# Patient Record
Sex: FEMALE | Race: ASIAN | Employment: UNEMPLOYED | URBAN - METROPOLITAN AREA
[De-identification: names, ages, dates, MRNs, and addresses within clinical notes are randomized per-mention and may not be internally consistent; named-entity substitution may affect disease eponyms.]

---

## 2017-07-31 PROBLEM — M35.1 MIXED CONNECTIVE TISSUE DISEASE (HCC): Status: ACTIVE | Noted: 2017-07-31

## 2017-07-31 PROBLEM — I73.00 RAYNAUD'S DISEASE WITHOUT GANGRENE: Status: ACTIVE | Noted: 2017-07-31

## 2017-07-31 PROCEDURE — 85613 RUSSELL VIPER VENOM DILUTED: CPT | Performed by: INTERNAL MEDICINE

## 2017-07-31 PROCEDURE — 86038 ANTINUCLEAR ANTIBODIES: CPT | Performed by: INTERNAL MEDICINE

## 2017-07-31 PROCEDURE — 85597 PHOSPHOLIPID PLTLT NEUTRALIZ: CPT | Performed by: INTERNAL MEDICINE

## 2017-07-31 PROCEDURE — 85670 THROMBIN TIME PLASMA: CPT | Performed by: INTERNAL MEDICINE

## 2017-07-31 PROCEDURE — 81001 URINALYSIS AUTO W/SCOPE: CPT | Performed by: INTERNAL MEDICINE

## 2017-07-31 PROCEDURE — 84156 ASSAY OF PROTEIN URINE: CPT | Performed by: INTERNAL MEDICINE

## 2017-07-31 PROCEDURE — 85610 PROTHROMBIN TIME: CPT | Performed by: INTERNAL MEDICINE

## 2017-07-31 PROCEDURE — 86146 BETA-2 GLYCOPROTEIN ANTIBODY: CPT | Performed by: INTERNAL MEDICINE

## 2017-07-31 PROCEDURE — 82570 ASSAY OF URINE CREATININE: CPT | Performed by: INTERNAL MEDICINE

## 2017-07-31 PROCEDURE — 86160 COMPLEMENT ANTIGEN: CPT | Performed by: INTERNAL MEDICINE

## 2017-07-31 PROCEDURE — 85732 THROMBOPLASTIN TIME PARTIAL: CPT | Performed by: INTERNAL MEDICINE

## 2017-07-31 PROCEDURE — 86147 CARDIOLIPIN ANTIBODY EA IG: CPT | Performed by: INTERNAL MEDICINE

## 2017-07-31 PROCEDURE — 87086 URINE CULTURE/COLONY COUNT: CPT | Performed by: INTERNAL MEDICINE

## 2017-07-31 PROCEDURE — 85598 HEXAGNAL PHOSPH PLTLT NEUTRL: CPT | Performed by: INTERNAL MEDICINE

## 2017-09-28 PROCEDURE — 86803 HEPATITIS C AB TEST: CPT | Performed by: INTERNAL MEDICINE

## 2017-09-28 PROCEDURE — 86480 TB TEST CELL IMMUN MEASURE: CPT | Performed by: INTERNAL MEDICINE

## 2017-09-28 PROCEDURE — 87340 HEPATITIS B SURFACE AG IA: CPT | Performed by: INTERNAL MEDICINE

## 2017-09-28 PROCEDURE — 86704 HEP B CORE ANTIBODY TOTAL: CPT | Performed by: INTERNAL MEDICINE

## 2017-09-28 PROCEDURE — 86160 COMPLEMENT ANTIGEN: CPT | Performed by: INTERNAL MEDICINE

## 2017-09-28 PROCEDURE — 86706 HEP B SURFACE ANTIBODY: CPT | Performed by: INTERNAL MEDICINE

## 2017-12-04 PROCEDURE — 85613 RUSSELL VIPER VENOM DILUTED: CPT | Performed by: INTERNAL MEDICINE

## 2017-12-04 PROCEDURE — 85705 THROMBOPLASTIN INHIBITION: CPT | Performed by: INTERNAL MEDICINE

## 2017-12-04 PROCEDURE — 85732 THROMBOPLASTIN TIME PARTIAL: CPT | Performed by: INTERNAL MEDICINE

## 2017-12-04 PROCEDURE — 85610 PROTHROMBIN TIME: CPT | Performed by: INTERNAL MEDICINE

## 2017-12-10 PROBLEM — Z79.899 HIGH RISK MEDICATION USE: Status: ACTIVE | Noted: 2017-12-10

## 2018-12-26 PROCEDURE — 86431 RHEUMATOID FACTOR QUANT: CPT | Performed by: INTERNAL MEDICINE

## 2018-12-26 PROCEDURE — 86160 COMPLEMENT ANTIGEN: CPT | Performed by: INTERNAL MEDICINE

## 2018-12-26 PROCEDURE — 83876 ASSAY MYELOPEROXIDASE: CPT | Performed by: INTERNAL MEDICINE

## 2018-12-26 PROCEDURE — 83516 IMMUNOASSAY NONANTIBODY: CPT | Performed by: INTERNAL MEDICINE

## 2018-12-26 PROCEDURE — 86225 DNA ANTIBODY NATIVE: CPT | Performed by: INTERNAL MEDICINE

## 2018-12-26 PROCEDURE — 86235 NUCLEAR ANTIGEN ANTIBODY: CPT | Performed by: INTERNAL MEDICINE

## 2018-12-26 PROCEDURE — 86038 ANTINUCLEAR ANTIBODIES: CPT | Performed by: INTERNAL MEDICINE

## 2018-12-27 ENCOUNTER — HOSPITAL ENCOUNTER (OUTPATIENT)
Age: 43
Discharge: HOME | End: 2018-12-27
Attending: FAMILY MEDICINE
Payer: COMMERCIAL

## 2018-12-27 VITALS
WEIGHT: 143.31 LBS | RESPIRATION RATE: 16 BRPM | SYSTOLIC BLOOD PRESSURE: 119 MMHG | TEMPERATURE: 99 F | BODY MASS INDEX: 26 KG/M2 | DIASTOLIC BLOOD PRESSURE: 81 MMHG | HEART RATE: 78 BPM | OXYGEN SATURATION: 100 %

## 2018-12-27 DIAGNOSIS — L97.919 ULCERS OF BOTH LOWER LEGS (HCC): Primary | ICD-10-CM

## 2018-12-27 DIAGNOSIS — Z87.898 HISTORY OF MIXED CONNECTIVE TISSUE DISEASE: ICD-10-CM

## 2018-12-27 DIAGNOSIS — M79.2 NEURALGIA: ICD-10-CM

## 2018-12-27 DIAGNOSIS — L84 PLANTAR CALLUS: ICD-10-CM

## 2018-12-27 DIAGNOSIS — L97.929 ULCERS OF BOTH LOWER LEGS (HCC): Primary | ICD-10-CM

## 2018-12-27 PROCEDURE — 99204 OFFICE O/P NEW MOD 45 MIN: CPT

## 2018-12-27 PROCEDURE — 99203 OFFICE O/P NEW LOW 30 MIN: CPT

## 2018-12-27 PROCEDURE — 99202 OFFICE O/P NEW SF 15 MIN: CPT

## 2018-12-27 NOTE — ED INITIAL ASSESSMENT (HPI)
Pt does not have PCP, Seen by Rheumatologist Dr Sumi Gil yesterday who ordered blood work which was done also advised to set up PCP appointment - 1/3  & wound clinic - appointment 1/3.   Pt has bilat lower leg wounds requesting pain control until get into her o

## 2018-12-27 NOTE — ED PROVIDER NOTES
Patient Seen in: [facility]    History   Patient presents with:  Cellulitis (integumentary, infectious)  Pain (neurologic)    Stated Complaint: wound check    HPI  64-year-old female with a  presents to immediate car well-nourished. No distress. HENT:   Head: Normocephalic and atraumatic. Right Ear: External ear normal.   Left Ear: External ear normal.   Nose: Nose normal.   Mouth/Throat: Oropharynx is clear and moist. No oropharyngeal exudate.    Eyes: Conjunctivae Disposition and Plan     Clinical Impression:  Ulcers of both lower legs ([address]  (primary encounter diagnosis)  History of mixed connective tissue disease  Neuralgia  Plantar callus    Disposition:  Discharge  12/27/2018 10:28 am    Follow-up:

## 2018-12-27 NOTE — ED NOTES
Pt and family given instruction on wound care and follow-up with the wound clinic. Pt receptive to instructions given.

## 2019-01-15 ENCOUNTER — OFFICE VISIT (OUTPATIENT)
Dept: WOUND CARE | Facility: HOSPITAL | Age: 44
End: 2019-01-15
Attending: NURSE PRACTITIONER
Payer: COMMERCIAL

## 2019-01-15 DIAGNOSIS — L94.9 LOCALIZED CONNECTIVE TISSUE DISORDER, UNSPECIFIED: ICD-10-CM

## 2019-01-15 DIAGNOSIS — L97.322 NON-PRESSURE CHRONIC ULCER OF LEFT ANKLE WITH FAT LAYER EXPOSED (HCC): Primary | ICD-10-CM

## 2019-01-15 DIAGNOSIS — L97.312 NON-PRESSURE CHRONIC ULCER OF RIGHT ANKLE WITH FAT LAYER EXPOSED (HCC): ICD-10-CM

## 2019-01-15 DIAGNOSIS — L97.929 CHRONIC VENOUS HYPERTENSION (IDIOPATHIC) WITH ULCER AND INFLAMMATION OF BILATERAL LOWER EXTREMITY (HCC): ICD-10-CM

## 2019-01-15 DIAGNOSIS — I87.333 CHRONIC VENOUS HYPERTENSION (IDIOPATHIC) WITH ULCER AND INFLAMMATION OF BILATERAL LOWER EXTREMITY (HCC): ICD-10-CM

## 2019-01-15 DIAGNOSIS — L97.919 CHRONIC VENOUS HYPERTENSION (IDIOPATHIC) WITH ULCER AND INFLAMMATION OF BILATERAL LOWER EXTREMITY (HCC): ICD-10-CM

## 2019-01-15 PROCEDURE — 99215 OFFICE O/P EST HI 40 MIN: CPT

## 2019-01-15 NOTE — PROGRESS NOTES
Subjective    Chief Complaint  This information was obtained from the patient  patient is here on initial visit, wound to both legs. wounds began 12/26/18, unknown how they started. pain to wounds 8/10, burning sensation - worst at night.     Allergies  No Cancer - No History, Diabetes - Mother, Father, Heart Disease - No History, Hypertension - No History, Kidney Disease - No History, Lung Disease - No History, Mental Illness - No History, Stroke - No History, Thyroid Problems - No History, Tuberculosis - N Medication reconciliation completed at today's visit. : Yes  History of chemotherapy?: No  History of radiation?: No    Medications  prednisone - oral 5 mg tablet        Objective    Constitutional  bp wnl for patient. Pulse Regular and wnl for patient. Chuyita RODRIGUEZ The periwound skin moisture is normal. The periwound skin exhibited: Brawny Induration, Erythema, Hemosiderosis. The periwound skin did not exhibit: Edema, Friable, Rash. The temperature of the periwound skin is Cool.  Periwound skin does not exhibit signs Plan    Additional Orders:    Wound Cleansing & Dressings  May shower with protection.   Cleanse with saline or wound cleanser - soak with PURE AND CLEAN  Enluxtra humidifiber  Medipore tape (no substitution)   Change dressing every: - ON SATURDAY    Comp Other Physician Referral - rheumatology options: 1) Chad Santos is with [employer] of [address] in [address] [facility] 2) [address]:  A follow-up appointment should be scheduled.         I discussed with the patient an

## 2019-01-22 ENCOUNTER — OFFICE VISIT (OUTPATIENT)
Dept: WOUND CARE | Facility: HOSPITAL | Age: 44
End: 2019-01-22
Attending: NURSE PRACTITIONER
Payer: COMMERCIAL

## 2019-01-22 ENCOUNTER — HOSPITAL ENCOUNTER (OUTPATIENT)
Dept: CARDIOLOGY CLINIC | Facility: HOSPITAL | Age: 44
Discharge: HOME | End: 2019-01-22
Attending: NURSE PRACTITIONER
Payer: COMMERCIAL

## 2019-01-22 DIAGNOSIS — L97.312 NON-PRESSURE CHRONIC ULCER OF ANKLE WITH FAT LAYER EXPOSED, RIGHT (HCC): ICD-10-CM

## 2019-01-22 DIAGNOSIS — L97.322 NON-PRESSURE CHRONIC ULCER OF LEFT ANKLE, WITH FAT LAYER EXPOSED (HCC): ICD-10-CM

## 2019-01-22 DIAGNOSIS — L97.322 NON-PRESSURE CHRONIC ULCER OF LEFT ANKLE, WITH FAT LAYER EXPOSED (HCC): Primary | ICD-10-CM

## 2019-01-22 PROCEDURE — 99214 OFFICE O/P EST MOD 30 MIN: CPT

## 2019-01-22 PROCEDURE — 93925 LOWER EXTREMITY STUDY: CPT | Performed by: NURSE PRACTITIONER

## 2019-01-22 NOTE — PROGRESS NOTES
Subjective    Chief Complaint  This information was obtained from the patient  patient is here for followup for bilateral leg wounds. She have vascular studies scheduled for 10 am today. She arrives with single layer tubigrip in place.  She states pain cont 1-22-19 patient returns today with spouse, she does have her vascular studies scheduled for 10 am today.  she is wearing single layer tubi , ambulates without difficulty, no acute s/s of infection, still c/o burning pain at noc has been taking gabapenti dp palpable bilaterally. Extremities free of varicosities, no edema, +hemosideran staining. Capillary refill < 3 seconds. Digits are cool and dusky in color. toenails are wnl for color, thickness and hygeine. + hairgrowth on legs.  .     Musculoskeletal:  G The periwound skin moisture is normal. The periwound skin exhibited: Brawny Induration, Hemosiderosis. The periwound skin did not exhibit: Edema. The temperature of the periwound skin is Cool. Periwound skin does not exhibit signs or symptoms of infection. Protein: Meats, beans, eggs, milk and yogurt particularly [place] yogurt), tofu, soy nuts, soy protein products  Vitamin C: Citrus fruits and juices, strawberries, tomatoes, tomato juice, peppers, baked potatoes, spinach, broccoli, cauliflower, Frederick spro

## 2019-01-29 ENCOUNTER — OFFICE VISIT (OUTPATIENT)
Dept: WOUND CARE | Facility: HOSPITAL | Age: 44
End: 2019-01-29
Attending: NURSE PRACTITIONER
Payer: COMMERCIAL

## 2019-01-29 DIAGNOSIS — L97.312 NON-PRESSURE CHRONIC ULCER OF ANKLE WITH FAT LAYER EXPOSED, RIGHT (HCC): ICD-10-CM

## 2019-01-29 DIAGNOSIS — L97.322 NON-PRESSURE CHRONIC ULCER OF LEFT ANKLE, WITH FAT LAYER EXPOSED (HCC): Primary | ICD-10-CM

## 2019-01-29 DIAGNOSIS — L97.322 NON-PRESSURE CHRONIC ULCER OF LEFT ANKLE WITH FAT LAYER EXPOSED (HCC): ICD-10-CM

## 2019-01-29 PROCEDURE — 97597 DBRDMT OPN WND 1ST 20 CM/<: CPT

## 2019-01-29 NOTE — PROGRESS NOTES
Subjective    Chief Complaint  This information was obtained from the patient  patient is here for followup for bilateral leg wounds. She arrives with single layer tubigrip in place. She states pain continues to be worse at night.      Allergies  No known a 1-29-19 patient returns today with spouse, we discussed vascular studies, wounds are improving pain is better in right lower extremity since wound has healed. patient has appt with dr Brian Flowers and then with dr Natalia Rogers at the end of february.   continue current p dp palpable bilaterally. Extremities free of varicosities, no edema, +hemosideran staining. Capillary refill < 3 seconds. Digits are cool and dusky in color. toenails are wnl for color, thickness and hygeine. + hairgrowth on legs.  .     Musculoskeletal:  G The periwound skin moisture is normal. The periwound skin exhibited: Hemosiderosis. The periwound skin did not exhibit: Brawny Induration, Edema. The temperature of the periwound skin is Cool. Periwound skin does not exhibit signs or symptoms of infection. Cleanse with saline or wound cleanser - soak with PURE AND CLEAN or VASHE  Enluxtra humidifiber  Medipore tape (no substitution)   Change dressing every: - weekly    Compression Therapy:  Tubigrip - single C BILATERALLY  Other: - MAKE SURE YOU WEAR VERY WA Manuel DOMINGUEZ, NANCY-AP, CFCN, [facility] 01/29/2019 08:10:49       Entered By: Manuel Parra on 01/29/2019 08:10:38

## 2019-01-31 ENCOUNTER — OFFICE VISIT (OUTPATIENT)
Dept: RHEUMATOLOGY | Facility: CLINIC | Age: 44
End: 2019-01-31
Payer: COMMERCIAL

## 2019-01-31 VITALS
HEART RATE: 64 BPM | DIASTOLIC BLOOD PRESSURE: 62 MMHG | BODY MASS INDEX: 23 KG/M2 | SYSTOLIC BLOOD PRESSURE: 108 MMHG | WEIGHT: 132.81 LBS | RESPIRATION RATE: 12 BRPM | TEMPERATURE: 98 F

## 2019-01-31 DIAGNOSIS — M79.10 MYALGIA: ICD-10-CM

## 2019-01-31 DIAGNOSIS — R76.8 RIBONUCLEOPROTEIN ANTIBODY POSITIVE: ICD-10-CM

## 2019-01-31 DIAGNOSIS — I73.01 RAYNAUD'S DISEASE WITH GANGRENE (HCC): ICD-10-CM

## 2019-01-31 DIAGNOSIS — M35.1 MIXED CONNECTIVE TISSUE DISEASE (HCC): Primary | ICD-10-CM

## 2019-01-31 DIAGNOSIS — Z79.899 HIGH RISK MEDICATION USE: ICD-10-CM

## 2019-01-31 DIAGNOSIS — R76.8 SS-A ANTIBODY POSITIVE: ICD-10-CM

## 2019-01-31 PROCEDURE — 99245 OFF/OP CONSLTJ NEW/EST HI 55: CPT | Performed by: INTERNAL MEDICINE

## 2019-01-31 RX ORDER — AMLODIPINE BESYLATE 2.5 MG/1
2.5 TABLET ORAL DAILY
Qty: 30 TABLET | Refills: 2 | Status: SHIPPED | OUTPATIENT
Start: 2019-01-31 | End: 2019-05-01

## 2019-01-31 RX ORDER — FOLIC ACID 1 MG/1
1 TABLET ORAL DAILY
Qty: 90 TABLET | Refills: 0 | Status: SHIPPED | OUTPATIENT
Start: 2019-01-31 | End: 2019-05-28

## 2019-01-31 RX ORDER — METHOTREXATE 2.5 MG/1
15 TABLET ORAL WEEKLY
Qty: 24 TABLET | Refills: 2 | Status: SHIPPED | OUTPATIENT
Start: 2019-01-31 | End: 2019-02-27

## 2019-01-31 RX ORDER — PREDNISONE 1 MG/1
5 TABLET ORAL DAILY
Qty: 30 TABLET | Refills: 2 | Status: SHIPPED | OUTPATIENT
Start: 2019-01-31 | End: 2019-05-28

## 2019-01-31 NOTE — PATIENT INSTRUCTIONS
-- start daily amlodipine to encourage better blood flow to your fingers/toes  -- start methotrexate 6 tabs weekly and daily folic acid 1mg   -- read about both medications below  -- continue with current dose of prednisone 5mg, be sure to be getting calci drug used to treat cancer including breast cancer, leukemia, and lymphoma. This medicine can also be used to treat psoriasis and certain kinds of arthritis. How should I use this medicine? Take this medicine by mouth with a glass of water.  Follow the dir gums, or nose  · signs and symptoms of kidney injury like trouble passing urine or change in the amount of urine  · signs and symptoms of liver injury like dark yellow or brown urine; general ill feeling or flu-like symptoms; light-colored stools; loss of red cell counts  · lung disease  · radiation therapy  · stomach ulcers  · ulcerative colitis  · an unusual or allergic reaction to methotrexate, other medicines, foods, dyes, or preservatives  · pregnant or trying to get pregnant  · breast-feeding  What sh sheet is a summary. It may not cover all possible information. If you have questions about this medicine, talk to your doctor, pharmacist, or health care provider.  Copyright© 2018 [employer]        Amlodipine tablets  Brand Name: Norvasc  What is this medici it is almost time for your next dose, take only that dose. Do not take double or extra doses. Where should I keep my medicine? Keep out of the reach of children. Store at room temperature between 59 and 86 degrees F (15 and 30 degrees C).  Protect from l Raynaud disease? A Raynaud disease attack is often triggered by cold or stress. During an attack, blood vessels suddenly narrow (called vasospasm).  This most often happens in fingers and toes. In rare cases, the nose, ears, or even tongue are affected.  Marv Schultz as beta-blockers, migraine medicine, birth control pills and others  · Injury  How is Raynaud disease diagnosed? Your description of your symptoms, a health history, and a physical exam are often enough for a diagnosis.  Blood tests and other tests may be isn’t likely to cause any permanent damage. If attacks are severe, very prolonged, or very often, skin damage may result. Controlling attacks can help prevent this. When to seek medical care  The following problems happen rarely, but they can be serious.

## 2019-02-05 ENCOUNTER — OFFICE VISIT (OUTPATIENT)
Dept: WOUND CARE | Facility: HOSPITAL | Age: 44
End: 2019-02-05
Attending: NURSE PRACTITIONER
Payer: COMMERCIAL

## 2019-02-05 DIAGNOSIS — L97.322 NON-PRESSURE CHRONIC ULCER OF LEFT ANKLE, WITH FAT LAYER EXPOSED (HCC): Primary | ICD-10-CM

## 2019-02-05 DIAGNOSIS — L97.312 NON-PRESSURE CHRONIC ULCER OF ANKLE WITH FAT LAYER EXPOSED, RIGHT (HCC): ICD-10-CM

## 2019-02-05 PROCEDURE — 99214 OFFICE O/P EST MOD 30 MIN: CPT

## 2019-02-05 NOTE — PROGRESS NOTES
Subjective    Chief Complaint  This information was obtained from the patient  patient is here for followup for bilateral leg wounds. She arrives with single layer tubigrip in place.  She states pain continues to be worse at night. has new medications but n 1-22-19 patient returns today with spouse, she does have her vascular studies scheduled for 10 am today.  she is wearing single layer tubi , ambulates without difficulty, no acute s/s of infection, still c/o burning pain at noc has been taking gabapenti Additional Information  Medication reconciliation completed at today's visit. : Yes        Objective    Constitutional  bp wnl for patient. Pulse Regular and wnl for patient. Geo Mehta Respirations easy and unlabored. Temperature wnl. Weight normal for height. Geo Mehta  Kenneth judgement and insight is intact, however assistance by family for medical decision-making. Alert and oriented times 3. Patient is anxious, but cooperative with exam and answers questions appropriately.         Assessment    Active Problems    ICD-10  (Encou S/S of Infection  Non-adherence    Care summary  Reviewed and evaluated labs. - Dec 2018 bun 7, creat 0.63, gfr 110    Wound type: - AUTOIMMUNE  Wound improving. No s/s of infection.    Compression technique: - single C TUBI  Perfusion assessed by HANNAH/TBI -

## 2019-02-12 ENCOUNTER — OFFICE VISIT (OUTPATIENT)
Dept: WOUND CARE | Facility: HOSPITAL | Age: 44
End: 2019-02-12
Attending: NURSE PRACTITIONER
Payer: COMMERCIAL

## 2019-02-12 DIAGNOSIS — L97.322 NON-PRESSURE CHRONIC ULCER OF LEFT ANKLE, WITH FAT LAYER EXPOSED (HCC): Primary | ICD-10-CM

## 2019-02-12 DIAGNOSIS — L97.312 NON-PRESSURE CHRONIC ULCER OF ANKLE WITH FAT LAYER EXPOSED, RIGHT (HCC): ICD-10-CM

## 2019-02-12 PROCEDURE — 99214 OFFICE O/P EST MOD 30 MIN: CPT

## 2019-02-12 NOTE — PROGRESS NOTES
Subjective    Chief Complaint  This information was obtained from the patient  patient is here for followup for bilateral leg wounds. Patients stated pain on the wound.      Allergies  No known allergies    HPI  This information was obtained from the patien 2-5-19 patient returns today, they saw dr. Car Beth and she was restarted on methotrexate and also amlodipine.   patient continues with pain in ble.  wound improving although there is an area of atrophie jay proximal to the wound that I am concerned may b bp wnl for patient. Pulse Regular and wnl for patient. Jayla Spies Respirations easy and unlabored. Temperature wnl. Weight normal for height. . Appearance neat and clean. Appears in no acute distress. Well nourished and well developed.  Height/Length: 64 in (162.56 cm Wound #3 Left, Posterior Ankle is an acute Deep Tissue Pressure Injury Persistent non-blanchable deep red, maroon or purple discoloration Pressure Ulcer and has received a status of Not Healed.  Initial wound encounter measurements are 1.8cm length x 0.9cm Enluxtra humidifiber  Medipore tape (no substitution)   Change dressing every: - weekly    Compression Therapy:  Tubigrip - single C BILATERALLY-MAKE SURE THERE IS NOT ANY WRINKLES AND THAT THE TUBI  GOES FROM THE BASE OF TOES TO ABOVE THE ANKLE  Other Sabino DOMINGUEZ, NANCY-AP, CFCN, [facility] 02/12/2019 08:05:38       Entered By: Sabino Norris on 02/12/2019 08:05:27

## 2019-02-19 ENCOUNTER — OFFICE VISIT (OUTPATIENT)
Dept: WOUND CARE | Facility: HOSPITAL | Age: 44
End: 2019-02-19
Attending: NURSE PRACTITIONER
Payer: COMMERCIAL

## 2019-02-19 ENCOUNTER — APPOINTMENT (OUTPATIENT)
Dept: LAB | Facility: HOSPITAL | Age: 44
End: 2019-02-19
Attending: INTERNAL MEDICINE
Payer: COMMERCIAL

## 2019-02-19 DIAGNOSIS — R76.8 RIBONUCLEOPROTEIN ANTIBODY POSITIVE: ICD-10-CM

## 2019-02-19 DIAGNOSIS — M35.1 MIXED CONNECTIVE TISSUE DISEASE (HCC): ICD-10-CM

## 2019-02-19 DIAGNOSIS — M79.10 MYALGIA: ICD-10-CM

## 2019-02-19 DIAGNOSIS — L97.312 NON-PRESSURE CHRONIC ULCER OF ANKLE WITH FAT LAYER EXPOSED, RIGHT (HCC): ICD-10-CM

## 2019-02-19 DIAGNOSIS — I73.01 RAYNAUD'S DISEASE WITH GANGRENE (HCC): ICD-10-CM

## 2019-02-19 DIAGNOSIS — R76.8 SS-A ANTIBODY POSITIVE: ICD-10-CM

## 2019-02-19 DIAGNOSIS — L97.322 NON-PRESSURE CHRONIC ULCER OF LEFT ANKLE, WITH FAT LAYER EXPOSED (HCC): Primary | ICD-10-CM

## 2019-02-19 DIAGNOSIS — L97.322 NON-PRESSURE CHRONIC ULCER OF LEFT ANKLE WITH FAT LAYER EXPOSED (HCC): ICD-10-CM

## 2019-02-19 LAB
ALBUMIN SERPL-MCNC: 4 G/DL (ref 3.4–5)
ALBUMIN/GLOB SERPL: 0.9 {RATIO} (ref 1–2)
ALP LIVER SERPL-CCNC: 89 U/L (ref 37–98)
ALT SERPL-CCNC: 17 U/L (ref 13–56)
ANION GAP SERPL CALC-SCNC: 5 MMOL/L (ref 0–18)
AST SERPL-CCNC: 18 U/L (ref 15–37)
BASOPHILS # BLD AUTO: 0.02 X10(3) UL (ref 0–0.2)
BASOPHILS NFR BLD AUTO: 0.5 %
BILIRUB SERPL-MCNC: 0.6 MG/DL (ref 0.1–2)
BUN BLD-MCNC: 10 MG/DL (ref 7–18)
BUN/CREAT SERPL: 16.1 (ref 10–20)
CALCIUM BLD-MCNC: 8.9 MG/DL (ref 8.5–10.1)
CHLORIDE SERPL-SCNC: 112 MMOL/L (ref 98–107)
CO2 SERPL-SCNC: 26 MMOL/L (ref 21–32)
CREAT BLD-MCNC: 0.62 MG/DL (ref 0.55–1.02)
CRP SERPL-MCNC: <0.29 MG/DL (ref ?–0.3)
DEPRECATED RDW RBC AUTO: 45 FL (ref 35.1–46.3)
EOSINOPHIL # BLD AUTO: 0.07 X10(3) UL (ref 0–0.7)
EOSINOPHIL NFR BLD AUTO: 1.9 %
ERYTHROCYTE [DISTWIDTH] IN BLOOD BY AUTOMATED COUNT: 14.4 % (ref 11–15)
GLOBULIN PLAS-MCNC: 4.6 G/DL (ref 2.8–4.4)
GLUCOSE BLD-MCNC: 69 MG/DL (ref 70–99)
HCT VFR BLD AUTO: 37.7 % (ref 35–48)
HGB BLD-MCNC: 11.6 G/DL (ref 12–16)
IMM GRANULOCYTES # BLD AUTO: 0.01 X10(3) UL (ref 0–1)
IMM GRANULOCYTES NFR BLD: 0.3 %
LYMPHOCYTES # BLD AUTO: 0.55 X10(3) UL (ref 1–4)
LYMPHOCYTES NFR BLD AUTO: 15.1 %
M PROTEIN MFR SERPL ELPH: 8.6 G/DL (ref 6.4–8.2)
MCH RBC QN AUTO: 26.6 PG (ref 26–34)
MCHC RBC AUTO-ENTMCNC: 30.8 G/DL (ref 31–37)
MCV RBC AUTO: 86.5 FL (ref 80–100)
MONOCYTES # BLD AUTO: 0.32 X10(3) UL (ref 0.1–1)
MONOCYTES NFR BLD AUTO: 8.8 %
NEUTROPHILS # BLD AUTO: 2.68 X10 (3) UL (ref 1.5–7.7)
NEUTROPHILS # BLD AUTO: 2.68 X10(3) UL (ref 1.5–7.7)
NEUTROPHILS NFR BLD AUTO: 73.4 %
OSMOLALITY SERPL CALC.SUM OF ELEC: 293 MOSM/KG (ref 275–295)
PLATELET # BLD AUTO: 200 10(3)UL (ref 150–450)
POTASSIUM SERPL-SCNC: 3.6 MMOL/L (ref 3.5–5.1)
RBC # BLD AUTO: 4.36 X10(6)UL (ref 3.8–5.3)
SED RATE-ML: 37 MM/HR (ref 0–25)
SODIUM SERPL-SCNC: 143 MMOL/L (ref 136–145)
WBC # BLD AUTO: 3.7 X10(3) UL (ref 4–11)

## 2019-02-19 PROCEDURE — 86235 NUCLEAR ANTIGEN ANTIBODY: CPT

## 2019-02-19 PROCEDURE — 86140 C-REACTIVE PROTEIN: CPT | Performed by: INTERNAL MEDICINE

## 2019-02-19 PROCEDURE — 80053 COMPREHEN METABOLIC PANEL: CPT | Performed by: INTERNAL MEDICINE

## 2019-02-19 PROCEDURE — 85025 COMPLETE CBC W/AUTO DIFF WBC: CPT | Performed by: INTERNAL MEDICINE

## 2019-02-19 PROCEDURE — 82595 ASSAY OF CRYOGLOBULIN: CPT

## 2019-02-19 PROCEDURE — 99214 OFFICE O/P EST MOD 30 MIN: CPT

## 2019-02-19 PROCEDURE — 83883 ASSAY NEPHELOMETRY NOT SPEC: CPT

## 2019-02-19 PROCEDURE — 84165 PROTEIN E-PHORESIS SERUM: CPT

## 2019-02-19 PROCEDURE — 36415 COLL VENOUS BLD VENIPUNCTURE: CPT | Performed by: INTERNAL MEDICINE

## 2019-02-19 PROCEDURE — 86334 IMMUNOFIX E-PHORESIS SERUM: CPT

## 2019-02-19 PROCEDURE — 85652 RBC SED RATE AUTOMATED: CPT | Performed by: INTERNAL MEDICINE

## 2019-02-19 PROCEDURE — 83516 IMMUNOASSAY NONANTIBODY: CPT

## 2019-02-19 NOTE — PROGRESS NOTES
Subjective    Chief Complaint  This information was obtained from the patient  patient is here for followup for bilateral leg wounds. Patients stated pain on the wound or new concerns.     Allergies  No known allergies    HPI  This information was obtained 2-5-19 patient returns today, they saw dr. Nicki Betancur and she was restarted on methotrexate and also amlodipine.   patient continues with pain in ble.  wound improving although there is an area of atrophie jay proximal to the wound that I am concerned may b Medication reconciliation completed at today's visit. : Yes        Objective    Constitutional  bp wnl for patient. Pulse Regular and wnl for patient. Royann Dana Respirations easy and unlabored. Temperature wnl. Weight normal for height. . Appearance neat and clean. Wound #3 Left, Posterior Ankle is an acute Deep Tissue Pressure Injury Persistent non-blanchable deep red, maroon or purple discoloration Pressure Ulcer and has received a status of Not Healed.  Subsequent wound encounter measurements are 1.9cm length x 0.7 Wound #3 Left, Posterior Ankle     Wound Cleansing & Dressings  Betadine  Change Dressing Daily and PRN    Additional Orders: Follow-Up Appointments  Return Appointment in 1 week. Misc/Additional Orders  Supplement with a daily multivitamin.   Incre Electronic Signature(s)  Signed By: Date:  Marylouise Schirmer FNP-C, NANCY-BHARGAVI, CFALISON, [facility] 02/19/2019 08:23:59       Entered By: Marylouise Schirmer on 02/19/2019 08:23:44

## 2019-02-22 LAB
ALBUMIN SERPL-MCNC: 4.61 G/DL (ref 3.1–4.5)
ALBUMIN/GLOB SERPL: 1.25 {RATIO}
ALPHA1 GLOB SERPL ELPH-MCNC: 0.19 G/DL (ref 0.1–0.3)
ALPHA2 GLOB SERPL ELPH-MCNC: 0.86 G/DL (ref 0.6–1)
B-GLOBULIN SERPL ELPH-MCNC: 0.76 G/DL (ref 0.7–1.2)
GAMMA GLOB SERPL ELPH-MCNC: 1.88 G/DL (ref 0.6–1.6)
KAPPA FREE LIGHT CHAIN: 4.21 MG/DL (ref 0.33–1.94)
KAPPA/LAMBDA FLC RATIO: 1.85 (ref 0.26–1.65)
LAMBDA FREE LIGHT CHAIN: 2.28 MG/DL (ref 0.57–2.63)
MAI PROTEIN SERPL-MCNC: 8.3 G/DL (ref 6.4–8.2)

## 2019-02-26 ENCOUNTER — OFFICE VISIT (OUTPATIENT)
Dept: WOUND CARE | Facility: HOSPITAL | Age: 44
End: 2019-02-26
Attending: NURSE PRACTITIONER
Payer: COMMERCIAL

## 2019-02-26 DIAGNOSIS — L97.312 NON-PRESSURE CHRONIC ULCER OF ANKLE WITH FAT LAYER EXPOSED, RIGHT (HCC): ICD-10-CM

## 2019-02-26 DIAGNOSIS — L97.929 CHRONIC VENOUS HYPERTENSION (IDIOPATHIC) WITH ULCER AND INFLAMMATION OF BILATERAL LOWER EXTREMITY (HCC): ICD-10-CM

## 2019-02-26 DIAGNOSIS — L97.322 NON-PRESSURE CHRONIC ULCER OF LEFT ANKLE, WITH FAT LAYER EXPOSED (HCC): Primary | ICD-10-CM

## 2019-02-26 DIAGNOSIS — I87.333 CHRONIC VENOUS HYPERTENSION (IDIOPATHIC) WITH ULCER AND INFLAMMATION OF BILATERAL LOWER EXTREMITY (HCC): ICD-10-CM

## 2019-02-26 DIAGNOSIS — L97.919 CHRONIC VENOUS HYPERTENSION (IDIOPATHIC) WITH ULCER AND INFLAMMATION OF BILATERAL LOWER EXTREMITY (HCC): ICD-10-CM

## 2019-02-26 PROCEDURE — 99214 OFFICE O/P EST MOD 30 MIN: CPT

## 2019-02-26 NOTE — PROGRESS NOTES
Subjective    Chief Complaint  This information was obtained from the patient  patient is here for followup for left leg wounds. Patients states having very little pain 3/10.     Allergies  No known allergies    HPI  This information was obtained from the p 2-5-19 patient returns today, they saw dr. Mary Alice Alonzo and she was restarted on methotrexate and also amlodipine.   patient continues with pain in ble.  wound improving although there is an area of atrophie jay proximal to the wound that I am concerned may b Respiratory: Cough, Shortness of Breath  Cardiovascular (Central/Peripheral): Chest Pain, Dyspnea on Exertion, Edema  Psychiatric: Memory Loss, Depression    Additional Information  Medication reconciliation completed at today's visit. : Yes        Parvin Hodges The periwound skin texture is normal. The periwound skin moisture is normal. The periwound skin exhibited: Atrophie Stephanie, Hemosiderosis. The periwound skin did not exhibit: Erythema. The temperature of the periwound skin is Cool.  Periwound skin does not Wound #1 Left, Medial Lower Leg     Wound Cleansing & Dressings  May shower with protection.   Cleanse with saline or wound cleanser - soak with PURE AND CLEAN or VASHE  Enluxtra humidifiber  Medipore tape (no substitution)   Change dressing every: - weekly Perfusion assessed by doppler. - PUSLATILE SIGNALS AT THE DP/PT BILATERALLY  Perfusion assessed by palpation of pulses. Last albumin date and value: - Dec 2018 albumin 3.7/tp 8.3        continue current poc.     Electronic Signature(s)  Signed By: Date:

## 2019-02-27 ENCOUNTER — OFFICE VISIT (OUTPATIENT)
Dept: RHEUMATOLOGY | Facility: CLINIC | Age: 44
End: 2019-02-27
Payer: COMMERCIAL

## 2019-02-27 VITALS
WEIGHT: 134 LBS | RESPIRATION RATE: 12 BRPM | SYSTOLIC BLOOD PRESSURE: 100 MMHG | HEART RATE: 60 BPM | DIASTOLIC BLOOD PRESSURE: 60 MMHG | BODY MASS INDEX: 23 KG/M2 | TEMPERATURE: 98 F

## 2019-02-27 DIAGNOSIS — I73.01 RAYNAUD'S DISEASE WITH GANGRENE (HCC): ICD-10-CM

## 2019-02-27 DIAGNOSIS — M35.1 MIXED CONNECTIVE TISSUE DISEASE (HCC): Primary | ICD-10-CM

## 2019-02-27 DIAGNOSIS — D72.819 LEUKOPENIA, UNSPECIFIED TYPE: ICD-10-CM

## 2019-02-27 DIAGNOSIS — R76.8 RIBONUCLEOPROTEIN ANTIBODY POSITIVE: ICD-10-CM

## 2019-02-27 DIAGNOSIS — R76.8 SS-A ANTIBODY POSITIVE: ICD-10-CM

## 2019-02-27 DIAGNOSIS — Z79.899 HIGH RISK MEDICATION USE: ICD-10-CM

## 2019-02-27 PROCEDURE — 99215 OFFICE O/P EST HI 40 MIN: CPT | Performed by: INTERNAL MEDICINE

## 2019-02-27 RX ORDER — METHOTREXATE 2.5 MG/1
12.5 TABLET ORAL WEEKLY
Qty: 24 TABLET | Refills: 2 | Status: SHIPPED | OUTPATIENT
Start: 2019-02-27 | End: 2019-04-29

## 2019-03-01 LAB
EJ (GLYCYL - TRNA SYNTHETASE) ANTIBODY: NEGATIVE
FIBRILLARIN (U3 RNP) AB, IGG: NEGATIVE
JO-1 (HISTIDY1-TRNA SYNTHETASE) AB, IGG: 1 AU/ML
KU ANTIBODY: NEGATIVE
MDA5 (CADM-140) ANTIBODY: NEGATIVE
MI-2 (NUCLEAR HELICASE PROTEIN) ANTIBODY: NEGATIVE
NXP-2 (NUCLEAR MATRIX PROTEIN-2) AB: NEGATIVE
OJ (ISOLEUCYL-TRNA SYNTHETASE) ANTIBODY: NEGATIVE
P155/140 (TIF-1 GAMMA) ANTIBODY: NEGATIVE
PL-12 (ALANYL-TRNA SYNTHETASE) ANTIBODY: NEGATIVE
PL-7 (THREONYL-TRNA SYNTHETASE) ANTIBODY: NEGATIVE
PM_SCL 100 ANTIBODY, IGG: NEGATIVE
RIBONUCLEIC PROTEIN (U1) (ENA) AB, IGG: 68 AU/ML
SAE1 (SUMO ACTIVATING ENZYME) ANTIBODY: NEGATIVE
SRP (SINGLE RECOGNITION PARTICLE) AB: NEGATIVE
SSA 52 (RO) (ENA) ANTIBODY, IGG: 226 AU/ML
SSA 60 (RO) (ENA) ANTIBODY, IGG: 120 AU/ML
TIF1-GAMMA ANTIBODY: NEGATIVE
U2 SN (SMALL NUCLEAR) RNP ANTIBODY: NEGATIVE

## 2019-03-05 ENCOUNTER — OFFICE VISIT (OUTPATIENT)
Dept: WOUND CARE | Facility: HOSPITAL | Age: 44
End: 2019-03-05
Attending: NURSE PRACTITIONER
Payer: COMMERCIAL

## 2019-03-05 DIAGNOSIS — L97.312 NON-PRESSURE CHRONIC ULCER OF ANKLE WITH FAT LAYER EXPOSED, RIGHT (HCC): ICD-10-CM

## 2019-03-05 DIAGNOSIS — L97.322 NON-PRESSURE CHRONIC ULCER OF LEFT ANKLE, WITH FAT LAYER EXPOSED (HCC): Primary | ICD-10-CM

## 2019-03-05 DIAGNOSIS — I87.333 CHRONIC VENOUS HYPERTENSION (IDIOPATHIC) WITH ULCER AND INFLAMMATION OF BILATERAL LOWER EXTREMITY (HCC): ICD-10-CM

## 2019-03-05 DIAGNOSIS — L97.919 CHRONIC VENOUS HYPERTENSION (IDIOPATHIC) WITH ULCER AND INFLAMMATION OF BILATERAL LOWER EXTREMITY (HCC): ICD-10-CM

## 2019-03-05 DIAGNOSIS — L97.929 CHRONIC VENOUS HYPERTENSION (IDIOPATHIC) WITH ULCER AND INFLAMMATION OF BILATERAL LOWER EXTREMITY (HCC): ICD-10-CM

## 2019-03-05 PROCEDURE — 99214 OFFICE O/P EST MOD 30 MIN: CPT

## 2019-03-05 NOTE — PROGRESS NOTES
Subjective    Chief Complaint  This information was obtained from the patient  Patient is here for a follow up visit for left ankle and leg wounds.     Allergies  No known allergies    HPI  This information was obtained from the patient, caregiver and chart 3-5-19 patient returns today, she saw dr Margaret Chau last week.   patient symptoms continue to improve, pain better, ulcers also improved without s/s of infection, ambulates without difficulty    Review of Systems (ROS)  This information was obtained from the pa Wound #1 Left, Medial Lower Leg is a chronic Full Thickness Venous Ulcer and has received a status of Bridged. Subsequent wound encounter measurements are 1cm length x 0.9cm width x 0.1cm depth, with an area of 0.9 sq cm and a volume of 0.09 cubic cm.  No t (Encounter Diagnosis) L89.520 - Pressure ulcer of left ankle, unstageable  (Encounter Diagnosis) L97.322 - Non-pressure chronic ulcer of left ankle with fat layer exposed  (Encounter Diagnosis) I87.333 - Chronic venous hypertension (idiopathic) with ulcer Vitamin A: Dark green, leafy vegetables, orange or yellow vegetables, cantaloupe, fortified dairy products, liver, fortified cereals  Zinc: Fortified cereals, red meats, seafood    S/S of Infection  Non-adherence    Care summary  Reviewed and evaluated lab

## 2019-03-12 ENCOUNTER — OFFICE VISIT (OUTPATIENT)
Dept: WOUND CARE | Facility: HOSPITAL | Age: 44
End: 2019-03-12
Attending: NURSE PRACTITIONER
Payer: COMMERCIAL

## 2019-03-12 DIAGNOSIS — I87.333 CHRONIC VENOUS HYPERTENSION (IDIOPATHIC) WITH ULCER AND INFLAMMATION OF BILATERAL LOWER EXTREMITY (HCC): ICD-10-CM

## 2019-03-12 DIAGNOSIS — L97.312 NON-PRESSURE CHRONIC ULCER OF ANKLE WITH FAT LAYER EXPOSED, RIGHT (HCC): ICD-10-CM

## 2019-03-12 DIAGNOSIS — L97.322 NON-PRESSURE CHRONIC ULCER OF LEFT ANKLE, WITH FAT LAYER EXPOSED (HCC): Primary | ICD-10-CM

## 2019-03-12 DIAGNOSIS — L97.929 CHRONIC VENOUS HYPERTENSION (IDIOPATHIC) WITH ULCER AND INFLAMMATION OF BILATERAL LOWER EXTREMITY (HCC): ICD-10-CM

## 2019-03-12 DIAGNOSIS — L97.919 CHRONIC VENOUS HYPERTENSION (IDIOPATHIC) WITH ULCER AND INFLAMMATION OF BILATERAL LOWER EXTREMITY (HCC): ICD-10-CM

## 2019-03-12 PROCEDURE — 99214 OFFICE O/P EST MOD 30 MIN: CPT

## 2019-03-12 NOTE — PROGRESS NOTES
Subjective    Chief Complaint  This information was obtained from the patient  Patient is here for a follow up visit for left ankle and leg wounds.     Allergies  No known allergies    HPI  This information was obtained from the patient, caregiver and chart without difficulty    3-12-19 patient returns today, wound is again smaller, slightly dry, achilles area resolved, no s/s of infection, patient able to ambulate without difficulty, pain only with palpation to the area.     Review of Systems (ROS)  This info received a status of Bridged. Subsequent wound encounter measurements are 0.3cm length x 0.2cm width with no measurable depth, with an area of 0.06 sq cm . No tunneling has been noted. No sinus tract has been noted. No undermining has been noted.  There is extremity  (Encounter Diagnosis) L94.9 - Localized connective tissue disorder, unspecified  (Encounter Diagnosis) I73.00 - Raynaud's syndrome without gangrene  (Encounter Diagnosis) Z79.52 - Long term (current) use of systemic steroids        Plan    Wound sonography of the lower extremities. 2.  Normal bilateral HANNAH's.  3.  Minimally depressed bilateral toe brachial indices. Perfusion assessed by doppler. - PUSLATILE SIGNALS AT THE DP/PT BILATERALLY  Perfusion assessed by palpation of pulses.    Last album

## 2019-03-26 ENCOUNTER — APPOINTMENT (OUTPATIENT)
Dept: LAB | Facility: HOSPITAL | Age: 44
End: 2019-03-26
Attending: INTERNAL MEDICINE
Payer: COMMERCIAL

## 2019-03-26 ENCOUNTER — OFFICE VISIT (OUTPATIENT)
Dept: RHEUMATOLOGY | Facility: CLINIC | Age: 44
End: 2019-03-26
Payer: COMMERCIAL

## 2019-03-26 ENCOUNTER — OFFICE VISIT (OUTPATIENT)
Dept: WOUND CARE | Facility: HOSPITAL | Age: 44
End: 2019-03-26
Attending: NURSE PRACTITIONER
Payer: COMMERCIAL

## 2019-03-26 VITALS
SYSTOLIC BLOOD PRESSURE: 110 MMHG | TEMPERATURE: 99 F | BODY MASS INDEX: 24 KG/M2 | HEART RATE: 68 BPM | RESPIRATION RATE: 16 BRPM | DIASTOLIC BLOOD PRESSURE: 68 MMHG | WEIGHT: 138 LBS

## 2019-03-26 DIAGNOSIS — Z79.52 CURRENT CHRONIC USE OF SYSTEMIC STEROIDS: ICD-10-CM

## 2019-03-26 DIAGNOSIS — L97.919 CHRONIC VENOUS HYPERTENSION (IDIOPATHIC) WITH ULCER AND INFLAMMATION OF BILATERAL LOWER EXTREMITY (HCC): ICD-10-CM

## 2019-03-26 DIAGNOSIS — R76.8 POSITIVE ANA (ANTINUCLEAR ANTIBODY): ICD-10-CM

## 2019-03-26 DIAGNOSIS — I73.01 RAYNAUD'S DISEASE WITH GANGRENE (HCC): ICD-10-CM

## 2019-03-26 DIAGNOSIS — R76.0 LUPUS ANTICOAGULANT POSITIVE: ICD-10-CM

## 2019-03-26 DIAGNOSIS — M35.1 MIXED CONNECTIVE TISSUE DISEASE (HCC): Primary | ICD-10-CM

## 2019-03-26 DIAGNOSIS — L97.929 CHRONIC VENOUS HYPERTENSION (IDIOPATHIC) WITH ULCER AND INFLAMMATION OF BILATERAL LOWER EXTREMITY (HCC): ICD-10-CM

## 2019-03-26 DIAGNOSIS — L97.312 NON-PRESSURE CHRONIC ULCER OF ANKLE WITH FAT LAYER EXPOSED, RIGHT (HCC): ICD-10-CM

## 2019-03-26 DIAGNOSIS — R76.8 SS-A ANTIBODY POSITIVE: ICD-10-CM

## 2019-03-26 DIAGNOSIS — L97.322 NON-PRESSURE CHRONIC ULCER OF LEFT ANKLE, WITH FAT LAYER EXPOSED (HCC): Primary | ICD-10-CM

## 2019-03-26 DIAGNOSIS — D72.819 LEUKOPENIA, UNSPECIFIED TYPE: ICD-10-CM

## 2019-03-26 DIAGNOSIS — R76.8 RIBONUCLEOPROTEIN ANTIBODY POSITIVE: ICD-10-CM

## 2019-03-26 DIAGNOSIS — Z79.899 HIGH RISK MEDICATION USE: ICD-10-CM

## 2019-03-26 DIAGNOSIS — I87.333 CHRONIC VENOUS HYPERTENSION (IDIOPATHIC) WITH ULCER AND INFLAMMATION OF BILATERAL LOWER EXTREMITY (HCC): ICD-10-CM

## 2019-03-26 LAB
BASOPHILS # BLD AUTO: 0.01 X10(3) UL (ref 0–0.2)
BASOPHILS NFR BLD AUTO: 0.2 %
DEPRECATED RDW RBC AUTO: 47.7 FL (ref 35.1–46.3)
EOSINOPHIL # BLD AUTO: 0.1 X10(3) UL (ref 0–0.7)
EOSINOPHIL NFR BLD AUTO: 2.5 %
ERYTHROCYTE [DISTWIDTH] IN BLOOD BY AUTOMATED COUNT: 15.4 % (ref 11–15)
HCT VFR BLD AUTO: 37.6 % (ref 35–48)
HGB BLD-MCNC: 12 G/DL (ref 12–16)
IMM GRANULOCYTES # BLD AUTO: 0.01 X10(3) UL (ref 0–1)
IMM GRANULOCYTES NFR BLD: 0.2 %
LYMPHOCYTES # BLD AUTO: 0.47 X10(3) UL (ref 1–4)
LYMPHOCYTES NFR BLD AUTO: 11.6 %
MCH RBC QN AUTO: 27.4 PG (ref 26–34)
MCHC RBC AUTO-ENTMCNC: 31.9 G/DL (ref 31–37)
MCV RBC AUTO: 85.8 FL (ref 80–100)
MONOCYTES # BLD AUTO: 0.54 X10(3) UL (ref 0.1–1)
MONOCYTES NFR BLD AUTO: 13.3 %
NEUTROPHILS # BLD AUTO: 2.93 X10 (3) UL (ref 1.5–7.7)
NEUTROPHILS # BLD AUTO: 2.93 X10(3) UL (ref 1.5–7.7)
NEUTROPHILS NFR BLD AUTO: 72.2 %
PLATELET # BLD AUTO: 204 10(3)UL (ref 150–450)
RBC # BLD AUTO: 4.38 X10(6)UL (ref 3.8–5.3)
WBC # BLD AUTO: 4.1 X10(3) UL (ref 4–11)

## 2019-03-26 PROCEDURE — 99213 OFFICE O/P EST LOW 20 MIN: CPT

## 2019-03-26 PROCEDURE — 36415 COLL VENOUS BLD VENIPUNCTURE: CPT | Performed by: INTERNAL MEDICINE

## 2019-03-26 PROCEDURE — 85025 COMPLETE CBC W/AUTO DIFF WBC: CPT | Performed by: INTERNAL MEDICINE

## 2019-03-26 PROCEDURE — 99215 OFFICE O/P EST HI 40 MIN: CPT | Performed by: INTERNAL MEDICINE

## 2019-03-26 NOTE — PROGRESS NOTES
?  RHEUMATOLOGY Follow up   Date of visit: 3/26/2019  ? Patient presents with:  Mixed Connective Tissue Disease: 1 month f/u. Pt states 'is doing well but does have some pain on right outside ankle for about a week now. Leg ulcer has completely healed. 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 We will recheck this after her next visit. Her myositis antibody panel was negative with exception of SSA positivity and RNP positivity.    Will have pt get repeat blood tests (CBC, CMP and infla with daily folic acid supplementation as well as added amlodipine. She had some decrease in her WBC count so we repeated this and she is here for follow up.      Currently taking: Methotrextate 5 tabs once weekly, daily folic acid supplementation, amlodipin stopped. Pt was also on azathioprine but had some cytopenias. Also had been on methotrexate but since this wasn't helping, this was discontinued. Pt has been following with wound care.  States with the topicals and wrapping, she has had some improvement connective tissue disease ([facility] [address]    • Raynaud's disease      Past Surgical History:  Past Surgical History:   Procedure Laterality Date   • TUBAL LIGATION       Family History:  Family History   Problem Relation Age of Onset   • Diabetes Mother    • Diabete clear and moist. No oropharyngeal exudate. Eyes: Conjunctivae and EOM are normal. Pupils are equal, round, and reactive to light. No scleral icterus. Neck: Normal range of motion. Neck supple. No JVD present. No tracheal deviation present.    Cardiovasc 85.8 03/26/2019    MCH 27.4 03/26/2019    MCHC 31.9 03/26/2019    RDW 15.4 (H) 03/26/2019    NEPRELIM 2.93 03/26/2019    NEPERCENT 72.2 03/26/2019    LYPERCENT 11.6 03/26/2019    MOPERCENT 13.3 03/26/2019    EOPERCENT 2.5 03/26/2019    BAPERCENT 0.2 03/26/

## 2019-03-26 NOTE — PROGRESS NOTES
Subjective    Chief Complaint  This information was obtained from the patient  Patient is here for a follow up visit for left ankle and leg wounds.     Allergies  No known allergies    HPI  This information was obtained from the patient, caregiver and chart 3-5-19 patient returns today, she saw dr Presley Salomon last week.   patient symptoms continue to improve, pain better, ulcers also improved without s/s of infection, ambulates without difficulty    3-12-19 patient returns today, wound is again smaller, slightly  bp wnl for patient. Pulse Regular and wnl for patient. Tino Chafe Respirations easy and unlabored. Temperature wnl. Weight normal for height. . Appearance neat and clean. Appears in no acute distress. Well nourished and well developed.  Height/Length: 64 in (162.56 cm (Encounter Diagnosis) I87.333 - Chronic venous hypertension (idiopathic) with ulcer and inflammation of bilateral lower extremity  (Encounter Diagnosis) L94.9 - Localized connective tissue disorder, unspecified  (Encounter Diagnosis) I73.00 - Raynaud's syn Wound improving. No s/s of infection. - fragilly healed  Compression technique: - single C TUBI  Perfusion assessed by HANNAH/TBI - January 2019:  CONCLUSION:    1. Normal bilateral arterial duplex sonography of the lower extremities.   2.  Normal bilateral A

## 2019-04-09 ENCOUNTER — APPOINTMENT (OUTPATIENT)
Dept: WOUND CARE | Facility: HOSPITAL | Age: 44
End: 2019-04-09
Attending: NURSE PRACTITIONER
Payer: COMMERCIAL

## 2019-04-29 DIAGNOSIS — I73.01 RAYNAUD'S DISEASE WITH GANGRENE (HCC): ICD-10-CM

## 2019-04-29 DIAGNOSIS — M35.1 MIXED CONNECTIVE TISSUE DISEASE (HCC): ICD-10-CM

## 2019-05-01 DIAGNOSIS — M35.1 MIXED CONNECTIVE TISSUE DISEASE (HCC): ICD-10-CM

## 2019-05-01 DIAGNOSIS — I73.01 RAYNAUD'S DISEASE WITH GANGRENE (HCC): ICD-10-CM

## 2019-05-01 RX ORDER — AMLODIPINE BESYLATE 2.5 MG/1
TABLET ORAL
Qty: 30 TABLET | Refills: 2 | Status: SHIPPED | OUTPATIENT
Start: 2019-05-01 | End: 2019-05-28

## 2019-05-01 NOTE — TELEPHONE ENCOUNTER
Ok to fill? mp      Future Appointments   Date Time Provider Noemy Langston   5/28/2019 11:00 AM Jamie Farmer [facility] Qiana Hopper

## 2019-05-28 ENCOUNTER — OFFICE VISIT (OUTPATIENT)
Dept: RHEUMATOLOGY | Facility: CLINIC | Age: 44
End: 2019-05-28
Payer: COMMERCIAL

## 2019-05-28 VITALS
RESPIRATION RATE: 12 BRPM | BODY MASS INDEX: 22 KG/M2 | DIASTOLIC BLOOD PRESSURE: 64 MMHG | TEMPERATURE: 98 F | SYSTOLIC BLOOD PRESSURE: 102 MMHG | WEIGHT: 127 LBS | HEART RATE: 60 BPM

## 2019-05-28 DIAGNOSIS — R76.8 RIBONUCLEOPROTEIN ANTIBODY POSITIVE: ICD-10-CM

## 2019-05-28 DIAGNOSIS — Z79.899 HIGH RISK MEDICATION USE: Primary | ICD-10-CM

## 2019-05-28 DIAGNOSIS — M35.1 MIXED CONNECTIVE TISSUE DISEASE (HCC): ICD-10-CM

## 2019-05-28 DIAGNOSIS — R76.8 POSITIVE ANA (ANTINUCLEAR ANTIBODY): ICD-10-CM

## 2019-05-28 DIAGNOSIS — Z79.52 CURRENT CHRONIC USE OF SYSTEMIC STEROIDS: ICD-10-CM

## 2019-05-28 DIAGNOSIS — R76.8 SS-A ANTIBODY POSITIVE: ICD-10-CM

## 2019-05-28 DIAGNOSIS — I73.01 RAYNAUD'S DISEASE WITH GANGRENE (HCC): ICD-10-CM

## 2019-05-28 PROCEDURE — 99215 OFFICE O/P EST HI 40 MIN: CPT | Performed by: INTERNAL MEDICINE

## 2019-05-28 RX ORDER — AMLODIPINE BESYLATE 2.5 MG/1
2.5 TABLET ORAL
Qty: 90 TABLET | Refills: 0 | Status: SHIPPED | OUTPATIENT
Start: 2019-05-28 | End: 2019-08-26

## 2019-05-28 RX ORDER — FOLIC ACID 1 MG/1
1 TABLET ORAL DAILY
Qty: 90 TABLET | Refills: 0 | Status: SHIPPED | OUTPATIENT
Start: 2019-05-28 | End: 2019-08-30

## 2019-05-28 RX ORDER — PREDNISONE 1 MG/1
5 TABLET ORAL DAILY
Qty: 90 TABLET | Refills: 0 | Status: SHIPPED | OUTPATIENT
Start: 2019-05-28 | End: 2019-08-30

## 2019-05-28 NOTE — PROGRESS NOTES
?  RHEUMATOLOGY Follow up   Date of visit: 5/28/2019  ? Patient presents with:  Mixed Connective Tissue Disease: 2 month f/u. Pt states 'is doing ok but does have a spot on right bottom foot. Is also  having lots of hair loss.'    ?   ASSESSMENT, DISCUSS OTC remedies for this. Advised that if the wound opens, however, she needs to be very careful it does not get infected due to the chronic prednisone and methotrexate use, this can increase her risk of infection.      For her hair loss, recommended OTC bioti Seemed like she was also on methotrexate but unclear why stopped. Since first seeing her, I restarted methotrexate with daily folic acid supplementation as well as added amlodipine. She had some decrease in her WBC count which has stabilized.  She is here recommended pt be evaluated by wound care. Also recommended tertiary center. Was previously on plaquenil, however due to the side effects, this was stopped. Pt was also on azathioprine but had some cytopenias.  Also had been on methotrexate but since this    Family hx:  Father with possible MCTD  Brothers/sisters healthy       Past Medical History:  Past Medical History:   Diagnosis Date   • Asthma    • Mixed connective tissue disease ([facility] [address]    • Raynaud's disease      Past Surgical History:  Past Surgical BESYLATE 2.5 MG Oral Tab    predniSONE 5 MG Oral Tab               ? ?  Allergies:  No Known Allergies  ? REVIEW OF SYSTEMS   ? Review of Systems   Constitutional: Negative for malaise/fatigue. HENT: Negative for hearing loss and tinnitus.     Eyes: Ne the fingers, no basilar joint tenderness of the 1st CMC bilaterally. No swelling, tenderness, redness or restriction of motion of the DIPs, PIPs, MCPs, wrists, elbows, ankles, or joints of the feet.   Bilateral shoulders with full ROM, no evidence of impin ANIONGAP 5 02/19/2019    GFRNAA 112 02/19/2019    GFRAA 129 02/19/2019    CA 8.9 02/19/2019    OSMOCALC 293 02/19/2019    ALKPHO 89 02/19/2019    AST 18 02/19/2019    ALT 17 02/19/2019    BILT 0.6 02/19/2019    TP 8.6 (H) 02/19/2019    ALB 4.0 02/19/2019

## 2019-08-30 ENCOUNTER — OFFICE VISIT (OUTPATIENT)
Dept: RHEUMATOLOGY | Facility: CLINIC | Age: 44
End: 2019-08-30
Payer: COMMERCIAL

## 2019-08-30 ENCOUNTER — LAB ENCOUNTER (OUTPATIENT)
Dept: LAB | Age: 44
End: 2019-08-30
Attending: INTERNAL MEDICINE
Payer: COMMERCIAL

## 2019-08-30 VITALS
RESPIRATION RATE: 16 BRPM | SYSTOLIC BLOOD PRESSURE: 100 MMHG | HEIGHT: 60 IN | HEART RATE: 60 BPM | WEIGHT: 132 LBS | TEMPERATURE: 98 F | DIASTOLIC BLOOD PRESSURE: 62 MMHG | BODY MASS INDEX: 25.91 KG/M2

## 2019-08-30 DIAGNOSIS — Z79.52 CURRENT CHRONIC USE OF SYSTEMIC STEROIDS: ICD-10-CM

## 2019-08-30 DIAGNOSIS — Z79.899 HIGH RISK MEDICATION USE: ICD-10-CM

## 2019-08-30 DIAGNOSIS — M35.1 MIXED CONNECTIVE TISSUE DISEASE (HCC): Primary | ICD-10-CM

## 2019-08-30 DIAGNOSIS — R76.8 RIBONUCLEOPROTEIN ANTIBODY POSITIVE: ICD-10-CM

## 2019-08-30 DIAGNOSIS — M79.604 RIGHT LEG PAIN: ICD-10-CM

## 2019-08-30 DIAGNOSIS — L65.9 HAIR LOSS: ICD-10-CM

## 2019-08-30 DIAGNOSIS — I73.01 RAYNAUD'S DISEASE WITH GANGRENE (HCC): ICD-10-CM

## 2019-08-30 DIAGNOSIS — M35.1 MIXED CONNECTIVE TISSUE DISEASE (HCC): ICD-10-CM

## 2019-08-30 DIAGNOSIS — R76.8 SS-A ANTIBODY POSITIVE: ICD-10-CM

## 2019-08-30 LAB
ALBUMIN SERPL-MCNC: 3.9 G/DL (ref 3.4–5)
ALBUMIN/GLOB SERPL: 0.9 {RATIO} (ref 1–2)
ALP LIVER SERPL-CCNC: 77 U/L (ref 37–98)
ALT SERPL-CCNC: 17 U/L (ref 13–56)
ANION GAP SERPL CALC-SCNC: 7 MMOL/L (ref 0–18)
AST SERPL-CCNC: 17 U/L (ref 15–37)
BASOPHILS # BLD AUTO: 0.02 X10(3) UL (ref 0–0.2)
BASOPHILS NFR BLD AUTO: 0.3 %
BILIRUB SERPL-MCNC: 0.3 MG/DL (ref 0.1–2)
BUN BLD-MCNC: 12 MG/DL (ref 7–18)
BUN/CREAT SERPL: 21.8 (ref 10–20)
CALCIUM BLD-MCNC: 8.8 MG/DL (ref 8.5–10.1)
CHLORIDE SERPL-SCNC: 106 MMOL/L (ref 98–112)
CO2 SERPL-SCNC: 27 MMOL/L (ref 21–32)
CREAT BLD-MCNC: 0.55 MG/DL (ref 0.55–1.02)
CRP SERPL-MCNC: <0.29 MG/DL (ref ?–0.3)
DEPRECATED RDW RBC AUTO: 49.1 FL (ref 35.1–46.3)
EOSINOPHIL # BLD AUTO: 0.04 X10(3) UL (ref 0–0.7)
EOSINOPHIL NFR BLD AUTO: 0.6 %
ERYTHROCYTE [DISTWIDTH] IN BLOOD BY AUTOMATED COUNT: 15.7 % (ref 11–15)
GLOBULIN PLAS-MCNC: 4.5 G/DL (ref 2.8–4.4)
GLUCOSE BLD-MCNC: 89 MG/DL (ref 70–99)
HCT VFR BLD AUTO: 38.2 % (ref 35–48)
HGB BLD-MCNC: 11.6 G/DL (ref 12–16)
IMM GRANULOCYTES # BLD AUTO: 0.03 X10(3) UL (ref 0–1)
IMM GRANULOCYTES NFR BLD: 0.5 %
LYMPHOCYTES # BLD AUTO: 0.42 X10(3) UL (ref 1–4)
LYMPHOCYTES NFR BLD AUTO: 6.8 %
M PROTEIN MFR SERPL ELPH: 8.4 G/DL (ref 6.4–8.2)
MCH RBC QN AUTO: 26.5 PG (ref 26–34)
MCHC RBC AUTO-ENTMCNC: 30.4 G/DL (ref 31–37)
MCV RBC AUTO: 87.2 FL (ref 80–100)
MONOCYTES # BLD AUTO: 0.53 X10(3) UL (ref 0.1–1)
MONOCYTES NFR BLD AUTO: 8.5 %
NEUTROPHILS # BLD AUTO: 5.18 X10 (3) UL (ref 1.5–7.7)
NEUTROPHILS # BLD AUTO: 5.18 X10(3) UL (ref 1.5–7.7)
NEUTROPHILS NFR BLD AUTO: 83.3 %
OSMOLALITY SERPL CALC.SUM OF ELEC: 289 MOSM/KG (ref 275–295)
PLATELET # BLD AUTO: 221 10(3)UL (ref 150–450)
POTASSIUM SERPL-SCNC: 4 MMOL/L (ref 3.5–5.1)
RBC # BLD AUTO: 4.38 X10(6)UL (ref 3.8–5.3)
SED RATE-ML: 34 MM/HR (ref 0–25)
SODIUM SERPL-SCNC: 140 MMOL/L (ref 136–145)
WBC # BLD AUTO: 6.2 X10(3) UL (ref 4–11)

## 2019-08-30 PROCEDURE — 99214 OFFICE O/P EST MOD 30 MIN: CPT | Performed by: INTERNAL MEDICINE

## 2019-08-30 PROCEDURE — 86140 C-REACTIVE PROTEIN: CPT | Performed by: INTERNAL MEDICINE

## 2019-08-30 PROCEDURE — 85025 COMPLETE CBC W/AUTO DIFF WBC: CPT | Performed by: INTERNAL MEDICINE

## 2019-08-30 PROCEDURE — 80053 COMPREHEN METABOLIC PANEL: CPT | Performed by: INTERNAL MEDICINE

## 2019-08-30 PROCEDURE — 85652 RBC SED RATE AUTOMATED: CPT | Performed by: INTERNAL MEDICINE

## 2019-08-30 RX ORDER — FOLIC ACID 1 MG/1
2 TABLET ORAL DAILY
Qty: 180 TABLET | Refills: 0 | Status: SHIPPED | OUTPATIENT
Start: 2019-08-30 | End: 2019-12-06

## 2019-08-30 RX ORDER — PREDNISONE 1 MG/1
5 TABLET ORAL DAILY
Qty: 90 TABLET | Refills: 0 | Status: SHIPPED | OUTPATIENT
Start: 2019-08-30 | End: 2019-12-06

## 2019-08-30 NOTE — PATIENT INSTRUCTIONS
-- get blood tests done today, if labs look okay, will continue current dose of methotrexate  -- increase folic acid 2mg daily   -- will try to decrease prednisone to 2.5mg daily  -- let me know if the pain over the right ankle gets worse, can consider res

## 2019-08-30 NOTE — PROGRESS NOTES
?  RHEUMATOLOGY Follow up   Date of visit: 8/30/2019? Patient presents with:  Mixed Connective Tissue Disease: 3 month f/u, has been on 5 tablets of MTX, patient has pain to her right leg and is unsure if MTX  is helping. No labs. C/O hair loss.  Also on underlying mixed connective tissue disease. Patient does have some gabapentin 100 mg tablets available from previous rheumatologist, recommended that if the pain continues/worsens to call my office and we may recommend restarting the gabapentin.     She is follow-up today. She was seen as a new patient a few months ago for second opinion regarding underlying connective tissue disease. She has been suffering from multiple leg wounds over the years.   She was diagnosed by another rheumatologist with having mixe [facility]. She was seeing Dr. Justin Garcia, diagnosed pt with mixed connective tissue disease. States her symptoms worsened in winter months- joint pain in her hands as well as change in color of her fingertips.  About one month ago, she developed a woun abdominal pain, constipation, diarrhea, or bloody stools. There are no symptoms of severe dry eyes, dry mouth, or swelling of the cheeks or under the jawbone. No fevers, chills, lymphadenopathy, night sweats, or easy bruising or bleeding.   Denies chron Encounter  folic acid 1 MG Oral Tab               predniSONE 5 MG Oral Tab               methotrexate 2.5 MG Oral Tab           ? ? Allergies:  No Known Allergies  ? REVIEW OF SYSTEMS   ?   Review of Systems   Constitutional: Negative for chills, fever, tracheal deviation present. Cardiovascular: Normal rate, regular rhythm, normal heart sounds and intact distal pulses. No murmur heard. Pulmonary/Chest: Effort normal and breath sounds normal. No respiratory distress. She has no wheezes.    Abdominal: 03/26/2019    LYMABS 0.47 (L) 03/26/2019    MOABSO 0.54 03/26/2019    EOABSO 0.10 03/26/2019    BAABSO 0.01 03/26/2019     Lab Results   Component Value Date    GLU 69 (L) 02/19/2019    BUN 10 02/19/2019    BUNCREA 16.1 02/19/2019    CREATSERUM 0.62 02/19/

## 2019-11-18 DIAGNOSIS — M35.1 MIXED CONNECTIVE TISSUE DISEASE (HCC): ICD-10-CM

## 2019-11-20 DIAGNOSIS — I73.01 RAYNAUD'S DISEASE WITH GANGRENE (HCC): ICD-10-CM

## 2019-11-20 DIAGNOSIS — M35.1 MIXED CONNECTIVE TISSUE DISEASE (HCC): ICD-10-CM

## 2019-11-20 RX ORDER — FOLIC ACID 1 MG/1
2 TABLET ORAL DAILY
Qty: 30 TABLET | Refills: 2 | Status: SHIPPED | OUTPATIENT
Start: 2019-11-20 | End: 2019-12-06

## 2019-12-06 ENCOUNTER — APPOINTMENT (OUTPATIENT)
Dept: LAB | Age: 44
End: 2019-12-06
Attending: INTERNAL MEDICINE
Payer: COMMERCIAL

## 2019-12-06 ENCOUNTER — OFFICE VISIT (OUTPATIENT)
Dept: RHEUMATOLOGY | Facility: CLINIC | Age: 44
End: 2019-12-06
Payer: COMMERCIAL

## 2019-12-06 VITALS
BODY MASS INDEX: 27 KG/M2 | HEART RATE: 72 BPM | TEMPERATURE: 98 F | RESPIRATION RATE: 12 BRPM | WEIGHT: 137 LBS | SYSTOLIC BLOOD PRESSURE: 122 MMHG | DIASTOLIC BLOOD PRESSURE: 70 MMHG

## 2019-12-06 DIAGNOSIS — Z79.899 HIGH RISK MEDICATION USE: ICD-10-CM

## 2019-12-06 DIAGNOSIS — I73.01 RAYNAUD'S DISEASE WITH GANGRENE (HCC): ICD-10-CM

## 2019-12-06 DIAGNOSIS — Z79.52 CURRENT CHRONIC USE OF SYSTEMIC STEROIDS: ICD-10-CM

## 2019-12-06 DIAGNOSIS — L65.9 HAIR LOSS: ICD-10-CM

## 2019-12-06 DIAGNOSIS — M35.1 MIXED CONNECTIVE TISSUE DISEASE (HCC): Primary | ICD-10-CM

## 2019-12-06 DIAGNOSIS — R76.8 RIBONUCLEOPROTEIN ANTIBODY POSITIVE: ICD-10-CM

## 2019-12-06 DIAGNOSIS — R76.8 SS-A ANTIBODY POSITIVE: ICD-10-CM

## 2019-12-06 PROCEDURE — 86140 C-REACTIVE PROTEIN: CPT | Performed by: INTERNAL MEDICINE

## 2019-12-06 PROCEDURE — 80053 COMPREHEN METABOLIC PANEL: CPT | Performed by: INTERNAL MEDICINE

## 2019-12-06 PROCEDURE — 99214 OFFICE O/P EST MOD 30 MIN: CPT | Performed by: INTERNAL MEDICINE

## 2019-12-06 PROCEDURE — 85652 RBC SED RATE AUTOMATED: CPT | Performed by: INTERNAL MEDICINE

## 2019-12-06 PROCEDURE — 85025 COMPLETE CBC W/AUTO DIFF WBC: CPT | Performed by: INTERNAL MEDICINE

## 2019-12-06 RX ORDER — AMLODIPINE BESYLATE 2.5 MG/1
TABLET ORAL
Refills: 0 | COMMUNITY
Start: 2019-11-19 | End: 2019-12-06

## 2019-12-06 RX ORDER — FOLIC ACID 1 MG/1
2 TABLET ORAL DAILY
Qty: 180 TABLET | Refills: 0 | Status: SHIPPED | OUTPATIENT
Start: 2019-12-06 | End: 2020-03-10

## 2019-12-06 RX ORDER — PREDNISONE 1 MG/1
2.5 TABLET ORAL DAILY
Qty: 90 TABLET | Refills: 0 | Status: SHIPPED | OUTPATIENT
Start: 2019-12-06 | End: 2020-03-10

## 2019-12-06 RX ORDER — AMLODIPINE BESYLATE 2.5 MG/1
2.5 TABLET ORAL DAILY
Qty: 30 TABLET | Refills: 0 | Status: SHIPPED | OUTPATIENT
Start: 2019-12-06 | End: 2020-01-07

## 2019-12-06 NOTE — PROGRESS NOTES
?  RHEUMATOLOGY Follow up   Date of visit: 12/6/19? Patient presents with:  Mixed Connective Tissue Disease: 3 month f/u. Pt states 'is doing well.'     ?   ASSESSMENT, DISCUSSION & PLAN   Assessment:  Mixed connective tissue disease ([facility] [address]  (primary enco time.    Plan:  Diagnoses and all orders for this visit:    Mixed connective tissue disease ([facility] [address]  -     CBC WITH DIFFERENTIAL WITH PLATELET  -     COMP METABOLIC PANEL (14)  -     C-REACTIVE PROTEIN  -     SED RATE, WESTERGREN (AUTOMATED)  -     folic aci tabs once weekly, daily folic acid supplementation, as well as prednisone 2.5mg daily. Restarted amlodipine a few months ago. Her  is here and serves to translate if pt does not understand what I'm asking/explaining.      Since her last visit, s with the topicals and wrapping, she has had some improvement of the wound size. Pt continues to have burning sensation in both legs, described as 5-6/10. She is currently on prednisone 5mg daily.    She has never gotten a biopsy of the lesion, unless sh Problem Relation Age of Onset   • Diabetes Mother    • Diabetes Father      Social History:  Social History    Tobacco Use      Smoking status: Never Smoker      Smokeless tobacco: Never Used    Alcohol use: No    Drug use: No    Medications:  amLODIPine and redness. Respiratory: Negative for cough and wheezing. Cardiovascular: Negative for chest pain, palpitations and leg swelling. Gastrointestinal: Negative for abdominal pain, constipation, diarrhea, heartburn, nausea and vomiting.    Genitourinary fingers, no basilar joint tenderness of the 1st CMC bilaterally. No swelling, tenderness, redness or restriction of motion of the DIPs, PIPs, MCPs, wrists, elbows, ankles, or joints of the feet. Bilateral shoulders with full ROM.   Bilateral knees without 08/30/2019    BILT 0.3 08/30/2019    TP 8.4 (H) 08/30/2019    ALB 3.9 08/30/2019    GLOBULIN 4.5 (H) 08/30/2019     08/30/2019    K 4.0 08/30/2019     08/30/2019    CO2 27.0 08/30/2019       Additional Labs:  08/2019  CRP normal   ESR normal

## 2020-01-07 ENCOUNTER — OFFICE VISIT (OUTPATIENT)
Dept: RHEUMATOLOGY | Facility: CLINIC | Age: 45
End: 2020-01-07
Payer: COMMERCIAL

## 2020-01-07 VITALS
HEART RATE: 68 BPM | SYSTOLIC BLOOD PRESSURE: 104 MMHG | BODY MASS INDEX: 27 KG/M2 | DIASTOLIC BLOOD PRESSURE: 68 MMHG | RESPIRATION RATE: 16 BRPM | WEIGHT: 137 LBS | TEMPERATURE: 98 F

## 2020-01-07 DIAGNOSIS — R76.8 POSITIVE ANA (ANTINUCLEAR ANTIBODY): ICD-10-CM

## 2020-01-07 DIAGNOSIS — R76.8 RIBONUCLEOPROTEIN ANTIBODY POSITIVE: ICD-10-CM

## 2020-01-07 DIAGNOSIS — L65.9 HAIR LOSS: ICD-10-CM

## 2020-01-07 DIAGNOSIS — M79.604 RIGHT LEG PAIN: ICD-10-CM

## 2020-01-07 DIAGNOSIS — Z79.899 HIGH RISK MEDICATION USE: ICD-10-CM

## 2020-01-07 DIAGNOSIS — R76.8 SS-A ANTIBODY POSITIVE: ICD-10-CM

## 2020-01-07 DIAGNOSIS — M35.1 MIXED CONNECTIVE TISSUE DISEASE (HCC): Primary | ICD-10-CM

## 2020-01-07 DIAGNOSIS — I73.01 RAYNAUD'S DISEASE WITH GANGRENE (HCC): ICD-10-CM

## 2020-01-07 PROCEDURE — 99214 OFFICE O/P EST MOD 30 MIN: CPT | Performed by: INTERNAL MEDICINE

## 2020-01-07 RX ORDER — NIFEDIPINE 30 MG/1
30 TABLET, FILM COATED, EXTENDED RELEASE ORAL DAILY
Qty: 30 TABLET | Refills: 2 | Status: SHIPPED | OUTPATIENT
Start: 2020-01-07 | End: 2020-02-18

## 2020-01-07 RX ORDER — GABAPENTIN 100 MG/1
100 CAPSULE ORAL 2 TIMES DAILY
Qty: 60 CAPSULE | Refills: 2 | Status: SHIPPED | OUTPATIENT
Start: 2020-01-07 | End: 2020-03-10

## 2020-01-07 NOTE — PROGRESS NOTES
?  RHEUMATOLOGY Follow up   Date of visit: 1/7/2020    Patient presents with:  Mixed Connective Tissue Disease: here for follow up on right ankle ulcer    ?   ASSESSMENT, DISCUSSION & PLAN   Assessment:  Mixed connective tissue disease ([address]  (primary encou from the methotrexate prior to next visit  -- will reassess antibody status prior to next visit as well  -- follow up in 2 months or sooner as needed  -- will likely order 2D ECHO as well as PFTs/CXR at next visit for screening for ILD and pulmonary hypert Future    SS-A antibody positive  -     MYOSITIS ANTIBODY COMPREHENSIVE PANEL; Future  -     WELLINGTON BY IFA, IGG; Future  -     RHEUMATOID ARTHRITIS FACTOR  -     CYCLIC CITRULLINATE PEP.  IGG; Future  -     WELLINGTON BY IFA WITH REFLEX TO ENAS; Future    Ribonucleop where previous wounds were present. She does have slight ulceration over the right ankle x 2. No other areas of ulceration. Denies any GI upset, fatigue, dizziness. No recurrence of oral ulcers. No chest pain or shortness of breath.   Overall, feels wel thighs without redness or rash.    Hx of cytopenia while on DMARDs  + ulcers over toes/bottom of feet  + pain awakening the patient from sleep  + looks thinner, however actual weight is the same.      No history of significant wrist pain or swelling, pain o Discontinued During This Encounter  amLODIPine Besylate 2.5 MG Oral Tab    ? ? Allergies:  No Known Allergies  ? REVIEW OF SYSTEMS   ? Review of Systems   Constitutional: Negative for chills, fever, malaise/fatigue and weight loss.    HENT: Negative for normal heart sounds and intact distal pulses. No murmur heard. Pulmonary/Chest: Effort normal and breath sounds normal. No respiratory distress. She has no wheezes. Abdominal: Soft. She exhibits no distension.  Musculoskeletal:         General: No defo EOABSO 0.09 12/06/2019    BAABSO 0.02 12/06/2019     Lab Results   Component Value Date    GLU 85 12/06/2019    BUN 7 12/06/2019    BUNCREA 10.9 12/06/2019    CREATSERUM 0.64 12/06/2019    ANIONGAP 4 12/06/2019    GFRNAA 110 12/06/2019    GFRAA 126 12/06/2

## 2020-01-13 RX ORDER — AMLODIPINE BESYLATE 2.5 MG/1
TABLET ORAL
Qty: 30 TABLET | Refills: 0 | OUTPATIENT
Start: 2020-01-13

## 2020-02-12 NOTE — PROGRESS NOTES
?  RHEUMATOLOGY NEW PATIENT   Date of visit: 1/31/2019  ? Patient presents with:  Establish Care: NP ref by [facility] for bilateral lower leg wounds. Pt dx w/ MCTD and raynauds. Father dx w/ MCTD. ?   ASSESSMENT, DISCUSSION & PLAN   Assessment:  Mixed con Patient will follow-up in 1 month after these blood tests are done. We discussed the risk and benefit of methotrexate therapy, including the possibility of liver toxicity.  We will monitor her liver functions in every month for the first couple months C-REACTIVE PROTEIN  -     predniSONE 5 MG Oral Tab; Take 1 tablet (5 mg total) by mouth daily. Raynaud's disease with gangrene ([facility] [address]  -     AmLODIPine Besylate 2.5 MG Oral Tab;  Take 1 tablet (2.5 mg total) by mouth daily.  -     Methotrexate Sodium 2.5 She followed with Dr. Jonas Led, as Dr. Lares Lies left. Once the wound came, he recommended pt be evaluated by wound care. Also recommended tertiary center. Was previously on plaquenil, however due to the side effects, this was stopped.   Pt was also on azathiopr infections/disease or epistaxis. Denies chronic cough or hemoptysis.      Family hx:  Father with possible MCTD  Brothers/sisters healthy     Past Medical History:  Past Medical History:   Diagnosis Date   • Asthma    • Mixed connective tissue disease ([facility] [address] urgency. Musculoskeletal: Positive for joint pain. Negative for back pain and neck pain. Skin: Negative for itching and rash. Neurological: Negative for dizziness, weakness and headaches. Endo/Heme/Allergies: Bruises/bleeds easily.    Psychiatric/Be effusion. Lymphadenopathy:     She has no cervical adenopathy. Neurological: She is alert and oriented to person, place, and time. No cranial nerve deficit. Skin: Skin is warm and dry. She is not diaphoretic. Two wounds, currently bandaged.   Abnorm MICHAEL 8/11/2017 19:05 [phone]      Study Result     X-RAY RIGHT LOWER EXTREMITY, TIBIA AND FIBULA  CLINICAL INFORMATION: Pain in right leg  COMPARISON STUDY: None. TECHNIQUE:  AP and lateral tibia and fibula, 2 views.   FINDINGS: The tibia and fibula appe normal...

## 2020-02-18 ENCOUNTER — PATIENT MESSAGE (OUTPATIENT)
Dept: RHEUMATOLOGY | Facility: CLINIC | Age: 45
End: 2020-02-18

## 2020-02-18 DIAGNOSIS — M35.1 MIXED CONNECTIVE TISSUE DISEASE (HCC): ICD-10-CM

## 2020-02-18 DIAGNOSIS — M79.604 RIGHT LEG PAIN: ICD-10-CM

## 2020-02-18 DIAGNOSIS — I73.01 RAYNAUD'S DISEASE WITH GANGRENE (HCC): ICD-10-CM

## 2020-02-18 RX ORDER — NIFEDIPINE 30 MG/1
30 TABLET, FILM COATED, EXTENDED RELEASE ORAL DAILY
Qty: 90 TABLET | Refills: 0 | Status: SHIPPED | OUTPATIENT
Start: 2020-02-18 | End: 2020-03-10

## 2020-02-18 NOTE — PROGRESS NOTES
Your appointments     Date & Time Appointment Department [place] [facility])    Mar 10, 2020 11:45 AM CDT Exam - Established with DO Melchor [address] ([facility])

## 2020-02-19 RX ORDER — AMLODIPINE BESYLATE 2.5 MG/1
2.5 TABLET ORAL DAILY
Qty: 90 TABLET | Refills: 0 | Status: SHIPPED | OUTPATIENT
Start: 2020-02-19 | End: 2020-03-10

## 2020-02-19 NOTE — TELEPHONE ENCOUNTER
From: Alicia Jones  To: Cachorro Stapleton DO  Sent: 2/18/2020 6:54 PM CST  Subject: Referral Request    Hi Dr. Nelli Otoole,    Could you please refill Amlodopine medicine as early possible . My wife missed to use this medicine from last 12 days.     Note :    Last vis

## 2020-03-10 ENCOUNTER — LAB ENCOUNTER (OUTPATIENT)
Dept: LAB | Age: 45
End: 2020-03-10
Attending: INTERNAL MEDICINE
Payer: COMMERCIAL

## 2020-03-10 ENCOUNTER — OFFICE VISIT (OUTPATIENT)
Dept: RHEUMATOLOGY | Facility: CLINIC | Age: 45
End: 2020-03-10
Payer: COMMERCIAL

## 2020-03-10 VITALS
BODY MASS INDEX: 26.5 KG/M2 | HEIGHT: 60 IN | WEIGHT: 135 LBS | TEMPERATURE: 98 F | HEART RATE: 100 BPM | RESPIRATION RATE: 18 BRPM | SYSTOLIC BLOOD PRESSURE: 96 MMHG | DIASTOLIC BLOOD PRESSURE: 62 MMHG

## 2020-03-10 DIAGNOSIS — R76.8 POSITIVE ANA (ANTINUCLEAR ANTIBODY): ICD-10-CM

## 2020-03-10 DIAGNOSIS — R76.8 SS-A ANTIBODY POSITIVE: ICD-10-CM

## 2020-03-10 DIAGNOSIS — Z79.899 HIGH RISK MEDICATION USE: ICD-10-CM

## 2020-03-10 DIAGNOSIS — R76.8 RIBONUCLEOPROTEIN ANTIBODY POSITIVE: ICD-10-CM

## 2020-03-10 DIAGNOSIS — M35.1 MIXED CONNECTIVE TISSUE DISEASE (HCC): Primary | ICD-10-CM

## 2020-03-10 DIAGNOSIS — L65.9 HAIR LOSS: ICD-10-CM

## 2020-03-10 DIAGNOSIS — Z79.52 CURRENT CHRONIC USE OF SYSTEMIC STEROIDS: ICD-10-CM

## 2020-03-10 DIAGNOSIS — I73.01 RAYNAUD'S DISEASE WITH GANGRENE (HCC): ICD-10-CM

## 2020-03-10 DIAGNOSIS — M35.1 MIXED CONNECTIVE TISSUE DISEASE (HCC): ICD-10-CM

## 2020-03-10 DIAGNOSIS — M79.604 RIGHT LEG PAIN: ICD-10-CM

## 2020-03-10 DIAGNOSIS — L97.911 ULCER OF RIGHT LOWER EXTREMITY, LIMITED TO BREAKDOWN OF SKIN (HCC): ICD-10-CM

## 2020-03-10 LAB
ALBUMIN SERPL-MCNC: 3.8 G/DL (ref 3.4–5)
ALBUMIN/GLOB SERPL: 0.7 {RATIO} (ref 1–2)
ALP LIVER SERPL-CCNC: 82 U/L (ref 37–98)
ALT SERPL-CCNC: 22 U/L (ref 13–56)
ANION GAP SERPL CALC-SCNC: 3 MMOL/L (ref 0–18)
AST SERPL-CCNC: 13 U/L (ref 15–37)
BASOPHILS # BLD AUTO: 0.02 X10(3) UL (ref 0–0.2)
BASOPHILS NFR BLD AUTO: 0.4 %
BILIRUB SERPL-MCNC: 0.4 MG/DL (ref 0.1–2)
BUN BLD-MCNC: 7 MG/DL (ref 7–18)
BUN/CREAT SERPL: 12.3 (ref 10–20)
CALCIUM BLD-MCNC: 8.9 MG/DL (ref 8.5–10.1)
CHLORIDE SERPL-SCNC: 106 MMOL/L (ref 98–112)
CO2 SERPL-SCNC: 28 MMOL/L (ref 21–32)
CREAT BLD-MCNC: 0.57 MG/DL (ref 0.55–1.02)
CRP SERPL-MCNC: 0.32 MG/DL (ref ?–0.3)
DEPRECATED RDW RBC AUTO: 45.9 FL (ref 35.1–46.3)
EOSINOPHIL # BLD AUTO: 0.08 X10(3) UL (ref 0–0.7)
EOSINOPHIL NFR BLD AUTO: 1.6 %
ERYTHROCYTE [DISTWIDTH] IN BLOOD BY AUTOMATED COUNT: 14.5 % (ref 11–15)
GLOBULIN PLAS-MCNC: 5.1 G/DL (ref 2.8–4.4)
GLUCOSE BLD-MCNC: 79 MG/DL (ref 70–99)
HCT VFR BLD AUTO: 37.8 % (ref 35–48)
HGB BLD-MCNC: 11.6 G/DL (ref 12–16)
IMM GRANULOCYTES # BLD AUTO: 0.01 X10(3) UL (ref 0–1)
IMM GRANULOCYTES NFR BLD: 0.2 %
LYMPHOCYTES # BLD AUTO: 0.46 X10(3) UL (ref 1–4)
LYMPHOCYTES NFR BLD AUTO: 9.2 %
M PROTEIN MFR SERPL ELPH: 8.9 G/DL (ref 6.4–8.2)
MCH RBC QN AUTO: 27 PG (ref 26–34)
MCHC RBC AUTO-ENTMCNC: 30.7 G/DL (ref 31–37)
MCV RBC AUTO: 88.1 FL (ref 80–100)
MONOCYTES # BLD AUTO: 0.58 X10(3) UL (ref 0.1–1)
MONOCYTES NFR BLD AUTO: 11.6 %
NEUTROPHILS # BLD AUTO: 3.85 X10 (3) UL (ref 1.5–7.7)
NEUTROPHILS # BLD AUTO: 3.85 X10(3) UL (ref 1.5–7.7)
NEUTROPHILS NFR BLD AUTO: 77 %
OSMOLALITY SERPL CALC.SUM OF ELEC: 281 MOSM/KG (ref 275–295)
PATIENT FASTING Y/N/NP: YES
PLATELET # BLD AUTO: 195 10(3)UL (ref 150–450)
POTASSIUM SERPL-SCNC: 3.7 MMOL/L (ref 3.5–5.1)
RBC # BLD AUTO: 4.29 X10(6)UL (ref 3.8–5.3)
RHEUMATOID FACT SERPL-ACNC: 13 IU/ML (ref ?–15)
SED RATE-ML: 41 MM/HR (ref 0–25)
SODIUM SERPL-SCNC: 137 MMOL/L (ref 136–145)
WBC # BLD AUTO: 5 X10(3) UL (ref 4–11)

## 2020-03-10 PROCEDURE — 86225 DNA ANTIBODY NATIVE: CPT | Performed by: INTERNAL MEDICINE

## 2020-03-10 PROCEDURE — 86038 ANTINUCLEAR ANTIBODIES: CPT | Performed by: INTERNAL MEDICINE

## 2020-03-10 PROCEDURE — 85652 RBC SED RATE AUTOMATED: CPT | Performed by: INTERNAL MEDICINE

## 2020-03-10 PROCEDURE — 85025 COMPLETE CBC W/AUTO DIFF WBC: CPT | Performed by: INTERNAL MEDICINE

## 2020-03-10 PROCEDURE — 99214 OFFICE O/P EST MOD 30 MIN: CPT | Performed by: INTERNAL MEDICINE

## 2020-03-10 PROCEDURE — 86431 RHEUMATOID FACTOR QUANT: CPT | Performed by: INTERNAL MEDICINE

## 2020-03-10 PROCEDURE — 83516 IMMUNOASSAY NONANTIBODY: CPT | Performed by: INTERNAL MEDICINE

## 2020-03-10 PROCEDURE — 86140 C-REACTIVE PROTEIN: CPT | Performed by: INTERNAL MEDICINE

## 2020-03-10 PROCEDURE — 86235 NUCLEAR ANTIGEN ANTIBODY: CPT | Performed by: INTERNAL MEDICINE

## 2020-03-10 PROCEDURE — 80053 COMPREHEN METABOLIC PANEL: CPT | Performed by: INTERNAL MEDICINE

## 2020-03-10 RX ORDER — METHOTREXATE 2.5 MG/1
12.5 TABLET ORAL WEEKLY
Qty: 60 TABLET | Refills: 0 | Status: SHIPPED | OUTPATIENT
Start: 2020-03-10 | End: 2020-05-05

## 2020-03-10 RX ORDER — PREDNISONE 1 MG/1
2.5 TABLET ORAL DAILY
Qty: 90 TABLET | Refills: 0 | Status: SHIPPED | OUTPATIENT
Start: 2020-03-10 | End: 2020-08-12

## 2020-03-10 RX ORDER — AMLODIPINE BESYLATE 2.5 MG/1
2.5 TABLET ORAL 2 TIMES DAILY
Qty: 180 TABLET | Refills: 0 | Status: SHIPPED | OUTPATIENT
Start: 2020-03-10 | End: 2020-05-05

## 2020-03-10 RX ORDER — FOLIC ACID 1 MG/1
2 TABLET ORAL DAILY
Qty: 180 TABLET | Refills: 0 | Status: SHIPPED | OUTPATIENT
Start: 2020-03-10 | End: 2020-05-05

## 2020-03-10 NOTE — PROGRESS NOTES
?  RHEUMATOLOGY Follow up   Date of visit: 03/10/2020    Patient presents with: Follow - Up: 2 month f/u    ?   ASSESSMENT, DISCUSSION & PLAN   Assessment:  Mixed connective tissue disease ([facility] [address]  (primary encounter diagnosis)  Raynaud's disease with gangre of low blood pressure (headaches, dizziness, lightheaded, etc) you may want to check your blood pressure at home as well  -- get updated labs done today  -- continue 5 tabs of methotrexate weekly for now  -- try to stop prednisone and let me know how you f who is seen for medically necessary follow-up today. She was seen as a new patient several months ago for second opinion regarding underlying connective tissue disease. She had been suffering from multiple leg wounds over the years.   She was diagnosed by a some oral prednisone which were reduced over time. She had a wound on her leg, was told it was a vascular wound. Had a laser vein surgery at that time. No recurrence until one month ago. About 3 years ago, they [facility].  She was seeing seizures. No history of prior blood clot in the legs or lungs, strokes or ischemic phenomenon. Denies nonhealing ulcers on the fingertips, trouble swallowing, or severe acid reflux.   The patient denies any history of uveitis, crampy abdominal pain, const mouth daily. Take 2 tablets (2 mg total) by mouth daily. PREDNISONE 5 MG ORAL TAB predniSONE 5 MG Oral Tab       Take 0.5 tablets (2.5 mg total) by mouth daily. Take 0.5 tablets (2.5 mg total) by mouth daily.      Medications Discontinued During Th normal.   Mouth/Throat: Oropharynx is clear and moist. No oropharyngeal exudate. Eyes: Pupils are equal, round, and reactive to light. Conjunctivae and EOM are normal. No scleral icterus. Neck: Normal range of motion. Neck supple. No JVD present.  No tr 03/10/2020    HGB 11.6 (L) 03/10/2020    HCT 37.8 03/10/2020    .0 03/10/2020    MCV 88.1 03/10/2020    MCH 27.0 03/10/2020    MCHC 30.7 (L) 03/10/2020    RDW 14.5 03/10/2020    NEPRELIM 3.85 03/10/2020    NEPERCENT 77.0 03/10/2020    LYPERCENT 9.2

## 2020-03-10 NOTE — PATIENT INSTRUCTIONS
-- increase amlodipine 2.5mg to twice daily, notify me if any signs of low blood pressure (headaches, dizziness, lightheaded, etc) you may want to check your blood pressure at home as well  -- get updated labs done today  -- continue 5 tabs of methotrexate

## 2020-03-11 ENCOUNTER — TELEPHONE (OUTPATIENT)
Dept: RHEUMATOLOGY | Facility: CLINIC | Age: 45
End: 2020-03-11

## 2020-03-11 LAB
ANA SCREEN: POSITIVE
CCP IGG SERPL-ACNC: 2.7 U/ML (ref 0–6.9)
CENTROMERE AUTOAB: <100 AU/ML (ref ?–100)
DSDNA AUTOAB: <100 IU/ML (ref ?–100)
HISTONE AUTOAB: <100 AU/ML (ref ?–100)
JO-1 AUTOAB: <100 AU/ML (ref ?–100)
RNP AUTOAB: 364 AU/ML (ref ?–100)
SCL-70 AUTOAB: <100 AU/ML (ref ?–100)
SM AUTOAB (SMITH): <100 AU/ML (ref ?–100)
SSA AUTOAB: 358 AU/ML (ref ?–100)
SSB AUTOAB: <100 AU/ML (ref ?–100)

## 2020-03-12 LAB — ANTI-NUCLEAR ANTIBODY (ANA), HEP-2 IGG: DETECTED

## 2020-03-13 RX ORDER — METHOTREXATE 2.5 MG/1
TABLET ORAL
Qty: 20 TABLET | Refills: 2 | OUTPATIENT
Start: 2020-03-13

## 2020-03-16 RX ORDER — AMLODIPINE BESYLATE 2.5 MG/1
TABLET ORAL
Qty: 30 TABLET | Refills: 2 | OUTPATIENT
Start: 2020-03-16

## 2020-03-20 LAB
EJ (GLYCYL - TRNA SYNTHETASE) ANTIBODY: NEGATIVE
FIBRILLARIN (U3 RNP) AB, IGG: NEGATIVE
JO-1 (HISTIDY1-TRNA SYNTHETASE) AB, IGG: 0 AU/ML
KU ANTIBODY: NEGATIVE
MDA5 (CADM-140) ANTIBODY: NEGATIVE
MI-2 (NUCLEAR HELICASE PROTEIN) ANTIBODY: NEGATIVE
NXP-2 (NUCLEAR MATRIX PROTEIN-2) AB: NEGATIVE
OJ (ISOLEUCYL-TRNA SYNTHETASE) ANTIBODY: NEGATIVE
P155/140 (TIF-1 GAMMA) ANTIBODY: NEGATIVE
PL-12 (ALANYL-TRNA SYNTHETASE) ANTIBODY: NEGATIVE
PL-7 (THREONYL-TRNA SYNTHETASE) ANTIBODY: NEGATIVE
PM_SCL 100 ANTIBODY, IGG: NEGATIVE
RIBONUCLEIC PROTEIN (U1) (ENA) AB, IGG: 29 AU/ML
SAE1 (SUMO ACTIVATING ENZYME) ANTIBODY: NEGATIVE
SRP (SINGLE RECOGNITION PARTICLE) AB: NEGATIVE
SSA 52 (RO) (ENA) ANTIBODY, IGG: 181 AU/ML
SSA 60 (RO) (ENA) ANTIBODY, IGG: 92 AU/ML
TIF1-GAMMA ANTIBODY: NEGATIVE

## 2020-05-05 ENCOUNTER — TELEMEDICINE (OUTPATIENT)
Dept: RHEUMATOLOGY | Facility: CLINIC | Age: 45
End: 2020-05-05

## 2020-05-05 VITALS — HEIGHT: 60 IN | BODY MASS INDEX: 26.5 KG/M2 | WEIGHT: 135 LBS

## 2020-05-05 DIAGNOSIS — R76.8 RIBONUCLEOPROTEIN ANTIBODY POSITIVE: ICD-10-CM

## 2020-05-05 DIAGNOSIS — M35.1 MIXED CONNECTIVE TISSUE DISEASE (HCC): Primary | ICD-10-CM

## 2020-05-05 DIAGNOSIS — R76.8 POSITIVE ANA (ANTINUCLEAR ANTIBODY): ICD-10-CM

## 2020-05-05 DIAGNOSIS — R76.8 SS-A ANTIBODY POSITIVE: ICD-10-CM

## 2020-05-05 DIAGNOSIS — I73.01 RAYNAUD'S DISEASE WITH GANGRENE (HCC): ICD-10-CM

## 2020-05-05 DIAGNOSIS — L65.9 HAIR LOSS: ICD-10-CM

## 2020-05-05 DIAGNOSIS — L97.911 ULCER OF RIGHT LOWER EXTREMITY, LIMITED TO BREAKDOWN OF SKIN (HCC): ICD-10-CM

## 2020-05-05 DIAGNOSIS — Z79.899 HIGH RISK MEDICATION USE: ICD-10-CM

## 2020-05-05 PROCEDURE — 99214 OFFICE O/P EST MOD 30 MIN: CPT | Performed by: INTERNAL MEDICINE

## 2020-05-05 RX ORDER — FOLIC ACID 1 MG/1
2 TABLET ORAL DAILY
Qty: 180 TABLET | Refills: 0 | Status: SHIPPED | OUTPATIENT
Start: 2020-05-05 | End: 2020-08-12

## 2020-05-05 RX ORDER — AMLODIPINE BESYLATE 2.5 MG/1
2.5 TABLET ORAL 2 TIMES DAILY
Qty: 180 TABLET | Refills: 0 | Status: SHIPPED | OUTPATIENT
Start: 2020-05-05 | End: 2020-08-24

## 2020-05-05 RX ORDER — GABAPENTIN 100 MG/1
100 CAPSULE ORAL 3 TIMES DAILY
Qty: 270 CAPSULE | Refills: 0 | Status: SHIPPED | OUTPATIENT
Start: 2020-05-05 | End: 2020-08-03

## 2020-05-05 RX ORDER — METHOTREXATE 2.5 MG/1
12.5 TABLET ORAL WEEKLY
Qty: 60 TABLET | Refills: 0 | Status: SHIPPED | OUTPATIENT
Start: 2020-05-05 | End: 2020-08-03

## 2020-05-05 NOTE — PROGRESS NOTES
[facility] TeleHealth Audio and Visual Visit   Office visit canceled due to coronavirus pandemic    This was an audio/video conversation using Epic/[employer] in lieu of an in-person visit due to need to limit person to person contact during the [facility]  Ulcer of right lower extremity, limited to breakdown of skin [facility])    Discussion:  Mrs. Jessica Pate is a 39 yo woman with previously diagnosed mixed connective tissue disease and recent ulcer/chronic wounds in her bilateral legs.  She does seem to 2mg daily and biotin supplementation  -- get chest xray to evaluate for possible ILD  -- get 2D echo (ultrasound of heart) to evaluate for pulmonary hypertension   -- follow up in 1-2 months or sooner as needed    Patient and pt's  verbalized unders disease. She had been suffering from multiple leg wounds over the years. She was diagnosed by another rheumatologist with having mixed connective tissue disorder. She was previously on Plaquenil as well as azathioprine and some dose of prednisone.   Seemed [facility]. There was concern for possible rheumatologic issue at that time. She was given some oral prednisone which were reduced over time. She had a wound on her leg, was told it was a vascular wound. Had a laser vein surgery at that time.  No recurrence unti elevated or scarring rashes, prior renal or liver disease, or history of seizures. No history of prior blood clot in the legs or lungs, strokes or ischemic phenomenon. Denies nonhealing ulcers on the fingertips, trouble swallowing, or severe acid reflux. (two) times daily. FOLIC ACID 1 MG ORAL TAB folic acid 1 MG Oral Tab       Take 2 tablets (2 mg total) by mouth daily. Take 2 tablets (2 mg total) by mouth daily.     METHOTREXATE SODIUM 2.5 MG ORAL TAB Methotrexate Sodium 2.5 MG Oral Tab       Take 5 oriented to person, place, and time. She appears well-developed and well-nourished. No distress. HENT:   Head: Normocephalic and atraumatic. Eyes: Conjunctivae and EOM are normal. No scleral icterus. Neck: No JVD present.  No tracheal deviation presen 03/10/2020    EOPERCENT 1.6 03/10/2020    BAPERCENT 0.4 03/10/2020    NE 3.85 03/10/2020    LYMABS 0.46 (L) 03/10/2020    MOABSO 0.58 03/10/2020    EOABSO 0.08 03/10/2020    BAABSO 0.02 03/10/2020     Lab Results   Component Value Date    GLU 79 03/10/2020

## 2020-07-01 ENCOUNTER — OFFICE VISIT (OUTPATIENT)
Dept: RHEUMATOLOGY | Facility: CLINIC | Age: 45
End: 2020-07-01
Payer: COMMERCIAL

## 2020-07-01 VITALS
SYSTOLIC BLOOD PRESSURE: 100 MMHG | HEIGHT: 62 IN | DIASTOLIC BLOOD PRESSURE: 60 MMHG | WEIGHT: 130 LBS | TEMPERATURE: 99 F | BODY MASS INDEX: 23.92 KG/M2 | HEART RATE: 88 BPM | RESPIRATION RATE: 16 BRPM

## 2020-07-01 DIAGNOSIS — R76.8 RIBONUCLEOPROTEIN ANTIBODY POSITIVE: ICD-10-CM

## 2020-07-01 DIAGNOSIS — I73.01 RAYNAUD'S DISEASE WITH GANGRENE (HCC): ICD-10-CM

## 2020-07-01 DIAGNOSIS — L65.9 HAIR LOSS: ICD-10-CM

## 2020-07-01 DIAGNOSIS — Z79.899 HIGH RISK MEDICATION USE: ICD-10-CM

## 2020-07-01 DIAGNOSIS — Z79.52 CURRENT CHRONIC USE OF SYSTEMIC STEROIDS: ICD-10-CM

## 2020-07-01 DIAGNOSIS — M35.1 MIXED CONNECTIVE TISSUE DISEASE (HCC): Primary | ICD-10-CM

## 2020-07-01 DIAGNOSIS — L97.911 ULCER OF RIGHT LOWER EXTREMITY, LIMITED TO BREAKDOWN OF SKIN (HCC): ICD-10-CM

## 2020-07-01 DIAGNOSIS — R76.8 SS-A ANTIBODY POSITIVE: ICD-10-CM

## 2020-07-01 DIAGNOSIS — R76.8 POSITIVE ANA (ANTINUCLEAR ANTIBODY): ICD-10-CM

## 2020-07-01 PROCEDURE — 99214 OFFICE O/P EST MOD 30 MIN: CPT | Performed by: INTERNAL MEDICINE

## 2020-07-01 NOTE — PATIENT INSTRUCTIONS
-- follow up with vascular surgeon and keep me updated with what he says  -- depending on workup, will consider adding sildenafil   -- continue current dose of amlodipine for now  -- increase methotrexate to 6 tabs once weekly and continue daily folic acid

## 2020-07-01 NOTE — PROGRESS NOTES
?  RHEUMATOLOGY Follow up   Date of visit: 07/01/2020    Patient presents with: Follow - Up: 3 month f/u. Feeling good. No aches and pains. Prednisone helped.     ?  ASSESSMENT, DISCUSSION & PLAN   Assessment:  Mixed connective tissue disease ([facility] [address]  (prima -- follow up with vascular surgeon and keep me updated with what he says  -- depending on workup, will consider adding sildenafil   -- continue current dose of amlodipine for now  -- increase methotrexate to 6 tabs once weekly and continue daily folic her last visit, there was concern for early ulcer formation and pt had only recently restarted her amlodipine after discontinuing during warmer months. Her  is here and serves to translate if pt does not understand what I'm asking/explaining. however due to the side effects, this was stopped. Pt was also on azathioprine but had some cytopenias. Also had been on methotrexate but since this wasn't helping, this was discontinued. Pt has been following with wound care.  States with the topicals a History:   Diagnosis Date   • Asthma    • Mixed connective tissue disease ([facility] [address]    • Raynaud's disease      Past Surgical History:  Past Surgical History:   Procedure Laterality Date   • TUBAL LIGATION       Family History:  Family History   Problem Relati and headaches. Endo/Heme/Allergies: Bruises/bleeds easily. Psychiatric/Behavioral: Negative for depression. The patient is not nervous/anxious and does not have insomnia.       PHYSICAL EXAM   Today's Vitals:  Temperature Blood Pressure Heart Rate Resp hyperpigmentation of skin   Abnormal nailfold capillaroscopy diffuse fingers, stable   No digital pits of fingers. Cool to touch toes without color changes; dorsalis pedis pulse intact.    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

## 2020-07-06 ENCOUNTER — PATIENT MESSAGE (OUTPATIENT)
Dept: RHEUMATOLOGY | Facility: CLINIC | Age: 45
End: 2020-07-06

## 2020-07-06 DIAGNOSIS — M35.1 MIXED CONNECTIVE TISSUE DISEASE (HCC): Primary | ICD-10-CM

## 2020-07-06 DIAGNOSIS — I73.01 RAYNAUD'S DISEASE WITH GANGRENE (HCC): ICD-10-CM

## 2020-07-07 RX ORDER — HYDROXYCHLOROQUINE SULFATE 200 MG/1
200 TABLET ORAL DAILY
Qty: 30 TABLET | Refills: 0 | Status: SHIPPED | OUTPATIENT
Start: 2020-07-07 | End: 2020-08-12

## 2020-07-07 NOTE — TELEPHONE ENCOUNTER
Called and spoke to patient's   Discussed multiple treatment options including retrying Plaquenil versus stopping methotrexate and starting CellCept versus adding monthly IVIG infusions.   Amongst these options, patient would feel most comfortable re

## 2020-07-08 ENCOUNTER — OFFICE VISIT (OUTPATIENT)
Dept: WOUND CARE | Facility: HOSPITAL | Age: 45
End: 2020-07-08
Attending: NURSE PRACTITIONER
Payer: COMMERCIAL

## 2020-07-08 DIAGNOSIS — I87.331 CHRONIC VENOUS HYPERTENSION (IDIOPATHIC) WITH ULCER AND INFLAMMATION OF RIGHT LOWER EXTREMITY (HCC): ICD-10-CM

## 2020-07-08 DIAGNOSIS — L97.919 CHRONIC VENOUS HYPERTENSION (IDIOPATHIC) WITH ULCER AND INFLAMMATION OF RIGHT LOWER EXTREMITY (HCC): ICD-10-CM

## 2020-07-08 DIAGNOSIS — I77.6 VASCULITIS DETERMINED BY BIOPSY OF SKIN (HCC): Primary | ICD-10-CM

## 2020-07-08 DIAGNOSIS — L97.312 NON-PRESSURE CHRONIC ULCER OF RIGHT ANKLE WITH FAT LAYER EXPOSED (HCC): ICD-10-CM

## 2020-07-08 PROCEDURE — 99214 OFFICE O/P EST MOD 30 MIN: CPT

## 2020-07-08 PROCEDURE — 88346 IMFLUOR 1ST 1ANTB STAIN PX: CPT

## 2020-07-08 PROCEDURE — 88350 IMFLUOR EA ADDL 1ANTB STN PX: CPT

## 2020-07-08 PROCEDURE — 29581 APPL MULTLAYER CMPRN SYS LEG: CPT

## 2020-07-08 PROCEDURE — 88305 TISSUE EXAM BY PATHOLOGIST: CPT

## 2020-07-08 PROCEDURE — 11104 PUNCH BX SKIN SINGLE LESION: CPT

## 2020-07-08 NOTE — PROGRESS NOTES
Subjective    Chief Complaint  This information was obtained from the patient   states wounds opened two weeks ago from underlying conditions. Wounds were covered with telfa and roll gauze.     Allergies  No known allergies    HPI  This information w Never smoker, Caffeine Use - none, Alcohol Use - no, Marital Status - , Cultural, Hoahaoism, or Language Concerns - none, Lives in - house, Lives with -     Medical History  This information was obtained from the patient, caregiver and chart. Wound #4 Right, Lateral Lower Leg is a chronic Full Thickness Venous Ulcer and has received a status of Not Healed.  Initial wound encounter measurements are 5.5cm length x 3.1cm width x 0.1cm depth, with an area of 17.05 sq cm and a volume of 1.705 cubic c bp wnl for patient. Pulse Regular and wnl for patient. Liborione Surinder Respirations easy and unlabored. Temperature wnl. Weight normal for height. . Appearance neat and clean. Appears in no acute distress. Well nourished and well developed.     Cardiovascular:  dp/pt palpa Wound #5 (Venous Ulcer) is located on the right, medial lower leg. There was a Punch biopsy performed by Ulises Devi NP. The skin was cleansed and prepped with anti-septic. Pain control was achieved using Local % Inj. . Tissue sample was removed with th 2.  Normal bilateral HANNAH's.  3.  Minimally depressed bilateral toe brachial indices. Perfusion assessed by doppler. - PUSLATILE SIGNALS AT THE DP/PT BILATERALLY  Perfusion assessed by palpation of pulses.   Venous/Vascular consult: - dr Tayo Keith

## 2020-07-10 ENCOUNTER — OFFICE VISIT (OUTPATIENT)
Dept: WOUND CARE | Facility: HOSPITAL | Age: 45
End: 2020-07-10
Attending: NURSE PRACTITIONER
Payer: COMMERCIAL

## 2020-07-10 DIAGNOSIS — I87.331 CHRONIC VENOUS HYPERTENSION (IDIOPATHIC) WITH ULCER AND INFLAMMATION OF RIGHT LOWER EXTREMITY (HCC): ICD-10-CM

## 2020-07-10 DIAGNOSIS — L97.919 CHRONIC VENOUS HYPERTENSION (IDIOPATHIC) WITH ULCER AND INFLAMMATION OF RIGHT LOWER EXTREMITY (HCC): ICD-10-CM

## 2020-07-10 DIAGNOSIS — L97.312 NON-PRESSURE CHRONIC ULCER OF RIGHT ANKLE WITH FAT LAYER EXPOSED (HCC): Primary | ICD-10-CM

## 2020-07-10 PROCEDURE — 29581 APPL MULTLAYER CMPRN SYS LEG: CPT

## 2020-07-15 ENCOUNTER — TELEPHONE (OUTPATIENT)
Dept: RHEUMATOLOGY | Facility: CLINIC | Age: 45
End: 2020-07-15

## 2020-07-15 ENCOUNTER — OFFICE VISIT (OUTPATIENT)
Dept: WOUND CARE | Facility: HOSPITAL | Age: 45
End: 2020-07-15
Attending: NURSE PRACTITIONER
Payer: COMMERCIAL

## 2020-07-15 DIAGNOSIS — L97.919 CHRONIC VENOUS HYPERTENSION (IDIOPATHIC) WITH ULCER AND INFLAMMATION OF RIGHT LOWER EXTREMITY (HCC): ICD-10-CM

## 2020-07-15 DIAGNOSIS — I77.6 VASCULITIS DETERMINED BY BIOPSY OF SKIN (HCC): ICD-10-CM

## 2020-07-15 DIAGNOSIS — L97.312 NON-PRESSURE CHRONIC ULCER OF RIGHT ANKLE WITH FAT LAYER EXPOSED (HCC): Primary | ICD-10-CM

## 2020-07-15 DIAGNOSIS — I87.331 CHRONIC VENOUS HYPERTENSION (IDIOPATHIC) WITH ULCER AND INFLAMMATION OF RIGHT LOWER EXTREMITY (HCC): ICD-10-CM

## 2020-07-15 DIAGNOSIS — M32.19 LUPUS VASCULITIS (HCC): Primary | ICD-10-CM

## 2020-07-15 DIAGNOSIS — I77.89 LUPUS VASCULITIS (HCC): Primary | ICD-10-CM

## 2020-07-15 PROCEDURE — 29581 APPL MULTLAYER CMPRN SYS LEG: CPT

## 2020-07-15 RX ORDER — MYCOPHENOLATE MOFETIL 500 MG/1
500 TABLET ORAL DAILY
Qty: 30 TABLET | Refills: 2 | Status: SHIPPED | OUTPATIENT
Start: 2020-07-15 | End: 2020-08-13

## 2020-07-15 NOTE — TELEPHONE ENCOUNTER
Called patient and spoke to patient's  per patient preference. Discussed test results from biopsy through wound care in detail. 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 Due to this patient does warrant further immunosuppression.   Patient is hesitant in doing

## 2020-07-15 NOTE — PROGRESS NOTES
Subjective    Chief Complaint  This information was obtained from the patient  Patient is here for a follow up visit for right leg wounds.  states the ultrasound was completed yesterday.     Allergies  No known allergies    HPI  This information was Cardiovascular (Central/Peripheral):  Other (venous disease/intervention, raynouds)  Integumentary (Hair/Skin/Nails): Dryness, Calluses/Corns, Hyperpigmentation, Ulcers  Allergic/Immunologic: Other (raynauds, mixed connective tissue disease)  Psychiatric: N Wound #5 Right, Medial Lower Leg is a chronic Full Thickness Venous Ulcer and has received a status of Not Healed.  Subsequent wound encounter measurements are 1.9cm length x 1.7cm width x 0.2cm depth, with an area of 3.23 sq cm and a volume of 0.646 cubic Active Problems    ICD-10  (Encounter Diagnosis) L93.1 - Subacute cutaneous lupus erythematosus  (Encounter Diagnosis) L97.312 - Non-pressure chronic ulcer of right ankle with fat layer exposed  (Encounter Diagnosis) L95.8 - Other vasculitis limited to the 1.  Normal bilateral arterial duplex sonography of the lower extremities. 2.  Normal bilateral HANNAH's.  3.  Minimally depressed bilateral toe brachial indices.   Perfusion assessed by doppler. - PUSLATILE SIGNALS AT THE DP/PT BILATERALLY  Perfusion assessed

## 2020-07-22 ENCOUNTER — OFFICE VISIT (OUTPATIENT)
Dept: WOUND CARE | Facility: HOSPITAL | Age: 45
End: 2020-07-22
Attending: NURSE PRACTITIONER
Payer: COMMERCIAL

## 2020-07-22 DIAGNOSIS — L97.929 CHRONIC VENOUS HYPERTENSION (IDIOPATHIC) WITH ULCER AND INFLAMMATION OF BILATERAL LOWER EXTREMITY (HCC): ICD-10-CM

## 2020-07-22 DIAGNOSIS — L95.8 OTHER VASCULITIS LIMITED TO THE SKIN: ICD-10-CM

## 2020-07-22 DIAGNOSIS — L93.1 SUBACUTE CUTANEOUS LUPUS ERYTHEMATOSUS: Primary | ICD-10-CM

## 2020-07-22 DIAGNOSIS — L97.919 CHRONIC VENOUS HYPERTENSION (IDIOPATHIC) WITH ULCER AND INFLAMMATION OF BILATERAL LOWER EXTREMITY (HCC): ICD-10-CM

## 2020-07-22 DIAGNOSIS — M36.8: ICD-10-CM

## 2020-07-22 DIAGNOSIS — I87.333 CHRONIC VENOUS HYPERTENSION (IDIOPATHIC) WITH ULCER AND INFLAMMATION OF BILATERAL LOWER EXTREMITY (HCC): ICD-10-CM

## 2020-07-22 DIAGNOSIS — L97.322 NON-PRESSURE CHRONIC ULCER OF LEFT ANKLE, WITH FAT LAYER EXPOSED (HCC): ICD-10-CM

## 2020-07-22 PROCEDURE — 29581 APPL MULTLAYER CMPRN SYS LEG: CPT

## 2020-07-22 NOTE — PROGRESS NOTES
Subjective    Chief Complaint  This information was obtained from the patient  Patient is here for a follow up visit for right leg wounds. Allergies  No known allergies    HPI  This information was obtained from the patient, caregiver and chart.   7-8-20 7-22-20 patient returns today with spouse, Vascular does not feel there is any intervention that can be done related to her venous study. rheumatology has discussed with the couple regarding medication changes and starting cellcept.   no s/s infection, amb Wound #4 Right, Lateral Lower Leg is a chronic Full Thickness Venous Ulcer and has received a status of Not Healed. Subsequent wound encounter measurements are 2.5cm length x 2.8cm width x 0.1cm depth, with an area of 7 sq cm and a volume of 0.7 cubic cm. bp wnl for patient. Pulse Regular and wnl for patient. Arch Kim Respirations easy and unlabored. Temperature wnl. Weight normal for height. . Appearance neat and clean. Appears in no acute distress. Well nourished and well developed.     Cardiovascular:  dp/pt palpa Wound #5 Right, Medial Lower Leg    Wound Cleansing & Dressings:  May shower with protection. Cleanse with Vashe  Enluxtra humidifiber  Change Dressing Every: - weekly    Compression Therapy:  Coflex 2 Layer  Avoid prolonged standing in one place.   Oma Segal

## 2020-07-29 ENCOUNTER — OFFICE VISIT (OUTPATIENT)
Dept: WOUND CARE | Facility: HOSPITAL | Age: 45
End: 2020-07-29
Attending: NURSE PRACTITIONER
Payer: COMMERCIAL

## 2020-07-29 DIAGNOSIS — L95.8 OTHER VASCULITIS LIMITED TO THE SKIN: ICD-10-CM

## 2020-07-29 DIAGNOSIS — I87.331 CHRONIC VENOUS HYPERTENSION (IDIOPATHIC) WITH ULCER AND INFLAMMATION OF RIGHT LOWER EXTREMITY (HCC): ICD-10-CM

## 2020-07-29 DIAGNOSIS — L97.919 CHRONIC VENOUS HYPERTENSION (IDIOPATHIC) WITH ULCER AND INFLAMMATION OF RIGHT LOWER EXTREMITY (HCC): ICD-10-CM

## 2020-07-29 DIAGNOSIS — L97.322 NON-PRESSURE CHRONIC ULCER OF LEFT ANKLE, WITH FAT LAYER EXPOSED (HCC): ICD-10-CM

## 2020-07-29 DIAGNOSIS — L93.1 SUBACUTE CUTANEOUS LUPUS ERYTHEMATOSUS: Primary | ICD-10-CM

## 2020-07-29 DIAGNOSIS — M36.8: ICD-10-CM

## 2020-07-29 PROCEDURE — 97597 DBRDMT OPN WND 1ST 20 CM/<: CPT

## 2020-07-29 NOTE — PROGRESS NOTES
Subjective    Chief Complaint  This information was obtained from the patient  Patient is here for a follow up visit for right leg wounds. Denies any pain at this time.     Allergies  No known allergies    HPI  This information was obtained from the patient ambulates without difficulty. wounds are improving, albeit slowly, patient reports pain is improved    7-29-20 patient returns today with spouse.   no s/s of infection, pain is better, selective debridement today, will have spouse change dressings and utili The periwound skin did not exhibit: Edema. The temperature of the periwound skin is Warm. Periwound skin does not exhibit signs or symptoms of infection. Local Pulse is Weak.     Wound #5 Right, Medial Lower Leg is a chronic Full Thickness Venous Ulcer and agitation. Calm, cooperative, and communicative. Appropriate interactions and affect.         Assessment    Active Problems    ICD-10  (Encounter Diagnosis) L93.1 - Subacute cutaneous lupus erythematosus  (Encounter Diagnosis) L97.312 - Non-pressure chronic depth; with an area of 2.1 sq cm and a volume of 0.42 cubic cm;        Plan    Wound Orders:  Wound #4 Right, Lateral Lower Leg    Wound Cleansing & Dressings:  May shower with protection.   Cleanse with Vashe  Enluxtra humidifiber  Medipore tape (no substi

## 2020-08-12 ENCOUNTER — APPOINTMENT (OUTPATIENT)
Dept: LAB | Age: 45
End: 2020-08-12
Attending: INTERNAL MEDICINE
Payer: COMMERCIAL

## 2020-08-12 ENCOUNTER — OFFICE VISIT (OUTPATIENT)
Dept: WOUND CARE | Facility: HOSPITAL | Age: 45
End: 2020-08-12
Attending: NURSE PRACTITIONER
Payer: COMMERCIAL

## 2020-08-12 ENCOUNTER — OFFICE VISIT (OUTPATIENT)
Dept: RHEUMATOLOGY | Facility: CLINIC | Age: 45
End: 2020-08-12
Payer: COMMERCIAL

## 2020-08-12 VITALS
HEART RATE: 84 BPM | TEMPERATURE: 98 F | DIASTOLIC BLOOD PRESSURE: 60 MMHG | WEIGHT: 129.38 LBS | SYSTOLIC BLOOD PRESSURE: 100 MMHG | RESPIRATION RATE: 16 BRPM | HEIGHT: 66 IN | BODY MASS INDEX: 20.79 KG/M2

## 2020-08-12 DIAGNOSIS — R76.8 RIBONUCLEOPROTEIN ANTIBODY POSITIVE: ICD-10-CM

## 2020-08-12 DIAGNOSIS — L95.8 OTHER VASCULITIS LIMITED TO THE SKIN: ICD-10-CM

## 2020-08-12 DIAGNOSIS — Z79.899 HIGH RISK MEDICATION USE: ICD-10-CM

## 2020-08-12 DIAGNOSIS — L93.1 SUBACUTE CUTANEOUS LUPUS ERYTHEMATOSUS: Primary | ICD-10-CM

## 2020-08-12 DIAGNOSIS — L97.919 CHRONIC VENOUS HYPERTENSION (IDIOPATHIC) WITH ULCER AND INFLAMMATION OF RIGHT LOWER EXTREMITY (HCC): ICD-10-CM

## 2020-08-12 DIAGNOSIS — M35.1 MIXED CONNECTIVE TISSUE DISEASE (HCC): ICD-10-CM

## 2020-08-12 DIAGNOSIS — I77.89 LUPUS VASCULITIS (HCC): Primary | ICD-10-CM

## 2020-08-12 DIAGNOSIS — I73.01 RAYNAUD'S DISEASE WITH GANGRENE (HCC): ICD-10-CM

## 2020-08-12 DIAGNOSIS — L97.322 NON-PRESSURE CHRONIC ULCER OF LEFT ANKLE, WITH FAT LAYER EXPOSED (HCC): ICD-10-CM

## 2020-08-12 DIAGNOSIS — L97.911 ULCER OF RIGHT LOWER EXTREMITY, LIMITED TO BREAKDOWN OF SKIN (HCC): ICD-10-CM

## 2020-08-12 DIAGNOSIS — Z79.52 CURRENT CHRONIC USE OF SYSTEMIC STEROIDS: ICD-10-CM

## 2020-08-12 DIAGNOSIS — M32.19 LUPUS VASCULITIS (HCC): ICD-10-CM

## 2020-08-12 DIAGNOSIS — M32.19 LUPUS VASCULITIS (HCC): Primary | ICD-10-CM

## 2020-08-12 DIAGNOSIS — R76.8 POSITIVE ANA (ANTINUCLEAR ANTIBODY): ICD-10-CM

## 2020-08-12 DIAGNOSIS — I87.331 CHRONIC VENOUS HYPERTENSION (IDIOPATHIC) WITH ULCER AND INFLAMMATION OF RIGHT LOWER EXTREMITY (HCC): ICD-10-CM

## 2020-08-12 DIAGNOSIS — I77.89 LUPUS VASCULITIS (HCC): ICD-10-CM

## 2020-08-12 DIAGNOSIS — R76.8 SS-A ANTIBODY POSITIVE: ICD-10-CM

## 2020-08-12 DIAGNOSIS — M36.8: ICD-10-CM

## 2020-08-12 LAB
ALBUMIN SERPL-MCNC: 3.9 G/DL (ref 3.4–5)
ALBUMIN/GLOB SERPL: 0.9 {RATIO} (ref 1–2)
ALP LIVER SERPL-CCNC: 75 U/L (ref 37–98)
ALT SERPL-CCNC: 15 U/L (ref 13–56)
ANION GAP SERPL CALC-SCNC: 1 MMOL/L (ref 0–18)
AST SERPL-CCNC: 13 U/L (ref 15–37)
BASOPHILS # BLD AUTO: 0.01 X10(3) UL (ref 0–0.2)
BASOPHILS NFR BLD AUTO: 0.2 %
BILIRUB SERPL-MCNC: 0.3 MG/DL (ref 0.1–2)
BUN BLD-MCNC: 7 MG/DL (ref 7–18)
BUN/CREAT SERPL: 13.2 (ref 10–20)
C3 SERPL-MCNC: 115 MG/DL (ref 90–180)
C4 SERPL-MCNC: 30.6 MG/DL (ref 10–40)
CALCIUM BLD-MCNC: 9.2 MG/DL (ref 8.5–10.1)
CHLORIDE SERPL-SCNC: 107 MMOL/L (ref 98–112)
CO2 SERPL-SCNC: 29 MMOL/L (ref 21–32)
CREAT BLD-MCNC: 0.53 MG/DL (ref 0.55–1.02)
CRP SERPL-MCNC: 0.6 MG/DL (ref ?–0.3)
DEPRECATED RDW RBC AUTO: 47.8 FL (ref 35.1–46.3)
EOSINOPHIL # BLD AUTO: 0.06 X10(3) UL (ref 0–0.7)
EOSINOPHIL NFR BLD AUTO: 1.1 %
ERYTHROCYTE [DISTWIDTH] IN BLOOD BY AUTOMATED COUNT: 14.8 % (ref 11–15)
GLOBULIN PLAS-MCNC: 4.5 G/DL (ref 2.8–4.4)
GLUCOSE BLD-MCNC: 92 MG/DL (ref 70–99)
HCT VFR BLD AUTO: 36.9 % (ref 35–48)
HGB BLD-MCNC: 11.4 G/DL (ref 12–16)
IMM GRANULOCYTES # BLD AUTO: 0.06 X10(3) UL (ref 0–1)
IMM GRANULOCYTES NFR BLD: 1.1 %
LYMPHOCYTES # BLD AUTO: 0.51 X10(3) UL (ref 1–4)
LYMPHOCYTES NFR BLD AUTO: 9.7 %
M PROTEIN MFR SERPL ELPH: 8.4 G/DL (ref 6.4–8.2)
MCH RBC QN AUTO: 27.3 PG (ref 26–34)
MCHC RBC AUTO-ENTMCNC: 30.9 G/DL (ref 31–37)
MCV RBC AUTO: 88.3 FL (ref 80–100)
MONOCYTES # BLD AUTO: 0.52 X10(3) UL (ref 0.1–1)
MONOCYTES NFR BLD AUTO: 9.9 %
NEUTROPHILS # BLD AUTO: 4.11 X10 (3) UL (ref 1.5–7.7)
NEUTROPHILS # BLD AUTO: 4.11 X10(3) UL (ref 1.5–7.7)
NEUTROPHILS NFR BLD AUTO: 78 %
OSMOLALITY SERPL CALC.SUM OF ELEC: 282 MOSM/KG (ref 275–295)
PATIENT FASTING Y/N/NP: NO
PLATELET # BLD AUTO: 192 10(3)UL (ref 150–450)
POTASSIUM SERPL-SCNC: 4.7 MMOL/L (ref 3.5–5.1)
RBC # BLD AUTO: 4.18 X10(6)UL (ref 3.8–5.3)
SED RATE-ML: 36 MM/HR (ref 0–25)
SODIUM SERPL-SCNC: 137 MMOL/L (ref 136–145)
WBC # BLD AUTO: 5.3 X10(3) UL (ref 4–11)

## 2020-08-12 PROCEDURE — 36415 COLL VENOUS BLD VENIPUNCTURE: CPT | Performed by: INTERNAL MEDICINE

## 2020-08-12 PROCEDURE — 3074F SYST BP LT 130 MM HG: CPT | Performed by: INTERNAL MEDICINE

## 2020-08-12 PROCEDURE — 85025 COMPLETE CBC W/AUTO DIFF WBC: CPT | Performed by: INTERNAL MEDICINE

## 2020-08-12 PROCEDURE — 80053 COMPREHEN METABOLIC PANEL: CPT | Performed by: INTERNAL MEDICINE

## 2020-08-12 PROCEDURE — 85652 RBC SED RATE AUTOMATED: CPT | Performed by: INTERNAL MEDICINE

## 2020-08-12 PROCEDURE — 3078F DIAST BP <80 MM HG: CPT | Performed by: INTERNAL MEDICINE

## 2020-08-12 PROCEDURE — 99214 OFFICE O/P EST MOD 30 MIN: CPT

## 2020-08-12 PROCEDURE — 86140 C-REACTIVE PROTEIN: CPT | Performed by: INTERNAL MEDICINE

## 2020-08-12 PROCEDURE — 99214 OFFICE O/P EST MOD 30 MIN: CPT | Performed by: INTERNAL MEDICINE

## 2020-08-12 PROCEDURE — 86160 COMPLEMENT ANTIGEN: CPT | Performed by: INTERNAL MEDICINE

## 2020-08-12 PROCEDURE — 3008F BODY MASS INDEX DOCD: CPT | Performed by: INTERNAL MEDICINE

## 2020-08-12 RX ORDER — PREDNISONE 2.5 MG
2.5 TABLET ORAL DAILY
Qty: 90 TABLET | Refills: 0 | Status: SHIPPED | OUTPATIENT
Start: 2020-08-12 | End: 2020-09-14

## 2020-08-12 RX ORDER — HYDROXYCHLOROQUINE SULFATE 200 MG/1
200 TABLET ORAL DAILY
Qty: 90 TABLET | Refills: 0 | Status: SHIPPED | OUTPATIENT
Start: 2020-08-12 | End: 2020-08-15

## 2020-08-12 NOTE — PROGRESS NOTES
Subjective    Chief Complaint  This information was obtained from the patient  Patient is here for a follow up visit for right leg wounds.  Patients denies any issues    Allergies  No known allergies    HPI  This information was obtained from the patient, radha ambulates without difficulty. wounds are improving, albeit slowly, patient reports pain is improved    7-29-20 patient returns today with spouse.   no s/s of infection, pain is better, selective debridement today, will have spouse change dressings and utili tunneling has been noted. No sinus tract has been noted. No undermining has been noted. There is a small amount of sero-sanguineous drainage noted which has no odor. The patient reports a wound pain of level 0/10. The wound margin is well defined.  Wound be thickness and hygeine. + hairgrowth on legs. .    Musculoskeletal:  Gait and station stable. Integumentary (Hair, Skin)  see wound documentation. Psychiatric:  Judgment and insight intact. Alert and oriented times 3.  No evidence of depression, anxiety AUTOIMMUNE  Wound improving. No s/s of infection. Perfusion assessed by HANNAH/TBI - January 2019:  CONCLUSION:    1. Normal bilateral arterial duplex sonography of the lower extremities.   2.  Normal bilateral HANNAH's.  3.  Minimally depressed bilateral toe b

## 2020-08-12 NOTE — PROGRESS NOTES
?  RHEUMATOLOGY Follow up   Date of visit: 08/12/2020    Patient presents with:  Joint Pain: one month f/u. Feeling good. no symptoms.      ?  ASSESSMENT, DISCUSSION & PLAN   Assessment:  Lupus vasculitis ([facility] [address]  (primary encounter diagnosis)  [facility] nicely. At this time, pt got labs after her visit with me, labs look stable, so will increase cellcept to 500mg twice daily, continue prednisone 2.5mg daily and plaquenil 200mg daily in addition to her amlodipine.  She will get repeat labs in another on chronic use of systemic steroids        Return in about 2 months (around 10/12/2020). ? HPI   Mark Mccullough is a 41year old female with the following active problems who is seen for medically necessary follow-up today.  She was seen as a new patient severa daily  Biotin supplementation         HPI from initial consultation  States about 8-10 years ago, she started to have pain in her joints, while living in [place]. There was concern for possible rheumatologic issue at that time.  She was given some oral predni swelling of the MCPs, pain or swelling of the ankles and bones of the feet, or new skin nodule formation. The patient denies nasal ulcers, photosensitive rash, elevated or scarring rashes, prior renal or liver disease, or history of seizures.   No history ORAL TAB Mycophenolate Mofetil 500 MG Oral Tab       Take 1 tablet (500 mg total) by mouth 2 (two) times daily. Take 1 tablet (500 mg total) by mouth daily.     PLAQUENIL 200 MG ORAL TAB PLAQUENIL 200 MG Oral Tab       Take 1 tablet (200 mg total) by mynor Encounters:  08/12/20 : 129 lb 6.4 oz (58.7 kg)   Height: 66\" Body mass index is 20.89 kg/m². Body surface area is 1.66 meters squared. Physical Exam   Constitutional: She is oriented to person, place, and time.  She appears well-developed and well review     Labs:  Lab Results   Component Value Date    WBC 5.3 08/12/2020    RBC 4.18 08/12/2020    HGB 11.4 (L) 08/12/2020    HCT 36.9 08/12/2020    .0 08/12/2020    MCV 88.3 08/12/2020    MCH 27.3 08/12/2020    MCHC 30.9 (L) 08/12/2020    RDW 14.   Few grains along basement membrane zone and grains and         clumps within superficial and upper dermal blood         vessels     Fibrinogen:  3+ deposition around superficial, upper, and                mid dermal blood vessels     COMMENTS   By direct 12/2018  SSB negative  SSA 52 and 60 positive   RNP 62  (H)  Padilla negative  Scl 70 negative   Chromatin negative  dsDNA negiatve  WELLINGTON 1:1280 speckled  RF 18 (N<15)  MPO/PR3 negative  C3 and C4 normal  CRP 0.17 normal  ESR 35 (H)     10/2018  ESR 41 (H

## 2020-08-13 RX ORDER — MYCOPHENOLATE MOFETIL 500 MG/1
500 TABLET ORAL 2 TIMES DAILY
Qty: 120 TABLET | Refills: 0 | Status: SHIPPED | OUTPATIENT
Start: 2020-08-13 | End: 2020-10-16

## 2020-08-13 NOTE — PATIENT INSTRUCTIONS
-- increase CellCept (mycophenolate) to 500mg twice daily  -- continue plaquenil 200mg daily   -- continue current dose of amlodipine for now  -- no need for folic acid but okay to complete most recent refill   -- continue prednisone 2.5mg daily   -- get u

## 2020-08-15 DIAGNOSIS — M32.19 LUPUS VASCULITIS (HCC): ICD-10-CM

## 2020-08-15 DIAGNOSIS — M35.1 MIXED CONNECTIVE TISSUE DISEASE (HCC): ICD-10-CM

## 2020-08-15 DIAGNOSIS — I77.89 LUPUS VASCULITIS (HCC): ICD-10-CM

## 2020-08-15 DIAGNOSIS — I73.01 RAYNAUD'S DISEASE WITH GANGRENE (HCC): ICD-10-CM

## 2020-08-17 RX ORDER — HYDROXYCHLOROQUINE SULFATE 200 MG/1
200 TABLET ORAL DAILY
Qty: 90 TABLET | Refills: 0 | Status: SHIPPED | OUTPATIENT
Start: 2020-08-17 | End: 2020-09-14

## 2020-08-22 DIAGNOSIS — L97.911 ULCER OF RIGHT LOWER EXTREMITY, LIMITED TO BREAKDOWN OF SKIN (HCC): ICD-10-CM

## 2020-08-22 DIAGNOSIS — I73.01 RAYNAUD'S DISEASE WITH GANGRENE (HCC): ICD-10-CM

## 2020-08-22 DIAGNOSIS — M35.1 MIXED CONNECTIVE TISSUE DISEASE (HCC): ICD-10-CM

## 2020-08-24 RX ORDER — AMLODIPINE BESYLATE 2.5 MG/1
TABLET ORAL
Qty: 60 TABLET | Refills: 2 | Status: SHIPPED | OUTPATIENT
Start: 2020-08-24 | End: 2020-10-16

## 2020-08-24 NOTE — TELEPHONE ENCOUNTER
Future Appointments   Date Time Provider Noemy Langston   10/13/2020 11:15 AM Rhonda Day DO [facility] [place] [place]   12/16/2020  9:00 AM Diana Farmer DO [facility] [place]

## 2020-08-26 ENCOUNTER — OFFICE VISIT (OUTPATIENT)
Dept: WOUND CARE | Facility: HOSPITAL | Age: 45
End: 2020-08-26
Attending: NURSE PRACTITIONER
Payer: COMMERCIAL

## 2020-08-26 DIAGNOSIS — I87.331 CHRONIC VENOUS HYPERTENSION (IDIOPATHIC) WITH ULCER AND INFLAMMATION OF RIGHT LOWER EXTREMITY (HCC): ICD-10-CM

## 2020-08-26 DIAGNOSIS — L97.322 NON-PRESSURE CHRONIC ULCER OF LEFT ANKLE, WITH FAT LAYER EXPOSED (HCC): ICD-10-CM

## 2020-08-26 DIAGNOSIS — M36.8: ICD-10-CM

## 2020-08-26 DIAGNOSIS — L93.1 SUBACUTE CUTANEOUS LUPUS ERYTHEMATOSUS: Primary | ICD-10-CM

## 2020-08-26 DIAGNOSIS — L97.919 CHRONIC VENOUS HYPERTENSION (IDIOPATHIC) WITH ULCER AND INFLAMMATION OF RIGHT LOWER EXTREMITY (HCC): ICD-10-CM

## 2020-08-26 DIAGNOSIS — L95.8 OTHER VASCULITIS LIMITED TO THE SKIN: ICD-10-CM

## 2020-08-26 PROCEDURE — 99214 OFFICE O/P EST MOD 30 MIN: CPT

## 2020-08-26 NOTE — PROGRESS NOTES
Subjective    Chief Complaint  This information was obtained from the patient  Patient is here for a wound care follow up. She denies any new wound concerns or current pain.     Allergies  No known allergies    HPI  This information was obtained from the pa infection, ambulates without difficulty. wounds are improving, albeit slowly, patient reports pain is improved    7-29-20 patient returns today with spouse.   no s/s of infection, pain is better, selective debridement today, will have spouse change dressing Right, Lateral Lower Leg is a chronic Full Thickness Vasculitic Ulcer and has received an outcome of Resolved. Subsequent wound encounter measurements are 0cm length x 0cm width with no measurable depth, with an area of 0 sq cm .  No tunneling has been note nourished and well developed. Cardiovascular:  dp/pt palpable bilaterally. left lower Extremity + varicosities, minimal edema, hyperpigmentation. Capillary refill < 3 seconds. Digits are warm. toenails are wnl for color, thickness and hygeine.  + Antwon Looney - March 2020 bun 7, creat 0.57, [id number]  Dec 2018 bun 7, creat 0.63, gfr 110    Reviewed hospital records to include: - biopsy results: +DIF  Wound type: - AUTOIMMUNE  Wound improving. No s/s of infection.   Perfusion assessed by HANNAH/TBI - January 2019:  CON

## 2020-09-09 ENCOUNTER — OFFICE VISIT (OUTPATIENT)
Dept: WOUND CARE | Facility: HOSPITAL | Age: 45
End: 2020-09-09
Attending: NURSE PRACTITIONER
Payer: COMMERCIAL

## 2020-09-09 DIAGNOSIS — L97.919 CHRONIC VENOUS HYPERTENSION (IDIOPATHIC) WITH ULCER AND INFLAMMATION OF RIGHT LOWER EXTREMITY (HCC): ICD-10-CM

## 2020-09-09 DIAGNOSIS — L95.8 OTHER VASCULITIS LIMITED TO THE SKIN: ICD-10-CM

## 2020-09-09 DIAGNOSIS — L93.1 SUBACUTE CUTANEOUS LUPUS ERYTHEMATOSUS: Primary | ICD-10-CM

## 2020-09-09 DIAGNOSIS — I87.331 CHRONIC VENOUS HYPERTENSION (IDIOPATHIC) WITH ULCER AND INFLAMMATION OF RIGHT LOWER EXTREMITY (HCC): ICD-10-CM

## 2020-09-09 DIAGNOSIS — L97.322 NON-PRESSURE CHRONIC ULCER OF LEFT ANKLE, WITH FAT LAYER EXPOSED (HCC): ICD-10-CM

## 2020-09-09 DIAGNOSIS — M36.8: ICD-10-CM

## 2020-09-09 PROCEDURE — 99214 OFFICE O/P EST MOD 30 MIN: CPT

## 2020-09-09 NOTE — PROGRESS NOTES
Subjective    Chief Complaint  This information was obtained from the patient  Patient is here for a wound care follow up.  Patients denies any pain on the wound    Allergies  No known allergies    HPI  This information was obtained from the patient, hasmukh ambulates without difficulty. wounds are improving, albeit slowly, patient reports pain is improved    7-29-20 patient returns today with spouse.   no s/s of infection, pain is better, selective debridement today, will have spouse change dressings and utili Fatigue, Fever, Loss of Appetite, Marked Weight Change, Night Sweats, Chills  Respiratory: Cough, Shortness of Breath  Cardiovascular (Central/Peripheral): Chest Pain, Dyspnea on Exertion, Edema  Musculoskeletal: Backache, Contractures, Assistive Devices documentation. Psychiatric:  Judgment and insight intact. Alert and oriented times 3. Patient is anxious, but cooperative with exam and answers questions appropriately.         Assessment    Active Problems    ICD-10  (Encounter Diagnosis) L93.1 - Subacu albumin 3.8/tp8.9        discussed cleansing and moisturization.   continued use of light compression stockings indefinitely    Electronic Signature(s)  Signed By: Date:  Shun DOMINGUEZ, NANCY-AP, CFCN, CSWS, WCC, [facility] 09/09/2020 08:23:39       Entered

## 2020-09-12 DIAGNOSIS — I73.01 RAYNAUD'S DISEASE WITH GANGRENE (HCC): ICD-10-CM

## 2020-09-12 DIAGNOSIS — I77.89 LUPUS VASCULITIS (HCC): ICD-10-CM

## 2020-09-12 DIAGNOSIS — M32.19 LUPUS VASCULITIS (HCC): ICD-10-CM

## 2020-09-12 DIAGNOSIS — M35.1 MIXED CONNECTIVE TISSUE DISEASE (HCC): ICD-10-CM

## 2020-09-14 DIAGNOSIS — M35.1 MIXED CONNECTIVE TISSUE DISEASE (HCC): ICD-10-CM

## 2020-09-14 DIAGNOSIS — I77.89 LUPUS VASCULITIS (HCC): ICD-10-CM

## 2020-09-14 DIAGNOSIS — M32.19 LUPUS VASCULITIS (HCC): ICD-10-CM

## 2020-09-14 RX ORDER — HYDROXYCHLOROQUINE SULFATE 200 MG/1
TABLET, FILM COATED ORAL
Qty: 30 TABLET | Refills: 0 | Status: SHIPPED | OUTPATIENT
Start: 2020-09-14 | End: 2020-10-08

## 2020-09-14 RX ORDER — PREDNISONE 2.5 MG
2.5 TABLET ORAL DAILY
Qty: 90 TABLET | Refills: 0 | Status: SHIPPED | OUTPATIENT
Start: 2020-09-14 | End: 2020-10-16

## 2020-10-08 DIAGNOSIS — M35.1 MIXED CONNECTIVE TISSUE DISEASE (HCC): ICD-10-CM

## 2020-10-08 DIAGNOSIS — I73.01 RAYNAUD'S DISEASE WITH GANGRENE (HCC): ICD-10-CM

## 2020-10-08 DIAGNOSIS — M32.19 LUPUS VASCULITIS (HCC): ICD-10-CM

## 2020-10-08 DIAGNOSIS — I77.89 LUPUS VASCULITIS (HCC): ICD-10-CM

## 2020-10-08 RX ORDER — HYDROXYCHLOROQUINE SULFATE 200 MG/1
TABLET, FILM COATED ORAL
Qty: 30 TABLET | Refills: 0 | Status: SHIPPED | OUTPATIENT
Start: 2020-10-08 | End: 2020-10-16

## 2020-10-16 ENCOUNTER — OFFICE VISIT (OUTPATIENT)
Dept: RHEUMATOLOGY | Facility: CLINIC | Age: 45
End: 2020-10-16
Payer: COMMERCIAL

## 2020-10-16 VITALS
HEIGHT: 66 IN | HEART RATE: 78 BPM | TEMPERATURE: 97 F | RESPIRATION RATE: 16 BRPM | BODY MASS INDEX: 21.69 KG/M2 | WEIGHT: 135 LBS | DIASTOLIC BLOOD PRESSURE: 70 MMHG | SYSTOLIC BLOOD PRESSURE: 110 MMHG

## 2020-10-16 DIAGNOSIS — R76.8 RIBONUCLEOPROTEIN ANTIBODY POSITIVE: ICD-10-CM

## 2020-10-16 DIAGNOSIS — M35.1 MIXED CONNECTIVE TISSUE DISEASE (HCC): Primary | ICD-10-CM

## 2020-10-16 DIAGNOSIS — I73.01 RAYNAUD'S DISEASE WITH GANGRENE (HCC): ICD-10-CM

## 2020-10-16 DIAGNOSIS — L97.911 ULCER OF RIGHT LOWER EXTREMITY, LIMITED TO BREAKDOWN OF SKIN (HCC): ICD-10-CM

## 2020-10-16 DIAGNOSIS — I77.89 LUPUS VASCULITIS (HCC): ICD-10-CM

## 2020-10-16 DIAGNOSIS — Z79.52 CURRENT CHRONIC USE OF SYSTEMIC STEROIDS: ICD-10-CM

## 2020-10-16 DIAGNOSIS — Z79.899 HIGH RISK MEDICATION USE: ICD-10-CM

## 2020-10-16 DIAGNOSIS — M32.19 LUPUS VASCULITIS (HCC): ICD-10-CM

## 2020-10-16 PROCEDURE — 99214 OFFICE O/P EST MOD 30 MIN: CPT | Performed by: INTERNAL MEDICINE

## 2020-10-16 PROCEDURE — 3078F DIAST BP <80 MM HG: CPT | Performed by: INTERNAL MEDICINE

## 2020-10-16 PROCEDURE — 3074F SYST BP LT 130 MM HG: CPT | Performed by: INTERNAL MEDICINE

## 2020-10-16 PROCEDURE — 3008F BODY MASS INDEX DOCD: CPT | Performed by: INTERNAL MEDICINE

## 2020-10-16 RX ORDER — AMLODIPINE BESYLATE 2.5 MG/1
2.5 TABLET ORAL 2 TIMES DAILY
Qty: 180 TABLET | Refills: 0 | Status: SHIPPED | OUTPATIENT
Start: 2020-10-16 | End: 2020-12-16

## 2020-10-16 RX ORDER — PREDNISONE 2.5 MG
2.5 TABLET ORAL DAILY
Qty: 90 TABLET | Refills: 0 | Status: SHIPPED | OUTPATIENT
Start: 2020-10-16 | End: 2020-11-08

## 2020-10-16 RX ORDER — MYCOPHENOLATE MOFETIL 500 MG/1
500 TABLET ORAL 2 TIMES DAILY
Qty: 180 TABLET | Refills: 0 | Status: SHIPPED | OUTPATIENT
Start: 2020-10-16 | End: 2020-11-08

## 2020-10-16 RX ORDER — HYDROXYCHLOROQUINE SULFATE 200 MG/1
200 TABLET, FILM COATED ORAL DAILY
Qty: 90 TABLET | Refills: 0 | Status: SHIPPED | OUTPATIENT
Start: 2020-10-16 | End: 2020-11-23

## 2020-10-16 NOTE — PATIENT INSTRUCTIONS
-- continue CellCept (mycophenolate) to 500mg twice daily  -- continue plaquenil 200mg daily   -- continue current dose of amlodipine for now  -- continue prednisone 2.5mg daily   -- get updated labs in one month to monitor for toxicity from cellcept   --

## 2020-10-16 NOTE — PROGRESS NOTES
?  RHEUMATOLOGY Follow up   Date of visit: 10/16/2020    Patient presents with:  Lupus: here for follow up. co rash over nose and bilateral wrist/forearm with slight itching  Mixed Connective Tissue Disease    ?   ASSESSMENT, DISCUSSION & PLAN   Assessment: either of these things worsens. Reminded she will need to get updated ECHO, CXR and PFTs next year when they feel more comfortable about the [address] pandemic. Having MCTD puts her at increased risk of ILD and pulmonary hypertension.      Follow up in 2 mo Tab; Take 1 tablet (200 mg total) by mouth daily.  -     Mycophenolate Mofetil 500 MG Oral Tab; Take 1 tablet (500 mg total) by mouth 2 (two) times daily. -     predniSONE 2.5 MG Oral Tab; Take 1 tablet (2.5 mg total) by mouth daily.     High risk medicati getting better on it's own. Admits to slight itching. Applying a moisturizing cream.   Prior wounds on right ankle has improved almost completely resolved. No new lesions. Denies worsened raynauds despite cold weather.    Denies any other joint pain or sw prednisone 5mg daily. She has never gotten a biopsy of the lesion, unless she had one 8 years ago in [facility], but  cannot recall results.   She tried gabapentin, only 100mg nightly but stopped because of feeling drowsy.      Pt does have morning stif tobacco: Never Used    Alcohol use: No    Drug use: No    Medications:    •  amLODIPine Besylate 2.5 MG Oral Tab, Take 1 tablet (2.5 mg total) by mouth 2 (two) times daily. , Disp: 180 tablet, Rfl: 0    •  Hydroxychloroquine Sulfate 200 MG Oral Tab, Take 1 Cardiovascular: Negative for chest pain, palpitations and leg swelling. Gastrointestinal: Negative for abdominal pain, constipation, diarrhea, heartburn, nausea and vomiting. Genitourinary: Negative for dysuria, frequency, hematuria and urgency.    Mu restriction of motion of the DIPs, PIPs, MCPs, wrists, elbows, ankles, or joints of the feet. Bilateral shoulders with full ROM. Bilateral knees without medial joint line tenderness, no crepitus, no effusion.      Lymphadenopathy:     She has no cervical 3.9 08/12/2020    GLOBULIN 4.5 (H) 08/12/2020     08/12/2020    K 4.7 08/12/2020     08/12/2020    CO2 29.0 08/12/2020       Additional Labs:  08/2020  Esr 36  CRP 0.60  C3 and C4 normal     Cutaneous Direct IF, Biopsy See Note    Comment: IMMU antibody-mediated vasculitis, likely lupus vasculitis. Correlation with histopathologic examination of formalin-fixed   tissue is needed to further define this process.  Correlation with   clinical findings is also needed, including with serologic gordo

## 2020-11-08 DIAGNOSIS — I77.89 LUPUS VASCULITIS (HCC): ICD-10-CM

## 2020-11-08 DIAGNOSIS — M35.1 MIXED CONNECTIVE TISSUE DISEASE (HCC): ICD-10-CM

## 2020-11-08 DIAGNOSIS — I73.01 RAYNAUD'S DISEASE WITH GANGRENE (HCC): ICD-10-CM

## 2020-11-08 DIAGNOSIS — M32.19 LUPUS VASCULITIS (HCC): ICD-10-CM

## 2020-11-09 RX ORDER — PREDNISONE 2.5 MG
2.5 TABLET ORAL DAILY
Qty: 90 TABLET | Refills: 0 | Status: SHIPPED | OUTPATIENT
Start: 2020-11-09 | End: 2020-12-16

## 2020-11-09 RX ORDER — MYCOPHENOLATE MOFETIL 500 MG/1
500 TABLET ORAL 2 TIMES DAILY
Qty: 180 TABLET | Refills: 0 | Status: SHIPPED | OUTPATIENT
Start: 2020-11-09 | End: 2020-12-16

## 2020-11-23 DIAGNOSIS — M32.19 LUPUS VASCULITIS (HCC): ICD-10-CM

## 2020-11-23 DIAGNOSIS — I77.89 LUPUS VASCULITIS (HCC): ICD-10-CM

## 2020-11-23 DIAGNOSIS — I73.01 RAYNAUD'S DISEASE WITH GANGRENE (HCC): ICD-10-CM

## 2020-11-23 DIAGNOSIS — M35.1 MIXED CONNECTIVE TISSUE DISEASE (HCC): ICD-10-CM

## 2020-11-23 RX ORDER — HYDROXYCHLOROQUINE SULFATE 200 MG/1
200 TABLET, FILM COATED ORAL DAILY
Qty: 90 TABLET | Refills: 0 | Status: SHIPPED | OUTPATIENT
Start: 2020-11-23 | End: 2021-01-21

## 2020-12-16 ENCOUNTER — OFFICE VISIT (OUTPATIENT)
Dept: RHEUMATOLOGY | Facility: CLINIC | Age: 45
End: 2020-12-16
Payer: COMMERCIAL

## 2020-12-16 VITALS
RESPIRATION RATE: 16 BRPM | SYSTOLIC BLOOD PRESSURE: 100 MMHG | DIASTOLIC BLOOD PRESSURE: 62 MMHG | WEIGHT: 131.63 LBS | TEMPERATURE: 97 F | HEIGHT: 64 IN | BODY MASS INDEX: 22.47 KG/M2 | HEART RATE: 80 BPM

## 2020-12-16 DIAGNOSIS — M35.1 MIXED CONNECTIVE TISSUE DISEASE (HCC): Primary | ICD-10-CM

## 2020-12-16 DIAGNOSIS — I77.89 LUPUS VASCULITIS (HCC): ICD-10-CM

## 2020-12-16 DIAGNOSIS — M32.19 LUPUS VASCULITIS (HCC): ICD-10-CM

## 2020-12-16 DIAGNOSIS — L97.911 ULCER OF RIGHT LOWER EXTREMITY, LIMITED TO BREAKDOWN OF SKIN (HCC): ICD-10-CM

## 2020-12-16 DIAGNOSIS — I73.01 RAYNAUD'S DISEASE WITH GANGRENE (HCC): ICD-10-CM

## 2020-12-16 DIAGNOSIS — Z79.899 HIGH RISK MEDICATION USE: ICD-10-CM

## 2020-12-16 DIAGNOSIS — R07.89 CHEST PRESSURE: ICD-10-CM

## 2020-12-16 DIAGNOSIS — Z79.52 CURRENT CHRONIC USE OF SYSTEMIC STEROIDS: ICD-10-CM

## 2020-12-16 DIAGNOSIS — R76.8 RIBONUCLEOPROTEIN ANTIBODY POSITIVE: ICD-10-CM

## 2020-12-16 DIAGNOSIS — M79.10 MYALGIA: ICD-10-CM

## 2020-12-16 PROCEDURE — 3008F BODY MASS INDEX DOCD: CPT | Performed by: INTERNAL MEDICINE

## 2020-12-16 PROCEDURE — 3074F SYST BP LT 130 MM HG: CPT | Performed by: INTERNAL MEDICINE

## 2020-12-16 PROCEDURE — 99214 OFFICE O/P EST MOD 30 MIN: CPT | Performed by: INTERNAL MEDICINE

## 2020-12-16 PROCEDURE — 3078F DIAST BP <80 MM HG: CPT | Performed by: INTERNAL MEDICINE

## 2020-12-16 RX ORDER — MYCOPHENOLATE MOFETIL 500 MG/1
500 TABLET ORAL 2 TIMES DAILY
Qty: 180 TABLET | Refills: 0 | Status: SHIPPED | OUTPATIENT
Start: 2020-12-16 | End: 2021-02-16

## 2020-12-16 RX ORDER — AMLODIPINE BESYLATE 2.5 MG/1
2.5 TABLET ORAL 2 TIMES DAILY
Qty: 180 TABLET | Refills: 0 | Status: SHIPPED | OUTPATIENT
Start: 2020-12-16 | End: 2021-02-16

## 2020-12-16 RX ORDER — PREDNISONE 2.5 MG
2.5 TABLET ORAL DAILY
Qty: 90 TABLET | Refills: 0 | Status: SHIPPED | OUTPATIENT
Start: 2020-12-16 | End: 2021-02-16

## 2020-12-16 NOTE — PROGRESS NOTES
?  RHEUMATOLOGY Follow up   Date of visit: 12/16/2020    Patient presents with: Follow - Up: 2 month f/u. Feeling good. No rashes or other symptoms.     ?  ASSESSMENT, DISCUSSION & PLAN   Assessment:  Mixed connective tissue disease ([facility] [address]  (primary encount another CXR since she did not get earlier this year. Reminded she will need to get updated ECHO and PFTs next year when they feel more comfortable about the [address] pandemic. Having MCTD puts her at increased risk of ILD and pulmonary hypertension. Oral Tab; Take 1 tablet (500 mg total) by mouth 2 (two) times daily. -     predniSONE 2.5 MG Oral Tab; Take 1 tablet (2.5 mg total) by mouth daily.     High risk medication use  -     CBC WITH DIFFERENTIAL WITH PLATELET  -     COMP METABOLIC PANEL (14) periods, last cycle was 3 months ago. Has plans to see PCP tomorrow and will discuss then. + chest heaviness/pressure. No chest pain or shortness of breath. Denies cough. Denies fever. Denies any known covid exposure.  Has been essentially home quarantine daily.   She has never gotten a biopsy of the lesion, unless she had one 8 years ago in [facility], but  cannot recall results.   She tried gabapentin, only 100mg nightly but stopped because of feeling drowsy.      Pt does have morning stiffness lasting 1 Alcohol use: No    Drug use: No    Medications:    •  amLODIPine Besylate 2.5 MG Oral Tab, Take 1 tablet (2.5 mg total) by mouth 2 (two) times daily. , Disp: 180 tablet, Rfl: 0    •  Mycophenolate Mofetil 500 MG Oral Tab, Take 1 tablet (500 mg total) by mo neck pain. Negative for back pain, joint pain and myalgias. Skin: Negative for itching and rash. Neurological: Negative for dizziness, tingling, seizures, weakness and headaches. Endo/Heme/Allergies: Negative for environmental allergies.  Bruises/blee Neurological: She is alert and oriented to person, place, and time. No cranial nerve deficit. Skin: Skin is warm and dry. She is not diaphoretic.    Previous ulcerations now with healing granulation tissue   Abnormal nailfold capillaroscopy diffuse fing IMMUNODERMATOLOGY REPORT       Specimen(s):   1.  Right medial leg     Clinical/Diagnostic Information:   Presumptive diagnosis is vasculitis     ____________________________________________________________   DIAGNOSTIC INTERPRETATION     Positive findings lupus associated antibodies, including antibodies to SSA (Ro)   and SSB (La), which are often associated with SCLE.          03/2020  CCP negative  RF negative  ESR 41 normal  CRP 0.32 borderline   Myositis ab panel- SSA 51kd +; SSA 60kd +; otherwise negat

## 2021-01-21 DIAGNOSIS — M32.19 LUPUS VASCULITIS (HCC): ICD-10-CM

## 2021-01-21 DIAGNOSIS — I73.01 RAYNAUD'S DISEASE WITH GANGRENE (HCC): ICD-10-CM

## 2021-01-21 DIAGNOSIS — I77.89 LUPUS VASCULITIS (HCC): ICD-10-CM

## 2021-01-21 DIAGNOSIS — M35.1 MIXED CONNECTIVE TISSUE DISEASE (HCC): ICD-10-CM

## 2021-01-21 RX ORDER — HYDROXYCHLOROQUINE SULFATE 200 MG/1
TABLET, FILM COATED ORAL
Qty: 30 TABLET | Refills: 2 | Status: SHIPPED | OUTPATIENT
Start: 2021-01-21 | End: 2021-02-16

## 2021-01-21 NOTE — TELEPHONE ENCOUNTER
Future Appointments   Date Time Provider Noemy Ivis   2/16/2021 11:30 AM Elvia Montes DO [facility]   4/14/2021 11:00 AM Alisha Farmer DO [facility] [place] [facility]   7/28/2021 10:00 AM Eduardo Farmer DO [facility] [place] [facility]

## 2021-02-16 ENCOUNTER — OFFICE VISIT (OUTPATIENT)
Dept: RHEUMATOLOGY | Facility: CLINIC | Age: 46
End: 2021-02-16
Payer: COMMERCIAL

## 2021-02-16 VITALS
DIASTOLIC BLOOD PRESSURE: 74 MMHG | HEART RATE: 76 BPM | TEMPERATURE: 97 F | BODY MASS INDEX: 24.55 KG/M2 | HEIGHT: 61 IN | WEIGHT: 130 LBS | RESPIRATION RATE: 16 BRPM | SYSTOLIC BLOOD PRESSURE: 100 MMHG

## 2021-02-16 DIAGNOSIS — I77.89 LUPUS VASCULITIS (HCC): ICD-10-CM

## 2021-02-16 DIAGNOSIS — M32.19 LUPUS VASCULITIS (HCC): ICD-10-CM

## 2021-02-16 DIAGNOSIS — Z79.899 HIGH RISK MEDICATION USE: ICD-10-CM

## 2021-02-16 DIAGNOSIS — L65.9 HAIR LOSS: ICD-10-CM

## 2021-02-16 DIAGNOSIS — M35.1 MIXED CONNECTIVE TISSUE DISEASE (HCC): Primary | ICD-10-CM

## 2021-02-16 DIAGNOSIS — I73.01 RAYNAUD'S DISEASE WITH GANGRENE (HCC): ICD-10-CM

## 2021-02-16 DIAGNOSIS — L97.911 ULCER OF RIGHT LOWER EXTREMITY, LIMITED TO BREAKDOWN OF SKIN (HCC): ICD-10-CM

## 2021-02-16 DIAGNOSIS — Z79.52 CURRENT CHRONIC USE OF SYSTEMIC STEROIDS: ICD-10-CM

## 2021-02-16 PROCEDURE — 3074F SYST BP LT 130 MM HG: CPT | Performed by: INTERNAL MEDICINE

## 2021-02-16 PROCEDURE — 99214 OFFICE O/P EST MOD 30 MIN: CPT | Performed by: INTERNAL MEDICINE

## 2021-02-16 PROCEDURE — 3078F DIAST BP <80 MM HG: CPT | Performed by: INTERNAL MEDICINE

## 2021-02-16 PROCEDURE — 3008F BODY MASS INDEX DOCD: CPT | Performed by: INTERNAL MEDICINE

## 2021-02-16 RX ORDER — PREDNISONE 2.5 MG
2.5 TABLET ORAL DAILY
Qty: 90 TABLET | Refills: 0 | Status: SHIPPED | OUTPATIENT
Start: 2021-02-16 | End: 2021-07-28

## 2021-02-16 RX ORDER — HYDROXYCHLOROQUINE SULFATE 200 MG/1
200 TABLET, FILM COATED ORAL DAILY
Qty: 90 TABLET | Refills: 0 | Status: SHIPPED | OUTPATIENT
Start: 2021-02-16 | End: 2021-07-28

## 2021-02-16 RX ORDER — MYCOPHENOLATE MOFETIL 500 MG/1
500 TABLET ORAL 2 TIMES DAILY
Qty: 180 TABLET | Refills: 0 | Status: SHIPPED | OUTPATIENT
Start: 2021-02-16 | End: 2021-07-15

## 2021-02-16 RX ORDER — AMLODIPINE BESYLATE 2.5 MG/1
2.5 TABLET ORAL DAILY
Qty: 90 TABLET | Refills: 0 | Status: SHIPPED | OUTPATIENT
Start: 2021-02-16 | End: 2021-05-12

## 2021-02-16 NOTE — PROGRESS NOTES
?  RHEUMATOLOGY Follow up   Date of visit: 2/16/2021    Patient presents with: Follow - Up: 2 month f/u. Doing good. Rash on ankle healing well. No joint pain or swelling. No other rashes.      ?  ASSESSMENT, DISCUSSION & PLAN   Assessment:  Mixed connecti pulmonary hypertension. Recommended she consider seeing dermatology for the persistent hair loss to see if there are other treatment options. Follow up in 2 months or sooner as needed. Labs before that visit.      -- continue CellCept (mycophenolate 1 tablet (2.5 mg total) by mouth daily.  -     Mycophenolate Mofetil 500 MG Oral Tab; Take 1 tablet (500 mg total) by mouth 2 (two) times daily. Lupus vasculitis (HCC)  -     Hydroxychloroquine Sulfate 200 MG Oral Tab;  Take 1 tablet (200 mg total) by mo cough. Denies fever. Has been essentially home quarantined. Previous ankle wound has subsided. Denies new lesions/ulcers. Previous discomfort at tip of right toe has subsided. Denies worsened raynauds despite cold weather.    Denies any other joint p recall results. She tried gabapentin, only 100mg nightly but stopped because of feeling drowsy.      Pt does have morning stiffness lasting 10-15 minutes.    Significant hair loss  + ulcers over bottom inside lip  Itching over thighs without redness or kira (200 mg total) by mouth daily. , Disp: 90 tablet, Rfl: 0    •  amLODIPine Besylate 2.5 MG Oral Tab, Take 1 tablet (2.5 mg total) by mouth daily. , Disp: 90 tablet, Rfl: 0    •  Mycophenolate Mofetil 500 MG Oral Tab, Take 1 tablet (500 mg total) by mouth 2 (t Genitourinary: Negative for frequency and urgency. Musculoskeletal: Positive for neck pain. Negative for back pain, joint pain and myalgias. Skin: Negative for itching and rash.    Neurological: Negative for dizziness, tingling, seizures, weakness and Neurological: She is alert and oriented to person, place, and time. No cranial nerve deficit. Skin: Skin is warm and dry. She is not diaphoretic. Previous ulcerations now healed and slight scarring present.    Abnormal nailfold capillaroscopy diffuse Information:   Presumptive diagnosis is vasculitis     ____________________________________________________________   DIAGNOSTIC INTERPRETATION     Positive findings by direct immunofluorescence; probable lupus   erythematosus, including lupus vasculitis often associated with SCLE.          03/2020  CCP negative  RF negative  ESR 41 normal  CRP 0.32 borderline   Myositis ab panel- SSA 51kd +; SSA 60kd +; otherwise negative  WELLINGTON 1:1280 speckled    (N<100)   (N<100)  Remaining DARRYL panel neg (SS

## 2021-02-17 NOTE — PATIENT INSTRUCTIONS
-- continue CellCept (mycophenolate) to 500mg twice daily  -- continue plaquenil 200mg daily   -- continue current dose of amlodipine for now  -- continue prednisone 2.5mg daily   -- get updated labs now to monitor for toxicity from cellcept prior to next

## 2021-05-12 DIAGNOSIS — I73.01 RAYNAUD'S DISEASE WITH GANGRENE (HCC): ICD-10-CM

## 2021-05-12 DIAGNOSIS — M35.1 MIXED CONNECTIVE TISSUE DISEASE (HCC): ICD-10-CM

## 2021-05-12 RX ORDER — AMLODIPINE BESYLATE 2.5 MG/1
TABLET ORAL
Qty: 60 TABLET | Refills: 2 | Status: SHIPPED | OUTPATIENT
Start: 2021-05-12 | End: 2021-07-28

## 2021-05-12 NOTE — TELEPHONE ENCOUNTER
Future Appointments   Date Time Provider Noemy Langston   7/28/2021 10:00 AM Dsilva, Ceclia Brittle, DO [facility] [place]

## 2021-07-03 DIAGNOSIS — M32.19 LUPUS VASCULITIS (HCC): ICD-10-CM

## 2021-07-03 DIAGNOSIS — I77.89 LUPUS VASCULITIS (HCC): ICD-10-CM

## 2021-07-03 DIAGNOSIS — I73.01 RAYNAUD'S DISEASE WITH GANGRENE (HCC): ICD-10-CM

## 2021-07-03 DIAGNOSIS — M35.1 MIXED CONNECTIVE TISSUE DISEASE (HCC): ICD-10-CM

## 2021-07-05 NOTE — TELEPHONE ENCOUNTER
Future Appointments   Date Time Provider Noemy Langston   7/28/2021 10:00 AM Tyler Farmer,  [facility] Greg[facility] MATTHEWS 2/16/21 recommended fu in 2mo  Outstanding lab work   Phoned pt, and recommended he go for his labs to monitor for toxicity, pt st

## 2021-07-07 ENCOUNTER — TELEPHONE (OUTPATIENT)
Dept: RHEUMATOLOGY | Facility: CLINIC | Age: 46
End: 2021-07-07

## 2021-07-07 RX ORDER — MYCOPHENOLATE MOFETIL 500 MG/1
TABLET ORAL
Qty: 60 TABLET | Refills: 2 | OUTPATIENT
Start: 2021-07-07

## 2021-07-07 NOTE — TELEPHONE ENCOUNTER
Patient is overdue for labs by many months. No refill can be given. I put in repeat blood work for the patient to get completed.

## 2021-07-08 NOTE — TELEPHONE ENCOUNTER
phoned pt, explained lab work over due, will need to complete prior to approving medication refills. abs by many months. Lab orders placed per Dr. Ricky Hernandez. Pt voiced understanding.

## 2021-07-13 ENCOUNTER — TELEPHONE (OUTPATIENT)
Dept: RHEUMATOLOGY | Facility: CLINIC | Age: 46
End: 2021-07-13

## 2021-07-14 ENCOUNTER — LAB ENCOUNTER (OUTPATIENT)
Dept: LAB | Age: 46
End: 2021-07-14
Attending: INTERNAL MEDICINE
Payer: COMMERCIAL

## 2021-07-14 DIAGNOSIS — M35.1 MIXED CONNECTIVE TISSUE DISEASE (HCC): Primary | ICD-10-CM

## 2021-07-14 LAB
ALBUMIN SERPL-MCNC: 4.1 G/DL (ref 3.4–5)
ALBUMIN/GLOB SERPL: 1 {RATIO} (ref 1–2)
ALP LIVER SERPL-CCNC: 85 U/L
ALT SERPL-CCNC: 20 U/L
ANION GAP SERPL CALC-SCNC: 2 MMOL/L (ref 0–18)
AST SERPL-CCNC: 19 U/L (ref 15–37)
BASOPHILS # BLD AUTO: 0.02 X10(3) UL (ref 0–0.2)
BASOPHILS NFR BLD AUTO: 0.5 %
BILIRUB SERPL-MCNC: 0.6 MG/DL (ref 0.1–2)
BILIRUB UR QL STRIP.AUTO: NEGATIVE
BUN BLD-MCNC: 8 MG/DL (ref 7–18)
BUN/CREAT SERPL: 16.7 (ref 10–20)
C3 SERPL-MCNC: 112 MG/DL (ref 90–180)
C4 SERPL-MCNC: 27.3 MG/DL (ref 10–40)
CALCIUM BLD-MCNC: 9.4 MG/DL (ref 8.5–10.1)
CHLORIDE SERPL-SCNC: 108 MMOL/L (ref 98–112)
CLARITY UR REFRACT.AUTO: CLEAR
CO2 SERPL-SCNC: 29 MMOL/L (ref 21–32)
COLOR UR AUTO: COLORLESS
CREAT BLD-MCNC: 0.48 MG/DL
CREAT UR-SCNC: <13 MG/DL
CRP SERPL-MCNC: 0.46 MG/DL (ref ?–0.3)
DEPRECATED RDW RBC AUTO: 42.7 FL (ref 35.1–46.3)
EOSINOPHIL # BLD AUTO: 0.05 X10(3) UL (ref 0–0.7)
EOSINOPHIL NFR BLD AUTO: 1.3 %
ERYTHROCYTE [DISTWIDTH] IN BLOOD BY AUTOMATED COUNT: 13.9 % (ref 11–15)
GLOBULIN PLAS-MCNC: 4.3 G/DL (ref 2.8–4.4)
GLUCOSE BLD-MCNC: 78 MG/DL (ref 70–99)
GLUCOSE UR STRIP.AUTO-MCNC: NEGATIVE MG/DL
HCT VFR BLD AUTO: 36.4 %
HGB BLD-MCNC: 10.9 G/DL
IMM GRANULOCYTES # BLD AUTO: 0 X10(3) UL (ref 0–1)
IMM GRANULOCYTES NFR BLD: 0 %
KETONES UR STRIP.AUTO-MCNC: NEGATIVE MG/DL
LYMPHOCYTES # BLD AUTO: 0.7 X10(3) UL (ref 1–4)
LYMPHOCYTES NFR BLD AUTO: 17.6 %
M PROTEIN MFR SERPL ELPH: 8.4 G/DL (ref 6.4–8.2)
MCH RBC QN AUTO: 25.3 PG (ref 26–34)
MCHC RBC AUTO-ENTMCNC: 29.9 G/DL (ref 31–37)
MCV RBC AUTO: 84.7 FL
MONOCYTES # BLD AUTO: 0.62 X10(3) UL (ref 0.1–1)
MONOCYTES NFR BLD AUTO: 15.6 %
NEUTROPHILS # BLD AUTO: 2.59 X10 (3) UL (ref 1.5–7.7)
NEUTROPHILS # BLD AUTO: 2.59 X10(3) UL (ref 1.5–7.7)
NEUTROPHILS NFR BLD AUTO: 65 %
NITRITE UR QL STRIP.AUTO: NEGATIVE
OSMOLALITY SERPL CALC.SUM OF ELEC: 285 MOSM/KG (ref 275–295)
PATIENT FASTING Y/N/NP: YES
PH UR STRIP.AUTO: 6 [PH] (ref 5–8)
POTASSIUM SERPL-SCNC: 4 MMOL/L (ref 3.5–5.1)
PROT UR STRIP.AUTO-MCNC: NEGATIVE MG/DL
PROT UR-MCNC: <5 MG/DL
RBC # BLD AUTO: 4.3 X10(6)UL
SED RATE-ML: 40 MM/HR
SODIUM SERPL-SCNC: 139 MMOL/L (ref 136–145)
SP GR UR STRIP.AUTO: 1 (ref 1–1.03)
UROBILINOGEN UR STRIP.AUTO-MCNC: <2 MG/DL
WBC # BLD AUTO: 4 X10(3) UL (ref 4–11)

## 2021-07-14 PROCEDURE — 81001 URINALYSIS AUTO W/SCOPE: CPT | Performed by: INTERNAL MEDICINE

## 2021-07-14 PROCEDURE — 84156 ASSAY OF PROTEIN URINE: CPT | Performed by: INTERNAL MEDICINE

## 2021-07-14 PROCEDURE — 80053 COMPREHEN METABOLIC PANEL: CPT | Performed by: INTERNAL MEDICINE

## 2021-07-14 PROCEDURE — 85652 RBC SED RATE AUTOMATED: CPT | Performed by: INTERNAL MEDICINE

## 2021-07-14 PROCEDURE — 86160 COMPLEMENT ANTIGEN: CPT | Performed by: INTERNAL MEDICINE

## 2021-07-14 PROCEDURE — 82570 ASSAY OF URINE CREATININE: CPT | Performed by: INTERNAL MEDICINE

## 2021-07-14 PROCEDURE — 85025 COMPLETE CBC W/AUTO DIFF WBC: CPT | Performed by: INTERNAL MEDICINE

## 2021-07-14 PROCEDURE — 86225 DNA ANTIBODY NATIVE: CPT | Performed by: INTERNAL MEDICINE

## 2021-07-14 PROCEDURE — 86140 C-REACTIVE PROTEIN: CPT | Performed by: INTERNAL MEDICINE

## 2021-07-15 DIAGNOSIS — I73.01 RAYNAUD'S DISEASE WITH GANGRENE (HCC): ICD-10-CM

## 2021-07-15 DIAGNOSIS — I77.89 LUPUS VASCULITIS (HCC): ICD-10-CM

## 2021-07-15 DIAGNOSIS — M35.1 MIXED CONNECTIVE TISSUE DISEASE (HCC): ICD-10-CM

## 2021-07-15 DIAGNOSIS — M32.19 LUPUS VASCULITIS (HCC): ICD-10-CM

## 2021-07-15 RX ORDER — MYCOPHENOLATE MOFETIL 500 MG/1
500 TABLET ORAL 2 TIMES DAILY
Qty: 180 TABLET | Refills: 0 | Status: SHIPPED | OUTPATIENT
Start: 2021-07-15 | End: 2021-07-28

## 2021-07-15 NOTE — TELEPHONE ENCOUNTER
Pt phoned office, requesting refill of mycophenolate. Labs completed yesterday.    Future Appointments   Date Time Provider Noemy Ivis   7/28/2021 10:00 AM Nelli Farmer DO [facility]

## 2021-07-16 LAB — DSDNA AUTOAB: <100 IU/ML (ref ?–100)

## 2021-07-28 ENCOUNTER — OFFICE VISIT (OUTPATIENT)
Dept: RHEUMATOLOGY | Facility: CLINIC | Age: 46
End: 2021-07-28
Payer: COMMERCIAL

## 2021-07-28 VITALS
DIASTOLIC BLOOD PRESSURE: 50 MMHG | SYSTOLIC BLOOD PRESSURE: 98 MMHG | WEIGHT: 127 LBS | RESPIRATION RATE: 16 BRPM | HEART RATE: 80 BPM | BODY MASS INDEX: 23.98 KG/M2 | TEMPERATURE: 98 F | HEIGHT: 61 IN

## 2021-07-28 DIAGNOSIS — M32.19 LUPUS VASCULITIS (HCC): ICD-10-CM

## 2021-07-28 DIAGNOSIS — I77.89 LUPUS VASCULITIS (HCC): ICD-10-CM

## 2021-07-28 DIAGNOSIS — R76.8 SS-A ANTIBODY POSITIVE: ICD-10-CM

## 2021-07-28 DIAGNOSIS — R76.8 RIBONUCLEOPROTEIN ANTIBODY POSITIVE: ICD-10-CM

## 2021-07-28 DIAGNOSIS — Z79.899 HIGH RISK MEDICATION USE: ICD-10-CM

## 2021-07-28 DIAGNOSIS — R76.8 POSITIVE ANA (ANTINUCLEAR ANTIBODY): ICD-10-CM

## 2021-07-28 DIAGNOSIS — I73.01 RAYNAUD'S DISEASE WITH GANGRENE (HCC): ICD-10-CM

## 2021-07-28 DIAGNOSIS — R82.90 ABNORMAL URINALYSIS: ICD-10-CM

## 2021-07-28 DIAGNOSIS — D50.9 MICROCYTIC ANEMIA: ICD-10-CM

## 2021-07-28 DIAGNOSIS — M35.1 MIXED CONNECTIVE TISSUE DISEASE (HCC): Primary | ICD-10-CM

## 2021-07-28 DIAGNOSIS — Z79.52 CURRENT CHRONIC USE OF SYSTEMIC STEROIDS: ICD-10-CM

## 2021-07-28 PROCEDURE — 99214 OFFICE O/P EST MOD 30 MIN: CPT | Performed by: INTERNAL MEDICINE

## 2021-07-28 PROCEDURE — 3074F SYST BP LT 130 MM HG: CPT | Performed by: INTERNAL MEDICINE

## 2021-07-28 PROCEDURE — 3008F BODY MASS INDEX DOCD: CPT | Performed by: INTERNAL MEDICINE

## 2021-07-28 PROCEDURE — 3078F DIAST BP <80 MM HG: CPT | Performed by: INTERNAL MEDICINE

## 2021-07-28 RX ORDER — HYDROXYCHLOROQUINE SULFATE 200 MG/1
200 TABLET, FILM COATED ORAL DAILY
Qty: 90 TABLET | Refills: 0 | Status: SHIPPED | OUTPATIENT
Start: 2021-07-28 | End: 2021-10-27

## 2021-07-28 RX ORDER — PREDNISONE 2.5 MG
2.5 TABLET ORAL DAILY
Qty: 90 TABLET | Refills: 0 | Status: SHIPPED | OUTPATIENT
Start: 2021-07-28 | End: 2021-10-27

## 2021-07-28 RX ORDER — AMLODIPINE BESYLATE 2.5 MG/1
2.5 TABLET ORAL 2 TIMES DAILY
Qty: 120 TABLET | Refills: 0 | Status: SHIPPED | OUTPATIENT
Start: 2021-07-28 | End: 2021-10-27

## 2021-07-28 RX ORDER — MYCOPHENOLATE MOFETIL 500 MG/1
500 TABLET ORAL 2 TIMES DAILY
Qty: 180 TABLET | Refills: 0 | Status: SHIPPED | OUTPATIENT
Start: 2021-07-28 | End: 2021-10-27

## 2021-07-28 NOTE — PROGRESS NOTES
?  RHEUMATOLOGY Follow up   Date of visit: 7/28/2021    Patient presents with: Follow - Up: 5 month f/u. Feeling good. No joint pain or swelling.        ASSESSMENT, DISCUSSION & PLAN   Assessment:  Mixed connective tissue disease ([address]  (primary encounter continue plaquenil 200mg daily   -- continue current dose of amlodipine for now  -- continue prednisone 2.5mg daily   -- get updated labs for workup of the anemia (wait one week after next menstrual cycle)   -- need to get 2D ECHO and PFTs (to check for po tablet (500 mg total) by mouth 2 (two) times daily.  -     Hydroxychloroquine Sulfate 200 MG Oral Tab; Take 1 tablet (200 mg total) by mouth daily.  -     SARS-COV-2 BY PCR (ALIJOSE);  Future    Raynaud's disease with gangrene ([facility]  -     CARD ECHO 2D DOPP and start mycophenolate due to lupus vasculitis dx on skin biopsy. She presents for follow up today. Her  is here and serves to translate if pt does not understand what I'm asking/explaining.      Currently taking:  Mycophenolate 500mg BID  Plaque plaquenil, however due to the side effects, this was stopped. Pt was also on azathioprine but had some cytopenias. Also had been on methotrexate but since this wasn't helping, this was discontinued. Pt has been following with wound care.  States with the Medical History:   Diagnosis Date   • Asthma    • Mixed connective tissue disease ([facility] [address]    • Raynaud's disease      Past Surgical History:  Past Surgical History:   Procedure Laterality Date   • TUBAL LIGATION       Family History:  Family History   Proble MG Oral Tab    Mycophenolate Mofetil 500 MG Oral Tab  ? ? Allergies:  No Known Allergies  ? REVIEW OF SYSTEMS   ? Review of Systems   Constitutional: Negative for chills, fever, malaise/fatigue and weight loss.    HENT: Negative for congestion, hearing heart sounds. No murmur heard. Pulmonary:      Effort: Pulmonary effort is normal. No respiratory distress. Breath sounds: Normal breath sounds. No wheezing. Abdominal:      General: There is no distension. Palpations: Abdomen is soft.    Mu (L) 07/14/2021    MOABSO 0.62 07/14/2021    EOABSO 0.05 07/14/2021    BAABSO 0.02 07/14/2021     Lab Results   Component Value Date    GLU 78 07/14/2021    BUN 8 07/14/2021    BUNCREA 16.7 07/14/2021    CREATSERUM 0.48 (L) 07/14/2021    ANIONGAP 2 07/14/20 specifically subacute   cutaneous lupus erythematosus (SCLE). The finding of granular   staining of cell nuclei may be indicative of the presence of   anti-nuclear antibodies, further lending support to the   possibility of lupus erythematosus.  In addition

## 2021-07-28 NOTE — PATIENT INSTRUCTIONS
-- continue CellCept (mycophenolate) to 500mg twice daily  -- continue plaquenil 200mg daily   -- continue current dose of amlodipine for now  -- continue prednisone 2.5mg daily   -- get updated labs for workup of the anemia (wait one week after next menst

## 2021-08-02 ENCOUNTER — TELEPHONE (OUTPATIENT)
Dept: RHEUMATOLOGY | Facility: CLINIC | Age: 46
End: 2021-08-02

## 2021-10-27 ENCOUNTER — OFFICE VISIT (OUTPATIENT)
Dept: RHEUMATOLOGY | Facility: CLINIC | Age: 46
End: 2021-10-27
Payer: COMMERCIAL

## 2021-10-27 VITALS
RESPIRATION RATE: 16 BRPM | BODY MASS INDEX: 23.6 KG/M2 | HEART RATE: 72 BPM | DIASTOLIC BLOOD PRESSURE: 54 MMHG | OXYGEN SATURATION: 98 % | SYSTOLIC BLOOD PRESSURE: 98 MMHG | WEIGHT: 125 LBS | HEIGHT: 61 IN | TEMPERATURE: 97 F

## 2021-10-27 DIAGNOSIS — R76.8 SS-A ANTIBODY POSITIVE: ICD-10-CM

## 2021-10-27 DIAGNOSIS — I77.89 LUPUS VASCULITIS (HCC): ICD-10-CM

## 2021-10-27 DIAGNOSIS — I73.01 RAYNAUD'S DISEASE WITH GANGRENE (HCC): ICD-10-CM

## 2021-10-27 DIAGNOSIS — D50.9 MICROCYTIC ANEMIA: ICD-10-CM

## 2021-10-27 DIAGNOSIS — Z79.52 CURRENT CHRONIC USE OF SYSTEMIC STEROIDS: ICD-10-CM

## 2021-10-27 DIAGNOSIS — R76.8 RIBONUCLEOPROTEIN ANTIBODY POSITIVE: ICD-10-CM

## 2021-10-27 DIAGNOSIS — R82.90 ABNORMAL URINALYSIS: ICD-10-CM

## 2021-10-27 DIAGNOSIS — M35.1 MIXED CONNECTIVE TISSUE DISEASE (HCC): Primary | ICD-10-CM

## 2021-10-27 DIAGNOSIS — R06.02 SHORTNESS OF BREATH: ICD-10-CM

## 2021-10-27 DIAGNOSIS — L30.9 DERMATITIS: ICD-10-CM

## 2021-10-27 DIAGNOSIS — M32.19 LUPUS VASCULITIS (HCC): ICD-10-CM

## 2021-10-27 DIAGNOSIS — Z79.899 HIGH RISK MEDICATION USE: ICD-10-CM

## 2021-10-27 PROCEDURE — 3008F BODY MASS INDEX DOCD: CPT | Performed by: INTERNAL MEDICINE

## 2021-10-27 PROCEDURE — 99214 OFFICE O/P EST MOD 30 MIN: CPT | Performed by: INTERNAL MEDICINE

## 2021-10-27 PROCEDURE — 3078F DIAST BP <80 MM HG: CPT | Performed by: INTERNAL MEDICINE

## 2021-10-27 PROCEDURE — 3074F SYST BP LT 130 MM HG: CPT | Performed by: INTERNAL MEDICINE

## 2021-10-27 RX ORDER — DOXYCYCLINE HYCLATE 100 MG
100 TABLET ORAL 2 TIMES DAILY
Qty: 20 TABLET | Refills: 0 | Status: SHIPPED | OUTPATIENT
Start: 2021-10-27 | End: 2021-11-30 | Stop reason: ALTCHOICE

## 2021-10-27 RX ORDER — AMLODIPINE BESYLATE 2.5 MG/1
2.5 TABLET ORAL 2 TIMES DAILY
Qty: 180 TABLET | Refills: 0 | Status: SHIPPED | OUTPATIENT
Start: 2021-10-27 | End: 2021-11-25

## 2021-10-27 RX ORDER — HYDROXYCHLOROQUINE SULFATE 200 MG/1
200 TABLET, FILM COATED ORAL DAILY
Qty: 90 TABLET | Refills: 0 | Status: SHIPPED | OUTPATIENT
Start: 2021-10-27 | End: 2021-11-25

## 2021-10-27 RX ORDER — PREDNISONE 2.5 MG
2.5 TABLET ORAL DAILY
Qty: 90 TABLET | Refills: 0 | Status: SHIPPED | OUTPATIENT
Start: 2021-10-27 | End: 2021-11-25

## 2021-10-27 RX ORDER — MYCOPHENOLATE MOFETIL 500 MG/1
500 TABLET ORAL 2 TIMES DAILY
Qty: 180 TABLET | Refills: 0 | Status: SHIPPED | OUTPATIENT
Start: 2021-10-27 | End: 2021-11-25

## 2021-10-27 NOTE — PROGRESS NOTES
?  RHEUMATOLOGY Follow up   Date of visit: 10/27/2021    Patient presents with: Follow - Up: 3 month f/u. Feeling good. Facial rash around the mouth that started last week. Skin tightening and discoloration on the fingers. No joint pain.        ASSESSMENT, evaluate for possible ILD vs pulmonary HTN. Of note, she does have some perioral dermatitis which looks like it could be infectious. Will have her take doxycycline for the next 7-10 days.  If no improvement, will consider antiviral therapy with valcyclovi 2.5 MG Oral Tab; Take 1 tablet (2.5 mg total) by mouth 2 (two) times daily.  -     CBC WITH DIFFERENTIAL WITH PLATELET  -     COMP METABOLIC PANEL (14)  -     CARD ECHO 2D DOPPLER (CPT=93173); Future  -     COMPLETE PFT;  Future  -     XR CHEST PA + LAT BENNIE LAT CHEST (CPT=71046); Future    SS-A antibody positive  -     CARD ECHO 2D DOPPLER (CPT=93306); Future  -     COMPLETE PFT; Future  -     XR CHEST PA + LAT CHEST (CPT=71046); Future    Shortness of breath  -     CARD ECHO 2D DOPPLER (CPT=93736);  Future  - products or masking. Has had some worsened Raynaud's with the change in weather. Has some worsened cracking of the finger tips. No worsened ulcerations over the ankles. Denies any other joint pain or swelling.   Denies tightening of the skin that's w is currently on prednisone 5mg daily. She has never gotten a biopsy of the lesion, unless she had one 8 years ago in [facility], but  cannot recall results.   She tried gabapentin, only 100mg nightly but stopped because of feeling drowsy.      Pt does h Smokeless tobacco: Never Used    Vaping Use      Vaping Use: Never used    Alcohol use: No    Drug use: No    Medications:  predniSONE 2.5 MG Oral Tab, Take 1 tablet (2.5 mg total) by mouth daily. , Disp: 90 tablet, Rfl: 0  Mycophenolate Mofetil 500 MG Oral loss, sore throat and tinnitus. Eyes: Negative for blurred vision, double vision and photophobia. Respiratory: Negative for cough, shortness of breath and wheezing. Cardiovascular: Negative for chest pain, palpitations and leg swelling.    The [employer] deformity. Cervical back: Neck supple. Comments: No evidence of heberden or kecia nodes of any of the fingers, no basilar joint tenderness of the 1st CMC bilaterally.   No swelling, tenderness, redness or restriction of motion of the DIPs, PIPs GLU 78 07/14/2021    BUN 8 07/14/2021    BUNCREA 16.7 07/14/2021    CREATSERUM 0.48 (L) 07/14/2021    ANIONGAP 2 07/14/2021    GFRNAA 119 07/14/2021    GFRAA 137 07/14/2021    CA 9.4 07/14/2021    OSMOCALC 285 07/14/2021    ALKPHO 85 07/14/2021    AST 1 around basal keratinocytes and within cell nuclei. The   presence of granular IgG within the epidermis raises the   consideration of lupus erythematosus, specifically subacute   cutaneous lupus erythematosus (SCLE).  The finding of granular   staining of ce 40 (H)    Deangelo Bob,   [facility]  10/27/2021

## 2021-11-09 ENCOUNTER — TELEPHONE (OUTPATIENT)
Dept: RHEUMATOLOGY | Facility: CLINIC | Age: 46
End: 2021-11-09

## 2021-11-22 ENCOUNTER — HOSPITAL ENCOUNTER (INPATIENT)
Facility: HOSPITAL | Age: 46
LOS: 3 days | Discharge: HOME | DRG: 813 | End: 2021-11-25
Attending: EMERGENCY MEDICINE | Admitting: INTERNAL MEDICINE
Payer: COMMERCIAL

## 2021-11-22 ENCOUNTER — OFFICE VISIT (OUTPATIENT)
Dept: RHEUMATOLOGY | Facility: CLINIC | Age: 46
End: 2021-11-22
Payer: COMMERCIAL

## 2021-11-22 ENCOUNTER — LAB ENCOUNTER (OUTPATIENT)
Dept: LAB | Facility: HOSPITAL | Age: 46
End: 2021-11-22
Attending: INTERNAL MEDICINE
Payer: COMMERCIAL

## 2021-11-22 ENCOUNTER — TELEPHONE (OUTPATIENT)
Dept: RHEUMATOLOGY | Facility: CLINIC | Age: 46
End: 2021-11-22

## 2021-11-22 VITALS
SYSTOLIC BLOOD PRESSURE: 102 MMHG | TEMPERATURE: 97 F | DIASTOLIC BLOOD PRESSURE: 70 MMHG | WEIGHT: 126 LBS | HEIGHT: 61 IN | RESPIRATION RATE: 16 BRPM | BODY MASS INDEX: 23.79 KG/M2 | HEART RATE: 82 BPM

## 2021-11-22 DIAGNOSIS — M35.1 MIXED CONNECTIVE TISSUE DISEASE (HCC): ICD-10-CM

## 2021-11-22 DIAGNOSIS — E53.8 B12 DEFICIENCY: ICD-10-CM

## 2021-11-22 DIAGNOSIS — I73.01 RAYNAUD'S DISEASE WITH GANGRENE (HCC): ICD-10-CM

## 2021-11-22 DIAGNOSIS — D69.6 THROMBOCYTOPENIA (HCC): Primary | ICD-10-CM

## 2021-11-22 DIAGNOSIS — R23.8 ABNORMAL BRUISING: ICD-10-CM

## 2021-11-22 DIAGNOSIS — R23.8 ABNORMAL BRUISING: Primary | ICD-10-CM

## 2021-11-22 DIAGNOSIS — Z79.52 CURRENT CHRONIC USE OF SYSTEMIC STEROIDS: ICD-10-CM

## 2021-11-22 DIAGNOSIS — Z79.899 HIGH RISK MEDICATION USE: ICD-10-CM

## 2021-11-22 DIAGNOSIS — D50.0 IRON DEFICIENCY ANEMIA DUE TO CHRONIC BLOOD LOSS: ICD-10-CM

## 2021-11-22 DIAGNOSIS — D50.9 MICROCYTIC ANEMIA: ICD-10-CM

## 2021-11-22 DIAGNOSIS — M32.19 LUPUS VASCULITIS (HCC): ICD-10-CM

## 2021-11-22 DIAGNOSIS — I77.89 LUPUS VASCULITIS (HCC): ICD-10-CM

## 2021-11-22 PROBLEM — D64.9 ANEMIA: Status: ACTIVE | Noted: 2021-11-22

## 2021-11-22 PROCEDURE — 85025 COMPLETE CBC W/AUTO DIFF WBC: CPT | Performed by: INTERNAL MEDICINE

## 2021-11-22 PROCEDURE — 3008F BODY MASS INDEX DOCD: CPT | Performed by: INTERNAL MEDICINE

## 2021-11-22 PROCEDURE — 80053 COMPREHEN METABOLIC PANEL: CPT | Performed by: INTERNAL MEDICINE

## 2021-11-22 PROCEDURE — 99215 OFFICE O/P EST HI 40 MIN: CPT | Performed by: INTERNAL MEDICINE

## 2021-11-22 PROCEDURE — 3078F DIAST BP <80 MM HG: CPT | Performed by: INTERNAL MEDICINE

## 2021-11-22 PROCEDURE — 3074F SYST BP LT 130 MM HG: CPT | Performed by: INTERNAL MEDICINE

## 2021-11-22 RX ORDER — ONDANSETRON 2 MG/ML
4 INJECTION INTRAMUSCULAR; INTRAVENOUS EVERY 6 HOURS PRN
Status: DISCONTINUED | OUTPATIENT
Start: 2021-11-22 | End: 2021-11-25

## 2021-11-22 RX ORDER — POTASSIUM CHLORIDE 20 MEQ/1
40 TABLET, EXTENDED RELEASE ORAL ONCE
Status: COMPLETED | OUTPATIENT
Start: 2021-11-22 | End: 2021-11-22

## 2021-11-22 RX ORDER — MYCOPHENOLATE MOFETIL 250 MG/1
500 CAPSULE ORAL 2 TIMES DAILY
Status: DISCONTINUED | OUTPATIENT
Start: 2021-11-22 | End: 2021-11-23

## 2021-11-22 RX ORDER — METHYLPREDNISOLONE SODIUM SUCCINATE 40 MG/ML
40 INJECTION, POWDER, LYOPHILIZED, FOR SOLUTION INTRAMUSCULAR; INTRAVENOUS EVERY 8 HOURS
Status: DISCONTINUED | OUTPATIENT
Start: 2021-11-23 | End: 2021-11-23

## 2021-11-22 RX ORDER — ACETAMINOPHEN 325 MG/1
650 TABLET ORAL EVERY 6 HOURS PRN
Status: DISCONTINUED | OUTPATIENT
Start: 2021-11-22 | End: 2021-11-25

## 2021-11-22 RX ORDER — HYDROXYCHLOROQUINE SULFATE 200 MG/1
200 TABLET, FILM COATED ORAL DAILY
Status: DISCONTINUED | OUTPATIENT
Start: 2021-11-23 | End: 2021-11-23

## 2021-11-22 RX ORDER — PROCHLORPERAZINE EDISYLATE 5 MG/ML
5 INJECTION INTRAMUSCULAR; INTRAVENOUS EVERY 8 HOURS PRN
Status: DISCONTINUED | OUTPATIENT
Start: 2021-11-22 | End: 2021-11-25

## 2021-11-22 RX ORDER — METHYLPREDNISOLONE SODIUM SUCCINATE 40 MG/ML
40 INJECTION, POWDER, LYOPHILIZED, FOR SOLUTION INTRAMUSCULAR; INTRAVENOUS ONCE
Status: COMPLETED | OUTPATIENT
Start: 2021-11-22 | End: 2021-11-22

## 2021-11-22 NOTE — PROGRESS NOTES
?  RHEUMATOLOGY Follow up   Date of visit: 11/22/2021    Patient presents with: Follow - Up: 3 week f/u. Several marks on both arms and back. Noticed them about three days ago. Color stays the same. Noticed all marks same day. No dizziness.        ASSESSME ECHO, PFTs as well as CXR for baseline. She had delayed initially due to [address] pandemic, however, she has not had any baseline done since disease onset and we need to evaluate for possible ILD vs pulmonary HTN.      Patient and pt's  verbalized und WBC count which has stabilized. Decision was made to stop methotrexate and start mycophenolate due to lupus vasculitis dx on skin biopsy. She presents for follow up today.     Her  is here and serves to translate if pt does not understand what I'm as in both legs, described as 5-6/10. She is currently on prednisone 5mg daily. She has never gotten a biopsy of the lesion, unless she had one 8 years ago in [facility], but  cannot recall results.   She tried gabapentin, only 100mg nightly but stopped Use      Smoking status: Never Smoker      Smokeless tobacco: Never Used    Vaping Use      Vaping Use: Never used    Alcohol use: No    Drug use: No    Medications:  predniSONE 2.5 MG Oral Tab, Take 1 tablet (2.5 mg total) by mouth daily. , Disp: 90 tablet 1 Encounters:  11/22/21 : 126 lb (57.2 kg)   Height: 5' 1\" (154.9 cm) Body mass index is 23.81 kg/m². Body surface area is 1.55 meters squared. Physical Exam  Vitals and nursing note reviewed.    Constitutional:       General: She is not in acute d over forehead    Neurological:      General: No focal deficit present. Mental Status: She is alert and oriented to person, place, and time. Cranial Nerves: No cranial nerve deficit.    Psychiatric:         Mood and Affect: Mood normal.         Beh Clinical/Diagnostic Information:   Presumptive diagnosis is vasculitis     ____________________________________________________________   DIAGNOSTIC INTERPRETATION     Positive findings by direct immunofluorescence; probable lupus   erythematosus, incl SSB (La), which are often associated with SCLE.          03/2020  CCP negative  RF negative  ESR 41 normal  CRP 0.32 borderline   Myositis ab panel- SSA 51kd +; SSA 60kd +; otherwise negative  WELLINGTON 1:1280 speckled    (N<100)   (N<100)  Remaini

## 2021-11-23 PROCEDURE — 99255 IP/OBS CONSLTJ NEW/EST HI 80: CPT | Performed by: SPECIALIST

## 2021-11-23 PROCEDURE — 99255 IP/OBS CONSLTJ NEW/EST HI 80: CPT | Performed by: INTERNAL MEDICINE

## 2021-11-23 RX ORDER — METHYLPREDNISOLONE SODIUM SUCCINATE 125 MG/2ML
80 INJECTION, POWDER, LYOPHILIZED, FOR SOLUTION INTRAMUSCULAR; INTRAVENOUS DAILY
Status: DISCONTINUED | OUTPATIENT
Start: 2021-11-24 | End: 2021-11-25

## 2021-11-23 RX ORDER — AMLODIPINE BESYLATE 2.5 MG/1
2.5 TABLET ORAL 2 TIMES DAILY
Status: DISCONTINUED | OUTPATIENT
Start: 2021-11-23 | End: 2021-11-25

## 2021-11-23 RX ORDER — CYANOCOBALAMIN 1000 UG/ML
1000 INJECTION INTRAMUSCULAR; SUBCUTANEOUS DAILY
Status: DISCONTINUED | OUTPATIENT
Start: 2021-11-23 | End: 2021-11-25

## 2021-11-23 RX ORDER — METHYLPREDNISOLONE SODIUM SUCCINATE 40 MG/ML
40 INJECTION, POWDER, LYOPHILIZED, FOR SOLUTION INTRAMUSCULAR; INTRAVENOUS ONCE
Status: COMPLETED | OUTPATIENT
Start: 2021-11-23 | End: 2021-11-23

## 2021-11-23 RX ORDER — HYDROXYCHLOROQUINE SULFATE 200 MG/1
200 TABLET, FILM COATED ORAL 2 TIMES DAILY
Status: DISCONTINUED | OUTPATIENT
Start: 2021-11-23 | End: 2021-11-25

## 2021-11-23 NOTE — PLAN OF CARE
Pt a&ox4, VSS, afebrile. O2 sats stable on room air. Primarily yenni speaking, pt  at bedside to help translate and complete admission navigator. Pt denies pain, denies nausea. Also denies dizziness/lightheadedness. Up with steady gait.  Voiding dimple brush  - Limit straining and forceful nose blowing  Outcome: Progressing

## 2021-11-23 NOTE — PLAN OF CARE
Problem: Patient/Family Goals  Goal: Patient/Family Long Term Goal  Description: Patient's Long Term Goal: Discharge home    Interventions:  - Monitor labs  -Maintain bleeding precautions  - See additional Care Plan goals for specific interventions  Outc

## 2021-11-23 NOTE — CONSULTS
THE [facility] Hematology Oncology Group Report of Consultation      Patient Name: Brenna Harris   YOB: 1975  Medical Record Number: [id number]  Consulting Physician: Thuy Penaloza. Regis Mckay M.D.    Referring Physician: MD MASON Pearce Sodium Succ (Solu-MEDROL) injection 40 mg, 40 mg, Intravenous, Once  acetaminophen (TYLENOL) tab 650 mg, 650 mg, Oral, Q6H PRN  ondansetron (ZOFRAN) injection 4 mg, 4 mg, Intravenous, Q6H PRN  Prochlorperazine Edisylate (COMPAZINE) injection 5 mg, 5 mg, In atraumatic. Eyes Conjunctiva clear; sclera anicteric. ENMT External nose normal; external ears normal.  Neck NO JVD. Hematologic/Lymphatic Bruises on upper extremities. Respiratory Normal effort; no respiratory distress.   Cardiovascular Regular rate a x10(3) uL    Basophil Absolute 0.01 0.00 - 0.20 x10(3) uL    Immature Granulocyte Absolute 0.01 0.00 - 1.00 x10(3) uL    Neutrophil % 68.3 %    Lymphocyte % 19.4 %    Monocyte % 10.9 %    Eosinophil % 0.8 %    Basophil % 0.3 %    Immature Granulocyte % 0.3 5.30 x10(6)uL    HGB 11.7 (L) 12.0 - 16.0 g/dL    HCT 37.7 35.0 - 48.0 %    PLT 10.0 (LL) 150.0 - 450.0 10(3)uL    MCV 78.9 (L) 80.0 - 100.0 fL    MCH 24.5 (L) 26.0 - 34.0 pg    MCHC 31.0 31.0 - 37.0 g/dL    RDW 15.3 %    Neutrophil Absolute Prelim 2.19 1.

## 2021-11-23 NOTE — ED PROVIDER NOTES
Patient Seen in: [facility] Emergency Department      History   Patient presents with:  Abnormal Result    Stated Complaint: low plat     Subjective:   HPI    This is a 43-year-old woman history of mixed connective tissue disorder, lupus vasculitis, note reviewed. General: Well-appearing woman sitting up in the bed no acute distress  Head: Normocephalic and atraumatic.    HEENT:  Mucous membranes are moist.  Oropharynx clear, no significant petechia noted  Cardiovascular:  Normal rate and regular rhy 40mg solumedrol q8h, will add on LDH levels. Admission disposition: 11/22/2021  7:13 PM                                  Disposition and Plan     Clinical Impression:   Thrombocytopenia ([address]  (primary encounter diagnosis)     Disposition:  Admit  11/22/

## 2021-11-23 NOTE — H&P
[facility] Hospitalist H&P       CC: easy bruising    PCP: Valarie Velasquez MD    History of Present Illness: Pt is a 41 yo with asthma, MCTD, lupus vasculitis, Raynaud's dz who is admitted for easy bruising.  Seen in rheum clinic and follow up labs reveal hearing intact       Neck: Supple, symmetrical   Lungs:   Clear to auscultation bilaterally. ok effort   Chest wall:  No tenderness or deformity. Heart:  Regular rate and rhythm, S1, S2 normal,no LE edema   Abdomen:   Soft, non-tender.  Bowel sounds lisa therapeutic plan as outlined    Thank You,  [address]  Internal Medicine  Answering Service number: [phone]

## 2021-11-23 NOTE — ED INITIAL ASSESSMENT (HPI)
Patient to ED with . Reports she saw her doctor today, was sent here d/t abnormal labs and for admission. Patient denies complaints, pain, weakness, or other complaints.

## 2021-11-23 NOTE — TELEPHONE ENCOUNTER
Called and spoke to pt's . Pt's platelet count severely low. Significant decrease compared to July. Now only 10k was previous over 200k in July.  In the setting of the abnormal bruising, I am concerned for possible ITP vs TTP given her hx of severe

## 2021-11-23 NOTE — CONSULTS
[facility]  Rheumatology Report of Consultation  Ash Olivo Patient Status:  Inpatient    1975 MRN [id number]   [facility] 3NW-A Attending Bladimir Moreno, [address] [place] [address] Day # 1 PCP Alberto Rubio MD     Date of Admi Raynaud's phenomena. She also has some telangiectasia. History of mild asthma. PSH    tubal ligation. FAMHX parents are alive. Father has some form of arthritis please in his 76s. There is diabetes in the family. 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   1 marlys cyanosis, clubbing, or edema. Normal upper and lower extremities. Joints are nontender. Skin: Left forearm has a small bruise slight purpura of the size of the oval half dollar. No significant petechiae present.     Laboratory Data:  Lab Results   [place] patient.     Stanislaw Esteves MD Cell [phone]    11/23/2021  8:53 AM

## 2021-11-24 PROCEDURE — 99233 SBSQ HOSP IP/OBS HIGH 50: CPT | Performed by: SPECIALIST

## 2021-11-24 PROCEDURE — 99232 SBSQ HOSP IP/OBS MODERATE 35: CPT | Performed by: INTERNAL MEDICINE

## 2021-11-24 NOTE — PROGRESS NOTES
[facility] Hospitalist Progress Note     PCP: Africa Tiwari MD    CC:  Follow up    SUBJECTIVE:  No current issues. To start on IVIG.     OBJECTIVE:  Temp:  [98.4 °F (36.9 °C)-98.5 °F (36.9 °C)] 98.4 °F (36.9 °C)  Pulse:  [76-83] 76  Resp:  [17-18] 18  BP: Thrombocytopenia ([facility] [address]  Active Problems:    Anemia    B12 deficiency          39 yo with asthma, MCTD, lupus vasculitis, Raynaud's dz who is admitted for easy bruising.  Found to have platelets 10     **easy bruising secondary to acute thrombocytopenia, welch

## 2021-11-24 NOTE — PLAN OF CARE
Pt a&ox4, VSS, afebrile. Up with steady gait. IV SL. Denies pain. Bruise to left forearm, otherwise skin is intact. Voiding well. Tolerating diet. Plan of care discussed with pt and pt spouse at bedside, both verbalize understanding.       Problem: Patient

## 2021-11-24 NOTE — PROGRESS NOTES
THE [facility] Hematology Oncology Group Progress Note      Patient Name: Juancarlos Saini   YOB: 1975  Medical Record Number: [id number]  Attending Physician: NATALI Toribio Rachael Alvarez is a 40year old female w JVD.  Hematologic/Lymphatic Bruises on upper extremities. Respiratory Normal effort; no respiratory distress. Cardiovascular Regular rate and rhythm. Neurologic Motor and sensory grossly intact. Psychiatric Mood and affect appropriate.     Laboratory Leukocyte Esterase Urine Negative Negative    WBC Urine 1-5 0 - 5 /HPF    RBC Urine None Seen 0 - 2 /HPF    Bacteria Urine None Seen None Seen /HPF    Squamous Epi.  Cells None Seen None Seen /HPF    Renal Tubular Epithelial Cells None Seen None Seen /HP remained unchanged at 11 K/mcl. Awaiting platelet count this morning. If there is no response to steroids, will continue steroids but add IVIG. If platelet count is rising, she may be able to be switched to oral steroids to continue as outpatient.

## 2021-11-24 NOTE — PROGRESS NOTES
[facility]  Rheumatology Progress Note  Rosalia Olivo Patient Status:  Inpatient    1975 MRN [id number]   [facility] 3NW-A Attending Eleuterio Reina, *   Hosp Day # 2 PCP Africa Tiwari MD     Subjective:  Darin London

## 2021-11-24 NOTE — PAYOR COMM NOTE
--------------  CONTINUED STAY REVIEW    Payor: KLAUS MCCURDY  Subscriber #:  [id number]  Authorization Number: [id number]    Admit date: 11/22/21  Admit time:  9:50 PM    FAXING CLINICAL UPDATE FOR 11/23/21 11/23/21  HEMATOLOGY:  Date of Consultation: 11/ The skin at the access site was anesthetized.  Using a micropuncture needle with the Seldinger technique the right basilic vein was succesfully accessed in an antegrade fashion over the guidewire using a Introducer Shth 6fr 50cm 11cm Grn Hub Snpft Dil.  edema.  Integumentary   Skin is warm and dry. Neurologic         Motor and sensory grossly intact.   Psychiatric         Mood and affect appropriate.         Result Value     Glucose 92     Sodium 140     Potassium 3.9     Chloride 109     CO2 25.0     Ani 200 mg Oral     11/23/2021 2033 Given 200 mg Oral       immune globulin (GAMMAGARD) 10 % infusion 50 g     Date Action Dose Route     11/24/2021 1157 New Bag 50 g Intravenous       methylPREDNISolone Sodium Succ (Solu-MEDROL) injection 80 mg     Date Actio

## 2021-11-25 VITALS
RESPIRATION RATE: 18 BRPM | BODY MASS INDEX: 21.73 KG/M2 | WEIGHT: 122.63 LBS | DIASTOLIC BLOOD PRESSURE: 72 MMHG | HEIGHT: 63 IN | HEART RATE: 81 BPM | TEMPERATURE: 98 F | SYSTOLIC BLOOD PRESSURE: 104 MMHG | OXYGEN SATURATION: 99 %

## 2021-11-25 PROCEDURE — 99232 SBSQ HOSP IP/OBS MODERATE 35: CPT | Performed by: INTERNAL MEDICINE

## 2021-11-25 RX ORDER — AMLODIPINE BESYLATE 2.5 MG/1
2.5 TABLET ORAL 2 TIMES DAILY
Qty: 60 TABLET | Refills: 1 | Status: SHIPPED | OUTPATIENT
Start: 2021-11-25 | End: 2021-12-11

## 2021-11-25 RX ORDER — PREDNISONE 20 MG/1
80 TABLET ORAL
Status: DISCONTINUED | OUTPATIENT
Start: 2021-11-25 | End: 2021-11-25

## 2021-11-25 RX ORDER — CYANOCOBALAMIN (VITAMIN B-12) 100 MCG
1000 TABLET ORAL DAILY
Qty: 30 TABLET | Refills: 0 | Status: SHIPPED | OUTPATIENT
Start: 2021-11-25 | End: 2021-12-25

## 2021-11-25 RX ORDER — HYDROXYCHLOROQUINE SULFATE 200 MG/1
200 TABLET, FILM COATED ORAL 2 TIMES DAILY
Qty: 60 TABLET | Refills: 1 | Status: SHIPPED | OUTPATIENT
Start: 2021-11-25 | End: 2022-01-10

## 2021-11-25 RX ORDER — PREDNISONE 20 MG/1
80 TABLET ORAL DAILY
Qty: 240 TABLET | Refills: 0 | Status: SHIPPED | OUTPATIENT
Start: 2021-11-25 | End: 2022-01-26

## 2021-11-25 NOTE — PROGRESS NOTES
Heme/Onc Progress Note - Prane[facility]      Chief Complaint:    Follow up for evaluation and management of thrombocytopenia. Interim History:      No new complaints. No bleeding. No abdominal pain. Good appetite. Normal bm's.      Physical Examination:    Vital S argues against a primary marrow process. Drug effect (ie mycophenolate) remains a possibility. She has received 3 days of IV methylprednisolone. She had IVIG yesterday. PLT today much improved to 40k. Recommend stopping IV methylprednisolone.  Will sta

## 2021-11-25 NOTE — PROGRESS NOTES
[facility]  Rheumatology Progress Note  Evelyn Olivo Patient Status:  Inpatient    1975 MRN [id number]   [facility] 3NW-A Attending Marie Love MD   Hosp Day # 3 PCP Jennifer Shah MD     Subjective:  Evelyn Marino today on 80 mg of prednisone and she will follow up with hematology early next week with a repeat CBC. #2 - For iron deficiency she is receiving another dose of IV iron. #3 - vitamin B12 deficiency she is going to take daily B12 at home.   #4 -  her mixed

## 2021-11-25 NOTE — PLAN OF CARE
Pt alert and oriented x 4. Scientologist speaking.  at bedside. Up ad gabriel. Voiding with no difficulties. Lung sounds clear on room air. Abdomen soft and non-distended. Bowel sounds present. Pt denies nausea. Pt denies pain. VSS. IV saline locked.  POC discus

## 2021-11-25 NOTE — PLAN OF CARE
Plan of care discussed with patient and spouse. Both are very eager to leave today. Iv iron dose completed as ordered. All discharge instructions discussed with both at bedside. All Md's on case discussed as well. No signs of bleeding noted.  Voiding well,

## 2021-11-26 NOTE — PROGRESS NOTES
[facility] Hematology Oncology Group Progress Note      Patient Name: Watson Solorzano   YOB: 1975  Medical Record Number: [id number]  Attending Physician: Rangel Ybarra M.D.      Date of Visit: 11/30/2021       Chief Complaint  ITP - fol disorders. Social History (historical data, reviewed by physician)  Denies tobacco use. Current Medications   predniSONE 20 MG Oral Tab, Take 4 tablets (80 mg total) by mouth daily. , Disp: 240 tablet, Rfl: 0  Cyanocobalamin (B-12-SL) 1000 MCG The [place] Absolute 0.04 0.00 - 0.70 x10(3) uL    Basophil Absolute 0.03 0.00 - 0.20 x10(3) uL    Immature Granulocyte Absolute 0.12 0.00 - 1.00 x10(3) uL    Neutrophil % 57.9 %    Lymphocyte % 28.1 %    Monocyte % 11.8 %    Eosinophil % 0.5 %    Basophil % 0.3 %

## 2021-11-26 NOTE — PAYOR COMM NOTE
Discharge Notification    Patient Name: Carter Spring  Payor: KLAUS PPO  Subscriber #: [id number]  Authorization Number: Raejean Amaya Date/Time: 11/22/2021 7:30 PM  Discharge Date/Time: 11/25/2021 12:45 PM

## 2021-11-30 ENCOUNTER — OFFICE VISIT (OUTPATIENT)
Dept: HEMATOLOGY/ONCOLOGY | Facility: HOSPITAL | Age: 46
End: 2021-11-30
Attending: SPECIALIST
Payer: COMMERCIAL

## 2021-11-30 VITALS
TEMPERATURE: 98 F | DIASTOLIC BLOOD PRESSURE: 72 MMHG | WEIGHT: 123 LBS | HEART RATE: 87 BPM | BODY MASS INDEX: 21.79 KG/M2 | HEIGHT: 62.99 IN | SYSTOLIC BLOOD PRESSURE: 107 MMHG | OXYGEN SATURATION: 100 % | RESPIRATION RATE: 16 BRPM

## 2021-11-30 DIAGNOSIS — D69.3 ACUTE ITP (HCC): ICD-10-CM

## 2021-11-30 DIAGNOSIS — E61.1 IRON DEFICIENCY: ICD-10-CM

## 2021-11-30 DIAGNOSIS — E53.8 B12 DEFICIENCY: Primary | ICD-10-CM

## 2021-11-30 DIAGNOSIS — D50.0 IRON DEFICIENCY ANEMIA SECONDARY TO BLOOD LOSS (CHRONIC): ICD-10-CM

## 2021-11-30 PROCEDURE — 96372 THER/PROPH/DIAG INJ SC/IM: CPT

## 2021-11-30 PROCEDURE — 99214 OFFICE O/P EST MOD 30 MIN: CPT | Performed by: SPECIALIST

## 2021-11-30 RX ORDER — CYANOCOBALAMIN 1000 UG/ML
1000 INJECTION INTRAMUSCULAR; SUBCUTANEOUS ONCE
Status: COMPLETED | OUTPATIENT
Start: 2021-11-30 | End: 2021-11-30

## 2021-11-30 RX ORDER — CYANOCOBALAMIN 1000 UG/ML
1000 INJECTION INTRAMUSCULAR; SUBCUTANEOUS ONCE
Status: CANCELLED | OUTPATIENT
Start: 2021-11-30

## 2021-11-30 RX ADMIN — CYANOCOBALAMIN 1000 MCG: 1000 INJECTION INTRAMUSCULAR; SUBCUTANEOUS at 10:01:00

## 2021-11-30 NOTE — PROGRESS NOTES
Patient is here today for hematology post Hospital follow up with Chuck Floyd . Patient denies pain. Patient is currently on Prednisone 80mg daily. Stated she is feeling better. Medication list and medical history were reviewed and updated.      Education Re

## 2021-11-30 NOTE — PROGRESS NOTES
Education Record    Learner:  Patient     Disease / Diagnosis: B12 deficiency - vitamin b12 injection    Barriers / Limitations:  None   Comments:    Method:  Brief focused and Reinforcement   Comments:    General Topics:  Plan of care reviewed   Comments:

## 2021-12-05 DIAGNOSIS — D69.3 ACUTE ITP (HCC): Primary | ICD-10-CM

## 2021-12-07 ENCOUNTER — NURSE ONLY (OUTPATIENT)
Dept: HEMATOLOGY/ONCOLOGY | Facility: HOSPITAL | Age: 46
End: 2021-12-07
Attending: SPECIALIST
Payer: COMMERCIAL

## 2021-12-07 DIAGNOSIS — D69.3 ACUTE ITP (HCC): ICD-10-CM

## 2021-12-07 PROCEDURE — 85025 COMPLETE CBC W/AUTO DIFF WBC: CPT

## 2021-12-07 PROCEDURE — 36415 COLL VENOUS BLD VENIPUNCTURE: CPT

## 2021-12-14 ENCOUNTER — NURSE ONLY (OUTPATIENT)
Dept: HEMATOLOGY/ONCOLOGY | Facility: HOSPITAL | Age: 46
End: 2021-12-14
Attending: SPECIALIST
Payer: COMMERCIAL

## 2021-12-14 DIAGNOSIS — D69.3 ACUTE ITP (HCC): ICD-10-CM

## 2021-12-14 PROCEDURE — 85025 COMPLETE CBC W/AUTO DIFF WBC: CPT

## 2021-12-14 PROCEDURE — 36415 COLL VENOUS BLD VENIPUNCTURE: CPT

## 2021-12-20 ENCOUNTER — PATIENT MESSAGE (OUTPATIENT)
Dept: HEMATOLOGY/ONCOLOGY | Facility: HOSPITAL | Age: 46
End: 2021-12-20

## 2021-12-21 ENCOUNTER — NURSE ONLY (OUTPATIENT)
Dept: HEMATOLOGY/ONCOLOGY | Facility: HOSPITAL | Age: 46
End: 2021-12-21
Attending: SPECIALIST
Payer: COMMERCIAL

## 2021-12-21 ENCOUNTER — TELEPHONE (OUTPATIENT)
Dept: HEMATOLOGY/ONCOLOGY | Facility: HOSPITAL | Age: 46
End: 2021-12-21

## 2021-12-21 DIAGNOSIS — D69.3 ACUTE ITP (HCC): ICD-10-CM

## 2021-12-21 PROCEDURE — 36415 COLL VENOUS BLD VENIPUNCTURE: CPT

## 2021-12-21 PROCEDURE — 85025 COMPLETE CBC W/AUTO DIFF WBC: CPT

## 2021-12-21 NOTE — TELEPHONE ENCOUNTER
From: Loreto Jessie Pallavi  To: Marina Couch MD  Sent: 12/20/2021 4:16 PM CST  Subject: Amelia Coffey Doctor   From the last 5 days my wife Raymond Olivo suffering with motion problem. She doesn’t had motion for the last 5 days .  This issue star

## 2021-12-21 NOTE — TELEPHONE ENCOUNTER
Patient is constipated - patient has not had a bowel movement in 5 days. Instructed to try miralax or Milk of magnesia - to let us know is she has no results.

## 2021-12-21 NOTE — TELEPHONE ENCOUNTER
Patients   Louise calling.    Has questions for the DR  And also would like results from Labs on 12/21/2021    Please return call to [facility]

## 2021-12-22 ENCOUNTER — HOSPITAL ENCOUNTER (EMERGENCY)
Facility: HOSPITAL | Age: 46
Discharge: LEFT BEFORE DISCHARGE | End: 2021-12-22
Attending: EMERGENCY MEDICINE
Payer: COMMERCIAL

## 2021-12-22 ENCOUNTER — APPOINTMENT (OUTPATIENT)
Dept: CT IMAGING | Facility: HOSPITAL | Age: 46
End: 2021-12-22
Attending: EMERGENCY MEDICINE
Payer: COMMERCIAL

## 2021-12-22 VITALS
DIASTOLIC BLOOD PRESSURE: 90 MMHG | RESPIRATION RATE: 18 BRPM | TEMPERATURE: 96 F | SYSTOLIC BLOOD PRESSURE: 111 MMHG | BODY MASS INDEX: 24.8 KG/M2 | HEIGHT: 63 IN | OXYGEN SATURATION: 100 % | HEART RATE: 85 BPM | WEIGHT: 140 LBS

## 2021-12-22 DIAGNOSIS — K56.41 FECAL IMPACTION (HCC): Primary | ICD-10-CM

## 2021-12-22 DIAGNOSIS — R33.9 URINARY RETENTION: ICD-10-CM

## 2021-12-22 DIAGNOSIS — K80.20 CALCULUS OF GALLBLADDER WITHOUT CHOLECYSTITIS WITHOUT OBSTRUCTION: ICD-10-CM

## 2021-12-22 PROCEDURE — 51702 INSERT TEMP BLADDER CATH: CPT

## 2021-12-22 PROCEDURE — 96375 TX/PRO/DX INJ NEW DRUG ADDON: CPT

## 2021-12-22 PROCEDURE — 81025 URINE PREGNANCY TEST: CPT

## 2021-12-22 PROCEDURE — 74177 CT ABD & PELVIS W/CONTRAST: CPT | Performed by: EMERGENCY MEDICINE

## 2021-12-22 PROCEDURE — 96361 HYDRATE IV INFUSION ADD-ON: CPT

## 2021-12-22 PROCEDURE — 87077 CULTURE AEROBIC IDENTIFY: CPT | Performed by: EMERGENCY MEDICINE

## 2021-12-22 PROCEDURE — 99284 EMERGENCY DEPT VISIT MOD MDM: CPT

## 2021-12-22 PROCEDURE — 87186 SC STD MICRODIL/AGAR DIL: CPT | Performed by: EMERGENCY MEDICINE

## 2021-12-22 PROCEDURE — 85025 COMPLETE CBC W/AUTO DIFF WBC: CPT | Performed by: EMERGENCY MEDICINE

## 2021-12-22 PROCEDURE — 87086 URINE CULTURE/COLONY COUNT: CPT | Performed by: EMERGENCY MEDICINE

## 2021-12-22 PROCEDURE — 83690 ASSAY OF LIPASE: CPT | Performed by: EMERGENCY MEDICINE

## 2021-12-22 PROCEDURE — 96374 THER/PROPH/DIAG INJ IV PUSH: CPT

## 2021-12-22 PROCEDURE — 80053 COMPREHEN METABOLIC PANEL: CPT | Performed by: EMERGENCY MEDICINE

## 2021-12-22 PROCEDURE — 99285 EMERGENCY DEPT VISIT HI MDM: CPT

## 2021-12-22 PROCEDURE — 81001 URINALYSIS AUTO W/SCOPE: CPT | Performed by: EMERGENCY MEDICINE

## 2021-12-22 RX ORDER — SODIUM CHLORIDE 9 MG/ML
1000 INJECTION, SOLUTION INTRAVENOUS ONCE
Status: COMPLETED | OUTPATIENT
Start: 2021-12-22 | End: 2021-12-22

## 2021-12-22 RX ORDER — DOCUSATE SODIUM 100 MG/1
100 CAPSULE, LIQUID FILLED ORAL 2 TIMES DAILY
Qty: 30 CAPSULE | Refills: 0 | Status: SHIPPED | OUTPATIENT
Start: 2021-12-22

## 2021-12-22 RX ORDER — POTASSIUM CHLORIDE 20 MEQ/1
20 TABLET, EXTENDED RELEASE ORAL ONCE
Status: COMPLETED | OUTPATIENT
Start: 2021-12-22 | End: 2021-12-22

## 2021-12-22 RX ORDER — KETOROLAC TROMETHAMINE 30 MG/ML
30 INJECTION, SOLUTION INTRAMUSCULAR; INTRAVENOUS ONCE
Status: COMPLETED | OUTPATIENT
Start: 2021-12-22 | End: 2021-12-22

## 2021-12-22 NOTE — ED INITIAL ASSESSMENT (HPI)
Patient presents for evaluation of constipation and abdominal pain for the past week, sent by primary to rule out bowel obstruction.

## 2021-12-22 NOTE — ED PROVIDER NOTES
Patient Seen in: [facility] Emergency Department      History   Patient presents with:  Abdomen/Flank Pain    Stated Complaint: abd pain, sent by pcp to r/o sbo    Subjective:   HPI    Patient is a 23-year-old female with medical history as noted be 24.80 kg/m²         Physical Exam  GENERAL: Well-developed, well-nourished female sitting up breathing easily in no apparent distress. Patient is nontoxic in appearance  HEENT: Head is normocephalic, atraumatic.  Pupils are 3 mm equally round and reactive PREGNANCY URINE - Normal   RAPID SARS-COV-2 BY PCR - Normal   CBC WITH DIFFERENTIAL WITH PLATELET    Narrative: The following orders were created for panel order CBC With Differential With Platelet.   Procedure                               Abnormality 1100 cc of clear urine that was returned. Patient felt much better after this was placed. Patient was improved in her symptoms after Noland catheter was placed. Patient was given a stress dose of steroids here in the emergency room.   I discussed with the discussed that I wanted to admit patient to the hospital for further observation and the patient and patient's  are refusing admission to the hospital at this time and are aware of the risks of being discharged home at this time.   Patient was able t capsule (100 mg total) by mouth 2 (two) times daily.   Qty: 30 capsule Refills: 0

## 2021-12-28 ENCOUNTER — NURSE ONLY (OUTPATIENT)
Dept: HEMATOLOGY/ONCOLOGY | Facility: HOSPITAL | Age: 46
End: 2021-12-28
Attending: SPECIALIST
Payer: COMMERCIAL

## 2021-12-28 ENCOUNTER — TELEPHONE (OUTPATIENT)
Dept: HEMATOLOGY/ONCOLOGY | Facility: HOSPITAL | Age: 46
End: 2021-12-28

## 2021-12-28 DIAGNOSIS — D69.3 ACUTE ITP (HCC): ICD-10-CM

## 2021-12-28 PROCEDURE — 85025 COMPLETE CBC W/AUTO DIFF WBC: CPT

## 2021-12-28 PROCEDURE — 36415 COLL VENOUS BLD VENIPUNCTURE: CPT

## 2021-12-28 NOTE — TELEPHONE ENCOUNTER
CALVIN Liao Rns  Reviewed CBC results with Dr Mariya Ruiz. Pt should be on prednisone 20 mg every day. They should decrease dose to 10 mg every day and recheck CBC in 1 week.  Please contact the patient's  to notify

## 2021-12-29 ENCOUNTER — TELEPHONE (OUTPATIENT)
Dept: RHEUMATOLOGY | Facility: CLINIC | Age: 46
End: 2021-12-29

## 2021-12-29 NOTE — TELEPHONE ENCOUNTER
Called pts  to get an update on pt since she was last admitted to the hospital. Pts child answered and said that they would have their dad call the office back with an update. 98

## 2021-12-30 NOTE — TELEPHONE ENCOUNTER
Pts  called back with an update. He stated that her bruising is getting better. It is still present, but no new bruises have occurred. She is on 10 mg of prednisone daily, amlodipine, and plaquenil. Has stopped the mycophenalate.     Her gallbladder

## 2022-01-08 NOTE — DISCHARGE SUMMARY
[facility] Discharge Summary     Patient ID:  Rosita Olivo  40year old  5/27/1975    Admit date: 11/22/2021  Discharge date and time: 11/25/2021 12:45 PM     Attending Physician: No att. providers found     Primary Care Phys hold - resume when ok with hemeonc/rheum     **leukopenia- suspect reactive     **subclinical hypothyroidism- TSH 5, fT4 wnl- f/u with PCP     Stable chronic illnesses:  **MCTD, lupus vasculitis, Raynaud's dz-f/u outpatient       Consults: IP CONSULT TO HE the rectum suggestive of fecal impaction. No evidence of bowel obstruction. 3. Gallstone within the neck of the gallbladder measuring 1.3 x 1.0 cm. The gallbladder is contracted but there is suggestion of small amount of pericholecystic fluid.   Correlat

## 2022-01-11 ENCOUNTER — NURSE ONLY (OUTPATIENT)
Dept: HEMATOLOGY/ONCOLOGY | Facility: HOSPITAL | Age: 47
End: 2022-01-11
Attending: SPECIALIST
Payer: COMMERCIAL

## 2022-01-11 ENCOUNTER — TELEPHONE (OUTPATIENT)
Dept: HEMATOLOGY/ONCOLOGY | Facility: HOSPITAL | Age: 47
End: 2022-01-11

## 2022-01-11 DIAGNOSIS — D69.3 ACUTE ITP (HCC): ICD-10-CM

## 2022-01-11 LAB
BASOPHILS # BLD AUTO: 0.01 X10(3) UL (ref 0–0.2)
BASOPHILS NFR BLD AUTO: 0.2 %
EOSINOPHIL # BLD AUTO: 0.02 X10(3) UL (ref 0–0.7)
EOSINOPHIL NFR BLD AUTO: 0.4 %
ERYTHROCYTE [DISTWIDTH] IN BLOOD BY AUTOMATED COUNT: 17.4 %
HCT VFR BLD AUTO: 43.5 %
HGB BLD-MCNC: 13.2 G/DL
IMM GRANULOCYTES # BLD AUTO: 0.01 X10(3) UL (ref 0–1)
IMM GRANULOCYTES NFR BLD: 0.2 %
LYMPHOCYTES # BLD AUTO: 1.84 X10(3) UL (ref 1–4)
LYMPHOCYTES NFR BLD AUTO: 33 %
MCH RBC QN AUTO: 27.9 PG (ref 26–34)
MCHC RBC AUTO-ENTMCNC: 30.3 G/DL (ref 31–37)
MCV RBC AUTO: 92 FL
MONOCYTES # BLD AUTO: 0.65 X10(3) UL (ref 0.1–1)
MONOCYTES NFR BLD AUTO: 11.6 %
NEUTROPHILS # BLD AUTO: 3.05 X10 (3) UL (ref 1.5–7.7)
NEUTROPHILS # BLD AUTO: 3.05 X10(3) UL (ref 1.5–7.7)
NEUTROPHILS NFR BLD AUTO: 54.6 %
PLATELET # BLD AUTO: 214 10(3)UL (ref 150–450)
RBC # BLD AUTO: 4.73 X10(6)UL
WBC # BLD AUTO: 5.6 X10(3) UL (ref 4–11)

## 2022-01-11 PROCEDURE — 85025 COMPLETE CBC W/AUTO DIFF WBC: CPT

## 2022-01-11 PROCEDURE — 36415 COLL VENOUS BLD VENIPUNCTURE: CPT

## 2022-01-11 NOTE — TELEPHONE ENCOUNTER
Dr. Adal Dejesus spoke with patient spouse. See is currently on Prednisone 10mg daily.       Lory Kaplan spoke to patient spouse - to stop prednisone - recheck labs in one month

## 2022-01-13 ENCOUNTER — PATIENT MESSAGE (OUTPATIENT)
Dept: RHEUMATOLOGY | Facility: CLINIC | Age: 47
End: 2022-01-13

## 2022-01-13 DIAGNOSIS — I77.89 LUPUS VASCULITIS (HCC): ICD-10-CM

## 2022-01-13 DIAGNOSIS — D69.6 THROMBOCYTOPENIA (HCC): ICD-10-CM

## 2022-01-13 DIAGNOSIS — M32.19 LUPUS VASCULITIS (HCC): ICD-10-CM

## 2022-01-13 DIAGNOSIS — M35.1 MIXED CONNECTIVE TISSUE DISEASE (HCC): Primary | ICD-10-CM

## 2022-01-13 DIAGNOSIS — Z79.899 HIGH RISK MEDICATION USE: ICD-10-CM

## 2022-01-18 DIAGNOSIS — D69.6 THROMBOCYTOPENIA (HCC): ICD-10-CM

## 2022-01-18 RX ORDER — PREDNISONE 20 MG/1
TABLET ORAL
Qty: 120 TABLET | Refills: 1 | OUTPATIENT
Start: 2022-01-18

## 2022-01-26 ENCOUNTER — OFFICE VISIT (OUTPATIENT)
Dept: RHEUMATOLOGY | Facility: CLINIC | Age: 47
End: 2022-01-26
Payer: COMMERCIAL

## 2022-01-26 VITALS
BODY MASS INDEX: 22.32 KG/M2 | HEART RATE: 82 BPM | DIASTOLIC BLOOD PRESSURE: 60 MMHG | SYSTOLIC BLOOD PRESSURE: 94 MMHG | HEIGHT: 63 IN | RESPIRATION RATE: 16 BRPM | WEIGHT: 126 LBS | TEMPERATURE: 97 F

## 2022-01-26 DIAGNOSIS — Z79.899 HIGH RISK MEDICATION USE: ICD-10-CM

## 2022-01-26 DIAGNOSIS — D69.3 IDIOPATHIC THROMBOCYTOPENIC PURPURA (ITP) (HCC): ICD-10-CM

## 2022-01-26 DIAGNOSIS — M35.1 MIXED CONNECTIVE TISSUE DISEASE (HCC): Primary | ICD-10-CM

## 2022-01-26 DIAGNOSIS — I77.89 LUPUS VASCULITIS (HCC): ICD-10-CM

## 2022-01-26 DIAGNOSIS — M32.19 LUPUS VASCULITIS (HCC): ICD-10-CM

## 2022-01-26 DIAGNOSIS — I73.01 RAYNAUD'S DISEASE WITH GANGRENE (HCC): ICD-10-CM

## 2022-01-26 DIAGNOSIS — R76.8 RIBONUCLEOPROTEIN ANTIBODY POSITIVE: ICD-10-CM

## 2022-01-26 DIAGNOSIS — R76.8 SS-A ANTIBODY POSITIVE: ICD-10-CM

## 2022-01-26 PROCEDURE — 3074F SYST BP LT 130 MM HG: CPT | Performed by: INTERNAL MEDICINE

## 2022-01-26 PROCEDURE — 3008F BODY MASS INDEX DOCD: CPT | Performed by: INTERNAL MEDICINE

## 2022-01-26 PROCEDURE — 99215 OFFICE O/P EST HI 40 MIN: CPT | Performed by: INTERNAL MEDICINE

## 2022-01-26 PROCEDURE — 3078F DIAST BP <80 MM HG: CPT | Performed by: INTERNAL MEDICINE

## 2022-01-26 NOTE — PROGRESS NOTES
?  RHEUMATOLOGY Follow up   Date of visit: 01/26/2022    Patient presents with: Follow - Up: 2 month f/u. Feeling good. Bruising has healed and no more have appeared. No joint pain or swelling. No new symptoms.        ASSESSMENT, DISCUSSION & PLAN   Assess further recommendations based off of what those labs show. Discussed potentially restarting low-dose prednisone versus low-dose mycophenolate. Also briefly discussed options of azathioprine versus injectable methotrexate.   Again we will hold off on any m rheumatologist with having mixed connective tissue disorder. She was previously on Plaquenil as well as azathioprine and some dose of prednisone. Seemed like she was also on methotrexate but unclear why stopped.  Since first seeing her, I restarted methotr ago.   About 3 years ago, they moved to [facility]. She was seeing Dr. Quincy Cross, diagnosed pt with mixed connective tissue disease. States her symptoms worsened in winter months- joint pain in her hands as well as change in color of her fingertips.  A patient denies any history of uveitis, crampy abdominal pain, constipation, diarrhea, or bloody stools. There are no symptoms of severe dry eyes, dry mouth, or swelling of the cheeks or under the jawbone.    No fevers, chills, lymphadenopathy, night sweat and palpitations. Gastrointestinal: Negative for abdominal pain, blood in stool, diarrhea, heartburn and nausea. Genitourinary: Negative for dysuria, frequency and hematuria.    Musculoskeletal: Negative for back pain, joint pain, myalgias and neck pain or restriction of motion of the DIPs, PIPs, MCPs, wrists, elbows, or joints of the feet. Bilateral shoulders with full ROM. Bilateral knees without medial joint line tenderness, no crepitus, no effusion.   + bilateral ankle tenderness over med/lat malleol 12/22/2021    OSMOCALC 276 12/22/2021    ALKPHO 78 12/22/2021    AST 11 (L) 12/22/2021    ALT 25 12/22/2021    BILT 0.5 12/22/2021    TP 8.1 12/22/2021    ALB 4.0 12/22/2021    GLOBULIN 4.1 12/22/2021     (L) 12/22/2021    K 3.3 (L) 12/22/2021    CL superficial, upper, and                mid dermal blood vessels     COMMENTS   By direct immunofluorescence, there is granular deposition of IgG   within and around basal keratinocytes and within cell nuclei.  The   presence of granular IgG within the epide (N<15)  MPO/PR3 negative  C3 and C4 normal  CRP 0.17 normal  ESR 35 (H)     10/2018  ESR 41 (H)     04/2018  CRP 0.44 (N)  ESR 48 (H)     02/2018  CRP normal  ESR 44 (H)    Alisha Farmer DO  [facility]  01/26/2022

## 2022-01-29 ENCOUNTER — LAB ENCOUNTER (OUTPATIENT)
Dept: LAB | Facility: HOSPITAL | Age: 47
End: 2022-01-29
Attending: INTERNAL MEDICINE
Payer: COMMERCIAL

## 2022-01-29 DIAGNOSIS — Z79.899 HIGH RISK MEDICATION USE: ICD-10-CM

## 2022-01-29 DIAGNOSIS — D69.6 THROMBOCYTOPENIA (HCC): ICD-10-CM

## 2022-01-29 DIAGNOSIS — I77.89 LUPUS VASCULITIS (HCC): ICD-10-CM

## 2022-01-29 DIAGNOSIS — D69.3 ACUTE ITP (HCC): ICD-10-CM

## 2022-01-29 DIAGNOSIS — E53.8 B12 DEFICIENCY: ICD-10-CM

## 2022-01-29 DIAGNOSIS — M35.1 MIXED CONNECTIVE TISSUE DISEASE (HCC): ICD-10-CM

## 2022-01-29 DIAGNOSIS — M32.19 LUPUS VASCULITIS (HCC): ICD-10-CM

## 2022-01-29 LAB
ALBUMIN SERPL-MCNC: 3.7 G/DL (ref 3.4–5)
ALBUMIN/GLOB SERPL: 0.9 {RATIO} (ref 1–2)
ALP LIVER SERPL-CCNC: 87 U/L
ALT SERPL-CCNC: 18 U/L
ANION GAP SERPL CALC-SCNC: 3 MMOL/L (ref 0–18)
AST SERPL-CCNC: 19 U/L (ref 15–37)
BASOPHILS # BLD AUTO: 0.02 X10(3) UL (ref 0–0.2)
BASOPHILS NFR BLD AUTO: 0.3 %
BILIRUB SERPL-MCNC: 0.3 MG/DL (ref 0.1–2)
BILIRUB UR QL STRIP.AUTO: NEGATIVE
BUN BLD-MCNC: 7 MG/DL (ref 7–18)
CALCIUM BLD-MCNC: 9.2 MG/DL (ref 8.5–10.1)
CHLORIDE SERPL-SCNC: 107 MMOL/L (ref 98–112)
CO2 SERPL-SCNC: 29 MMOL/L (ref 21–32)
CREAT BLD-MCNC: 0.51 MG/DL
CRP SERPL-MCNC: 0.44 MG/DL (ref ?–0.3)
DEPRECATED HBV CORE AB SER IA-ACNC: 245.8 NG/ML
EOSINOPHIL # BLD AUTO: 0.07 X10(3) UL (ref 0–0.7)
EOSINOPHIL NFR BLD AUTO: 1.2 %
ERYTHROCYTE [DISTWIDTH] IN BLOOD BY AUTOMATED COUNT: 16.5 %
ERYTHROCYTE [SEDIMENTATION RATE] IN BLOOD: 48 MM/HR
FASTING STATUS PATIENT QL REPORTED: NO
GLOBULIN PLAS-MCNC: 4.2 G/DL (ref 2.8–4.4)
GLUCOSE BLD-MCNC: 81 MG/DL (ref 70–99)
GLUCOSE UR STRIP.AUTO-MCNC: NEGATIVE MG/DL
HCT VFR BLD AUTO: 39.1 %
HGB BLD-MCNC: 12.2 G/DL
IMM GRANULOCYTES # BLD AUTO: 0.02 X10(3) UL (ref 0–1)
IMM GRANULOCYTES NFR BLD: 0.3 %
IRON SATN MFR SERPL: 14 %
IRON SERPL-MCNC: 46 UG/DL
KETONES UR STRIP.AUTO-MCNC: NEGATIVE MG/DL
LYMPHOCYTES # BLD AUTO: 1.3 X10(3) UL (ref 1–4)
LYMPHOCYTES NFR BLD AUTO: 22.1 %
MCH RBC QN AUTO: 28.2 PG (ref 26–34)
MCHC RBC AUTO-ENTMCNC: 31.2 G/DL (ref 31–37)
MCV RBC AUTO: 90.5 FL
MONOCYTES # BLD AUTO: 0.71 X10(3) UL (ref 0.1–1)
MONOCYTES NFR BLD AUTO: 12.1 %
NEUTROPHILS # BLD AUTO: 3.75 X10(3) UL (ref 1.5–7.7)
NEUTROPHILS NFR BLD AUTO: 64 %
NITRITE UR QL STRIP.AUTO: NEGATIVE
OSMOLALITY SERPL CALC.SUM OF ELEC: 285 MOSM/KG (ref 275–295)
PH UR STRIP.AUTO: 6 [PH] (ref 5–8)
PLATELET # BLD AUTO: 296 10(3)UL (ref 150–450)
POTASSIUM SERPL-SCNC: 3.7 MMOL/L (ref 3.5–5.1)
PROT SERPL-MCNC: 7.9 G/DL (ref 6.4–8.2)
PROT UR STRIP.AUTO-MCNC: NEGATIVE MG/DL
RBC # BLD AUTO: 4.32 X10(6)UL
RBC UR QL AUTO: NEGATIVE
SODIUM SERPL-SCNC: 139 MMOL/L (ref 136–145)
SP GR UR STRIP.AUTO: 1 (ref 1–1.03)
TIBC SERPL-MCNC: 325 UG/DL (ref 240–450)
TRANSFERRIN SERPL-MCNC: 218 MG/DL (ref 200–360)
UROBILINOGEN UR STRIP.AUTO-MCNC: <2 MG/DL
VIT B12 SERPL-MCNC: 689 PG/ML (ref 193–986)
WBC # BLD AUTO: 5.9 X10(3) UL (ref 4–11)

## 2022-01-29 PROCEDURE — 86225 DNA ANTIBODY NATIVE: CPT | Performed by: INTERNAL MEDICINE

## 2022-01-29 PROCEDURE — 82728 ASSAY OF FERRITIN: CPT | Performed by: INTERNAL MEDICINE

## 2022-01-29 PROCEDURE — 80053 COMPREHEN METABOLIC PANEL: CPT | Performed by: INTERNAL MEDICINE

## 2022-01-29 PROCEDURE — 86038 ANTINUCLEAR ANTIBODIES: CPT | Performed by: INTERNAL MEDICINE

## 2022-01-29 PROCEDURE — 85652 RBC SED RATE AUTOMATED: CPT | Performed by: INTERNAL MEDICINE

## 2022-01-29 PROCEDURE — 86140 C-REACTIVE PROTEIN: CPT | Performed by: INTERNAL MEDICINE

## 2022-01-29 PROCEDURE — 86038 ANTINUCLEAR ANTIBODIES: CPT

## 2022-01-29 PROCEDURE — 81001 URINALYSIS AUTO W/SCOPE: CPT | Performed by: INTERNAL MEDICINE

## 2022-01-29 PROCEDURE — 85025 COMPLETE CBC W/AUTO DIFF WBC: CPT

## 2022-01-29 PROCEDURE — 83550 IRON BINDING TEST: CPT | Performed by: INTERNAL MEDICINE

## 2022-01-29 PROCEDURE — 82607 VITAMIN B-12: CPT

## 2022-01-29 PROCEDURE — 83540 ASSAY OF IRON: CPT | Performed by: INTERNAL MEDICINE

## 2022-01-29 PROCEDURE — 86235 NUCLEAR ANTIGEN ANTIBODY: CPT | Performed by: INTERNAL MEDICINE

## 2022-01-31 LAB
ANA SER QL: POSITIVE
CENTROMERE AB SER-ACNC: <100 AU/ML (ref ?–100)
DSDNA AB SER-ACNC: <100 IU/ML (ref ?–100)
ENA RNP AB SER-ACNC: 201 AU/ML (ref ?–100)
ENA SCL70 AB SER-ACNC: <100 AU/ML (ref ?–100)
ENA SM AB SER-ACNC: <100 AU/ML (ref ?–100)
ENA SS-A AB SER-ACNC: 365 AU/ML (ref ?–100)
ENA SS-B AB SER-ACNC: <100 AU/ML (ref ?–100)
HISTONE AB SER-ACNC: <100 AU/ML (ref ?–100)
JO-1 AUTOAB: <100 AU/ML (ref ?–100)

## 2022-02-01 LAB — ANTI-NUCLEAR ANTIBODY (ANA), HEP-2 IGG: DETECTED

## 2022-02-08 ENCOUNTER — APPOINTMENT (OUTPATIENT)
Dept: HEMATOLOGY/ONCOLOGY | Facility: HOSPITAL | Age: 47
End: 2022-02-08
Attending: SPECIALIST
Payer: COMMERCIAL

## 2022-02-25 ENCOUNTER — OFFICE VISIT (OUTPATIENT)
Dept: RHEUMATOLOGY | Facility: CLINIC | Age: 47
End: 2022-02-25
Payer: COMMERCIAL

## 2022-02-25 VITALS
WEIGHT: 125 LBS | HEART RATE: 78 BPM | HEIGHT: 63 IN | SYSTOLIC BLOOD PRESSURE: 100 MMHG | RESPIRATION RATE: 16 BRPM | BODY MASS INDEX: 22.15 KG/M2 | DIASTOLIC BLOOD PRESSURE: 60 MMHG | TEMPERATURE: 97 F

## 2022-02-25 DIAGNOSIS — M32.19 LUPUS VASCULITIS (HCC): ICD-10-CM

## 2022-02-25 DIAGNOSIS — Z79.52 LONG TERM (CURRENT) USE OF SYSTEMIC STEROIDS: ICD-10-CM

## 2022-02-25 DIAGNOSIS — Z79.899 LONG-TERM USE OF PLAQUENIL: ICD-10-CM

## 2022-02-25 DIAGNOSIS — I77.89 LUPUS VASCULITIS (HCC): ICD-10-CM

## 2022-02-25 DIAGNOSIS — M35.1 MIXED CONNECTIVE TISSUE DISEASE (HCC): Primary | ICD-10-CM

## 2022-02-25 DIAGNOSIS — I73.01 RAYNAUD'S DISEASE WITH GANGRENE (HCC): ICD-10-CM

## 2022-02-25 DIAGNOSIS — R70.0 ELEVATED SED RATE: ICD-10-CM

## 2022-02-25 DIAGNOSIS — R76.8 SS-A ANTIBODY POSITIVE: ICD-10-CM

## 2022-02-25 DIAGNOSIS — D69.3 IDIOPATHIC THROMBOCYTOPENIC PURPURA (ITP) (HCC): ICD-10-CM

## 2022-02-25 DIAGNOSIS — R76.8 RIBONUCLEOPROTEIN ANTIBODY POSITIVE: ICD-10-CM

## 2022-02-25 DIAGNOSIS — R76.8 POSITIVE ANA (ANTINUCLEAR ANTIBODY): ICD-10-CM

## 2022-02-25 DIAGNOSIS — D50.8 OTHER IRON DEFICIENCY ANEMIA: ICD-10-CM

## 2022-02-25 PROCEDURE — 99214 OFFICE O/P EST MOD 30 MIN: CPT | Performed by: INTERNAL MEDICINE

## 2022-02-25 PROCEDURE — 3078F DIAST BP <80 MM HG: CPT | Performed by: INTERNAL MEDICINE

## 2022-02-25 PROCEDURE — 3074F SYST BP LT 130 MM HG: CPT | Performed by: INTERNAL MEDICINE

## 2022-02-25 PROCEDURE — 3008F BODY MASS INDEX DOCD: CPT | Performed by: INTERNAL MEDICINE

## 2022-02-25 RX ORDER — FERROUS SULFATE 220 (44)/5
220 SOLUTION, ORAL ORAL
Qty: 473 ML | Refills: 0 | Status: SHIPPED | OUTPATIENT
Start: 2022-02-25

## 2022-02-25 RX ORDER — PREDNISONE 1 MG/1
5 TABLET ORAL DAILY
COMMUNITY

## 2022-02-25 NOTE — PATIENT INSTRUCTIONS
-- continue prednisone 5mg daily  -- continue amlodipine 2.5mg twice daily  -- continue plaquenil 200mg twice daily, will consider decreasing this based off weight at next appointment  -- start iron supplementation- start with once daily and try to increase to twice daily  -- labs now to monitor blood counts, kidney/liver function and inflammatory markers  -- iron studies to be checked prior to April appt  -- will need to get ECHO and chest imaging in the Spring  -- consider nephrology evaluation given the hydronephrosis seen on CT during hospitalization  -- be sure to get updated eye exam while on the plaquenil  -- follow up in 2 months or sooner as needed  -- call/message with any questions/concerns    Dr. Shanon Gleason

## 2022-04-27 ENCOUNTER — OFFICE VISIT (OUTPATIENT)
Dept: RHEUMATOLOGY | Facility: CLINIC | Age: 47
End: 2022-04-27
Payer: COMMERCIAL

## 2022-04-27 VITALS
HEIGHT: 63 IN | TEMPERATURE: 97 F | WEIGHT: 119 LBS | SYSTOLIC BLOOD PRESSURE: 90 MMHG | BODY MASS INDEX: 21.09 KG/M2 | RESPIRATION RATE: 16 BRPM | HEART RATE: 82 BPM | DIASTOLIC BLOOD PRESSURE: 60 MMHG

## 2022-04-27 DIAGNOSIS — M32.19 LUPUS VASCULITIS (HCC): ICD-10-CM

## 2022-04-27 DIAGNOSIS — R76.8 RIBONUCLEOPROTEIN ANTIBODY POSITIVE: ICD-10-CM

## 2022-04-27 DIAGNOSIS — M35.1 MIXED CONNECTIVE TISSUE DISEASE (HCC): Primary | ICD-10-CM

## 2022-04-27 DIAGNOSIS — D69.3 IDIOPATHIC THROMBOCYTOPENIC PURPURA (ITP) (HCC): ICD-10-CM

## 2022-04-27 DIAGNOSIS — R76.8 POSITIVE ANA (ANTINUCLEAR ANTIBODY): ICD-10-CM

## 2022-04-27 DIAGNOSIS — I73.01 RAYNAUD'S DISEASE WITH GANGRENE (HCC): ICD-10-CM

## 2022-04-27 DIAGNOSIS — R53.82 CHRONIC FATIGUE: ICD-10-CM

## 2022-04-27 DIAGNOSIS — Z79.52 LONG TERM (CURRENT) USE OF SYSTEMIC STEROIDS: ICD-10-CM

## 2022-04-27 DIAGNOSIS — Z79.899 LONG-TERM USE OF PLAQUENIL: ICD-10-CM

## 2022-04-27 DIAGNOSIS — I77.89 LUPUS VASCULITIS (HCC): ICD-10-CM

## 2022-04-27 DIAGNOSIS — E55.9 VITAMIN D DEFICIENCY: ICD-10-CM

## 2022-04-27 PROCEDURE — 3078F DIAST BP <80 MM HG: CPT | Performed by: INTERNAL MEDICINE

## 2022-04-27 PROCEDURE — 99214 OFFICE O/P EST MOD 30 MIN: CPT | Performed by: INTERNAL MEDICINE

## 2022-04-27 PROCEDURE — 3008F BODY MASS INDEX DOCD: CPT | Performed by: INTERNAL MEDICINE

## 2022-04-27 PROCEDURE — 3074F SYST BP LT 130 MM HG: CPT | Performed by: INTERNAL MEDICINE

## 2022-04-27 RX ORDER — HYDROXYCHLOROQUINE SULFATE 200 MG/1
200 TABLET, FILM COATED ORAL DAILY
Qty: 90 TABLET | Refills: 1 | Status: SHIPPED | OUTPATIENT
Start: 2022-04-27

## 2022-04-28 NOTE — PATIENT INSTRUCTIONS
-- continue prednisone 5mg daily  -- continue amlodipine 2.5mg twice daily  -- continue plaquenil 200mg but decrease to once daily, based off weight  -- labs now to monitor blood counts, kidney/liver function and inflammatory markers  -- will need to get ECHO and chest imaging  -- consider nephrology evaluation given the hydronephrosis seen on CT during hospitalization  -- be sure to get updated eye exam while on the plaquenil  -- follow up in 2 months or sooner as needed  -- call/message with any questions/concerns    Dr. Evelyn Wilkes

## 2022-06-22 RX ORDER — AMLODIPINE BESYLATE 2.5 MG/1
TABLET ORAL
Qty: 180 TABLET | Refills: 0 | Status: SHIPPED | OUTPATIENT
Start: 2022-06-22

## 2022-06-22 NOTE — TELEPHONE ENCOUNTER
Future Appointments   Date Time Provider Noemy Langston   6/24/2022  9:00 AM Annamarie Farmer DO [facility]   8/31/2022 10:30 AM Alisha Farmer DO [facility] [place]   10/26/2022 10:30 AM Alisha Farmer DO [facility] 4/28/22  RTO in 2mo

## 2022-06-24 ENCOUNTER — TELEMEDICINE (OUTPATIENT)
Dept: RHEUMATOLOGY | Facility: CLINIC | Age: 47
End: 2022-06-24
Payer: COMMERCIAL

## 2022-06-24 DIAGNOSIS — I77.89 LUPUS VASCULITIS (HCC): ICD-10-CM

## 2022-06-24 DIAGNOSIS — M35.1 MIXED CONNECTIVE TISSUE DISEASE (HCC): Primary | ICD-10-CM

## 2022-06-24 DIAGNOSIS — M32.19 LUPUS VASCULITIS (HCC): ICD-10-CM

## 2022-06-24 NOTE — PROGRESS NOTES
Attempted to call pt for her virtual appt with no answer. Was also called by MA in office with no answer. [employer] message also sent. Pt does have follow up on 08/31/22 already scheduled.      Jacinto Anaya DO  [facility]  6/24/2022

## 2022-07-28 ENCOUNTER — LAB ENCOUNTER (OUTPATIENT)
Dept: LAB | Facility: HOSPITAL | Age: 47
End: 2022-07-28
Attending: INTERNAL MEDICINE
Payer: COMMERCIAL

## 2022-07-28 DIAGNOSIS — E55.9 VITAMIN D DEFICIENCY: ICD-10-CM

## 2022-07-28 DIAGNOSIS — M32.19 LUPUS VASCULITIS (HCC): ICD-10-CM

## 2022-07-28 DIAGNOSIS — R70.0 ELEVATED SED RATE: ICD-10-CM

## 2022-07-28 DIAGNOSIS — R53.82 CHRONIC FATIGUE: ICD-10-CM

## 2022-07-28 DIAGNOSIS — I77.89 LUPUS VASCULITIS (HCC): ICD-10-CM

## 2022-07-28 DIAGNOSIS — D50.8 OTHER IRON DEFICIENCY ANEMIA: ICD-10-CM

## 2022-07-28 DIAGNOSIS — M35.1 MIXED CONNECTIVE TISSUE DISEASE (HCC): ICD-10-CM

## 2022-07-28 LAB
ALBUMIN SERPL-MCNC: 4 G/DL (ref 3.4–5)
ALBUMIN/GLOB SERPL: 1.1 {RATIO} (ref 1–2)
ALP LIVER SERPL-CCNC: 79 U/L
ALT SERPL-CCNC: 18 U/L
ANION GAP SERPL CALC-SCNC: 0 MMOL/L (ref 0–18)
AST SERPL-CCNC: 15 U/L (ref 15–37)
BASOPHILS # BLD AUTO: 0.01 X10(3) UL (ref 0–0.2)
BASOPHILS NFR BLD AUTO: 0.2 %
BILIRUB SERPL-MCNC: 0.4 MG/DL (ref 0.1–2)
BUN BLD-MCNC: 7 MG/DL (ref 7–18)
C3 SERPL-MCNC: 97.8 MG/DL (ref 90–180)
C4 SERPL-MCNC: 29.1 MG/DL (ref 10–40)
CALCIUM BLD-MCNC: 9.1 MG/DL (ref 8.5–10.1)
CHLORIDE SERPL-SCNC: 110 MMOL/L (ref 98–112)
CO2 SERPL-SCNC: 30 MMOL/L (ref 21–32)
CREAT BLD-MCNC: 0.61 MG/DL
CRP SERPL-MCNC: <0.29 MG/DL (ref ?–0.3)
DEPRECATED HBV CORE AB SER IA-ACNC: 118.3 NG/ML
EOSINOPHIL # BLD AUTO: 0.04 X10(3) UL (ref 0–0.7)
EOSINOPHIL NFR BLD AUTO: 0.9 %
ERYTHROCYTE [DISTWIDTH] IN BLOOD BY AUTOMATED COUNT: 13 %
ERYTHROCYTE [SEDIMENTATION RATE] IN BLOOD: 15 MM/HR
FASTING STATUS PATIENT QL REPORTED: NO
GLOBULIN PLAS-MCNC: 3.8 G/DL (ref 2.8–4.4)
GLUCOSE BLD-MCNC: 87 MG/DL (ref 70–99)
HCT VFR BLD AUTO: 42.3 %
HGB BLD-MCNC: 13.2 G/DL
IMM GRANULOCYTES # BLD AUTO: 0.01 X10(3) UL (ref 0–1)
IMM GRANULOCYTES NFR BLD: 0.2 %
IRON SATN MFR SERPL: 20 %
IRON SERPL-MCNC: 68 UG/DL
LYMPHOCYTES # BLD AUTO: 0.86 X10(3) UL (ref 1–4)
LYMPHOCYTES NFR BLD AUTO: 20.1 %
MCH RBC QN AUTO: 28.8 PG (ref 26–34)
MCHC RBC AUTO-ENTMCNC: 31.2 G/DL (ref 31–37)
MCV RBC AUTO: 92.2 FL
MONOCYTES # BLD AUTO: 0.55 X10(3) UL (ref 0.1–1)
MONOCYTES NFR BLD AUTO: 12.9 %
NEUTROPHILS # BLD AUTO: 2.81 X10 (3) UL (ref 1.5–7.7)
NEUTROPHILS # BLD AUTO: 2.81 X10(3) UL (ref 1.5–7.7)
NEUTROPHILS NFR BLD AUTO: 65.7 %
OSMOLALITY SERPL CALC.SUM OF ELEC: 287 MOSM/KG (ref 275–295)
PLATELET # BLD AUTO: 169 10(3)UL (ref 150–450)
POTASSIUM SERPL-SCNC: 3.3 MMOL/L (ref 3.5–5.1)
PROT SERPL-MCNC: 7.8 G/DL (ref 6.4–8.2)
RBC # BLD AUTO: 4.59 X10(6)UL
SODIUM SERPL-SCNC: 140 MMOL/L (ref 136–145)
TIBC SERPL-MCNC: 335 UG/DL (ref 240–450)
TRANSFERRIN SERPL-MCNC: 225 MG/DL (ref 200–360)
VIT B12 SERPL-MCNC: 302 PG/ML (ref 193–986)
VIT D+METAB SERPL-MCNC: 14.4 NG/ML (ref 30–100)
WBC # BLD AUTO: 4.3 X10(3) UL (ref 4–11)

## 2022-07-28 PROCEDURE — 85652 RBC SED RATE AUTOMATED: CPT

## 2022-07-28 PROCEDURE — 82607 VITAMIN B-12: CPT

## 2022-07-28 PROCEDURE — 80053 COMPREHEN METABOLIC PANEL: CPT

## 2022-07-28 PROCEDURE — 83550 IRON BINDING TEST: CPT

## 2022-07-28 PROCEDURE — 82728 ASSAY OF FERRITIN: CPT

## 2022-07-28 PROCEDURE — 86140 C-REACTIVE PROTEIN: CPT

## 2022-07-28 PROCEDURE — 36415 COLL VENOUS BLD VENIPUNCTURE: CPT

## 2022-07-28 PROCEDURE — 86160 COMPLEMENT ANTIGEN: CPT

## 2022-07-28 PROCEDURE — 83540 ASSAY OF IRON: CPT

## 2022-07-28 PROCEDURE — 82306 VITAMIN D 25 HYDROXY: CPT

## 2022-07-28 PROCEDURE — 85025 COMPLETE CBC W/AUTO DIFF WBC: CPT

## 2022-07-29 DIAGNOSIS — E55.9 VITAMIN D DEFICIENCY: Primary | ICD-10-CM

## 2022-07-29 RX ORDER — ERGOCALCIFEROL 1.25 MG/1
50000 CAPSULE ORAL WEEKLY
Qty: 12 CAPSULE | Refills: 0 | Status: SHIPPED | OUTPATIENT
Start: 2022-07-29 | End: 2022-10-21

## 2022-08-31 ENCOUNTER — OFFICE VISIT (OUTPATIENT)
Dept: RHEUMATOLOGY | Facility: CLINIC | Age: 47
End: 2022-08-31
Payer: COMMERCIAL

## 2022-08-31 VITALS
SYSTOLIC BLOOD PRESSURE: 98 MMHG | BODY MASS INDEX: 21.62 KG/M2 | HEART RATE: 68 BPM | RESPIRATION RATE: 16 BRPM | DIASTOLIC BLOOD PRESSURE: 64 MMHG | WEIGHT: 122 LBS | HEIGHT: 63 IN

## 2022-08-31 DIAGNOSIS — Z79.899 LONG-TERM USE OF PLAQUENIL: ICD-10-CM

## 2022-08-31 DIAGNOSIS — M35.1 MIXED CONNECTIVE TISSUE DISEASE (HCC): Primary | ICD-10-CM

## 2022-08-31 DIAGNOSIS — I73.01 RAYNAUD'S DISEASE WITH GANGRENE (HCC): ICD-10-CM

## 2022-08-31 DIAGNOSIS — Z13.820 SCREENING FOR OSTEOPOROSIS: ICD-10-CM

## 2022-08-31 DIAGNOSIS — Z79.52 LONG TERM (CURRENT) USE OF SYSTEMIC STEROIDS: ICD-10-CM

## 2022-08-31 DIAGNOSIS — M32.19 LUPUS VASCULITIS (HCC): ICD-10-CM

## 2022-08-31 DIAGNOSIS — I77.89 LUPUS VASCULITIS (HCC): ICD-10-CM

## 2022-08-31 DIAGNOSIS — R76.8 RIBONUCLEOPROTEIN ANTIBODY POSITIVE: ICD-10-CM

## 2022-08-31 DIAGNOSIS — Z78.0 ASYMPTOMATIC MENOPAUSE: ICD-10-CM

## 2022-08-31 DIAGNOSIS — R76.8 POSITIVE ANA (ANTINUCLEAR ANTIBODY): ICD-10-CM

## 2022-08-31 PROCEDURE — 99214 OFFICE O/P EST MOD 30 MIN: CPT | Performed by: INTERNAL MEDICINE

## 2022-08-31 PROCEDURE — 3074F SYST BP LT 130 MM HG: CPT | Performed by: INTERNAL MEDICINE

## 2022-08-31 PROCEDURE — 3078F DIAST BP <80 MM HG: CPT | Performed by: INTERNAL MEDICINE

## 2022-08-31 PROCEDURE — 3008F BODY MASS INDEX DOCD: CPT | Performed by: INTERNAL MEDICINE

## 2022-08-31 RX ORDER — AMLODIPINE BESYLATE 2.5 MG/1
2.5 TABLET ORAL 2 TIMES DAILY
Qty: 180 TABLET | Refills: 0 | Status: SHIPPED | OUTPATIENT
Start: 2022-08-31

## 2022-08-31 RX ORDER — HYDROXYCHLOROQUINE SULFATE 200 MG/1
200 TABLET, FILM COATED ORAL DAILY
Qty: 90 TABLET | Refills: 0 | Status: SHIPPED | OUTPATIENT
Start: 2022-08-31

## 2022-08-31 RX ORDER — PREDNISONE 1 MG/1
5 TABLET ORAL DAILY
Qty: 90 TABLET | Refills: 0 | Status: SHIPPED | OUTPATIENT
Start: 2022-08-31

## 2022-09-01 NOTE — PATIENT INSTRUCTIONS
-- continue prednisone 5mg daily  -- continue amlodipine 2.5mg twice daily  -- continue plaquenil 200mg daily  -- labs prior to next visit to monitor blood counts, kidney/liver function and inflammatory markers  -- will need to get ECHO and chest imaging- reminded that orders  in October  -- consider nephrology evaluation given the hydronephrosis seen on CT during hospitalization  -- be sure to get updated eye exam while on the plaquenil  -- follow up in 2 months or sooner as needed  -- call/message with any questions/concerns    Dr. Julius Hurtado

## 2022-10-15 DIAGNOSIS — E55.9 VITAMIN D DEFICIENCY: ICD-10-CM

## 2022-10-17 RX ORDER — ERGOCALCIFEROL 1.25 MG/1
CAPSULE ORAL
Qty: 12 CAPSULE | Refills: 0 | Status: SHIPPED | OUTPATIENT
Start: 2022-10-17

## 2022-10-17 NOTE — TELEPHONE ENCOUNTER
Future Appointments   Date Time Provider Noemy Langston   10/26/2022 10:30 AM Virgie Irby, DO [facility]   1/24/2023 11:30 AM Mary Kay Farmer, DO [facility]     Last office visit 8/31/2022  Last labs taken 7/28/2022

## 2022-10-26 ENCOUNTER — OFFICE VISIT (OUTPATIENT)
Dept: RHEUMATOLOGY | Facility: CLINIC | Age: 47
End: 2022-10-26
Payer: COMMERCIAL

## 2022-10-26 VITALS
OXYGEN SATURATION: 100 % | HEART RATE: 78 BPM | SYSTOLIC BLOOD PRESSURE: 100 MMHG | DIASTOLIC BLOOD PRESSURE: 60 MMHG | HEIGHT: 63 IN | WEIGHT: 121 LBS | TEMPERATURE: 97 F | BODY MASS INDEX: 21.44 KG/M2 | RESPIRATION RATE: 16 BRPM

## 2022-10-26 DIAGNOSIS — M32.19 LUPUS VASCULITIS (HCC): ICD-10-CM

## 2022-10-26 DIAGNOSIS — E53.8 B12 DEFICIENCY: ICD-10-CM

## 2022-10-26 DIAGNOSIS — Z79.52 LONG TERM (CURRENT) USE OF SYSTEMIC STEROIDS: ICD-10-CM

## 2022-10-26 DIAGNOSIS — R76.8 RIBONUCLEOPROTEIN ANTIBODY POSITIVE: ICD-10-CM

## 2022-10-26 DIAGNOSIS — Z79.899 LONG-TERM USE OF PLAQUENIL: ICD-10-CM

## 2022-10-26 DIAGNOSIS — E55.9 VITAMIN D DEFICIENCY: ICD-10-CM

## 2022-10-26 DIAGNOSIS — I77.89 LUPUS VASCULITIS (HCC): ICD-10-CM

## 2022-10-26 DIAGNOSIS — D50.8 OTHER IRON DEFICIENCY ANEMIA: ICD-10-CM

## 2022-10-26 DIAGNOSIS — M35.1 MIXED CONNECTIVE TISSUE DISEASE (HCC): Primary | ICD-10-CM

## 2022-10-26 DIAGNOSIS — R76.8 POSITIVE ANA (ANTINUCLEAR ANTIBODY): ICD-10-CM

## 2022-10-26 DIAGNOSIS — I73.01 RAYNAUD'S DISEASE WITH GANGRENE (HCC): ICD-10-CM

## 2022-10-26 PROCEDURE — 3078F DIAST BP <80 MM HG: CPT | Performed by: INTERNAL MEDICINE

## 2022-10-26 PROCEDURE — 3074F SYST BP LT 130 MM HG: CPT | Performed by: INTERNAL MEDICINE

## 2022-10-26 PROCEDURE — 99214 OFFICE O/P EST MOD 30 MIN: CPT | Performed by: INTERNAL MEDICINE

## 2022-10-26 PROCEDURE — 3008F BODY MASS INDEX DOCD: CPT | Performed by: INTERNAL MEDICINE

## 2022-10-26 RX ORDER — PREDNISONE 1 MG/1
5 TABLET ORAL DAILY
Qty: 90 TABLET | Refills: 0 | Status: SHIPPED | OUTPATIENT
Start: 2022-10-26

## 2022-10-26 RX ORDER — AMLODIPINE BESYLATE 2.5 MG/1
2.5 TABLET ORAL 2 TIMES DAILY
Qty: 180 TABLET | Refills: 0 | Status: SHIPPED | OUTPATIENT
Start: 2022-10-26

## 2022-10-26 RX ORDER — HYDROXYCHLOROQUINE SULFATE 200 MG/1
200 TABLET, FILM COATED ORAL DAILY
Qty: 90 TABLET | Refills: 0 | Status: SHIPPED | OUTPATIENT
Start: 2022-10-26

## 2022-10-26 NOTE — PATIENT INSTRUCTIONS
-- continue prednisone 5mg daily  -- continue amlodipine 2.5mg twice daily  -- continue plaquenil 200mg daily  -- labs now, in system from before and I added iron studies and B12 levels.    -- will need to get ECHO and PFTs, updated orders placed, my staff will confirm insurance approval and call when you are able to schedule   -- remember bone density on file   -- consider nephrology evaluation given the hydronephrosis seen on CT during hospitalization  -- be sure to get updated eye exam while on the plaquenil- if no eye exam no further refills  -- follow up in 3 months or sooner as needed  -- call/message with any questions/concerns    Dr. Melissa Mazariegos

## 2022-10-26 NOTE — PROGRESS NOTES
Phoned [employer] to initiate PA PFT and Echo. No PA required. Call VIA#[phone]. Phoned pt and notified ok to schedule.

## 2022-10-26 NOTE — PROGRESS NOTES
Phoned [employer] to initiate PA PFT and ECHO. No PA required. Call Ref# [id number]. Phoned pt and notified ok to schedule with CS.

## 2023-03-04 DIAGNOSIS — M32.19 LUPUS VASCULITIS (HCC): ICD-10-CM

## 2023-03-04 DIAGNOSIS — M35.1 MIXED CONNECTIVE TISSUE DISEASE (HCC): ICD-10-CM

## 2023-03-04 DIAGNOSIS — I77.89 LUPUS VASCULITIS (HCC): ICD-10-CM

## 2023-03-04 DIAGNOSIS — I73.01 RAYNAUD'S DISEASE WITH GANGRENE (HCC): ICD-10-CM

## 2023-03-06 RX ORDER — PREDNISONE 1 MG/1
5 TABLET ORAL DAILY
Qty: 90 TABLET | Refills: 0 | Status: SHIPPED | OUTPATIENT
Start: 2023-03-06

## 2023-03-06 RX ORDER — AMLODIPINE BESYLATE 2.5 MG/1
TABLET ORAL
Qty: 180 TABLET | Refills: 0 | Status: SHIPPED | OUTPATIENT
Start: 2023-03-06

## 2023-03-28 ENCOUNTER — OFFICE VISIT (OUTPATIENT)
Dept: RHEUMATOLOGY | Facility: CLINIC | Age: 48
End: 2023-03-28
Payer: COMMERCIAL

## 2023-03-28 VITALS
RESPIRATION RATE: 16 BRPM | DIASTOLIC BLOOD PRESSURE: 62 MMHG | WEIGHT: 123 LBS | BODY MASS INDEX: 21.79 KG/M2 | HEART RATE: 60 BPM | HEIGHT: 63 IN | OXYGEN SATURATION: 100 % | TEMPERATURE: 98 F | SYSTOLIC BLOOD PRESSURE: 100 MMHG

## 2023-03-28 DIAGNOSIS — M32.19 LUPUS VASCULITIS (HCC): ICD-10-CM

## 2023-03-28 DIAGNOSIS — D69.3 IDIOPATHIC THROMBOCYTOPENIC PURPURA (ITP) (HCC): ICD-10-CM

## 2023-03-28 DIAGNOSIS — E53.8 B12 DEFICIENCY: ICD-10-CM

## 2023-03-28 DIAGNOSIS — I77.89 LUPUS VASCULITIS (HCC): ICD-10-CM

## 2023-03-28 DIAGNOSIS — I73.01 RAYNAUD'S DISEASE WITH GANGRENE (HCC): ICD-10-CM

## 2023-03-28 DIAGNOSIS — E55.9 VITAMIN D DEFICIENCY: ICD-10-CM

## 2023-03-28 DIAGNOSIS — M35.1 MIXED CONNECTIVE TISSUE DISEASE (HCC): Primary | ICD-10-CM

## 2023-03-28 DIAGNOSIS — Z79.899 LONG-TERM USE OF PLAQUENIL: ICD-10-CM

## 2023-03-28 DIAGNOSIS — D50.9 MICROCYTIC ANEMIA: ICD-10-CM

## 2023-03-28 DIAGNOSIS — Z79.52 LONG TERM (CURRENT) USE OF SYSTEMIC STEROIDS: ICD-10-CM

## 2023-03-28 PROCEDURE — 3008F BODY MASS INDEX DOCD: CPT | Performed by: INTERNAL MEDICINE

## 2023-03-28 PROCEDURE — 3078F DIAST BP <80 MM HG: CPT | Performed by: INTERNAL MEDICINE

## 2023-03-28 PROCEDURE — 3074F SYST BP LT 130 MM HG: CPT | Performed by: INTERNAL MEDICINE

## 2023-03-28 PROCEDURE — 99214 OFFICE O/P EST MOD 30 MIN: CPT | Performed by: INTERNAL MEDICINE

## 2023-03-28 RX ORDER — HYDROXYCHLOROQUINE SULFATE 200 MG/1
200 TABLET, FILM COATED ORAL DAILY
Qty: 90 TABLET | Refills: 0 | Status: SHIPPED | OUTPATIENT
Start: 2023-03-28

## 2023-03-28 NOTE — PATIENT INSTRUCTIONS
-- continue prednisone 5mg daily  -- continue amlodipine 2.5mg twice daily  -- continue plaquenil 200mg daily  -- labs reordered again   -- will need to get ECHO and PFTs, updated orders placed, my staff will confirm insurance approval and call when you are able to schedule   -- consider nephrology evaluation given the hydronephrosis seen on CT during hospitalization  -- be sure to get updated eye exam while on the plaquenil  -- follow up in 3 months or sooner as needed  -- call/message with any questions/concerns    Dr. Zulema George

## 2023-07-05 ENCOUNTER — LAB ENCOUNTER (OUTPATIENT)
Dept: LAB | Age: 48
End: 2023-07-05
Attending: INTERNAL MEDICINE
Payer: COMMERCIAL

## 2023-07-05 DIAGNOSIS — D50.9 MICROCYTIC ANEMIA: ICD-10-CM

## 2023-07-05 DIAGNOSIS — I73.01 RAYNAUD'S DISEASE WITH GANGRENE (HCC): ICD-10-CM

## 2023-07-05 DIAGNOSIS — D50.8 OTHER IRON DEFICIENCY ANEMIA: ICD-10-CM

## 2023-07-05 DIAGNOSIS — M32.19 LUPUS VASCULITIS (HCC): ICD-10-CM

## 2023-07-05 DIAGNOSIS — I77.89 LUPUS VASCULITIS (HCC): ICD-10-CM

## 2023-07-05 DIAGNOSIS — E53.8 B12 DEFICIENCY: ICD-10-CM

## 2023-07-05 DIAGNOSIS — D69.3 IDIOPATHIC THROMBOCYTOPENIC PURPURA (ITP) (HCC): ICD-10-CM

## 2023-07-05 DIAGNOSIS — E55.9 VITAMIN D DEFICIENCY: ICD-10-CM

## 2023-07-05 DIAGNOSIS — M35.1 MIXED CONNECTIVE TISSUE DISEASE (HCC): ICD-10-CM

## 2023-07-05 LAB
ALBUMIN SERPL-MCNC: 4 G/DL (ref 3.4–5)
ALBUMIN/GLOB SERPL: 1 {RATIO} (ref 1–2)
ALP LIVER SERPL-CCNC: 101 U/L
ALT SERPL-CCNC: 18 U/L
ANION GAP SERPL CALC-SCNC: 2 MMOL/L (ref 0–18)
AST SERPL-CCNC: 20 U/L (ref 15–37)
BASOPHILS # BLD AUTO: 0.02 X10(3) UL (ref 0–0.2)
BASOPHILS NFR BLD AUTO: 0.4 %
BILIRUB SERPL-MCNC: 0.4 MG/DL (ref 0.1–2)
BUN BLD-MCNC: 10 MG/DL (ref 7–18)
CALCIUM BLD-MCNC: 9.1 MG/DL (ref 8.5–10.1)
CHLORIDE SERPL-SCNC: 108 MMOL/L (ref 98–112)
CO2 SERPL-SCNC: 28 MMOL/L (ref 21–32)
CREAT BLD-MCNC: 0.5 MG/DL
DEPRECATED HBV CORE AB SER IA-ACNC: 96.6 NG/ML
EOSINOPHIL # BLD AUTO: 0.02 X10(3) UL (ref 0–0.7)
EOSINOPHIL NFR BLD AUTO: 0.4 %
ERYTHROCYTE [DISTWIDTH] IN BLOOD BY AUTOMATED COUNT: 13 %
FASTING STATUS PATIENT QL REPORTED: NO
GFR SERPLBLD BASED ON 1.73 SQ M-ARVRAT: 116 ML/MIN/1.73M2 (ref 60–?)
GLOBULIN PLAS-MCNC: 4 G/DL (ref 2.8–4.4)
GLUCOSE BLD-MCNC: 93 MG/DL (ref 70–99)
HCT VFR BLD AUTO: 38.4 %
HGB BLD-MCNC: 12.4 G/DL
IMM GRANULOCYTES # BLD AUTO: 0.01 X10(3) UL (ref 0–1)
IMM GRANULOCYTES NFR BLD: 0.2 %
IRON SATN MFR SERPL: 11 %
IRON SERPL-MCNC: 39 UG/DL
LYMPHOCYTES # BLD AUTO: 0.55 X10(3) UL (ref 1–4)
LYMPHOCYTES NFR BLD AUTO: 10 %
MCH RBC QN AUTO: 27.9 PG (ref 26–34)
MCHC RBC AUTO-ENTMCNC: 32.3 G/DL (ref 31–37)
MCV RBC AUTO: 86.3 FL
MONOCYTES # BLD AUTO: 0.42 X10(3) UL (ref 0.1–1)
MONOCYTES NFR BLD AUTO: 7.6 %
NEUTROPHILS # BLD AUTO: 4.49 X10 (3) UL (ref 1.5–7.7)
NEUTROPHILS # BLD AUTO: 4.49 X10(3) UL (ref 1.5–7.7)
NEUTROPHILS NFR BLD AUTO: 81.4 %
OSMOLALITY SERPL CALC.SUM OF ELEC: 285 MOSM/KG (ref 275–295)
PLATELET # BLD AUTO: 189 10(3)UL (ref 150–450)
POTASSIUM SERPL-SCNC: 4 MMOL/L (ref 3.5–5.1)
PROT SERPL-MCNC: 8 G/DL (ref 6.4–8.2)
RBC # BLD AUTO: 4.45 X10(6)UL
SODIUM SERPL-SCNC: 138 MMOL/L (ref 136–145)
TIBC SERPL-MCNC: 359 UG/DL (ref 240–450)
TRANSFERRIN SERPL-MCNC: 241 MG/DL (ref 200–360)
VIT B12 SERPL-MCNC: 246 PG/ML (ref 193–986)
VIT D+METAB SERPL-MCNC: 22.9 NG/ML (ref 30–100)
WBC # BLD AUTO: 5.5 X10(3) UL (ref 4–11)

## 2023-07-05 PROCEDURE — 82607 VITAMIN B-12: CPT

## 2023-07-05 PROCEDURE — 82728 ASSAY OF FERRITIN: CPT

## 2023-07-05 PROCEDURE — 82306 VITAMIN D 25 HYDROXY: CPT

## 2023-07-05 PROCEDURE — 83540 ASSAY OF IRON: CPT

## 2023-07-05 PROCEDURE — 80053 COMPREHEN METABOLIC PANEL: CPT

## 2023-07-05 PROCEDURE — 85025 COMPLETE CBC W/AUTO DIFF WBC: CPT

## 2023-07-05 PROCEDURE — 36415 COLL VENOUS BLD VENIPUNCTURE: CPT

## 2023-07-05 PROCEDURE — 83550 IRON BINDING TEST: CPT

## 2023-07-06 ENCOUNTER — OFFICE VISIT (OUTPATIENT)
Dept: RHEUMATOLOGY | Facility: CLINIC | Age: 48
End: 2023-07-06
Payer: COMMERCIAL

## 2023-07-06 VITALS
SYSTOLIC BLOOD PRESSURE: 100 MMHG | WEIGHT: 125 LBS | DIASTOLIC BLOOD PRESSURE: 60 MMHG | BODY MASS INDEX: 22.15 KG/M2 | HEIGHT: 63 IN | RESPIRATION RATE: 16 BRPM | TEMPERATURE: 98 F | OXYGEN SATURATION: 99 % | HEART RATE: 84 BPM

## 2023-07-06 DIAGNOSIS — M32.19 LUPUS VASCULITIS (HCC): ICD-10-CM

## 2023-07-06 DIAGNOSIS — M79.672 BILATERAL FOOT PAIN: ICD-10-CM

## 2023-07-06 DIAGNOSIS — E61.1 IRON DEFICIENCY: ICD-10-CM

## 2023-07-06 DIAGNOSIS — M79.671 BILATERAL FOOT PAIN: ICD-10-CM

## 2023-07-06 DIAGNOSIS — Z79.899 LONG-TERM USE OF PLAQUENIL: ICD-10-CM

## 2023-07-06 DIAGNOSIS — M35.1 MIXED CONNECTIVE TISSUE DISEASE (HCC): Primary | ICD-10-CM

## 2023-07-06 DIAGNOSIS — E55.9 VITAMIN D DEFICIENCY: ICD-10-CM

## 2023-07-06 DIAGNOSIS — D69.3 IDIOPATHIC THROMBOCYTOPENIC PURPURA (ITP) (HCC): ICD-10-CM

## 2023-07-06 DIAGNOSIS — Z79.52 LONG TERM (CURRENT) USE OF SYSTEMIC STEROIDS: ICD-10-CM

## 2023-07-06 DIAGNOSIS — E53.8 B12 DEFICIENCY: ICD-10-CM

## 2023-07-06 DIAGNOSIS — I77.89 LUPUS VASCULITIS (HCC): ICD-10-CM

## 2023-07-06 DIAGNOSIS — I73.01 RAYNAUD'S DISEASE WITH GANGRENE (HCC): ICD-10-CM

## 2023-07-06 PROCEDURE — 3074F SYST BP LT 130 MM HG: CPT | Performed by: INTERNAL MEDICINE

## 2023-07-06 PROCEDURE — 3078F DIAST BP <80 MM HG: CPT | Performed by: INTERNAL MEDICINE

## 2023-07-06 PROCEDURE — 3008F BODY MASS INDEX DOCD: CPT | Performed by: INTERNAL MEDICINE

## 2023-07-06 PROCEDURE — 99214 OFFICE O/P EST MOD 30 MIN: CPT | Performed by: INTERNAL MEDICINE

## 2023-07-06 RX ORDER — AMLODIPINE BESYLATE 2.5 MG/1
2.5 TABLET ORAL 2 TIMES DAILY
Qty: 180 TABLET | Refills: 0 | Status: SHIPPED | OUTPATIENT
Start: 2023-07-06

## 2023-07-06 RX ORDER — PREDNISONE 5 MG/1
5 TABLET ORAL DAILY
Qty: 90 TABLET | Refills: 0 | Status: SHIPPED | OUTPATIENT
Start: 2023-07-06

## 2023-07-06 RX ORDER — ERGOCALCIFEROL 1.25 MG/1
50000 CAPSULE ORAL WEEKLY
Qty: 12 CAPSULE | Refills: 0 | Status: SHIPPED | OUTPATIENT
Start: 2023-07-06 | End: 2023-09-28

## 2023-07-06 RX ORDER — HYDROXYCHLOROQUINE SULFATE 200 MG/1
200 TABLET, FILM COATED ORAL DAILY
Qty: 90 TABLET | Refills: 0 | Status: SHIPPED | OUTPATIENT
Start: 2023-07-06

## 2023-07-06 RX ORDER — FERROUS SULFATE 325(65) MG
325 TABLET ORAL 2 TIMES DAILY WITH MEALS
Qty: 180 TABLET | Refills: 0 | Status: SHIPPED | OUTPATIENT
Start: 2023-07-06

## 2023-07-06 NOTE — PATIENT INSTRUCTIONS
-- start vitamin d prescription (once weekly x 12 weeks), then start over the counter 2000IU daily  -- start prescription iron supplement twice daily  -- start over the counter B12 sublingual daily  -- continue plaquenil 200mg daily  -- continue amlodipine 2.5mg twice daily  -- continue prednisone 5mg   -- remember importance of monitor for signs of pulmonary hypertension and interstitial lung disease   -- try to get the bone density (expires in August)   -- find out if optometrist did retinal scan and have them fax me a copy of the note  -- labs prior to next visit   -- follow up in 3 months or sooner as needed  -- call with questions/concerns    Dr. Sidra Carbone

## 2023-09-21 DIAGNOSIS — E55.9 VITAMIN D DEFICIENCY: ICD-10-CM

## 2023-10-17 RX ORDER — ERGOCALCIFEROL 1.25 MG/1
50000 CAPSULE ORAL WEEKLY
Qty: 12 CAPSULE | Refills: 0 | OUTPATIENT
Start: 2023-10-17

## 2023-10-19 ENCOUNTER — OFFICE VISIT (OUTPATIENT)
Dept: RHEUMATOLOGY | Facility: CLINIC | Age: 48
End: 2023-10-19
Payer: COMMERCIAL

## 2023-10-19 ENCOUNTER — LAB ENCOUNTER (OUTPATIENT)
Dept: LAB | Facility: HOSPITAL | Age: 48
End: 2023-10-19
Attending: INTERNAL MEDICINE
Payer: COMMERCIAL

## 2023-10-19 ENCOUNTER — HOSPITAL ENCOUNTER (OUTPATIENT)
Dept: GENERAL RADIOLOGY | Facility: HOSPITAL | Age: 48
Discharge: HOME | End: 2023-10-19
Attending: INTERNAL MEDICINE
Payer: COMMERCIAL

## 2023-10-19 VITALS
RESPIRATION RATE: 16 BRPM | DIASTOLIC BLOOD PRESSURE: 60 MMHG | WEIGHT: 127 LBS | HEART RATE: 80 BPM | BODY MASS INDEX: 22.5 KG/M2 | HEIGHT: 63 IN | SYSTOLIC BLOOD PRESSURE: 100 MMHG | TEMPERATURE: 97 F

## 2023-10-19 DIAGNOSIS — E61.1 IRON DEFICIENCY: ICD-10-CM

## 2023-10-19 DIAGNOSIS — E53.8 B12 DEFICIENCY: ICD-10-CM

## 2023-10-19 DIAGNOSIS — M32.19 LUPUS VASCULITIS (HCC): ICD-10-CM

## 2023-10-19 DIAGNOSIS — M35.1 MIXED CONNECTIVE TISSUE DISEASE (HCC): ICD-10-CM

## 2023-10-19 DIAGNOSIS — I73.01 RAYNAUD'S DISEASE WITH GANGRENE (HCC): ICD-10-CM

## 2023-10-19 DIAGNOSIS — D69.3 IDIOPATHIC THROMBOCYTOPENIC PURPURA (ITP) (HCC): ICD-10-CM

## 2023-10-19 DIAGNOSIS — E55.9 VITAMIN D DEFICIENCY: ICD-10-CM

## 2023-10-19 DIAGNOSIS — M79.672 LEFT FOOT PAIN: ICD-10-CM

## 2023-10-19 DIAGNOSIS — I77.89 LUPUS VASCULITIS (HCC): ICD-10-CM

## 2023-10-19 DIAGNOSIS — M35.1 MIXED CONNECTIVE TISSUE DISEASE (HCC): Primary | ICD-10-CM

## 2023-10-19 LAB
ALBUMIN SERPL-MCNC: 4 G/DL (ref 3.4–5)
ALBUMIN/GLOB SERPL: 0.9 {RATIO} (ref 1–2)
ALP LIVER SERPL-CCNC: 96 U/L
ALT SERPL-CCNC: 20 U/L
ANION GAP SERPL CALC-SCNC: 2 MMOL/L (ref 0–18)
AST SERPL-CCNC: 18 U/L (ref 15–37)
BASOPHILS # BLD AUTO: 0.02 X10(3) UL (ref 0–0.2)
BASOPHILS NFR BLD AUTO: 0.4 %
BILIRUB SERPL-MCNC: 0.4 MG/DL (ref 0.1–2)
BUN BLD-MCNC: 8 MG/DL (ref 7–18)
CALCIUM BLD-MCNC: 9.4 MG/DL (ref 8.5–10.1)
CHLORIDE SERPL-SCNC: 109 MMOL/L (ref 98–112)
CO2 SERPL-SCNC: 30 MMOL/L (ref 21–32)
CREAT BLD-MCNC: 0.56 MG/DL
CRP SERPL-MCNC: 0.48 MG/DL (ref ?–0.3)
DEPRECATED HBV CORE AB SER IA-ACNC: 94.1 NG/ML
EGFRCR SERPLBLD CKD-EPI 2021: 113 ML/MIN/1.73M2 (ref 60–?)
EOSINOPHIL # BLD AUTO: 0.06 X10(3) UL (ref 0–0.7)
EOSINOPHIL NFR BLD AUTO: 1.3 %
ERYTHROCYTE [DISTWIDTH] IN BLOOD BY AUTOMATED COUNT: 13.4 %
ERYTHROCYTE [SEDIMENTATION RATE] IN BLOOD: 45 MM/HR
FASTING STATUS PATIENT QL REPORTED: YES
GLOBULIN PLAS-MCNC: 4.5 G/DL (ref 2.8–4.4)
GLUCOSE BLD-MCNC: 74 MG/DL (ref 70–99)
HCT VFR BLD AUTO: 39.6 %
HGB BLD-MCNC: 12.4 G/DL
IMM GRANULOCYTES # BLD AUTO: 0 X10(3) UL (ref 0–1)
IMM GRANULOCYTES NFR BLD: 0 %
IRON SATN MFR SERPL: 12 %
IRON SERPL-MCNC: 43 UG/DL
LYMPHOCYTES # BLD AUTO: 1.01 X10(3) UL (ref 1–4)
LYMPHOCYTES NFR BLD AUTO: 22.3 %
MCH RBC QN AUTO: 27.6 PG (ref 26–34)
MCHC RBC AUTO-ENTMCNC: 31.3 G/DL (ref 31–37)
MCV RBC AUTO: 88 FL
MONOCYTES # BLD AUTO: 0.63 X10(3) UL (ref 0.1–1)
MONOCYTES NFR BLD AUTO: 13.9 %
NEUTROPHILS # BLD AUTO: 2.8 X10 (3) UL (ref 1.5–7.7)
NEUTROPHILS # BLD AUTO: 2.8 X10(3) UL (ref 1.5–7.7)
NEUTROPHILS NFR BLD AUTO: 62.1 %
OSMOLALITY SERPL CALC.SUM OF ELEC: 289 MOSM/KG (ref 275–295)
PLATELET # BLD AUTO: 206 10(3)UL (ref 150–450)
POTASSIUM SERPL-SCNC: 3.9 MMOL/L (ref 3.5–5.1)
PROT SERPL-MCNC: 8.5 G/DL (ref 6.4–8.2)
RBC # BLD AUTO: 4.5 X10(6)UL
SODIUM SERPL-SCNC: 141 MMOL/L (ref 136–145)
TIBC SERPL-MCNC: 353 UG/DL (ref 240–450)
TRANSFERRIN SERPL-MCNC: 237 MG/DL (ref 200–360)
VIT B12 SERPL-MCNC: 726 PG/ML (ref 193–986)
VIT D+METAB SERPL-MCNC: 58.8 NG/ML (ref 30–100)
WBC # BLD AUTO: 4.5 X10(3) UL (ref 4–11)

## 2023-10-19 PROCEDURE — 36415 COLL VENOUS BLD VENIPUNCTURE: CPT

## 2023-10-19 PROCEDURE — 85025 COMPLETE CBC W/AUTO DIFF WBC: CPT

## 2023-10-19 PROCEDURE — 3074F SYST BP LT 130 MM HG: CPT | Performed by: INTERNAL MEDICINE

## 2023-10-19 PROCEDURE — 83540 ASSAY OF IRON: CPT

## 2023-10-19 PROCEDURE — 80053 COMPREHEN METABOLIC PANEL: CPT

## 2023-10-19 PROCEDURE — 73630 X-RAY EXAM OF FOOT: CPT | Performed by: INTERNAL MEDICINE

## 2023-10-19 PROCEDURE — 82306 VITAMIN D 25 HYDROXY: CPT

## 2023-10-19 PROCEDURE — 99215 OFFICE O/P EST HI 40 MIN: CPT | Performed by: INTERNAL MEDICINE

## 2023-10-19 PROCEDURE — 82728 ASSAY OF FERRITIN: CPT

## 2023-10-19 PROCEDURE — 82607 VITAMIN B-12: CPT

## 2023-10-19 PROCEDURE — 3008F BODY MASS INDEX DOCD: CPT | Performed by: INTERNAL MEDICINE

## 2023-10-19 PROCEDURE — 85652 RBC SED RATE AUTOMATED: CPT

## 2023-10-19 PROCEDURE — 3078F DIAST BP <80 MM HG: CPT | Performed by: INTERNAL MEDICINE

## 2023-10-19 PROCEDURE — 86140 C-REACTIVE PROTEIN: CPT

## 2023-10-19 PROCEDURE — 83550 IRON BINDING TEST: CPT

## 2023-10-19 RX ORDER — MYCOPHENOLATE MOFETIL 500 MG/1
500 TABLET ORAL DAILY
Qty: 90 TABLET | Refills: 0 | Status: SHIPPED | OUTPATIENT
Start: 2023-10-19

## 2023-10-19 RX ORDER — HYDROXYCHLOROQUINE SULFATE 200 MG/1
200 TABLET, FILM COATED ORAL DAILY
Qty: 90 TABLET | Refills: 0 | Status: SHIPPED | OUTPATIENT
Start: 2023-10-19

## 2023-10-19 RX ORDER — AMLODIPINE BESYLATE 2.5 MG/1
2.5 TABLET ORAL 2 TIMES DAILY
Qty: 180 TABLET | Refills: 0 | Status: SHIPPED | OUTPATIENT
Start: 2023-10-19

## 2023-10-19 RX ORDER — PREDNISONE 5 MG/1
5 TABLET ORAL DAILY
Qty: 90 TABLET | Refills: 0 | Status: SHIPPED | OUTPATIENT
Start: 2023-10-19

## 2023-10-19 NOTE — PATIENT INSTRUCTIONS
-- start mycophenolate 500mg daily  -- increase prednisone to 10mg daily x 7 days then resume 5mg daily  -- repeat labs in 4 weeks   -- continue plaquenil 200mg daily  -- continue amlodipine 2.5mg twice daily  -- start over the counter vitamin d 1000IU daily  -- restart iron supplement twice daily  -- continue over the counter B12 sublingual daily  -- remember importance of monitor for signs of pulmonary hypertension and interstitial lung disease   -- if no updated eye exam, will not prescribe plaquenil further.    -- follow up in 3 months or sooner as needed  -- call with questions/concerns    Dr. Gomez Castellanos

## 2023-11-07 ENCOUNTER — PATIENT MESSAGE (OUTPATIENT)
Dept: RHEUMATOLOGY | Facility: CLINIC | Age: 48
End: 2023-11-07

## 2023-11-07 DIAGNOSIS — M79.672 LEFT FOOT PAIN: ICD-10-CM

## 2023-11-07 DIAGNOSIS — I77.89 LUPUS VASCULITIS (HCC): ICD-10-CM

## 2023-11-07 DIAGNOSIS — M32.19 LUPUS VASCULITIS (HCC): ICD-10-CM

## 2023-11-07 DIAGNOSIS — M35.1 MIXED CONNECTIVE TISSUE DISEASE (HCC): Primary | ICD-10-CM

## 2023-11-08 RX ORDER — GABAPENTIN 100 MG/1
CAPSULE ORAL
Qty: 180 CAPSULE | Refills: 0 | Status: SHIPPED | OUTPATIENT
Start: 2023-11-08

## 2023-11-20 NOTE — PROGRESS NOTES
?  RHEUMATOLOGY Follow up   Date of visit: 2/27/2019  ? Patient presents with:  Test Results: Pt here to discuss test results. Pt states 'is doing well on medications, no side effects.'    ?   ASSESSMENT, DISCUSSION & PLAN   Assessment:  Mixed connective her with the results of her blood tests and she will likely need a repeat blood test prior to her next appointment again to monitor for any drug toxicities from the methotrexate. 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   We will recheck unclear why stopped. At her last appointment, I restarted methotrexate with daily folic acid supplementation as well as added amlodipine. She is here for follow up.      Currently taking: Methotrextate 6 tabs once weekly, daily folic acid supplementation, a on methotrexate but since this wasn't helping, this was discontinued. Pt has been following with wound care. States with the topicals and wrapping, she has had some improvement of the wound size.    Pt continues to have burning sensation in both legs, eris History:  Past Surgical History:   Procedure Laterality Date   • TUBAL LIGATION       Family History:  Family History   Problem Relation Age of Onset   • Diabetes Mother    • Diabetes Father      Social History:  Social History    Tobacco Use      Smoking Constitutional: She is oriented to person, place, and time. She appears well-developed and well-nourished. No distress. HENT:   Head: Normocephalic.    Right Ear: External ear normal.   Left Ear: External ear normal.   Nose: Nose normal.   Mouth/Throat: Component Value Date    WBC 3.7 (L) 02/19/2019    RBC 4.36 02/19/2019    HGB 11.6 (L) 02/19/2019    HCT 37.7 02/19/2019    .0 02/19/2019    MCV 86.5 02/19/2019    MCH 26.6 02/19/2019    MCHC 30.8 (L) 02/19/2019    RDW 14.4 02/19/2019    NEPRELIM 2 Klisyri Counseling:  I discussed with the patient the risks of Klisyri including but not limited to erythema, scaling, itching, weeping, crusting, and pain.

## 2023-11-28 ENCOUNTER — HOSPITAL ENCOUNTER (OUTPATIENT)
Dept: ULTRASOUND IMAGING | Facility: HOSPITAL | Age: 48
Discharge: HOME | End: 2023-11-28
Attending: INTERNAL MEDICINE
Payer: COMMERCIAL

## 2023-11-28 DIAGNOSIS — M32.19 LUPUS VASCULITIS (HCC): ICD-10-CM

## 2023-11-28 DIAGNOSIS — I73.01 RAYNAUD'S DISEASE WITH GANGRENE (HCC): ICD-10-CM

## 2023-11-28 DIAGNOSIS — M79.672 LEFT FOOT PAIN: ICD-10-CM

## 2023-11-28 DIAGNOSIS — I77.89 LUPUS VASCULITIS (HCC): ICD-10-CM

## 2023-11-28 DIAGNOSIS — M35.1 MIXED CONNECTIVE TISSUE DISEASE (HCC): ICD-10-CM

## 2023-11-28 PROCEDURE — 93926 LOWER EXTREMITY STUDY: CPT | Performed by: INTERNAL MEDICINE

## 2023-12-03 DIAGNOSIS — I73.01 RAYNAUD'S DISEASE WITH GANGRENE (HCC): ICD-10-CM

## 2023-12-03 DIAGNOSIS — M32.19 LUPUS VASCULITIS (HCC): ICD-10-CM

## 2023-12-03 DIAGNOSIS — I77.89 LUPUS VASCULITIS (HCC): ICD-10-CM

## 2023-12-03 DIAGNOSIS — M35.1 MIXED CONNECTIVE TISSUE DISEASE (HCC): ICD-10-CM

## 2023-12-04 RX ORDER — HYDROXYCHLOROQUINE SULFATE 200 MG/1
200 TABLET, FILM COATED ORAL DAILY
Qty: 90 TABLET | Refills: 0 | OUTPATIENT
Start: 2023-12-04

## 2023-12-04 NOTE — TELEPHONE ENCOUNTER
Future Appointments   Date Time Provider Noemy Langston   12/5/2023  3:30 PM Adriel Canela MD [facility]   12/22/2023  7:00 AM PF CARD STRESS ECHO RM 1 PF CARD [place]   1/12/2024  9:45 AM Alisha Farmer, DO [facility] [place]   4/30/2024  1:45 PM Alisha Farmer, DO [facility] [place]   7/23/2024 10:00 AM Melisa Farmer DO [facility] [place]     Last office visit: 10/19/2023    Last fill: 10/19/2023 90 tab, 0 refills    Pt should have enough medication to get them to the next office visit

## 2023-12-05 ENCOUNTER — OFFICE VISIT (OUTPATIENT)
Facility: CLINIC | Age: 48
End: 2023-12-05

## 2023-12-05 VITALS — WEIGHT: 127 LBS | HEIGHT: 63 IN | RESPIRATION RATE: 20 BRPM | BODY MASS INDEX: 22.5 KG/M2

## 2023-12-05 DIAGNOSIS — M79.605 LEG PAIN, LEFT: Primary | ICD-10-CM

## 2024-01-10 DIAGNOSIS — E55.9 VITAMIN D DEFICIENCY: ICD-10-CM

## 2024-01-10 DIAGNOSIS — M32.19 LUPUS VASCULITIS (HCC): ICD-10-CM

## 2024-01-10 DIAGNOSIS — I77.89 LUPUS VASCULITIS (HCC): ICD-10-CM

## 2024-01-10 DIAGNOSIS — E61.1 IRON DEFICIENCY: ICD-10-CM

## 2024-01-10 DIAGNOSIS — I73.01 RAYNAUD'S DISEASE WITH GANGRENE (HCC): ICD-10-CM

## 2024-01-10 DIAGNOSIS — M79.672 LEFT FOOT PAIN: ICD-10-CM

## 2024-01-10 DIAGNOSIS — M35.1 MIXED CONNECTIVE TISSUE DISEASE (HCC): ICD-10-CM

## 2024-01-10 DIAGNOSIS — M35.1 MIXED CONNECTIVE TISSUE DISEASE (HCC): Primary | ICD-10-CM

## 2024-01-10 RX ORDER — MYCOPHENOLATE MOFETIL 500 MG/1
500 TABLET ORAL DAILY
Qty: 90 TABLET | Refills: 0 | Status: SHIPPED | OUTPATIENT
Start: 2024-01-10

## 2024-01-10 NOTE — TELEPHONE ENCOUNTER
LOV:    10/19/2023      Future Appointments   Date Time Provider Department Center   1/12/2024  9:45 AM Alisha Farmer DO [facility] [place]   4/30/2024  1:45 PM Alisha Farmer DO [facility] [place]   7/23/2024 10:00 AM Alisha Farmer DO [facility] [place]       LF:  10/19/2023   QTY:  90   Refills:  0    LABS:          Component      Latest Ref Rng 10/19/2023   WBC      4.0 - 11.0 x10(3) uL 4.5    RBC      3.80 - 5.30 x10(6)uL 4.50    Hemoglobin      12.0 - 16.0 g/dL 12.4    Hematocrit      35.0 - 48.0 % 39.6    Platelet Count      150.0 - 450.0 10(3)uL 206.0    MCV      80.0 - 100.0 fL 88.0    MCH      26.0 - 34.0 pg 27.6    MCHC      31.0 - 37.0 g/dL 31.3    RDW      % 13.4    Prelim Neutrophil Abs      1.50 - 7.70 x10 (3) uL 2.80    Neutrophils Absolute      1.50 - 7.70 x10(3) uL 2.80    Lymphocytes Absolute      1.00 - 4.00 x10(3) uL 1.01    Monocytes Absolute      0.10 - 1.00 x10(3) uL 0.63    Eosinophils Absolute      0.00 - 0.70 x10(3) uL 0.06    Basophils Absolute      0.00 - 0.20 x10(3) uL 0.02    Immature Granulocyte Absolute      0.00 - 1.00 x10(3) uL 0.00    Neutrophils %      % 62.1    Lymphocytes %      % 22.3    Monocytes %      % 13.9    Eosinophils %      % 1.3    Basophils %      % 0.4    Immature Granulocyte %      % 0.0    Glucose      70 - 99 mg/dL 74    Sodium      136 - 145 mmol/L 141    Potassium      3.5 - 5.1 mmol/L 3.9    Chloride      98 - 112 mmol/L 109    Carbon Dioxide, Total      21.0 - 32.0 mmol/L 30.0    ANION GAP      0 - 18 mmol/L 2    BUN      7 - 18 mg/dL 8    CREATININE      0.55 - 1.02 mg/dL 0.56    CALCIUM      8.5 - 10.1 mg/dL 9.4    CALCULATED OSMOLALITY      275 - 295 mOsm/kg 289    EGFR      >=60 mL/min/1.73m2 113    AST (SGOT)      15 - 37 U/L 18    ALT (SGPT)      13 - 56 U/L 20    ALKALINE PHOSPHATASE      39 - 100 U/L 96    Total Bilirubin      0.1 - 2.0 mg/dL 0.4    PROTEIN, TOTAL      6.4 - 8.2 g/dL 8.5 (H)    Albumin      3.4 - 5.0 g/dL 4.0     Globulin      2.8 - 4.4 g/dL 4.5 (H)    A/G Ratio      1.0 - 2.0  0.9 (L)    Patient Fasting for CMP? Yes       Legend:  (H) High  (L) Low

## 2024-01-12 ENCOUNTER — TELEMEDICINE (OUTPATIENT)
Dept: RHEUMATOLOGY | Facility: CLINIC | Age: 49
End: 2024-01-12
Payer: COMMERCIAL

## 2024-01-12 VITALS — WEIGHT: 127 LBS | HEIGHT: 63 IN | BODY MASS INDEX: 22.5 KG/M2

## 2024-01-12 DIAGNOSIS — Z13.820 SCREENING FOR OSTEOPOROSIS: ICD-10-CM

## 2024-01-12 DIAGNOSIS — I77.89 LUPUS VASCULITIS (HCC): ICD-10-CM

## 2024-01-12 DIAGNOSIS — Z79.899 LONG-TERM USE OF PLAQUENIL: ICD-10-CM

## 2024-01-12 DIAGNOSIS — D69.3 IDIOPATHIC THROMBOCYTOPENIC PURPURA (ITP) (HCC): ICD-10-CM

## 2024-01-12 DIAGNOSIS — M35.1 MIXED CONNECTIVE TISSUE DISEASE (HCC): Primary | ICD-10-CM

## 2024-01-12 DIAGNOSIS — E61.1 IRON DEFICIENCY: ICD-10-CM

## 2024-01-12 DIAGNOSIS — I73.01 RAYNAUD'S DISEASE WITH GANGRENE (HCC): ICD-10-CM

## 2024-01-12 DIAGNOSIS — M32.19 LUPUS VASCULITIS (HCC): ICD-10-CM

## 2024-01-12 DIAGNOSIS — Z79.52 LONG TERM (CURRENT) USE OF SYSTEMIC STEROIDS: ICD-10-CM

## 2024-01-12 DIAGNOSIS — Z78.0 ASYMPTOMATIC MENOPAUSE: ICD-10-CM

## 2024-01-12 PROCEDURE — 99213 OFFICE O/P EST LOW 20 MIN: CPT | Performed by: INTERNAL MEDICINE

## 2024-01-12 RX ORDER — PREDNISONE 5 MG/1
5 TABLET ORAL DAILY
Qty: 90 TABLET | Refills: 0 | Status: SHIPPED | OUTPATIENT
Start: 2024-01-12

## 2024-01-12 RX ORDER — HYDROXYCHLOROQUINE SULFATE 200 MG/1
200 TABLET, FILM COATED ORAL DAILY
Qty: 90 TABLET | Refills: 0 | Status: SHIPPED | OUTPATIENT
Start: 2024-01-12

## 2024-01-12 RX ORDER — AMLODIPINE BESYLATE 2.5 MG/1
2.5 TABLET ORAL 2 TIMES DAILY
Qty: 180 TABLET | Refills: 0 | Status: SHIPPED | OUTPATIENT
Start: 2024-01-12

## 2024-01-12 NOTE — PROGRESS NOTES
[facility] TeleHealth Audio and Visual Visit     This was an audio/video conversation using [employer] in lieu of an in-person visit due to need to limit person to person contact during the coronavirus pandemic. The patient was within the state [facility] during our visit.   Raymond Olivo consents to a virtual/telephone check in service on 01/12/2024.  Patient understands and accepts financial responsibility for any deductible, coinsurance and/or co-pays associated with the service. The patient understands that the physical exam will be limited.      Telehealth provider: Dr. Alisha Farmer, DO; encounter initiated by Jair Aguilar MA     Please note that the following visit was completed using two-way, real-time interactive audio and/or video communication. This has been done in good francis to provide continuity of care the in the base interest of the provider/patient relationship, due to the ongoing public health crisis/national emergency and because of restrictions of visitation. There are limitations of this visit as a limited physical exam could be performed. Every conscious effort was taken to allow for sufficient and adequate time. Time was spent on reviewing labs, medications, radiology tests and decision making. Appropriate medical decision-making and tests are ordered as detailed in the plan of care above.    ?  RHEUMATOLOGY Follow up   Date of visit: 01/12/2024    Chief Complaint   Patient presents with    Mixed Connective Tissue Disease     2 month f/u. Feeling good. No joint pain or swelling. No bruising. No new symptoms.        ASSESSMENT, DISCUSSION & PLAN   Assessment:  1. Mixed connective tissue disease (HCC)    2. Lupus vasculitis (HCC)    3. Raynaud's disease with gangrene (HCC)    4. Iron deficiency    5. Long term (current) use of systemic steroids    6. Long-term use of Plaquenil    7. Idiopathic thrombocytopenic purpura (ITP) (HCC)    8. Asymptomatic menopause    9. Screening for  osteoporosis        Discussion:  Mrs. Raymond Olivo is a 47 yo woman with previously diagnosed mixed connective tissue disease and recent ulcer/chronic wounds in her bilateral legs. She does seem to have prominent sclerodermatous features with Raynaud's and chronic wounds, skin tightening as well as telangectasias. It seems like she has been on multiple DMARDs in the past including plaquenil, which was stopped due to possible inefficacy and GI upset as well as azathioprine which was discontinued due to cytopenias.     Since establishing care with me, she restarted methotrexate and had significant improvement of skin ulcerations with amlodipine. She did have worsened skin ulcer formation and biopsy performed by wound care confirmed lupus vasculitis. Due to lack of efficacy of methotrexate, decision was made to stop methotrexate, restart plaquenil and mycophenolate.     Most recently, patient had to be hospitalized due to ITP.  Unclear etiology.  At this time she is doing much better.  However she has stopped both CellCept and prednisone.  She has been off CellCept since hospitalization at the end of November 2022.  And she was off of her prednisone but had some worsened fatigue and trace ankle swelling bilaterally. She did have some elevated inflammatory markers as well so prednisone was restarted. She has been doing better overall.   Reminded AGAIN about importance of getting baseline ECHO and PFTs due to other complications from the MCTD which may be contributing to some of her fatigue and low weight.  She had delayed initially due to COVID19 pandemic, however, she has not had any baseline done since disease onset and we need to evaluate for possible ILD vs pulmonary HTN. Reiterated again but pt declines further work up.     At her last visit, she had some worsened foot pain. Arterial duplex showed mild diffuse stenosis but vascular surgeon did not think significant for intervention. Thought her symptoms were more  neurologic and she has been doing better with use of compression socks  She did not restart gabapentin or MMF.   Will have her continue hcq, prednisone and amlodipine for now  States she got an eye exam through [facility] so will call and request records.   Discussed xray of foot showing periarticular osteopenia- could be related to bone density vs early inflammation. Encouraged to get updated BMD as well as get labs done that were previously ordered.  May consider restarting low dose MMF if elevated inflammatory markers still present.   Will be in communication when testing resulted  Otherwise, okay to follow up in 3 months or sooner as needed     Patient and pt's  verbalized understanding of above instructions. No further questions at this time.    Code selection for this visit was based on severity of care. Time spent (20min) on date of service in preparing to see the patient, obtaining and/or reviewing separately obtained history, performing a medically appropriate examination, counseling and educating the patient/family/caregiver, ordering medications or testing, referring and communicating with other healthcare providers, documenting clinical information in the E HR, independently interpreting results and communicating results to the patient/family/caregiver and care coordination with the patient's other providers.    Plan:  Diagnoses and all orders for this visit:    Mixed connective tissue disease (HCC)  -     amLODIPine 2.5 MG Oral Tab; Take 1 tablet (2.5 mg total) by mouth 2 (two) times daily.  -     hydroxychloroquine 200 MG Oral Tab; Take 1 tablet (200 mg total) by mouth daily.  -     predniSONE 5 MG Oral Tab; Take 1 tablet (5 mg total) by mouth daily.    Lupus vasculitis (HCC)  -     amLODIPine 2.5 MG Oral Tab; Take 1 tablet (2.5 mg total) by mouth 2 (two) times daily.  -     hydroxychloroquine 200 MG Oral Tab; Take 1 tablet (200 mg total) by mouth daily.  -     predniSONE 5 MG Oral Tab; Take 1  tablet (5 mg total) by mouth daily.    Raynaud's disease with gangrene (HCC)  -     amLODIPine 2.5 MG Oral Tab; Take 1 tablet (2.5 mg total) by mouth 2 (two) times daily.  -     hydroxychloroquine 200 MG Oral Tab; Take 1 tablet (200 mg total) by mouth daily.  -     predniSONE 5 MG Oral Tab; Take 1 tablet (5 mg total) by mouth daily.    Iron deficiency    Long term (current) use of systemic steroids    Long-term use of Plaquenil    Idiopathic thrombocytopenic purpura (ITP) (HCC)    Asymptomatic menopause  -     XR DEXA BONE DENSITOMETRY (CPT=77080); Future    Screening for osteoporosis  -     XR DEXA BONE DENSITOMETRY (CPT=77080); Future            Return in about 3 months (around 4/12/2024).  ?  JMI   Raymond Olivo is a 48 year old female with the following active problems who is seen for medically necessary follow-up today. She was seen as a new patient several months ago for second opinion regarding underlying connective tissue disease. She had been suffering from multiple leg wounds over the years.  She was diagnosed by another rheumatologist with having mixed connective tissue disorder. She was previously on Plaquenil as well as azathioprine and some dose of prednisone.  Seemed like she was also on methotrexate but unclear why stopped. Since first seeing her, I restarted methotrexate with daily folic acid supplementation as well as added amlodipine. She had some decrease in her WBC count which has stabilized. Decision was made to stop methotrexate and start mycophenolate due to lupus vasculitis dx on skin biopsy. She was hospitalized due to thrombocytopenia.  Was diagnosed with ITP and given IV methylprednisolone as well as IVIG infusion. Initially as outpatient platelets were only 10 and improved most recently. She presents for follow up today.    Her  is here and serves to translate if pt does not understand what I'm asking/explaining.     Currently taking:  Plaquenil 200mg daily   Amlodipine  2.5mg twice daily  Prednisone 5mg daily   Biotin supplementation  Vit d completed prescription 2 weeks ago, not taking OTC at this time.   B12 daily     Since her last visit, she has been doing well overall.  States prior foot/leg pain has improved. Was seen by vascular surgeon and recommend compression stockings. And thought symptoms were more neuropathic in nature.   Since using the compression stocking for the past one month.   Is not taking the gabapentin or MMF despite recs to do so at last visit.     Prior shoulder pain has subsided.   Denies any other joint pain or swelling.   Denies skin tightness.   Continues to have hair loss/thinning but details unclear- stable   Feels fatigue has improved  Prior weakness has gotten better.     Denies ulcer formation recently   Denies skin rashes or recurrence of prior ulcers  No recent bruising or bleeding  Raynaud's present but not severe, stable.   Feels the tightness of the skin over the fingers and toes are the same.   Has not had obvious bleeding- epistaxis, hemoptysis, hematuria or bloody stools  Denies shortness of breath or chest pain.   Denies cough.   Denies fevers or chills.     Still not been seen by ophthalmology - denies blurred vision. Was seen by optometry in December.   Has not done ECHO or CXR as previously ordered.     HPI from initial consultation  States about 8-10 years ago, she started to have pain in her joints, while living in [place]. There was concern for possible rheumatologic issue at that time. She was given some oral prednisone which were reduced over time. She had a wound on her leg, was told it was a vascular wound. Had a laser vein surgery at that time. No recurrence until one month ago.   About 3 years ago, they moved to [facility]. She was seeing Dr. Keys, diagnosed pt with mixed connective tissue disease. States her symptoms worsened in winter months- joint pain in her hands as well as change in color of her fingertips. About  one month ago, she developed a wound over her leg. She followed with Dr. Ferguson, as Dr. Keys left. Once the wound came, he recommended pt be evaluated by wound care. Also recommended tertiary center.  Was previously on plaquenil, however due to the side effects, this was stopped.  Pt was also on azathioprine but had some cytopenias. Also had been on methotrexate but since this wasn't helping, this was discontinued.   Pt has been following with wound care. States with the topicals and wrapping, she has had some improvement of the wound size.   Pt continues to have burning sensation in both legs, described as 5-6/10.   She is currently on prednisone 5mg daily.   She has never gotten a biopsy of the lesion, unless she had one 8 years ago in [place], but  cannot recall results.  She tried gabapentin, only 100mg nightly but stopped because of feeling drowsy.      Pt does have morning stiffness lasting 10-15 minutes.   Significant hair loss  + ulcers over bottom inside lip  Itching over thighs without redness or rash.   Hx of cytopenia while on DMARDs  + ulcers over toes/bottom of feet  + pain awakening the patient from sleep  + looks thinner, however actual weight is the same.      No history of significant wrist pain or swelling, pain or swelling of the MCPs, pain or swelling of the ankles and bones of the feet, or new skin nodule formation.  The patient denies nasal ulcers, photosensitive rash, elevated or scarring rashes, prior renal or liver disease, or history of seizures.  No history of prior blood clot in the legs or lungs, strokes or ischemic phenomenon.  Denies nonhealing ulcers on the fingertips, trouble swallowing, or severe acid reflux.  The patient denies any history of uveitis, crampy abdominal pain, constipation, diarrhea, or bloody stools.   There are no symptoms of severe dry eyes, dry mouth, or swelling of the cheeks or under the jawbone.   No fevers, chills, lymphadenopathy, night sweats, or easy  bruising or bleeding.  Denies chronic sinus infections/disease or epistaxis.  Denies chronic cough or hemoptysis.      Family hx:  Father with possible MCTD  Brothers/sisters healthy       Past Medical History:  Past Medical History:   Diagnosis Date    Asthma     Mixed connective tissue disease (HCC)     Raynaud's disease      Past Surgical History:  Past Surgical History:   Procedure Laterality Date    TUBAL LIGATION       Family History:  Family History   Problem Relation Age of Onset    Diabetes Mother     Diabetes Father      Social History:  Social History     Socioeconomic History    Marital status:    Tobacco Use    Smoking status: Never    Smokeless tobacco: Never   Vaping Use    Vaping Use: Never used   Substance and Sexual Activity    Alcohol use: No    Drug use: No    Sexual activity: Yes     Medications:  Outpatient Medications Marked as Taking for the 1/12/24 encounter (Telemedicine) with Alisha Farmer DO   Medication Sig Dispense Refill    amLODIPine 2.5 MG Oral Tab Take 1 tablet (2.5 mg total) by mouth 2 (two) times daily. 180 tablet 0    hydroxychloroquine 200 MG Oral Tab Take 1 tablet (200 mg total) by mouth daily. 90 tablet 0    predniSONE 5 MG Oral Tab Take 1 tablet (5 mg total) by mouth daily. 90 tablet 0    Ferrous Sulfate 325 (65 Fe) MG Oral Tab Take 1 tablet (325 mg total) by mouth 2 (two) times daily with meals. 180 tablet 0     Modified Medications    Modified Medication Previous Medication    AMLODIPINE 2.5 MG ORAL TAB amLODIPine 2.5 MG Oral Tab       Take 1 tablet (2.5 mg total) by mouth 2 (two) times daily.    Take 1 tablet (2.5 mg total) by mouth 2 (two) times daily.    HYDROXYCHLOROQUINE 200 MG ORAL TAB hydroxychloroquine 200 MG Oral Tab       Take 1 tablet (200 mg total) by mouth daily.    Take 1 tablet (200 mg total) by mouth daily.    PREDNISONE 5 MG ORAL TAB predniSONE 5 MG Oral Tab       Take 1 tablet (5 mg total) by mouth daily.    Take 1 tablet (5 mg total) by mouth  daily.     Medications Discontinued During This Encounter   Medication Reason    gabapentin 100 MG Oral Cap     amLODIPine 2.5 MG Oral Tab     hydroxychloroquine 200 MG Oral Tab     predniSONE 5 MG Oral Tab      ?  Allergies:  No Known Allergies  ?  REVIEW OF SYSTEMS   ?  Review of Systems   Constitutional:  Negative for chills, fever, malaise/fatigue and weight loss.   HENT:  Negative for congestion and nosebleeds.    Eyes:  Negative for blurred vision, double vision, pain and redness.   Respiratory:  Negative for cough, hemoptysis and shortness of breath.    Cardiovascular:  Negative for chest pain, palpitations and leg swelling.   Gastrointestinal:  Negative for abdominal pain, blood in stool, diarrhea, heartburn and nausea.   Genitourinary:  Negative for dysuria, frequency, hematuria and urgency.   Musculoskeletal:  Positive for joint pain. Negative for back pain, myalgias and neck pain.   Skin:  Negative for itching and rash.   Neurological:  Positive for tingling and weakness. Negative for dizziness, seizures and headaches.   Endo/Heme/Allergies:  Does not bruise/bleed easily.   Psychiatric/Behavioral:  Negative for depression. The patient is not nervous/anxious and does not have insomnia.      PHYSICAL EXAM   Today's Vitals:  Temperature Blood Pressure Heart Rate Resp Rate SpO2               ?  Current Weight Height BMI BSA Pain   Wt Readings from Last 1 Encounters:   01/12/24 127 lb (57.6 kg)    Height: 5' 3\" (160 cm) Body mass index is 22.5 kg/m². Body surface area is 1.59 meters squared.         Physical Exam  Vitals and nursing note reviewed.   Constitutional:       General: She is not in acute distress.     Appearance: She is well-developed. She is not diaphoretic.      Comments: + thin   HENT:      Head: Normocephalic and atraumatic.   Eyes:      General: No scleral icterus.     Extraocular Movements: Extraocular movements intact.      Conjunctiva/sclera: Conjunctivae normal.   Neck:      Vascular: No  JVD.      Trachea: No tracheal deviation.   Pulmonary:      Effort: Pulmonary effort is normal. No respiratory distress.   Musculoskeletal:      Comments: (Prior exam)  No evidence of heberden or kecia nodes of any of the fingers, no basilar joint tenderness of the 1st CMC bilaterally.  No swelling, tenderness, redness or restriction of motion of the DIPs, PIPs, MCPs, wrists, elbows, or joints of the feet.  Bilateral shoulders with full ROM.  Bilateral knees without medial joint line tenderness, no crepitus, no effusion.  + bilateral ankle tenderness over med/lat malleoli and some swelling most notable over lateral malleolus on left - no more swelling but tenderness over top of feet  Very sensitive to touch over left dorsal foot diffusely  Difficult to palpate pulses due to sensitivity to touch    Skin:     General: Skin is warm and dry.      Comments: No active bruising noted today.   Previous ulcerations now healed and slight scarring present around ankles- salt/pepper appearance of skin stable  Abnormal nailfold capillaroscopy diffuse fingers, stable  No digital pits of fingers  Cool to touch toes with purplish color changes; dorsalis pedis pulse intact. Early possible ulcer formation over left 2nd and 3rd toes. -- all resolved; now toes look pink  + telangectasias over face stable  Skin tightening of distal fingers improved    Neurological:      Mental Status: She is alert. She is disoriented.      Cranial Nerves: No cranial nerve deficit.   Psychiatric:         Mood and Affect: Mood normal.         Behavior: Behavior normal.       ?  Radiology review:       Narrative   PROCEDURE:  US ARTERIAL DUPLEX LOWER EXTREMITY LEFT (CPT=93926)     COMPARISON:  None.     INDICATIONS:  M79.672 Left foot pain I73.01 Raynaud's disease with gangrene ([facility]) I77.89 Lupus vasculitis ([facility]) M32.19 Lupus vasculitis ([facility]) M35.1 Mixed connective tissue d*     TECHNIQUE:  A comprehensive color duplex Doppler ultrasound examination of  the left lower extremity was performed. Color imaging, Doppler wave form analysis, and peak systolic flow measurements of the common femoral, profunda femoral, superficial  femoral, and popliteal arteries were performed.       PATIENT STATED HISTORY: (As transcribed by Technologist)           FINDINGS:    LEFT EXTREMITY:  Triphasic waveforms in the left common femoral, profunda, superficial femoral arteries.  Biphasic waveforms below the left knee.  OTHER:  None.       PEAK VELOCITIES (CM/S):  LEFT COMMON FEMORAL:      165    LEFT PROFUNDA FEMORAL:      78      LEFT SUPERFICIAL FEMORAL PROX:    113  LEFT SUPERFICIAL FEMORAL MID:      94  LEFT SUPERFICIAL FEMORAL DISTAL:    92  LEFT POPLITEAL PROX:      110  LEFT POPLITEAL DISTAL:      121  TIBIOPER TRUNK:      66  LEFT PTA PROX:      60  LEFT PTA MID:      62  LEFT PTA DISTAL:      73  LEFT BETH PROX:      82  LEFT BETH MID:      72    LEFT DORSALIS PEDIS::      30  LEFT GREAT TOE PRESSURE:      25 mmHg                       Impression   CONCLUSION:       1. No high-grade stenosis is identified.  There is suggestion of diffuse mild stenosis throughout most of the left lower extremity.     2. Overall diminished left toe pressure is noted.  Possibility of decreased perfusion in the distal left foot is of consideration.          LOCATION:  [place] [id number]           Dictated by (CST): Anselmo Carroll MD on 11/28/2023 at 9:30 AM      Finalized by (CST): Anselmo Carroll MD on 11/28/2023 at 9:35 AM       Narrative   PROCEDURE:  XR FOOT, COMPLETE (MIN 3 VIEWS), LEFT (CPT=73630)     TECHNIQUE:  AP, oblique, and lateral views were obtained.     COMPARISON:  None.     INDICATIONS:  M79.672 Left foot pain I73.01 Raynaud's disease with gangrene ([facility]) I77.89 Lupus vasculitis ([facility]) M32.19 Lupus vasculitis ([facility]) M35.1 Mixed connective tissue d*     PATIENT STATED HISTORY: (As transcribed by Technologist)  Patient states having lateral anterior foot pain that began roughly twenty days  ago, with no injury.         FINDINGS:    BONES:  Osseous structures are intact.  Periarticular decreased bone mineralization.  Limited evaluation of the 2nd through 5th distal phalanges is due to constant flexion.  Joint spaces are preserved.  Mild hallux valgus deformity.  No dislocation.    Mild inferior calcaneal enthesopathy.                   Impression   CONCLUSION:    1. Osseous structures are intact.  Please see details as above.        LOCATION:  [place]        Dictated by ([facility]): Madina Colin MD on 10/19/2023 at 12:16 PM      Finalized by (CST): Madina Colin MD on 10/19/2023 at 12:18 PM        Narrative   PROCEDURE:  XR FOOT, COMPLETE (MIN 3 VIEWS), LEFT (CPT=73630)     TECHNIQUE:  AP, oblique, and lateral views were obtained.     COMPARISON:  None.     INDICATIONS:  M79.672 Left foot pain I73.01 Raynaud's disease with gangrene (HCC) I77.89 Lupus vasculitis (HCC) M32.19 Lupus vasculitis (HCC) M35.1 Mixed connective tissue d*     PATIENT STATED HISTORY: (As transcribed by Technologist)  Patient states having lateral anterior foot pain that began roughly twenty days ago, with no injury.         FINDINGS:    BONES:  Osseous structures are intact.  Periarticular decreased bone mineralization.  Limited evaluation of the 2nd through 5th distal phalanges is due to constant flexion.  Joint spaces are preserved.  Mild hallux valgus deformity.  No dislocation.    Mild inferior calcaneal enthesopathy.                   Impression   CONCLUSION:    1. Osseous structures are intact.  Please see details as above.        LOCATION:  [place]        Dictated by ([facility]): Madina Colin MD on 10/19/2023 at 12:16 PM      Finalized by (CST): Madina Colin MD on 10/19/2023 at 12:18 PM      PROCEDURE:  CT ABDOMEN PELVIS IV CONTRAST, NO ORAL (ER)       COMPARISON:  None.       INDICATIONS:  abd pain, sent by pcp to r/o sbo       TECHNIQUE:  CT scanning was performed from the dome of the diaphragm to the pubic symphysis with non-ionic intravenous  contrast material. Post contrast coronal MPR imaging was performed.  Dose reduction techniques were used. Dose information is   transmitted to the ACR (American College of Radiology) NRDR (National Radiology Data Registry) which includes the Dose Index Registry.       FINDINGS:     LUNG BASES:  Dependent atelectasis bilaterally.   LIVER:  Normal in shape and contour.   BILIARY:  Cholelithiasis.  No intrahepatic biliary dilatation.  There is possible small amount of pericholecystic fluid..   SPLEEN:  Normal.  No enlargement or focal lesion.   PANCREAS:  No tyson-pancreatic inflammatory stranding   ADRENALS:  Normal.  No mass or enlargement.     KIDNEYS:  There is moderate bilateral hydronephrosis.  No obstructing urolithiasis.   BOWEL/MESENTERY:  Bowel is normal in caliber. No evidence of obstruction.  Considerable amount of stool noted within the rectum.  Moderate amount of fecal material noted within the remaining portion of the colon.  The appendix is normal appearance.   PELVIS:  Distended bladder.  No free pelvic fluid.  Calcified phleboliths within the pelvis.     AORTA/VASCULAR:  Atheromatous calcifications of the aorta.  Aorta is normal in caliber.   BONES:  No acute fractures.                        Impression   CONCLUSION:     1. Considerably distended bladder with bilateral hydronephrosis.  No obstructing urolithiasis is noted.   2. Prominent mount of stool noted within the rectum suggestive of fecal impaction.  No evidence of bowel obstruction.     3. Gallstone within the neck of the gallbladder measuring 1.3 x 1.0 cm.  The gallbladder is contracted but there is suggestion of small amount of pericholecystic fluid.  Correlation with clinical symptoms to exclude acute cholecystitis.             Dictated by (CST): Colette Nelson MD on 12/22/2021 at 7:51 PM       Finalized by (CST): Colette Nelson MD on 12/22/2021 at 7:56 PM        Labs:  Lab Results   Component Value Date    WBC 4.5 10/19/2023    RBC 4.50  10/19/2023    HGB 12.4 10/19/2023    HCT 39.6 10/19/2023    .0 10/19/2023    MCV 88.0 10/19/2023    MCH 27.6 10/19/2023    MCHC 31.3 10/19/2023    RDW 13.4 10/19/2023    NEPRELIM 2.80 10/19/2023    NEPERCENT 62.1 10/19/2023    LYPERCENT 22.3 10/19/2023    MOPERCENT 13.9 10/19/2023    EOPERCENT 1.3 10/19/2023    BAPERCENT 0.4 10/19/2023    NE 2.80 10/19/2023    LYMABS 1.01 10/19/2023    MOABSO 0.63 10/19/2023    EOABSO 0.06 10/19/2023    BAABSO 0.02 10/19/2023     Lab Results   Component Value Date    GLU 74 10/19/2023    BUN 8 10/19/2023    BUNCREA 16.7 07/14/2021    CREATSERUM 0.56 10/19/2023    ANIONGAP 2 10/19/2023    GFRNAA 109 07/28/2022    GFRAA 126 07/28/2022    CA 9.4 10/19/2023    OSMOCALC 289 10/19/2023    ALKPHO 96 10/19/2023    AST 18 10/19/2023    ALT 20 10/19/2023    BILT 0.4 10/19/2023    TP 8.5 (H) 10/19/2023    ALB 4.0 10/19/2023    GLOBULIN 4.5 (H) 10/19/2023     10/19/2023    K 3.9 10/19/2023     10/19/2023    CO2 30.0 10/19/2023       Additional Labs:  10/2023  CBC grossly normal  CMP grossly normal  Iron 43; percent sat 12 low  Ferritin 94.1 normal  B12 726 normal  ESR 45 elevated  CRP 0.48 borderline elevated  Vitamin D 58.8 Normal    07/2023  B12 246 borderline low   Vitamin D 22.9 low  Ferritin 96.6 normal  Iron low  CBC grossly normal  CMP grossly normal    07/2022  B12 302 normal  Vitamin D 14.4 low  Ferritin normal  Iron normal  CBC grossly normal  CMP with potassium 3.3 otherwise grossly normal  CRP normal  ESR 15 normal  C3 97.8 normal  C4 29.1 normal    01/29/2022  WELLINGTON 1: 640 speckled      Remainder of DARRYL panel negative  CBC with WBC 5.9; hemoglobin 12.2; platelet 296  B12 689  ESR 48 elevated  CRP 0.44 borderline elevated  CMP grossly normal  UA grossly normal with exception of trace leuk esterase squamous epithelial and rare bacteria  Ferritin 245.8 elevated  Iron and percent sat low    01/11/2022  CBC grossly normal (WBC 5.6; hemoglobin 13.2;  platelet 214)    11/2021   B12 943  WELLINGTON positive      Remainder of DARRYL panel negative intrinsic factor blocking antibody negative  MMA normal  ESR 34  dsDNA negative  Total complement normal    11/22/2021  CBC with WBC 3.9; hemoglobin 11.4; platelet 10    7/2021  dsDNA negative  Protein creatinine ratio normal  UA small amount of blood; leuk esterase; bacteria rare; moderate squamous  C4 27.3 normal  C3 112 normal  CRP 0.46 borderline elevated  ESR 40 elevated  CBC with hemoglobin 10.9; WBC 4.0; platelet 213 normal  CMP grossly normal    08/2020  Esr 36  CRP 0.60  C3 and C4 normal     Cutaneous Direct IF, Biopsy See Note    Comment: IMMUNODERMATOLOGY REPORT       Specimen(s):   1. 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, including lupus vasculitis     (See Results and Comments)   ____________________________________________________________     RESULTS   IgG:  Grains within and around basal keratinocytes and         within cell nuclei and grains in focal superficial,         upper, and mid dermal blood vessels         IgG4:  Negative     IgM:  Grains in focal dermal blood vessels     IgA:  Focal grains within basal keratinocytes     C3:   Few grains along basement membrane zone and grains and         clumps within superficial and upper dermal blood         vessels     Fibrinogen:  3+ deposition around superficial, upper, and                mid dermal blood vessels     COMMENTS   By direct immunofluorescence, there is granular deposition of IgG   within and around basal keratinocytes and within cell nuclei. The   presence of granular IgG within the epidermis raises the   consideration of lupus erythematosus, specifically subacute   cutaneous lupus erythematosus (SCLE). The finding  of granular   staining of cell nuclei may be indicative of the presence of   anti-nuclear antibodies, further lending support to the   possibility of lupus erythematosus. 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     Correlation with histopathologic examination of formalin-fixed   tissue is needed to further define this process. Correlation with   clinical findings is also needed, including with serologic testing   for lupus associated antibodies, including antibodies to SSA (Ro)   and SSB (La), which are often associated with SCLE.        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

## 2024-01-18 DIAGNOSIS — E61.1 IRON DEFICIENCY: ICD-10-CM

## 2024-01-18 RX ORDER — FERROUS SULFATE 325(65) MG
1 TABLET ORAL 2 TIMES DAILY WITH MEALS
Qty: 180 TABLET | Refills: 0 | Status: SHIPPED | OUTPATIENT
Start: 2024-01-18

## 2024-01-18 NOTE — TELEPHONE ENCOUNTER
LOV:     01/12/2024 Telemedicine       Future Appointments   Date Time Provider Department Center   4/30/2024  1:45 PM Alisha Farmer DO [facility] [place]   7/23/2024 10:00 AM Alisha Farmer DO [facility] [place]   11/1/2024  9:30 AM Alisha Farmer DO [facility] [place]       LF:  07/06/2023   QTY:  180   Refills:  0    LABS:      Component      Latest Ref Rng 10/19/2023   Iron, Serum      50 - 170 ug/dL 43 (L)    Transferrin      200 - 360 mg/dL 237    Iron Bind.Cap.(TIBC)      240 - 450 ug/dL 353    Iron Saturation      15 - 50 % 12 (L)    FERRITIN      12.0 - 240.0 ng/mL 94.1       Legend:  (L) Low

## 2024-03-18 ENCOUNTER — LAB ENCOUNTER (OUTPATIENT)
Dept: LAB | Age: 49
End: 2024-03-18
Attending: INTERNAL MEDICINE
Payer: COMMERCIAL

## 2024-03-18 DIAGNOSIS — I73.01 RAYNAUD'S DISEASE WITH GANGRENE (HCC): ICD-10-CM

## 2024-03-18 DIAGNOSIS — E55.9 VITAMIN D DEFICIENCY: ICD-10-CM

## 2024-03-18 DIAGNOSIS — E61.1 IRON DEFICIENCY: ICD-10-CM

## 2024-03-18 DIAGNOSIS — M32.19 LUPUS VASCULITIS (HCC): ICD-10-CM

## 2024-03-18 DIAGNOSIS — M35.1 MIXED CONNECTIVE TISSUE DISEASE (HCC): ICD-10-CM

## 2024-03-18 DIAGNOSIS — I77.89 LUPUS VASCULITIS (HCC): ICD-10-CM

## 2024-03-18 LAB
ALBUMIN SERPL-MCNC: 4 G/DL (ref 3.4–5)
ALBUMIN/GLOB SERPL: 1 {RATIO} (ref 1–2)
ALP LIVER SERPL-CCNC: 85 U/L
ALT SERPL-CCNC: 12 U/L
ANION GAP SERPL CALC-SCNC: 3 MMOL/L (ref 0–18)
AST SERPL-CCNC: 20 U/L (ref 15–37)
BASOPHILS # BLD AUTO: 0.02 X10(3) UL (ref 0–0.2)
BASOPHILS NFR BLD AUTO: 0.6 %
BILIRUB SERPL-MCNC: 0.5 MG/DL (ref 0.1–2)
BUN BLD-MCNC: 6 MG/DL (ref 9–23)
C3 SERPL-MCNC: 103 MG/DL (ref 90–180)
C4 SERPL-MCNC: 33.7 MG/DL (ref 10–40)
CALCIUM BLD-MCNC: 9.5 MG/DL (ref 8.5–10.1)
CHLORIDE SERPL-SCNC: 111 MMOL/L (ref 98–112)
CO2 SERPL-SCNC: 27 MMOL/L (ref 21–32)
CREAT BLD-MCNC: 0.5 MG/DL
CRP SERPL-MCNC: 0.37 MG/DL (ref ?–0.3)
DEPRECATED HBV CORE AB SER IA-ACNC: 114.6 NG/ML
EGFRCR SERPLBLD CKD-EPI 2021: 116 ML/MIN/1.73M2 (ref 60–?)
EOSINOPHIL # BLD AUTO: 0.06 X10(3) UL (ref 0–0.7)
EOSINOPHIL NFR BLD AUTO: 1.9 %
ERYTHROCYTE [DISTWIDTH] IN BLOOD BY AUTOMATED COUNT: 12.9 %
ERYTHROCYTE [SEDIMENTATION RATE] IN BLOOD: 54 MM/HR
FASTING STATUS PATIENT QL REPORTED: YES
GLOBULIN PLAS-MCNC: 4 G/DL (ref 2.8–4.4)
GLUCOSE BLD-MCNC: 93 MG/DL (ref 70–99)
HCT VFR BLD AUTO: 39.2 %
HGB BLD-MCNC: 12.8 G/DL
IMM GRANULOCYTES # BLD AUTO: 0.01 X10(3) UL (ref 0–1)
IMM GRANULOCYTES NFR BLD: 0.3 %
IRON SATN MFR SERPL: 15 %
IRON SERPL-MCNC: 45 UG/DL
LYMPHOCYTES # BLD AUTO: 0.59 X10(3) UL (ref 1–4)
LYMPHOCYTES NFR BLD AUTO: 18.4 %
MCH RBC QN AUTO: 28.8 PG (ref 26–34)
MCHC RBC AUTO-ENTMCNC: 32.7 G/DL (ref 31–37)
MCV RBC AUTO: 88.1 FL
MONOCYTES # BLD AUTO: 0.41 X10(3) UL (ref 0.1–1)
MONOCYTES NFR BLD AUTO: 12.8 %
NEUTROPHILS # BLD AUTO: 2.11 X10 (3) UL (ref 1.5–7.7)
NEUTROPHILS # BLD AUTO: 2.11 X10(3) UL (ref 1.5–7.7)
NEUTROPHILS NFR BLD AUTO: 66 %
OSMOLALITY SERPL CALC.SUM OF ELEC: 289 MOSM/KG (ref 275–295)
PLATELET # BLD AUTO: 174 10(3)UL (ref 150–450)
POTASSIUM SERPL-SCNC: 3.8 MMOL/L (ref 3.5–5.1)
PROT SERPL-MCNC: 8 G/DL (ref 6.4–8.2)
RBC # BLD AUTO: 4.45 X10(6)UL
SODIUM SERPL-SCNC: 141 MMOL/L (ref 136–145)
TIBC SERPL-MCNC: 310 UG/DL (ref 240–450)
TRANSFERRIN SERPL-MCNC: 208 MG/DL (ref 200–360)
VIT D+METAB SERPL-MCNC: 35 NG/ML (ref 30–100)
WBC # BLD AUTO: 3.2 X10(3) UL (ref 4–11)

## 2024-03-18 PROCEDURE — 85025 COMPLETE CBC W/AUTO DIFF WBC: CPT

## 2024-03-18 PROCEDURE — 82728 ASSAY OF FERRITIN: CPT

## 2024-03-18 PROCEDURE — 83540 ASSAY OF IRON: CPT

## 2024-03-18 PROCEDURE — 80053 COMPREHEN METABOLIC PANEL: CPT

## 2024-03-18 PROCEDURE — 86140 C-REACTIVE PROTEIN: CPT

## 2024-03-18 PROCEDURE — 36415 COLL VENOUS BLD VENIPUNCTURE: CPT

## 2024-03-18 PROCEDURE — 86160 COMPLEMENT ANTIGEN: CPT

## 2024-03-18 PROCEDURE — 82306 VITAMIN D 25 HYDROXY: CPT

## 2024-03-18 PROCEDURE — 83550 IRON BINDING TEST: CPT

## 2024-03-18 PROCEDURE — 85652 RBC SED RATE AUTOMATED: CPT

## 2024-04-25 NOTE — PROGRESS NOTES
CHIEF COMPLAINT:     Chief Complaint   Patient presents with    Wound Care     Patient is here for initial visit and has had her wounds for the last 3 weeks. She has been leaving it open to air. Patient reports she has compression stockings at home and her family has been helping with care.     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 Patient saw dr. Santamaria (vascular) in December 2023 and he documented \"I explained to the patient and her  that I do not believe the source of her pain to be vascular in origin.  She has an easily palpable dorsalis pedis pulse and the location of the pain is more suggestive of a neuropathic origin.  I suspect perhaps that given the tightness of her feet from her mixed connective tissue disorder the compression stockings is causing somewhat compression of the nerves in that area and I have encouraged her to substitute those stockings to those that involve the feet up to the upper calf.\" Patient/spouse have been in contact with dr. Farmer and in march 2024 reported a dark spot/ulcer on her bilateral lower legs, she was advised to make an appointment with wound care as well as get her blood work done ordered in January 2024 so that further treatment plan could be extablished.  The blood work did show increased inflammation and she was advised to increase her prednisone to 20mg x 5 days (which spouse reports they did do) and take the amlodipine 2.5 mg-which she states she is doing.  They have been leaving the areas open to air and she has not been wearing compression.  She c/o burning sensation in her ankle area.  She was started on gabapentin by dr. Farmer last year, however they stopped it because it was making her too sleepy. We discussed restarting the gabapentin, but only  take a noc first.  Both wounds are dried eschar.  Will utilize hydrogel with lidocaine to start debriding off/softening the eschar. I have also ordered santyl. Patient placed into spandagrips for compression    MEDICATIONS:     Current Outpatient Medications:     collagenase (SANTYL) 250 UNIT/GM External Ointment, Apply 1 Application topically daily., Disp: 90 g, Rfl: 0    Ferrous Sulfate 325 (65 Fe) MG Oral Tab, Take 1 tablet (325 mg total) by mouth 2 (two) times daily with meals., Disp: 180 tablet, Rfl: 0    amLODIPine 2.5 MG Oral Tab, Take 1 tablet (2.5 mg total) by mouth 2 (two) times daily., Disp: 180 tablet, Rfl: 0    hydroxychloroquine 200 MG Oral Tab, Take 1 tablet (200 mg total) by mouth daily., Disp: 90 tablet, Rfl: 0    predniSONE 5 MG Oral Tab, Take 1 tablet (5 mg total) by mouth daily., Disp: 90 tablet, Rfl: 0    Mycophenolate Mofetil 500 MG Oral Tab, Take 1 tablet (500 mg total) by mouth daily. (Patient not taking: Reported on 1/12/2024), Disp: 90 tablet, Rfl: 0  ALLERGIES:   No Known Allergies   REVIEW OF SYSTEMS:   This information was obtained from the patient/family and chart.    See HPI for pertinent positives, otherwise 10 pt ROS negative.    HISTORY:     Past Medical History:    Mixed connective tissue disease (HCC)    Raynaud's disease     History reviewed. No pertinent surgical history.     Social History     Socioeconomic History    Marital status:    Tobacco Use    Smoking status: Never    Smokeless tobacco: Never   Vaping Use    Vaping status: Never Used   Substance and Sexual Activity    Alcohol use: No    Drug use: No    Sexual activity: Yes     PHYSICAL EXAM:     Vitals:    04/26/24 1035   BP: 118/80   Pulse: 92   Resp: 18   Temp: 98.4 °F (36.9 °C)   Weight: 130 lb (59 kg)   Height: 64\"      Estimated body mass index is 22.31 kg/m² as calculated from the following:    Height as of this encounter: 64\".    Weight as of this encounter: 130 lb (59 kg).   No results found for:  \"PGLU\"    Vital signs reviewed.Appears stated age, well groomed.    Constitutional:  Bp wnl for patient. Pulse Regular and wnl for patient. Respirations easy and unlabored. Temperature wnl. Weight normal for height. Appearance neat and clean. Appears in no acute distress. Well nourished and well developed.    Lower extremity:  dp/pt palpable bilaterally with strong pulsatile signals at the dp/pt/distal hallux. Her Extremities are free of varicosities and edema, they are scarred, scattered changes in pigmentation (hypo/hyper) with atrophie jay. Capillary refill < 3 seconds. Digits are warm. toenails are wnl for color, thickness and hygeine. Skin hydration wnl. + hairgrowth on legs.    Musculoskeletal:  Gait and station stable   Integumentary:  refer to wound characteristics and images   Psychiatric:  Judgment and insight intact. Alert and oriented times 3. No evidence of depression, anxiety, or agitation. Calm, cooperative, and communicative, but very quiet and reserved.  EDEMA:   Calf  Point of Measurement - Left Calf: 31  Point of Measurement - Right Calf: 31  Left Calf from:: Heel  Calf Left cm:: 27.5  Right Calf from:: Heel  Right Calf cm:: 27.5  Ankle  Point of Measurement - Left Ankle: 11  Point of Measurement - Right Ankle: 11  Left Ankle from:: Heel  Left Ankle cm:: 18.2     Right Ankle from:: Heel  Right Ankle cm:: 17.2     DIAGNOSTICS:     Lab Results   Component Value Date    BUN 6 (L) 03/18/2024    CREATSERUM 0.50 (L) 03/18/2024    GFRCKDEPI 121.37 12/17/2020    ALB 4.0 03/18/2024    TP 8.0 03/18/2024    A1C 5.1 12/17/2020 11-28-23 arterial duplex-dr hilliard  FINDINGS:    LEFT EXTREMITY:  Triphasic waveforms in the left common femoral, profunda, superficial femoral arteries.  Biphasic waveforms below the left knee.   OTHER:  None.     PEAK VELOCITIES (CM/S):   LEFT COMMON FEMORAL:      165    LEFT PROFUNDA FEMORAL:      78      LEFT SUPERFICIAL FEMORAL PROX:    113   LEFT SUPERFICIAL FEMORAL MID:       94   LEFT SUPERFICIAL FEMORAL DISTAL:    92   LEFT POPLITEAL PROX:      110   LEFT POPLITEAL DISTAL:      121   TIBIOPER TRUNK:      66   LEFT PTA PROX:      60   LEFT PTA MID:      62   LEFT PTA DISTAL:      73   LEFT BETH PROX:      82   LEFT BETH MID:      72    LEFT DORSALIS PEDIS::      30   LEFT GREAT TOE PRESSURE:      25 mmHg   CONCLUSION:    1. No high-grade stenosis is identified.  There is suggestion of diffuse mild stenosis throughout most of the left lower extremity.    2. Overall diminished left toe pressure is noted.  Possibility of decreased perfusion in the distal left foot is of consideration.     7-14-20 venous reflux study-dr santamaria  Per Dr. Santamaria review \"no intervention needed\"  WOUND ASSESSMENT:     Wound 04/26/24 #1 Left Medial Ankle Ankle Left;Medial (Active)   Date First Assessed/Time First Assessed: 04/26/24 1031    Wound Number (Wound Clinic Only): #1 Left Medial Ankle  Primary Wound Type: (c) Auto-immune  Location: Ankle  Wound Location Orientation: Left;Medial      Assessments 4/26/2024 10:34 AM   Wound Image     Drainage Amount Unable to assess   Wound Length (cm) 3.5 cm   Wound Width (cm) 4.6 cm   Wound Surface Area (cm^2) 16.1 cm^2   Wound Depth (cm) 0.1 cm   Wound Volume (cm^3) 1.61 cm^3   Margins Undefined edges   Non-staged Wound Description Full thickness   Soni-wound Assessment Dry;Edema;Atrophy jay   Wound Odor None   Shape 100% scabbed       No associated orders.       Wound 04/26/24 #2 Right Lateral Ankle Ankle Right;Lateral (Active)   Date First Assessed/Time First Assessed: 04/26/24 1031    Wound Number (Wound Clinic Only): #2 Right Lateral Ankle  Primary Wound Type: (c) Auto-immune  Location: Ankle  Wound Location Orientation: Right;Lateral      Assessments 4/26/2024 10:34 AM   Wound Image     Drainage Amount Unable to assess   Wound Length (cm) 0.5 cm   Wound Width (cm) 0.7 cm   Wound Surface Area (cm^2) 0.35 cm^2   Wound Depth (cm) 0.1 cm   Wound Volume (cm^3) 0.035 cm^3    Margins Well-defined edges   Non-staged Wound Description Full thickness   Soni-wound Assessment Dry;Edema;Atrophy jay   Wound Odor None   Shape 100% scabbed       No associated orders.          ASSESSMENT AND PLAN:    1. Mixed connective tissue disease (HCC)    2. Lupus vasculitis (HCC)    3. Non-pressure chronic ulcer of left lower leg with fat layer exposed (HCC)    4. Non-pressure chronic ulcer of other part of right lower leg with fat layer exposed (HCC)         Discussed with patient and spouse the aspects of wound healing including:  blood flow in/out, wound bed optimization including moist wound healing, removal of necrosis, bioburden control, monitoring for infection, use of compression and finally the patient as a whole including nutrition and increased protein intake.    Risks, benefits, and alternatives of current treatment plan discussed in detail.  Questions and concerns addressed. Red flags to RTC or ED reviewed.  Patient (or parent) agrees to plan.      NOTE TO PATIENT: The 21st Century Cures Act makes clinical notes like these available to patients in the interest of transparency. Clinical notes are medical documents used by physicians and care providers to communicate with each other. These documents include medical language and terminology, abbreviations, and treatment information that may sound technical and at times possibly unfamiliar. In addition, at times, the verbiage may appear blunt or direct. These documents are one tool providers use to communicate relevant information and clinical opinions of the care providers in a way that allows common understanding of the clinical context.   I spent   40   minutes with the patient. This time included:    preparing to see the patient (eg, review notes and recent diagnostics),  seeing the patient, obtaining and/or reviewing separately obtained history, performing a medically appropriate examination and/or evaluation, counseling and educating the  patient, documenting in the record, rx  DISCHARGE:      Patient Instructions   Please return: 1 week-    RESTART GABABAPENTIN 100MG AT NIGHT ONLY    Requested Prescriptions     Signed Prescriptions Disp Refills    collagenase (SANTYL) 250 UNIT/GM External Ointment 90 g 0     Sig: Apply 1 Application topically daily.       Patient discharge and wound care instructions  Raymond Olivo  4/26/2024       You may shower and cleanse area with mild soap and water, hypochlorus wound cleanser (ie anasept), dab dry with gauze and apply your dressings as directed.     Changing your dressing:         every day       Wash your hands with soap and water.  Ensure that the old dressing is removed completely. Place it in a plastic bag and throw it in the trash.  Cleanse the wound with hypochlorous wound cleanser (ie. Anasept, vashe, pure and clean) .  It's ok to “scrub” your wound with the gauze, small amount of bleeding with cleansing is normal and ok.  Apply the following dressings:  put BLUE SMALL HYDROGEL directly on the wound, then cover with a 2x2 soaked with the RED LARGE CONTAINer HYDROGEL>COVER WITH TEGADERM AND SPANDAGRIP.    Nutrition and blood sugar control:  Focus on the following:  Protein: Meats, beans, eggs, milk and yogurt particularly Greek yogurt), tofu, soy nuts, soy protein products (Follow the protein handout in your welcome folder)  Vitamin C: Citrus fruits and juices, strawberries, tomatoes, tomato juice, peppers, baked potatoes, spinach, broccoli, cauliflower, [place] sprouts, cabbage  Vitamin A: Dark green, leafy vegetables, orange or yellow vegetables, cantaloupe, fortified dairy products, liver, fortified cereals  Zinc: Fortified cereals, red meats, seafood  Consider supplementing with Justin by [employer]. It can be purchased on amazon, [employer] website, or local pharmacy may be able to order it for you.  (These are essential branch chain amino acids that help with tissue building and wound healing).      Concerns:  Signs of infection may include the following:  Increase in redness  Red \"streaks\" from wound  Increase in swelling  Fever  Unusual odor  Change in the amount of wound drainage     Should you experience any significant changes in your wound(s) or have any questions regarding your home care instructions please contact the [facility] center [facility] @ [phone] If after regular business hours, please call your family doctor or local emergency room. The treatment plan has been discussed at length between you and your provider. Follow all instructions carefully, it is very important. If you do not follow all instructions you are at risk of your wound not healing, infection, possible loss of limb and even loss of life.        Martina Mckeon FNP-C, CWCN-AP, CFCN, CSWS, WCC, DWC  4/26/2024

## 2024-04-26 ENCOUNTER — OFFICE VISIT (OUTPATIENT)
Dept: WOUND CARE | Facility: HOSPITAL | Age: 49
End: 2024-04-26
Attending: NURSE PRACTITIONER
Payer: COMMERCIAL

## 2024-04-26 VITALS
TEMPERATURE: 98 F | WEIGHT: 130 LBS | DIASTOLIC BLOOD PRESSURE: 80 MMHG | RESPIRATION RATE: 18 BRPM | HEIGHT: 64 IN | SYSTOLIC BLOOD PRESSURE: 118 MMHG | HEART RATE: 92 BPM | BODY MASS INDEX: 22.2 KG/M2

## 2024-04-26 DIAGNOSIS — L97.922 NON-PRESSURE CHRONIC ULCER OF LEFT LOWER LEG WITH FAT LAYER EXPOSED (HCC): ICD-10-CM

## 2024-04-26 DIAGNOSIS — L97.812 NON-PRESSURE CHRONIC ULCER OF OTHER PART OF RIGHT LOWER LEG WITH FAT LAYER EXPOSED (HCC): ICD-10-CM

## 2024-04-26 DIAGNOSIS — M35.1 MIXED CONNECTIVE TISSUE DISEASE (HCC): Primary | ICD-10-CM

## 2024-04-26 DIAGNOSIS — I77.89 LUPUS VASCULITIS (HCC): ICD-10-CM

## 2024-04-26 DIAGNOSIS — M32.19 LUPUS VASCULITIS (HCC): ICD-10-CM

## 2024-04-26 PROCEDURE — 99214 OFFICE O/P EST MOD 30 MIN: CPT

## 2024-04-26 RX ORDER — COLLAGENASE SANTYL 250 [ARB'U]/G
1 OINTMENT TOPICAL DAILY
Qty: 90 G | Refills: 0 | Status: SHIPPED | OUTPATIENT
Start: 2024-04-26 | End: 2024-05-26

## 2024-04-26 NOTE — PATIENT INSTRUCTIONS
Please return: 1 week-    RESTART GABABAPENTIN 100MG AT NIGHT ONLY    Requested Prescriptions     Signed Prescriptions Disp Refills    collagenase (SANTYL) 250 UNIT/GM External Ointment 90 g 0     Sig: Apply 1 Application topically daily.       Patient discharge and wound care instructions  Raymond Olivo  4/26/2024       You may shower and cleanse area with mild soap and water, hypochlorus wound cleanser (ie anasept), dab dry with gauze and apply your dressings as directed.     Changing your dressing:         every day       Wash your hands with soap and water.  Ensure that the old dressing is removed completely. Place it in a plastic bag and throw it in the trash.  Cleanse the wound with hypochlorous wound cleanser (ie. Anasept, vashe, pure and clean) .  It's ok to “scrub” your wound with the gauze, small amount of bleeding with cleansing is normal and ok.  Apply the following dressings:  put BLUE SMALL HYDROGEL directly on the wound, then cover with a 2x2 soaked with the RED LARGE CONTAINer HYDROGEL>COVER WITH TEGADERM AND SPANDAGRIP.    Nutrition and blood sugar control:  Focus on the following:  Protein: Meats, beans, eggs, milk and yogurt particularly Greek yogurt), tofu, soy nuts, soy protein products (Follow the protein handout in your welcome folder)  Vitamin C: Citrus fruits and juices, strawberries, tomatoes, tomato juice, peppers, baked potatoes, spinach, broccoli, cauliflower, [place] sprouts, cabbage  Vitamin A: Dark green, leafy vegetables, orange or yellow vegetables, cantaloupe, fortified dairy products, liver, fortified cereals  Zinc: Fortified cereals, red meats, seafood  Consider supplementing with Justin by [employer]. It can be purchased on amazon, [employer] website, or local pharmacy may be able to order it for you.  (These are essential branch chain amino acids that help with tissue building and wound healing).     Concerns:  Signs of infection may include the following:  Increase in  redness  Red \"streaks\" from wound  Increase in swelling  Fever  Unusual odor  Change in the amount of wound drainage     Should you experience any significant changes in your wound(s) or have any questions regarding your home care instructions please contact the wound center [facility] @ [phone] If after regular business hours, please call your family doctor or local emergency room. The treatment plan has been discussed at length between you and your provider. Follow all instructions carefully, it is very important. If you do not follow all instructions you are at risk of your wound not healing, infection, possible loss of limb and even loss of life.

## 2024-04-26 NOTE — PROGRESS NOTES
.Weekly Wound Education Note    Teaching Provided To: Family;Patient  Training Topics: Dressing;Edema control;Cleasing and general instructions;Compression;Discharge instructions  Training Method: Explain/Verbal;Written  Training Response: Patient responds and understands        Notes: Inital visit for bilateral ankle wounds. Start with lidocaine hydrogel, hydrogel covered gauze, tegaderm and spandagrip MARCE Carmen ordered this visit.

## 2024-04-30 ENCOUNTER — OFFICE VISIT (OUTPATIENT)
Dept: RHEUMATOLOGY | Facility: CLINIC | Age: 49
End: 2024-04-30
Payer: COMMERCIAL

## 2024-04-30 VITALS
OXYGEN SATURATION: 98 % | TEMPERATURE: 97 F | HEIGHT: 63 IN | BODY MASS INDEX: 21.44 KG/M2 | SYSTOLIC BLOOD PRESSURE: 98 MMHG | DIASTOLIC BLOOD PRESSURE: 58 MMHG | WEIGHT: 121 LBS | HEART RATE: 78 BPM

## 2024-04-30 DIAGNOSIS — Z79.899 LONG-TERM USE OF PLAQUENIL: ICD-10-CM

## 2024-04-30 DIAGNOSIS — Z79.899 HIGH RISK MEDICATION USE: ICD-10-CM

## 2024-04-30 DIAGNOSIS — I77.89 LUPUS VASCULITIS (HCC): ICD-10-CM

## 2024-04-30 DIAGNOSIS — M32.19 LUPUS VASCULITIS (HCC): ICD-10-CM

## 2024-04-30 DIAGNOSIS — I73.01 RAYNAUD'S DISEASE WITH GANGRENE (HCC): ICD-10-CM

## 2024-04-30 DIAGNOSIS — M35.1 MIXED CONNECTIVE TISSUE DISEASE (HCC): Primary | ICD-10-CM

## 2024-04-30 DIAGNOSIS — Z79.52 LONG TERM (CURRENT) USE OF SYSTEMIC STEROIDS: ICD-10-CM

## 2024-04-30 PROCEDURE — 99215 OFFICE O/P EST HI 40 MIN: CPT | Performed by: INTERNAL MEDICINE

## 2024-04-30 PROCEDURE — 99417 PROLNG OP E/M EACH 15 MIN: CPT | Performed by: INTERNAL MEDICINE

## 2024-04-30 PROCEDURE — 3074F SYST BP LT 130 MM HG: CPT | Performed by: INTERNAL MEDICINE

## 2024-04-30 PROCEDURE — 3008F BODY MASS INDEX DOCD: CPT | Performed by: INTERNAL MEDICINE

## 2024-04-30 PROCEDURE — 3078F DIAST BP <80 MM HG: CPT | Performed by: INTERNAL MEDICINE

## 2024-04-30 RX ORDER — HYDROXYCHLOROQUINE SULFATE 200 MG/1
200 TABLET, FILM COATED ORAL DAILY
Qty: 90 TABLET | Refills: 0 | Status: SHIPPED | OUTPATIENT
Start: 2024-04-30

## 2024-04-30 RX ORDER — AMLODIPINE BESYLATE 2.5 MG/1
2.5 TABLET ORAL 2 TIMES DAILY
Qty: 180 TABLET | Refills: 0 | Status: SHIPPED | OUTPATIENT
Start: 2024-04-30

## 2024-04-30 RX ORDER — PREDNISONE 5 MG/1
5 TABLET ORAL DAILY
Qty: 90 TABLET | Refills: 0 | Status: SHIPPED | OUTPATIENT
Start: 2024-04-30

## 2024-04-30 RX ORDER — GABAPENTIN 100 MG/1
100 CAPSULE ORAL NIGHTLY
COMMUNITY

## 2024-04-30 NOTE — PROGRESS NOTES
?  RHEUMATOLOGY Follow up   Date of visit: 04/30/2024    Chief Complaint   Patient presents with    Mixed Connective Tissue Disease    Leg Pain     Bilateral Wounds       ASSESSMENT, DISCUSSION & PLAN   Assessment:  1. Mixed connective tissue disease (HCC)    2. Lupus vasculitis (HCC)    3. Raynaud's disease with gangrene (HCC)    4. Long term (current) use of systemic steroids    5. Long-term use of Plaquenil    6. High risk medication use          Discussion:  Mrs. Raymond Olivo is a 49 yo woman with previously diagnosed mixed connective tissue disease and recent ulcer/chronic wounds in her bilateral legs. She does seem to have prominent sclerodermatous features with Raynaud's and chronic wounds, skin tightening as well as telangectasias. It seems like she has been on multiple DMARDs in the past including plaquenil, which was stopped due to possible inefficacy and GI upset as well as azathioprine which was discontinued due to cytopenias.     Since establishing care with me, she restarted methotrexate and had significant improvement of skin ulcerations with amlodipine. She did have worsened skin ulcer formation and biopsy performed by wound care confirmed lupus vasculitis. Due to lack of efficacy of methotrexate, decision was made to stop methotrexate, restart plaquenil and mycophenolate.     Patient had to be hospitalized due to ITP.  Unclear etiology but had responded to prolonged steroids.   She decided to  stop both CellCept and prednisone.  She has been off CellCept since hospitalization at the end of November 2022.  And she had been off of her prednisone but had some worsened fatigue and trace ankle swelling bilaterally. She did have some elevated inflammatory markers as well so prednisone was restarted. She had been doing better overall.     There is a comopnent of medical nonadherence and pt has been told multiple times to get  baseline ECHO and PFTs due to other complications from the MCTD which may be  contributing to some of her fatigue and low weight.  She had initially delayed initially due to COVID19 pandemic, however, she has not had any baseline done since disease onset and we need to evaluate for possible ILD vs pulmonary HTN. Reiterated again but pt declines further work up.     More recently, she had some worsened foot pain. Arterial duplex showed mild diffuse stenosis but vascular surgeon did not think significant for intervention. Thought her symptoms were more neurologic and she has been doing better with use of compression socks  She did not restart gabapentin or MMF despite my recommendations to do so.    Now, more recently, she has suffered recurrence of ulcerations in both ankles that had progressed fairly quickly. Her and her  had requested urgent wound care evaluation so they were fit in last week and her wounds are improving.   Reminded pt that this is likely the lupus vasculitis as was determined a few years ago. Strongly recommended compliance with medication regimen    -- restart MMF 500mg daily  -- updated labs in 4 weeks to monitor for toxicities   -- continue prednisone 5mg daily, will consider increasing based off follow up with wound care later this week  -- continue plaquenil 200mg daily (weight based dosing)  -- continue amlodipine 2.5mg twice daily, will consider nifedipine. (I do not think she will be open to fluoxetine usage but will consider this at next visit)  -- be sure to get retinal exam by ophthalmology since retinal exam does not seem to have been done by [facility]   -- previously discussed xray of foot showing periarticular osteopenia- could be related to bone density vs early inflammation. Encouraged to get updated BMD that was previously ordered.  -- get baseline ECHO and PFTs as previously ordered and recommended   -- take iron supplements daily  -- take vitamin d 1000IU daily   -- follow up in 3 months  -- we will be in communication in the  meantime      Patient and pt's  verbalized understanding of above instructions. No further questions at this time.    Code selection for this visit was based on time. Time spent (65min) on date of service in preparing to see the patient, obtaining and/or reviewing separately obtained history, performing a medically appropriate examination, counseling and educating the patient/family/caregiver, ordering medications or testing, referring and communicating with other healthcare providers, documenting clinical information in the E HR, independently interpreting results and communicating results to the patient/family/caregiver and care coordination with the patient's other providers.    Additional time spent communicating and coordinating care leading up to today's appt.     Plan:  Diagnoses and all orders for this visit:    Mixed connective tissue disease (HCC)  -     CBC With Differential With Platelet; Standing  -     Comp Metabolic Panel (14); Standing  -     Sed Rate, Westergren (Automated); Standing  -     C-Reactive Protein; Standing  -     amLODIPine 2.5 MG Oral Tab; Take 1 tablet (2.5 mg total) by mouth 2 (two) times daily.  -     hydroxychloroquine 200 MG Oral Tab; Take 1 tablet (200 mg total) by mouth daily.  -     predniSONE 5 MG Oral Tab; Take 1 tablet (5 mg total) by mouth daily.    Lupus vasculitis (HCC)  -     CBC With Differential With Platelet; Standing  -     Comp Metabolic Panel (14); Standing  -     Sed Rate, Westergren (Automated); Standing  -     C-Reactive Protein; Standing  -     amLODIPine 2.5 MG Oral Tab; Take 1 tablet (2.5 mg total) by mouth 2 (two) times daily.  -     hydroxychloroquine 200 MG Oral Tab; Take 1 tablet (200 mg total) by mouth daily.  -     predniSONE 5 MG Oral Tab; Take 1 tablet (5 mg total) by mouth daily.    Raynaud's disease with gangrene (HCC)  -     amLODIPine 2.5 MG Oral Tab; Take 1 tablet (2.5 mg total) by mouth 2 (two) times daily.  -     hydroxychloroquine 200  MG Oral Tab; Take 1 tablet (200 mg total) by mouth daily.  -     predniSONE 5 MG Oral Tab; Take 1 tablet (5 mg total) by mouth daily.    Long term (current) use of systemic steroids    Long-term use of Plaquenil    High risk medication use  -     CBC With Differential With Platelet; Standing  -     Comp Metabolic Panel (14); Standing  -     Sed Rate, Westergren (Automated); Standing  -     C-Reactive Protein; Standing              Return in about 3 months (around 7/30/2024).  ?  HPI   Raymond Olivo is a 48 year old female with the following active problems who is seen for medically necessary follow-up today. She was seen as a new patient several months ago for second opinion regarding underlying connective tissue disease. She had been suffering from multiple leg wounds over the years.  She was diagnosed by another rheumatologist with having mixed connective tissue disorder. She was previously on Plaquenil as well as azathioprine and some dose of prednisone.  Seemed like she was also on methotrexate but unclear why stopped. Since first seeing her, I restarted methotrexate with daily folic acid supplementation as well as added amlodipine. She had some decrease in her WBC count which has stabilized. Decision was made to stop methotrexate and start mycophenolate due to lupus vasculitis dx on skin biopsy. She was hospitalized due to thrombocytopenia.  Was diagnosed with ITP and given IV methylprednisolone as well as IVIG infusion. Initially as outpatient platelets were only 10 and improved most recently. She presents for follow up today.    Her  is here and serves to translate if pt does not understand what I'm asking/explaining.     Currently taking:  Plaquenil 200mg daily   Amlodipine 2.5mg twice daily  Prednisone 5mg daily   Biotin supplementation  Vit d completed prescription 2 weeks ago, not taking OTC at this time.   B12 daily     Since her last visit, she hasn't been doing well.   Developed ulcers on  both lower legs over the past 3 weeks, started to get worse and asked to be seen in wound clinic last week. Is now doing various wound care topicals.  Having burning sensation more than the pain.   Restarted gabapentin 100mg nightly - is helping minimally.   Increased fatigue but relates to above     Still using the compression stocking since seeing vascular surgeon.     Denies any other joint pain or swelling.   Denies skin tightness.   Continues to have hair loss/thinning but details unclear- stable   Prior weakness has gotten better.   Denies skin rashes   No recent bruising or bleeding  Raynaud's present but not severe, stable.   Feels the tightness of the skin over the fingers and toes are the same.   Has not had obvious bleeding- epistaxis, hemoptysis, hematuria or bloody stools  Denies shortness of breath or chest pain.   Denies cough.   Denies fevers or chills.     Still not been seen by ophthalmology - denies blurred vision. Was seen by optometry in December.   Has not done ECHO or CXR as previously ordered.     HPI from initial consultation  States about 8-10 years ago, she started to have pain in her joints, while living in [place]. There was concern for possible rheumatologic issue at that time. She was given some oral prednisone which were reduced over time. She had a wound on her leg, was told it was a vascular wound. Had a laser vein surgery at that time. No recurrence until one month ago.   About 3 years ago, they moved to [facility]. She was seeing Dr. Keys, diagnosed pt with mixed connective tissue disease. States her symptoms worsened in winter months- joint pain in her hands as well as change in color of her fingertips. About one month ago, she developed a wound over her leg. She followed with Dr. Ferguson, as Dr. Keys left. Once the wound came, he recommended pt be evaluated by wound care. Also recommended tertiary center.  Was previously on plaquenil, however due to the side effects,  this was stopped.  Pt was also on azathioprine but had some cytopenias. Also had been on methotrexate but since this wasn't helping, this was discontinued.   Pt has been following with wound care. States with the topicals and wrapping, she has had some improvement of the wound size.   Pt continues to have burning sensation in both legs, described as 5-6/10.   She is currently on prednisone 5mg daily.   She has never gotten a biopsy of the lesion, unless she had one 8 years ago in [place], but  cannot recall results.  She tried gabapentin, only 100mg nightly but stopped because of feeling drowsy.      Pt does have morning stiffness lasting 10-15 minutes.   Significant hair loss  + ulcers over bottom inside lip  Itching over thighs without redness or rash.   Hx of cytopenia while on DMARDs  + ulcers over toes/bottom of feet  + pain awakening the patient from sleep  + looks thinner, however actual weight is the same.      No history of significant wrist pain or swelling, pain or swelling of the MCPs, pain or swelling of the ankles and bones of the feet, or new skin nodule formation.  The patient denies nasal ulcers, photosensitive rash, elevated or scarring rashes, prior renal or liver disease, or history of seizures.  No history of prior blood clot in the legs or lungs, strokes or ischemic phenomenon.  Denies nonhealing ulcers on the fingertips, trouble swallowing, or severe acid reflux.  The patient denies any history of uveitis, crampy abdominal pain, constipation, diarrhea, or bloody stools.   There are no symptoms of severe dry eyes, dry mouth, or swelling of the cheeks or under the jawbone.   No fevers, chills, lymphadenopathy, night sweats, or easy bruising or bleeding.  Denies chronic sinus infections/disease or epistaxis.  Denies chronic cough or hemoptysis.      Family hx:  Father with possible MCTD  Brothers/sisters healthy       Past Medical History:  Past Medical History:    Mixed connective tissue  disease (HCC)    Raynaud's disease     Past Surgical History:  History reviewed. No pertinent surgical history.    Family History:  Family History   Problem Relation Age of Onset    Diabetes Father     Diabetes Mother      Social History:  Social History     Socioeconomic History    Marital status:    Tobacco Use    Smoking status: Never    Smokeless tobacco: Never   Vaping Use    Vaping status: Never Used   Substance and Sexual Activity    Alcohol use: No    Drug use: No    Sexual activity: Yes     Medications:  Outpatient Medications Marked as Taking for the 4/30/24 encounter (Office Visit) with Alisha Farmer DO   Medication Sig Dispense Refill    gabapentin 100 MG Oral Cap Take 1 capsule (100 mg total) by mouth nightly.      amLODIPine 2.5 MG Oral Tab Take 1 tablet (2.5 mg total) by mouth 2 (two) times daily. 180 tablet 0    hydroxychloroquine 200 MG Oral Tab Take 1 tablet (200 mg total) by mouth daily. 90 tablet 0    predniSONE 5 MG Oral Tab Take 1 tablet (5 mg total) by mouth daily. 90 tablet 0    collagenase (SANTYL) 250 UNIT/GM External Ointment Apply 1 Application topically daily. 90 g 0    Ferrous Sulfate 325 (65 Fe) MG Oral Tab Take 1 tablet (325 mg total) by mouth 2 (two) times daily with meals. 180 tablet 0    Mycophenolate Mofetil 500 MG Oral Tab Take 1 tablet (500 mg total) by mouth daily. 90 tablet 0     Modified Medications    Modified Medication Previous Medication    AMLODIPINE 2.5 MG ORAL TAB amLODIPine 2.5 MG Oral Tab       Take 1 tablet (2.5 mg total) by mouth 2 (two) times daily.    Take 1 tablet (2.5 mg total) by mouth 2 (two) times daily.    HYDROXYCHLOROQUINE 200 MG ORAL TAB hydroxychloroquine 200 MG Oral Tab       Take 1 tablet (200 mg total) by mouth daily.    Take 1 tablet (200 mg total) by mouth daily.    PREDNISONE 5 MG ORAL TAB predniSONE 5 MG Oral Tab       Take 1 tablet (5 mg total) by mouth daily.    Take 1 tablet (5 mg total) by mouth daily.     Medications Discontinued  During This Encounter   Medication Reason    amLODIPine 2.5 MG Oral Tab     hydroxychloroquine 200 MG Oral Tab     predniSONE 5 MG Oral Tab        ?  Allergies:  No Known Allergies  ?  REVIEW OF SYSTEMS   ?  Review of Systems   Constitutional:  Negative for chills, fever, malaise/fatigue and weight loss.   HENT:  Negative for congestion and nosebleeds.    Eyes:  Negative for blurred vision, double vision, pain and redness.   Respiratory:  Negative for cough, hemoptysis and shortness of breath.    Cardiovascular:  Negative for chest pain, palpitations and leg swelling.   Gastrointestinal:  Negative for abdominal pain, blood in stool, diarrhea, heartburn and nausea.   Genitourinary:  Negative for dysuria, frequency, hematuria and urgency.   Musculoskeletal:  Positive for joint pain. Negative for back pain, myalgias and neck pain.   Skin:  Negative for itching and rash.   Neurological:  Positive for tingling and weakness. Negative for dizziness, seizures and headaches.   Endo/Heme/Allergies:  Does not bruise/bleed easily.   Psychiatric/Behavioral:  Negative for depression. The patient is not nervous/anxious and does not have insomnia.      PHYSICAL EXAM   Today's Vitals:  Temperature Blood Pressure Heart Rate Resp Rate SpO2   Temp: 97 °F (36.1 °C) BP: 98/58 Pulse: 78   SpO2: 98 %   ?  Current Weight Height BMI BSA Pain   Wt Readings from Last 1 Encounters:   04/30/24 121 lb (54.9 kg)    Height: 5' 3\" (160 cm) Body mass index is 21.43 kg/m². Body surface area is 1.56 meters squared. Pain Score: 10 - (Severe)       Physical Exam  Vitals and nursing note reviewed.   Constitutional:       General: She is not in acute distress.     Appearance: She is well-developed. She is not diaphoretic.      Comments: + thin   HENT:      Head: Normocephalic.   Eyes:      General: No scleral icterus.     Extraocular Movements: Extraocular movements intact.      Conjunctiva/sclera: Conjunctivae normal.   Neck:      Vascular: No JVD.       Trachea: No tracheal deviation.   Cardiovascular:      Rate and Rhythm: Normal rate and regular rhythm.      Heart sounds: Normal heart sounds. No murmur heard.  Pulmonary:      Effort: Pulmonary effort is normal. No respiratory distress.      Breath sounds: Normal breath sounds. No wheezing.   Musculoskeletal:      Cervical back: Neck supple.      Comments: (Prior exam)  No evidence of heberden or kecia nodes of any of the fingers, no basilar joint tenderness of the 1st CMC bilaterally.  No swelling, tenderness, redness or restriction of motion of the DIPs, PIPs, MCPs, wrists, elbows, or joints of the feet.  Bilateral shoulders with full ROM.  Bilateral knees without medial joint line tenderness, no crepitus, no effusion.  + bilateral ankle tenderness over med/lat malleoli and some swelling most notable over lateral malleolus on left - no more swelling but tenderness over top of feet  Very sensitive to touch over left dorsal foot diffusely  Difficult to palpate pulses due to sensitivity to touch    Lymphadenopathy:      Cervical: No cervical adenopathy.   Skin:     General: Skin is warm and dry.      Comments: No active bruising noted today.   Salt/pepper appearance of skin stable  Abnormal nailfold capillaroscopy diffuse fingers, stable  No digital pits of fingers  Cool to touch toes with purplish color changes; dorsalis pedis pulse intact.   + telangectasias over face stable  Skin tightening of distal fingers improved   Ulcers noted on b/l ankles- see pictures from wound care   Neurological:      Mental Status: She is alert. She is disoriented.      Cranial Nerves: No cranial nerve deficit.      Gait: Gait abnormal.   Psychiatric:         Mood and Affect: Mood normal.         Behavior: Behavior normal.       ?  Radiology review:       Narrative   PROCEDURE:  US ARTERIAL DUPLEX LOWER EXTREMITY LEFT (CPT=93926)     COMPARISON:  None.     INDICATIONS:  M79.672 Left foot pain I73.01 Raynaud's disease with gangrene  ([facility]) I77.89 Lupus vasculitis ([facility]) M32.19 Lupus vasculitis ([facility]) M35.1 Mixed connective tissue d*     TECHNIQUE:  A comprehensive color duplex Doppler ultrasound examination of the left lower extremity was performed. Color imaging, Doppler wave form analysis, and peak systolic flow measurements of the common femoral, profunda femoral, superficial  femoral, and popliteal arteries were performed.       PATIENT STATED HISTORY: (As transcribed by Technologist)           FINDINGS:    LEFT EXTREMITY:  Triphasic waveforms in the left common femoral, profunda, superficial femoral arteries.  Biphasic waveforms below the left knee.  OTHER:  None.       PEAK VELOCITIES (CM/S):  LEFT COMMON FEMORAL:      165    LEFT PROFUNDA FEMORAL:      78      LEFT SUPERFICIAL FEMORAL PROX:    113  LEFT SUPERFICIAL FEMORAL MID:      94  LEFT SUPERFICIAL FEMORAL DISTAL:    92  LEFT POPLITEAL PROX:      110  LEFT POPLITEAL DISTAL:      121  TIBIOPER TRUNK:      66  LEFT PTA PROX:      60  LEFT PTA MID:      62  LEFT PTA DISTAL:      73  LEFT BETH PROX:      82  LEFT BETH MID:      72    LEFT DORSALIS PEDIS::      30  LEFT GREAT TOE PRESSURE:      25 mmHg                       Impression   CONCLUSION:       1. No high-grade stenosis is identified.  There is suggestion of diffuse mild stenosis throughout most of the left lower extremity.     2. Overall diminished left toe pressure is noted.  Possibility of decreased perfusion in the distal left foot is of consideration.          LOCATION:  [place] [address]           Dictated by (CST): Anselmo Carroll MD on 11/28/2023 at 9:30 AM      Finalized by (CST): Anselmo Carroll MD on 11/28/2023 at 9:35 AM       Narrative   PROCEDURE:  XR FOOT, COMPLETE (MIN 3 VIEWS), LEFT (CPT=73630)     TECHNIQUE:  AP, oblique, and lateral views were obtained.     COMPARISON:  None.     INDICATIONS:  M79.672 Left foot pain I73.01 Raynaud's disease with gangrene ([facility]) I77.89 Lupus vasculitis ([facility]) M32.19 Lupus vasculitis  ([facility]) M35.1 Mixed connective tissue d*     PATIENT STATED HISTORY: (As transcribed by Technologist)  Patient states having lateral anterior foot pain that began roughly twenty days ago, with no injury.         FINDINGS:    BONES:  Osseous structures are intact.  Periarticular decreased bone mineralization.  Limited evaluation of the 2nd through 5th distal phalanges is due to constant flexion.  Joint spaces are preserved.  Mild hallux valgus deformity.  No dislocation.    Mild inferior calcaneal enthesopathy.                   Impression   CONCLUSION:    1. Osseous structures are intact.  Please see details as above.        LOCATION:  [facility]        Dictated by (CST): Madina Colin MD on 10/19/2023 at 12:16 PM      Finalized by (CST): Madina Colin MD on 10/19/2023 at 12:18 PM        Narrative   PROCEDURE:  XR FOOT, COMPLETE (MIN 3 VIEWS), LEFT (CPT=73630)     TECHNIQUE:  AP, oblique, and lateral views were obtained.     COMPARISON:  None.     INDICATIONS:  M79.672 Left foot pain I73.01 Raynaud's disease with gangrene ([facility]) I77.89 Lupus vasculitis ([facility]) M32.19 Lupus vasculitis ([facility]) M35.1 Mixed connective tissue d*     PATIENT STATED HISTORY: (As transcribed by Technologist)  Patient states having lateral anterior foot pain that began roughly twenty days ago, with no injury.         FINDINGS:    BONES:  Osseous structures are intact.  Periarticular decreased bone mineralization.  Limited evaluation of the 2nd through 5th distal phalanges is due to constant flexion.  Joint spaces are preserved.  Mild hallux valgus deformity.  No dislocation.    Mild inferior calcaneal enthesopathy.                   Impression   CONCLUSION:    1. Osseous structures are intact.  Please see details as above.        LOCATION:  [facility]        Dictated by (CST): Madina Colin MD on 10/19/2023 at 12:16 PM      Finalized by (CST): Madina Colin MD on 10/19/2023 at 12:18 PM      PROCEDURE:  CT ABDOMEN PELVIS IV CONTRAST, NO ORAL (ER)       COMPARISON:   None.       INDICATIONS:  abd pain, sent by pcp to r/o sbo       TECHNIQUE:  CT scanning was performed from the dome of the diaphragm to the pubic symphysis with non-ionic intravenous contrast material. Post contrast coronal MPR imaging was performed.  Dose reduction techniques were used. Dose information is   transmitted to the ACR (American College of Radiology) NRDR (National Radiology Data Registry) which includes the Dose Index Registry.       FINDINGS:     LUNG BASES:  Dependent atelectasis bilaterally.   LIVER:  Normal in shape and contour.   BILIARY:  Cholelithiasis.  No intrahepatic biliary dilatation.  There is possible small amount of pericholecystic fluid..   SPLEEN:  Normal.  No enlargement or focal lesion.   PANCREAS:  No tyson-pancreatic inflammatory stranding   ADRENALS:  Normal.  No mass or enlargement.     KIDNEYS:  There is moderate bilateral hydronephrosis.  No obstructing urolithiasis.   BOWEL/MESENTERY:  Bowel is normal in caliber. No evidence of obstruction.  Considerable amount of stool noted within the rectum.  Moderate amount of fecal material noted within the remaining portion of the colon.  The appendix is normal appearance.   PELVIS:  Distended bladder.  No free pelvic fluid.  Calcified phleboliths within the pelvis.     AORTA/VASCULAR:  Atheromatous calcifications of the aorta.  Aorta is normal in caliber.   BONES:  No acute fractures.                        Impression   CONCLUSION:     1. Considerably distended bladder with bilateral hydronephrosis.  No obstructing urolithiasis is noted.   2. Prominent mount of stool noted within the rectum suggestive of fecal impaction.  No evidence of bowel obstruction.     3. Gallstone within the neck of the gallbladder measuring 1.3 x 1.0 cm.  The gallbladder is contracted but there is suggestion of small amount of pericholecystic fluid.  Correlation with clinical symptoms to exclude acute cholecystitis.             Dictated by (CST): Leslie  MD Colette on 12/22/2021 at 7:51 PM       Finalized by (CST): Colette Nelson MD on 12/22/2021 at 7:56 PM        Labs:  Lab Results   Component Value Date    WBC 3.2 (L) 03/18/2024    RBC 4.45 03/18/2024    HGB 12.8 03/18/2024    HCT 39.2 03/18/2024    .0 03/18/2024    MCV 88.1 03/18/2024    MCH 28.8 03/18/2024    MCHC 32.7 03/18/2024    RDW 12.9 03/18/2024    NEPRELIM 2.11 03/18/2024    NEPERCENT 66.0 03/18/2024    LYPERCENT 18.4 03/18/2024    MOPERCENT 12.8 03/18/2024    EOPERCENT 1.9 03/18/2024    BAPERCENT 0.6 03/18/2024    NE 2.11 03/18/2024    LYMABS 0.59 (L) 03/18/2024    MOABSO 0.41 03/18/2024    EOABSO 0.06 03/18/2024    BAABSO 0.02 03/18/2024     Lab Results   Component Value Date    GLU 93 03/18/2024    BUN 6 (L) 03/18/2024    BUNCREA 16.7 07/14/2021    CREATSERUM 0.50 (L) 03/18/2024    ANIONGAP 3 03/18/2024    GFRNAA 109 07/28/2022    GFRAA 126 07/28/2022    CA 9.5 03/18/2024    OSMOCALC 289 03/18/2024    ALKPHO 85 03/18/2024    AST 20 03/18/2024    ALT 12 (L) 03/18/2024    BILT 0.5 03/18/2024    TP 8.0 03/18/2024    ALB 4.0 03/18/2024    GLOBULIN 4.0 03/18/2024     03/18/2024    K 3.8 03/18/2024     03/18/2024    CO2 27.0 03/18/2024       Additional Labs:  03/2024  CBC with WBC 3.2, otherwise normal  CMP grossly normal   Iron 45; percent sat 15 borderline low  Ferritin 114.6  B12 726 normal  ESR 54 elevated  CRP 0.37 borderline elevated  Vitamin D 35  C3 103  C4 33.7     10/2023  CBC grossly normal  CMP grossly normal  Iron 43; percent sat 12 low  Ferritin 94.1 normal  B12 726 normal  ESR 45 elevated  CRP 0.48 borderline elevated  Vitamin D 58.8 Normal    07/2023  B12 246 borderline low   Vitamin D 22.9 low  Ferritin 96.6 normal  Iron low  CBC grossly normal  CMP grossly normal    07/2022  B12 302 normal  Vitamin D 14.4 low  Ferritin normal  Iron normal  CBC grossly normal  CMP with potassium 3.3 otherwise grossly normal  CRP normal  ESR 15 normal  C3 97.8 normal  C4 29.1  normal    01/29/2022  WELLINGTON 1: 640 speckled      Remainder of DARRYL panel negative  CBC with WBC 5.9; hemoglobin 12.2; platelet 296  B12 689  ESR 48 elevated  CRP 0.44 borderline elevated  CMP grossly normal  UA grossly normal with exception of trace leuk esterase squamous epithelial and rare bacteria  Ferritin 245.8 elevated  Iron and percent sat low    01/11/2022  CBC grossly normal (WBC 5.6; hemoglobin 13.2; platelet 214)    11/2021   B12 943  WELLINGTON positive      Remainder of DARRYL panel negative intrinsic factor blocking antibody negative  MMA normal  ESR 34  dsDNA negative  Total complement normal    11/22/2021  CBC with WBC 3.9; hemoglobin 11.4; platelet 10    7/2021  dsDNA negative  Protein creatinine ratio normal  UA small amount of blood; leuk esterase; bacteria rare; moderate squamous  C4 27.3 normal  C3 112 normal  CRP 0.46 borderline elevated  ESR 40 elevated  CBC with hemoglobin 10.9; WBC 4.0; platelet 213 normal  CMP grossly normal    08/2020  Esr 36  CRP 0.60  C3 and C4 normal     Cutaneous Direct IF, Biopsy See Note    Comment: IMMUNODERMATOLOGY REPORT       Specimen(s):   1. 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, including lupus vasculitis     (See Results and Comments)   ____________________________________________________________     RESULTS   IgG:  Grains within and around basal keratinocytes and         within cell nuclei and grains in focal superficial,         upper, and mid dermal blood vessels         IgG4:  Negative     IgM:  Grains in focal dermal blood vessels     IgA:  Focal grains within basal keratinocytes     C3:   Few grains along basement membrane zone and grains and         clumps within superficial and upper dermal blood          vessels     Fibrinogen:  3+ deposition around superficial, upper, and                mid dermal blood vessels     COMMENTS   By direct immunofluorescence, there is granular deposition of IgG   within and around basal keratinocytes and within cell nuclei. The   presence of granular IgG within the epidermis raises the   consideration of lupus erythematosus, specifically subacute   cutaneous lupus erythematosus (SCLE). The finding of granular   staining of cell nuclei may be indicative of the presence of   anti-nuclear antibodies, further lending support to the   possibility of lupus erythematosus. 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     Correlation with histopathologic examination of formalin-fixed   tissue is needed to further define this process. Correlation with   clinical findings is also needed, including with serologic testing   for lupus associated antibodies, including antibodies to SSA (Ro)   and SSB (La), which are often associated with SCLE.        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

## 2024-05-01 NOTE — PATIENT INSTRUCTIONS
-- restart MMF 500mg daily  -- updated labs in 4 weeks to monitor for toxicities   -- continue prednisone 5mg daily, will consider increasing based off follow up with wound care later this week  -- continue plaquenil 200mg daily (weight based dosing)  -- continue amlodipine 2.5mg twice daily, will consider nifedipine. (I do not think she will be open to fluoxetine usage but will consider this at next visit)  -- be sure to get retinal exam by ophthalmology since retinal exam does not seem to have been done by [facility]   -- previously discussed xray of foot showing periarticular osteopenia- could be related to bone density vs early inflammation. Encouraged to get updated BMD that was previously ordered.  -- get baseline ECHO and PFTs as previously ordered and recommended   -- take iron supplements daily  -- take vitamin d 1000IU daily   -- follow up in 3 months  -- we will be in communication in the meantime    Dr. Farmer

## 2024-05-02 NOTE — PROGRESS NOTES
CHIEF COMPLAINT:     Chief Complaint   Patient presents with    Wound Care     Follow up for bilateral leg wounds. Pt has just started using Santyl a few days ago.      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 Patient saw dr. Santamaria (vascular) in December 2023 and he documented \"I explained to the patient and her  that I do not believe the source of her pain to be vascular in origin.  She has an easily palpable dorsalis pedis pulse and the location of the pain is more suggestive of a neuropathic origin.  I suspect perhaps that given the tightness of her feet from her mixed connective tissue disorder the compression stockings is causing somewhat compression of the nerves in that area and I have encouraged her to substitute those stockings to those that involve the feet up to the upper calf.\" Patient/spouse have been in contact with dr. Farmer and in march 2024 reported a dark spot/ulcer on her bilateral lower legs, she was advised to make an appointment with wound care as well as get her blood work done ordered in January 2024 so that further treatment plan could be extablished.  The blood work did show increased inflammation and she was advised to increase her prednisone to 20mg x 5 days (which spouse reports they did do) and take the amlodipine 2.5 mg-which she states she is doing.  They have been leaving the areas open to air and she has not been wearing compression.  She c/o burning sensation in her ankle area.  She was started on gabapentin by dr. Farmer last year, however they stopped it because it was making her too sleepy. We discussed restarting the gabapentin, but only take a noc first.  Both wounds are dried eschar.  Will utilize hydrogel with lidocaine to start debriding off/softening the  eschar. I have also ordered santyl. Patient placed into spandagrips for compression    5-3-24 patient returns.  Last week we had the spouse utilize hydrogel to start moistening and debriding off the eschar/scabbing/dried drainage.  I also ordered santyl.  They were able to get the Santyl, and started utilizing it a few days ago.  she followed up in office with rheum. She states she has been taking the gabapentin at noc but it has not been helping the burning and is still causing her drowsiness.  The wounds are much , the eschar and drainage is off.  Will continue with santyl and have spouse utilize barrier cream around the wounds as needed to prevent maceration. No acute s/s of infection, patient still with pain with any cleansing or manipulation of the wounds.    MEDICATIONS:     Current Outpatient Medications:     gabapentin 100 MG Oral Cap, Take 1 capsule (100 mg total) by mouth nightly., Disp: , Rfl:     amLODIPine 2.5 MG Oral Tab, Take 1 tablet (2.5 mg total) by mouth 2 (two) times daily., Disp: 180 tablet, Rfl: 0    hydroxychloroquine 200 MG Oral Tab, Take 1 tablet (200 mg total) by mouth daily., Disp: 90 tablet, Rfl: 0    predniSONE 5 MG Oral Tab, Take 1 tablet (5 mg total) by mouth daily., Disp: 90 tablet, Rfl: 0    collagenase (SANTYL) 250 UNIT/GM External Ointment, Apply 1 Application topically daily., Disp: 90 g, Rfl: 0    Ferrous Sulfate 325 (65 Fe) MG Oral Tab, Take 1 tablet (325 mg total) by mouth 2 (two) times daily with meals., Disp: 180 tablet, Rfl: 0    Mycophenolate Mofetil 500 MG Oral Tab, Take 1 tablet (500 mg total) by mouth daily., Disp: 90 tablet, Rfl: 0  ALLERGIES:   No Known Allergies   REVIEW OF SYSTEMS:   This information was obtained from the patient/family and chart.    See HPI for pertinent positives, otherwise 10 pt ROS negative.    HISTORY:   Past medical, surgical, family and social history updated where appropriate.      PHYSICAL EXAM:     Vitals:    05/03/24 0700   BP:  120/71   Pulse: 87   Resp: 16   Temp: 97.9 °F (36.6 °C)        Estimated body mass index is 21.43 kg/m² as calculated from the following:    Height as of 4/30/24: 63\".    Weight as of 4/30/24: 121 lb (54.9 kg).   No results found for: \"PGLU\"    Vital signs reviewed.Appears stated age, well groomed.    Constitutional:  Bp wnl for patient. Pulse Regular and wnl for patient. Respirations easy and unlabored. Temperature wnl. Weight normal for height. Appearance neat and clean. Appears in no acute distress. Well nourished and well developed.    Lower extremity:  dp/pt palpable bilaterally. Her Extremities are free of varicosities and edema, they are scarred, scattered changes in pigmentation (hypo/hyper) with atrophie jay. Capillary refill < 3 seconds. Digits are warm. toenails are wnl for color, thickness and hygeine. Skin hydration wnl. + hairgrowth on legs.    Musculoskeletal:  Gait and station stable   Integumentary:  refer to wound characteristics and images   Psychiatric:  Judgment and insight intact. Alert and oriented times 3. No evidence of depression, anxiety, or agitation. Calm, cooperative, and communicative, but very quiet and reserved.  EDEMA:   Calf  Point of Measurement - Left Calf: 31  Point of Measurement - Right Calf: 31  Left Calf from:: Heel  Calf Left cm:: 28.5  Right Calf from:: Heel  Right Calf cm:: 29.8  Ankle  Point of Measurement - Left Ankle: 11  Point of Measurement - Right Ankle: 11  Left Ankle from:: Heel  Left Ankle cm:: 19.2     Right Ankle from:: Heel  Right Ankle cm:: 17.5     DIAGNOSTICS:     Lab Results   Component Value Date    BUN 6 (L) 03/18/2024    CREATSERUM 0.50 (L) 03/18/2024    GFRCKDEPI 121.37 12/17/2020    ALB 4.0 03/18/2024    TP 8.0 03/18/2024    A1C 5.1 12/17/2020 11-28-23 arterial duplex-dr hilliard  FINDINGS:    LEFT EXTREMITY:  Triphasic waveforms in the left common femoral, profunda, superficial femoral arteries.  Biphasic waveforms below the left knee.   OTHER:   None.     PEAK VELOCITIES (CM/S):   LEFT COMMON FEMORAL:      165    LEFT PROFUNDA FEMORAL:      78      LEFT SUPERFICIAL FEMORAL PROX:    113   LEFT SUPERFICIAL FEMORAL MID:      94   LEFT SUPERFICIAL FEMORAL DISTAL:    92   LEFT POPLITEAL PROX:      110   LEFT POPLITEAL DISTAL:      121   TIBIOPER TRUNK:      66   LEFT PTA PROX:      60   LEFT PTA MID:      62   LEFT PTA DISTAL:      73   LEFT BETH PROX:      82   LEFT BETH MID:      72    LEFT DORSALIS PEDIS::      30   LEFT GREAT TOE PRESSURE:      25 mmHg   CONCLUSION:    1. No high-grade stenosis is identified.  There is suggestion of diffuse mild stenosis throughout most of the left lower extremity.    2. Overall diminished left toe pressure is noted.  Possibility of decreased perfusion in the distal left foot is of consideration.     7-14-20 venous reflux study-dr santamaria  Per Dr. Santamaria review \"no intervention needed\"  WOUND ASSESSMENT:     Wound 04/26/24 #1 Left Medial Ankle Ankle Left;Medial (Active)   Date First Assessed/Time First Assessed: 04/26/24 1031    Wound Number (Wound Clinic Only): #1 Left Medial Ankle  Primary Wound Type: (c) Auto-immune  Location: Ankle  Wound Location Orientation: Left;Medial      Assessments 4/26/2024 10:34 AM 5/3/2024 10:10 AM   Wound Image       Drainage Amount Unable to assess Moderate   Drainage Description -- Serosanguineous   Treatments Compression --   Wound Length (cm) 3.5 cm 2.7 cm   Wound Width (cm) 4.6 cm 3.7 cm   Wound Surface Area (cm^2) 16.1 cm^2 9.99 cm^2   Wound Depth (cm) 0.1 cm 0.1 cm   Wound Volume (cm^3) 1.61 cm^3 0.999 cm^3   Wound Healing % -- 38   Margins Undefined edges Well-defined edges   Non-staged Wound Description Full thickness Full thickness   Soni-wound Assessment Dry;Edema;Atrophy jay Edema;Atrophy jay;Moist;Maceration   Wound Granulation Tissue -- Pink;Spongy   Wound Bed Granulation (%) -- 30 %   Wound Bed Slough (%) -- 70 %   Wound Odor None None   Shape 100% scabbed --       No  associated orders.       Wound 04/26/24 #2 Right Lateral Ankle Ankle Right;Lateral (Active)   Date First Assessed/Time First Assessed: 04/26/24 1031    Wound Number (Wound Clinic Only): #2 Right Lateral Ankle  Primary Wound Type: (c) Auto-immune  Location: Ankle  Wound Location Orientation: Right;Lateral      Assessments 4/26/2024 10:34 AM 5/3/2024 10:09 AM   Wound Image       Drainage Amount Unable to assess Scant   Drainage Description -- Serous;Yellow   Treatments Compression --   Wound Length (cm) 0.5 cm 0.4 cm   Wound Width (cm) 0.7 cm 1.3 cm   Wound Surface Area (cm^2) 0.35 cm^2 0.52 cm^2   Wound Depth (cm) 0.1 cm 0.1 cm   Wound Volume (cm^3) 0.035 cm^3 0.052 cm^3   Wound Healing % -- -49   Margins Well-defined edges Well-defined edges   Non-staged Wound Description Full thickness Full thickness   Soni-wound Assessment Dry;Edema;Atrophy jay Edema;Atrophy jay;Moist;Hemosiderin staining   Wound Granulation Tissue -- Pink;Firm   Wound Bed Granulation (%) -- 10 %   Wound Bed Slough (%) -- 90 %   Wound Odor None None   Shape 100% scabbed --       No associated orders.          ASSESSMENT AND PLAN:    1. Mixed connective tissue disease (HCC)    2. Lupus vasculitis (HCC)    3. Non-pressure chronic ulcer of left lower leg with fat layer exposed (HCC)    4. Non-pressure chronic ulcer of other part of right lower leg with fat layer exposed (HCC)       Risks, benefits, and alternatives of current treatment plan discussed in detail.  Questions and concerns addressed. Red flags to RTC or ED reviewed.  Patient (or parent) agrees to plan.      NOTE TO PATIENT: The 21st Century Cures Act makes clinical notes like these available to patients in the interest of transparency. Clinical notes are medical documents used by physicians and care providers to communicate with each other. These documents include medical language and terminology, abbreviations, and treatment information that may sound technical and at times  possibly unfamiliar. In addition, at times, the verbiage may appear blunt or direct. These documents are one tool providers use to communicate relevant information and clinical opinions of the care providers in a way that allows common understanding of the clinical context.   I spent  25 minutes with the patient. This time included:    preparing to see the patient (eg, review notes and recent diagnostics),  seeing the patient, obtaining and/or reviewing separately obtained history, performing a medically appropriate examination and/or evaluation, counseling and educating the patient, documenting in the record,  DISCHARGE:      Patient Instructions   Please return: 1 week-       Patient discharge and wound care instructions  Raymond Olivo  5/3/2024         You may shower and cleanse area with mild soap and water, hypochlorus wound cleanser (ie anasept), dab dry with gauze and apply your dressings as directed.     Changing your dressing:         every day       Wash your hands with soap and water.  Ensure that the old dressing is removed completely. Place it in a plastic bag and throw it in the trash.  Cleanse the wound with hypochlorous wound cleanser (ie. Anasept, vashe, pure and clean) .  It's ok to “scrub” your wound with the gauze, small amount of bleeding with cleansing is normal and ok.  Apply the following dressings:  every 2-3 days: apply the white paste around the wounds   Right:  santyl>gauze>tegaderm>spandagrip  Left:  santyl>adaptic>roll gauze>spandagrip      Nutrition and blood sugar control:  Focus on the following:  Protein: Meats, beans, eggs, milk and yogurt particularly Greek yogurt), tofu, soy nuts, soy protein products (Follow the protein handout in your welcome folder)  Vitamin C: Citrus fruits and juices, strawberries, tomatoes, tomato juice, peppers, baked potatoes, spinach, broccoli, cauliflower, [place] sprouts, cabbage  Vitamin A: Dark green, leafy vegetables, orange or yellow  vegetables, cantaloupe, fortified dairy products, liver, fortified cereals  Zinc: Fortified cereals, red meats, seafood  Consider supplementing with Justin by [employer]. It can be purchased on amazon, [employer] website, or local pharmacy may be able to order it for you.  (These are essential branch chain amino acids that help with tissue building and wound healing).     Concerns:  Signs of infection may include the following:  Increase in redness  Red \"streaks\" from wound  Increase in swelling  Fever  Unusual odor  Change in the amount of wound drainage     Should you experience any significant changes in your wound(s) or have any questions regarding your home care instructions please contact the [facility] @ [phone] If after regular business hours, please call your family doctor or local emergency room. The treatment plan has been discussed at length between you and your provider. Follow all instructions carefully, it is very important. If you do not follow all instructions you are at risk of your wound not healing, infection, possible loss of limb and even loss of life.        Martina Mckeon FNP-C, CWCN-AP, CFCN, CSWS, WCC, DWC  5/3/2024

## 2024-05-03 ENCOUNTER — OFFICE VISIT (OUTPATIENT)
Dept: WOUND CARE | Facility: HOSPITAL | Age: 49
End: 2024-05-03
Attending: NURSE PRACTITIONER
Payer: COMMERCIAL

## 2024-05-03 VITALS
RESPIRATION RATE: 16 BRPM | DIASTOLIC BLOOD PRESSURE: 71 MMHG | TEMPERATURE: 98 F | SYSTOLIC BLOOD PRESSURE: 120 MMHG | HEART RATE: 87 BPM

## 2024-05-03 DIAGNOSIS — I77.89 LUPUS VASCULITIS (HCC): ICD-10-CM

## 2024-05-03 DIAGNOSIS — M32.19 LUPUS VASCULITIS (HCC): ICD-10-CM

## 2024-05-03 DIAGNOSIS — M35.1 MIXED CONNECTIVE TISSUE DISEASE (HCC): Primary | ICD-10-CM

## 2024-05-03 DIAGNOSIS — L97.812 NON-PRESSURE CHRONIC ULCER OF OTHER PART OF RIGHT LOWER LEG WITH FAT LAYER EXPOSED (HCC): ICD-10-CM

## 2024-05-03 DIAGNOSIS — L97.922 NON-PRESSURE CHRONIC ULCER OF LEFT LOWER LEG WITH FAT LAYER EXPOSED (HCC): ICD-10-CM

## 2024-05-03 PROCEDURE — 99213 OFFICE O/P EST LOW 20 MIN: CPT | Performed by: NURSE PRACTITIONER

## 2024-05-03 NOTE — PROGRESS NOTES
.Weekly Wound Education Note    Teaching Provided To: Patient;Family  Training Topics: Discharge instructions;Dressing;Cleasing and general instructions;Edema control;Compression  Training Method: Explain/Verbal;Written  Training Response: Patient responds and understands;Reinforcement needed           Coloplast TRIAD paste to tyson areas.  Santyl ointment and adaptic to wounds, may cover with gauze and tegaderm on right ankle.  Left ankle conforming gauze roll used.  Spanda  size D to BLE.

## 2024-05-03 NOTE — PATIENT INSTRUCTIONS
Please return: 1 week-       Patient discharge and wound care instructions  Raymond Olivo  5/3/2024         You may shower and cleanse area with mild soap and water, hypochlorus wound cleanser (ie anasept), dab dry with gauze and apply your dressings as directed.     Changing your dressing:         every day       Wash your hands with soap and water.  Ensure that the old dressing is removed completely. Place it in a plastic bag and throw it in the trash.  Cleanse the wound with hypochlorous wound cleanser (ie. Anasept, vashe, pure and clean) .  It's ok to “scrub” your wound with the gauze, small amount of bleeding with cleansing is normal and ok.  Apply the following dressings:  every 2-3 days: apply the white paste around the wounds   Right:  santyl>gauze>tegaderm>spandagrip  Left:  santyl>adaptic>roll gauze>spandagrip      Nutrition and blood sugar control:  Focus on the following:  Protein: Meats, beans, eggs, milk and yogurt particularly Greek yogurt), tofu, soy nuts, soy protein products (Follow the protein handout in your welcome folder)  Vitamin C: Citrus fruits and juices, strawberries, tomatoes, tomato juice, peppers, baked potatoes, spinach, broccoli, cauliflower, [place] sprouts, cabbage  Vitamin A: Dark green, leafy vegetables, orange or yellow vegetables, cantaloupe, fortified dairy products, liver, fortified cereals  Zinc: Fortified cereals, red meats, seafood  Consider supplementing with Justin by [employer]. It can be purchased on amazon, [employer] website, or local pharmacy may be able to order it for you.  (These are essential branch chain amino acids that help with tissue building and wound healing).     Concerns:  Signs of infection may include the following:  Increase in redness  Red \"streaks\" from wound  Increase in swelling  Fever  Unusual odor  Change in the amount of wound drainage     Should you experience any significant changes in your wound(s) or have any questions regarding your  home care instructions please contact the wound center [facility] @ [phone] If after regular business hours, please call your family doctor or local emergency room. The treatment plan has been discussed at length between you and your provider. Follow all instructions carefully, it is very important. If you do not follow all instructions you are at risk of your wound not healing, infection, possible loss of limb and even loss of life.

## 2024-05-09 NOTE — PROGRESS NOTES
CHIEF COMPLAINT:     Chief Complaint   Patient presents with    Wound Care     Follow up for bilateral leg wounds. Pt complaining of more pain to wounds.      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 Patient saw dr. Santamaria (vascular) in December 2023 and he documented \"I explained to the patient and her  that I do not believe the source of her pain to be vascular in origin.  She has an easily palpable dorsalis pedis pulse and the location of the pain is more suggestive of a neuropathic origin.  I suspect perhaps that given the tightness of her feet from her mixed connective tissue disorder the compression stockings is causing somewhat compression of the nerves in that area and I have encouraged her to substitute those stockings to those that involve the feet up to the upper calf.\" Patient/spouse have been in contact with dr. Farmer and in march 2024 reported a dark spot/ulcer on her bilateral lower legs, she was advised to make an appointment with wound care as well as get her blood work done ordered in January 2024 so that further treatment plan could be extablished.  The blood work did show increased inflammation and she was advised to increase her prednisone to 20mg x 5 days (which spouse reports they did do) and take the amlodipine 2.5 mg-which she states she is doing.  They have been leaving the areas open to air and she has not been wearing compression.  She c/o burning sensation in her ankle area.  She was started on gabapentin by dr. Farmer last year, however they stopped it because it was making her too sleepy. We discussed restarting the gabapentin, but only take a noc first.  Both wounds are dried eschar.  Will utilize hydrogel with lidocaine to start debriding off/softening the eschar. I  have also ordered santyl. Patient placed into spandagrips for compression    5-3-24 patient returns.  Last week we had the spouse utilize hydrogel to start moistening and debriding off the eschar/scabbing/dried drainage.  I also ordered santyl.  They were able to get the Santyl, and started utilizing it a few days ago.  she followed up in office with rheum. She states she has been taking the gabapentin at noc but it has not been helping the burning and is still causing her drowsiness.  The wounds are much , the eschar and drainage is off.  Will continue with santyl and have spouse utilize barrier cream around the wounds as needed to prevent maceration. No acute s/s of infection, patient still with pain with any cleansing or manipulation of the wounds.    5-10-24 patient returns. Edema is stable.  They have been utilizing santyl.  Her pain has been worse the last 4 days, especially when she is walking.  She is not able to tolerate any touch to the areas. We discussed utilizing a compound rx, they are agreeable.  I called and s/w [facility] and they will compound 5% phenytoin, 5% lidocaine, .85340% misoprostol, 1%pentoxifylline, 1%clindamycin.  Patient to continue to use the santyl until the compound is received. No systemic s/s of infecrtion.    MEDICATIONS:     Current Outpatient Medications:     gabapentin 100 MG Oral Cap, Take 1 capsule (100 mg total) by mouth nightly., Disp: , Rfl:     amLODIPine 2.5 MG Oral Tab, Take 1 tablet (2.5 mg total) by mouth 2 (two) times daily., Disp: 180 tablet, Rfl: 0    hydroxychloroquine 200 MG Oral Tab, Take 1 tablet (200 mg total) by mouth daily., Disp: 90 tablet, Rfl: 0    predniSONE 5 MG Oral Tab, Take 1 tablet (5 mg total) by mouth daily., Disp: 90 tablet, Rfl: 0    collagenase (SANTYL) 250 UNIT/GM External Ointment, Apply 1 Application topically daily., Disp: 90 g, Rfl: 0    Ferrous Sulfate 325 (65 Fe) MG Oral Tab, Take 1 tablet (325 mg total) by mouth 2 (two)  times daily with meals., Disp: 180 tablet, Rfl: 0    Mycophenolate Mofetil 500 MG Oral Tab, Take 1 tablet (500 mg total) by mouth daily., Disp: 90 tablet, Rfl: 0  ALLERGIES:   No Known Allergies   REVIEW OF SYSTEMS:   This information was obtained from the patient/family and chart.    See HPI for pertinent positives, otherwise 10 pt ROS negative.    HISTORY:   Past medical, surgical, family and social history updated where appropriate.      PHYSICAL EXAM:     Vitals:    05/10/24 1100   BP: 107/70   Pulse: 95   Resp: 16   Temp: 98.1 °F (36.7 °C)          Estimated body mass index is 21.43 kg/m² as calculated from the following:    Height as of 4/30/24: 63\".    Weight as of 4/30/24: 121 lb (54.9 kg).   No results found for: \"PGLU\"    Vital signs reviewed.Appears stated age, well groomed.    Constitutional:  Bp wnl for patient. Pulse Regular and wnl for patient. Respirations easy and unlabored. Temperature wnl. Weight normal for height. Appearance neat and clean. Appears in no acute distress. Well nourished and well developed.    Lower extremity:  dp/pt palpable bilaterally. Extremities are free of varicosities and edema, they are scarred, scattered changes in pigmentation (hypo/hyper) with atrophie jay. Capillary refill < 3 seconds. Digits are warm. toenails are wnl for color, thickness and hygeine. Skin hydration wnl. + hairgrowth on legs.    Musculoskeletal:  Gait and station stable   Integumentary:  refer to wound characteristics and images   Psychiatric:  Judgment and insight intact. Alert and oriented times 3. No evidence of depression, anxiety, or agitation. Calm, cooperative, and communicative, but very quiet and reserved.  EDEMA:   Calf  Point of Measurement - Left Calf: 31  Point of Measurement - Right Calf: 31  Left Calf from:: Heel  Calf Left cm:: 28.5  Right Calf from:: Heel  Right Calf cm:: 29  Ankle  Point of Measurement - Left Ankle: 11  Point of Measurement - Right Ankle: 11  Left Ankle from::  Heel  Left Ankle cm:: 19     Right Ankle from:: Heel  Right Ankle cm:: 17.6     DIAGNOSTICS:     Lab Results   Component Value Date    BUN 6 (L) 03/18/2024    CREATSERUM 0.50 (L) 03/18/2024    GFRCKDEPI 121.37 12/17/2020    ALB 4.0 03/18/2024    TP 8.0 03/18/2024    A1C 5.1 12/17/2020 11-28-23 arterial duplex-dr santamaria  FINDINGS:    LEFT EXTREMITY:  Triphasic waveforms in the left common femoral, profunda, superficial femoral arteries.  Biphasic waveforms below the left knee.   OTHER:  None.     PEAK VELOCITIES (CM/S):   LEFT COMMON FEMORAL:      165    LEFT PROFUNDA FEMORAL:      78      LEFT SUPERFICIAL FEMORAL PROX:    113   LEFT SUPERFICIAL FEMORAL MID:      94   LEFT SUPERFICIAL FEMORAL DISTAL:    92   LEFT POPLITEAL PROX:      110   LEFT POPLITEAL DISTAL:      121   TIBIOPER TRUNK:      66   LEFT PTA PROX:      60   LEFT PTA MID:      62   LEFT PTA DISTAL:      73   LEFT BETH PROX:      82   LEFT BETH MID:      72    LEFT DORSALIS PEDIS::      30   LEFT GREAT TOE PRESSURE:      25 mmHg   CONCLUSION:    1. No high-grade stenosis is identified.  There is suggestion of diffuse mild stenosis throughout most of the left lower extremity.    2. Overall diminished left toe pressure is noted.  Possibility of decreased perfusion in the distal left foot is of consideration.     7-14-20 venous reflux study-dr santamaria  Per Dr. Santamaria review \"no intervention needed\"  WOUND ASSESSMENT:     Wound 04/26/24 #1 Left Medial Ankle Ankle Left;Medial (Active)   Date First Assessed/Time First Assessed: 04/26/24 1031    Wound Number (Wound Clinic Only): #1 Left Medial Ankle  Primary Wound Type: (c) Auto-immune  Location: Ankle  Wound Location Orientation: Left;Medial      Assessments 4/26/2024 10:34 AM 5/10/2024  1:12 PM   Wound Image        Drainage Amount Unable to assess Large   Drainage Description -- Serosanguineous   Treatments Compression --   Wound Length (cm) 3.5 cm 3.5 cm   Wound Width (cm) 4.6 cm 11.5 cm   Wound Surface Area  (cm^2) 16.1 cm^2 40.25 cm^2   Wound Depth (cm) 0.1 cm 0.1 cm   Wound Volume (cm^3) 1.61 cm^3 4.025 cm^3   Wound Healing % -- -150   Margins Undefined edges Well-defined edges   Non-staged Wound Description Full thickness Full thickness   Soni-wound Assessment Dry;Edema;Atrophy jay Edema;Atrophy jay   Wound Granulation Tissue -- Red;Firm   Wound Bed Granulation (%) -- 15 %   Wound Bed Epithelium (%) -- 15 %   Wound Bed Slough (%) -- 70 %   Wound Odor None None   Shape 100% scabbed --       No associated orders.       Wound 04/26/24 #2 Right Lateral Ankle Ankle Right;Lateral (Active)   Date First Assessed/Time First Assessed: 04/26/24 1031    Wound Number (Wound Clinic Only): #2 Right Lateral Ankle  Primary Wound Type: (c) Auto-immune  Location: Ankle  Wound Location Orientation: Right;Lateral      Assessments 4/26/2024 10:34 AM 5/10/2024  1:12 PM   Wound Image       Drainage Amount Unable to assess Moderate   Drainage Description -- Serous;Yellow   Treatments Compression --   Wound Length (cm) 0.5 cm 1.4 cm   Wound Width (cm) 0.7 cm 1.6 cm   Wound Surface Area (cm^2) 0.35 cm^2 2.24 cm^2   Wound Depth (cm) 0.1 cm 0.1 cm   Wound Volume (cm^3) 0.035 cm^3 0.224 cm^3   Wound Healing % -- -540   Margins Well-defined edges Well-defined edges   Non-staged Wound Description Full thickness Full thickness   Soni-wound Assessment Dry;Edema;Atrophy jay Edema;Atrophy jay;Moist;Hemosiderin staining   Wound Bed Slough (%) -- 100 %   Wound Odor None None   Shape 100% scabbed --       No associated orders.          ASSESSMENT AND PLAN:    1. Mixed connective tissue disease (HCC)    2. Lupus vasculitis (HCC)    3. Non-pressure chronic ulcer of left lower leg with fat layer exposed (HCC)    4. Non-pressure chronic ulcer of other part of right lower leg with fat layer exposed (HCC)         Risks, benefits, and alternatives of current treatment plan discussed in detail.  Questions and concerns addressed. Red flags to RTC  or ED reviewed.  Patient (or parent) agrees to plan.      NOTE TO PATIENT: The 21st Century Cures Act makes clinical notes like these available to patients in the interest of transparency. Clinical notes are medical documents used by physicians and care providers to communicate with each other. These documents include medical language and terminology, abbreviations, and treatment information that may sound technical and at times possibly unfamiliar. In addition, at times, the verbiage may appear blunt or direct. These documents are one tool providers use to communicate relevant information and clinical opinions of the care providers in a way that allows common understanding of the clinical context.   I spent  45 minutes with the patient. This time included:    preparing to see the patient (eg, review notes and recent diagnostics),  seeing the patient, obtaining and/or reviewing separately obtained history, performing a medically appropriate examination and/or evaluation, counseling and educating the patient, documenting in the record, on [facility] with [facility]  DISCHARGE:      Patient Instructions   Please return: 1.5 week     If you don't hear from [facility] pharmacy by Tuesday, call us and let us kno    Patient discharge and wound care instructions  Raymond Olivo  5/10/2024       You may shower and cleanse area with mild soap and water, hypochlorus wound cleanser (ie anasept), dab dry with gauze and apply your dressings as directed.     Changing your dressing:         every day       Wash your hands with soap and water.  Ensure that the old dressing is removed completely. Place it in a plastic bag and throw it in the trash.  Cleanse the wound with hypochlorous wound cleanser (ie. Anasept, vashe, pure and clean) .  It's ok to “scrub” your wound with the gauze, small amount of bleeding with cleansing is normal and ok.  Apply the following dressings:  every 2-3 days: apply the white paste around the  wounds   Right:   santyl>gauze>roll gauze>spandagrip  Left:  santyl>roll gauze>spandagrip      Nutrition and blood sugar control:  Focus on the following:  Protein: Meats, beans, eggs, milk and yogurt particularly Greek yogurt), tofu, soy nuts, soy protein products (Follow the protein handout in your welcome folder)  Vitamin C: Citrus fruits and juices, strawberries, tomatoes, tomato juice, peppers, baked potatoes, spinach, broccoli, cauliflower, [place] sprouts, cabbage  Vitamin A: Dark green, leafy vegetables, orange or yellow vegetables, cantaloupe, fortified dairy products, liver, fortified cereals  Zinc: Fortified cereals, red meats, seafood  Consider supplementing with Justin by [employer]. It can be purchased on amazon, [employer] website, or local pharmacy may be able to order it for you.  (These are essential branch chain amino acids that help with tissue building and wound healing).     Concerns:  Signs of infection may include the following:  Increase in redness  Red \"streaks\" from wound  Increase in swelling  Fever  Unusual odor  Change in the amount of wound drainage     Should you experience any significant changes in your wound(s) or have any questions regarding your home care instructions please contact the [facility] center [facility] @ [phone] If after regular business hours, please call your family doctor or local emergency room. The treatment plan has been discussed at length between you and your provider. Follow all instructions carefully, it is very important. If you do not follow all instructions you are at risk of your wound not healing, infection, possible loss of limb and even loss of life.        Martina Mckeon FNP-C, CWCN-AP, CFCN, CSWS, WCC, DWC  5/10/2024

## 2024-05-10 ENCOUNTER — OFFICE VISIT (OUTPATIENT)
Dept: WOUND CARE | Facility: HOSPITAL | Age: 49
End: 2024-05-10
Attending: NURSE PRACTITIONER
Payer: COMMERCIAL

## 2024-05-10 VITALS
TEMPERATURE: 98 F | HEART RATE: 95 BPM | SYSTOLIC BLOOD PRESSURE: 107 MMHG | DIASTOLIC BLOOD PRESSURE: 70 MMHG | RESPIRATION RATE: 16 BRPM

## 2024-05-10 DIAGNOSIS — L97.812 NON-PRESSURE CHRONIC ULCER OF OTHER PART OF RIGHT LOWER LEG WITH FAT LAYER EXPOSED (HCC): ICD-10-CM

## 2024-05-10 DIAGNOSIS — M35.1 MIXED CONNECTIVE TISSUE DISEASE (HCC): Primary | ICD-10-CM

## 2024-05-10 DIAGNOSIS — L97.922 NON-PRESSURE CHRONIC ULCER OF LEFT LOWER LEG WITH FAT LAYER EXPOSED (HCC): ICD-10-CM

## 2024-05-10 DIAGNOSIS — M32.19 LUPUS VASCULITIS (HCC): ICD-10-CM

## 2024-05-10 DIAGNOSIS — I77.89 LUPUS VASCULITIS (HCC): ICD-10-CM

## 2024-05-10 PROCEDURE — 99215 OFFICE O/P EST HI 40 MIN: CPT | Performed by: NURSE PRACTITIONER

## 2024-05-10 NOTE — PATIENT INSTRUCTIONS
Please return: 1.5 week     If you don't hear from [facility] pharmacy by Tuesday, call us and let us kno    Patient discharge and wound care instructions  Raymond Martinezu  5/10/2024       You may shower and cleanse area with mild soap and water, hypochlorus wound cleanser (ie anasept), dab dry with gauze and apply your dressings as directed.     Changing your dressing:         every day       Wash your hands with soap and water.  Ensure that the old dressing is removed completely. Place it in a plastic bag and throw it in the trash.  Cleanse the wound with hypochlorous wound cleanser (ie. Anasept, vashe, pure and clean) .  It's ok to “scrub” your wound with the gauze, small amount of bleeding with cleansing is normal and ok.  Apply the following dressings:  every 2-3 days: apply the white paste around the wounds   Right:   santyl>gauze>roll gauze>spandagrip  Left:  santyl>roll gauze>spandagrip      Nutrition and blood sugar control:  Focus on the following:  Protein: Meats, beans, eggs, milk and yogurt particularly Greek yogurt), tofu, soy nuts, soy protein products (Follow the protein handout in your welcome folder)  Vitamin C: Citrus fruits and juices, strawberries, tomatoes, tomato juice, peppers, baked potatoes, spinach, broccoli, cauliflower, [place] sprouts, cabbage  Vitamin A: Dark green, leafy vegetables, orange or yellow vegetables, cantaloupe, fortified dairy products, liver, fortified cereals  Zinc: Fortified cereals, red meats, seafood  Consider supplementing with Justin by [employer]. It can be purchased on amazon, [employer] website, or local pharmacy may be able to order it for you.  (These are essential branch chain amino acids that help with tissue building and wound healing).     Concerns:  Signs of infection may include the following:  Increase in redness  Red \"streaks\" from wound  Increase in swelling  Fever  Unusual odor  Change in the amount of wound drainage     Should you experience any  significant changes in your wound(s) or have any questions regarding your home care instructions please contact the wound center [facility] @ [phone] If after regular business hours, please call your family doctor or local emergency room. The treatment plan has been discussed at length between you and your provider. Follow all instructions carefully, it is very important. If you do not follow all instructions you are at risk of your wound not healing, infection, possible loss of limb and even loss of life.

## 2024-05-10 NOTE — PROGRESS NOTES
Weekly Wound Education Note    Teaching Provided To: Patient;Family  Training Topics: Discharge instructions;Dressing;Cleasing and general instructions  Training Method: Written;Explain/Verbal  Training Response: Reinforcement needed            Compounding medication from [facility] pharmacy ordered this visit.  Patients  will apply Santyl ointment daily until medication is received.  Cover wounds with gauze and conforming gauze roll.  Spanda  size D to BLE.

## 2024-05-13 ENCOUNTER — PATIENT MESSAGE (OUTPATIENT)
Dept: RHEUMATOLOGY | Facility: CLINIC | Age: 49
End: 2024-05-13

## 2024-05-13 DIAGNOSIS — M32.19 LUPUS VASCULITIS (HCC): ICD-10-CM

## 2024-05-13 DIAGNOSIS — I73.01 RAYNAUD'S DISEASE WITH GANGRENE (HCC): ICD-10-CM

## 2024-05-13 DIAGNOSIS — I77.89 LUPUS VASCULITIS (HCC): ICD-10-CM

## 2024-05-13 DIAGNOSIS — M35.1 MIXED CONNECTIVE TISSUE DISEASE (HCC): ICD-10-CM

## 2024-05-13 NOTE — TELEPHONE ENCOUNTER
From: Raymond Olivo  To: Alisha Farmer  Sent: 5/13/2024 4:05 PM CDT  Subject: Raymond Downs- Update    Hi Doctor,  Hope you are doing well.  Just wanted to update on Raymond ‘s progress, With wound care ame trying to heel the wound and it’s working very slowly this time and she suggested to try with combination drug proscribed from this week waiting to deliver from pharmacy.   In meantime need your help /input on the extreme pain and burning sensation on her legs. Taking our medication but level of that pain/burning sensation is not decreasing.     Please do needful and looking forward from you.    Thanks  Christy

## 2024-05-14 RX ORDER — PREDNISONE 5 MG/1
TABLET ORAL
Qty: 180 TABLET | Refills: 0 | Status: SHIPPED | OUTPATIENT
Start: 2024-05-14

## 2024-05-20 NOTE — PROGRESS NOTES
CHIEF COMPLAINT:     No chief complaint on file.    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 Patient saw dr. Santamaria (vascular) in December 2023 and he documented \"I explained to the patient and her  that I do not believe the source of her pain to be vascular in origin.  She has an easily palpable dorsalis pedis pulse and the location of the pain is more suggestive of a neuropathic origin.  I suspect perhaps that given the tightness of her feet from her mixed connective tissue disorder the compression stockings is causing somewhat compression of the nerves in that area and I have encouraged her to substitute those stockings to those that involve the feet up to the upper calf.\" Patient/spouse have been in contact with dr. Farmer and in march 2024 reported a dark spot/ulcer on her bilateral lower legs, she was advised to make an appointment with wound care as well as get her blood work done ordered in January 2024 so that further treatment plan could be extablished.  The blood work did show increased inflammation and she was advised to increase her prednisone to 20mg x 5 days (which spouse reports they did do) and take the amlodipine 2.5 mg-which she states she is doing.  They have been leaving the areas open to air and she has not been wearing compression.  She c/o burning sensation in her ankle area.  She was started on gabapentin by dr. Farmer last year, however they stopped it because it was making her too sleepy. We discussed restarting the gabapentin, but only take a noc first.  Both wounds are dried eschar.  Will utilize hydrogel with lidocaine to start debriding off/softening the eschar. I have also ordered santyl. Patient placed into spandagrips for compression    5-3-24 patient returns.  Last  week we had the spouse utilize hydrogel to start moistening and debriding off the eschar/scabbing/dried drainage.  I also ordered santyl.  They were able to get the Santyl, and started utilizing it a few days ago.  she followed up in office with rheum. She states she has been taking the gabapentin at noc but it has not been helping the burning and is still causing her drowsiness.  The wounds are much , the eschar and drainage is off.  Will continue with santyl and have spouse utilize barrier cream around the wounds as needed to prevent maceration. No acute s/s of infection, patient still with pain with any cleansing or manipulation of the wounds.    5-10-24 patient returns. Edema is stable.  They have been utilizing santyl.  Her pain has been worse the last 4 days, especially when she is walking.  She is not able to tolerate any touch to the areas. We discussed utilizing a compound rx, they are agreeable.  I called and s/w [facility] and they will compound 5% phenytoin, 5% lidocaine, .46031% misoprostol, 1%pentoxifylline, 1%clindamycin.  Patient to continue to use the santyl until the compound is received. No systemic s/s of infecrtion.    5-21-24 patient returns today, she has continued to experience pain and burning in her lower extremities.  Increase gabapentin?***, increase prednisone?***.  They have been using the compound topical for *** days    MEDICATIONS:     Current Outpatient Medications:     predniSONE 5 MG Oral Tab, Take 20mg daily for 5 days then update office., Disp: 180 tablet, Rfl: 0    gabapentin 100 MG Oral Cap, Take 1 capsule (100 mg total) by mouth nightly., Disp: , Rfl:     amLODIPine 2.5 MG Oral Tab, Take 1 tablet (2.5 mg total) by mouth 2 (two) times daily., Disp: 180 tablet, Rfl: 0    hydroxychloroquine 200 MG Oral Tab, Take 1 tablet (200 mg total) by mouth daily., Disp: 90 tablet, Rfl: 0    collagenase (SANTYL) 250 UNIT/GM External Ointment, Apply 1 Application topically  daily., Disp: 90 g, Rfl: 0    Ferrous Sulfate 325 (65 Fe) MG Oral Tab, Take 1 tablet (325 mg total) by mouth 2 (two) times daily with meals., Disp: 180 tablet, Rfl: 0    Mycophenolate Mofetil 500 MG Oral Tab, Take 1 tablet (500 mg total) by mouth daily., Disp: 90 tablet, Rfl: 0  ALLERGIES:   No Known Allergies   REVIEW OF SYSTEMS:   This information was obtained from the patient/family and chart.    See HPI for pertinent positives, otherwise 10 pt ROS negative.    HISTORY:   Past medical, surgical, family and social history updated where appropriate.      PHYSICAL EXAM:     There were no vitals filed for this visit.         Estimated body mass index is 21.43 kg/m² as calculated from the following:    Height as of 4/30/24: 63\".    Weight as of 4/30/24: 121 lb (54.9 kg).   No results found for: \"PGLU\"    Vital signs reviewed.Appears stated age, well groomed.    Constitutional:  Bp ***wnl for patient. Pulse Regular and wnl for patient. Respirations easy and unlabored. Temperature wnl. Weight normal for height. Appearance neat and clean. Appears in no acute distress. Well nourished and well developed.    Lower extremity:  dp/pt palpable ***bilaterally. Extremities are free of varicosities and edema, they are scarred, scattered changes in pigmentation (hypo/hyper) with atrophie jay. Capillary refill < 3 seconds. Digits are warm. toenails are wnl for color, thickness and hygeine. Skin hydration wnl. + hairgrowth on legs.    Musculoskeletal:  Gait and station stable   Integumentary:  refer to wound characteristics and images   Psychiatric:  Judgment and insight intact. Alert and oriented times 3. No evidence of depression, anxiety, or agitation. Calm, cooperative, and communicative, but very quiet and reserved.  EDEMA:   Calf                    Ankle                          DIAGNOSTICS:     Lab Results   Component Value Date    BUN 6 (L) 03/18/2024    CREATSERUM 0.50 (L) 03/18/2024    GFRCKDEPI 121.37 12/17/2020     ALB 4.0 03/18/2024    TP 8.0 03/18/2024    A1C 5.1 12/17/2020 11-28-23 arterial duplex-dr santamaria  FINDINGS:    LEFT EXTREMITY:  Triphasic waveforms in the left common femoral, profunda, superficial femoral arteries.  Biphasic waveforms below the left knee.   OTHER:  None.     PEAK VELOCITIES (CM/S):   LEFT COMMON FEMORAL:      165    LEFT PROFUNDA FEMORAL:      78      LEFT SUPERFICIAL FEMORAL PROX:    113   LEFT SUPERFICIAL FEMORAL MID:      94   LEFT SUPERFICIAL FEMORAL DISTAL:    92   LEFT POPLITEAL PROX:      110   LEFT POPLITEAL DISTAL:      121   TIBIOPER TRUNK:      66   LEFT PTA PROX:      60   LEFT PTA MID:      62   LEFT PTA DISTAL:      73   LEFT BETH PROX:      82   LEFT BETH MID:      72    LEFT DORSALIS PEDIS::      30   LEFT GREAT TOE PRESSURE:      25 mmHg   CONCLUSION:    1. No high-grade stenosis is identified.  There is suggestion of diffuse mild stenosis throughout most of the left lower extremity.    2. Overall diminished left toe pressure is noted.  Possibility of decreased perfusion in the distal left foot is of consideration.     7-14-20 venous reflux study-dr santamaria  Per Dr. Santamarai review \"no intervention needed\"  WOUND ASSESSMENT:     Wound 04/26/24 #1 Left Medial Ankle Ankle Left;Medial (Active)   Date First Assessed/Time First Assessed: 04/26/24 1031    Wound Number (Wound Clinic Only): #1 Left Medial Ankle  Primary Wound Type: (c) Auto-immune  Location: Ankle  Wound Location Orientation: Left;Medial      Assessments 4/26/2024 10:34 AM 5/10/2024  1:12 PM   Wound Image        Drainage Amount Unable to assess Large   Drainage Description -- Serosanguineous   Treatments Compression Compression   Wound Length (cm) 3.5 cm 3.5 cm   Wound Width (cm) 4.6 cm 11.5 cm   Wound Surface Area (cm^2) 16.1 cm^2 40.25 cm^2   Wound Depth (cm) 0.1 cm 0.1 cm   Wound Volume (cm^3) 1.61 cm^3 4.025 cm^3   Wound Healing % -- -150   Margins Undefined edges Well-defined edges   Non-staged Wound Description Full  thickness Full thickness   Soni-wound Assessment Dry;Edema;Atrophy jay Edema;Atrophy jay   Wound Granulation Tissue -- Red;Firm   Wound Bed Granulation (%) -- 15 %   Wound Bed Epithelium (%) -- 15 %   Wound Bed Slough (%) -- 70 %   Wound Odor None None   Shape 100% scabbed --       No associated orders.       Wound 04/26/24 #2 Right Lateral Ankle Ankle Right;Lateral (Active)   Date First Assessed/Time First Assessed: 04/26/24 1031    Wound Number (Wound Clinic Only): #2 Right Lateral Ankle  Primary Wound Type: (c) Auto-immune  Location: Ankle  Wound Location Orientation: Right;Lateral      Assessments 4/26/2024 10:34 AM 5/10/2024  1:12 PM   Wound Image       Drainage Amount Unable to assess Moderate   Drainage Description -- Serous;Yellow   Treatments Compression Compression   Wound Length (cm) 0.5 cm 1.4 cm   Wound Width (cm) 0.7 cm 1.6 cm   Wound Surface Area (cm^2) 0.35 cm^2 2.24 cm^2   Wound Depth (cm) 0.1 cm 0.1 cm   Wound Volume (cm^3) 0.035 cm^3 0.224 cm^3   Wound Healing % -- -540   Margins Well-defined edges Well-defined edges   Non-staged Wound Description Full thickness Full thickness   Soni-wound Assessment Dry;Edema;Atrophy jay Edema;Atrophy jay;Moist;Hemosiderin staining   Wound Bed Slough (%) -- 100 %   Wound Odor None None   Shape 100% scabbed --       No associated orders.          ASSESSMENT AND PLAN:    There are no diagnoses linked to this encounter.         Risks, benefits, and alternatives of current treatment plan discussed in detail.  Questions and concerns addressed. Red flags to RTC or ED reviewed.  Patient (or parent) agrees to plan.      NOTE TO PATIENT: The 21st Century Cures Act makes clinical notes like these available to patients in the interest of transparency. Clinical notes are medical documents used by physicians and care providers to communicate with each other. These documents include medical language and terminology, abbreviations, and treatment information that  may sound technical and at times possibly unfamiliar. In addition, at times, the verbiage may appear blunt or direct. These documents are one tool providers use to communicate relevant information and clinical opinions of the care providers in a way that allows common understanding of the clinical context.   I spent  *** minutes with the patient. This time included:    preparing to see the patient (eg, review notes and recent diagnostics),  seeing the patient, obtaining and/or reviewing separately obtained history, performing a medically appropriate examination and/or evaluation, counseling and educating the patient, documenting in the record,***    There are no Patient Instructions on file for this visit.   Martina Mckeon FNP-C, CWCN-AP, CFCN, CSWS, WCC, DWC  5/21/2024        diagnostics),  seeing the patient, obtaining and/or reviewing separately obtained history, performing a medically appropriate examination and/or evaluation, counseling and educating the patient, documenting in the record,    Patient Instructions   Please return: 1    Patient discharge and wound care instructions  Raymond Olivo  5/21/2024           You may shower and cleanse area with mild soap and water, hypochlorus wound cleanser (ie anasept), dab dry with gauze and apply your dressings as directed.     Changing your dressing:           Wash your hands with soap and water.  Ensure that the old dressing is removed completely. Place it in a plastic bag and throw it in the trash.  Cleanse the wound with hypochlorous wound cleanser (ie. Anasept, vashe, pure and clean) .  It's ok to “scrub” your wound with the gauze, small amount of bleeding with cleansing is normal and ok.  Apply the following dressings:  After 3 days: remove the left wrap, cleanse and then   Right:   compound prescription>adaptic>gauze>spandagrip  Left:  compound prescrption>adaptic>roll gauze>spandagrip      Nutrition and blood sugar control:  Focus on the following:  Protein: Meats, beans, eggs, milk and yogurt particularly Greek yogurt), tofu, soy nuts, soy protein products (Follow the protein handout in your welcome folder)  Vitamin C: Citrus fruits and juices, strawberries, tomatoes, tomato juice, peppers, baked potatoes, spinach, broccoli, cauliflower, [place] sprouts, cabbage  Vitamin A: Dark green, leafy vegetables, orange or yellow vegetables, cantaloupe, fortified dairy products, liver, fortified cereals  Zinc: Fortified cereals, red meats, seafood  Consider supplementing with Justin by [employer]. It can be purchased on amazon, [employer], or local pharmacy may be able to order it for you.  (These are essential branch chain amino acids that help with tissue building and wound healing).     Concerns:  Signs of infection may  include the following:  Increase in redness  Red \"streaks\" from wound  Increase in swelling  Fever  Unusual odor  Change in the amount of wound drainage     Should you experience any significant changes in your wound(s) or have any questions regarding your home care instructions please contact the [facility] @ [phone] If after regular business hours, please call your family doctor or local emergency room. The treatment plan has been discussed at length between you and your provider. Follow all instructions carefully, it is very important. If you do not follow all instructions you are at risk of your wound not healing, infection, possible loss of limb and even loss of life.        Martina Mckeon FNP-C, CWCN-AP, CFCN, CSWS, WCC, DWC  5/21/2024

## 2024-05-21 ENCOUNTER — APPOINTMENT (OUTPATIENT)
Dept: WOUND CARE | Facility: HOSPITAL | Age: 49
End: 2024-05-21
Attending: NURSE PRACTITIONER

## 2024-05-21 ENCOUNTER — OFFICE VISIT (OUTPATIENT)
Dept: WOUND CARE | Facility: HOSPITAL | Age: 49
End: 2024-05-21
Attending: NURSE PRACTITIONER

## 2024-05-21 VITALS
RESPIRATION RATE: 16 BRPM | HEART RATE: 85 BPM | DIASTOLIC BLOOD PRESSURE: 70 MMHG | TEMPERATURE: 98 F | SYSTOLIC BLOOD PRESSURE: 111 MMHG

## 2024-05-21 DIAGNOSIS — M32.19 LUPUS VASCULITIS (HCC): ICD-10-CM

## 2024-05-21 DIAGNOSIS — L97.922 NON-PRESSURE CHRONIC ULCER OF LEFT LOWER LEG WITH FAT LAYER EXPOSED (HCC): ICD-10-CM

## 2024-05-21 DIAGNOSIS — L97.812 NON-PRESSURE CHRONIC ULCER OF OTHER PART OF RIGHT LOWER LEG WITH FAT LAYER EXPOSED (HCC): ICD-10-CM

## 2024-05-21 DIAGNOSIS — I77.89 LUPUS VASCULITIS (HCC): ICD-10-CM

## 2024-05-21 DIAGNOSIS — M35.1 MIXED CONNECTIVE TISSUE DISEASE (HCC): Primary | ICD-10-CM

## 2024-05-21 PROCEDURE — 99214 OFFICE O/P EST MOD 30 MIN: CPT | Performed by: NURSE PRACTITIONER

## 2024-05-21 NOTE — PROGRESS NOTES
.Weekly Wound Education Note    Teaching Provided To: Patient;Family  Training Topics: Dressing;Edema control;Cleasing and general instructions;Compression;Discharge instructions  Training Method: Explain/Verbal  Training Response: Patient responds and understands        Notes: Wounds stable. Continue compounding ointment, adaptic, and bordered gauze to right ankle wound. Dressing changed to compounding ointment, folded vasaline gauze, and calamine unna boot 20-30mmHg loosely wrapped to left ankle wound. Pt to leave in place for three days, then remove and change dressing daily. Spandagrip D applied to right leg and extra provided for left leg after wrap is removed.

## 2024-05-21 NOTE — PATIENT INSTRUCTIONS
Please return: 1    Patient discharge and wound care instructions  Raymond Olivo  5/21/2024           You may shower and cleanse area with mild soap and water, hypochlorus wound cleanser (ie anasept), dab dry with gauze and apply your dressings as directed.     Changing your dressing:           Wash your hands with soap and water.  Ensure that the old dressing is removed completely. Place it in a plastic bag and throw it in the trash.  Cleanse the wound with hypochlorous wound cleanser (ie. Anasept, vashe, pure and clean) .  It's ok to “scrub” your wound with the gauze, small amount of bleeding with cleansing is normal and ok.  Apply the following dressings:  After 3 days: remove the left wrap, cleanse and then   Right:   compound prescription>adaptic>gauze>spandagrip  Left:  compound prescrption>adaptic>roll gauze>spandagrip      Nutrition and blood sugar control:  Focus on the following:  Protein: Meats, beans, eggs, milk and yogurt particularly Greek yogurt), tofu, soy nuts, soy protein products (Follow the protein handout in your welcome folder)  Vitamin C: Citrus fruits and juices, strawberries, tomatoes, tomato juice, peppers, baked potatoes, spinach, broccoli, cauliflower, [place] sprouts, cabbage  Vitamin A: Dark green, leafy vegetables, orange or yellow vegetables, cantaloupe, fortified dairy products, liver, fortified cereals  Zinc: Fortified cereals, red meats, seafood  Consider supplementing with Justin by [employer]. It can be purchased on amazon, [employer], or local pharmacy may be able to order it for you.  (These are essential branch chain amino acids that help with tissue building and wound healing).     Concerns:  Signs of infection may include the following:  Increase in redness  Red \"streaks\" from wound  Increase in swelling  Fever  Unusual odor  Change in the amount of wound drainage     Should you experience any significant changes in your wound(s) or have any questions regarding  your home care instructions please contact the wound center [facility] @ [phone] If after regular business hours, please call your family doctor or local emergency room. The treatment plan has been discussed at length between you and your provider. Follow all instructions carefully, it is very important. If you do not follow all instructions you are at risk of your wound not healing, infection, possible loss of limb and even loss of life.

## 2024-05-24 DIAGNOSIS — I73.01 RAYNAUD'S DISEASE WITH GANGRENE (HCC): ICD-10-CM

## 2024-05-24 DIAGNOSIS — M32.19 LUPUS VASCULITIS (HCC): ICD-10-CM

## 2024-05-24 DIAGNOSIS — M35.1 MIXED CONNECTIVE TISSUE DISEASE (HCC): ICD-10-CM

## 2024-05-24 DIAGNOSIS — I77.89 LUPUS VASCULITIS (HCC): ICD-10-CM

## 2024-05-27 NOTE — PROGRESS NOTES
CHIEF COMPLAINT:     Chief Complaint   Patient presents with    Wound Care     Patient arrives for a wound care follow up appointment. Patient arrives with kurlex to the left wound and a bordered gauze to the right wound. Patient states she has mild, \"burning\" pain. Patient is putting prescribed antibiotic ointment to the wounds and vaseline gauze.      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 Patient saw dr. Santamaria (vascular) in December 2023 and he documented \"I explained to the patient and her  that I do not believe the source of her pain to be vascular in origin.  She has an easily palpable dorsalis pedis pulse and the location of the pain is more suggestive of a neuropathic origin.  I suspect perhaps that given the tightness of her feet from her mixed connective tissue disorder the compression stockings is causing somewhat compression of the nerves in that area and I have encouraged her to substitute those stockings to those that involve the feet up to the upper calf.\" Patient/spouse have been in contact with dr. Farmer and in march 2024 reported a dark spot/ulcer on her bilateral lower legs, she was advised to make an appointment with wound care as well as get her blood work done ordered in January 2024 so that further treatment plan could be extablished.  The blood work did show increased inflammation and she was advised to increase her prednisone to 20mg x 5 days (which spouse reports they did do) and take the amlodipine 2.5 mg-which she states she is doing.  They have been leaving the areas open to air and she has not been wearing compression.  She c/o burning sensation in her ankle area.  She was started on gabapentin by dr. Farmer last year, however they stopped it because it was making her  too sleepy. We discussed restarting the gabapentin, but only take a noc first.  Both wounds are dried eschar.  Will utilize hydrogel with lidocaine to start debriding off/softening the eschar. I have also ordered santyl. Patient placed into spandagrips for compression    5-3-24 patient returns.  Last week we had the spouse utilize hydrogel to start moistening and debriding off the eschar/scabbing/dried drainage.  I also ordered santyl.  They were able to get the Santyl, and started utilizing it a few days ago.  she followed up in office with rheum. She states she has been taking the gabapentin at noc but it has not been helping the burning and is still causing her drowsiness.  The wounds are much , the eschar and drainage is off.  Will continue with santyl and have spouse utilize barrier cream around the wounds as needed to prevent maceration. No acute s/s of infection, patient still with pain with any cleansing or manipulation of the wounds.    5-10-24 patient returns. Edema is stable.  They have been utilizing santyl.  Her pain has been worse the last 4 days, especially when she is walking.  She is not able to tolerate any touch to the areas. We discussed utilizing a compound rx, they are agreeable.  I called and s/w [facility] and they will compound 5% phenytoin, 5% lidocaine, .26707% misoprostol, 1%pentoxifylline, 1%clindamycin.  Patient to continue to use the santyl until the compound is received. No systemic s/s of infecrtion.    5-21-24 patient returns today, she has continued to experience pain and burning in her lower extremities. But she states there has been some improvement in the pain and burning. She increased her gabapentin for 2 days, but the most likely reason for the improvement in pain is the increase in prednisone.  They have been using the compound topical for 6-7 days. The right lateral wound is improved, base is necrotic, pain improved-this was cross hatched to allow for rx  penetration.  The left is dessicated and has dried exudate around it.  We discussed putting her in compression wrap with petroleum gauze to assist with autolyitcally debriding the tissue. Spouse will remove the wrap and cleanse in 3 days and will return to using the compound rx with addition of adaptic. No acute s/s of infection.    5-28-24 Patient returns.  She has been utilizing the compound topical med for slightly under 2 weeks.  Last week I did cross francisco the right wound base and we wrapped with petroleum gauze and and compression wrap to assist with autolytic debridement.  Spouse removed the wrap after 3 days. She states her pain was a little better with the wrap.  Wounds are improved, a little dessicated. We discussed putting the compound ointment, petroleum gauze, and putting wraps on for 1 week.  Patient and spouse are agreeable.  There is definitely improvement overall in the wounds.  Patient to see dr. Aguilar when I am out of clinic in 2 weeks.    MEDICATIONS:     Current Outpatient Medications:     predniSONE 5 MG Oral Tab, Take 20mg daily for 5 days then update office., Disp: 180 tablet, Rfl: 0    gabapentin 100 MG Oral Cap, Take 1 capsule (100 mg total) by mouth nightly., Disp: , Rfl:     amLODIPine 2.5 MG Oral Tab, Take 1 tablet (2.5 mg total) by mouth 2 (two) times daily., Disp: 180 tablet, Rfl: 0    hydroxychloroquine 200 MG Oral Tab, Take 1 tablet (200 mg total) by mouth daily., Disp: 90 tablet, Rfl: 0    Ferrous Sulfate 325 (65 Fe) MG Oral Tab, Take 1 tablet (325 mg total) by mouth 2 (two) times daily with meals., Disp: 180 tablet, Rfl: 0    Mycophenolate Mofetil 500 MG Oral Tab, Take 1 tablet (500 mg total) by mouth daily., Disp: 90 tablet, Rfl: 0  ALLERGIES:   No Known Allergies   REVIEW OF SYSTEMS:   This information was obtained from the patient/family and chart.    See HPI for pertinent positives, otherwise 10 pt ROS negative.    HISTORY:   Past medical, surgical, family and social history  updated where appropriate.      PHYSICAL EXAM:     Vitals:    05/28/24 0945   BP: 115/76   Pulse: 89   Resp: 16   Temp: 97.7 °F (36.5 °C)        Estimated body mass index is 21.43 kg/m² as calculated from the following:    Height as of 4/30/24: 63\".    Weight as of 4/30/24: 121 lb (54.9 kg).   No results found for: \"PGLU\"    Vital signs reviewed.Appears stated age, well groomed.    Constitutional:  Bp wnl for patient. Pulse Regular and wnl for patient. Respirations easy and unlabored. Temperature wnl. Weight normal for height. Appearance neat and clean. Appears in no acute distress. Well nourished and well developed.    Lower extremity:  dp/pt palpable bilaterally. Extremities are free of varicosities and edema, they are scarred, scattered changes in pigmentation (hypo/hyper) with atrophie jay. Capillary refill < 3 seconds. Digits are warm. toenails are wnl for color, thickness and hygeine. Skin hydration wnl. + hairgrowth on legs.    Musculoskeletal:  Gait and station stable   Integumentary:  refer to wound characteristics and images   Psychiatric:  Judgment and insight intact. Alert and oriented times 3. No evidence of depression, anxiety, or agitation. Calm, cooperative, and communicative, but very quiet and reserved.  EDEMA:   Calf  Point of Measurement - Left Calf: 31  Point of Measurement - Right Calf: 31  Left Calf from:: Heel  Calf Left cm:: 28.6  Right Calf from:: Heel  Right Calf cm:: 29.2  Ankle  Point of Measurement - Left Ankle: 11  Point of Measurement - Right Ankle: 11  Left Ankle from:: Heel  Left Ankle cm:: 18     Right Ankle from:: Heel  Right Ankle cm:: 17     DIAGNOSTICS:     Lab Results   Component Value Date    BUN 6 (L) 03/18/2024    CREATSERUM 0.50 (L) 03/18/2024    GFRCKDEPI 121.37 12/17/2020    ALB 4.0 03/18/2024    TP 8.0 03/18/2024    A1C 5.1 12/17/2020 11-28-23 arterial duplex-dr hliliard  FINDINGS:    LEFT EXTREMITY:  Triphasic waveforms in the left common femoral, profunda,  superficial femoral arteries.  Biphasic waveforms below the left knee.   OTHER:  None.     PEAK VELOCITIES (CM/S):   LEFT COMMON FEMORAL:      165    LEFT PROFUNDA FEMORAL:      78      LEFT SUPERFICIAL FEMORAL PROX:    113   LEFT SUPERFICIAL FEMORAL MID:      94   LEFT SUPERFICIAL FEMORAL DISTAL:    92   LEFT POPLITEAL PROX:      110   LEFT POPLITEAL DISTAL:      121   TIBIOPER TRUNK:      66   LEFT PTA PROX:      60   LEFT PTA MID:      62   LEFT PTA DISTAL:      73   LEFT BETH PROX:      82   LEFT BETH MID:      72    LEFT DORSALIS PEDIS::      30   LEFT GREAT TOE PRESSURE:      25 mmHg   CONCLUSION:    1. No high-grade stenosis is identified.  There is suggestion of diffuse mild stenosis throughout most of the left lower extremity.    2. Overall diminished left toe pressure is noted.  Possibility of decreased perfusion in the distal left foot is of consideration.     7-14-20 venous reflux study-dr santamaria  Per Dr. Santamaria review \"no intervention needed\"  WOUND ASSESSMENT:     Wound 04/26/24 #1 Left Medial Ankle Ankle Left;Medial (Active)   Date First Assessed/Time First Assessed: 04/26/24 1031    Wound Number (Wound Clinic Only): #1 Left Medial Ankle  Primary Wound Type: (c) Auto-immune  Location: Ankle  Wound Location Orientation: Left;Medial      Assessments 4/26/2024 10:34 AM 5/28/2024  9:47 AM   Wound Image       Drainage Amount Unable to assess Small   Drainage Description -- Serous;Yellow   Treatments Compression --   Wound Length (cm) 3.5 cm 5.5 cm   Wound Width (cm) 4.6 cm 11.4 cm   Wound Surface Area (cm^2) 16.1 cm^2 62.7 cm^2   Wound Depth (cm) 0.1 cm 0.1 cm   Wound Volume (cm^3) 1.61 cm^3 6.27 cm^3   Wound Healing % -- -289   Margins Undefined edges Well-defined edges   Non-staged Wound Description Full thickness Full thickness   Soni-wound Assessment Dry;Edema;Atrophy jay Dry;Edema;Non-blanchable erythema   Wound Granulation Tissue -- Pink;Red;Firm   Wound Bed Granulation (%) -- 40 %   Wound Bed  Epithelium (%) -- 20 %   Wound Bed Slough (%) -- 40 %   Wound Odor None None   Shape 100% scabbed --   Tunneling? -- No   Undermining? -- No   Sinus Tracts? -- No       No associated orders.       Wound 04/26/24 #2 Right Lateral Ankle Ankle Right;Lateral (Active)   Date First Assessed/Time First Assessed: 04/26/24 1031    Wound Number (Wound Clinic Only): #2 Right Lateral Ankle  Primary Wound Type: (c) Auto-immune  Location: Ankle  Wound Location Orientation: Right;Lateral      Assessments 4/26/2024 10:34 AM 5/28/2024  9:45 AM   Wound Image        Drainage Amount Unable to assess Scant   Drainage Description -- Serous;Yellow   Treatments Compression --   Wound Length (cm) 0.5 cm 1.3 cm   Wound Width (cm) 0.7 cm 1.4 cm   Wound Surface Area (cm^2) 0.35 cm^2 1.82 cm^2   Wound Depth (cm) 0.1 cm 0.1 cm   Wound Volume (cm^3) 0.035 cm^3 0.182 cm^3   Wound Healing % -- -420   Margins Well-defined edges Well-defined edges   Non-staged Wound Description Full thickness Full thickness   Soni-wound Assessment Dry;Edema;Atrophy jay Edema;Atrophy jay;Hemosiderin staining;Dry   Wound Granulation Tissue -- Red;Pink;Spongy   Wound Bed Granulation (%) -- 10 %   Wound Bed Slough (%) -- 90 %   Wound Odor None None   Shape 100% scabbed --   Tunneling? -- No   Undermining? -- No   Sinus Tracts? -- No       No associated orders.       Compression Wrap 05/21/24 Ankle Anterior;Left (Active)   Placement Date/Time: 05/21/24 1019   Location: Ankle  Wound Location Orientation: Anterior;Left      Assessments 5/21/2024  9:36 AM   Response to Treatment Well tolerated   Compression Layers Multilayer   Compression Product Type Unna Boot   Dressing Applied Yes   Compression Wrap Location Toes to Knee   Compression Wrap Status Clean;Dry;Intact       No associated orders.          ASSESSMENT AND PLAN:    1. Mixed connective tissue disease (HCC)    2. Lupus vasculitis (HCC)    3. Non-pressure chronic ulcer of left lower leg with fat layer exposed  (HCC)    4. Non-pressure chronic ulcer of other part of right lower leg with fat layer exposed (HCC)      Risks, benefits, and alternatives of current treatment plan discussed in detail.  Questions and concerns addressed. Red flags to RTC or ED reviewed.  Patient (or parent) agrees to plan.      NOTE TO PATIENT: The 21st Century Cures Act makes clinical notes like these available to patients in the interest of transparency. Clinical notes are medical documents used by physicians and care providers to communicate with each other. These documents include medical language and terminology, abbreviations, and treatment information that may sound technical and at times possibly unfamiliar. In addition, at times, the verbiage may appear blunt or direct. These documents are one tool providers use to communicate relevant information and clinical opinions of the care providers in a way that allows common understanding of the clinical context.   I spent 29minutes with the patient. This time included:    preparing to see the patient (eg, review notes and recent diagnostics),  seeing the patient, obtaining and/or reviewing separately obtained history, performing a medically appropriate examination and/or evaluation, counseling and educating the patient, documenting in the record,  DISCHARGE INSTRUCTIONS     Patient Instructions   Please return: 1 week with martina    Kenisha 10 or 12 with Dr. Aguilar  June 17 or 19 with Dr. Aguilar  June 25 with Martina    Patient discharge and wound care instructions  Raymond Olivo  5/28/2024        You may shower with protection of the wound (ie a cast cover or similar).     Changing your dressing:           Wash your hands with soap and water.  Ensure that the old dressing is removed completely. Place it in a plastic bag and throw it in the trash.  Cleanse the wound with hypochlorous wound cleanser (ie. Anasept, vashe, pure and clean) .  It's ok to “scrub” your wound with the gauze, small  amount of bleeding with cleansing is normal and ok.  Apply the following dressings:   Right:   compound prescription>petroleum gauze>20-30mmhg VERY light wrap  Left:  compound prescrption>petroleum gauze>20-30mmhg VERY light wrap    Nutrition and blood sugar control:  Focus on the following:  Protein: Meats, beans, eggs, milk and yogurt particularly Greek yogurt), tofu, soy nuts, soy protein products (Follow the protein handout in your welcome folder)  Vitamin C: Citrus fruits and juices, strawberries, tomatoes, tomato juice, peppers, baked potatoes, spinach, broccoli, cauliflower, [employer] sprouts, cabbage  Vitamin A: Dark green, leafy vegetables, orange or yellow vegetables, cantaloupe, fortified dairy products, liver, fortified cereals  Zinc: Fortified cereals, red meats, seafood  Consider supplementing with Justin by [employer]. It can be purchased on amazon, [employer] website, or local pharmacy may be able to order it for you.  (These are essential branch chain amino acids that help with tissue building and wound healing).     Concerns:  Signs of infection may include the following:  Increase in redness  Red \"streaks\" from wound  Increase in swelling  Fever  Unusual odor  Change in the amount of wound drainage     Should you experience any significant changes in your wound(s) or have any questions regarding your home care instructions please contact the wound center [facility] @ [phone] If after regular business hours, please call your family doctor or local emergency room. The treatment plan has been discussed at length between you and your provider. Follow all instructions carefully, it is very important. If you do not follow all instructions you are at risk of your wound not healing, infection, possible loss of limb and even loss of life.        Martina Mckeon FNP-C, CWCN-AP, CFCN, CSWS, WCC, DWC  5/28/2024

## 2024-05-28 ENCOUNTER — OFFICE VISIT (OUTPATIENT)
Dept: WOUND CARE | Facility: HOSPITAL | Age: 49
End: 2024-05-28
Attending: NURSE PRACTITIONER

## 2024-05-28 VITALS
RESPIRATION RATE: 16 BRPM | SYSTOLIC BLOOD PRESSURE: 115 MMHG | DIASTOLIC BLOOD PRESSURE: 76 MMHG | TEMPERATURE: 98 F | HEART RATE: 89 BPM

## 2024-05-28 DIAGNOSIS — L97.922 NON-PRESSURE CHRONIC ULCER OF LEFT LOWER LEG WITH FAT LAYER EXPOSED (HCC): ICD-10-CM

## 2024-05-28 DIAGNOSIS — I77.89 LUPUS VASCULITIS (HCC): ICD-10-CM

## 2024-05-28 DIAGNOSIS — M32.19 LUPUS VASCULITIS (HCC): ICD-10-CM

## 2024-05-28 DIAGNOSIS — M35.1 MIXED CONNECTIVE TISSUE DISEASE (HCC): Primary | ICD-10-CM

## 2024-05-28 DIAGNOSIS — L97.812 NON-PRESSURE CHRONIC ULCER OF OTHER PART OF RIGHT LOWER LEG WITH FAT LAYER EXPOSED (HCC): ICD-10-CM

## 2024-05-28 PROCEDURE — 99213 OFFICE O/P EST LOW 20 MIN: CPT | Performed by: NURSE PRACTITIONER

## 2024-05-28 RX ORDER — PREDNISONE 5 MG/1
TABLET ORAL
Qty: 180 TABLET | Refills: 0 | Status: SHIPPED | OUTPATIENT
Start: 2024-05-28

## 2024-05-28 NOTE — PROGRESS NOTES
.Weekly Wound Education Note    Teaching Provided To: Patient;Family  Training Topics: Dressing;Edema control;Cleasing and general instructions;Compression;Discharge instructions  Training Method: Explain/Verbal;Written  Training Response: Patient responds and understands        Notes: Wounds stable. Both legs dressed with compounding ointment, folded vaseline gauze, and calamine unna boot 20-30mmHg wrapped loosely. Leave in place until next clinic visit.

## 2024-05-28 NOTE — TELEPHONE ENCOUNTER
LOV: 4/30/2024    RTC: 3 months   Future Appointments   Date Time Provider Department Center   5/28/2024  9:45 AM Martina Mckeon APRN [facility]   7/23/2024 10:00 AM Alisha Farmer DO [facility]   11/1/2024  9:45 AM Alisha Farmer,  [facility]   LABS:   Component      Latest Ref Rng 3/18/2024   WBC      4.0 - 11.0 x10(3) uL 3.2 (L)    RBC      3.80 - 5.30 x10(6)uL 4.45    Hemoglobin      12.0 - 16.0 g/dL 12.8    Hematocrit      35.0 - 48.0 % 39.2    Platelet Count      150.0 - 450.0 10(3)uL 174.0    MCV      80.0 - 100.0 fL 88.1    MCH      26.0 - 34.0 pg 28.8    MCHC      31.0 - 37.0 g/dL 32.7    RDW      % 12.9    Prelim Neutrophil Abs      1.50 - 7.70 x10 (3) uL 2.11    Neutrophils Absolute      1.50 - 7.70 x10(3) uL 2.11    Lymphocytes Absolute      1.00 - 4.00 x10(3) uL 0.59 (L)    Monocytes Absolute      0.10 - 1.00 x10(3) uL 0.41    Eosinophils Absolute      0.00 - 0.70 x10(3) uL 0.06    Basophils Absolute      0.00 - 0.20 x10(3) uL 0.02    Immature Granulocyte Absolute      0.00 - 1.00 x10(3) uL 0.01    Neutrophils %      % 66.0    Lymphocytes %      % 18.4    Monocytes %      % 12.8    Eosinophils %      % 1.9    Basophils %      % 0.6    Immature Granulocyte %      % 0.3    Glucose      70 - 99 mg/dL 93    Sodium      136 - 145 mmol/L 141    Potassium      3.5 - 5.1 mmol/L 3.8    Chloride      98 - 112 mmol/L 111    Carbon Dioxide, Total      21.0 - 32.0 mmol/L 27.0    ANION GAP      0 - 18 mmol/L 3    BUN      9 - 23 mg/dL 6 (L)    CREATININE      0.55 - 1.02 mg/dL 0.50 (L)    CALCIUM      8.5 - 10.1 mg/dL 9.5    CALCULATED OSMOLALITY      275 - 295 mOsm/kg 289    EGFR      >=60 mL/min/1.73m2 116    AST (SGOT)      15 - 37 U/L 20    ALT (SGPT)      13 - 56 U/L 12 (L)    ALKALINE PHOSPHATASE      39 - 100 U/L 85    Total Bilirubin      0.1 - 2.0 mg/dL 0.5    PROTEIN, TOTAL      6.4 - 8.2 g/dL 8.0    Albumin      3.4 - 5.0 g/dL 4.0    Globulin      2.8 - 4.4 g/dL 4.0     A/G Ratio      1.0 - 2.0  1.0    Patient Fasting for CMP? Yes    Iron, Serum      50 - 170 ug/dL 45 (L)    Transferrin      200 - 360 mg/dL 208    Iron Bind.Cap.(TIBC)      240 - 450 ug/dL 310    Iron Saturation      15 - 50 % 15    C-REACTIVE PROTEIN      <0.30 mg/dL 0.37 (H)    SED RATE      0 - 20 mm/Hr 54 (H)    COMPLEMENT C3      90.0 - 180.0 mg/dL 103.0    COMPLEMENT C4      10.0 - 40.0 mg/dL 33.7    FERRITIN      12.0 - 240.0 ng/mL 114.6    VITAMIN D, 25-OH, TOTAL      30.0 - 100.0 ng/mL 35.0       Legend:  (L) Low  (H) High    LAST REFILL: 5/14/2024, Quantity 180 tablets, Refills 0    Dr Farmer-- MCM sent asking pt if doing ok on dose. Awaiting response. Orders pending, approve if agreeable.

## 2024-05-28 NOTE — PATIENT INSTRUCTIONS
Please return: 1 week with estrellita    Kenisha 10 or 12 with Dr. Aguilar  June 17 or 19 with Dr. Aguilar  June 25 with Estrellita    Patient discharge and wound care instructions  Raymond Olivo  5/28/2024        You may shower with protection of the wound (ie a cast cover or similar).     Changing your dressing:           Wash your hands with soap and water.  Ensure that the old dressing is removed completely. Place it in a plastic bag and throw it in the trash.  Cleanse the wound with hypochlorous wound cleanser (ie. Anasept, vashe, pure and clean) .  It's ok to “scrub” your wound with the gauze, small amount of bleeding with cleansing is normal and ok.  Apply the following dressings:   Right:   compound prescription>petroleum gauze>20-30mmhg VERY light wrap  Left:  compound prescrption>petroleum gauze>20-30mmhg VERY light wrap    Nutrition and blood sugar control:  Focus on the following:  Protein: Meats, beans, eggs, milk and yogurt particularly Greek yogurt), tofu, soy nuts, soy protein products (Follow the protein handout in your welcome folder)  Vitamin C: Citrus fruits and juices, strawberries, tomatoes, tomato juice, peppers, baked potatoes, spinach, broccoli, cauliflower, [employer] sprouts, cabbage  Vitamin A: Dark green, leafy vegetables, orange or yellow vegetables, cantaloupe, fortified dairy products, liver, fortified cereals  Zinc: Fortified cereals, red meats, seafood  Consider supplementing with Justin by [employer]. It can be purchased on amazon, [employer] website, or local pharmacy may be able to order it for you.  (These are essential branch chain amino acids that help with tissue building and wound healing).     Concerns:  Signs of infection may include the following:  Increase in redness  Red \"streaks\" from wound  Increase in swelling  Fever  Unusual odor  Change in the amount of wound drainage     Should you experience any significant changes in your wound(s) or have any questions regarding your home  care instructions please contact the wound center [facility] @ [phone] If after regular business hours, please call your family doctor or local emergency room. The treatment plan has been discussed at length between you and your provider. Follow all instructions carefully, it is very important. If you do not follow all instructions you are at risk of your wound not healing, infection, possible loss of limb and even loss of life.

## 2024-06-03 NOTE — PROGRESS NOTES
CHIEF COMPLAINT:     Chief Complaint   Patient presents with    Wound Care     Patient is here for a follow up visit for ankle wounds.     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 Patient saw dr. Santamaria (vascular) in December 2023 and he documented \"I explained to the patient and her  that I do not believe the source of her pain to be vascular in origin.  She has an easily palpable dorsalis pedis pulse and the location of the pain is more suggestive of a neuropathic origin.  I suspect perhaps that given the tightness of her feet from her mixed connective tissue disorder the compression stockings is causing somewhat compression of the nerves in that area and I have encouraged her to substitute those stockings to those that involve the feet up to the upper calf.\" Patient/spouse have been in contact with dr. Farmer and in march 2024 reported a dark spot/ulcer on her bilateral lower legs, she was advised to make an appointment with wound care as well as get her blood work done ordered in January 2024 so that further treatment plan could be extablished.  The blood work did show increased inflammation and she was advised to increase her prednisone to 20mg x 5 days (which spouse reports they did do) and take the amlodipine 2.5 mg-which she states she is doing.  They have been leaving the areas open to air and she has not been wearing compression.  She c/o burning sensation in her ankle area.  She was started on gabapentin by dr. Farmer last year, however they stopped it because it was making her too sleepy. We discussed restarting the gabapentin, but only take a noc first.  Both wounds are dried eschar.  Will utilize hydrogel with lidocaine to start debriding off/softening the eschar. I have also ordered  santyl. Patient placed into spandagrips for compression    5-3-24 patient returns.  Last week we had the spouse utilize hydrogel to start moistening and debriding off the eschar/scabbing/dried drainage.  I also ordered santyl.  They were able to get the Santyl, and started utilizing it a few days ago.  she followed up in office with rheum. She states she has been taking the gabapentin at noc but it has not been helping the burning and is still causing her drowsiness.  The wounds are much , the eschar and drainage is off.  Will continue with santyl and have spouse utilize barrier cream around the wounds as needed to prevent maceration. No acute s/s of infection, patient still with pain with any cleansing or manipulation of the wounds.    5-10-24 patient returns. Edema is stable.  They have been utilizing santyl.  Her pain has been worse the last 4 days, especially when she is walking.  She is not able to tolerate any touch to the areas. We discussed utilizing a compound rx, they are agreeable.  I called and s/w [place] [facility] and they will compound 5% phenytoin, 5% lidocaine, .91930% misoprostol, 1%pentoxifylline, 1%clindamycin.  Patient to continue to use the santyl until the compound is received. No systemic s/s of infecrtion.    5-21-24 patient returns today, she has continued to experience pain and burning in her lower extremities. But she states there has been some improvement in the pain and burning. She increased her gabapentin for 2 days, but the most likely reason for the improvement in pain is the increase in prednisone.  They have been using the compound topical for 6-7 days. The right lateral wound is improved, base is necrotic, pain improved-this was cross hatched to allow for rx penetration.  The left is dessicated and has dried exudate around it.  We discussed putting her in compression wrap with petroleum gauze to assist with autolyitcally debriding the tissue. Spouse will remove the wrap and  cleanse in 3 days and will return to using the compound rx with addition of adaptic. No acute s/s of infection.    5-28-24 Patient returns.  She has been utilizing the compound topical med for slightly under 2 weeks.  Last week I did cross francisco the right wound base and we wrapped with petroleum gauze and and compression wrap to assist with autolytic debridement.  Spouse removed the wrap after 3 days. She states her pain was a little better with the wrap.  Wounds are improved, a little dessicated. We discussed putting the compound ointment, petroleum gauze, and putting wraps on for 1 week.  Patient and spouse are agreeable.  There is definitely improvement overall in the wounds.  Patient to see dr. Aguilar when I am out of clinic in 2 weeks.    6-4-24 patient returns.  Last week she reported that when her legs were wrapped it helped with her pain (we had kept the wraps on for 3 days), therefore this week we placed the compound ointment and petroleum gauze and wrapped in light compression wrap.  Another factor helping her pain is that 2 weeks ago she was on and increased dose of predenisone at 20 mg daily.  She is will be reducing to 15 md later this week. She has tolerated the wraps well.  She still c/o burning pain and difficulty sleeping.  The tissue is improving and it appears the slough would be able to be lifted, but she is unable to tolerate any type of debridement. Will utilize enluxtra (after vashe soak) with back removed and kerramax in an attempt to clean up the wound bed and address the periwound. No s/s of infection. She wiil f/u with dr. Aguilar in my absence    MEDICATIONS:     Current Outpatient Medications:     predniSONE 5 MG Oral Tab, Take 20mg daily for 5 days then update office., Disp: 180 tablet, Rfl: 0    gabapentin 100 MG Oral Cap, Take 1 capsule (100 mg total) by mouth nightly., Disp: , Rfl:     amLODIPine 2.5 MG Oral Tab, Take 1 tablet (2.5 mg total) by mouth 2 (two) times daily., Disp:  180 tablet, Rfl: 0    hydroxychloroquine 200 MG Oral Tab, Take 1 tablet (200 mg total) by mouth daily., Disp: 90 tablet, Rfl: 0    Ferrous Sulfate 325 (65 Fe) MG Oral Tab, Take 1 tablet (325 mg total) by mouth 2 (two) times daily with meals., Disp: 180 tablet, Rfl: 0    Mycophenolate Mofetil 500 MG Oral Tab, Take 1 tablet (500 mg total) by mouth daily., Disp: 90 tablet, Rfl: 0  ALLERGIES:   No Known Allergies   REVIEW OF SYSTEMS:   This information was obtained from the patient/family and chart.    See HPI for pertinent positives, otherwise 10 pt ROS negative.    HISTORY:   Past medical, surgical, family and social history updated where appropriate.      PHYSICAL EXAM:     Vitals:    06/04/24 0950   BP: 111/72   Pulse: 83   Resp: 14   Temp: 98.3 °F (36.8 °C)          Estimated body mass index is 21.43 kg/m² as calculated from the following:    Height as of 4/30/24: 63\".    Weight as of 4/30/24: 121 lb (54.9 kg).   No results found for: \"PGLU\"    Vital signs reviewed.Appears stated age, well groomed.    Constitutional:  Bp wnl for patient. Pulse Regular and wnl for patient. Respirations easy and unlabored. Temperature wnl. Weight normal for height. Appearance neat and clean. Appears in no acute distress. Well nourished and well developed.    Lower extremity:  dp/pt palpable bilaterally. Extremities are free of varicosities and edema, they are scarred, scattered changes in pigmentation (hypo/hyper) with atrophie jay. Capillary refill < 3 seconds. Digits are warm. toenails are wnl for color, thickness and hygeine. Skin hydration wnl. + hairgrowth on legs.    Musculoskeletal:  Gait and station stable   Integumentary:  refer to wound characteristics and images   Psychiatric:  Judgment and insight intact. Alert and oriented times 3. No evidence of depression, anxiety, or agitation. Calm, cooperative, and communicative, but very quiet and reserved.  EDEMA:   Calf  Point of Measurement - Left Calf: 31  Point of  Measurement - Right Calf: 31     Calf Left cm:: 28.5     Right Calf cm:: 29.5  Ankle  Point of Measurement - Left Ankle: 11  Point of Measurement - Right Ankle: 11     Left Ankle cm:: 19        Right Ankle cm:: 17     DIAGNOSTICS:     Lab Results   Component Value Date    BUN 6 (L) 03/18/2024    CREATSERUM 0.50 (L) 03/18/2024    GFRCKDEPI 121.37 12/17/2020    ALB 4.0 03/18/2024    TP 8.0 03/18/2024    A1C 5.1 12/17/2020 11-28-23 arterial duplex-dr santamaria  FINDINGS:    LEFT EXTREMITY:  Triphasic waveforms in the left common femoral, profunda, superficial femoral arteries.  Biphasic waveforms below the left knee.   OTHER:  None.     PEAK VELOCITIES (CM/S):   LEFT COMMON FEMORAL:      165    LEFT PROFUNDA FEMORAL:      78      LEFT SUPERFICIAL FEMORAL PROX:    113   LEFT SUPERFICIAL FEMORAL MID:      94   LEFT SUPERFICIAL FEMORAL DISTAL:    92   LEFT POPLITEAL PROX:      110   LEFT POPLITEAL DISTAL:      121   TIBIOPER TRUNK:      66   LEFT PTA PROX:      60   LEFT PTA MID:      62   LEFT PTA DISTAL:      73   LEFT BETH PROX:      82   LEFT BETH MID:      72    LEFT DORSALIS PEDIS::      30   LEFT GREAT TOE PRESSURE:      25 mmHg   CONCLUSION:    1. No high-grade stenosis is identified.  There is suggestion of diffuse mild stenosis throughout most of the left lower extremity.    2. Overall diminished left toe pressure is noted.  Possibility of decreased perfusion in the distal left foot is of consideration.     7-14-20 venous reflux study-dr santamaria  Per Dr. Santamaria review \"no intervention needed\"  WOUND ASSESSMENT:     Wound 04/26/24 #1 Left Medial Ankle Ankle Left;Medial (Active)   Date First Assessed/Time First Assessed: 04/26/24 1031    Wound Number (Wound Clinic Only): #1 Left Medial Ankle  Primary Wound Type: (c) Auto-immune  Location: Ankle  Wound Location Orientation: Left;Medial      Assessments 4/26/2024 10:34 AM 6/4/2024  9:47 AM   Wound Image        Drainage Amount Unable to assess Small   Drainage Description  -- Serosanguineous   Treatments Compression --   Wound Length (cm) 3.5 cm 5.8 cm   Wound Width (cm) 4.6 cm 12.4 cm   Wound Surface Area (cm^2) 16.1 cm^2 71.92 cm^2   Wound Depth (cm) 0.1 cm 0.1 cm   Wound Volume (cm^3) 1.61 cm^3 7.192 cm^3   Wound Healing % -- -347   Margins Undefined edges Well-defined edges   Non-staged Wound Description Full thickness Full thickness   Soni-wound Assessment Dry;Edema;Atrophy jay Dry;Edema;Non-blanchable erythema   Wound Granulation Tissue -- Pink;Red;Spongy   Wound Bed Granulation (%) -- 40 %   Wound Bed Epithelium (%) -- 10 %   Wound Bed Slough (%) -- 50 %   Wound Odor None None   Shape 100% scabbed --       No associated orders.       Wound 04/26/24 #2 Right Lateral Ankle Ankle Right;Lateral (Active)   Date First Assessed/Time First Assessed: 04/26/24 1031    Wound Number (Wound Clinic Only): #2 Right Lateral Ankle  Primary Wound Type: (c) Auto-immune  Location: Ankle  Wound Location Orientation: Right;Lateral      Assessments 4/26/2024 10:34 AM 6/4/2024  9:44 AM   Wound Image       Drainage Amount Unable to assess Small   Drainage Description -- Serosanguineous   Treatments Compression --   Wound Length (cm) 0.5 cm 2 cm   Wound Width (cm) 0.7 cm 1.7 cm   Wound Surface Area (cm^2) 0.35 cm^2 3.4 cm^2   Wound Depth (cm) 0.1 cm 0.1 cm   Wound Volume (cm^3) 0.035 cm^3 0.34 cm^3   Wound Healing % -- -871   Margins Well-defined edges Well-defined edges   Non-staged Wound Description Full thickness Full thickness   Soni-wound Assessment Dry;Edema;Atrophy jay Edema;Atrophy jay;Hemosiderin staining;Dry   Wound Granulation Tissue -- Pink;Firm   Wound Bed Granulation (%) -- 2 %   Wound Bed Epithelium (%) -- 2 %   Wound Bed Slough (%) -- 90 %   Wound Odor None None   Shape 100% scabbed clustered       No associated orders.       Compression Wrap 05/21/24 Ankle Anterior;Left (Active)   Placement Date/Time: 05/21/24 1019   Location: Ankle  Wound Location Orientation: Anterior;Left       Assessments 5/21/2024  9:36 AM 5/28/2024  9:47 AM   Response to Treatment Well tolerated Well tolerated   Compression Layers Multilayer Multilayer   Compression Product Type Unna Boot Unna Boot   Dressing Applied Yes Yes   Compression Wrap Location Toes to Knee Toes to Knee   Compression Wrap Status Clean;Dry;Intact Clean;Dry;Intact       No associated orders.       Compression Wrap 05/28/24 Ankle Anterior;Right (Active)   Placement Date/Time: 05/28/24 1012   Location: Ankle  Wound Location Orientation: Anterior;Right      Assessments 5/28/2024  9:47 AM   Response to Treatment Well tolerated   Compression Layers Multilayer   Compression Product Type Unna Boot   Dressing Applied Yes   Compression Wrap Location Toes to Knee   Compression Wrap Status Dry;Intact;Clean       No associated orders.          ASSESSMENT AND PLAN:    1. Mixed connective tissue disease (HCC)    2. Lupus vasculitis (HCC)    3. Non-pressure chronic ulcer of left lower leg with fat layer exposed (HCC)    4. Non-pressure chronic ulcer of other part of right lower leg with fat layer exposed (HCC)    5. Current chronic use of systemic steroids        Risks, benefits, and alternatives of current treatment plan discussed in detail.  Questions and concerns addressed. Red flags to RTC or ED reviewed.  Patient (or parent) agrees to plan.      NOTE TO PATIENT: The 21st Century Cures Act makes clinical notes like these available to patients in the interest of transparency. Clinical notes are medical documents used by physicians and care providers to communicate with each other. These documents include medical language and terminology, abbreviations, and treatment information that may sound technical and at times possibly unfamiliar. In addition, at times, the verbiage may appear blunt or direct. These documents are one tool providers use to communicate relevant information and clinical opinions of the care providers in a way that allows common  understanding of the clinical context.   I spent 30minutes with the patient. This time included:    preparing to see the patient (eg, review notes and recent diagnostics),  seeing the patient, obtaining and/or reviewing separately obtained history, performing a medically appropriate examination and/or evaluation, counseling and educating the patient, documenting in the record,  DISCHARGE INSTRUCTIONS     Patient Instructions   Please return: 1 week with estrellita    Kenisha 10 or 12 with Dr. Aguilar  June 17 or 19 with Dr. Aguilar  June 25 with Estrellita    Patient discharge and wound care instructions  Raymond Hayward Pallavi  6/4/2024           You may shower with protection of the wound (ie a cast cover or similar).     Changing your dressing:           Wash your hands with soap and water.  Ensure that the old dressing is removed completely. Place it in a plastic bag and throw it in the trash.  Cleanse the wound with hypochlorous wound cleanser (ie. Anasept, vashe, pure and clean) .  It's ok to “scrub” your wound with the gauze, small amount of bleeding with cleansing is normal and ok.  Apply the following dressings:   Right:   enluxtra with back off>kerramax>20-30mmhg VERY light wrap  Left:   enluxtra with back off>kerramax >20-30mmhg VERY light wrap    Nutrition and blood sugar control:  Focus on the following:  Protein: Meats, beans, eggs, milk and yogurt particularly Greek yogurt), tofu, soy nuts, soy protein products (Follow the protein handout in your welcome folder)  Vitamin C: Citrus fruits and juices, strawberries, tomatoes, tomato juice, peppers, baked potatoes, spinach, broccoli, cauliflower, [place] sprouts, cabbage  Vitamin A: Dark green, leafy vegetables, orange or yellow vegetables, cantaloupe, fortified dairy products, liver, fortified cereals  Zinc: Fortified cereals, red meats, seafood  Consider supplementing with Justin by [employer]. It can be purchased on amazon, [employer] website, or local pharmacy may be  able to order it for you.  (These are essential branch chain amino acids that help with tissue building and wound healing).     Concerns:  Signs of infection may include the following:  Increase in redness  Red \"streaks\" from wound  Increase in swelling  Fever  Unusual odor  Change in the amount of wound drainage     Should you experience any significant changes in your wound(s) or have any questions regarding your home care instructions please contact the [facility] @ [phone] If after regular business hours, please call your family doctor or local emergency room. The treatment plan has been discussed at length between you and your provider. Follow all instructions carefully, it is very important. If you do not follow all instructions you are at risk of your wound not healing, infection, possible loss of limb and even loss of life.        Martina Mckeon FNP-C, CWCN-AP, CFCN, CSWS, WCC, DWC  6/4/2024

## 2024-06-04 ENCOUNTER — OFFICE VISIT (OUTPATIENT)
Dept: WOUND CARE | Facility: HOSPITAL | Age: 49
End: 2024-06-04
Attending: NURSE PRACTITIONER
Payer: COMMERCIAL

## 2024-06-04 VITALS
SYSTOLIC BLOOD PRESSURE: 111 MMHG | TEMPERATURE: 98 F | RESPIRATION RATE: 14 BRPM | DIASTOLIC BLOOD PRESSURE: 72 MMHG | HEART RATE: 83 BPM

## 2024-06-04 DIAGNOSIS — L97.922 NON-PRESSURE CHRONIC ULCER OF LEFT LOWER LEG WITH FAT LAYER EXPOSED (HCC): ICD-10-CM

## 2024-06-04 DIAGNOSIS — M32.19 LUPUS VASCULITIS (HCC): ICD-10-CM

## 2024-06-04 DIAGNOSIS — M35.1 MIXED CONNECTIVE TISSUE DISEASE (HCC): Primary | ICD-10-CM

## 2024-06-04 DIAGNOSIS — Z79.52 CURRENT CHRONIC USE OF SYSTEMIC STEROIDS: ICD-10-CM

## 2024-06-04 DIAGNOSIS — L97.812 NON-PRESSURE CHRONIC ULCER OF OTHER PART OF RIGHT LOWER LEG WITH FAT LAYER EXPOSED (HCC): ICD-10-CM

## 2024-06-04 DIAGNOSIS — I77.89 LUPUS VASCULITIS (HCC): ICD-10-CM

## 2024-06-04 PROCEDURE — 99214 OFFICE O/P EST MOD 30 MIN: CPT | Performed by: NURSE PRACTITIONER

## 2024-06-04 NOTE — PROGRESS NOTES
.Weekly Wound Education Note    Teaching Provided To: Patient;Family  Training Topics: Dressing;Edema control;Cleasing and general instructions;Compression;Discharge instructions  Training Method: Explain/Verbal;Written  Training Response: Patient responds and understands        Notes: Wounds stable. Dressing changed to enluxtra with back off, kerramax and calamine unna boot 20-30mmhg wrapped loosely.

## 2024-06-04 NOTE — PATIENT INSTRUCTIONS
Please return: 1 week with estrellita    Kenisha 10 or 12 with Dr. Aguilar  June 17 or 19 with Dr. Aguilar  June 25 with Estrellita    Patient discharge and wound care instructions  Raymond Olivo  6/4/2024           You may shower with protection of the wound (ie a cast cover or similar).     Changing your dressing:           Wash your hands with soap and water.  Ensure that the old dressing is removed completely. Place it in a plastic bag and throw it in the trash.  Cleanse the wound with hypochlorous wound cleanser (ie. Anasept, vashe, pure and clean) .  It's ok to “scrub” your wound with the gauze, small amount of bleeding with cleansing is normal and ok.  Apply the following dressings:   Right:   enluxtra with back off>kerramax>20-30mmhg VERY light wrap  Left:   enluxtra with back off>kerramax >20-30mmhg VERY light wrap    Nutrition and blood sugar control:  Focus on the following:  Protein: Meats, beans, eggs, milk and yogurt particularly Greek yogurt), tofu, soy nuts, soy protein products (Follow the protein handout in your welcome folder)  Vitamin C: Citrus fruits and juices, strawberries, tomatoes, tomato juice, peppers, baked potatoes, spinach, broccoli, cauliflower, [place] sprouts, cabbage  Vitamin A: Dark green, leafy vegetables, orange or yellow vegetables, cantaloupe, fortified dairy products, liver, fortified cereals  Zinc: Fortified cereals, red meats, seafood  Consider supplementing with Justin by [employer]. It can be purchased on amazon, [employer] website, or local pharmacy may be able to order it for you.  (These are essential branch chain amino acids that help with tissue building and wound healing).     Concerns:  Signs of infection may include the following:  Increase in redness  Red \"streaks\" from wound  Increase in swelling  Fever  Unusual odor  Change in the amount of wound drainage     Should you experience any significant changes in your wound(s) or have any questions regarding your home care  instructions please contact the wound center [facility] @ [phone] If after regular business hours, please call your family doctor or local emergency room. The treatment plan has been discussed at length between you and your provider. Follow all instructions carefully, it is very important. If you do not follow all instructions you are at risk of your wound not healing, infection, possible loss of limb and even loss of life.

## 2024-06-12 ENCOUNTER — OFFICE VISIT (OUTPATIENT)
Dept: WOUND CARE | Facility: HOSPITAL | Age: 49
End: 2024-06-12
Attending: NURSE PRACTITIONER
Payer: COMMERCIAL

## 2024-06-12 VITALS
RESPIRATION RATE: 14 BRPM | DIASTOLIC BLOOD PRESSURE: 69 MMHG | HEART RATE: 86 BPM | SYSTOLIC BLOOD PRESSURE: 105 MMHG | TEMPERATURE: 98 F

## 2024-06-12 DIAGNOSIS — L97.322 NON-PRESSURE CHRONIC ULCER OF LEFT ANKLE WITH FAT LAYER EXPOSED (HCC): ICD-10-CM

## 2024-06-12 DIAGNOSIS — M35.1 MIXED CONNECTIVE TISSUE DISEASE (HCC): ICD-10-CM

## 2024-06-12 DIAGNOSIS — I77.89 LUPUS VASCULITIS (HCC): ICD-10-CM

## 2024-06-12 DIAGNOSIS — Z79.52 CURRENT CHRONIC USE OF SYSTEMIC STEROIDS: ICD-10-CM

## 2024-06-12 DIAGNOSIS — M32.19 LUPUS VASCULITIS (HCC): ICD-10-CM

## 2024-06-12 DIAGNOSIS — I87.333 IDIOPATHIC CHRONIC VENOUS HYPERTENSION OF BOTH LOWER EXTREMITIES WITH ULCER AND INFLAMMATION (HCC): ICD-10-CM

## 2024-06-12 DIAGNOSIS — L97.312 NON-PRESSURE CHRONIC ULCER OF RIGHT ANKLE WITH FAT LAYER EXPOSED (HCC): Primary | ICD-10-CM

## 2024-06-12 PROCEDURE — 29581 APPL MULTLAYER CMPRN SYS LEG: CPT

## 2024-06-12 NOTE — PROGRESS NOTES
[facility] PROGRESS NOTE  SHABNAM ADRIAN MD  6/12/2024    Chief Complaint:   Chief Complaint   Patient presents with    Wound Care     Patient is here for a wound care follow up. She complains of pain that is 5/10.        HPI:   Subjective   Raymond Olivo is a 49 year old female coming in for a follow-up visit.    HPI    COVERING NIRANJAN NP  CHART REVIEWED IN DETAIL.     Wounds stable - slightly improved.   Still  with large amount of nonviable tissue / slough wound base.   Pain better but still has burning all the time - more at night  Takes gabapentin 200 gm at bedtime through rheum - dr. Stewart    No s/o active infection now.     Does not tolerate compression wraps well due to pain - discussed possibility of increasing gabapentin dose so that wraps maybe better tolerated.     Will run insurance PA for skin subs.     Review of Systems  Negative except HPI   Denies chest pain / SOB / palpitations  Denies fever.     Allergies  No Known Allergies    Current Meds:  Current Outpatient Medications   Medication Sig Dispense Refill    predniSONE 5 MG Oral Tab Take 20mg daily for 5 days then update office. 180 tablet 0    gabapentin 100 MG Oral Cap Take 1 capsule (100 mg total) by mouth nightly.      amLODIPine 2.5 MG Oral Tab Take 1 tablet (2.5 mg total) by mouth 2 (two) times daily. 180 tablet 0    hydroxychloroquine 200 MG Oral Tab Take 1 tablet (200 mg total) by mouth daily. 90 tablet 0    Ferrous Sulfate 325 (65 Fe) MG Oral Tab Take 1 tablet (325 mg total) by mouth 2 (two) times daily with meals. 180 tablet 0    Mycophenolate Mofetil 500 MG Oral Tab Take 1 tablet (500 mg total) by mouth daily. 90 tablet 0         EXAM:   Objective   Objective    Physical Exam    Vital Signs  Vitals:    06/12/24 1008   BP: 105/69   Pulse: 86   Resp: 14   Temp: 98.2 °F (36.8 °C)       Wound Assessment  Wound 04/26/24 #1 Left Medial Ankle Ankle Left;Medial (Active)   Wound Image    06/12/24 1006   Drainage Amount Moderate  06/12/24 1006   Drainage Description Serous;Yellow 06/12/24 1006   Treatments Compression 06/04/24 0947   Wound Length (cm) 7.1 cm 06/12/24 1006   Wound Width (cm) 11.2 cm 06/12/24 1006   Wound Surface Area (cm^2) 79.52 cm^2 06/12/24 1006   Wound Depth (cm) 0.1 cm 06/12/24 1006   Wound Volume (cm^3) 7.952 cm^3 06/12/24 1006   Wound Healing % -394 06/12/24 1006   Margins Well-defined edges 06/12/24 1006   Non-staged Wound Description Full thickness 06/12/24 1006   Soni-wound Assessment Dry;Edema;Non-blanchable erythema 06/12/24 1006   Wound Granulation Tissue Pink;Firm 06/12/24 1006   Wound Bed Granulation (%) 20 % 06/12/24 1006   Wound Bed Epithelium (%) 30 % 06/12/24 1006   Wound Bed Slough (%) 50 % 06/12/24 1006   Wound Odor None 06/12/24 1006   Shape 100% scabbed 04/26/24 1034   Tunneling? No 05/28/24 0947   Undermining? No 05/28/24 0947   Sinus Tracts? No 05/28/24 0947       Wound 04/26/24 #2 Right Lateral Ankle Ankle Right;Lateral (Active)   Wound Image   06/12/24 1004   Drainage Amount Scant 06/12/24 1004   Drainage Description Serosanguineous 06/12/24 1004   Treatments Compression 06/04/24 0944   Wound Length (cm) 2 cm 06/12/24 1004   Wound Width (cm) 1.8 cm 06/12/24 1004   Wound Surface Area (cm^2) 3.6 cm^2 06/12/24 1004   Wound Depth (cm) 0.1 cm 06/12/24 1004   Wound Volume (cm^3) 0.36 cm^3 06/12/24 1004   Wound Healing % -929 06/12/24 1004   Margins Well-defined edges 06/12/24 1004   Non-staged Wound Description Full thickness 06/12/24 1004   Soni-wound Assessment Edema;Atrophy jay;Hemosiderin staining;Dry 06/12/24 1004   Wound Granulation Tissue Firm;Pink 06/12/24 1004   Wound Bed Granulation (%) 30 % 06/12/24 1004   Wound Bed Epithelium (%) 2 % 06/04/24 0944   Wound Bed Slough (%) 60 % 06/12/24 1004   Wound Odor None 06/12/24 1004   Shape 10% scabbed 06/12/24 1004   Tunneling? No 05/28/24 0945   Undermining? No 05/28/24 0945   Sinus Tracts? No 05/28/24 0945       Compression Wrap 05/21/24 Ankle  Anterior;Left (Active)   Response to Treatment Well tolerated 06/04/24 0944   Compression Layers Multilayer 06/04/24 0944   Compression Product Type Unna Boot 06/04/24 0944   Dressing Applied Yes 06/04/24 0944   Compression Wrap Location Toes to Knee 06/04/24 0944   Compression Wrap Status Clean;Dry;Intact 06/04/24 0944       Compression Wrap 05/28/24 Ankle Anterior;Right (Active)   Response to Treatment Well tolerated 06/04/24 0944   Compression Layers Multilayer 06/04/24 0944   Compression Product Type Unna Boot 06/04/24 0944   Dressing Applied Yes 06/04/24 0944   Compression Wrap Location Toes to Knee 06/04/24 0944   Compression Wrap Status Dry;Intact;Clean 06/04/24 0944           ASSESSMENT AND PLAN:     Assessment   Assessment    Encounter Diagnosis  1. 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 Idiopathic chronic venous hypertension of both lower extremities with ulcer and inflammation (HCC)    3. Non-pressure chronic ulcer of left ankle with fat layer exposed (HCC)    4. Lupus vasculitis (HCC)    5. Mixed connective tissue disease (HCC)    6. Current chronic use of systemic steroids        Problem List  Patient Active Problem List   Diagnosis    Mixed connective tissue disease (HCC)    Raynaud's disease without gangrene    High risk medication use    Raynaud's disease    Current chronic use of systemic steroids    Leukopenia    Ribonucleoprotein antibody positive    Positive WELLINGTON (antinuclear antibody)    Idiopathic thrombocytopenic purpura (ITP) (HCC)    Anemia    B12 deficiency    Long-term use of Plaquenil    Long term (current) use of systemic steroids    Iron deficiency    Lupus vasculitis (HCC)         MANAGEMENT      Start collagen right ankle wound.   Increase dose of gabapentin  Increase compression as tolerated  Leg elevation.   Low salt diet.   PA for skin subs.   D/w  and daughter in detail.   Return one week    PROCEDURES:    Compression wrap application.      Patient Instructions     Consider increase gabapentin to TID PRN due to persistent burning pain - through Dr. Stewart    RETURN:  June 19th with Dr. Aguilar  June 25th with Martina    Patient discharge and wound care instructions  Raymond Olivo      You may shower with protection of the wound (ie a cast cover or similar).     Changing your dressing:           Wash your hands with soap and water.  Ensure that the old dressing is removed completely. Place it in a plastic bag and throw it in the trash.  Cleanse the wound with hypochlorous wound cleanser (ie. Anasept, vashe, pure and clean) .  It's ok to “scrub” your wound with the gauze, small amount of bleeding with cleansing is normal and ok.  Apply the following dressings:   Right:   JAVON / enluxtra with back off>kerramax>20-30mm hg wrap  Left:   enluxtra with back off>kerramax >20-30mmhg VERY light wrap    Nutrition and blood sugar control:  Focus on the following:  Protein: Meats, beans, eggs, milk and yogurt particularly Greek yogurt), tofu, soy nuts, soy protein products (Follow the protein handout in your welcome folder)  Vitamin C: Citrus fruits and juices, strawberries, tomatoes, tomato juice, peppers, baked potatoes, spinach, broccoli, cauliflower, [place] sprouts, cabbage  Vitamin A: Dark green, leafy vegetables, orange or yellow vegetables, cantaloupe, fortified dairy products, liver, fortified cereals  Zinc: Fortified cereals, red meats, seafood  Consider supplementing with Justin by [employer]ize. It can be purchased on amazon, [employer] website, or local pharmacy may be able to order it for you.  (These are essential branch chain amino acids that help with tissue building and wound healing).     Concerns:  Signs of infection may include the following:  Increase in redness  Red \"streaks\" from wound  Increase in swelling  Fever  Unusual odor  Change in the amount of wound drainage     Should you experience any significant changes in your wound(s) or  have any questions regarding your home care instructions please contact the wound center [facility] @ [phone] If after regular business hours, please call your family doctor or local emergency room. The treatment plan has been discussed at length between you and your provider. Follow all instructions carefully, it is very important. If you do not follow all instructions you are at risk of your wound not healing, infection, possible loss of limb and even loss of life.           Patient/Caregiver Education: There are no barriers to learning. Medical education for above diagnosis given.   Answered all questions.    Outcome: Patient verbalizes understanding. Patient is notified to call with any questions, complications, allergies, or worsening or changing symptoms.  Patient is to call with any side effects or complications as a result of the treatments today.      DOCUMENTATION OF TIME SPENT: Code selection for this visit was based on time spent : 40 min on date of service in preparing to see the patient, obtaining and/or reviewing separately obtained history, performing a medically appropriate examination, counseling and educating the patient/family/caregiver, ordering medications or testing, referring and communicating with other healthcare providers, documenting clinical information in the E HR, independently interpreting results and communicating results to the patient/family/caregiver and care coordination with the patient's other providers.    Followup: Return in about 1 week (around 6/19/2024) for Wound followup.      Note to Patient:  The 21st Century Cures Act makes medical notes like these available to patients in the interest of transparency. However, be advised this is a medical document and is intended as [id number] communication; it is written in medical language and may appear blunt, direct, or contain abbreviations or verbiage that are unfamiliar. Medical documents are intended to carry  relevant information, facts as evident, and the clinical opinion of the practitioner.    Also, please note that this report has been produced using speech recognition software and may contain errors related to that system including, but not limited to, errors in grammar, punctuation, and spelling, as well as words and phrases that possibly may have been recognized inappropriately.  If there are any questions or concerns, contact the dictating provider for clarification.      Kaz Aguilar MD  6/12/2024  10:20 AM

## 2024-06-12 NOTE — PROGRESS NOTES
Weekly Wound Education Note    Teaching Provided To: Patient;Family  Training Topics: Cleasing and general instructions;Compression;Discharge instructions;Dressing;Edema control  Training Method: Explain/Verbal  Training Response: Patient responds and understands;Reinforcement needed        Notes: Stable. Vashe soak prior to dressing application. Right ankle: gina and enluxtra. Left ankle: enluxtra (backing removed) and kerramax. Unna boot 20-30mmhg BLE. Running insurance for skin subs. Ordered polymem silver MAX from [facility] - pt/family advised to bring to next visit.

## 2024-06-12 NOTE — PATIENT INSTRUCTIONS
Consider increase gabapentin to TID PRN due to persistent burning pain - through Dr. Stewart    RETURN:  June 19th with Dr. Aguilar  June 25th with Martina    Patient discharge and wound care instructions  Raymond Olivo      You may shower with protection of the wound (ie a cast cover or similar).     Changing your dressing:           Wash your hands with soap and water.  Ensure that the old dressing is removed completely. Place it in a plastic bag and throw it in the trash.  Cleanse the wound with hypochlorous wound cleanser (ie. Anasept, vashe, pure and clean) .  It's ok to “scrub” your wound with the gauze, small amount of bleeding with cleansing is normal and ok.  Apply the following dressings:   Right:   JAVON / enluxtra with back off>kerramax>20-30mm hg wrap  Left:   enluxtra with back off>kerramax >20-30mmhg VERY light wrap    Nutrition and blood sugar control:  Focus on the following:  Protein: Meats, beans, eggs, milk and yogurt particularly Greek yogurt), tofu, soy nuts, soy protein products (Follow the protein handout in your welcome folder)  Vitamin C: Citrus fruits and juices, strawberries, tomatoes, tomato juice, peppers, baked potatoes, spinach, broccoli, cauliflower, [place] sprouts, cabbage  Vitamin A: Dark green, leafy vegetables, orange or yellow vegetables, cantaloupe, fortified dairy products, liver, fortified cereals  Zinc: Fortified cereals, red meats, seafood  Consider supplementing with Justin by [employer]. It can be purchased on amazon, [employer] website, or local pharmacy may be able to order it for you.  (These are essential branch chain amino acids that help with tissue building and wound healing).     Concerns:  Signs of infection may include the following:  Increase in redness  Red \"streaks\" from wound  Increase in swelling  Fever  Unusual odor  Change in the amount of wound drainage     Should you experience any significant changes in your wound(s) or have any questions  regarding your home care instructions please contact the wound center [facility] @ [phone] If after regular business hours, please call your family doctor or local emergency room. The treatment plan has been discussed at length between you and your provider. Follow all instructions carefully, it is very important. If you do not follow all instructions you are at risk of your wound not healing, infection, possible loss of limb and even loss of life.

## 2024-06-19 ENCOUNTER — OFFICE VISIT (OUTPATIENT)
Dept: WOUND CARE | Facility: HOSPITAL | Age: 49
End: 2024-06-19
Attending: NURSE PRACTITIONER

## 2024-06-19 ENCOUNTER — APPOINTMENT (OUTPATIENT)
Dept: WOUND CARE | Facility: HOSPITAL | Age: 49
End: 2024-06-19
Attending: NURSE PRACTITIONER

## 2024-06-19 VITALS
TEMPERATURE: 98 F | SYSTOLIC BLOOD PRESSURE: 116 MMHG | HEART RATE: 89 BPM | DIASTOLIC BLOOD PRESSURE: 81 MMHG | RESPIRATION RATE: 14 BRPM

## 2024-06-19 DIAGNOSIS — L97.312 NON-PRESSURE CHRONIC ULCER OF RIGHT ANKLE WITH FAT LAYER EXPOSED (HCC): Primary | ICD-10-CM

## 2024-06-19 DIAGNOSIS — I77.89 LUPUS VASCULITIS (HCC): ICD-10-CM

## 2024-06-19 DIAGNOSIS — L97.322 NON-PRESSURE CHRONIC ULCER OF LEFT ANKLE WITH FAT LAYER EXPOSED (HCC): ICD-10-CM

## 2024-06-19 DIAGNOSIS — I87.333 IDIOPATHIC CHRONIC VENOUS HYPERTENSION OF BOTH LOWER EXTREMITIES WITH ULCER AND INFLAMMATION (HCC): ICD-10-CM

## 2024-06-19 DIAGNOSIS — M35.1 MIXED CONNECTIVE TISSUE DISEASE (HCC): ICD-10-CM

## 2024-06-19 DIAGNOSIS — M32.19 LUPUS VASCULITIS (HCC): ICD-10-CM

## 2024-06-19 DIAGNOSIS — Z79.52 CURRENT CHRONIC USE OF SYSTEMIC STEROIDS: ICD-10-CM

## 2024-06-19 PROCEDURE — 97597 DBRDMT OPN WND 1ST 20 CM/<: CPT | Performed by: INTERNAL MEDICINE

## 2024-06-19 PROCEDURE — 29581 APPL MULTLAYER CMPRN SYS LEG: CPT

## 2024-06-19 NOTE — PROGRESS NOTES
.Weekly Wound Education Note    Teaching Provided To: Patient;Family  Training Topics: Discharge instructions;Dressing;Compression;Edema control  Training Method: Explain/Verbal;Written  Training Response: Patient responds and understands;Reinforcement needed            PolyMem Ag to right lateral leg.  Lety Ag, PolyMem Ag to left leg wounds.  Calamine unna boot 20-30mmHg to BLE.

## 2024-06-19 NOTE — PROGRESS NOTES
Patient ID: Raymond Olivo is a 49 year old female.    Debridement Auto-immune Right;Lateral Ankle   Wound 04/26/24 #2 Right Lateral Ankle Ankle Right;Lateral    Performed by: Kaz Tapia MD  Authorized by: Kaz Tapia MD      Consent   Consent obtained? verbal  Consent given by: patient    Debridement Details  Performed by: physician  Debridement type: conservative sharp  Pain control: lidocaine 4%  Pain control administration type: topical    Pre-debridement measurements  Length (cm): 1.5  Width (cm): 1.5  Depth (cm): 0.1  Surface Area (cm^2): 2.25    Post-debridement measurements  Length (cm): 1.5  Width (cm): 1.6  Depth (cm): 0.1  Percent debrided: 100%  Surface Area (cm^2): 2.4  Area Debrided (cm^2): 2.4  Volume (cm^3): 0.24    Devitalized tissue debrided: biofilm and slough  Instrument(s) utilized: curette  Bleeding: small  Hemostasis obtained with: not applicable  Procedural pain (0-10): 4  Post-procedural pain: 2   Response to treatment: procedure was tolerated well

## 2024-06-19 NOTE — PROGRESS NOTES
[facility] PROGRESS NOTE  SHABNAM ADRIAN MD  6/19/2024    Chief Complaint:   Chief Complaint   Patient presents with    Wound Care     Patient is here for a wound care follow up. Her pain is currently 6/10.        HPI:   Subjective   Raymond Olivo is a 49 year old female coming in for a follow-up visit.    HPI    Both wounds stable and improving.   Responding well to gina and enluxtra.  She has brought in the POLYMEM ag that was ordered last visit as recommended by Martina MONTES previously.     Her main concern is burning in the wound and periwound area.   Takes gabapentin for pain - somewhat effective    Toelrating wraps OK.     No s/o infection.   Overall looking better.     Review of Systems  Negative except HPI   Denies chest pain / SOB / palpitations  Denies fever.     Allergies  No Known Allergies    Current Meds:  Current Outpatient Medications   Medication Sig Dispense Refill    predniSONE 5 MG Oral Tab Take 20mg daily for 5 days then update office. 180 tablet 0    gabapentin 100 MG Oral Cap Take 1 capsule (100 mg total) by mouth nightly.      amLODIPine 2.5 MG Oral Tab Take 1 tablet (2.5 mg total) by mouth 2 (two) times daily. 180 tablet 0    hydroxychloroquine 200 MG Oral Tab Take 1 tablet (200 mg total) by mouth daily. 90 tablet 0    Ferrous Sulfate 325 (65 Fe) MG Oral Tab Take 1 tablet (325 mg total) by mouth 2 (two) times daily with meals. 180 tablet 0    Mycophenolate Mofetil 500 MG Oral Tab Take 1 tablet (500 mg total) by mouth daily. 90 tablet 0         EXAM:   Objective   Objective    Physical Exam    Vital Signs  Vitals:    06/19/24 0959   BP: 116/81   Pulse: 89   Resp: 14   Temp: 98 °F (36.7 °C)       Wound Assessment  Wound 04/26/24 #1 Left Medial Ankle Ankle Left;Medial (Active)   Wound Image    06/19/24 1008   Drainage Amount Moderate 06/19/24 1008   Drainage Description Serosanguineous 06/19/24 1008   Treatments Compression 06/12/24 1006   Wound Length (cm) 7.1 cm 06/19/24 1008    Wound Width (cm) 12.2 cm 06/19/24 1008   Wound Surface Area (cm^2) 86.62 cm^2 06/19/24 1008   Wound Depth (cm) 0.1 cm 06/19/24 1008   Wound Volume (cm^3) 8.662 cm^3 06/19/24 1008   Wound Healing % -438 06/19/24 1008   Margins Well-defined edges 06/19/24 1008   Non-staged Wound Description Full thickness 06/19/24 1008   Soni-wound Assessment Dry;Edema;Atrophy jay 06/19/24 1008   Wound Granulation Tissue Firm;[place] 06/19/24 1008   Wound Bed Granulation (%) 25 % 06/19/24 1008   Wound Bed Epithelium (%) 50 % 06/19/24 1008   Wound Bed Slough (%) 25 % 06/19/24 1008   Wound Odor None 06/19/24 1008   Shape 100% scabbed 04/26/24 1034   Tunneling? No 06/19/24 1008   Undermining? No 06/19/24 1008   Sinus Tracts? No 06/19/24 1008       Wound 04/26/24 #2 Right Lateral Ankle Ankle Right;Lateral (Active)   Wound Image   06/19/24 1009   Drainage Amount Moderate 06/19/24 1009   Drainage Description Serous;Yellow 06/19/24 1009   Treatments Compression 06/12/24 1004   Wound Length (cm) 1.5 cm 06/19/24 1009   Wound Width (cm) 1.5 cm 06/19/24 1009   Wound Surface Area (cm^2) 2.25 cm^2 06/19/24 1009   Wound Depth (cm) 0.1 cm 06/19/24 1009   Wound Volume (cm^3) 0.225 cm^3 06/19/24 1009   Wound Healing % -543 06/19/24 1009   Margins Well-defined edges 06/19/24 1009   Non-staged Wound Description Full thickness 06/19/24 1009   Soni-wound Assessment Edema;Atrophy jay;Hemosiderin staining;Dry 06/19/24 1009   Wound Granulation Tissue Firm;[place] 06/19/24 1009   Wound Bed Granulation (%) 5 % 06/19/24 1009   Wound Bed Epithelium (%) 2 % 06/04/24 0944   Wound Bed Slough (%) 95 % 06/19/24 1009   Wound Odor None 06/19/24 1009   Shape 10% scabbed 06/12/24 1004   Tunneling? No 06/19/24 1009   Undermining? No 06/19/24 1009   Sinus Tracts? No 06/19/24 1009       Compression Wrap 05/21/24 Ankle Anterior;Left (Active)   Response to Treatment Well tolerated 06/12/24 1210   Compression Layers Multilayer 06/12/24 1210   Compression Product Type Unna  Boot 06/12/24 1210   Dressing Applied Yes 06/12/24 1210   Compression Wrap Location Toes to Knee 06/04/24 0944   Compression Wrap Status Clean;Dry;Intact 06/12/24 1210       Compression Wrap 05/28/24 Ankle Anterior;Right (Active)   Response to Treatment Well tolerated 06/12/24 1210   Compression Layers Multilayer 06/12/24 1210   Compression Product Type Unna Boot 06/12/24 1210   Dressing Applied Yes 06/12/24 1210   Compression Wrap Location Toes to Knee 06/12/24 1210   Compression Wrap Status Dry;Intact;Clean 06/12/24 1210           ASSESSMENT AND PLAN:     Assessment   Assessment    Encounter Diagnosis  1. 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 Idiopathic chronic venous hypertension of both lower extremities with ulcer and inflammation (HCC)    3. Non-pressure chronic ulcer of left ankle with fat layer exposed (HCC)    4. Lupus vasculitis (HCC)    5. Mixed connective tissue disease (HCC)    6. Current chronic use of systemic steroids        Problem List  Patient Active Problem List   Diagnosis    Mixed connective tissue disease (HCC)    Raynaud's disease without gangrene    High risk medication use    Raynaud's disease    Current chronic use of systemic steroids    Leukopenia    Ribonucleoprotein antibody positive    Positive [place] (antinuclear antibody)    Idiopathic thrombocytopenic purpura (ITP) (HCC)    Anemia    B12 deficiency    Long-term use of Plaquenil    Long term (current) use of systemic steroids    Iron deficiency    Lupus vasculitis (HCC)         MANAGEMENT    Serial debridement as needed.   Start polymem ag to be used above gina collagen.   Consider increasing gabapentin dose.   Continue compression wraps on both legs  Return one week      PROCEDURES:    Debridement Auto-immune Right;Lateral Ankle   Wound 04/26/24 #2 Right Lateral Ankle Ankle Right;Lateral     Performed by: Kaz Tapia MD  Authorized by: Kaz Tapia MD       Consent   Consent  obtained? verbal  Consent given by: patient     Debridement Details  Performed by: physician  Debridement type: conservative sharp  Pain control: lidocaine 4%  Pain control administration type: topical     Pre-debridement measurements  Length (cm): 1.5  Width (cm): 1.5  Depth (cm): 0.1  Surface Area (cm^2): 2.25     Post-debridement measurements  Length (cm): 1.5  Width (cm): 1.6  Depth (cm): 0.1  Percent debrided: 100%  Surface Area (cm^2): 2.4  Area Debrided (cm^2): 2.4  Volume (cm^3): 0.24     Devitalized tissue debrided: biofilm and slough  Instrument(s) utilized: curette  Bleeding: small  Hemostasis obtained with: not applicable  Procedural pain (0-10): 4  Post-procedural pain: 2   Response to treatment: procedure was tolerated well    Compression wraps applied after dressing change.     Patient Instructions   RETURN: June 25th with Martina    Patient discharge and wound care instructions  Raymond Olivo    You may shower with protection of the wound (ie a cast cover or similar).     Changing your dressing:           Wash your hands with soap and water.  Ensure that the old dressing is removed completely. Place it in a plastic bag and throw it in the trash.  Cleanse the wound with hypochlorous wound cleanser (ie. Anasept, vashe, pure and clean) .  It's ok to “scrub” your wound with the gauze, small amount of bleeding with cleansing is normal and ok.  Apply the following dressings:   Right:   JAVON / polymems ag >kerramax>20-30mm hg wrap  Left:   polymems ag >kerramax >20-30mmhg VERY light wrap    Nutrition and blood sugar control:  Focus on the following:  Protein: Meats, beans, eggs, milk and yogurt particularly Greek yogurt), tofu, soy nuts, soy protein products (Follow the protein handout in your welcome folder)  Vitamin C: Citrus fruits and juices, strawberries, tomatoes, tomato juice, peppers, baked potatoes, spinach, broccoli, cauliflower, [place] sprouts, cabbage  Vitamin A: Dark green, leafy  vegetables, orange or yellow vegetables, cantaloupe, fortified dairy products, liver, fortified cereals  Zinc: Fortified cereals, red meats, seafood  Consider supplementing with Justin by [employer]. It can be purchased on amazon, [employer] website, or local pharmacy may be able to order it for you.  (These are essential branch chain amino acids that help with tissue building and wound healing).     Concerns:  Signs of infection may include the following:  Increase in redness  Red \"streaks\" from wound  Increase in swelling  Fever  Unusual odor  Change in the amount of wound drainage     Should you experience any significant changes in your wound(s) or have any questions regarding your home care instructions please contact the [facility] center [facility] @ [phone] If after regular business hours, please call your family doctor or local emergency room. The treatment plan has been discussed at length between you and your provider. Follow all instructions carefully, it is very important. If you do not follow all instructions you are at risk of your wound not healing, infection, possible loss of limb and even loss of life.         Orders  Orders Placed This Encounter   Procedures    Debridement Auto-immune Right;Lateral Ankle     Patient/Caregiver Education: There are no barriers to learning. Medical education for above diagnosis given.   Answered all questions.    Outcome: Patient verbalizes understanding. Patient is notified to call with any questions, complications, allergies, or worsening or changing symptoms.  Patient is to call with any side effects or complications as a result of the treatments today.      DOCUMENTATION OF TIME SPENT: Code selection for this visit was based on time spent : 35 min on date of service in preparing to see the patient, obtaining and/or reviewing separately obtained history, performing a medically appropriate examination, counseling and educating the patient/family/caregiver,  ordering medications or testing, referring and communicating with other healthcare providers, documenting clinical information in the E HR, independently interpreting results and communicating results to the patient/family/caregiver and care coordination with the patient's other providers.    Followup: Return in about 1 week (around 6/26/2024) for Wound followup.      Note to Patient:  The 21st Century Cures Act makes medical notes like these available to patients in the interest of transparency. However, be advised this is a medical document and is intended as [id number] communication; it is written in medical language and may appear blunt, direct, or contain abbreviations or verbiage that are unfamiliar. Medical documents are intended to carry relevant information, facts as evident, and the clinical opinion of the practitioner.    Also, please note that this report has been produced using speech recognition software and may contain errors related to that system including, but not limited to, errors in grammar, punctuation, and spelling, as well as words and phrases that possibly may have been recognized inappropriately.  If there are any questions or concerns, contact the dictating provider for clarification.      Kaz Aguilar MD  6/19/2024  10:13 AM

## 2024-06-24 NOTE — PROGRESS NOTES
CHIEF COMPLAINT:     Chief Complaint   Patient presents with    Wound Recheck     Pt here for follow up arrives with compression wraps to bilaterally legs in place. No new concerns.      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 Patient saw dr. Santamaria (vascular) in December 2023 and he documented \"I explained to the patient and her  that I do not believe the source of her pain to be vascular in origin.  She has an easily palpable dorsalis pedis pulse and the location of the pain is more suggestive of a neuropathic origin.  I suspect perhaps that given the tightness of her feet from her mixed connective tissue disorder the compression stockings is causing somewhat compression of the nerves in that area and I have encouraged her to substitute those stockings to those that involve the feet up to the upper calf.\" Patient/spouse have been in contact with dr. Farmer and in march 2024 reported a dark spot/ulcer on her bilateral lower legs, she was advised to make an appointment with wound care as well as get her blood work done ordered in January 2024 so that further treatment plan could be extablished.  The blood work did show increased inflammation and she was advised to increase her prednisone to 20mg x 5 days (which spouse reports they did do) and take the amlodipine 2.5 mg-which she states she is doing.  They have been leaving the areas open to air and she has not been wearing compression.  She c/o burning sensation in her ankle area.  She was started on gabapentin by dr. Farmer last year, however they stopped it because it was making her too sleepy. We discussed restarting the gabapentin, but only take a noc first.  Both wounds are dried eschar.  Will utilize hydrogel with lidocaine to start debriding  off/softening the eschar. I have also ordered santyl. Patient placed into spandagrips for compression    HPI PRIOR TO JUNE 2024 SEE INDIVIDUAL PROGRESS NOTES  5-3-24 patient returns.  Last week we had the spouse utilize hydrogel to start moistening and debriding off the eschar/scabbing/dried drainage.  I also ordered santyl.  They were able to get the Santyl, and started utilizing it a few days ago.  she followed up in office with rheum. She states she has been taking the gabapentin at noc but it has not been helping the burning and is still causing her drowsiness.  The wounds are much , the eschar and drainage is off.  Will continue with santyl and have spouse utilize barrier cream around the wounds as needed to prevent maceration. No acute s/s of infection, patient still with pain with any cleansing or manipulation of the wounds.    5-10-24 patient returns. Edema is stable.  They have been utilizing santyl.  Her pain has been worse the last 4 days, especially when she is walking.  She is not able to tolerate any touch to the areas. We discussed utilizing a compound rx, they are agreeable.  I called and s/w [facility] and they will compound 5% phenytoin, 5% lidocaine, .09884% misoprostol, 1%pentoxifylline, 1%clindamycin.  Patient to continue to use the santyl until the compound is received. No systemic s/s of infecrtion.    5-21-24 patient returns today, she has continued to experience pain and burning in her lower extremities. But she states there has been some improvement in the pain and burning. She increased her gabapentin for 2 days, but the most likely reason for the improvement in pain is the increase in prednisone.  They have been using the compound topical for 6-7 days. The right lateral wound is improved, base is necrotic, pain improved-this was cross hatched to allow for rx penetration.  The left is dessicated and has dried exudate around it.  We discussed putting her in compression wrap  with petroleum gauze to assist with autolyitcally debriding the tissue. Spouse will remove the wrap and cleanse in 3 days and will return to using the compound rx with addition of adaptic. No acute s/s of infection.    5-28-24 Patient returns.  She has been utilizing the compound topical med for slightly under 2 weeks.  Last week I did cross francisco the right wound base and we wrapped with petroleum gauze and and compression wrap to assist with autolytic debridement.  Spouse removed the wrap after 3 days. She states her pain was a little better with the wrap.  Wounds are improved, a little dessicated. We discussed putting the compound ointment, petroleum gauze, and putting wraps on for 1 week.  Patient and spouse are agreeable.  There is definitely improvement overall in the wounds.  Patient to see dr. Aguilar when I am out of clinic in 2 weeks.    6-4-24 patient returns.  Last week she reported that when her legs were wrapped it helped with her pain (we had kept the wraps on for 3 days), therefore this week we placed the compound ointment and petroleum gauze and wrapped in light compression wrap.  Another factor helping her pain is that 2 weeks ago she was on and increased dose of predenisone at 20 mg daily.  She is will be reducing to 15 md later this week. She has tolerated the wraps well.  She still c/o burning pain and difficulty sleeping.  The tissue is improving and it appears the slough would be able to be lifted, but she is unable to tolerate any type of debridement. Will utilize enluxtra (after vashe soak) with back removed and kerramax in an attempt to clean up the wound bed and address the periwound. No s/s of infection. She wiil f/u with dr. Aguilar in my absence    6-25-24 patient returns.  She has been following up with dr. aguilar in my abcense.  She did receive the polymem ag max and it was used last week with gina.  Today wounds are about the same, but the dressing stuck to the wounds-suspect it  was probably the dried gina.  After a long lidocaine soak and some hydrogel, I was able to clean up the dried scabbing and exudate on the medial aspect on the left, however she was too painful to address the lateral aspect.  Will continue with the polymem but not utilize gina.  Dr. Aguilar had also recommended increasing the gabapentin to help with pain, patient did try this with some result with her pain, but more sleepiness.  Skin sub insurance process started-still pending. No acute s/s of infection.    MEDICATIONS:     Current Outpatient Medications:     predniSONE 5 MG Oral Tab, Take 20mg daily for 5 days then update office., Disp: 180 tablet, Rfl: 0    gabapentin 100 MG Oral Cap, Take 1 capsule (100 mg total) by mouth nightly., Disp: , Rfl:     amLODIPine 2.5 MG Oral Tab, Take 1 tablet (2.5 mg total) by mouth 2 (two) times daily., Disp: 180 tablet, Rfl: 0    hydroxychloroquine 200 MG Oral Tab, Take 1 tablet (200 mg total) by mouth daily., Disp: 90 tablet, Rfl: 0    Ferrous Sulfate 325 (65 Fe) MG Oral Tab, Take 1 tablet (325 mg total) by mouth 2 (two) times daily with meals., Disp: 180 tablet, Rfl: 0    Mycophenolate Mofetil 500 MG Oral Tab, Take 1 tablet (500 mg total) by mouth daily., Disp: 90 tablet, Rfl: 0  ALLERGIES:   No Known Allergies   REVIEW OF SYSTEMS:   This information was obtained from the patient/family and chart.    See HPI for pertinent positives, otherwise 10 pt ROS negative.    HISTORY:   Past medical, surgical, family and social history updated where appropriate.      PHYSICAL EXAM:     Vitals:    06/25/24 0700   BP: 111/70   Pulse: 90   Resp: 12   Temp: 98.5 °F (36.9 °C)       Estimated body mass index is 21.43 kg/m² as calculated from the following:    Height as of 4/30/24: 63\".    Weight as of 4/30/24: 121 lb (54.9 kg).   No results found for: \"PGLU\"    Vital signs reviewed.Appears stated age, well groomed.    Constitutional:  Bp wnl for patient. Pulse Regular and wnl for patient.  Respirations easy and unlabored. Temperature wnl. Weight normal for height. Appearance neat and clean. Appears in no acute distress. Well nourished and well developed.    Lower extremity:  dp/pt palpable bilaterally. Extremities are free of varicosities and edema, they are scarred, scattered changes in pigmentation (hypo/hyper) with atrophie jay. Capillary refill < 3 seconds. Digits are warm. toenails are wnl for color, thickness and hygeine. Skin hydration wnl. + hairgrowth on legs.    Musculoskeletal:  Gait and station stable   Integumentary:  refer to wound characteristics and images   Psychiatric:  Judgment and insight intact. Alert and oriented times 3. No evidence of depression, anxiety, or agitation. Calm, cooperative, and communicative, but very quiet and reserved.  EDEMA:   Calf  Point of Measurement - Left Calf: 31  Point of Measurement - Right Calf: 31  Left Calf from:: Heel  Calf Left cm:: 27.9  Right Calf from:: Heel  Right Calf cm:: 29.4  Ankle  Point of Measurement - Left Ankle: 11  Point of Measurement - Right Ankle: 11  Left Ankle from:: Heel  Left Ankle cm:: 17.6     Right Ankle from:: Heel  Right Ankle cm:: 17.2     DIAGNOSTICS:     Lab Results   Component Value Date    BUN 6 (L) 03/18/2024    CREATSERUM 0.50 (L) 03/18/2024    GFRCKDEPI 121.37 12/17/2020    ALB 4.0 03/18/2024    TP 8.0 03/18/2024    A1C 5.1 12/17/2020 11-28-23 arterial duplex-dr hilliard  FINDINGS:    LEFT EXTREMITY:  Triphasic waveforms in the left common femoral, profunda, superficial femoral arteries.  Biphasic waveforms below the left knee.   OTHER:  None.     PEAK VELOCITIES (CM/S):   LEFT COMMON FEMORAL:      165    LEFT PROFUNDA FEMORAL:      78      LEFT SUPERFICIAL FEMORAL PROX:    113   LEFT SUPERFICIAL FEMORAL MID:      94   LEFT SUPERFICIAL FEMORAL DISTAL:    92   LEFT POPLITEAL PROX:      110   LEFT POPLITEAL DISTAL:      121   TIBIOPER TRUNK:      66   LEFT PTA PROX:      60   LEFT PTA MID:      62   LEFT PTA  DISTAL:      73   LEFT BETH PROX:      82   LEFT BETH MID:      72    LEFT DORSALIS PEDIS::      30   LEFT GREAT TOE PRESSURE:      25 mmHg   CONCLUSION:    1. No high-grade stenosis is identified.  There is suggestion of diffuse mild stenosis throughout most of the left lower extremity.    2. Overall diminished left toe pressure is noted.  Possibility of decreased perfusion in the distal left foot is of consideration.     7-14-20 venous reflux study-dr santamaria  Per Dr. Santamaria review \"no intervention needed\"  WOUND ASSESSMENT:     Wound 04/26/24 #1 Ankle Left;Medial (Active)   Date First Assessed/Time First Assessed: 04/26/24 1031    Wound Number (Wound Clinic Only): #1  Primary Wound Type: (c) Auto-immune  Location: Ankle  Wound Location Orientation: Left;Medial      Assessments 4/26/2024 10:34 AM 6/25/2024 10:00 AM   Wound Image        Drainage Amount Unable to assess Small   Drainage Description -- Serosanguineous   Treatments Compression --   Wound Length (cm) 3.5 cm 7.6 cm   Wound Width (cm) 4.6 cm 5.8 cm   Wound Surface Area (cm^2) 16.1 cm^2 44.08 cm^2   Wound Depth (cm) 0.1 cm --   Wound Volume (cm^3) 1.61 cm^3 --   Margins Undefined edges Well-defined edges   Non-staged Wound Description Full thickness Full thickness   Soni-wound Assessment Dry;Edema;Atrophy jay Dry;Edema;Atrophy jay   Wound Granulation Tissue -- Pink;Firm   Wound Bed Granulation (%) -- 30 %   Wound Bed Epithelium (%) -- 50 %   Wound Bed Slough (%) -- 20 %   Wound Odor None None   Shape 100% scabbed --   Tunneling? -- No   Undermining? -- No   Sinus Tracts? -- No       No associated orders.       Wound 04/26/24 #2 Ankle Right;Lateral (Active)   Date First Assessed/Time First Assessed: 04/26/24 1031    Wound Number (Wound Clinic Only): #2  Primary Wound Type: (c) Auto-immune  Location: Ankle  Wound Location Orientation: Right;Lateral      Assessments 4/26/2024 10:34 AM 6/25/2024 10:05 AM   Wound Image       Drainage Amount Unable to assess  Small   Drainage Description -- Serosanguineous   Treatments Compression --   Wound Length (cm) 0.5 cm 1.2 cm   Wound Width (cm) 0.7 cm 1.3 cm   Wound Surface Area (cm^2) 0.35 cm^2 1.56 cm^2   Wound Depth (cm) 0.1 cm 0.1 cm   Wound Volume (cm^3) 0.035 cm^3 0.156 cm^3   Wound Healing % -- -346   Margins Well-defined edges Well-defined edges   Non-staged Wound Description Full thickness Full thickness   Soni-wound Assessment Dry;Edema;Atrophy jay Edema;Atrophy jay;Hemosiderin staining;Dry   Wound Granulation Tissue -- Pink;Firm   Wound Bed Granulation (%) -- 25 %   Wound Bed Slough (%) -- 75 %   Wound Odor None None   Shape 100% scabbed --   Tunneling? -- No   Undermining? -- No   Sinus Tracts? -- No       Inactive Orders   Date Order Priority Status Authorizing Provider   06/19/24 1145 Debridement Auto-immune Right;Lateral Ankle Routine Completed Kaz Tapia MD       Compression Wrap 05/21/24 Ankle Anterior;Left (Active)   Placement Date/Time: 05/21/24 1019   Location: Ankle  Wound Location Orientation: Anterior;Left      Assessments 5/21/2024  9:36 AM 6/19/2024 11:46 AM   Response to Treatment Well tolerated Well tolerated   Compression Layers Multilayer Multilayer   Compression Product Type Unna Boot Unna Boot   Dressing Applied Yes Yes   Compression Wrap Location Toes to Knee Toes to Knee   Compression Wrap Status Clean;Dry;Intact Clean;Dry       No associated orders.       Compression Wrap 05/28/24 Ankle Anterior;Right (Active)   Placement Date/Time: 05/28/24 1012   Location: Ankle  Wound Location Orientation: Anterior;Right      Assessments 5/28/2024  9:47 AM 6/19/2024 11:47 AM   Response to Treatment Well tolerated Well tolerated   Compression Layers Multilayer Multilayer   Compression Product Type Unna Boot Unna Boot   Dressing Applied Yes Yes   Compression Wrap Location Toes to Knee Toes to Knee   Compression Wrap Status Dry;Intact;Clean Dry;Clean       No associated orders.       Wound  06/25/24 #3 Ankle Left;Lateral (Active)   Date First Assessed/Time First Assessed: 06/25/24 0959    Wound Number (Wound Clinic Only): #3  Primary Wound Type: Auto-immune  Location: Ankle  Wound Location Orientation: Left;Lateral      Assessments 6/25/2024 10:03 AM   Wound Image     Drainage Amount Scant   Drainage Description Serosanguineous   Wound Length (cm) 0.9 cm   Wound Width (cm) 0.8 cm   Wound Surface Area (cm^2) 0.72 cm^2   Wound Depth (cm) 0.1 cm   Wound Volume (cm^3) 0.072 cm^3   Margins Well-defined edges   Non-staged Wound Description Full thickness   Soni-wound Assessment Blanchable erythema;Dry;Edema;Moist   Wound Granulation Tissue Pink;Pale Lala;Firm   Wound Bed Granulation (%) 50 %   Wound Bed Slough (%) 50 %   Wound Odor None       No associated orders.          ASSESSMENT AND PLAN:    1. 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 Idiopathic chronic venous hypertension of both lower extremities with ulcer and inflammation (HCC)    3. Non-pressure chronic ulcer of left ankle with fat layer exposed (HCC)    4. Lupus vasculitis (HCC)    5. Mixed connective tissue disease (HCC)    6. Current chronic use of systemic steroids          Risks, benefits, and alternatives of current treatment plan discussed in detail.  Questions and concerns addressed. Red flags to RTC or ED reviewed.  Patient (or parent) agrees to plan.      NOTE TO PATIENT: The 21st Century Cures Act makes clinical notes like these available to patients in the interest of transparency. Clinical notes are medical documents used by physicians and care providers to communicate with each other. These documents include medical language and terminology, abbreviations, and treatment information that may sound technical and at times possibly unfamiliar. In addition, at times, the verbiage may appear blunt or direct. These documents are one tool providers use to communicate relevant information and clinical  opinions of the care providers in a way that allows common understanding of the clinical context.   I spent 40minutes with the patient. This time included:    preparing to see the patient (eg, review notes and recent diagnostics),  seeing the patient, obtaining and/or reviewing separately obtained history, performing a medically appropriate examination and/or evaluation, counseling and educating the patient, documenting in the record, time spent \"working\" the surrounding exudate/scabbing off around the wound  DISCHARGE INSTRUCTIONS     Patient Instructions   Please return: 1 week with estrellita      Patient discharge and wound care instructions  Raymondnathan Hayward Pallavi  6/25/2024             You may shower with protection of the wound (ie a cast cover or similar).     Changing your dressing:           Wash your hands with soap and water.  Ensure that the old dressing is removed completely. Place it in a plastic bag and throw it in the trash.  Cleanse the wound with hypochlorous wound cleanser (ie. Anasept, vashe, pure and clean) .  It's ok to “scrub” your wound with the gauze, small amount of bleeding with cleansing is normal and ok.  Apply the following dressings:   Right:  polymem ag>20-30mmhg VERY light wrap  Left:   polymem ag>>20-30mmhg VERY light wrap    Nutrition and blood sugar control:  Focus on the following:  Protein: Meats, beans, eggs, milk and yogurt particularly Greek yogurt), tofu, soy nuts, soy protein products (Follow the protein handout in your welcome folder)  Vitamin C: Citrus fruits and juices, strawberries, tomatoes, tomato juice, peppers, baked potatoes, spinach, broccoli, cauliflower, [place] sprouts, cabbage  Vitamin A: Dark green, leafy vegetables, orange or yellow vegetables, cantaloupe, fortified dairy products, liver, fortified cereals  Zinc: Fortified cereals, red meats, seafood  Consider supplementing with Justin by [employer]. It can be purchased on amazon, [employer] website, or local  pharmacy may be able to order it for you.  (These are essential branch chain amino acids that help with tissue building and wound healing).     Concerns:  Signs of infection may include the following:  Increase in redness  Red \"streaks\" from wound  Increase in swelling  Fever  Unusual odor  Change in the amount of wound drainage     Should you experience any significant changes in your wound(s) or have any questions regarding your home care instructions please contact the [facility] @ [phone] If after regular business hours, please call your family doctor or local emergency room. The treatment plan has been discussed at length between you and your provider. Follow all instructions carefully, it is very important. If you do not follow all instructions you are at risk of your wound not healing, infection, possible loss of limb and even loss of life.        Martina Mckeon FNP-C, CWCN-AP, CFCN, CSWS, WCC, DWC  6/25/2024

## 2024-06-25 ENCOUNTER — OFFICE VISIT (OUTPATIENT)
Dept: WOUND CARE | Facility: HOSPITAL | Age: 49
End: 2024-06-25
Attending: NURSE PRACTITIONER

## 2024-06-25 ENCOUNTER — APPOINTMENT (OUTPATIENT)
Dept: WOUND CARE | Facility: HOSPITAL | Age: 49
End: 2024-06-25
Attending: NURSE PRACTITIONER

## 2024-06-25 VITALS
RESPIRATION RATE: 12 BRPM | TEMPERATURE: 99 F | SYSTOLIC BLOOD PRESSURE: 111 MMHG | DIASTOLIC BLOOD PRESSURE: 70 MMHG | HEART RATE: 90 BPM

## 2024-06-25 DIAGNOSIS — L97.322 NON-PRESSURE CHRONIC ULCER OF LEFT ANKLE WITH FAT LAYER EXPOSED (HCC): ICD-10-CM

## 2024-06-25 DIAGNOSIS — M32.19 LUPUS VASCULITIS (HCC): ICD-10-CM

## 2024-06-25 DIAGNOSIS — L97.312 NON-PRESSURE CHRONIC ULCER OF RIGHT ANKLE WITH FAT LAYER EXPOSED (HCC): Primary | ICD-10-CM

## 2024-06-25 DIAGNOSIS — Z79.52 CURRENT CHRONIC USE OF SYSTEMIC STEROIDS: ICD-10-CM

## 2024-06-25 DIAGNOSIS — M35.1 MIXED CONNECTIVE TISSUE DISEASE (HCC): ICD-10-CM

## 2024-06-25 DIAGNOSIS — I87.333 IDIOPATHIC CHRONIC VENOUS HYPERTENSION OF BOTH LOWER EXTREMITIES WITH ULCER AND INFLAMMATION (HCC): ICD-10-CM

## 2024-06-25 DIAGNOSIS — I77.89 LUPUS VASCULITIS (HCC): ICD-10-CM

## 2024-06-25 PROCEDURE — 99215 OFFICE O/P EST HI 40 MIN: CPT | Performed by: NURSE PRACTITIONER

## 2024-06-25 NOTE — PROGRESS NOTES
.Weekly Wound Education Note    Teaching Provided To: Patient;Family  Training Topics: Dressing;Edema control;Cleasing and general instructions;Compression;Discharge instructions  Training Method: Explain/Verbal;Written  Training Response: Patient responds and understands        Notes: Wounds improving. Continue polymem silver max to both wounds. Both legs wrapped 20-30mmHg.

## 2024-06-25 NOTE — PATIENT INSTRUCTIONS
Please return: 1 week with estrellita      Patient discharge and wound care instructions  Raymond Olivo  6/25/2024             You may shower with protection of the wound (ie a cast cover or similar).     Changing your dressing:           Wash your hands with soap and water.  Ensure that the old dressing is removed completely. Place it in a plastic bag and throw it in the trash.  Cleanse the wound with hypochlorous wound cleanser (ie. Anasept, vashe, pure and clean) .  It's ok to “scrub” your wound with the gauze, small amount of bleeding with cleansing is normal and ok.  Apply the following dressings:   Right:  polymem ag>20-30mmhg VERY light wrap  Left:   polymem ag>>20-30mmhg VERY light wrap    Nutrition and blood sugar control:  Focus on the following:  Protein: Meats, beans, eggs, milk and yogurt particularly Greek yogurt), tofu, soy nuts, soy protein products (Follow the protein handout in your welcome folder)  Vitamin C: Citrus fruits and juices, strawberries, tomatoes, tomato juice, peppers, baked potatoes, spinach, broccoli, cauliflower, [place] sprouts, cabbage  Vitamin A: Dark green, leafy vegetables, orange or yellow vegetables, cantaloupe, fortified dairy products, liver, fortified cereals  Zinc: Fortified cereals, red meats, seafood  Consider supplementing with Justin by [employer]. It can be purchased on amazon, [employer] website, or local pharmacy may be able to order it for you.  (These are essential branch chain amino acids that help with tissue building and wound healing).     Concerns:  Signs of infection may include the following:  Increase in redness  Red \"streaks\" from wound  Increase in swelling  Fever  Unusual odor  Change in the amount of wound drainage     Should you experience any significant changes in your wound(s) or have any questions regarding your home care instructions please contact the [facility] @ [phone] If after regular business hours, please call your  family doctor or local emergency room. The treatment plan has been discussed at length between you and your provider. Follow all instructions carefully, it is very important. If you do not follow all instructions you are at risk of your wound not healing, infection, possible loss of limb and even loss of life.

## 2024-07-01 NOTE — PROGRESS NOTES
CHIEF COMPLAINT:     Chief Complaint   Patient presents with    Wound Care     Follow up for bilateral leg wounds. No complaints at this time.      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 Patient saw dr. Santamaria (vascular) in December 2023 and he documented \"I explained to the patient and her  that I do not believe the source of her pain to be vascular in origin.  She has an easily palpable dorsalis pedis pulse and the location of the pain is more suggestive of a neuropathic origin.  I suspect perhaps that given the tightness of her feet from her mixed connective tissue disorder the compression stockings is causing somewhat compression of the nerves in that area and I have encouraged her to substitute those stockings to those that involve the feet up to the upper calf.\" Patient/spouse have been in contact with dr. Farmer and in march 2024 reported a dark spot/ulcer on her bilateral lower legs, she was advised to make an appointment with wound care as well as get her blood work done ordered in January 2024 so that further treatment plan could be extablished.  The blood work did show increased inflammation and she was advised to increase her prednisone to 20mg x 5 days (which spouse reports they did do) and take the amlodipine 2.5 mg-which she states she is doing.  They have been leaving the areas open to air and she has not been wearing compression.  She c/o burning sensation in her ankle area.  She was started on gabapentin by dr. Farmer last year, however they stopped it because it was making her too sleepy. We discussed restarting the gabapentin, but only take a noc first.  Both wounds are dried eschar.  Will utilize hydrogel with lidocaine to start debriding off/softening the eschar. I have also  ordered santyl. Patient placed into spandagrips for compression    HPI PRIOR TO JUNE 2024 SEE INDIVIDUAL PROGRESS NOTES  5-21-24 patient returns today, she has continued to experience pain and burning in her lower extremities. But she states there has been some improvement in the pain and burning. She increased her gabapentin for 2 days, but the most likely reason for the improvement in pain is the increase in prednisone.  They have been using the compound topical for 6-7 days. The right lateral wound is improved, base is necrotic, pain improved-this was cross hatched to allow for rx penetration.  The left is dessicated and has dried exudate around it.  We discussed putting her in compression wrap with petroleum gauze to assist with autolyitcally debriding the tissue. Spouse will remove the wrap and cleanse in 3 days and will return to using the compound rx with addition of adaptic. No acute s/s of infection.    5-28-24 Patient returns.  She has been utilizing the compound topical med for slightly under 2 weeks.  Last week I did cross francisco the right wound base and we wrapped with petroleum gauze and and compression wrap to assist with autolytic debridement.  Spouse removed the wrap after 3 days. She states her pain was a little better with the wrap.  Wounds are improved, a little dessicated. We discussed putting the compound ointment, petroleum gauze, and putting wraps on for 1 week.  Patient and spouse are agreeable.  There is definitely improvement overall in the wounds.  Patient to see dr. Aguilar when I am out of clinic in 2 weeks.    6-4-24 patient returns.  Last week she reported that when her legs were wrapped it helped with her pain (we had kept the wraps on for 3 days), therefore this week we placed the compound ointment and petroleum gauze and wrapped in light compression wrap.  Another factor helping her pain is that 2 weeks ago she was on and increased dose of predenisone at 20 mg daily.  She is will  be reducing to 15 md later this week. She has tolerated the wraps well.  She still c/o burning pain and difficulty sleeping.  The tissue is improving and it appears the slough would be able to be lifted, but she is unable to tolerate any type of debridement. Will utilize enluxtra (after vashe soak) with back removed and kerramax in an attempt to clean up the wound bed and address the periwound. No s/s of infection. She wiil f/u with dr. Burns in my absence    6-25-24 patient returns.  She has been following up with dr. burns in my abcense.  She did receive the polymem ag max and it was used last week with gina.  Today wounds are about the same, but the dressing stuck to the wounds-suspect it was probably the dried gina.  After a long lidocaine soak and some hydrogel, I was able to clean up the dried scabbing and exudate on the medial aspect on the left, however she was too painful to address the lateral aspect.  Will continue with the polymem but not utilize gina.  Dr. Burns had also recommended increasing the gabapentin to help with pain, patient did try this with some result with her pain, but more sleepiness.  Skin sub insurance process started-still pending. No acute s/s of infection.    7-1-24 patient returns. Last week we utiliized polymem ag (without gina) the dressing. It definitely was less adherent, but still minimally so.  The left medial leg that we worked on last week to remove the dried exudate, is much improved. Today we again tried to remove the remaining dried exudate on the lateral side however patient was not able to tolerate.  Will utilize a scant amount of silver hydrogel under the polymem to attempt to loosen the dried exudate. We are continuint with a light compression wrap.  Epifix is still pending. Burning pain continues, gabapentin helps somewhat.  No s/s of infection.   MEDICATIONS:     Current Outpatient Medications:     predniSONE 5 MG Oral Tab, Take 20mg daily for 5  days then update office., Disp: 180 tablet, Rfl: 0    gabapentin 100 MG Oral Cap, Take 1 capsule (100 mg total) by mouth nightly., Disp: , Rfl:     amLODIPine 2.5 MG Oral Tab, Take 1 tablet (2.5 mg total) by mouth 2 (two) times daily., Disp: 180 tablet, Rfl: 0    hydroxychloroquine 200 MG Oral Tab, Take 1 tablet (200 mg total) by mouth daily., Disp: 90 tablet, Rfl: 0    Ferrous Sulfate 325 (65 Fe) MG Oral Tab, Take 1 tablet (325 mg total) by mouth 2 (two) times daily with meals., Disp: 180 tablet, Rfl: 0    Mycophenolate Mofetil 500 MG Oral Tab, Take 1 tablet (500 mg total) by mouth daily., Disp: 90 tablet, Rfl: 0  ALLERGIES:   No Known Allergies   REVIEW OF SYSTEMS:   This information was obtained from the patient/family and chart.    See HPI for pertinent positives, otherwise 10 pt ROS negative.    HISTORY:   Past medical, surgical, family and social history updated where appropriate.      PHYSICAL EXAM:     Vitals:    07/02/24 0700   BP: 106/68   Pulse: 90   Resp: 12   Temp: 97.8 °F (36.6 °C)     Estimated body mass index is 21.43 kg/m² as calculated from the following:    Height as of 4/30/24: 63\".    Weight as of 4/30/24: 121 lb (54.9 kg).   No results found for: \"PGLU\"    Vital signs reviewed.Appears stated age, well groomed.    Constitutional:  Bp wnl for patient. Pulse Regular and wnl for patient. Respirations easy and unlabored. Temperature wnl. Weight normal for height. Appearance neat and clean. Appears in no acute distress. Well nourished and well developed.    Lower extremity:  dp/pt palpable bilaterally. Extremities are free of varicosities and edema, they are scarred, scattered changes in pigmentation (hypo/hyper) with atrophie jay. Capillary refill < 3 seconds. Digits are warm. toenails are wnl for color, thickness and hygeine. Skin hydration wnl. + hairgrowth on legs.    Musculoskeletal:  Gait and station stable   Integumentary:  refer to wound characteristics and images   Psychiatric:  Judgment  and insight intact. Alert and oriented times 3. No evidence of depression, anxiety, or agitation. Calm, cooperative, and communicative, but very quiet and reserved.  EDEMA:   Calf  Point of Measurement - Left Calf: 31  Point of Measurement - Right Calf: 31  Left Calf from:: Heel  Calf Left cm:: 27.6  Right Calf from:: Heel  Right Calf cm:: 27.7  Ankle  Point of Measurement - Left Ankle: 11  Point of Measurement - Right Ankle: 11  Left Ankle from:: Heel  Left Ankle cm:: 17     Right Ankle from:: Heel  Right Ankle cm:: 16.8     DIAGNOSTICS:     Lab Results   Component Value Date    BUN 6 (L) 03/18/2024    CREATSERUM 0.50 (L) 03/18/2024    GFRCKDEPI 121.37 12/17/2020    ALB 4.0 03/18/2024    TP 8.0 03/18/2024    A1C 5.1 12/17/2020 11-28-23 arterial duplex-dr santamaria  FINDINGS:    LEFT EXTREMITY:  Triphasic waveforms in the left common femoral, profunda, superficial femoral arteries.  Biphasic waveforms below the left knee.   OTHER:  None.     PEAK VELOCITIES (CM/S):   LEFT COMMON FEMORAL:      165    LEFT PROFUNDA FEMORAL:      78      LEFT SUPERFICIAL FEMORAL PROX:    113   LEFT SUPERFICIAL FEMORAL MID:      94   LEFT SUPERFICIAL FEMORAL DISTAL:    92   LEFT POPLITEAL PROX:      110   LEFT POPLITEAL DISTAL:      121   TIBIOPER TRUNK:      66   LEFT PTA PROX:      60   LEFT PTA MID:      62   LEFT PTA DISTAL:      73   LEFT BETH PROX:      82   LEFT BETH MID:      72    LEFT DORSALIS PEDIS::      30   LEFT GREAT TOE PRESSURE:      25 mmHg   CONCLUSION:    1. No high-grade stenosis is identified.  There is suggestion of diffuse mild stenosis throughout most of the left lower extremity.    2. Overall diminished left toe pressure is noted.  Possibility of decreased perfusion in the distal left foot is of consideration.     7-14-20 venous reflux study-dr santamaria  Per Dr. Santamaria review \"no intervention needed\"  WOUND ASSESSMENT:     Wound 04/26/24 #1 Ankle Left;Medial (Active)   Date First Assessed/Time First Assessed: 04/26/24  1031    Wound Number (Wound Clinic Only): #1  Primary Wound Type: (c) Auto-immune  Location: Ankle  Wound Location Orientation: Left;Medial      Assessments 4/26/2024 10:34 AM 7/2/2024  9:09 AM   Wound Image       Drainage Amount Unable to assess Small   Drainage Description -- Serosanguineous   Treatments Compression --   Wound Length (cm) 3.5 cm 7 cm   Wound Width (cm) 4.6 cm 3.3 cm   Wound Surface Area (cm^2) 16.1 cm^2 23.1 cm^2   Wound Depth (cm) 0.1 cm 0.1 cm   Wound Volume (cm^3) 1.61 cm^3 2.31 cm^3   Wound Healing % -- -43   Margins Undefined edges Well-defined edges   Non-staged Wound Description Full thickness Full thickness   Soni-wound Assessment Dry;Edema;Atrophy jay Dry;Edema;Atrophy jay   Wound Granulation Tissue -- Pink;Pale Grey;Firm   Wound Bed Granulation (%) -- 40 %   Wound Bed Epithelium (%) -- 30 %   Wound Bed Slough (%) -- 30 %   Wound Odor None None   Shape 100% scabbed Clustered       No associated orders.       Wound 04/26/24 #2 Ankle Right;Lateral (Active)   Date First Assessed/Time First Assessed: 04/26/24 1031    Wound Number (Wound Clinic Only): #2  Primary Wound Type: (c) Auto-immune  Location: Ankle  Wound Location Orientation: Right;Lateral      Assessments 4/26/2024 10:34 AM 7/2/2024  9:08 AM   Wound Image       Drainage Amount Unable to assess Small   Drainage Description -- Serosanguineous   Treatments Compression --   Wound Length (cm) 0.5 cm 1.8 cm   Wound Width (cm) 0.7 cm 1.6 cm   Wound Surface Area (cm^2) 0.35 cm^2 2.88 cm^2   Wound Depth (cm) 0.1 cm 0.1 cm   Wound Volume (cm^3) 0.035 cm^3 0.288 cm^3   Wound Healing % -- -723   Margins Well-defined edges Well-defined edges   Non-staged Wound Description Full thickness --   Soni-wound Assessment Dry;Edema;Atrophy jay Edema;Atrophy jay;Hemosiderin staining;Dry   Wound Granulation Tissue -- Pink;Firm   Wound Bed Granulation (%) -- 50 %   Wound Bed Slough (%) -- 50 %   Wound Odor None None   Shape 100% scabbed --        Inactive Orders   Date Order Priority Status Authorizing Provider   06/19/24 1145 Debridement Auto-immune Right;Lateral Ankle Routine Completed Kaz Tapia MD       Compression Wrap 05/21/24 Ankle Anterior;Left (Active)   Placement Date/Time: 05/21/24 1019   Location: Ankle  Wound Location Orientation: Anterior;Left      Assessments 5/21/2024  9:36 AM 6/25/2024 10:05 AM   Response to Treatment Well tolerated Well tolerated   Compression Layers Multilayer Multilayer   Compression Product Type Unna Boot Unna Boot   Dressing Applied Yes Yes   Compression Wrap Location Toes to Knee Toes to Knee   Compression Wrap Status Clean;Dry;Intact Clean;Dry       No associated orders.       Compression Wrap 05/28/24 Ankle Anterior;Right (Active)   Placement Date/Time: 05/28/24 1012   Location: Ankle  Wound Location Orientation: Anterior;Right      Assessments 5/28/2024  9:47 AM 6/25/2024 10:05 AM   Response to Treatment Well tolerated Well tolerated   Compression Layers Multilayer Multilayer   Compression Product Type Unna Boot Unna Boot   Dressing Applied Yes Yes   Compression Wrap Location Toes to Knee Toes to Knee   Compression Wrap Status Dry;Intact;Clean Dry;Clean       No associated orders.       Wound 06/25/24 #3 Ankle Left;Lateral (Active)   Date First Assessed/Time First Assessed: 06/25/24 0959    Wound Number (Wound Clinic Only): #3  Primary Wound Type: Auto-immune  Location: Ankle  Wound Location Orientation: Left;Lateral      Assessments 6/25/2024 10:03 AM 7/2/2024  9:10 AM   Wound Image        Drainage Amount Scant Scant   Drainage Description Serosanguineous Serosanguineous   Treatments Compression --   Wound Length (cm) 0.9 cm 0.4 cm   Wound Width (cm) 0.8 cm 0.8 cm   Wound Surface Area (cm^2) 0.72 cm^2 0.32 cm^2   Wound Depth (cm) 0.1 cm 0.1 cm   Wound Volume (cm^3) 0.072 cm^3 0.032 cm^3   Wound Healing % -- 56   Margins Well-defined edges Well-defined edges   Non-staged Wound Description Full  thickness Full thickness   Soni-wound Assessment Blanchable erythema;Dry;Edema;Moist Blanchable erythema;Dry;Edema   Wound Granulation Tissue Pink;Pale Grey;Firm --   Wound Bed Granulation (%) 50 % 10 %   Wound Bed Slough (%) 50 % 90 %   Wound Odor None None       No associated orders.          ASSESSMENT AND PLAN:    1. 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 Idiopathic chronic venous hypertension of both lower extremities with ulcer and inflammation (HCC)    3. Non-pressure chronic ulcer of left ankle with fat layer exposed (HCC)    4. Lupus vasculitis (HCC)    5. Mixed connective tissue disease (HCC)            Risks, benefits, and alternatives of current treatment plan discussed in detail.  Questions and concerns addressed. Red flags to RTC or ED reviewed.  Patient (or parent) agrees to plan.      NOTE TO PATIENT: The 21st Century Cures Act makes clinical notes like these available to patients in the interest of transparency. Clinical notes are medical documents used by physicians and care providers to communicate with each other. These documents include medical language and terminology, abbreviations, and treatment information that may sound technical and at times possibly unfamiliar. In addition, at times, the verbiage may appear blunt or direct. These documents are one tool providers use to communicate relevant information and clinical opinions of the care providers in a way that allows common understanding of the clinical context.   I spent 30minutes with the patient. This time included:    preparing to see the patient (eg, review notes and recent diagnostics),  seeing the patient, obtaining and/or reviewing separately obtained history, performing a medically appropriate examination and/or evaluation, counseling and educating the patient, documenting in the record,   DISCHARGE INSTRUCTIONS     Patient Instructions   Please return: 1 week with estrellita       Patient discharge  and wound care instructions  Raymond Martinezu  7/2/2024     You may shower with protection of the wound (ie a cast cover or similar).     Changing your dressing:           Wash your hands with soap and water.  Ensure that the old dressing is removed completely. Place it in a plastic bag and throw it in the trash.  Cleanse the wound with hypochlorous wound cleanser (ie. Anasept, vashe, pure and clean) .  It's ok to “scrub” your wound with the gauze, small amount of bleeding with cleansing is normal and ok.  Apply the following dressings:   Right:  polymem ag>20-30mmhg VERY light wrap  Left:   small amount silver hydrogel on crusted areas>polymem ag>>20-30mmhg VERY light wrap    Nutrition and blood sugar control:  Focus on the following:  Protein: Meats, beans, eggs, milk and yogurt particularly Greek yogurt), tofu, soy nuts, soy protein products (Follow the protein handout in your welcome folder)  Vitamin C: Citrus fruits and juices, strawberries, tomatoes, tomato juice, peppers, baked potatoes, spinach, broccoli, cauliflower, [place] sprouts, cabbage  Vitamin A: Dark green, leafy vegetables, orange or yellow vegetables, cantaloupe, fortified dairy products, liver, fortified cereals  Zinc: Fortified cereals, red meats, seafood  Consider supplementing with Justin by [employer]. It can be purchased on amazon, [employer] website, or local pharmacy may be able to order it for you.  (These are essential branch chain amino acids that help with tissue building and wound healing).     Concerns:  Signs of infection may include the following:  Increase in redness  Red \"streaks\" from wound  Increase in swelling  Fever  Unusual odor  Change in the amount of wound drainage     Should you experience any significant changes in your wound(s) or have any questions regarding your home care instructions please contact the [facility] @ [phone] If after regular business hours, please call your family doctor or  local emergency room. The treatment plan has been discussed at length between you and your provider. Follow all instructions carefully, it is very important. If you do not follow all instructions you are at risk of your wound not healing, infection, possible loss of limb and even loss of life.        Martina Mckeon FNP-C, CWCN-AP, CFCN, CSWS, WCC, DWC  7/2/2024

## 2024-07-02 ENCOUNTER — OFFICE VISIT (OUTPATIENT)
Dept: WOUND CARE | Facility: HOSPITAL | Age: 49
End: 2024-07-02
Attending: NURSE PRACTITIONER
Payer: COMMERCIAL

## 2024-07-02 VITALS
DIASTOLIC BLOOD PRESSURE: 68 MMHG | SYSTOLIC BLOOD PRESSURE: 106 MMHG | TEMPERATURE: 98 F | HEART RATE: 90 BPM | RESPIRATION RATE: 12 BRPM

## 2024-07-02 DIAGNOSIS — I87.333 IDIOPATHIC CHRONIC VENOUS HYPERTENSION OF BOTH LOWER EXTREMITIES WITH ULCER AND INFLAMMATION (HCC): ICD-10-CM

## 2024-07-02 DIAGNOSIS — M35.1 MIXED CONNECTIVE TISSUE DISEASE (HCC): ICD-10-CM

## 2024-07-02 DIAGNOSIS — I77.89 LUPUS VASCULITIS (HCC): ICD-10-CM

## 2024-07-02 DIAGNOSIS — M32.19 LUPUS VASCULITIS (HCC): ICD-10-CM

## 2024-07-02 DIAGNOSIS — L97.322 NON-PRESSURE CHRONIC ULCER OF LEFT ANKLE WITH FAT LAYER EXPOSED (HCC): ICD-10-CM

## 2024-07-02 DIAGNOSIS — L97.312 NON-PRESSURE CHRONIC ULCER OF RIGHT ANKLE WITH FAT LAYER EXPOSED (HCC): Primary | ICD-10-CM

## 2024-07-02 PROCEDURE — 99214 OFFICE O/P EST MOD 30 MIN: CPT | Performed by: NURSE PRACTITIONER

## 2024-07-02 NOTE — PATIENT INSTRUCTIONS
Please return: 1 week with estrellita       Patient discharge and wound care instructions  Raymond Olivo  7/2/2024     You may shower with protection of the wound (ie a cast cover or similar).     Changing your dressing:           Wash your hands with soap and water.  Ensure that the old dressing is removed completely. Place it in a plastic bag and throw it in the trash.  Cleanse the wound with hypochlorous wound cleanser (ie. Anasept, vashe, pure and clean) .  It's ok to “scrub” your wound with the gauze, small amount of bleeding with cleansing is normal and ok.  Apply the following dressings:   Right:  polymem ag>20-30mmhg VERY light wrap  Left:   small amount silver hydrogel on crusted areas>polymem ag>>20-30mmhg VERY light wrap    Nutrition and blood sugar control:  Focus on the following:  Protein: Meats, beans, eggs, milk and yogurt particularly Greek yogurt), tofu, soy nuts, soy protein products (Follow the protein handout in your welcome folder)  Vitamin C: Citrus fruits and juices, strawberries, tomatoes, tomato juice, peppers, baked potatoes, spinach, broccoli, cauliflower, [place] sprouts, cabbage  Vitamin A: Dark green, leafy vegetables, orange or yellow vegetables, cantaloupe, fortified dairy products, liver, fortified cereals  Zinc: Fortified cereals, red meats, seafood  Consider supplementing with Justin by [employer]. It can be purchased on amazon, [employer] website, or local pharmacy may be able to order it for you.  (These are essential branch chain amino acids that help with tissue building and wound healing).     Concerns:  Signs of infection may include the following:  Increase in redness  Red \"streaks\" from wound  Increase in swelling  Fever  Unusual odor  Change in the amount of wound drainage     Should you experience any significant changes in your wound(s) or have any questions regarding your home care instructions please contact the [facility] center [facility] @ [phone] If after  regular business hours, please call your family doctor or local emergency room. The treatment plan has been discussed at length between you and your provider. Follow all instructions carefully, it is very important. If you do not follow all instructions you are at risk of your wound not healing, infection, possible loss of limb and even loss of life.

## 2024-07-02 NOTE — PROGRESS NOTES
.Weekly Wound Education Note    Teaching Provided To: Patient;Family  Training Topics: Dressing;Edema control;Cleasing and general instructions;Compression;Discharge instructions  Training Method: Explain/Verbal;Written  Training Response: Patient responds and understands        Notes: Wounds stable. Silver hydrogel applied to dry areas on left ankle wounds. All wounds dressed with polymem silver max and calamine unna boot 20-30mmHg.

## 2024-07-08 NOTE — PROGRESS NOTES
CHIEF COMPLAINT:     Chief Complaint   Patient presents with    Wound Care     Patient arrives for a wound care follow up visit. Patienty arrives with bilateral unna wraps and has mild pain.      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 Patient saw dr. Santamaria (vascular) in December 2023 and he documented \"I explained to the patient and her  that I do not believe the source of her pain to be vascular in origin.  She has an easily palpable dorsalis pedis pulse and the location of the pain is more suggestive of a neuropathic origin.  I suspect perhaps that given the tightness of her feet from her mixed connective tissue disorder the compression stockings is causing somewhat compression of the nerves in that area and I have encouraged her to substitute those stockings to those that involve the feet up to the upper calf.\" Patient/spouse have been in contact with dr. Farmer and in march 2024 reported a dark spot/ulcer on her bilateral lower legs, she was advised to make an appointment with wound care as well as get her blood work done ordered in January 2024 so that further treatment plan could be extablished.  The blood work did show increased inflammation and she was advised to increase her prednisone to 20mg x 5 days (which spouse reports they did do) and take the amlodipine 2.5 mg-which she states she is doing.  They have been leaving the areas open to air and she has not been wearing compression.  She c/o burning sensation in her ankle area.  She was started on gabapentin by dr. Farmer last year, however they stopped it because it was making her too sleepy. We discussed restarting the gabapentin, but only take a noc first.  Both wounds are dried eschar.  Will utilize hydrogel with lidocaine to start  debriding off/softening the eschar. I have also ordered santyl. Patient placed into spandagrips for compression    HPI PRIOR TO JULY 2024 SEE INDIVIDUAL PROGRESS NOTES  6-4-24 patient returns.  Last week she reported that when her legs were wrapped it helped with her pain (we had kept the wraps on for 3 days), therefore this week we placed the compound ointment and petroleum gauze and wrapped in light compression wrap.  Another factor helping her pain is that 2 weeks ago she was on and increased dose of predenisone at 20 mg daily.  She is will be reducing to 15 md later this week. She has tolerated the wraps well.  She still c/o burning pain and difficulty sleeping.  The tissue is improving and it appears the slough would be able to be lifted, but she is unable to tolerate any type of debridement. Will utilize enluxtra (after vashe soak) with back removed and kerramax in an attempt to clean up the wound bed and address the periwound. No s/s of infection. She wiil f/u with dr. Burns in my absence    6-25-24 patient returns.  She has been following up with dr. burns in my abcense.  She did receive the polymem ag max and it was used last week with gina.  Today wounds are about the same, but the dressing stuck to the wounds-suspect it was probably the dried gina.  After a long lidocaine soak and some hydrogel, I was able to clean up the dried scabbing and exudate on the medial aspect on the left, however she was too painful to address the lateral aspect.  Will continue with the polymem but not utilize gina.  Dr. Burns had also recommended increasing the gabapentin to help with pain, patient did try this with some result with her pain, but more sleepiness.  Skin sub insurance process started-still pending. No acute s/s of infection.    7-1-24 patient returns. Last week we utiliized polymem ag (without gina) the dressing. It definitely was less adherent, but still minimally so.  The left medial leg that  we worked on last week to remove the dried exudate, is much improved. Today we again tried to remove the remaining dried exudate on the lateral side however patient was not able to tolerate.  Will utilize a scant amount of silver hydrogel under the polymem to attempt to loosen the dried exudate. We are continuint with a light compression wrap.  Epifix is still pending. Burning pain continues, gabapentin helps somewhat.  No s/s of infection.     7-9-24 patient returns.  I was able to secure a sample of debrimit to help work off the dried exudate and scabbing.  The left medial leg was much improved after getting the dried exudate off. We utilize a small amount of silver hydrogel under the polymem to continue to autolytically debride it off. Epifix is not approved under patient's insurance. Wounds are improved today. We utilized a debrimit to assist with light debridement of the wounds and periwound exudate.  Patient continues with burning pain.  Will utilize the urgo clean ag. No acute s/s of infection.  MEDICATIONS:     Current Outpatient Medications:     predniSONE 5 MG Oral Tab, Take 20mg daily for 5 days then update office., Disp: 180 tablet, Rfl: 0    gabapentin 100 MG Oral Cap, Take 1 capsule (100 mg total) by mouth nightly., Disp: , Rfl:     amLODIPine 2.5 MG Oral Tab, Take 1 tablet (2.5 mg total) by mouth 2 (two) times daily., Disp: 180 tablet, Rfl: 0    hydroxychloroquine 200 MG Oral Tab, Take 1 tablet (200 mg total) by mouth daily., Disp: 90 tablet, Rfl: 0    Ferrous Sulfate 325 (65 Fe) MG Oral Tab, Take 1 tablet (325 mg total) by mouth 2 (two) times daily with meals., Disp: 180 tablet, Rfl: 0    Mycophenolate Mofetil 500 MG Oral Tab, Take 1 tablet (500 mg total) by mouth daily., Disp: 90 tablet, Rfl: 0  ALLERGIES:   No Known Allergies   REVIEW OF SYSTEMS:   This information was obtained from the patient/family and chart.    See HPI for pertinent positives, otherwise 10 pt ROS negative.    HISTORY:   Past  medical, surgical, family and social history updated where appropriate.      PHYSICAL EXAM:     Vitals:    07/09/24 0700   BP: 106/69   Pulse: 87   Resp: 15   Temp: 98 °F (36.7 °C)       Estimated body mass index is 21.43 kg/m² as calculated from the following:    Height as of 4/30/24: 63\".    Weight as of 4/30/24: 121 lb (54.9 kg).   No results found for: \"PGLU\"    Vital signs reviewed.Appears stated age, well groomed.    Constitutional:  Bp wnl for patient. Pulse Regular and wnl for patient. Respirations easy and unlabored. Temperature wnl. Weight normal for height. Appearance neat and clean. Appears in no acute distress. Well nourished and well developed.    Lower extremity:  dp/pt palpable bilaterally. Extremities are free of varicosities and edema, they are scarred, scattered changes in pigmentation (hypo/hyper) with atrophie jay. Capillary refill < 3 seconds. Digits are warm. toenails are wnl for color, thickness and hygeine. Skin hydration wnl. + hairgrowth on legs.    Musculoskeletal:  Gait and station stable   Integumentary:  refer to wound characteristics and images   Psychiatric:  Judgment and insight intact. Alert and oriented times 3. No evidence of depression, anxiety, or agitation. Calm, cooperative, and communicative, but very quiet and reserved.  EDEMA:   Calf  Point of Measurement - Left Calf: 31  Point of Measurement - Right Calf: 31  Left Calf from:: Heel  Calf Left cm:: 27.6  Right Calf from:: Heel  Right Calf cm:: 29.6  Ankle  Point of Measurement - Left Ankle: 11  Point of Measurement - Right Ankle: 11  Left Ankle from:: Heel  Left Ankle cm:: 17.3     Right Ankle from:: Heel  Right Ankle cm:: 17.2     DIAGNOSTICS:     Lab Results   Component Value Date    BUN 6 (L) 03/18/2024    CREATSERUM 0.50 (L) 03/18/2024    GFRCKDEPI 121.37 12/17/2020    ALB 4.0 03/18/2024    TP 8.0 03/18/2024    A1C 5.1 12/17/2020 11-28-23 arterial duplex-dr hilliard  FINDINGS:    LEFT EXTREMITY:  Triphasic waveforms  in the left common femoral, profunda, superficial femoral arteries.  Biphasic waveforms below the left knee.   OTHER:  None.     PEAK VELOCITIES (CM/S):   LEFT COMMON FEMORAL:      165    LEFT PROFUNDA FEMORAL:      78      LEFT SUPERFICIAL FEMORAL PROX:    113   LEFT SUPERFICIAL FEMORAL MID:      94   LEFT SUPERFICIAL FEMORAL DISTAL:    92   LEFT POPLITEAL PROX:      110   LEFT POPLITEAL DISTAL:      121   TIBIOPER TRUNK:      66   LEFT PTA PROX:      60   LEFT PTA MID:      62   LEFT PTA DISTAL:      73   LEFT BETH PROX:      82   LEFT BETH MID:      72    LEFT DORSALIS PEDIS::      30   LEFT GREAT TOE PRESSURE:      25 mmHg   CONCLUSION:    1. No high-grade stenosis is identified.  There is suggestion of diffuse mild stenosis throughout most of the left lower extremity.    2. Overall diminished left toe pressure is noted.  Possibility of decreased perfusion in the distal left foot is of consideration.     7-14-20 venous reflux study-dr santamaria  Per Dr. Santamaria review \"no intervention needed\"  WOUND ASSESSMENT:     Wound 04/26/24 #1 Ankle Left;Medial (Active)   Date First Assessed/Time First Assessed: 04/26/24 1031    Wound Number (Wound Clinic Only): #1  Primary Wound Type: (c) Auto-immune  Location: Ankle  Wound Location Orientation: Left;Medial      Assessments 4/26/2024 10:34 AM 7/9/2024  9:21 AM   Wound Image       Drainage Amount Unable to assess Small   Drainage Description -- Serosanguineous   Treatments Compression --   Wound Length (cm) 3.5 cm 5 cm   Wound Width (cm) 4.6 cm 3.4 cm   Wound Surface Area (cm^2) 16.1 cm^2 17 cm^2   Wound Depth (cm) 0.1 cm 0.1 cm   Wound Volume (cm^3) 1.61 cm^3 1.7 cm^3   Wound Healing % -- -6   Margins Undefined edges Well-defined edges   Non-staged Wound Description Full thickness Full thickness   Soni-wound Assessment Dry;Edema;Atrophy jay Dry;Edema;Atrophy jay   Wound Granulation Tissue -- Red;Pink;Firm   Wound Bed Granulation (%) -- 10 %   Wound Bed Epithelium (%) --  20 %   Wound Bed Slough (%) -- 70 %   Wound Odor None None   Shape 100% scabbed Clustered   Tunneling? -- No   Undermining? -- No   Sinus Tracts? -- No       No associated orders.       Wound 04/26/24 #2 Ankle Right;Lateral (Active)   Date First Assessed/Time First Assessed: 04/26/24 1031    Wound Number (Wound Clinic Only): #2  Primary Wound Type: (c) Auto-immune  Location: Ankle  Wound Location Orientation: Right;Lateral      Assessments 4/26/2024 10:34 AM 7/9/2024  9:24 AM   Wound Image       Drainage Amount Unable to assess Scant   Drainage Description -- Serosanguineous   Treatments Compression --   Wound Length (cm) 0.5 cm 1.8 cm   Wound Width (cm) 0.7 cm 1.6 cm   Wound Surface Area (cm^2) 0.35 cm^2 2.88 cm^2   Wound Depth (cm) 0.1 cm 0.1 cm   Wound Volume (cm^3) 0.035 cm^3 0.288 cm^3   Wound Healing % -- -723   Margins Well-defined edges --   Non-staged Wound Description Full thickness Full thickness   Soni-wound Assessment Dry;Edema;Atrophy jay Edema;Atrophy jay;Hemosiderin staining;Dry   Wound Granulation Tissue -- Pink;Spongy   Wound Bed Granulation (%) -- 50 %   Wound Bed Slough (%) -- 50 %   Wound Odor None None   Shape 100% scabbed --   Tunneling? -- No   Undermining? -- No   Sinus Tracts? -- No       Inactive Orders   Date Order Priority Status Authorizing Provider   06/19/24 1145 Debridement Auto-immune Right;Lateral Ankle Routine Completed Kaz Tapia MD       Compression Wrap 05/21/24 Ankle Anterior;Left (Active)   Placement Date/Time: 05/21/24 1019   Location: Ankle  Wound Location Orientation: Anterior;Left      Assessments 5/21/2024  9:36 AM 7/2/2024  9:08 AM   Response to Treatment Well tolerated Well tolerated   Compression Layers Multilayer Multilayer   Compression Product Type Unna Boot Unna Boot   Dressing Applied Yes Yes   Compression Wrap Location Toes to Knee Toes to Knee   Compression Wrap Status Clean;Dry;Intact Clean;Dry       No associated orders.       Compression  Wrap 05/28/24 Ankle Anterior;Right (Active)   Placement Date/Time: 05/28/24 1012   Location: Ankle  Wound Location Orientation: Anterior;Right      Assessments 5/28/2024  9:47 AM 7/2/2024  9:08 AM   Response to Treatment Well tolerated Well tolerated   Compression Layers Multilayer Multilayer   Compression Product Type Unna Boot Unna Boot   Dressing Applied Yes Yes   Compression Wrap Location Toes to Knee Toes to Knee   Compression Wrap Status Dry;Intact;Clean Dry;Clean       No associated orders.       Wound 06/25/24 #3 Ankle Left;Lateral (Active)   Date First Assessed/Time First Assessed: 06/25/24 0959    Wound Number (Wound Clinic Only): #3  Primary Wound Type: Auto-immune  Location: Ankle  Wound Location Orientation: Left;Lateral      Assessments 6/25/2024 10:03 AM 7/9/2024  9:19 AM   Wound Image       Drainage Amount Scant Small   Drainage Description Serosanguineous Serosanguineous   Treatments Compression --   Wound Length (cm) 0.9 cm 1.1 cm   Wound Width (cm) 0.8 cm 1.9 cm   Wound Surface Area (cm^2) 0.72 cm^2 2.09 cm^2   Wound Depth (cm) 0.1 cm 0.1 cm   Wound Volume (cm^3) 0.072 cm^3 0.209 cm^3   Wound Healing % -- -190   Margins Well-defined edges Well-defined edges   Non-staged Wound Description Full thickness Full thickness   Soni-wound Assessment Blanchable erythema;Dry;Edema;Moist Blanchable erythema;Dry;Edema   Wound Granulation Tissue Pink;Pale Grey;Firm Pink;Firm   Wound Bed Granulation (%) 50 % 20 %   Wound Bed Slough (%) 50 % 80 %   Wound Odor None --   Shape -- bridged   Tunneling? -- No   Undermining? -- No   Sinus Tracts? -- No       No associated orders.          ASSESSMENT AND PLAN:    1. 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 Idiopathic chronic venous hypertension of both lower extremities with ulcer and inflammation (HCC)    3. Non-pressure chronic ulcer of left ankle with fat layer exposed (HCC)    4. Lupus vasculitis (HCC)    5. Mixed connective  tissue disease (HCC)        Risks, benefits, and alternatives of current treatment plan discussed in detail.  Questions and concerns addressed. Red flags to RTC or ED reviewed.  Patient (or parent) agrees to plan.      NOTE TO PATIENT: The 21st Century Cures Act makes clinical notes like these available to patients in the interest of transparency. Clinical notes are medical documents used by physicians and care providers to communicate with each other. These documents include medical language and terminology, abbreviations, and treatment information that may sound technical and at times possibly unfamiliar. In addition, at times, the verbiage may appear blunt or direct. These documents are one tool providers use to communicate relevant information and clinical opinions of the care providers in a way that allows common understanding of the clinical context.   I spent 30 minutes with the patient. This time included:    preparing to see the patient (eg, review notes and recent diagnostics),  seeing the patient, obtaining and/or reviewing separately obtained history, performing a medically appropriate examination and/or evaluation, counseling and educating the patient, documenting in the record,   DISCHARGE INSTRUCTIONS     Patient Instructions   Please return: 1 week with estrellita       Patient discharge and wound care instructions  Raymond Olivo  7/9/2024       You may shower with protection of the wound (ie a cast cover or similar).     Changing your dressing:           Wash your hands with soap and water.  Ensure that the old dressing is removed completely. Place it in a plastic bag and throw it in the trash.  Cleanse the wound with hypochlorous wound cleanser (ie. Anasept, vashe, pure and clean) .  It's ok to “scrub” your wound with the gauze, small amount of bleeding with cleansing is normal and ok.  Apply the following dressings:   Right: urog ag (sample)>20-30mmhg VERY light wrap  Left:  urgo ag  (sample)>>20-30mmhg VERY light wrap    Nutrition and blood sugar control:  Focus on the following:  Protein: Meats, beans, eggs, milk and yogurt particularly Greek yogurt), tofu, soy nuts, soy protein products (Follow the protein handout in your welcome folder)  Vitamin C: Citrus fruits and juices, strawberries, tomatoes, tomato juice, peppers, baked potatoes, spinach, broccoli, cauliflower, [place] sprouts, cabbage  Vitamin A: Dark green, leafy vegetables, orange or yellow vegetables, cantaloupe, fortified dairy products, liver, fortified cereals  Zinc: Fortified cereals, red meats, seafood  Consider supplementing with Justin by [employer]. It can be purchased on amazon, [employer] website, or local pharmacy may be able to order it for you.  (These are essential branch chain amino acids that help with tissue building and wound healing).     Concerns:  Signs of infection may include the following:  Increase in redness  Red \"streaks\" from wound  Increase in swelling  Fever  Unusual odor  Change in the amount of wound drainage     Should you experience any significant changes in your wound(s) or have any questions regarding your home care instructions please contact the wound center [facility] @ [phone] If after regular business hours, please call your family doctor or local emergency room. The treatment plan has been discussed at length between you and your provider. Follow all instructions carefully, it is very important. If you do not follow all instructions you are at risk of your wound not healing, infection, possible loss of limb and even loss of life.        Martina Mckeon FNP-C, CWCN-AP, CFCN, CSWS, WCC, DWC  7/9/2024

## 2024-07-09 ENCOUNTER — OFFICE VISIT (OUTPATIENT)
Dept: WOUND CARE | Facility: HOSPITAL | Age: 49
End: 2024-07-09
Attending: NURSE PRACTITIONER
Payer: COMMERCIAL

## 2024-07-09 VITALS
DIASTOLIC BLOOD PRESSURE: 69 MMHG | HEART RATE: 87 BPM | SYSTOLIC BLOOD PRESSURE: 106 MMHG | TEMPERATURE: 98 F | RESPIRATION RATE: 15 BRPM

## 2024-07-09 DIAGNOSIS — M35.1 MIXED CONNECTIVE TISSUE DISEASE (HCC): ICD-10-CM

## 2024-07-09 DIAGNOSIS — L97.312 NON-PRESSURE CHRONIC ULCER OF RIGHT ANKLE WITH FAT LAYER EXPOSED (HCC): Primary | ICD-10-CM

## 2024-07-09 DIAGNOSIS — I87.333 IDIOPATHIC CHRONIC VENOUS HYPERTENSION OF BOTH LOWER EXTREMITIES WITH ULCER AND INFLAMMATION (HCC): ICD-10-CM

## 2024-07-09 DIAGNOSIS — L97.322 NON-PRESSURE CHRONIC ULCER OF LEFT ANKLE WITH FAT LAYER EXPOSED (HCC): ICD-10-CM

## 2024-07-09 DIAGNOSIS — M32.19 LUPUS VASCULITIS (HCC): ICD-10-CM

## 2024-07-09 DIAGNOSIS — I77.89 LUPUS VASCULITIS (HCC): ICD-10-CM

## 2024-07-09 PROCEDURE — 99214 OFFICE O/P EST MOD 30 MIN: CPT | Performed by: NURSE PRACTITIONER

## 2024-07-09 NOTE — PATIENT INSTRUCTIONS
Please return: 1 week with estrellita       Patient discharge and wound care instructions  Raymond Martinezu  7/9/2024       You may shower with protection of the wound (ie a cast cover or similar).     Changing your dressing:           Wash your hands with soap and water.  Ensure that the old dressing is removed completely. Place it in a plastic bag and throw it in the trash.  Cleanse the wound with hypochlorous wound cleanser (ie. Anasept, vashe, pure and clean) .  It's ok to “scrub” your wound with the gauze, small amount of bleeding with cleansing is normal and ok.  Apply the following dressings:   Right: urog ag (sample)>20-30mmhg VERY light wrap  Left:  urgo ag (sample)>>20-30mmhg VERY light wrap    Nutrition and blood sugar control:  Focus on the following:  Protein: Meats, beans, eggs, milk and yogurt particularly Greek yogurt), tofu, soy nuts, soy protein products (Follow the protein handout in your welcome folder)  Vitamin C: Citrus fruits and juices, strawberries, tomatoes, tomato juice, peppers, baked potatoes, spinach, broccoli, cauliflower, [place] sprouts, cabbage  Vitamin A: Dark green, leafy vegetables, orange or yellow vegetables, cantaloupe, fortified dairy products, liver, fortified cereals  Zinc: Fortified cereals, red meats, seafood  Consider supplementing with Justin by [employer]. It can be purchased on amazon, [employer] website, or local pharmacy may be able to order it for you.  (These are essential branch chain amino acids that help with tissue building and wound healing).     Concerns:  Signs of infection may include the following:  Increase in redness  Red \"streaks\" from wound  Increase in swelling  Fever  Unusual odor  Change in the amount of wound drainage     Should you experience any significant changes in your wound(s) or have any questions regarding your home care instructions please contact the [facility] @ [phone] If after regular business hours, please call  your family doctor or local emergency room. The treatment plan has been discussed at length between you and your provider. Follow all instructions carefully, it is very important. If you do not follow all instructions you are at risk of your wound not healing, infection, possible loss of limb and even loss of life.

## 2024-07-09 NOTE — PROGRESS NOTES
.Weekly Wound Education Note    Teaching Provided To: Patient;Family  Training Topics: Dressing;Edema control;Cleasing and general instructions;Compression;Discharge instructions  Training Method: Explain/Verbal;Written  Training Response: Patient responds and understands        Notes: Wounds stable. Dressing changed to sample Urgo Clean AG. BLE wrapped in calamine unna boot 20-30mmHg. ABD pad to anterior ankle for padding.

## 2024-07-15 NOTE — PROGRESS NOTES
CHIEF COMPLAINT:     No chief complaint on file.    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 Patient saw dr. Santamaria (vascular) in December 2023 and he documented \"I explained to the patient and her  that I do not believe the source of her pain to be vascular in origin.  She has an easily palpable dorsalis pedis pulse and the location of the pain is more suggestive of a neuropathic origin.  I suspect perhaps that given the tightness of her feet from her mixed connective tissue disorder the compression stockings is causing somewhat compression of the nerves in that area and I have encouraged her to substitute those stockings to those that involve the feet up to the upper calf.\" Patient/spouse have been in contact with dr. Farmer and in march 2024 reported a dark spot/ulcer on her bilateral lower legs, she was advised to make an appointment with wound care as well as get her blood work done ordered in January 2024 so that further treatment plan could be extablished.  The blood work did show increased inflammation and she was advised to increase her prednisone to 20mg x 5 days (which spouse reports they did do) and take the amlodipine 2.5 mg-which she states she is doing.  They have been leaving the areas open to air and she has not been wearing compression.  She c/o burning sensation in her ankle area.  She was started on gabapentin by dr. Farmer last year, however they stopped it because it was making her too sleepy. We discussed restarting the gabapentin, but only take a noc first.  Both wounds are dried eschar.  Will utilize hydrogel with lidocaine to start debriding off/softening the eschar. I have also ordered santyl. Patient placed into spandagrips for compression    HPI PRIOR TO JULY 2024 SEE  INDIVIDUAL PROGRESS NOTES  6-4-24 patient returns.  Last week she reported that when her legs were wrapped it helped with her pain (we had kept the wraps on for 3 days), therefore this week we placed the compound ointment and petroleum gauze and wrapped in light compression wrap.  Another factor helping her pain is that 2 weeks ago she was on and increased dose of predenisone at 20 mg daily.  She is will be reducing to 15 md later this week. She has tolerated the wraps well.  She still c/o burning pain and difficulty sleeping.  The tissue is improving and it appears the slough would be able to be lifted, but she is unable to tolerate any type of debridement. Will utilize enluxtra (after vashe soak) with back removed and kerramax in an attempt to clean up the wound bed and address the periwound. No s/s of infection. She wiil f/u with dr. Burns in my absence    6-25-24 patient returns.  She has been following up with dr. burns in my abcense.  She did receive the polymem ag max and it was used last week with gina.  Today wounds are about the same, but the dressing stuck to the wounds-suspect it was probably the dried gina.  After a long lidocaine soak and some hydrogel, I was able to clean up the dried scabbing and exudate on the medial aspect on the left, however she was too painful to address the lateral aspect.  Will continue with the polymem but not utilize gina.  Dr. Burns had also recommended increasing the gabapentin to help with pain, patient did try this with some result with her pain, but more sleepiness.  Skin sub insurance process started-still pending. No acute s/s of infection.    7-1-24 patient returns. Last week we utiliized polymem ag (without gina) the dressing. It definitely was less adherent, but still minimally so.  The left medial leg that we worked on last week to remove the dried exudate, is much improved. Today we again tried to remove the remaining dried exudate on the lateral  side however patient was not able to tolerate.  Will utilize a scant amount of silver hydrogel under the polymem to attempt to loosen the dried exudate. We are continuint with a light compression wrap.  Epifix is still pending. Burning pain continues, gabapentin helps somewhat.  No s/s of infection.     7-9-24 patient returns.  I was able to secure a sample of debrimit to help work off the dried exudate and scabbing.  The left medial leg was much improved after getting the dried exudate off. Last week  We utilize a small amount of silver hydrogel under the polymem to continue to autolytically debride it off. Epifix is not approved under patient's insurance. Wounds are improved today. We utilized a debrimit to assist with light debridement of the wounds and periwound exudate.  Patient continues with burning pain.  Will utilize the urgo clean ag. No acute s/s of infection.    7-16-24 patient returns. Last week we were able to remove exudate and lightly debride the wounds with debrimit, finally we utilized the urgo clean ag dressing.  Today ***, burning pain continues***  MEDICATIONS:     Current Outpatient Medications:     predniSONE 5 MG Oral Tab, Take 20mg daily for 5 days then update office., Disp: 180 tablet, Rfl: 0    gabapentin 100 MG Oral Cap, Take 1 capsule (100 mg total) by mouth nightly., Disp: , Rfl:     amLODIPine 2.5 MG Oral Tab, Take 1 tablet (2.5 mg total) by mouth 2 (two) times daily., Disp: 180 tablet, Rfl: 0    hydroxychloroquine 200 MG Oral Tab, Take 1 tablet (200 mg total) by mouth daily., Disp: 90 tablet, Rfl: 0    Ferrous Sulfate 325 (65 Fe) MG Oral Tab, Take 1 tablet (325 mg total) by mouth 2 (two) times daily with meals., Disp: 180 tablet, Rfl: 0    Mycophenolate Mofetil 500 MG Oral Tab, Take 1 tablet (500 mg total) by mouth daily., Disp: 90 tablet, Rfl: 0  ALLERGIES:   No Known Allergies   REVIEW OF SYSTEMS:   This information was obtained from the patient/family and chart.    See HPI for  pertinent positives, otherwise 10 pt ROS negative.    HISTORY:   Past medical, surgical, family and social history updated where appropriate.      PHYSICAL EXAM:     There were no vitals filed for this visit.      Estimated body mass index is 21.43 kg/m² as calculated from the following:    Height as of 4/30/24: 63\".    Weight as of 4/30/24: 121 lb (54.9 kg).   No results found for: \"PGLU\"    Vital signs reviewed.Appears stated age, well groomed.    Constitutional:  Bp*** wnl for patient. Pulse Regular and wnl for patient. Respirations easy and unlabored. Temperature wnl. Weight normal for height. Appearance neat and clean. Appears in no acute distress. Well nourished and well developed.    Lower extremity:  dp/pt palpable ***bilaterally. Extremities are free of varicosities and edema, they are scarred, scattered changes in pigmentation (hypo/hyper) with atrophie jay. Capillary refill < 3 seconds. Digits are warm. toenails are wnl for color, thickness and hygeine. Skin hydration wnl. + hairgrowth on legs.    Musculoskeletal:  Gait and station stable   Integumentary:  refer to wound characteristics and images   Psychiatric:  Judgment and insight intact. Alert and oriented times 3. No evidence of depression, anxiety, or agitation. Calm, cooperative, and communicative, but very quiet and reserved.  EDEMA:   Calf                    Ankle                          DIAGNOSTICS:     Lab Results   Component Value Date    BUN 6 (L) 03/18/2024    CREATSERUM 0.50 (L) 03/18/2024    GFRCKDEPI 121.37 12/17/2020    ALB 4.0 03/18/2024    TP 8.0 03/18/2024    A1C 5.1 12/17/2020 11-28-23 arterial duplex-dr hilliard  FINDINGS:    LEFT EXTREMITY:  Triphasic waveforms in the left common femoral, profunda, superficial femoral arteries.  Biphasic waveforms below the left knee.   OTHER:  None.     PEAK VELOCITIES (CM/S):   LEFT COMMON FEMORAL:      165    LEFT PROFUNDA FEMORAL:      78      LEFT SUPERFICIAL FEMORAL PROX:    113   LEFT  SUPERFICIAL FEMORAL MID:      94   LEFT SUPERFICIAL FEMORAL DISTAL:    92   LEFT POPLITEAL PROX:      110   LEFT POPLITEAL DISTAL:      121   TIBIOPER TRUNK:      66   LEFT PTA PROX:      60   LEFT PTA MID:      62   LEFT PTA DISTAL:      73   LEFT BETH PROX:      82   LEFT BETH MID:      72    LEFT DORSALIS PEDIS::      30   LEFT GREAT TOE PRESSURE:      25 mmHg   CONCLUSION:    1. No high-grade stenosis is identified.  There is suggestion of diffuse mild stenosis throughout most of the left lower extremity.    2. Overall diminished left toe pressure is noted.  Possibility of decreased perfusion in the distal left foot is of consideration.     7-14-20 venous reflux study-dr santamaria  Per Dr. Santamaria review \"no intervention needed\"  WOUND ASSESSMENT:     Wound 04/26/24 #1 Ankle Left;Medial (Active)   Date First Assessed/Time First Assessed: 04/26/24 1031    Wound Number (Wound Clinic Only): #1  Primary Wound Type: (c) Auto-immune  Location: Ankle  Wound Location Orientation: Left;Medial      Assessments 4/26/2024 10:34 AM 7/9/2024  9:21 AM   Wound Image       Drainage Amount Unable to assess Small   Drainage Description -- Serosanguineous   Treatments Compression Compression   Wound Length (cm) 3.5 cm 5 cm   Wound Width (cm) 4.6 cm 3.4 cm   Wound Surface Area (cm^2) 16.1 cm^2 17 cm^2   Wound Depth (cm) 0.1 cm 0.1 cm   Wound Volume (cm^3) 1.61 cm^3 1.7 cm^3   Wound Healing % -- -6   Margins Undefined edges Well-defined edges   Non-staged Wound Description Full thickness Full thickness   Soni-wound Assessment Dry;Edema;Atrophy jay Dry;Edema;Atrophy jay   Wound Granulation Tissue -- Red;Pink;Firm   Wound Bed Granulation (%) -- 10 %   Wound Bed Epithelium (%) -- 20 %   Wound Bed Slough (%) -- 70 %   Wound Odor None None   Shape 100% scabbed Clustered   Tunneling? -- No   Undermining? -- No   Sinus Tracts? -- No       No associated orders.       Wound 04/26/24 #2 Ankle Right;Lateral (Active)   Date First Assessed/Time  First Assessed: 04/26/24 1031    Wound Number (Wound Clinic Only): #2  Primary Wound Type: (c) Auto-immune  Location: Ankle  Wound Location Orientation: Right;Lateral      Assessments 4/26/2024 10:34 AM 7/9/2024  9:24 AM   Wound Image       Drainage Amount Unable to assess Scant   Drainage Description -- Serosanguineous   Treatments Compression Compression   Wound Length (cm) 0.5 cm 1.8 cm   Wound Width (cm) 0.7 cm 1.6 cm   Wound Surface Area (cm^2) 0.35 cm^2 2.88 cm^2   Wound Depth (cm) 0.1 cm 0.1 cm   Wound Volume (cm^3) 0.035 cm^3 0.288 cm^3   Wound Healing % -- -723   Margins Well-defined edges --   Non-staged Wound Description Full thickness Full thickness   Soni-wound Assessment Dry;Edema;Atrophy jay Edema;Atrophy jay;Hemosiderin staining;Dry   Wound Granulation Tissue -- Pink;Spongy   Wound Bed Granulation (%) -- 50 %   Wound Bed Slough (%) -- 50 %   Wound Odor None None   Shape 100% scabbed --   Tunneling? -- No   Undermining? -- No   Sinus Tracts? -- No       Inactive Orders   Date Order Priority Status Authorizing Provider   06/19/24 1145 Debridement Auto-immune Right;Lateral Ankle Routine Completed Kaz Tapia MD       Compression Wrap 05/21/24 Ankle Anterior;Left (Active)   Placement Date/Time: 05/21/24 1019   Location: Ankle  Wound Location Orientation: Anterior;Left      Assessments 5/21/2024  9:36 AM 7/9/2024  9:24 AM   Response to Treatment Well tolerated Well tolerated   Compression Layers Multilayer Multilayer   Compression Product Type Unna Boot Unna Boot   Dressing Applied Yes Yes   Compression Wrap Location Toes to Knee Toes to Knee   Compression Wrap Status Clean;Dry;Intact Clean;Dry       No associated orders.       Compression Wrap 05/28/24 Ankle Anterior;Right (Active)   Placement Date/Time: 05/28/24 1012   Location: Ankle  Wound Location Orientation: Anterior;Right      Assessments 5/28/2024  9:47 AM 7/9/2024  9:24 AM   Response to Treatment Well tolerated Well tolerated    Compression Layers Multilayer Multilayer   Compression Product Type Unna Boot Unna Boot   Dressing Applied Yes Yes   Compression Wrap Location Toes to Knee Toes to Knee   Compression Wrap Status Dry;Intact;Clean Dry;Clean       No associated orders.       Wound 06/25/24 #3 Ankle Left;Lateral (Active)   Date First Assessed/Time First Assessed: 06/25/24 0959    Wound Number (Wound Clinic Only): #3  Primary Wound Type: Auto-immune  Location: Ankle  Wound Location Orientation: Left;Lateral      Assessments 6/25/2024 10:03 AM 7/9/2024  9:19 AM   Wound Image       Drainage Amount Scant Small   Drainage Description Serosanguineous Serosanguineous   Treatments Compression Compression   Wound Length (cm) 0.9 cm 1.1 cm   Wound Width (cm) 0.8 cm 1.9 cm   Wound Surface Area (cm^2) 0.72 cm^2 2.09 cm^2   Wound Depth (cm) 0.1 cm 0.1 cm   Wound Volume (cm^3) 0.072 cm^3 0.209 cm^3   Wound Healing % -- -190   Margins Well-defined edges Well-defined edges   Non-staged Wound Description Full thickness Full thickness   Soni-wound Assessment Blanchable erythema;Dry;Edema;Moist Blanchable erythema;Dry;Edema   Wound Granulation Tissue Pink;Pale Grey;Firm Pink;Firm   Wound Bed Granulation (%) 50 % 20 %   Wound Bed Slough (%) 50 % 80 %   Wound Odor None --   Shape -- bridged   Tunneling? -- No   Undermining? -- No   Sinus Tracts? -- No       No associated orders.          ASSESSMENT AND PLAN:    There are no diagnoses linked to this encounter.        Risks, benefits, and alternatives of current treatment plan discussed in detail.  Questions and concerns addressed. Red flags to RTC or ED reviewed.  Patient (or parent) agrees to plan.      NOTE TO PATIENT: The 21st Century Cures Act makes clinical notes like these available to patients in the interest of transparency. Clinical notes are medical documents used by physicians and care providers to communicate with each other. These documents include medical language and terminology,  abbreviations, and treatment information that may sound technical and at times possibly unfamiliar. In addition, at times, the verbiage may appear blunt or direct. These documents are one tool providers use to communicate relevant information and clinical opinions of the care providers in a way that allows common understanding of the clinical context.   I spent ***minutes with the patient. This time included:    preparing to see the patient (eg, review notes and recent diagnostics),  seeing the patient, obtaining and/or reviewing separately obtained history, performing a medically appropriate examination and/or evaluation, counseling and educating the patient, documenting in the record,   DISCHARGE INSTRUCTIONS     There are no Patient Instructions on file for this visit.   Martina Mckeon FNP-C, CWCN-AP, CFCN, CSWS, WCC, DWC  7/16/2024        particularly Greek yogurt), tofu, soy nuts, soy protein products (Follow the protein handout in your welcome folder)  Vitamin C: Citrus fruits and juices, strawberries, tomatoes, tomato juice, peppers, baked potatoes, spinach, broccoli, cauliflower, [place] sprouts, cabbage  Vitamin A: Dark green, leafy vegetables, orange or yellow vegetables, cantaloupe, fortified dairy products, liver, fortified cereals  Zinc: Fortified cereals, red meats, seafood  Consider supplementing with Justin by [email or web address]. It can be purchased on amazon, [employer] website, or local pharmacy may be able to order it for you.  (These are essential branch chain amino acids that help with tissue building and wound healing).     Concerns:  Signs of infection may include the following:  Increase in redness  Red \"streaks\" from wound  Increase in swelling  Fever  Unusual odor  Change in the amount of wound drainage     Should you experience any significant changes in your wound(s) or have any questions regarding your home care instructions please contact the [facility] @ [phone] If after regular business hours, please call your family doctor or local emergency room. The treatment plan has been discussed at length between you and your provider. Follow all instructions carefully, it is very important. If you do not follow all instructions you are at risk of your wound not healing, infection, possible loss of limb and even loss of life.        Martina Mckeon FNP-C, CWALISON-AP, CFCN, CSWS, WCC, DWC  7/16/2024

## 2024-07-16 ENCOUNTER — APPOINTMENT (OUTPATIENT)
Dept: WOUND CARE | Facility: HOSPITAL | Age: 49
End: 2024-07-16
Attending: NURSE PRACTITIONER
Payer: COMMERCIAL

## 2024-07-16 VITALS
SYSTOLIC BLOOD PRESSURE: 108 MMHG | HEART RATE: 93 BPM | TEMPERATURE: 98 F | RESPIRATION RATE: 16 BRPM | DIASTOLIC BLOOD PRESSURE: 68 MMHG

## 2024-07-16 DIAGNOSIS — L97.312 NON-PRESSURE CHRONIC ULCER OF RIGHT ANKLE WITH FAT LAYER EXPOSED (HCC): Primary | ICD-10-CM

## 2024-07-16 DIAGNOSIS — M35.1 MIXED CONNECTIVE TISSUE DISEASE (HCC): ICD-10-CM

## 2024-07-16 DIAGNOSIS — L97.322 NON-PRESSURE CHRONIC ULCER OF LEFT ANKLE WITH FAT LAYER EXPOSED (HCC): ICD-10-CM

## 2024-07-16 DIAGNOSIS — M32.19 LUPUS VASCULITIS (HCC): ICD-10-CM

## 2024-07-16 DIAGNOSIS — I77.89 LUPUS VASCULITIS (HCC): ICD-10-CM

## 2024-07-16 DIAGNOSIS — I87.333 IDIOPATHIC CHRONIC VENOUS HYPERTENSION OF BOTH LOWER EXTREMITIES WITH ULCER AND INFLAMMATION (HCC): ICD-10-CM

## 2024-07-16 PROCEDURE — 99214 OFFICE O/P EST MOD 30 MIN: CPT | Performed by: NURSE PRACTITIONER

## 2024-07-16 NOTE — PROGRESS NOTES
.Weekly Wound Education Note    Teaching Provided To: Patient;Family  Training Topics: Discharge instructions;Dressing;Edema control;Compression  Training Method: Explain/Verbal;Written  Training Response: Patient responds and understands;Reinforcement needed            Enluxtra (back off) to all wounds, cover with abd pads.  Calamine unna boot 20-30mmHg to BLE.

## 2024-07-16 NOTE — PATIENT INSTRUCTIONS
Please return: 1 week with estrellita       Patient discharge and wound care instructions  Raymond Olivo  7/16/2024          You may shower with protection of the wound (ie a cast cover or similar).     Changing your dressing:           Wash your hands with soap and water.  Ensure that the old dressing is removed completely. Place it in a plastic bag and throw it in the trash.  Cleanse the wound with hypochlorous wound cleanser (ie. Anasept, vashe, pure and clean) .  It's ok to “scrub” your wound with the gauze, small amount of bleeding with cleansing is normal and ok.  Apply the following dressings:   Right: enluxtra (back off)>abd pad>20-30mmhg VERY light wrap  Left:   enluxtra (back off)>abd pad>20-30mmhg VERY light wrap    Nutrition and blood sugar control:  Focus on the following:  Protein: Meats, beans, eggs, milk and yogurt particularly Greek yogurt), tofu, soy nuts, soy protein products (Follow the protein handout in your welcome folder)  Vitamin C: Citrus fruits and juices, strawberries, tomatoes, tomato juice, peppers, baked potatoes, spinach, broccoli, cauliflower, [place] sprouts, cabbage  Vitamin A: Dark green, leafy vegetables, orange or yellow vegetables, cantaloupe, fortified dairy products, liver, fortified cereals  Zinc: Fortified cereals, red meats, seafood  Consider supplementing with Justin by [employer]. It can be purchased on amazon, [employer] website, or local pharmacy may be able to order it for you.  (These are essential branch chain amino acids that help with tissue building and wound healing).     Concerns:  Signs of infection may include the following:  Increase in redness  Red \"streaks\" from wound  Increase in swelling  Fever  Unusual odor  Change in the amount of wound drainage     Should you experience any significant changes in your wound(s) or have any questions regarding your home care instructions please contact the [facility] @ [phone] If after regular  business hours, please call your family doctor or local emergency room. The treatment plan has been discussed at length between you and your provider. Follow all instructions carefully, it is very important. If you do not follow all instructions you are at risk of your wound not healing, infection, possible loss of limb and even loss of life.

## 2024-07-22 NOTE — PROGRESS NOTES
CHIEF COMPLAINT:     Chief Complaint   Patient presents with    Wound Care     Follow-up for wounds to bilateral ankle. Arrives in wraps to both legs. Pain 1/10.     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 Patient saw dr. Santamaria (vascular) in December 2023 and he documented \"I explained to the patient and her  that I do not believe the source of her pain to be vascular in origin.  She has an easily palpable dorsalis pedis pulse and the location of the pain is more suggestive of a neuropathic origin.  I suspect perhaps that given the tightness of her feet from her mixed connective tissue disorder the compression stockings is causing somewhat compression of the nerves in that area and I have encouraged her to substitute those stockings to those that involve the feet up to the upper calf.\" Patient/spouse have been in contact with dr. Farmer and in march 2024 reported a dark spot/ulcer on her bilateral lower legs, she was advised to make an appointment with wound care as well as get her blood work done ordered in January 2024 so that further treatment plan could be extablished.  The blood work did show increased inflammation and she was advised to increase her prednisone to 20mg x 5 days (which spouse reports they did do) and take the amlodipine 2.5 mg-which she states she is doing.  They have been leaving the areas open to air and she has not been wearing compression.  She c/o burning sensation in her ankle area.  She was started on gabapentin by dr. Farmer last year, however they stopped it because it was making her too sleepy. We discussed restarting the gabapentin, but only take a noc first.  Both wounds are dried eschar.  Will utilize hydrogel with lidocaine to start debriding off/softening the  eschar. I have also ordered santyl. Patient placed into spandagrips for compression    HPI PRIOR TO JULY 2024 SEE INDIVIDUAL PROGRESS NOTES  6-25-24 patient returns.  She has been following up with dr. burns in my [facility].  She did receive the polymem ag max and it was used last week with gina.  Today wounds are about the same, but the dressing stuck to the wounds-suspect it was probably the dried gina.  After a long lidocaine soak and some hydrogel, I was able to clean up the dried scabbing and exudate on the medial aspect on the left, however she was too painful to address the lateral aspect.  Will continue with the polymem but not utilize gina.  Dr. Burns had also recommended increasing the gabapentin to help with pain, patient did try this with some result with her pain, but more sleepiness.  Skin sub insurance process started-still pending. No acute s/s of infection.    7-1-24 patient returns. Last week we utiliized polymem ag (without gina) the dressing. It definitely was less adherent, but still minimally so.  The left medial leg that we worked on last week to remove the dried exudate, is much improved. Today we again tried to remove the remaining dried exudate on the lateral side however patient was not able to tolerate.  Will utilize a scant amount of silver hydrogel under the polymem to attempt to loosen the dried exudate. We are continuint with a light compression wrap.  Epifix is still pending. Burning pain continues, gabapentin helps somewhat.  No s/s of infection.     7-9-24 patient returns.  I was able to secure a sample of debrimit to help work off the dried exudate and scabbing.  The left medial leg was much improved after getting the dried exudate off. Last week  We utilize a small amount of silver hydrogel under the polymem to continue to autolytically debride it off. Epifix is not approved under patient's insurance. Wounds are improved today. We utilized a debrimit to assist with  light debridement of the wounds and periwound exudate.  Patient continues with burning pain.  Will utilize the urgo clean ag. No acute s/s of infection.    7-16-24 patient returns. Last week we were able to remove exudate and lightly debride the wounds with debrimit, finally we utilized the urgo clean ag dressing.  burning pain continues worse for a day or two after appointment.  The urgo clean ag did not seem to address the periwound build up of exudate at all and then having to remove that mechanically causes patient significant pain. Will return to enluxtra to assist with both debridement within the wound and the periwound exudate/inflammation. No s/s of infection.  The 2nd pictures are post debrimit cleansing.     7-23-24 Patient returns.  We do not have any further samples of the debrimit that we have been utilizing the last 2 weeks for cleansing both the wound and exudate off the periwound.  Will try to order from [facility] as this definitely seems to be helping.  We transitioned to enluxtra to assist with periwound hygeinge and inflammation. Today wounds and periwound are improved overall, but there is a new area on left lateral proximal ankle that was previously atrophie jay that is now open.   Burning pain continues but patient states slightly better.  Patient saw dr davis earlier today and the plan is to increase the mycophenolate dependent on bloodwork (which she is getting drawn after this appointment).  No s/s of infection.  MEDICATIONS:     Current Outpatient Medications:     gabapentin 100 MG Oral Cap, Take 100mg in am and 200mg at bedtime., Disp: , Rfl:     FLUoxetine 10 MG Oral Cap, Take 1 capsule (10 mg total) by mouth at bedtime., Disp: 90 capsule, Rfl: 0    predniSONE 5 MG Oral Tab, Take 20mg daily for 5 days then update office., Disp: 180 tablet, Rfl: 0    amLODIPine 2.5 MG Oral Tab, Take 1 tablet (2.5 mg total) by mouth 2 (two) times daily., Disp: 180 tablet, Rfl: 0    hydroxychloroquine 200 MG  Oral Tab, Take 1 tablet (200 mg total) by mouth daily., Disp: 90 tablet, Rfl: 0    Ferrous Sulfate 325 (65 Fe) MG Oral Tab, Take 1 tablet (325 mg total) by mouth 2 (two) times daily with meals., Disp: 180 tablet, Rfl: 0    Mycophenolate Mofetil 500 MG Oral Tab, Take 1 tablet (500 mg total) by mouth daily., Disp: 90 tablet, Rfl: 0  ALLERGIES:   No Known Allergies   REVIEW OF SYSTEMS:   This information was obtained from the patient/family and chart.    See HPI for pertinent positives, otherwise 10 pt ROS negative.    HISTORY:   Past medical, surgical, family and social history updated where appropriate.    PHYSICAL EXAM:     Vitals:    07/23/24 1100   BP: 110/73   Pulse: 91   Resp: 16   Temp: 98 °F (36.7 °C)     Estimated body mass index is 20.94 kg/m² as calculated from the following:    Height as of an earlier encounter on 7/23/24: 64\".    Weight as of an earlier encounter on 7/23/24: 122 lb (55.3 kg).   No results found for: \"PGLU\"    Vital signs reviewed.Appears stated age, well groomed.    Constitutional:  Bp wnl for patient. Pulse Regular and wnl for patient. Respirations easy and unlabored. Temperature wnl. Weight normal for height. Appearance neat and clean. Appears in no acute distress. Well nourished and well developed.    Lower extremity:  dp/pt palpable bilaterally. Extremities are free of varicosities and edema, they are scarred, scattered changes in pigmentation (hypo/hyper) with atrophie jay. Capillary refill < 3 seconds. Digits are warm. toenails are wnl for color, thickness and hygeine. Skin hydration wnl. + hairgrowth on legs.    Musculoskeletal:  Gait and station stable   Integumentary:  refer to wound characteristics and images   Psychiatric:  Judgment and insight intact. Alert and oriented times 3. No evidence of depression, anxiety, or agitation. Calm, cooperative, and communicative, but very quiet and reserved.  EDEMA:   Calf  Point of Measurement - Left Calf: 31  Point of Measurement -  Right Calf: 31  Left Calf from:: Heel  Calf Left cm:: 27.4  Right Calf from:: Heel  Right Calf cm:: 28  Ankle  Point of Measurement - Left Ankle: 11  Point of Measurement - Right Ankle: 11  Left Ankle from:: Heel  Left Ankle cm:: 17.1     Right Ankle from:: Heel  Right Ankle cm:: 16.2     DIAGNOSTICS:     Lab Results   Component Value Date    BUN 6 (L) 03/18/2024    CREATSERUM 0.50 (L) 03/18/2024    GFRCKDEPI 121.37 12/17/2020    ALB 4.0 03/18/2024    TP 8.0 03/18/2024    A1C 5.1 12/17/2020 11-28-23 arterial duplex-dr santamaria  FINDINGS:    LEFT EXTREMITY:  Triphasic waveforms in the left common femoral, profunda, superficial femoral arteries.  Biphasic waveforms below the left knee.   OTHER:  None.     PEAK VELOCITIES (CM/S):   LEFT COMMON FEMORAL:      165    LEFT PROFUNDA FEMORAL:      78      LEFT SUPERFICIAL FEMORAL PROX:    113   LEFT SUPERFICIAL FEMORAL MID:      94   LEFT SUPERFICIAL FEMORAL DISTAL:    92   LEFT POPLITEAL PROX:      110   LEFT POPLITEAL DISTAL:      121   TIBIOPER TRUNK:      66   LEFT PTA PROX:      60   LEFT PTA MID:      62   LEFT PTA DISTAL:      73   LEFT BETH PROX:      82   LEFT BETH MID:      72    LEFT DORSALIS PEDIS::      30   LEFT GREAT TOE PRESSURE:      25 mmHg   CONCLUSION:    1. No high-grade stenosis is identified.  There is suggestion of diffuse mild stenosis throughout most of the left lower extremity.    2. Overall diminished left toe pressure is noted.  Possibility of decreased perfusion in the distal left foot is of consideration.     7-14-20 venous reflux study-dr santamaria  Per Dr. Santamaria review \"no intervention needed\"  WOUND ASSESSMENT:     Wound 04/26/24 #1 Ankle Left;Medial (Active)   Date First Assessed/Time First Assessed: 04/26/24 1031    Wound Number (Wound Clinic Only): #1  Primary Wound Type: (c) Auto-immune  Location: Ankle  Wound Location Orientation: Left;Medial      Assessments 4/26/2024 10:34 AM 7/23/2024 11:19 AM   Wound Image        Drainage Amount Unable to  assess Small   Drainage Description -- Yellow;Serous   Treatments Compression --   Wound Length (cm) 3.5 cm 1 cm   Wound Width (cm) 4.6 cm 1 cm   Wound Surface Area (cm^2) 16.1 cm^2 1 cm^2   Wound Depth (cm) 0.1 cm 0.1 cm   Wound Volume (cm^3) 1.61 cm^3 0.1 cm^3   Wound Healing % -- 94   Margins Undefined edges Well-defined edges   Non-staged Wound Description Full thickness Full thickness   Soni-wound Assessment Dry;Edema;Atrophy jay Dry;Hemosiderin staining;Other (Comment)   Wound Granulation Tissue -- Pink;Firm   Wound Bed Granulation (%) -- 40 %   Wound Bed Slough (%) -- 60 %   Wound Odor None None   Shape 100% scabbed --   Tunneling? -- No   Undermining? -- No   Sinus Tracts? -- No       No associated orders.       Wound 04/26/24 #2 Ankle Right;Lateral (Active)   Date First Assessed/Time First Assessed: 04/26/24 1031    Wound Number (Wound Clinic Only): #2  Primary Wound Type: (c) Auto-immune  Location: Ankle  Wound Location Orientation: Right;Lateral      Assessments 4/26/2024 10:34 AM 7/23/2024 11:18 AM   Wound Image       Drainage Amount Unable to assess Small   Drainage Description -- Yellow;Serous   Treatments Compression --   Wound Length (cm) 0.5 cm 1.9 cm   Wound Width (cm) 0.7 cm 1.6 cm   Wound Surface Area (cm^2) 0.35 cm^2 3.04 cm^2   Wound Depth (cm) 0.1 cm 0.2 cm   Wound Volume (cm^3) 0.035 cm^3 0.608 cm^3   Wound Healing % -- -1637   Margins Well-defined edges Well-defined edges   Non-staged Wound Description Full thickness Full thickness   Soni-wound Assessment Dry;Edema;Atrophy jay Atrophy jay;Hemosiderin staining;Dry;Other (Comment)   Wound Granulation Tissue -- Pink;Spongy;Red   Wound Bed Granulation (%) -- 20 %   Wound Bed Slough (%) -- 80 %   Wound Odor None None   Shape 100% scabbed --   Tunneling? -- No   Undermining? -- No   Sinus Tracts? -- No       Inactive Orders   Date Order Priority Status Authorizing Provider   06/19/24 1145 Debridement Auto-immune Right;Lateral Ankle  Routine Completed Kaz Tapia MD       Compression Wrap 05/21/24 Ankle Anterior;Left (Active)   Placement Date/Time: 05/21/24 1019   Location: Ankle  Wound Location Orientation: Anterior;Left      Assessments 5/21/2024  9:36 AM 7/16/2024 10:27 AM   Response to Treatment Well tolerated Well tolerated   Compression Layers Multilayer Multilayer   Compression Product Type Unna Boot Unna Boot   Dressing Applied Yes Yes   Compression Wrap Location Toes to Knee Toes to Knee   Compression Wrap Status Clean;Dry;Intact Dry;Clean       No associated orders.       Compression Wrap 05/28/24 Ankle Anterior;Right (Active)   Placement Date/Time: 05/28/24 1012   Location: Ankle  Wound Location Orientation: Anterior;Right      Assessments 5/28/2024  9:47 AM 7/16/2024 10:27 AM   Response to Treatment Well tolerated Well tolerated   Compression Layers Multilayer Multilayer   Compression Product Type Unna Boot Unna Boot   Dressing Applied Yes Yes   Compression Wrap Location Toes to Knee Toes to Knee   Compression Wrap Status Dry;Intact;Clean Clean;Dry       No associated orders.       Wound 06/25/24 #3 Ankle Left;Lateral (Active)   Date First Assessed/Time First Assessed: 06/25/24 0959    Wound Number (Wound Clinic Only): #3  Primary Wound Type: Auto-immune  Location: Ankle  Wound Location Orientation: Left;Lateral      Assessments 6/25/2024 10:03 AM 7/23/2024 11:21 AM   Wound Image       Drainage Amount Scant Small   Drainage Description Serosanguineous Yellow;Serous   Treatments Compression --   Wound Length (cm) 0.9 cm 2.5 cm   Wound Width (cm) 0.8 cm 1.5 cm   Wound Surface Area (cm^2) 0.72 cm^2 3.75 cm^2   Wound Depth (cm) 0.1 cm 0.1 cm   Wound Volume (cm^3) 0.072 cm^3 0.375 cm^3   Wound Healing % -- -421   Margins Well-defined edges Well-defined edges   Non-staged Wound Description Full thickness Full thickness   Soni-wound Assessment Blanchable erythema;Dry;Edema;Moist Dry;Other (Comment)   Wound Granulation Tissue  Pink;Pale Grey;Firm Spongy;Pink;Red   Wound Bed Granulation (%) 50 % 40 %   Wound Bed Slough (%) 50 % 60 %   Wound Odor None None   Tunneling? -- No   Undermining? -- No   Sinus Tracts? -- No       No associated orders.       Wound 07/23/24 #4 Left Lateral Leg Leg Left;Lateral (Active)   Date First Assessed/Time First Assessed: 07/23/24 1137    Wound Number (Wound Clinic Only): #4 Left Lateral Leg  Primary Wound Type: (c) Other (comment)  Location: Leg  Wound Location Orientation: Left;Lateral      Assessments 7/23/2024 11:46 AM   Wound Image     Drainage Amount Unable to assess   Wound Length (cm) 2.3 cm   Wound Width (cm) 1.3 cm   Wound Surface Area (cm^2) 2.99 cm^2   Wound Depth (cm) 0.1 cm   Wound Volume (cm^3) 0.299 cm^3   Margins Well-defined edges   Non-staged Wound Description Full thickness   Soni-wound Assessment Clean;Dry;Other (Comment)   Wound Granulation Tissue Pale Grey;Pink;Firm   Wound Bed Granulation (%) 45 %   Wound Bed Epithelium (%) 10 %   Wound Bed Slough (%) 45 %   Wound Odor None       No associated orders.          ASSESSMENT AND PLAN:    1. 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 Idiopathic chronic venous hypertension of both lower extremities with ulcer and inflammation (HCC)    3. Non-pressure chronic ulcer of left ankle with fat layer exposed (HCC)    4. Lupus vasculitis (HCC)    5. Mixed connective tissue disease (HCC)    6. Current chronic use of systemic steroids      Risks, benefits, and alternatives of current treatment plan discussed in detail.  Questions and concerns addressed. Red flags to RTC or ED reviewed.  Patient (or parent) agrees to plan.      NOTE TO PATIENT: The 21st Century Cures Act makes clinical notes like these available to patients in the interest of transparency. Clinical notes are medical documents used by physicians and care providers to communicate with each other. These documents include medical language and terminology,  abbreviations, and treatment information that may sound technical and at times possibly unfamiliar. In addition, at times, the verbiage may appear blunt or direct. These documents are one tool providers use to communicate relevant information and clinical opinions of the care providers in a way that allows common understanding of the clinical context.   I spent 39 minutes with the patient. This time included:    preparing to see the patient (eg, review notes and recent diagnostics),  seeing the patient, obtaining and/or reviewing separately obtained history, performing a medically appropriate examination and/or evaluation, counseling and educating the patient, documenting in the record, communication with dr. davis  DISCHARGE INSTRUCTIONS     Patient Instructions   Please return: 1 week with for RN visit for wound assessment, edema management, and if applicable reapplication of multilayer compression bandage system.    2 weeks with Martina    WILL ATTEMPT TO ORDER DEBRI-HU.  IF YOU GET THEM BRING THEM TO APPOINTMENT      Patient discharge and wound care instructions  Raymond Olivo  7/23/2024        You may shower with protection of the wound (ie a cast cover or similar).     Changing your dressing:           Wash your hands with soap and water.  Ensure that the old dressing is removed completely. Place it in a plastic bag and throw it in the trash.  Cleanse the wound with hypochlorous wound cleanser (ie. Anasept, vashe, pure and clean) .  It's ok to “scrub” your wound with the gauze, small amount of bleeding with cleansing is normal and ok.  Apply the following dressings:   Right: enluxtra (back on)>abd pad>20-30mmhg VERY light wrap  Left:   enluxtra (back on)>abd pad>20-30mmhg VERY light wrap  Left (lateral):  use 2 pieces of enluxtra so not over bony prominence    Nutrition and blood sugar control:  Focus on the following:  Protein: Meats, beans, eggs, milk and yogurt particularly Greek yogurt), tofu, soy  nuts, soy protein products (Follow the protein handout in your welcome folder)  Vitamin C: Citrus fruits and juices, strawberries, tomatoes, tomato juice, peppers, baked potatoes, spinach, broccoli, cauliflower, [place] sprouts, cabbage  Vitamin A: Dark green, leafy vegetables, orange or yellow vegetables, cantaloupe, fortified dairy products, liver, fortified cereals  Zinc: Fortified cereals, red meats, seafood  Consider supplementing with Justin by [employer]. It can be purchased on amazon, [employer] website, or local pharmacy may be able to order it for you.  (These are essential branch chain amino acids that help with tissue building and wound healing).     Concerns:  Signs of infection may include the following:  Increase in redness  Red \"streaks\" from wound  Increase in swelling  Fever  Unusual odor  Change in the amount of wound drainage     Should you experience any significant changes in your wound(s) or have any questions regarding your home care instructions please contact the [facility] @ [phone] If after regular business hours, please call your family doctor or local emergency room. The treatment plan has been discussed at length between you and your provider. Follow all instructions carefully, it is very important. If you do not follow all instructions you are at risk of your wound not healing, infection, possible loss of limb and even loss of life.        Martina Mckeon FNP-C, CWCN-AP, CFCN, CSWS, WCC, DWC  7/23/2024

## 2024-07-23 ENCOUNTER — HOSPITAL ENCOUNTER (OUTPATIENT)
Dept: LAB | Facility: HOSPITAL | Age: 49
Discharge: HOME | End: 2024-07-23
Attending: INTERNAL MEDICINE
Payer: COMMERCIAL

## 2024-07-23 ENCOUNTER — OFFICE VISIT (OUTPATIENT)
Dept: WOUND CARE | Facility: HOSPITAL | Age: 49
End: 2024-07-23
Attending: NURSE PRACTITIONER
Payer: COMMERCIAL

## 2024-07-23 ENCOUNTER — OFFICE VISIT (OUTPATIENT)
Dept: RHEUMATOLOGY | Facility: CLINIC | Age: 49
End: 2024-07-23
Payer: COMMERCIAL

## 2024-07-23 VITALS
HEART RATE: 91 BPM | SYSTOLIC BLOOD PRESSURE: 110 MMHG | DIASTOLIC BLOOD PRESSURE: 73 MMHG | TEMPERATURE: 98 F | RESPIRATION RATE: 16 BRPM

## 2024-07-23 VITALS
DIASTOLIC BLOOD PRESSURE: 60 MMHG | HEIGHT: 64 IN | TEMPERATURE: 98 F | SYSTOLIC BLOOD PRESSURE: 118 MMHG | BODY MASS INDEX: 20.83 KG/M2 | WEIGHT: 122 LBS | RESPIRATION RATE: 16 BRPM | OXYGEN SATURATION: 100 % | HEART RATE: 76 BPM

## 2024-07-23 DIAGNOSIS — I87.333 IDIOPATHIC CHRONIC VENOUS HYPERTENSION OF BOTH LOWER EXTREMITIES WITH ULCER AND INFLAMMATION (HCC): ICD-10-CM

## 2024-07-23 DIAGNOSIS — I73.01 RAYNAUD'S DISEASE WITH GANGRENE (HCC): ICD-10-CM

## 2024-07-23 DIAGNOSIS — Z79.899 HIGH RISK MEDICATION USE: ICD-10-CM

## 2024-07-23 DIAGNOSIS — M32.19 LUPUS VASCULITIS (HCC): ICD-10-CM

## 2024-07-23 DIAGNOSIS — L97.322 NON-PRESSURE CHRONIC ULCER OF LEFT ANKLE WITH FAT LAYER EXPOSED (HCC): ICD-10-CM

## 2024-07-23 DIAGNOSIS — M32.19 LUPUS VASCULITIS (HCC): Primary | ICD-10-CM

## 2024-07-23 DIAGNOSIS — M35.1 MIXED CONNECTIVE TISSUE DISEASE (HCC): ICD-10-CM

## 2024-07-23 DIAGNOSIS — L97.312 NON-PRESSURE CHRONIC ULCER OF RIGHT ANKLE WITH FAT LAYER EXPOSED (HCC): Primary | ICD-10-CM

## 2024-07-23 DIAGNOSIS — Z79.52 CURRENT CHRONIC USE OF SYSTEMIC STEROIDS: ICD-10-CM

## 2024-07-23 DIAGNOSIS — I77.89 LUPUS VASCULITIS (HCC): Primary | ICD-10-CM

## 2024-07-23 DIAGNOSIS — I77.89 LUPUS VASCULITIS (HCC): ICD-10-CM

## 2024-07-23 DIAGNOSIS — Z79.899 LONG-TERM USE OF PLAQUENIL: ICD-10-CM

## 2024-07-23 DIAGNOSIS — Z79.52 LONG TERM (CURRENT) USE OF SYSTEMIC STEROIDS: ICD-10-CM

## 2024-07-23 LAB
ALBUMIN SERPL-MCNC: 4.3 G/DL (ref 3.2–4.8)
ALBUMIN/GLOB SERPL: 1.4 {RATIO} (ref 1–2)
ALP LIVER SERPL-CCNC: 71 U/L
ALT SERPL-CCNC: 9 U/L
ANION GAP SERPL CALC-SCNC: 3 MMOL/L (ref 0–18)
AST SERPL-CCNC: 15 U/L (ref ?–34)
BASOPHILS # BLD AUTO: 0.01 X10(3) UL (ref 0–0.2)
BASOPHILS NFR BLD AUTO: 0.2 %
BILIRUB SERPL-MCNC: 0.6 MG/DL (ref 0.3–1.2)
BUN BLD-MCNC: 8 MG/DL (ref 9–23)
CALCIUM BLD-MCNC: 9.2 MG/DL (ref 8.7–10.4)
CHLORIDE SERPL-SCNC: 106 MMOL/L (ref 98–112)
CO2 SERPL-SCNC: 30 MMOL/L (ref 21–32)
CREAT BLD-MCNC: 0.59 MG/DL
CRP SERPL-MCNC: <0.4 MG/DL (ref ?–0.5)
EGFRCR SERPLBLD CKD-EPI 2021: 111 ML/MIN/1.73M2 (ref 60–?)
EOSINOPHIL # BLD AUTO: 0.04 X10(3) UL (ref 0–0.7)
EOSINOPHIL NFR BLD AUTO: 0.7 %
ERYTHROCYTE [DISTWIDTH] IN BLOOD BY AUTOMATED COUNT: 13.9 %
ERYTHROCYTE [SEDIMENTATION RATE] IN BLOOD: 39 MM/HR
FASTING STATUS PATIENT QL REPORTED: NO
GLOBULIN PLAS-MCNC: 3 G/DL (ref 2.8–4.4)
GLUCOSE BLD-MCNC: 78 MG/DL (ref 70–99)
HCT VFR BLD AUTO: 40.7 %
HGB BLD-MCNC: 12.8 G/DL
IMM GRANULOCYTES # BLD AUTO: 0.02 X10(3) UL (ref 0–1)
IMM GRANULOCYTES NFR BLD: 0.4 %
LYMPHOCYTES # BLD AUTO: 0.79 X10(3) UL (ref 1–4)
LYMPHOCYTES NFR BLD AUTO: 14.5 %
MCH RBC QN AUTO: 28.3 PG (ref 26–34)
MCHC RBC AUTO-ENTMCNC: 31.4 G/DL (ref 31–37)
MCV RBC AUTO: 89.8 FL
MONOCYTES # BLD AUTO: 0.68 X10(3) UL (ref 0.1–1)
MONOCYTES NFR BLD AUTO: 12.5 %
NEUTROPHILS # BLD AUTO: 3.92 X10 (3) UL (ref 1.5–7.7)
NEUTROPHILS # BLD AUTO: 3.92 X10(3) UL (ref 1.5–7.7)
NEUTROPHILS NFR BLD AUTO: 71.7 %
OSMOLALITY SERPL CALC.SUM OF ELEC: 285 MOSM/KG (ref 275–295)
PLATELET # BLD AUTO: 183 10(3)UL (ref 150–450)
POTASSIUM SERPL-SCNC: 3.3 MMOL/L (ref 3.5–5.1)
PROT SERPL-MCNC: 7.3 G/DL (ref 5.7–8.2)
RBC # BLD AUTO: 4.53 X10(6)UL
SODIUM SERPL-SCNC: 139 MMOL/L (ref 136–145)
WBC # BLD AUTO: 5.5 X10(3) UL (ref 4–11)

## 2024-07-23 PROCEDURE — G2211 COMPLEX E/M VISIT ADD ON: HCPCS | Performed by: INTERNAL MEDICINE

## 2024-07-23 PROCEDURE — 80053 COMPREHEN METABOLIC PANEL: CPT

## 2024-07-23 PROCEDURE — 36415 COLL VENOUS BLD VENIPUNCTURE: CPT

## 2024-07-23 PROCEDURE — 3074F SYST BP LT 130 MM HG: CPT | Performed by: INTERNAL MEDICINE

## 2024-07-23 PROCEDURE — 99214 OFFICE O/P EST MOD 30 MIN: CPT | Performed by: NURSE PRACTITIONER

## 2024-07-23 PROCEDURE — 3078F DIAST BP <80 MM HG: CPT | Performed by: INTERNAL MEDICINE

## 2024-07-23 PROCEDURE — 3074F SYST BP LT 130 MM HG: CPT | Performed by: NURSE PRACTITIONER

## 2024-07-23 PROCEDURE — 85652 RBC SED RATE AUTOMATED: CPT

## 2024-07-23 PROCEDURE — 85025 COMPLETE CBC W/AUTO DIFF WBC: CPT

## 2024-07-23 PROCEDURE — 99215 OFFICE O/P EST HI 40 MIN: CPT | Performed by: INTERNAL MEDICINE

## 2024-07-23 PROCEDURE — 86140 C-REACTIVE PROTEIN: CPT

## 2024-07-23 PROCEDURE — 3008F BODY MASS INDEX DOCD: CPT | Performed by: INTERNAL MEDICINE

## 2024-07-23 PROCEDURE — 3078F DIAST BP <80 MM HG: CPT | Performed by: NURSE PRACTITIONER

## 2024-07-23 RX ORDER — MYCOPHENOLATE MOFETIL 250 MG/1
250 CAPSULE ORAL NIGHTLY
Qty: 90 CAPSULE | Refills: 0 | Status: SHIPPED | OUTPATIENT
Start: 2024-07-23

## 2024-07-23 RX ORDER — GABAPENTIN 100 MG/1
CAPSULE ORAL
COMMUNITY
Start: 2024-07-23

## 2024-07-23 RX ORDER — PREDNISONE 2.5 MG/1
2.5 TABLET ORAL DAILY
Qty: 90 TABLET | Refills: 0 | Status: SHIPPED | OUTPATIENT
Start: 2024-07-23

## 2024-07-23 RX ORDER — FLUOXETINE 10 MG/1
10 CAPSULE ORAL NIGHTLY
Qty: 90 CAPSULE | Refills: 0 | Status: SHIPPED | OUTPATIENT
Start: 2024-07-23

## 2024-07-23 NOTE — PROGRESS NOTES
?  RHEUMATOLOGY Follow up   Date of visit: 07/23/2024    No chief complaint on file.      ASSESSMENT, DISCUSSION & PLAN   Assessment:  1. Lupus vasculitis (HCC)    2. Mixed connective tissue disease (HCC)    3. Raynaud's disease with gangrene (HCC)    4. Long term (current) use of systemic steroids    5. Long-term use of Plaquenil            Discussion:  Mrs. Raymond Olivo is a 48 yo woman with previously diagnosed mixed connective tissue disease and recent ulcer/chronic wounds in her bilateral legs. She does seem to have prominent sclerodermatous features with Raynaud's and chronic wounds, skin tightening as well as telangectasias. It seems like she has been on multiple DMARDs in the past including plaquenil, which was stopped due to possible inefficacy and GI upset as well as azathioprine which was discontinued due to cytopenias.     Since establishing care with me, she restarted methotrexate and had significant improvement of skin ulcerations with amlodipine. She did have worsened skin ulcer formation and biopsy performed by wound care confirmed lupus vasculitis. Due to lack of efficacy of methotrexate, decision was made to stop methotrexate, restart plaquenil and mycophenolate.     Patient had to be hospitalized due to ITP.  Unclear etiology but had responded to prolonged steroids.   She decided to  stop both CellCept and prednisone.  She has been off CellCept since hospitalization at the end of November 2022.  And she had been off of her prednisone but had some worsened fatigue and trace ankle swelling bilaterally. She did have some elevated inflammatory markers as well so prednisone was restarted. She had been doing better overall.     There is a comopnent of medical nonadherence and pt has been told multiple times to get  baseline ECHO and PFTs due to other complications from the MCTD which may be contributing to some of her fatigue and low weight.  She had initially delayed initially due to COVID19 pandemic,  however, she has not had any baseline done since disease onset and we need to evaluate for possible ILD vs pulmonary HTN. Reiterated again but pt declines further work up.     More recently, she had some worsened foot pain. Arterial duplex showed mild diffuse stenosis but vascular surgeon did not think significant for intervention. Thought her symptoms were more neurologic and she has been doing better with use of compression socks  She did not restart gabapentin or MMF despite my recommendations to do so.    Now, more recently, she has suffered recurrence of ulcerations in both ankles that had progressed fairly quickly. Her and her  had requested urgent wound care evaluation so they were fit and she has been getting slowly better.  Per wound care, progress is slow however patient also cannot tolerate full debridement.  Patient did not get any labs done despite restarting mycophenolate.  Reminded again about importance of medical adherence to wrist instructions and safety/risks of these medications.  Reminded pt that this is likely the lupus vasculitis as was determined a few years ago. Strongly recommended compliance with medication regimen    Due to persistent Raynaud's and questionable perfusion, will also add fluoxetine to the amlodipine.  Discussed at length the risks and benefits of the medication including but not limited to worsening depression/anxiety, suicidal ideations, GI upset as well as the risk of stopping medication abruptly without discussing with me.  Discussed life-threatening complications if patient stops medicine without communicating with me or another provider before stopping.    -- Increase MMF to 750mg daily  -- updated labs in 4 weeks to monitor for toxicities   -- decrease prednisone to 2.5mg daily  -- start fluoxetine nightly to see if this helps with the raynauds   -- continue plaquenil 200mg daily (weight based dosing)  -- continue amlodipine 2.5mg twice daily  -- be sure to get  retinal exam by ophthalmology since retinal exam does not seem to have been done by [facility]. If not done by next visit, will stop medication. Have reminded multiple times about importance of getting this done for the past few years.   -- previously discussed xray of foot showing periarticular osteopenia- could be related to bone density vs early inflammation. Encouraged to get updated BMD that was previously ordered.  -- get baseline ECHO and PFTs as previously ordered and recommended   -- take iron supplements daily  -- take vitamin d 1000IU daily   -- follow up in 2 months prior to trip to [facility]   -- result note sent prior to note being completed to discuss above as well.       Patient and pt's  verbalized understanding of above instructions. No further questions at this time.    Code selection for this visit was based on time. Time spent (45min) on date of service in preparing to see the patient, obtaining and/or reviewing separately obtained history, performing a medically appropriate examination, counseling and educating the patient/family/caregiver, ordering medications or testing, referring and communicating with other healthcare providers, documenting clinical information in the E HR, independently interpreting results and communicating results to the patient/family/caregiver and care coordination with the patient's other providers.    Additional time spent communicating and coordinating care.    Plan:  Diagnoses and all orders for this visit:    Lupus vasculitis (HCC)  -     predniSONE 2.5 MG Oral Tab; Take 1 tablet (2.5 mg total) by mouth daily.  -     mycophenolate mofetil 250 MG Oral Cap; Take 1 capsule (250 mg total) by mouth at bedtime.    Mixed connective tissue disease (HCC)  -     predniSONE 2.5 MG Oral Tab; Take 1 tablet (2.5 mg total) by mouth daily.  -     mycophenolate mofetil 250 MG Oral Cap; Take 1 capsule (250 mg total) by mouth at bedtime.    Raynaud's disease with gangrene  (HCC)  -     FLUoxetine 10 MG Oral Cap; Take 1 capsule (10 mg total) by mouth at bedtime.  -     predniSONE 2.5 MG Oral Tab; Take 1 tablet (2.5 mg total) by mouth daily.    Long term (current) use of systemic steroids    Long-term use of Plaquenil                Return in about 2 months (around 9/23/2024).  ?  HPI   Raymond Olivo is a 49 year old female with the following active problems who is seen for medically necessary follow-up today. She was seen as a new patient several months ago for second opinion regarding underlying connective tissue disease. She had been suffering from multiple leg wounds over the years.  She was diagnosed by another rheumatologist with having mixed connective tissue disorder. She was previously on Plaquenil as well as azathioprine and some dose of prednisone.  Seemed like she was also on methotrexate but unclear why stopped. Since first seeing her, I restarted methotrexate with daily folic acid supplementation as well as added amlodipine. She had some decrease in her WBC count which has stabilized. Decision was made to stop methotrexate and start mycophenolate due to lupus vasculitis dx on skin biopsy. She was hospitalized due to thrombocytopenia.  Was diagnosed with ITP and given IV methylprednisolone as well as IVIG infusion. Initially as outpatient platelets were only 10 and improved most recently. She presents for follow up today.    Her  is here and serves to translate if pt does not understand what I'm asking/explaining.     Currently taking:  Plaquenil 200mg daily   Amlodipine 2.5mg twice daily  Prednisone 5mg daily   MMF 500mg daily   Gabapentin 200mg in the morning.    Biotin supplementation  Vit d completed prescription 2 weeks ago, not taking OTC at this time.   B12 daily     Since her last visit, she has been feeling better.   Still having ulcers over the legs, that are healing but taking time.   Having burning sensation more than the pain.   Restarted  gabapentin 100mg nightly - is helping minimally.   Increased fatigue but relates to above   Restarted the MMF and has tolerated so far. No labs done.     Denies any other joint pain or swelling.   Continues to have hair loss/thinning but details unclear- stable. No changes since restarting MMF  Prior weakness has gotten better.   Denies skin rashes   No recent bruising or bleeding  Raynaud's present but not severe, stable.   Feels the tightness of the skin over the fingers and toes are the same.   Has not had obvious bleeding- epistaxis, hemoptysis, hematuria or bloody stools  Denies shortness of breath or chest pain.   Denies cough.   Denies fevers or chills.     Still not been seen by ophthalmology - denies blurred vision. Was seen by optometry in December.   Has not done ECHO or CXR as previously ordered.     HPI from initial consultation  States about 8-10 years ago, she started to have pain in her joints, while living in [place]. There was concern for possible rheumatologic issue at that time. She was given some oral prednisone which were reduced over time. She had a wound on her leg, was told it was a vascular wound. Had a laser vein surgery at that time. No recurrence until one month ago.   About 3 years ago, they moved to [facility]. She was seeing Dr. Keys, diagnosed pt with mixed connective tissue disease. States her symptoms worsened in winter months- joint pain in her hands as well as change in color of her fingertips. About one month ago, she developed a wound over her leg. She followed with Dr. Ferguson, as Dr. Keys left. Once the wound came, he recommended pt be evaluated by wound care. Also recommended tertiary center.  Was previously on plaquenil, however due to the side effects, this was stopped.  Pt was also on azathioprine but had some cytopenias. Also had been on methotrexate but since this wasn't helping, this was discontinued.   Pt has been following with wound care. States with the  topicals and wrapping, she has had some improvement of the wound size.   Pt continues to have burning sensation in both legs, described as 5-6/10.   She is currently on prednisone 5mg daily.   She has never gotten a biopsy of the lesion, unless she had one 8 years ago in [place], but  cannot recall results.  She tried gabapentin, only 100mg nightly but stopped because of feeling drowsy.      Pt does have morning stiffness lasting 10-15 minutes.   Significant hair loss  + ulcers over bottom inside lip  Itching over thighs without redness or rash.   Hx of cytopenia while on DMARDs  + ulcers over toes/bottom of feet  + pain awakening the patient from sleep  + looks thinner, however actual weight is the same.      No history of significant wrist pain or swelling, pain or swelling of the MCPs, pain or swelling of the ankles and bones of the feet, or new skin nodule formation.  The patient denies nasal ulcers, photosensitive rash, elevated or scarring rashes, prior renal or liver disease, or history of seizures.  No history of prior blood clot in the legs or lungs, strokes or ischemic phenomenon.  Denies nonhealing ulcers on the fingertips, trouble swallowing, or severe acid reflux.  The patient denies any history of uveitis, crampy abdominal pain, constipation, diarrhea, or bloody stools.   There are no symptoms of severe dry eyes, dry mouth, or swelling of the cheeks or under the jawbone.   No fevers, chills, lymphadenopathy, night sweats, or easy bruising or bleeding.  Denies chronic sinus infections/disease or epistaxis.  Denies chronic cough or hemoptysis.      Family hx:  Father with possible MCTD  Brothers/sisters healthy       Past Medical History:  Past Medical History:    Mixed connective tissue disease (HCC)    Raynaud's disease     Past Surgical History:  History reviewed. No pertinent surgical history.    Family History:  Family History   Problem Relation Age of Onset    Diabetes Father     Diabetes  Mother      Social History:  Social History     Socioeconomic History    Marital status:    Tobacco Use    Smoking status: Never    Smokeless tobacco: Never   Vaping Use    Vaping status: Never Used   Substance and Sexual Activity    Alcohol use: No    Drug use: No    Sexual activity: Yes     Medications:  Outpatient Medications Marked as Taking for the 7/23/24 encounter (Office Visit) with Alisha Farmer DO   Medication Sig Dispense Refill    gabapentin 100 MG Oral Cap Take 100mg in am and 200mg at bedtime.      FLUoxetine 10 MG Oral Cap Take 1 capsule (10 mg total) by mouth at bedtime. 90 capsule 0    predniSONE 2.5 MG Oral Tab Take 1 tablet (2.5 mg total) by mouth daily. 90 tablet 0    mycophenolate mofetil 250 MG Oral Cap Take 1 capsule (250 mg total) by mouth at bedtime. 90 capsule 0    amLODIPine 2.5 MG Oral Tab Take 1 tablet (2.5 mg total) by mouth 2 (two) times daily. 180 tablet 0    hydroxychloroquine 200 MG Oral Tab Take 1 tablet (200 mg total) by mouth daily. 90 tablet 0    Ferrous Sulfate 325 (65 Fe) MG Oral Tab Take 1 tablet (325 mg total) by mouth 2 (two) times daily with meals. 180 tablet 0    Mycophenolate Mofetil 500 MG Oral Tab Take 1 tablet (500 mg total) by mouth daily. 90 tablet 0     Modified Medications    Modified Medication Previous Medication    GABAPENTIN 100 MG ORAL CAP gabapentin 100 MG Oral Cap       Take 100mg in am and 200mg at bedtime.    Take 1 capsule (100 mg total) by mouth nightly.    PREDNISONE 2.5 MG ORAL TAB predniSONE 5 MG Oral Tab       Take 1 tablet (2.5 mg total) by mouth daily.    Take 20mg daily for 5 days then update office.     Medications Discontinued During This Encounter   Medication Reason    gabapentin 100 MG Oral Cap     predniSONE 5 MG Oral Tab          ?  Allergies:  No Known Allergies  ?  REVIEW OF SYSTEMS   ?  Review of Systems   Constitutional:  Positive for malaise/fatigue. Negative for chills, fever and weight loss.   HENT:  Negative for nosebleeds.     Eyes:  Negative for blurred vision, double vision, pain and redness.   Respiratory:  Negative for cough, hemoptysis and shortness of breath.    Cardiovascular:  Negative for chest pain, palpitations and leg swelling.   Gastrointestinal:  Negative for abdominal pain, blood in stool, constipation, diarrhea, heartburn and nausea.   Genitourinary:  Negative for dysuria, frequency, hematuria and urgency.   Musculoskeletal:  Negative for back pain, joint pain, myalgias and neck pain.   Skin:  Negative for itching and rash.   Neurological:  Positive for tingling. Negative for dizziness, weakness and headaches.   Endo/Heme/Allergies:  Does not bruise/bleed easily.     PHYSICAL EXAM   Today's Vitals:  Temperature Blood Pressure Heart Rate Resp Rate SpO2   Temp: 97.8 °F (36.6 °C) BP: 118/60 Pulse: 76 Resp: 16 SpO2: 100 %   ?  Current Weight Height BMI BSA Pain   Wt Readings from Last 1 Encounters:   07/23/24 122 lb (55.3 kg)    Height: 5' 4\" (162.6 cm) Body mass index is 20.94 kg/m². Body surface area is 1.58 meters squared.         Physical Exam  Vitals and nursing note reviewed.   Constitutional:       General: She is not in acute distress.     Appearance: She is well-developed. She is not diaphoretic.      Comments: + thin   HENT:      Head: Normocephalic.   Eyes:      General: No scleral icterus.     Extraocular Movements: Extraocular movements intact.      Conjunctiva/sclera: Conjunctivae normal.   Neck:      Vascular: No JVD.      Trachea: No tracheal deviation.   Cardiovascular:      Rate and Rhythm: Normal rate and regular rhythm.      Heart sounds: Normal heart sounds. No murmur heard.  Pulmonary:      Effort: Pulmonary effort is normal. No respiratory distress.      Breath sounds: Normal breath sounds. No wheezing.   Musculoskeletal:         General: Tenderness and deformity present. No swelling.      Cervical back: Neck supple.      Comments: (Prior exam)  No evidence of heberden or kecia nodes of any of the  fingers, no basilar joint tenderness of the 1st CMC bilaterally.  No swelling, tenderness, redness or restriction of motion of the DIPs, PIPs, MCPs, wrists, elbows, or joints of the feet.  Bilateral shoulders with full ROM.  Bilateral knees without medial joint line tenderness, no crepitus, no effusion.  + bilateral ankle tenderness over med/lat malleoli and some swelling most notable over lateral malleolus on left - no more swelling but tenderness over top of feet  Very sensitive to touch over left dorsal foot diffusely  Difficult to palpate pulses due to sensitivity to touch    Lymphadenopathy:      Cervical: No cervical adenopathy.   Skin:     General: Skin is warm and dry.      Comments: No active bruising noted today.   Salt/pepper appearance of skin stable  Abnormal nailfold capillaroscopy diffuse fingers, stable  No digital pits of fingers  Cool to touch toes with purplish color changes; dorsalis pedis pulse intact.   + telangectasias over face stable  Skin tightening of distal fingers improved   Ulcers noted on b/l ankles- see pictures from wound care   Neurological:      Mental Status: She is alert. She is disoriented.      Cranial Nerves: No cranial nerve deficit.      Gait: Gait abnormal.   Psychiatric:         Mood and Affect: Mood normal.         Behavior: Behavior normal.       ?  Radiology review:       Narrative   PROCEDURE:  US ARTERIAL DUPLEX LOWER EXTREMITY LEFT (CPT=93926)     COMPARISON:  None.     INDICATIONS:  M79.672 Left foot pain I73.01 Raynaud's disease with gangrene ([facility]) I77.89 Lupus vasculitis ([facility]) M32.19 Lupus vasculitis ([facility]) M35.1 Mixed connective tissue d*     TECHNIQUE:  A comprehensive color duplex Doppler ultrasound examination of the left lower extremity was performed. Color imaging, Doppler wave form analysis, and peak systolic flow measurements of the common femoral, profunda femoral, superficial  femoral, and popliteal arteries were performed.       PATIENT STATED HISTORY:  (As transcribed by Technologist)           FINDINGS:    LEFT EXTREMITY:  Triphasic waveforms in the left common femoral, profunda, superficial femoral arteries.  Biphasic waveforms below the left knee.  OTHER:  None.       PEAK VELOCITIES (CM/S):  LEFT COMMON FEMORAL:      165    LEFT PROFUNDA FEMORAL:      78      LEFT SUPERFICIAL FEMORAL PROX:    113  LEFT SUPERFICIAL FEMORAL MID:      94  LEFT SUPERFICIAL FEMORAL DISTAL:    92  LEFT POPLITEAL PROX:      110  LEFT POPLITEAL DISTAL:      121  TIBIOPER TRUNK:      66  LEFT PTA PROX:      60  LEFT PTA MID:      62  LEFT PTA DISTAL:      73  LEFT BETH PROX:      82  LEFT BETH MID:      72    LEFT DORSALIS PEDIS::      30  LEFT GREAT TOE PRESSURE:      25 mmHg                       Impression   CONCLUSION:       1. No high-grade stenosis is identified.  There is suggestion of diffuse mild stenosis throughout most of the left lower extremity.     2. Overall diminished left toe pressure is noted.  Possibility of decreased perfusion in the distal left foot is of consideration.          LOCATION:  [place] [id number]           Dictated by (CST): Anselmo Carroll MD on 11/28/2023 at 9:30 AM      Finalized by (CST): Anselmo Carroll MD on 11/28/2023 at 9:35 AM       Narrative   PROCEDURE:  XR FOOT, COMPLETE (MIN 3 VIEWS), LEFT (CPT=73630)     TECHNIQUE:  AP, oblique, and lateral views were obtained.     COMPARISON:  None.     INDICATIONS:  M79.672 Left foot pain I73.01 Raynaud's disease with gangrene ([facility]) I77.89 Lupus vasculitis ([facility]) M32.19 Lupus vasculitis ([facility]) M35.1 Mixed connective tissue d*     PATIENT STATED HISTORY: (As transcribed by Technologist)  Patient states having lateral anterior foot pain that began roughly twenty days ago, with no injury.         FINDINGS:    BONES:  Osseous structures are intact.  Periarticular decreased bone mineralization.  Limited evaluation of the 2nd through 5th distal phalanges is due to constant flexion.  Joint spaces are preserved.  Mild  hallux valgus deformity.  No dislocation.    Mild inferior calcaneal enthesopathy.                   Impression   CONCLUSION:    1. Osseous structures are intact.  Please see details as above.        LOCATION:  [place]        Dictated by (CST): Madina Colin MD on 10/19/2023 at 12:16 PM      Finalized by (CST): Madina Colin MD on 10/19/2023 at 12:18 PM        Narrative   PROCEDURE:  XR FOOT, COMPLETE (MIN 3 VIEWS), LEFT (CPT=73630)     TECHNIQUE:  AP, oblique, and lateral views were obtained.     COMPARISON:  None.     INDICATIONS:  M79.672 Left foot pain I73.01 Raynaud's disease with gangrene ([facility]) I77.89 Lupus vasculitis ([facility]) M32.19 Lupus vasculitis ([facility]) M35.1 Mixed connective tissue d*     PATIENT STATED HISTORY: (As transcribed by Technologist)  Patient states having lateral anterior foot pain that began roughly twenty days ago, with no injury.         FINDINGS:    BONES:  Osseous structures are intact.  Periarticular decreased bone mineralization.  Limited evaluation of the 2nd through 5th distal phalanges is due to constant flexion.  Joint spaces are preserved.  Mild hallux valgus deformity.  No dislocation.    Mild inferior calcaneal enthesopathy.                   Impression   CONCLUSION:    1. Osseous structures are intact.  Please see details as above.        LOCATION:  [place]        Dictated by ([facility]): Madina Colin MD on 10/19/2023 at 12:16 PM      Finalized by (CST): Madina Colin MD on 10/19/2023 at 12:18 PM      PROCEDURE:  CT ABDOMEN PELVIS IV CONTRAST, NO ORAL (ER)       COMPARISON:  None.       INDICATIONS:  abd pain, sent by pcp to r/o s[facility]       TECHNIQUE:  CT scanning was performed from the dome of the diaphragm to the pubic symphysis with non-ionic intravenous contrast material. Post contrast coronal MPR imaging was performed.  Dose reduction techniques were used. Dose information is   transmitted to the ACR (American College of Radiology) NRDR (National Radiology Data Registry) which includes the Dose  Index Registry.       FINDINGS:     LUNG BASES:  Dependent atelectasis bilaterally.   LIVER:  Normal in shape and contour.   BILIARY:  Cholelithiasis.  No intrahepatic biliary dilatation.  There is possible small amount of pericholecystic fluid..   SPLEEN:  Normal.  No enlargement or focal lesion.   PANCREAS:  No tyson-pancreatic inflammatory stranding   ADRENALS:  Normal.  No mass or enlargement.     KIDNEYS:  There is moderate bilateral hydronephrosis.  No obstructing urolithiasis.   BOWEL/MESENTERY:  Bowel is normal in caliber. No evidence of obstruction.  Considerable amount of stool noted within the rectum.  Moderate amount of fecal material noted within the remaining portion of the colon.  The appendix is normal appearance.   PELVIS:  Distended bladder.  No free pelvic fluid.  Calcified phleboliths within the pelvis.     AORTA/VASCULAR:  Atheromatous calcifications of the aorta.  Aorta is normal in caliber.   BONES:  No acute fractures.                        Impression   CONCLUSION:     1. Considerably distended bladder with bilateral hydronephrosis.  No obstructing urolithiasis is noted.   2. Prominent mount of stool noted within the rectum suggestive of fecal impaction.  No evidence of bowel obstruction.     3. Gallstone within the neck of the gallbladder measuring 1.3 x 1.0 cm.  The gallbladder is contracted but there is suggestion of small amount of pericholecystic fluid.  Correlation with clinical symptoms to exclude acute cholecystitis.             Dictated by (CST): Colette Nelson MD on 12/22/2021 at 7:51 PM       Finalized by (CST): Colette Nelson MD on 12/22/2021 at 7:56 PM        Labs:  Lab Results   Component Value Date    WBC 5.5 07/23/2024    RBC 4.53 07/23/2024    HGB 12.8 07/23/2024    HCT 40.7 07/23/2024    .0 07/23/2024    MCV 89.8 07/23/2024    MCH 28.3 07/23/2024    MCHC 31.4 07/23/2024    RDW 13.9 07/23/2024    NEPRELIM 3.92 07/23/2024    NEPERCENT 71.7 07/23/2024    LYPERCENT  14.5 07/23/2024    MOPERCENT 12.5 07/23/2024    EOPERCENT 0.7 07/23/2024    BAPERCENT 0.2 07/23/2024    NE 3.92 07/23/2024    LYMABS 0.79 (L) 07/23/2024    MOABSO 0.68 07/23/2024    EOABSO 0.04 07/23/2024    BAABSO 0.01 07/23/2024     Lab Results   Component Value Date    GLU 78 07/23/2024    BUN 8 (L) 07/23/2024    BUNCREA 16.7 07/14/2021    CREATSERUM 0.59 07/23/2024    ANIONGAP 3 07/23/2024    GFRNAA 109 07/28/2022    GFRAA 126 07/28/2022    CA 9.2 07/23/2024    OSMOCALC 285 07/23/2024    ALKPHO 71 07/23/2024    AST 15 07/23/2024    ALT 9 (L) 07/23/2024    BILT 0.6 07/23/2024    TP 7.3 07/23/2024    ALB 4.3 07/23/2024    GLOBULIN 3.0 07/23/2024     07/23/2024    K 3.3 (L) 07/23/2024     07/23/2024    CO2 30.0 07/23/2024       Additional Labs:  07/2024 (resulted after visit)  CBC with WBC 5.5; hemoglobin 12.8; platelet 183  CMP grossly normal except potassium 3.3 low  ESR 39 elevated  CRP normal     03/2024  CBC with WBC 3.2, otherwise normal  CMP grossly normal   Iron 45; percent sat 15 borderline low  Ferritin 114.6  B12 726 normal  ESR 54 elevated  CRP 0.37 borderline elevated  Vitamin D 35  C3 103  C4 33.7     10/2023  CBC grossly normal  CMP grossly normal  Iron 43; percent sat 12 low  Ferritin 94.1 normal  B12 726 normal  ESR 45 elevated  CRP 0.48 borderline elevated  Vitamin D 58.8 Normal    07/2023  B12 246 borderline low   Vitamin D 22.9 low  Ferritin 96.6 normal  Iron low  CBC grossly normal  CMP grossly normal    07/2022  B12 302 normal  Vitamin D 14.4 low  Ferritin normal  Iron normal  CBC grossly normal  CMP with potassium 3.3 otherwise grossly normal  CRP normal  ESR 15 normal  C3 97.8 normal  C4 29.1 normal    01/29/2022  WELLINGTON 1: 640 speckled      Remainder of DARRYL panel negative  CBC with WBC 5.9; hemoglobin 12.2; platelet 296  B12 689  ESR 48 elevated  CRP 0.44 borderline elevated  CMP grossly normal  UA grossly normal with exception of trace leuk esterase squamous  epithelial and rare bacteria  Ferritin 245.8 elevated  Iron and percent sat low    01/11/2022  CBC grossly normal (WBC 5.6; hemoglobin 13.2; platelet 214)    11/2021   B12 943  WELLINGTON positive      Remainder of DARRYL panel negative intrinsic factor blocking antibody negative  MMA normal  ESR 34  dsDNA negative  Total complement normal    11/22/2021  CBC with WBC 3.9; hemoglobin 11.4; platelet 10    7/2021  dsDNA negative  Protein creatinine ratio normal  UA small amount of blood; leuk esterase; bacteria rare; moderate squamous  C4 27.3 normal  C3 112 normal  CRP 0.46 borderline elevated  ESR 40 elevated  CBC with hemoglobin 10.9; WBC 4.0; platelet 213 normal  CMP grossly normal    08/2020  Esr 36  CRP 0.60  C3 and C4 normal     Cutaneous Direct IF, Biopsy See Note    Comment: IMMUNODERMATOLOGY REPORT       Specimen(s):   1. 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, including lupus vasculitis     (See Results and Comments)   ____________________________________________________________     RESULTS   IgG:  Grains within and around basal keratinocytes and         within cell nuclei and grains in focal superficial,         upper, and mid dermal blood vessels         IgG4:  Negative     IgM:  Grains in focal dermal blood vessels     IgA:  Focal grains within basal keratinocytes     C3:   Few grains along basement membrane zone and grains and         clumps within superficial and upper dermal blood         vessels     Fibrinogen:  3+ deposition around superficial, upper, and                mid dermal blood vessels     COMMENTS   By direct immunofluorescence, there is granular deposition of IgG   within and around basal keratinocytes and within cell nuclei. The   presence of granular IgG  within the epidermis raises the   consideration of lupus erythematosus, specifically subacute   cutaneous lupus erythematosus (SCLE). The finding of granular   staining of cell nuclei may be indicative of the presence of   anti-nuclear antibodies, further lending support to the   possibility of lupus erythematosus. 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     Correlation with histopathologic examination of formalin-fixed   tissue is needed to further define this process. Correlation with   clinical findings is also needed, including with serologic testing   for lupus associated antibodies, including antibodies to SSA (Ro)   and SSB (La), which are often associated with SCLE.        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

## 2024-07-23 NOTE — PROGRESS NOTES
.Weekly Wound Education Note    Teaching Provided To: Patient;Family  Training Topics: Dressing;Edema control;Cleasing and general instructions;Compression;Discharge instructions  Training Method: Explain/Verbal;Written  Training Response: Patient responds and understands        Notes: Wounds stable. Continue enluxtra (back on) avoiding any bony prominences. Calamine unna boot 20-30mmHg. Will look into ordered Debrisoft mits for pt.

## 2024-07-23 NOTE — PATIENT INSTRUCTIONS
-- Increase MMF to 750mg daily  -- updated labs in 4 weeks to monitor for toxicities   -- decrease prednisone to 2.5mg daily  -- start fluoxetine nightly to see if this helps with the raynauds   -- continue plaquenil 200mg daily (weight based dosing)  -- continue amlodipine 2.5mg twice daily  -- be sure to get retinal exam by ophthalmology since retinal exam does not seem to have been done by [facility]. If not done by next visit, will stop medication.    -- previously discussed xray of foot showing periarticular osteopenia- could be related to bone density vs early inflammation. Encouraged to get updated BMD that was previously ordered.  -- get baseline ECHO and PFTs as previously ordered and recommended   -- take iron supplements daily  -- take vitamin d 1000IU daily   -- follow up in 2 months prior to trip to [facility]     Dr. Farmer

## 2024-07-23 NOTE — PATIENT INSTRUCTIONS
Please return: 1 week with for RN visit for wound assessment, edema management, and if applicable reapplication of multilayer compression bandage system.    2 weeks with Martina    WILL ATTEMPT TO ORDER DEBRI-HU.  IF YOU GET THEM BRING THEM TO APPOINTMENT      Patient discharge and wound care instructions  Raymond Martinezu  7/23/2024        You may shower with protection of the wound (ie a cast cover or similar).     Changing your dressing:           Wash your hands with soap and water.  Ensure that the old dressing is removed completely. Place it in a plastic bag and throw it in the trash.  Cleanse the wound with hypochlorous wound cleanser (ie. Anasept, vashe, pure and clean) .  It's ok to “scrub” your wound with the gauze, small amount of bleeding with cleansing is normal and ok.  Apply the following dressings:   Right: enluxtra (back on)>abd pad>20-30mmhg VERY light wrap  Left:   enluxtra (back on)>abd pad>20-30mmhg VERY light wrap  Left (lateral):  use 2 pieces of enluxtra so not over bony prominence    Nutrition and blood sugar control:  Focus on the following:  Protein: Meats, beans, eggs, milk and yogurt particularly Greek yogurt), tofu, soy nuts, soy protein products (Follow the protein handout in your welcome folder)  Vitamin C: Citrus fruits and juices, strawberries, tomatoes, tomato juice, peppers, baked potatoes, spinach, broccoli, cauliflower, [place] sprouts, cabbage  Vitamin A: Dark green, leafy vegetables, orange or yellow vegetables, cantaloupe, fortified dairy products, liver, fortified cereals  Zinc: Fortified cereals, red meats, seafood  Consider supplementing with Justin by [employer]. It can be purchased on amazon, [employer], or local pharmacy may be able to order it for you.  (These are essential branch chain amino acids that help with tissue building and wound healing).     Concerns:  Signs of infection may include the following:  Increase in redness  Red \"streaks\" from wound   Increase in swelling  Fever  Unusual odor  Change in the amount of wound drainage     Should you experience any significant changes in your wound(s) or have any questions regarding your home care instructions please contact the wound center [facility] @ [phone] If after regular business hours, please call your family doctor or local emergency room. The treatment plan has been discussed at length between you and your provider. Follow all instructions carefully, it is very important. If you do not follow all instructions you are at risk of your wound not healing, infection, possible loss of limb and even loss of life.

## 2024-07-30 ENCOUNTER — APPOINTMENT (OUTPATIENT)
Dept: WOUND CARE | Facility: HOSPITAL | Age: 49
End: 2024-07-30
Attending: NURSE PRACTITIONER
Payer: COMMERCIAL

## 2024-07-30 VITALS
RESPIRATION RATE: 14 BRPM | SYSTOLIC BLOOD PRESSURE: 104 MMHG | DIASTOLIC BLOOD PRESSURE: 67 MMHG | TEMPERATURE: 98 F | HEART RATE: 88 BPM

## 2024-07-30 DIAGNOSIS — L97.312 NON-PRESSURE CHRONIC ULCER OF RIGHT ANKLE WITH FAT LAYER EXPOSED (HCC): Primary | ICD-10-CM

## 2024-07-30 DIAGNOSIS — I87.333 IDIOPATHIC CHRONIC VENOUS HYPERTENSION OF BOTH LOWER EXTREMITIES WITH ULCER AND INFLAMMATION (HCC): ICD-10-CM

## 2024-07-30 DIAGNOSIS — M35.1 MIXED CONNECTIVE TISSUE DISEASE (HCC): ICD-10-CM

## 2024-07-30 DIAGNOSIS — L97.322 NON-PRESSURE CHRONIC ULCER OF LEFT ANKLE WITH FAT LAYER EXPOSED (HCC): ICD-10-CM

## 2024-07-30 PROCEDURE — 29581 APPL MULTLAYER CMPRN SYS LEG: CPT

## 2024-07-30 NOTE — PROGRESS NOTES
Raymond Olivo is an 49 year old female.    Chief Complaint   Patient presents with    Wound Care     Follow up for bilateral ankle wounds. No complaints at this time.        /67   Pulse 88   Temp 98.2 °F (36.8 °C)   Resp 14     Wound 04/26/24 #1 Ankle Left;Medial (Active)   Date First Assessed/Time First Assessed: 04/26/24 1031    Wound Number (Wound Clinic Only): #1  Primary Wound Type: (c) Auto-immune  Location: Ankle  Wound Location Orientation: Left;Medial      Assessments 4/26/2024 10:34 AM 7/30/2024  9:58 AM   Wound Image       Drainage Amount Unable to assess Small   Drainage Description -- Tan   Treatments Compression Compression   Wound Length (cm) 3.5 cm 1.9 cm   Wound Width (cm) 4.6 cm 5.7 cm   Wound Surface Area (cm^2) 16.1 cm^2 10.83 cm^2   Wound Depth (cm) 0.1 cm 0.1 cm   Wound Volume (cm^3) 1.61 cm^3 1.083 cm^3   Wound Healing % -- 33   Margins Undefined edges Well-defined edges   Non-staged Wound Description Full thickness Full thickness   Soni-wound Assessment Dry;Edema;Atrophy jay Dry;Hemosiderin staining;Other (Comment)   Wound Granulation Tissue -- Pink;Red;Spongy   Wound Bed Granulation (%) -- 30 %   Wound Bed Epithelium (%) -- 60 %   Wound Bed Slough (%) -- 10 %   Wound Odor None None   Shape 100% scabbed Clustered       No associated orders.       Wound 04/26/24 #2 Ankle Right;Lateral (Active)   Date First Assessed/Time First Assessed: 04/26/24 1031    Wound Number (Wound Clinic Only): #2  Primary Wound Type: (c) Auto-immune  Location: Ankle  Wound Location Orientation: Right;Lateral      Assessments 4/26/2024 10:34 AM 7/30/2024  9:54 AM   Wound Image       Drainage Amount Unable to assess Small   Drainage Description -- Tan   Treatments Compression Compression   Wound Length (cm) 0.5 cm 1.1 cm   Wound Width (cm) 0.7 cm 1.4 cm   Wound Surface Area (cm^2) 0.35 cm^2 1.54 cm^2   Wound Depth (cm) 0.1 cm 0.1 cm   Wound Volume (cm^3) 0.035 cm^3 0.154 cm^3   Wound Healing % --  -340   Margins Well-defined edges Well-defined edges   Non-staged Wound Description Full thickness Full thickness   Soni-wound Assessment Dry;Edema;Atrophy jay Atrophy jay;Hemosiderin staining;Dry   Wound Granulation Tissue -- Spongy;Red   Wound Bed Granulation (%) -- 25 %   Wound Bed Slough (%) -- 75 %   Wound Odor None None   Shape 100% scabbed --       Inactive Orders   Date Order Priority Status Authorizing Provider   06/19/24 1145 Debridement Auto-immune Right;Lateral Ankle Routine Completed Kaz Tapia MD       Compression Wrap 05/21/24 Ankle Anterior;Left (Active)   Placement Date/Time: 05/21/24 1019   Location: Ankle  Wound Location Orientation: Anterior;Left      Assessments 5/21/2024  9:36 AM 7/30/2024 10:28 AM   Response to Treatment Well tolerated Well tolerated   Compression Layers Multilayer Multilayer   Compression Product Type Unna Boot Unna Boot   Dressing Applied Yes Yes   Compression Wrap Location Toes to Knee Toes to Knee   Compression Wrap Status Clean;Dry;Intact Clean;Dry       No associated orders.       Compression Wrap 05/28/24 Ankle Anterior;Right (Active)   Placement Date/Time: 05/28/24 1012   Location: Ankle  Wound Location Orientation: Anterior;Right      Assessments 5/28/2024  9:47 AM 7/30/2024 10:29 AM   Response to Treatment Well tolerated Well tolerated   Compression Layers Multilayer Multilayer   Compression Product Type Unna Boot Unna Boot   Dressing Applied Yes Yes   Compression Wrap Location Toes to Knee Toes to Knee   Compression Wrap Status Dry;Intact;Clean Clean;Dry       No associated orders.       Wound 06/25/24 #3 Ankle Left;Lateral (Active)   Date First Assessed/Time First Assessed: 06/25/24 0959    Wound Number (Wound Clinic Only): #3  Primary Wound Type: Auto-immune  Location: Ankle  Wound Location Orientation: Left;Lateral      Assessments 6/25/2024 10:03 AM 7/30/2024  9:56 AM   Wound Image       Drainage Amount Scant Small   Drainage Description  Serosanguineous [place]   Treatments Compression Compression   Wound Length (cm) 0.9 cm 2.1 cm   Wound Width (cm) 0.8 cm 1.3 cm   Wound Surface Area (cm^2) 0.72 cm^2 2.73 cm^2   Wound Depth (cm) 0.1 cm 0.1 cm   Wound Volume (cm^3) 0.072 cm^3 0.273 cm^3   Wound Healing % -- -279   Margins Well-defined edges Well-defined edges   Non-staged Wound Description Full thickness Full thickness   Soni-wound Assessment Blanchable erythema;Dry;Edema;Moist Atrophy jay;Dry;Hemosiderin staining   Wound Granulation Tissue Pink;Pale Grey;Firm Red;Pink;Spongy   Wound Bed Granulation (%) 50 % 70 %   Wound Bed Slough (%) 50 % 30 %   Wound Odor None None       No associated orders.       Wound 07/23/24 #4 Left Lateral Leg Leg Left;Lateral (Active)   Date First Assessed/Time First Assessed: 07/23/24 1137    Wound Number (Wound Clinic Only): #4 Left Lateral Leg  Primary Wound Type: (c) Other (comment)  Location: Leg  Wound Location Orientation: Left;Lateral      Assessments 7/23/2024 11:46 AM 7/30/2024  9:57 AM   Wound Image       Drainage Amount Unable to assess Small   Drainage Description -- Tan   Treatments Compression Compression   Wound Length (cm) 2.3 cm 1.9 cm   Wound Width (cm) 1.3 cm 0.4 cm   Wound Surface Area (cm^2) 2.99 cm^2 0.76 cm^2   Wound Depth (cm) 0.1 cm 0.1 cm   Wound Volume (cm^3) 0.299 cm^3 0.076 cm^3   Wound Healing % -- 75   Margins Well-defined edges Well-defined edges   Non-staged Wound Description Full thickness Full thickness   Soni-wound Assessment Clean;Dry;Other (Comment) Dry;Atrophy jay;Hemosiderin staining   Wound Granulation Tissue Pale Grey;Pink;Firm Red;Spongy   Wound Bed Granulation (%) 45 % 60 %   Wound Bed Epithelium (%) 10 % --   Wound Bed Slough (%) 45 % 40 %   Wound Odor None None       No associated orders.       Weekly Wound Education Note    Teaching Provided To: Patient;Family  Training Topics: Discharge instructions;Dressing;Compression;Edema control;Cleasing and general  instructions  Training Method: Explain/Verbal  Training Response: Patient responds and understands;Reinforcement needed         Patient is here for a nurse visit.  Left lateral leg and ankle wounds measuring smaller.  Right lateral ankle wound measuring smaller.  Soni area cleaned as much as patient could tolerate.  VASHE soaks applied.  Will continue with Enluxtra over wounds (back on).  Calamine unna boot 20-30mmHg to BLE.          No follow-ups on file.    Merari CAR RN

## 2024-08-05 NOTE — PROGRESS NOTES
CHIEF COMPLAINT:     Chief Complaint   Patient presents with    Wound Care     Patient arrives for follow up visit. Unna boot wraps in place. Rates pain as a 3 on a 0-10 scale.     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 Patient saw dr. Santamaria (vascular) in December 2023 and he documented \"I explained to the patient and her  that I do not believe the source of her pain to be vascular in origin.  She has an easily palpable dorsalis pedis pulse and the location of the pain is more suggestive of a neuropathic origin.  I suspect perhaps that given the tightness of her feet from her mixed connective tissue disorder the compression stockings is causing somewhat compression of the nerves in that area and I have encouraged her to substitute those stockings to those that involve the feet up to the upper calf.\" Patient/spouse have been in contact with dr. Farmer and in march 2024 reported a dark spot/ulcer on her bilateral lower legs, she was advised to make an appointment with wound care as well as get her blood work done ordered in January 2024 so that further treatment plan could be extablished.  The blood work did show increased inflammation and she was advised to increase her prednisone to 20mg x 5 days (which spouse reports they did do) and take the amlodipine 2.5 mg-which she states she is doing.  They have been leaving the areas open to air and she has not been wearing compression.  She c/o burning sensation in her ankle area.  She was started on gabapentin by dr. Farmer last year, however they stopped it because it was making her too sleepy. We discussed restarting the gabapentin, but only take a noc first.  Both wounds are dried eschar.  Will utilize hydrogel with lidocaine to start debriding  off/softening the eschar. I have also ordered santyl. Patient placed into spandagrips for compression    HPI PRIOR TO AUGUST 2024 SEE INDIVIDUAL PROGRESS NOTES  7-1-24 patient returns. Last week we utiliized polymem ag (without gina) the dressing. It definitely was less adherent, but still minimally so.  The left medial leg that we worked on last week to remove the dried exudate, is much improved. Today we again tried to remove the remaining dried exudate on the lateral side however patient was not able to tolerate.  Will utilize a scant amount of silver hydrogel under the polymem to attempt to loosen the dried exudate. We are continuint with a light compression wrap.  Epifix is still pending. Burning pain continues, gabapentin helps somewhat.  No s/s of infection.     7-9-24 patient returns.  I was able to secure a sample of debrimit to help work off the dried exudate and scabbing.  The left medial leg was much improved after getting the dried exudate off. Last week  We utilize a small amount of silver hydrogel under the polymem to continue to autolytically debride it off. Epifix is not approved under patient's insurance. Wounds are improved today. We utilized a debrimit to assist with light debridement of the wounds and periwound exudate.  Patient continues with burning pain.  Will utilize the urgo clean ag. No acute s/s of infection.    7-16-24 patient returns. Last week we were able to remove exudate and lightly debride the wounds with debrimit, finally we utilized the urgo clean ag dressing.  burning pain continues worse for a day or two after appointment.  The urgo clean ag did not seem to address the periwound build up of exudate at all and then having to remove that mechanically causes patient significant pain. Will return to [facility] to assist with both debridement within the wound and the periwound exudate/inflammation. No s/s of infection.  The 2nd pictures are post debrimit cleansing.     7-23-24  Patient returns.  We do not have any further samples of the debrimit that we have been utilizing the last 2 weeks for cleansing both the wound and exudate off the periwound.  Will try to order from [facility] as this definitely seems to be helping.  We transitioned to enluxtra to assist with periwound hygeinge and inflammation. Today wounds and periwound are improved overall, but there is a new area on left lateral proximal ankle that was previously atrophie jay that is now open.   Burning pain continues but patient states slightly better.  Patient saw dr davis earlier today and the plan is to increase the mycophenolate dependent on bloodwork (which she is getting drawn after this appointment).  No s/s of infection.    8-6-24 patient returns.  She got her labs drawn and she had a slight decrease in inflammation but still high plan from dr smith was to \"increase the mycophenolate to a total of 750 mg/day (take 500 in the morning and 250 mg in the evening) and then lets try to decrease the prednisone to 2.5 mg daily\"  the patient/spouse report that they did increase the mycophenolate and she is currently at 5 mg of prednisonse/day.  Patient is to get labs drawn in 4 weekis.    We were not able to order the debrisoft, but we were able to secure a few more samples which we will utilize again today.    The wounds and periwound are all improved with current poc. Will continue with enluxtra. Patient seems to be tolerating the cleansing slightly better. Still c/o burning pain.  MEDICATIONS:     Current Outpatient Medications:     gabapentin 100 MG Oral Cap, Take 100mg in am and 200mg at bedtime., Disp: , Rfl:     FLUoxetine 10 MG Oral Cap, Take 1 capsule (10 mg total) by mouth at bedtime., Disp: 90 capsule, Rfl: 0    predniSONE 2.5 MG Oral Tab, Take 1 tablet (2.5 mg total) by mouth daily., Disp: 90 tablet, Rfl: 0    mycophenolate mofetil 250 MG Oral Cap, Take 1 capsule (250 mg total) by mouth at bedtime., Disp: 90 capsule,  Rfl: 0    amLODIPine 2.5 MG Oral Tab, Take 1 tablet (2.5 mg total) by mouth 2 (two) times daily., Disp: 180 tablet, Rfl: 0    hydroxychloroquine 200 MG Oral Tab, Take 1 tablet (200 mg total) by mouth daily., Disp: 90 tablet, Rfl: 0    Ferrous Sulfate 325 (65 Fe) MG Oral Tab, Take 1 tablet (325 mg total) by mouth 2 (two) times daily with meals., Disp: 180 tablet, Rfl: 0    Mycophenolate Mofetil 500 MG Oral Tab, Take 1 tablet (500 mg total) by mouth daily., Disp: 90 tablet, Rfl: 0  ALLERGIES:   No Known Allergies   REVIEW OF SYSTEMS:   This information was obtained from the patient/family and chart.    See HPI for pertinent positives, otherwise 10 pt ROS negative.    HISTORY:   Past medical, surgical, family and social history updated where appropriate.    PHYSICAL EXAM:     Vitals:    08/06/24 1010   BP: 96/64   Pulse: 83   Resp: 14   Temp: 97.9 °F (36.6 °C)       Estimated body mass index is 20.94 kg/m² as calculated from the following:    Height as of 7/23/24: 64\".    Weight as of 7/23/24: 122 lb (55.3 kg).   No results found for: \"PGLU\"    Vital signs reviewed.Appears stated age, well groomed.    Constitutional:  Bp wnl for patient. Pulse Regular and wnl for patient. Respirations easy and unlabored. Temperature wnl. Weight normal for height. Appearance neat and clean. Appears in no acute distress. Well nourished and well developed.    Lower extremity:  dp/pt palpable bilaterally. Extremities are free of varicosities and edema, they are scarred, scattered changes in pigmentation (hypo/hyper) with atrophie jay. Capillary refill < 3 seconds. Digits are warm. toenails are wnl for color, thickness and hygeine. Skin hydration wnl. + hairgrowth on legs.    Musculoskeletal:  Gait and station stable   Integumentary:  refer to wound characteristics and images   Psychiatric:  Judgment and insight intact. Alert and oriented times 3. No evidence of depression, anxiety, or agitation. Calm, cooperative, and communicative,  but very quiet and reserved.  EDEMA:   Calf  Point of Measurement - Left Calf: 31  Point of Measurement - Right Calf: 31  Left Calf from:: Heel  Calf Left cm:: 28  Right Calf from:: Heel  Right Calf cm:: 29.4  Ankle  Point of Measurement - Left Ankle: 11  Point of Measurement - Right Ankle: 11  Left Ankle from:: Heel  Left Ankle cm:: 17.2     Right Ankle from:: Heel  Right Ankle cm:: 18     DIAGNOSTICS:     Lab Results   Component Value Date    BUN 8 (L) 07/23/2024    CREATSERUM 0.59 07/23/2024    GFRCKDEPI 121.37 12/17/2020    ALB 4.3 07/23/2024    TP 7.3 07/23/2024    A1C 5.1 12/17/2020     Lab Results   Component Value Date    ESRML 39 (H) 07/23/2024    ESRML 54 (H) 03/18/2024    ESRML 45 (H) 10/19/2023      Lab Results   Component Value Date    CRP <0.40 07/23/2024    CRP 0.37 (H) 03/18/2024    CRP 0.48 (H) 10/19/2023       11-28-23 arterial duplex-dr santamaria  FINDINGS:    LEFT EXTREMITY:  Triphasic waveforms in the left common femoral, profunda, superficial femoral arteries.  Biphasic waveforms below the left knee.   OTHER:  None.     PEAK VELOCITIES (CM/S):   LEFT COMMON FEMORAL:      165    LEFT PROFUNDA FEMORAL:      78      LEFT SUPERFICIAL FEMORAL PROX:    113   LEFT SUPERFICIAL FEMORAL MID:      94   LEFT SUPERFICIAL FEMORAL DISTAL:    92   LEFT POPLITEAL PROX:      110   LEFT POPLITEAL DISTAL:      121   TIBIOPER TRUNK:      66   LEFT PTA PROX:      60   LEFT PTA MID:      62   LEFT PTA DISTAL:      73   LEFT BETH PROX:      82   LEFT BETH MID:      72    LEFT DORSALIS PEDIS::      30   LEFT GREAT TOE PRESSURE:      25 mmHg   CONCLUSION:    1. No high-grade stenosis is identified.  There is suggestion of diffuse mild stenosis throughout most of the left lower extremity.    2. Overall diminished left toe pressure is noted.  Possibility of decreased perfusion in the distal left foot is of consideration.     7-14-20 venous reflux study-dr santamaria  Per Dr. Santamaria review \"no intervention needed\"  WOUND ASSESSMENT:      Wound 04/26/24 #1 Ankle Left;Medial (Active)   Date First Assessed/Time First Assessed: 04/26/24 1031    Wound Number (Wound Clinic Only): #1  Primary Wound Type: (c) Auto-immune  Location: Ankle  Wound Location Orientation: Left;Medial      Assessments 4/26/2024 10:34 AM 8/6/2024 10:14 AM   Wound Image       Drainage Amount Unable to assess Small   Drainage Description -- Serosanguineous   Treatments Compression --   Wound Length (cm) 3.5 cm 1.8 cm   Wound Width (cm) 4.6 cm 5.6 cm   Wound Surface Area (cm^2) 16.1 cm^2 10.08 cm^2   Wound Depth (cm) 0.1 cm 0.1 cm   Wound Volume (cm^3) 1.61 cm^3 1.008 cm^3   Wound Healing % -- 37   Margins Undefined edges Well-defined edges   Non-staged Wound Description Full thickness Full thickness   Soni-wound Assessment Dry;Edema;Atrophy jay Dry;Hemosiderin staining   Wound Granulation Tissue -- Red;Spongy   Wound Bed Granulation (%) -- 5 %   Wound Bed Epithelium (%) -- 80 %   Wound Bed Slough (%) -- 15 %   Wound Odor None None   Shape 100% scabbed --   Tunneling? -- No   Undermining? -- No   Sinus Tracts? -- No       No associated orders.       Wound 04/26/24 #2 Ankle Right;Lateral (Active)   Date First Assessed/Time First Assessed: 04/26/24 1031    Wound Number (Wound Clinic Only): #2  Primary Wound Type: (c) Auto-immune  Location: Ankle  Wound Location Orientation: Right;Lateral      Assessments 4/26/2024 10:34 AM 8/6/2024 10:12 AM   Wound Image       Drainage Amount Unable to assess Small   Drainage Description -- Serosanguineous   Treatments Compression --   Wound Length (cm) 0.5 cm 1.1 cm   Wound Width (cm) 0.7 cm 1 cm   Wound Surface Area (cm^2) 0.35 cm^2 1.1 cm^2   Wound Depth (cm) 0.1 cm 0.2 cm   Wound Volume (cm^3) 0.035 cm^3 0.22 cm^3   Wound Healing % -- -529   Margins Well-defined edges Well-defined edges   Non-staged Wound Description Full thickness Full thickness   Soni-wound Assessment Dry;Edema;Atrophy jay --   Wound Granulation Tissue -- Spongy;Red    Wound Bed Granulation (%) -- 35 %   Wound Bed Slough (%) -- 65 %   Wound Odor None None   Shape 100% scabbed --   Tunneling? -- No   Undermining? -- No   Sinus Tracts? -- No       Inactive Orders   Date Order Priority Status Authorizing Provider   06/19/24 1145 Debridement Auto-immune Right;Lateral Ankle Routine Completed Kaz Tapia MD       Compression Wrap 05/21/24 Ankle Anterior;Left (Active)   Placement Date/Time: 05/21/24 1019   Location: Ankle  Wound Location Orientation: Anterior;Left      Assessments 5/21/2024  9:36 AM 7/30/2024 10:28 AM   Response to Treatment Well tolerated Well tolerated   Compression Layers Multilayer Multilayer   Compression Product Type Unna Boot Unna Boot   Dressing Applied Yes Yes   Compression Wrap Location Toes to Knee Toes to Knee   Compression Wrap Status Clean;Dry;Intact Clean;Dry       No associated orders.       Compression Wrap 05/28/24 Ankle Anterior;Right (Active)   Placement Date/Time: 05/28/24 1012   Location: Ankle  Wound Location Orientation: Anterior;Right      Assessments 5/28/2024  9:47 AM 7/30/2024 10:29 AM   Response to Treatment Well tolerated Well tolerated   Compression Layers Multilayer Multilayer   Compression Product Type Unna Boot Unna Boot   Dressing Applied Yes Yes   Compression Wrap Location Toes to Knee Toes to Knee   Compression Wrap Status Dry;Intact;Clean Clean;Dry       No associated orders.       Wound 06/25/24 #3 Ankle Left;Lateral (Active)   Date First Assessed/Time First Assessed: 06/25/24 0959    Wound Number (Wound Clinic Only): #3  Primary Wound Type: Auto-immune  Location: Ankle  Wound Location Orientation: Left;Lateral      Assessments 6/25/2024 10:03 AM 8/6/2024 10:16 AM   Wound Image       Drainage Amount Scant Small   Drainage Description Serosanguineous Serosanguineous   Treatments Compression --   Wound Length (cm) 0.9 cm 1.9 cm   Wound Width (cm) 0.8 cm 0.8 cm   Wound Surface Area (cm^2) 0.72 cm^2 1.52 cm^2   Wound Depth  (cm) 0.1 cm 0.1 cm   Wound Volume (cm^3) 0.072 cm^3 0.152 cm^3   Wound Healing % -- -111   Margins Well-defined edges Well-defined edges   Non-staged Wound Description Full thickness Full thickness   Soni-wound Assessment Blanchable erythema;Dry;Edema;Moist Atrophy jay;Dry;Hemosiderin staining   Wound Granulation Tissue Pink;Pale Grey;Firm Firm;Pink;Pale Grey   Wound Bed Granulation (%) 50 % 80 %   Wound Bed Epithelium (%) -- 20 %   Wound Bed Slough (%) 50 % --   Wound Odor None None   Shape -- bridged   Tunneling? -- No   Undermining? -- No   Sinus Tracts? -- No       No associated orders.       Wound 07/23/24 #4 Leg Left;Lateral (Active)   Date First Assessed/Time First Assessed: 07/23/24 1137    Wound Number (Wound Clinic Only): #4  Primary Wound Type: (c) Other (comment)  Location: Leg  Wound Location Orientation: Left;Lateral      Assessments 7/23/2024 11:46 AM 8/6/2024 10:17 AM   Wound Image       Drainage Amount Unable to assess Small   Drainage Description -- Serosanguineous   Treatments Compression --   Wound Length (cm) 2.3 cm 1.8 cm   Wound Width (cm) 1.3 cm 0.4 cm   Wound Surface Area (cm^2) 2.99 cm^2 0.72 cm^2   Wound Depth (cm) 0.1 cm 0.1 cm   Wound Volume (cm^3) 0.299 cm^3 0.072 cm^3   Wound Healing % -- 76   Margins Well-defined edges Well-defined edges   Non-staged Wound Description Full thickness Full thickness   Soni-wound Assessment Clean;Dry;Other (Comment) Dry;Atrophy jay;Hemosiderin staining   Wound Granulation Tissue Pale Grey;Pink;Firm Red;Spongy   Wound Bed Granulation (%) 45 % 50 %   Wound Bed Epithelium (%) 10 % --   Wound Bed Slough (%) 45 % 50 %   Wound Odor None None   Tunneling? -- No   Undermining? -- No   Sinus Tracts? -- No       No associated orders.          ASSESSMENT AND PLAN:    1. 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 Non-pressure chronic ulcer of left ankle with fat layer exposed (HCC)    3. Idiopathic chronic venous  hypertension of both lower extremities with ulcer and inflammation (HCC)    4. Mixed connective tissue disease (HCC)    5. Lupus vasculitis (HCC)    6. Current chronic use of systemic steroids        Risks, benefits, and alternatives of current treatment plan discussed in detail.  Questions and concerns addressed. Red flags to RTC or ED reviewed.  Patient (or parent) agrees to plan.      NOTE TO PATIENT: The 21st Century Cures Act makes clinical notes like these available to patients in the interest of transparency. Clinical notes are medical documents used by physicians and care providers to communicate with each other. These documents include medical language and terminology, abbreviations, and treatment information that may sound technical and at times possibly unfamiliar. In addition, at times, the verbiage may appear blunt or direct. These documents are one tool providers use to communicate relevant information and clinical opinions of the care providers in a way that allows common understanding of the clinical context.   I spent30 minutes with the patient. This time included:    preparing to see the patient (eg, review notes and recent diagnostics),  seeing the patient, obtaining and/or reviewing separately obtained history, performing a medically appropriate examination and/or evaluation, counseling and educating the patient, documenting in the record,   DISCHARGE INSTRUCTIONS     Patient Instructions   Please return:  1 week        Patient discharge and wound care instructions  Raymond Olivo  8/6/2024          You may shower with protection of the wound (ie a cast cover or similar).     Changing your dressing:           Wash your hands with soap and water.  Ensure that the old dressing is removed completely. Place it in a plastic bag and throw it in the trash.  Cleanse the wound with hypochlorous wound cleanser (ie. Anasept, vashe, pure and clean) .  It's ok to “scrub” your wound with the gauze, small  amount of bleeding with cleansing is normal and ok.  Apply the following dressings:   Right: enluxtra (back on)>abd pad>20-30mmhg VERY light wrap  Left:   enluxtra (back on)>abd pad>20-30mmhg VERY light wrap  Left (lateral):  use 2 pieces of enluxtra so not over bony prominence  Protect anterior tibial/ankle prominence    Nutrition and blood sugar control:  Focus on the following:  Protein: Meats, beans, eggs, milk and yogurt particularly Greek yogurt), tofu, soy nuts, soy protein products (Follow the protein handout in your welcome folder)  Vitamin C: Citrus fruits and juices, strawberries, tomatoes, tomato juice, peppers, baked potatoes, spinach, broccoli, cauliflower, [place] sprouts, cabbage  Vitamin A: Dark green, leafy vegetables, orange or yellow vegetables, cantaloupe, fortified dairy products, liver, fortified cereals  Zinc: Fortified cereals, red meats, seafood  Consider supplementing with Justin by [employer]. It can be purchased on amazon, [employer] website, or local pharmacy may be able to order it for you.  (These are essential branch chain amino acids that help with tissue building and wound healing).     Concerns:  Signs of infection may include the following:  Increase in redness  Red \"streaks\" from wound  Increase in swelling  Fever  Unusual odor  Change in the amount of wound drainage     Should you experience any significant changes in your wound(s) or have any questions regarding your home care instructions please contact the [facility] @ [phone] If after regular business hours, please call your family doctor or local emergency room. The treatment plan has been discussed at length between you and your provider. Follow all instructions carefully, it is very important. If you do not follow all instructions you are at risk of your wound not healing, infection, possible loss of limb and even loss of life.        Martina Mckeon FNP-C, CWCN-AP, CFCN, CSWS, WCC, DWC  8/6/2024

## 2024-08-06 ENCOUNTER — OFFICE VISIT (OUTPATIENT)
Dept: WOUND CARE | Facility: HOSPITAL | Age: 49
End: 2024-08-06
Attending: NURSE PRACTITIONER
Payer: COMMERCIAL

## 2024-08-06 VITALS
DIASTOLIC BLOOD PRESSURE: 64 MMHG | SYSTOLIC BLOOD PRESSURE: 96 MMHG | TEMPERATURE: 98 F | RESPIRATION RATE: 14 BRPM | HEART RATE: 83 BPM

## 2024-08-06 DIAGNOSIS — I77.89 LUPUS VASCULITIS (HCC): ICD-10-CM

## 2024-08-06 DIAGNOSIS — L97.312 NON-PRESSURE CHRONIC ULCER OF RIGHT ANKLE WITH FAT LAYER EXPOSED (HCC): Primary | ICD-10-CM

## 2024-08-06 DIAGNOSIS — L97.322 NON-PRESSURE CHRONIC ULCER OF LEFT ANKLE WITH FAT LAYER EXPOSED (HCC): ICD-10-CM

## 2024-08-06 DIAGNOSIS — Z79.52 CURRENT CHRONIC USE OF SYSTEMIC STEROIDS: ICD-10-CM

## 2024-08-06 DIAGNOSIS — I87.333 IDIOPATHIC CHRONIC VENOUS HYPERTENSION OF BOTH LOWER EXTREMITIES WITH ULCER AND INFLAMMATION (HCC): ICD-10-CM

## 2024-08-06 DIAGNOSIS — M32.19 LUPUS VASCULITIS (HCC): ICD-10-CM

## 2024-08-06 DIAGNOSIS — M35.1 MIXED CONNECTIVE TISSUE DISEASE (HCC): ICD-10-CM

## 2024-08-06 PROCEDURE — 99214 OFFICE O/P EST MOD 30 MIN: CPT | Performed by: NURSE PRACTITIONER

## 2024-08-06 NOTE — PATIENT INSTRUCTIONS
Please return:  1 week        Patient discharge and wound care instructions  Raymond Martinezu  8/6/2024          You may shower with protection of the wound (ie a cast cover or similar).     Changing your dressing:           Wash your hands with soap and water.  Ensure that the old dressing is removed completely. Place it in a plastic bag and throw it in the trash.  Cleanse the wound with hypochlorous wound cleanser (ie. Anasept, vashe, pure and clean) .  It's ok to “scrub” your wound with the gauze, small amount of bleeding with cleansing is normal and ok.  Apply the following dressings:   Right: enluxtra (back on)>abd pad>20-30mmhg VERY light wrap  Left:   enluxtra (back on)>abd pad>20-30mmhg VERY light wrap  Left (lateral):  use 2 pieces of enluxtra so not over bony prominence  Protect anterior tibial/ankle prominence    Nutrition and blood sugar control:  Focus on the following:  Protein: Meats, beans, eggs, milk and yogurt particularly Greek yogurt), tofu, soy nuts, soy protein products (Follow the protein handout in your welcome folder)  Vitamin C: Citrus fruits and juices, strawberries, tomatoes, tomato juice, peppers, baked potatoes, spinach, broccoli, cauliflower, [place] sprouts, cabbage  Vitamin A: Dark green, leafy vegetables, orange or yellow vegetables, cantaloupe, fortified dairy products, liver, fortified cereals  Zinc: Fortified cereals, red meats, seafood  Consider supplementing with Justin by [employer]. It can be purchased on amazon, [employer] website, or local pharmacy may be able to order it for you.  (These are essential branch chain amino acids that help with tissue building and wound healing).     Concerns:  Signs of infection may include the following:  Increase in redness  Red \"streaks\" from wound  Increase in swelling  Fever  Unusual odor  Change in the amount of wound drainage     Should you experience any significant changes in your wound(s) or have any questions regarding your home  care instructions please contact the wound center [facility] @ [phone] If after regular business hours, please call your family doctor or local emergency room. The treatment plan has been discussed at length between you and your provider. Follow all instructions carefully, it is very important. If you do not follow all instructions you are at risk of your wound not healing, infection, possible loss of limb and even loss of life.

## 2024-08-06 NOTE — PROGRESS NOTES
Weekly Wound Education Note    Teaching Provided To: Patient;Family  Training Topics: Discharge instructions;Edema control;Compression  Training Method: Explain/Verbal;Written  Training Response: Patient responds and understands;Reinforcement needed            Wounds continue to improve, Enluxtra to all wounds.  Calamine unna boot 20-30mmHg with rosidol up the anterior ankle and legs for protection.

## 2024-08-12 NOTE — PROGRESS NOTES
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 Patient saw dr. Santamaria (vascular) in December 2023 and he documented \"I explained to the patient and her  that I do not believe the source of her pain to be vascular in origin.  She has an easily palpable dorsalis pedis pulse and the location of the pain is more suggestive of a neuropathic origin.  I suspect perhaps that given the tightness of her feet from her mixed connective tissue disorder the compression stockings is causing somewhat compression of the nerves in that area and I have encouraged her to substitute those stockings to those that involve the feet up to the upper calf.\" Patient/spouse have been in contact with dr. Farmer and in march 2024 reported a dark spot/ulcer on her bilateral lower legs, she was advised to make an appointment with wound care as well as get her blood work done ordered in January 2024 so that further treatment plan could be extablished.  The blood work did show increased inflammation and she was advised to increase her prednisone to 20mg x 5 days (which spouse reports they did do) and take the amlodipine 2.5 mg-which she states she is doing.  They have been leaving the areas open to air and she has not been wearing compression.  She c/o burning sensation in her ankle area.  She was started on gabapentin by dr. Farmer last year, however they stopped it because it was making her too sleepy. We discussed restarting the gabapentin, but only take a noc first.  Both wounds are dried eschar.  Will utilize hydrogel with lidocaine to start debriding off/softening the eschar. I  have also ordered santyl. Patient placed into spandagrips for compression    HPI PRIOR TO AUGUST 2024 SEE INDIVIDUAL PROGRESS NOTES  7-16-24 patient returns. Last week we were able to remove exudate and lightly debride the wounds with debrimit, finally we utilized the urgo clean ag dressing.  burning pain continues worse for a day or two after appointment.  The urgo clean ag did not seem to address the periwound build up of exudate at all and then having to remove that mechanically causes patient significant pain. Will return to enluxtra to assist with both debridement within the wound and the periwound exudate/inflammation. No s/s of infection.  The 2nd pictures are post debrimit cleansing.     7-23-24 Patient returns.  We do not have any further samples of the debrimit that we have been utilizing the last 2 weeks for cleansing both the wound and exudate off the periwound.  Will try to order from [facility] as this definitely seems to be helping.  We transitioned to enluxtra to assist with periwound hygeinge and inflammation. Today wounds and periwound are improved overall, but there is a new area on left lateral proximal ankle that was previously atrophie jay that is now open.   Burning pain continues but patient states slightly better.  Patient saw dr davis earlier today and the plan is to increase the mycophenolate dependent on bloodwork (which she is getting drawn after this appointment).  No s/s of infection.    8-6-24 patient returns.  She got her labs drawn and she had a slight decrease in inflammation but still high plan from dr smith was to \"increase the mycophenolate to a total of 750 mg/day (take 500 in the morning and 250 mg in the evening) and then lets try to decrease the prednisone to 2.5 mg daily\"  the patient/spouse report that they did increase the mycophenolate and she is currently at 5 mg of prednisonse/day.  Patient is to get labs drawn in 4 week is.    We were not able to order the debrisoft,  but we were able to secure a few more samples which we will utilize again today.    The wounds and periwound are all improved with current poc. Will continue with enluxtra. Patient seems to be tolerating the cleansing slightly better. Still c/o burning pain.    8-13-24 patient returns.  I touched base with dr. Farmer last week and indeed patient was to decrease to 2.5 mg daily (not 5 daily as she was doing).  Today she is still on 5mg.  I stressed to patient and daughter that she should divide the 5 mg in half and ONLY take 2.5 mg each day.  Her wounds are improving. They are slightly dry. Still with some coagulum/slough that we are unable to cleanse away. Will use small amount of honey to these areas, cover with xeroform to retain more moisture.  Will also utilize topical steroid. No s/s of infection. Burning pain continues.  MEDICATIONS:     Current Outpatient Medications:     gabapentin 100 MG Oral Cap, Take 100mg in am and 200mg at bedtime., Disp: , Rfl:     FLUoxetine 10 MG Oral Cap, Take 1 capsule (10 mg total) by mouth at bedtime., Disp: 90 capsule, Rfl: 0    predniSONE 2.5 MG Oral Tab, Take 1 tablet (2.5 mg total) by mouth daily., Disp: 90 tablet, Rfl: 0    mycophenolate mofetil 250 MG Oral Cap, Take 1 capsule (250 mg total) by mouth at bedtime., Disp: 90 capsule, Rfl: 0    amLODIPine 2.5 MG Oral Tab, Take 1 tablet (2.5 mg total) by mouth 2 (two) times daily., Disp: 180 tablet, Rfl: 0    hydroxychloroquine 200 MG Oral Tab, Take 1 tablet (200 mg total) by mouth daily., Disp: 90 tablet, Rfl: 0    Ferrous Sulfate 325 (65 Fe) MG Oral Tab, Take 1 tablet (325 mg total) by mouth 2 (two) times daily with meals., Disp: 180 tablet, Rfl: 0    Mycophenolate Mofetil 500 MG Oral Tab, Take 1 tablet (500 mg total) by mouth daily., Disp: 90 tablet, Rfl: 0  ALLERGIES:   No Known Allergies   REVIEW OF SYSTEMS:   This information was obtained from the patient/family and chart.    See HPI for pertinent positives, otherwise 10 pt  ROS negative.    HISTORY:   Past medical, surgical, family and social history updated where appropriate.    PHYSICAL EXAM:     Vitals:    08/13/24 1050   BP: 111/65   Pulse: 89   Resp: 12   Temp: 97.8 °F (36.6 °C)         Estimated body mass index is 20.94 kg/m² as calculated from the following:    Height as of 7/23/24: 64\".    Weight as of 7/23/24: 122 lb (55.3 kg).   No results found for: \"PGLU\"    Vital signs reviewed.Appears stated age, well groomed.    Constitutional:  Bp wnl for patient. Pulse Regular and wnl for patient. Respirations easy and unlabored. Temperature wnl. Weight normal for height. Appearance neat and clean. Appears in no acute distress. Well nourished and well developed.    Lower extremity:  dp/pt palpable bilaterally. Extremities are free of varicosities and edema, they are scarred, scattered changes in pigmentation (hypo/hyper) with atrophie jay. Capillary refill < 3 seconds. Digits are warm. toenails are wnl for color, thickness and hygeine. Skin hydration wnl. + hairgrowth on legs.    Musculoskeletal:  Gait and station stable   Integumentary:  refer to wound characteristics and images   Psychiatric:  Judgment and insight intact. Alert and oriented times 3. No evidence of depression, anxiety, or agitation. Calm, cooperative, and communicative, but very quiet and reserved.  EDEMA:   Calf  Point of Measurement - Left Calf: 31  Point of Measurement - Right Calf: 31  Left Calf from:: Heel  Calf Left cm:: 28.3  Right Calf from:: Heel  Right Calf cm:: 29.7  Ankle  Point of Measurement - Left Ankle: 11  Point of Measurement - Right Ankle: 11  Left Ankle from:: Heel  Left Ankle cm:: 17.2     Right Ankle from:: Heel  Right Ankle cm:: 17     DIAGNOSTICS:     Lab Results   Component Value Date    BUN 8 (L) 07/23/2024    CREATSERUM 0.59 07/23/2024    GFRCKDEPI 121.37 12/17/2020    ALB 4.3 07/23/2024    TP 7.3 07/23/2024    A1C 5.1 12/17/2020     Lab Results   Component Value Date    ESRML 39 (H)  07/23/2024    ESRML 54 (H) 03/18/2024    ESRML 45 (H) 10/19/2023      Lab Results   Component Value Date    CRP <0.40 07/23/2024    CRP 0.37 (H) 03/18/2024    CRP 0.48 (H) 10/19/2023       11-28-23 arterial duplex-dr santamaria  FINDINGS:    LEFT EXTREMITY:  Triphasic waveforms in the left common femoral, profunda, superficial femoral arteries.  Biphasic waveforms below the left knee.   OTHER:  None.     PEAK VELOCITIES (CM/S):   LEFT COMMON FEMORAL:      165    LEFT PROFUNDA FEMORAL:      78      LEFT SUPERFICIAL FEMORAL PROX:    113   LEFT SUPERFICIAL FEMORAL MID:      94   LEFT SUPERFICIAL FEMORAL DISTAL:    92   LEFT POPLITEAL PROX:      110   LEFT POPLITEAL DISTAL:      121   TIBIOPER TRUNK:      66   LEFT PTA PROX:      60   LEFT PTA MID:      62   LEFT PTA DISTAL:      73   LEFT BETH PROX:      82   LEFT BETH MID:      72    LEFT DORSALIS PEDIS::      30   LEFT GREAT TOE PRESSURE:      25 mmHg   CONCLUSION:    1. No high-grade stenosis is identified.  There is suggestion of diffuse mild stenosis throughout most of the left lower extremity.    2. Overall diminished left toe pressure is noted.  Possibility of decreased perfusion in the distal left foot is of consideration.     7-14-20 venous reflux study-dr santamaria  Per Dr. Santamaria review \"no intervention needed\"  WOUND ASSESSMENT:     Wound 04/26/24 #1 Ankle Left;Medial (Active)   Date First Assessed/Time First Assessed: 04/26/24 1031    Wound Number (Wound Clinic Only): #1  Primary Wound Type: (c) Auto-immune  Location: Ankle  Wound Location Orientation: Left;Medial      Assessments 4/26/2024 10:34 AM 8/13/2024 11:00 AM   Wound Image        Drainage Amount Unable to assess Small   Drainage Description -- Serosanguineous   Treatments Compression --   Wound Length (cm) 3.5 cm 1.7 cm   Wound Width (cm) 4.6 cm 2.1 cm   Wound Surface Area (cm^2) 16.1 cm^2 3.57 cm^2   Wound Depth (cm) 0.1 cm 0.1 cm   Wound Volume (cm^3) 1.61 cm^3 0.357 cm^3   Wound Healing % -- 78   Margins  Undefined edges Well-defined edges   Non-staged Wound Description Full thickness Full thickness   Soni-wound Assessment Dry;Edema;Atrophy jay Dry;Hemosiderin staining;Edema   Wound Granulation Tissue -- Red;Firm   Wound Bed Granulation (%) -- 20 %   Wound Bed Epithelium (%) -- 50 %   Wound Bed Slough (%) -- 30 %   Wound Odor None None   Shape 100% scabbed Clustered   Tunneling? -- No   Undermining? -- No   Sinus Tracts? -- No       No associated orders.       Wound 04/26/24 #2 Ankle Right;Lateral (Active)   Date First Assessed/Time First Assessed: 04/26/24 1031    Wound Number (Wound Clinic Only): #2  Primary Wound Type: (c) Auto-immune  Location: Ankle  Wound Location Orientation: Right;Lateral      Assessments 4/26/2024 10:34 AM 8/13/2024 10:56 AM   Wound Image        Drainage Amount Unable to assess Scant   Drainage Description -- Serosanguineous   Treatments Compression --   Wound Length (cm) 0.5 cm 1.2 cm   Wound Width (cm) 0.7 cm 1.4 cm   Wound Surface Area (cm^2) 0.35 cm^2 1.68 cm^2   Wound Depth (cm) 0.1 cm 0.1 cm   Wound Volume (cm^3) 0.035 cm^3 0.168 cm^3   Wound Healing % -- -380   Margins Well-defined edges Well-defined edges   Non-staged Wound Description Full thickness Full thickness   Soni-wound Assessment Dry;Edema;Atrophy jay Atrophy jay;Hemosiderin staining;Dry   Wound Granulation Tissue -- Pink;Pale Grey;Firm   Wound Bed Granulation (%) -- 75 %   Wound Bed Slough (%) -- 25 %   Wound Odor None None   Shape 100% scabbed --   Tunneling? -- No   Undermining? -- No   Sinus Tracts? -- No       Inactive Orders   Date Order Priority Status Authorizing Provider   06/19/24 1145 Debridement Auto-immune Right;Lateral Ankle Routine Completed Kaz Tapia MD       Compression Wrap 05/21/24 Ankle Anterior;Left (Active)   Placement Date/Time: 05/21/24 1019   Location: Ankle  Wound Location Orientation: Anterior;Left      Assessments 5/21/2024  9:36 AM 8/6/2024 11:07 AM   Response to Treatment  Well tolerated Well tolerated   Compression Layers Multilayer Multilayer   Compression Product Type Unna Boot Unna Boot   Dressing Applied Yes Yes   Compression Wrap Location Toes to Knee Toes to Knee   Compression Wrap Status Clean;Dry;Intact Clean;Dry       No associated orders.       Compression Wrap 05/28/24 Ankle Anterior;Right (Active)   Placement Date/Time: 05/28/24 1012   Location: Ankle  Wound Location Orientation: Anterior;Right      Assessments 5/28/2024  9:47 AM 8/6/2024 11:08 AM   Response to Treatment Well tolerated Well tolerated   Compression Layers Multilayer Multilayer   Compression Product Type Unna Boot Unna Boot   Dressing Applied Yes Yes   Compression Wrap Location Toes to Knee Toes to Knee   Compression Wrap Status Dry;Intact;Clean Clean;Dry       No associated orders.       Wound 06/25/24 #3 Ankle Left;Lateral (Active)   Date First Assessed/Time First Assessed: 06/25/24 0959    Wound Number (Wound Clinic Only): #3  Primary Wound Type: Auto-immune  Location: Ankle  Wound Location Orientation: Left;Lateral      Assessments 6/25/2024 10:03 AM 8/13/2024 11:03 AM   Wound Image        Drainage Amount Scant Small   Drainage Description Serosanguineous Serosanguineous   Treatments Compression --   Wound Length (cm) 0.9 cm 1.7 cm   Wound Width (cm) 0.8 cm 0.3 cm   Wound Surface Area (cm^2) 0.72 cm^2 0.51 cm^2   Wound Depth (cm) 0.1 cm 0.1 cm   Wound Volume (cm^3) 0.072 cm^3 0.051 cm^3   Wound Healing % -- 29   Margins Well-defined edges Well-defined edges   Non-staged Wound Description Full thickness Full thickness   Soni-wound Assessment Blanchable erythema;Dry;Edema;Moist Dry;Hemosiderin staining   Wound Granulation Tissue Pink;Pale Grey;Firm Pink;Pale Lala;Firm   Wound Bed Granulation (%) 50 % 15 %   Wound Bed Epithelium (%) -- 75 %   Wound Bed Slough (%) 50 % 10 %   Wound Odor None None   Shape -- bridged   Tunneling? -- No   Undermining? -- No   Sinus Tracts? -- No       No associated orders.        Wound 07/23/24 #4 Leg Left;Lateral (Active)   Date First Assessed/Time First Assessed: 07/23/24 1137    Wound Number (Wound Clinic Only): #4  Primary Wound Type: (c) Other (comment)  Location: Leg  Wound Location Orientation: Left;Lateral      Assessments 7/23/2024 11:46 AM 8/13/2024 11:01 AM   Wound Image        Drainage Amount Unable to assess Small   Drainage Description -- Serosanguineous   Treatments Compression --   Wound Length (cm) 2.3 cm 2.4 cm   Wound Width (cm) 1.3 cm 0.7 cm   Wound Surface Area (cm^2) 2.99 cm^2 1.68 cm^2   Wound Depth (cm) 0.1 cm 0.1 cm   Wound Volume (cm^3) 0.299 cm^3 0.168 cm^3   Wound Healing % -- 44   Margins Well-defined edges Well-defined edges   Non-staged Wound Description Full thickness Full thickness   Soni-wound Assessment Clean;Dry;Other (Comment) Dry;Hemosiderin staining   Wound Granulation Tissue Pale Grey;Pink;Firm Red;Firm   Wound Bed Granulation (%) 45 % 20 %   Wound Bed Epithelium (%) 10 % 10 %   Wound Bed Slough (%) 45 % 70 %   Wound Odor None None   Tunneling? -- No   Undermining? -- No   Sinus Tracts? -- No       No associated orders.          ASSESSMENT AND PLAN:    1. 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 Non-pressure chronic ulcer of left ankle with fat layer exposed (HCC)    3. Idiopathic chronic venous hypertension of both lower extremities with ulcer and inflammation (HCC)    4. Mixed connective tissue disease (HCC)    5. Lupus vasculitis (HCC)    6. Current chronic use of systemic steroids          Risks, benefits, and alternatives of current treatment plan discussed in detail.  Questions and concerns addressed. Red flags to RTC or ED reviewed.  Patient (or parent) agrees to plan.      NOTE TO PATIENT: The 21st Century Cures Act makes clinical notes like these available to patients in the interest of transparency. Clinical notes are medical documents used by physicians and care providers to communicate with each other.  These documents include medical language and terminology, abbreviations, and treatment information that may sound technical and at times possibly unfamiliar. In addition, at times, the verbiage may appear blunt or direct. These documents are one tool providers use to communicate relevant information and clinical opinions of the care providers in a way that allows common understanding of the clinical context.   I [id number] with the patient. This time included:    preparing to see the patient (eg, review notes and recent diagnostics),  seeing the patient, obtaining and/or reviewing separately obtained history, performing a medically appropriate examination and/or evaluation, counseling and educating the patient, documenting in the record,   DISCHARGE INSTRUCTIONS     Patient Instructions   Please return:  1 week        Patient discharge and wound care instructions  Raymond Hayward Pallavi  8/13/2024      You may shower with protection of the wound (ie a cast cover or similar).     Changing your dressing:           Wash your hands with soap and water.  Ensure that the old dressing is removed completely. Place it in a plastic bag and throw it in the trash.  Cleanse the wound with hypochlorous wound cleanser (ie. Anasept, vashe, pure and clean) .  It's ok to “scrub” your wound with the gauze, small amount of bleeding with cleansing is normal and ok.  Apply the following dressings:   clobetesol to periwound bilaterally     Right: scant amt honey to yellow tissue>open xeroform>abd pad>20-30mmhg VERY light wrap  Left:  scant amt honey to yellow tissue>open xeroform>abd pad>20-30mmhg VERY light wrap  Left (lateral):  scant amt honey to yellow tissue>open xeroform>abd pad  Protect anterior tibial/ankle prominence    Nutrition and blood sugar control:  Focus on the following:  Protein: Meats, beans, eggs, milk and yogurt particularly Greek yogurt), tofu, soy nuts, soy protein products (Follow the protein handout in your  welcome folder)  Vitamin C: Citrus fruits and juices, strawberries, tomatoes, tomato juice, peppers, baked potatoes, spinach, broccoli, cauliflower, [place] sprouts, cabbage  Vitamin A: Dark green, leafy vegetables, orange or yellow vegetables, cantaloupe, fortified dairy products, liver, fortified cereals  Zinc: Fortified cereals, red meats, seafood  Consider supplementing with Justin by [employer]. It can be purchased on amazon, [employer] website, or local pharmacy may be able to order it for you.  (These are essential branch chain amino acids that help with tissue building and wound healing).     Concerns:  Signs of infection may include the following:  Increase in redness  Red \"streaks\" from wound  Increase in swelling  Fever  Unusual odor  Change in the amount of wound drainage     Should you experience any significant changes in your wound(s) or have any questions regarding your home care instructions please contact the [facility] center [facility] @ [phone] If after regular business hours, please call your family doctor or local emergency room. The treatment plan has been discussed at length between you and your provider. Follow all instructions carefully, it is very important. If you do not follow all instructions you are at risk of your wound not healing, infection, possible loss of limb and even loss of life.        Martina Mckeon FNP-C, CWCN-AP, CFCN, CSWS, WCC, DWC  8/13/2024

## 2024-08-13 ENCOUNTER — OFFICE VISIT (OUTPATIENT)
Dept: WOUND CARE | Facility: HOSPITAL | Age: 49
End: 2024-08-13
Attending: NURSE PRACTITIONER
Payer: COMMERCIAL

## 2024-08-13 VITALS
SYSTOLIC BLOOD PRESSURE: 111 MMHG | DIASTOLIC BLOOD PRESSURE: 65 MMHG | TEMPERATURE: 98 F | RESPIRATION RATE: 12 BRPM | HEART RATE: 89 BPM

## 2024-08-13 DIAGNOSIS — M32.19 LUPUS VASCULITIS (HCC): ICD-10-CM

## 2024-08-13 DIAGNOSIS — L97.322 NON-PRESSURE CHRONIC ULCER OF LEFT ANKLE WITH FAT LAYER EXPOSED (HCC): ICD-10-CM

## 2024-08-13 DIAGNOSIS — L97.312 NON-PRESSURE CHRONIC ULCER OF RIGHT ANKLE WITH FAT LAYER EXPOSED (HCC): Primary | ICD-10-CM

## 2024-08-13 DIAGNOSIS — I77.89 LUPUS VASCULITIS (HCC): ICD-10-CM

## 2024-08-13 DIAGNOSIS — Z79.52 CURRENT CHRONIC USE OF SYSTEMIC STEROIDS: ICD-10-CM

## 2024-08-13 DIAGNOSIS — M35.1 MIXED CONNECTIVE TISSUE DISEASE (HCC): ICD-10-CM

## 2024-08-13 DIAGNOSIS — I87.333 IDIOPATHIC CHRONIC VENOUS HYPERTENSION OF BOTH LOWER EXTREMITIES WITH ULCER AND INFLAMMATION (HCC): ICD-10-CM

## 2024-08-13 PROCEDURE — 99214 OFFICE O/P EST MOD 30 MIN: CPT | Performed by: NURSE PRACTITIONER

## 2024-08-13 NOTE — PATIENT INSTRUCTIONS
Please return:  1 week        Patient discharge and wound care instructions  Raymond Olivo  8/13/2024      You may shower with protection of the wound (ie a cast cover or similar).     Changing your dressing:           Wash your hands with soap and water.  Ensure that the old dressing is removed completely. Place it in a plastic bag and throw it in the trash.  Cleanse the wound with hypochlorous wound cleanser (ie. Anasept, vashe, pure and clean) .  It's ok to “scrub” your wound with the gauze, small amount of bleeding with cleansing is normal and ok.  Apply the following dressings:   clobetesol to periwound bilaterally     Right: scant amt honey to yellow tissue>open xeroform>abd pad>20-30mmhg VERY light wrap  Left:  scant amt honey to yellow tissue>open xeroform>abd pad>20-30mmhg VERY light wrap  Left (lateral):  scant amt honey to yellow tissue>open xeroform>abd pad  Protect anterior tibial/ankle prominence    Nutrition and blood sugar control:  Focus on the following:  Protein: Meats, beans, eggs, milk and yogurt particularly Greek yogurt), tofu, soy nuts, soy protein products (Follow the protein handout in your welcome folder)  Vitamin C: Citrus fruits and juices, strawberries, tomatoes, tomato juice, peppers, baked potatoes, spinach, broccoli, cauliflower, [place] sprouts, cabbage  Vitamin A: Dark green, leafy vegetables, orange or yellow vegetables, cantaloupe, fortified dairy products, liver, fortified cereals  Zinc: Fortified cereals, red meats, seafood  Consider supplementing with Justin by [employer]. It can be purchased on amazon, [employer] website, or local pharmacy may be able to order it for you.  (These are essential branch chain amino acids that help with tissue building and wound healing).     Concerns:  Signs of infection may include the following:  Increase in redness  Red \"streaks\" from wound  Increase in swelling  Fever  Unusual odor  Change in the amount of wound drainage     Should you  experience any significant changes in your wound(s) or have any questions regarding your home care instructions please contact the wound center [facility] @ [phone] If after regular business hours, please call your family doctor or local emergency room. The treatment plan has been discussed at length between you and your provider. Follow all instructions carefully, it is very important. If you do not follow all instructions you are at risk of your wound not healing, infection, possible loss of limb and even loss of life.

## 2024-08-13 NOTE — PROGRESS NOTES
.Weekly Wound Education Note    Teaching Provided To: Patient  Training Topics: Dressing;Edema control;Cleasing and general instructions;Compression;Discharge instructions  Training Method: Explain/Verbal;Written  Training Response: Patient responds and understands        Notes: Wounds stable. Dressing changed to small bit of honey gel, open xeroform, ABD pads, and calamine unna boot 20-30mmHg lightly wrapped. Clobetasol to periwound.

## 2024-08-19 NOTE — PROGRESS NOTES
CHIEF COMPLAINT:     Chief Complaint   Patient presents with    Wound Recheck     Pt here for follow up arrives with compression wraps bilaterally. 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 Patient saw dr. Santamaria (vascular) in December 2023 and he documented \"I explained to the patient and her  that I do not believe the source of her pain to be vascular in origin.  She has an easily palpable dorsalis pedis pulse and the location of the pain is more suggestive of a neuropathic origin.  I suspect perhaps that given the tightness of her feet from her mixed connective tissue disorder the compression stockings is causing somewhat compression of the nerves in that area and I have encouraged her to substitute those stockings to those that involve the feet up to the upper calf.\" Patient/spouse have been in contact with dr. Farmer and in march 2024 reported a dark spot/ulcer on her bilateral lower legs, she was advised to make an appointment with wound care as well as get her blood work done ordered in January 2024 so that further treatment plan could be extablished.  The blood work did show increased inflammation and she was advised to increase her prednisone to 20mg x 5 days (which spouse reports they did do) and take the amlodipine 2.5 mg-which she states she is doing.  They have been leaving the areas open to air and she has not been wearing compression.  She c/o burning sensation in her ankle area.  She was started on gabapentin by dr. Farmer last year, however they stopped it because it was making her too sleepy. We discussed restarting the gabapentin, but only take a noc first.  Both wounds are dried eschar.  Will utilize hydrogel with lidocaine to start debriding off/softening the  eschar. I have also ordered santyl. Patient placed into spandagrips for compression    HPI PRIOR TO AUGUST 2024 SEE INDIVIDUAL PROGRESS NOTES  7-16-24 patient returns. Last week we were able to remove exudate and lightly debride the wounds with debrimit, finally we utilized the urgo clean ag dressing.  burning pain continues worse for a day or two after appointment.  The urgo clean ag did not seem to address the periwound build up of exudate at all and then having to remove that mechanically causes patient significant pain. Will return to enluxtra to assist with both debridement within the wound and the periwound exudate/inflammation. No s/s of infection.  The 2nd pictures are post debrimit cleansing.     7-23-24 Patient returns.  We do not have any further samples of the debrimit that we have been utilizing the last 2 weeks for cleansing both the wound and exudate off the periwound.  Will try to order from [facility] as this definitely seems to be helping.  We transitioned to enluxtra to assist with periwound hygeinge and inflammation. Today wounds and periwound are improved overall, but there is a new area on left lateral proximal ankle that was previously atrophie jay that is now open.   Burning pain continues but patient states slightly better.  Patient saw dr davis earlier today and the plan is to increase the mycophenolate dependent on bloodwork (which she is getting drawn after this appointment).  No s/s of infection.    8-6-24 patient returns.  She got her labs drawn and she had a slight decrease in inflammation but still high plan from dr smith was to \"increase the mycophenolate to a total of 750 mg/day (take 500 in the morning and 250 mg in the evening) and then lets try to decrease the prednisone to 2.5 mg daily\"  the patient/spouse report that they did increase the mycophenolate and she is currently at 5 mg of prednisonse/day.  Patient is to get labs drawn in 4 week is.    We were not able to order the  debrisoft, but we were able to secure a few more samples which we will utilize again today.    The wounds and periwound are all improved with current poc. Will continue with enluxtra. Patient seems to be tolerating the cleansing slightly better. Still c/o burning pain.    8-13-24 patient returns.  I touched base with dr. Farmre last week and indeed patient was to decrease to 2.5 mg daily (not 5 daily as she was doing).  Today she is still on 5mg.  I stressed to patient and daughter that she should divide the 5 mg in half and ONLY take 2.5 mg each day.  Her wounds are improving. They are slightly dry. Still with some coagulum/slough that we are unable to cleanse away. Will use small amount of honey to these areas, cover with xeroform to retain more moisture.  Will also utilize topical steroid. No s/s of infection. Burning pain continues.    8-20-24 patient returns.  She is down to 2.5 mg Prednisone dose without negative effect with her symptoms, last week the wounds and periwounds were quite dry.  We utilizing a small amount of honey over the nonviable areas and covered with xeroform.  Also applied topical steroid.  Today all wounds are improved, the periwounds are also improved, the wounds are primarily granular, no s/s of infection, patient continues with burning pain.  MEDICATIONS:     Current Outpatient Medications:     gabapentin 100 MG Oral Cap, Take 100mg in am and 200mg at bedtime., Disp: , Rfl:     FLUoxetine 10 MG Oral Cap, Take 1 capsule (10 mg total) by mouth at bedtime., Disp: 90 capsule, Rfl: 0    predniSONE 2.5 MG Oral Tab, Take 1 tablet (2.5 mg total) by mouth daily., Disp: 90 tablet, Rfl: 0    mycophenolate mofetil 250 MG Oral Cap, Take 1 capsule (250 mg total) by mouth at bedtime., Disp: 90 capsule, Rfl: 0    amLODIPine 2.5 MG Oral Tab, Take 1 tablet (2.5 mg total) by mouth 2 (two) times daily., Disp: 180 tablet, Rfl: 0    hydroxychloroquine 200 MG Oral Tab, Take 1 tablet (200 mg total) by mouth  daily., Disp: 90 tablet, Rfl: 0    Ferrous Sulfate 325 (65 Fe) MG Oral Tab, Take 1 tablet (325 mg total) by mouth 2 (two) times daily with meals., Disp: 180 tablet, Rfl: 0    Mycophenolate Mofetil 500 MG Oral Tab, Take 1 tablet (500 mg total) by mouth daily., Disp: 90 tablet, Rfl: 0  ALLERGIES:   No Known Allergies   REVIEW OF SYSTEMS:   This information was obtained from the patient/family and chart.    See HPI for pertinent positives, otherwise 10 pt ROS negative.    HISTORY:   Past medical, surgical, family and social history updated where appropriate.    PHYSICAL EXAM:     Vitals:    08/20/24 0700   BP: 97/60   Pulse: 77   Resp: 12   Temp: 96.6 °F (35.9 °C)     Estimated body mass index is 20.94 kg/m² as calculated from the following:    Height as of 7/23/24: 64\".    Weight as of 7/23/24: 122 lb (55.3 kg).   No results found for: \"PGLU\"    Vital signs reviewed.Appears stated age, well groomed.    Constitutional:  Bp wnl for patient. Pulse Regular and wnl for patient. Respirations easy and unlabored. Temperature wnl. Weight normal for height. Appearance neat and clean. Appears in no acute distress. Well nourished and well developed.    Lower extremity:  dp/pt palpable bilaterally. Extremities are free of varicosities and edema, they are scarred, scattered changes in pigmentation (hypo/hyper) with atrophie jay. Capillary refill < 3 seconds. Digits are warm. toenails are wnl for color, thickness and hygeine. Skin hydration wnl. + hairgrowth on legs.    Musculoskeletal:  Gait and station stable   Integumentary:  refer to wound characteristics and images   Psychiatric:  Judgment and insight intact. Alert and oriented times 3. No evidence of depression, anxiety, or agitation. Calm, cooperative, and communicative, but very quiet and reserved.  EDEMA:   Calf  Point of Measurement - Left Calf: 31  Point of Measurement - Right Calf: 31  Left Calf from:: Heel  Calf Left cm:: 29  Right Calf from:: Heel  Right Calf cm::  29.1  Ankle  Point of Measurement - Left Ankle: 11  Point of Measurement - Right Ankle: 11  Left Ankle from:: Heel  Left Ankle cm:: 18     Right Ankle from:: Heel  Right Ankle cm:: 17.1     DIAGNOSTICS:     Lab Results   Component Value Date    BUN 8 (L) 07/23/2024    CREATSERUM 0.59 07/23/2024    GFRCKDEPI 121.37 12/17/2020    ALB 4.3 07/23/2024    TP 7.3 07/23/2024    A1C 5.1 12/17/2020     Lab Results   Component Value Date    ESRML 39 (H) 07/23/2024    ESRML 54 (H) 03/18/2024    ESRML 45 (H) 10/19/2023      Lab Results   Component Value Date    CRP <0.40 07/23/2024    CRP 0.37 (H) 03/18/2024    CRP 0.48 (H) 10/19/2023       11-28-23 arterial duplex-dr santamaria  FINDINGS:    LEFT EXTREMITY:  Triphasic waveforms in the left common femoral, profunda, superficial femoral arteries.  Biphasic waveforms below the left knee.   OTHER:  None.     PEAK VELOCITIES (CM/S):   LEFT COMMON FEMORAL:      165    LEFT PROFUNDA FEMORAL:      78      LEFT SUPERFICIAL FEMORAL PROX:    113   LEFT SUPERFICIAL FEMORAL MID:      94   LEFT SUPERFICIAL FEMORAL DISTAL:    92   LEFT POPLITEAL PROX:      110   LEFT POPLITEAL DISTAL:      121   TIBIOPER TRUNK:      66   LEFT PTA PROX:      60   LEFT PTA MID:      62   LEFT PTA DISTAL:      73   LEFT BETH PROX:      82   LEFT BETH MID:      72    LEFT DORSALIS PEDIS::      30   LEFT GREAT TOE PRESSURE:      25 mmHg   CONCLUSION:    1. No high-grade stenosis is identified.  There is suggestion of diffuse mild stenosis throughout most of the left lower extremity.    2. Overall diminished left toe pressure is noted.  Possibility of decreased perfusion in the distal left foot is of consideration.     7-14-20 venous reflux study-dr santamaria  Per Dr. Santamaria review \"no intervention needed\"  WOUND ASSESSMENT:     Wound 04/26/24 #1 Ankle Left;Medial (Active)   Date First Assessed/Time First Assessed: 04/26/24 1031    Wound Number (Wound Clinic Only): #1  Primary Wound Type: (c) Auto-immune  Location: Ankle   Wound Location Orientation: Left;Medial      Assessments 4/26/2024 10:34 AM 8/20/2024 11:07 AM   Wound Image       Drainage Amount Unable to assess Scant   Drainage Description -- Serosanguineous   Treatments Compression --   Wound Length (cm) 3.5 cm 0.9 cm   Wound Width (cm) 4.6 cm 0.5 cm   Wound Surface Area (cm^2) 16.1 cm^2 0.45 cm^2   Wound Depth (cm) 0.1 cm 0.1 cm   Wound Volume (cm^3) 1.61 cm^3 0.045 cm^3   Wound Healing % -- 97   Margins Undefined edges Well-defined edges   Non-staged Wound Description Full thickness Full thickness   Soni-wound Assessment Dry;Edema;Atrophy jay Dry;Hemosiderin staining   Wound Granulation Tissue -- Pink;Firm   Wound Bed Granulation (%) -- 50 %   Wound Bed Slough (%) -- 50 %   Wound Odor None None   Shape 100% scabbed --   Tunneling? -- No   Undermining? -- No   Sinus Tracts? -- No       No associated orders.       Wound 04/26/24 #2 Ankle Right;Lateral (Active)   Date First Assessed/Time First Assessed: 04/26/24 1031    Wound Number (Wound Clinic Only): #2  Primary Wound Type: (c) Auto-immune  Location: Ankle  Wound Location Orientation: Right;Lateral      Assessments 4/26/2024 10:34 AM 8/20/2024 11:06 AM   Wound Image       Drainage Amount Unable to assess Scant   Drainage Description -- Serosanguineous   Treatments Compression --   Wound Length (cm) 0.5 cm 1.4 cm   Wound Width (cm) 0.7 cm 1.1 cm   Wound Surface Area (cm^2) 0.35 cm^2 1.54 cm^2   Wound Depth (cm) 0.1 cm 0.1 cm   Wound Volume (cm^3) 0.035 cm^3 0.154 cm^3   Wound Healing % -- -340   Margins Well-defined edges Well-defined edges   Non-staged Wound Description Full thickness Full thickness   Soni-wound Assessment Dry;Edema;Atrophy jay Hemosiderin staining;Atrophy jay   Wound Granulation Tissue -- Pink;Pale Grey;Spongy   Wound Bed Granulation (%) -- 95 %   Wound Bed Slough (%) -- 5 %   Wound Odor None None   Shape 100% scabbed --   Tunneling? -- No   Undermining? -- No   Sinus Tracts? -- No        Inactive Orders   Date Order Priority Status Authorizing Provider   06/19/24 1145 Debridement Auto-immune Right;Lateral Ankle Routine Completed Kaz aTpia MD       Compression Wrap 05/21/24 Ankle Anterior;Left (Active)   Placement Date/Time: 05/21/24 1019   Location: Ankle  Wound Location Orientation: Anterior;Left      Assessments 5/21/2024  9:36 AM 8/13/2024 10:58 AM   Response to Treatment Well tolerated Well tolerated   Compression Layers Multilayer Multilayer   Compression Product Type Unna Boot Unna Boot   Dressing Applied Yes Yes   Compression Wrap Location Toes to Knee Toes to Knee   Compression Wrap Status Clean;Dry;Intact Clean;Dry       No associated orders.       Compression Wrap 05/28/24 Ankle Anterior;Right (Active)   Placement Date/Time: 05/28/24 1012   Location: Ankle  Wound Location Orientation: Anterior;Right      Assessments 5/28/2024  9:47 AM 8/13/2024 10:58 AM   Response to Treatment Well tolerated Well tolerated   Compression Layers Multilayer Multilayer   Compression Product Type Unna Boot Unna Boot   Dressing Applied Yes Yes   Compression Wrap Location Toes to Knee Toes to Knee   Compression Wrap Status Dry;Intact;Clean Clean;Dry       No associated orders.       Wound 06/25/24 #3 Ankle Left;Lateral (Active)   Date First Assessed/Time First Assessed: 06/25/24 0959    Wound Number (Wound Clinic Only): #3  Primary Wound Type: Auto-immune  Location: Ankle  Wound Location Orientation: Left;Lateral      Assessments 6/25/2024 10:03 AM 8/20/2024 11:10 AM   Wound Image       Drainage Amount Scant None   Drainage Description Serosanguineous --   Treatments Compression --   Wound Length (cm) 0.9 cm 0.1 cm   Wound Width (cm) 0.8 cm 0.1 cm   Wound Surface Area (cm^2) 0.72 cm^2 0.01 cm^2   Wound Depth (cm) 0.1 cm 0 cm   Wound Volume (cm^3) 0.072 cm^3 0 cm^3   Wound Healing % -- 100   Margins Well-defined edges Attached edges   Non-staged Wound Description Full thickness Full thickness    Soni-wound Assessment Blanchable erythema;Dry;Edema;Moist Dry   Wound Granulation Tissue Pink;Pale Grey;Firm --   Wound Bed Granulation (%) 50 % --   Wound Bed Slough (%) 50 % --   Wound Odor None None   Tunneling? -- No   Undermining? -- No   Sinus Tracts? -- No       No associated orders.       Wound 07/23/24 #4 Leg Left;Lateral (Active)   Date First Assessed/Time First Assessed: 07/23/24 1137    Wound Number (Wound Clinic Only): #4  Primary Wound Type: (c) Other (comment)  Location: Leg  Wound Location Orientation: Left;Lateral      Assessments 7/23/2024 11:46 AM 8/20/2024 11:11 AM   Wound Image       Drainage Amount Unable to assess Scant   Drainage Description -- Serosanguineous   Treatments Compression --   Wound Length (cm) 2.3 cm 2.2 cm   Wound Width (cm) 1.3 cm 0.3 cm   Wound Surface Area (cm^2) 2.99 cm^2 0.66 cm^2   Wound Depth (cm) 0.1 cm 0.1 cm   Wound Volume (cm^3) 0.299 cm^3 0.066 cm^3   Wound Healing % -- 78   Margins Well-defined edges Well-defined edges   Non-staged Wound Description Full thickness --   Soni-wound Assessment Clean;Dry;Other (Comment) Dry;Hemosiderin staining   Wound Granulation Tissue Pale Grey;Pink;Firm Pink;Firm   Wound Bed Granulation (%) 45 % 25 %   Wound Bed Epithelium (%) 10 % 25 %   Wound Bed Slough (%) 45 % 50 %   Wound Odor None None   Tunneling? -- No   Undermining? -- No   Sinus Tracts? -- No       No associated orders.          ASSESSMENT AND PLAN:    1. 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 Non-pressure chronic ulcer of left ankle with fat layer exposed (HCC)    3. Idiopathic chronic venous hypertension of both lower extremities with ulcer and inflammation (HCC)    4. Lupus vasculitis (HCC)    5. Mixed connective tissue disease (HCC)    6. Current chronic use of systemic steroids            Risks, benefits, and alternatives of current treatment plan discussed in detail.  Questions and concerns addressed. Red flags to RTC or ED  reviewed.  Patient (or parent) agrees to plan.      NOTE TO PATIENT: The 21st Century Cures Act makes clinical notes like these available to patients in the interest of transparency. Clinical notes are medical documents used by physicians and care providers to communicate with each other. These documents include medical language and terminology, abbreviations, and treatment information that may sound technical and at times possibly unfamiliar. In addition, at times, the verbiage may appear blunt or direct. These documents are one tool providers use to communicate relevant information and clinical opinions of the care providers in a way that allows common understanding of the clinical context.   I [id number] with the patient. This time included:    preparing to see the patient (eg, review notes and recent diagnostics),  seeing the patient, obtaining and/or reviewing separately obtained history, performing a medically appropriate examination and/or evaluation, counseling and educating the patient, documenting in the record, update to rheum  DISCHARGE INSTRUCTIONS     Patient Instructions   Please return:  1 week    Patient discharge and wound care instructions  Raymond Olivo  8/20/2024        You may shower with protection of the wound (ie a cast cover or similar).     Changing your dressing:           Wash your hands with soap and water.  Ensure that the old dressing is removed completely. Place it in a plastic bag and throw it in the trash.  Cleanse the wound with hypochlorous wound cleanser (ie. Anasept, vashe, pure and clean) .  It's ok to “scrub” your wound with the gauze, small amount of bleeding with cleansing is normal and ok.  Apply the following dressings:   clobetesol to periwound bilaterally     Right: open xeroform>abd pad>20-30mmhg VERY light wrap  Left:  sopen xeroform>abd pad>20-30mmhg VERY light wrap  Left (lateral):>open xeroform>abd pad  Protect anterior tibial/ankle  prominence    Nutrition and blood sugar control:  Focus on the following:  Protein: Meats, beans, eggs, milk and yogurt particularly Greek yogurt), tofu, soy nuts, soy protein products (Follow the protein handout in your welcome folder)  Vitamin C: Citrus fruits and juices, strawberries, tomatoes, tomato juice, peppers, baked potatoes, spinach, broccoli, cauliflower, [place] sprouts, cabbage  Vitamin A: Dark green, leafy vegetables, orange or yellow vegetables, cantaloupe, fortified dairy products, liver, fortified cereals  Zinc: Fortified cereals, red meats, seafood  Consider supplementing with Justin by [employer]. It can be purchased on amazon, [employer] website, or local pharmacy may be able to order it for you.  (These are essential branch chain amino acids that help with tissue building and wound healing).     Concerns:  Signs of infection may include the following:  Increase in redness  Red \"streaks\" from wound  Increase in swelling  Fever  Unusual odor  Change in the amount of wound drainage     Should you experience any significant changes in your wound(s) or have any questions regarding your home care instructions please contact the [facility] @ [phone] If after regular business hours, please call your family doctor or local emergency room. The treatment plan has been discussed at length between you and your provider. Follow all instructions carefully, it is very important. If you do not follow all instructions you are at risk of your wound not healing, infection, possible loss of limb and even loss of life.        Martina Mckeon FNP-C, CWCN-AP, CFCN, CSWS, WCC, DWC  8/20/2024

## 2024-08-20 ENCOUNTER — OFFICE VISIT (OUTPATIENT)
Dept: WOUND CARE | Facility: HOSPITAL | Age: 49
End: 2024-08-20
Attending: NURSE PRACTITIONER
Payer: COMMERCIAL

## 2024-08-20 VITALS
DIASTOLIC BLOOD PRESSURE: 60 MMHG | RESPIRATION RATE: 12 BRPM | TEMPERATURE: 97 F | SYSTOLIC BLOOD PRESSURE: 97 MMHG | HEART RATE: 77 BPM

## 2024-08-20 DIAGNOSIS — L97.312 NON-PRESSURE CHRONIC ULCER OF RIGHT ANKLE WITH FAT LAYER EXPOSED (HCC): Primary | ICD-10-CM

## 2024-08-20 DIAGNOSIS — M32.19 LUPUS VASCULITIS (HCC): ICD-10-CM

## 2024-08-20 DIAGNOSIS — L97.322 NON-PRESSURE CHRONIC ULCER OF LEFT ANKLE WITH FAT LAYER EXPOSED (HCC): ICD-10-CM

## 2024-08-20 DIAGNOSIS — I87.333 IDIOPATHIC CHRONIC VENOUS HYPERTENSION OF BOTH LOWER EXTREMITIES WITH ULCER AND INFLAMMATION (HCC): ICD-10-CM

## 2024-08-20 DIAGNOSIS — I77.89 LUPUS VASCULITIS (HCC): ICD-10-CM

## 2024-08-20 DIAGNOSIS — Z79.52 CURRENT CHRONIC USE OF SYSTEMIC STEROIDS: ICD-10-CM

## 2024-08-20 DIAGNOSIS — M35.1 MIXED CONNECTIVE TISSUE DISEASE (HCC): ICD-10-CM

## 2024-08-20 PROCEDURE — 99214 OFFICE O/P EST MOD 30 MIN: CPT | Performed by: NURSE PRACTITIONER

## 2024-08-20 NOTE — PATIENT INSTRUCTIONS
Please return:  1 week    Patient discharge and wound care instructions  Raymond Olivo  8/20/2024        You may shower with protection of the wound (ie a cast cover or similar).     Changing your dressing:           Wash your hands with soap and water.  Ensure that the old dressing is removed completely. Place it in a plastic bag and throw it in the trash.  Cleanse the wound with hypochlorous wound cleanser (ie. Anasept, vashe, pure and clean) .  It's ok to “scrub” your wound with the gauze, small amount of bleeding with cleansing is normal and ok.  Apply the following dressings:   clobetesol to periwound bilaterally     Right: open xeroform>abd pad>20-30mmhg VERY light wrap  Left:  sopen xeroform>abd pad>20-30mmhg VERY light wrap  Left (lateral):>open xeroform>abd pad  Protect anterior tibial/ankle prominence    Nutrition and blood sugar control:  Focus on the following:  Protein: Meats, beans, eggs, milk and yogurt particularly Greek yogurt), tofu, soy nuts, soy protein products (Follow the protein handout in your welcome folder)  Vitamin C: Citrus fruits and juices, strawberries, tomatoes, tomato juice, peppers, baked potatoes, spinach, broccoli, cauliflower, [place] sprouts, cabbage  Vitamin A: Dark green, leafy vegetables, orange or yellow vegetables, cantaloupe, fortified dairy products, liver, fortified cereals  Zinc: Fortified cereals, red meats, seafood  Consider supplementing with Justin by [employer]. It can be purchased on amazon, [employer] website, or local pharmacy may be able to order it for you.  (These are essential branch chain amino acids that help with tissue building and wound healing).     Concerns:  Signs of infection may include the following:  Increase in redness  Red \"streaks\" from wound  Increase in swelling  Fever  Unusual odor  Change in the amount of wound drainage     Should you experience any significant changes in your wound(s) or have any questions regarding your home care  instructions please contact the wound center [facility] @ [phone] If after regular business hours, please call your family doctor or local emergency room. The treatment plan has been discussed at length between you and your provider. Follow all instructions carefully, it is very important. If you do not follow all instructions you are at risk of your wound not healing, infection, possible loss of limb and even loss of life.

## 2024-08-20 NOTE — PROGRESS NOTES
.Weekly Wound Education Note    Teaching Provided To: Patient;Family  Training Topics: Dressing;Edema control;Cleasing and general instructions;Compression;Discharge instructions  Training Method: Explain/Verbal;Written  Training Response: Patient responds and understands        Notes: Wounds improving. Continue open xeroform, ABD pad and calamine unna boot 20-30mmhg wrapped loose.

## 2024-08-26 NOTE — PROGRESS NOTES
CHIEF COMPLAINT:     No chief complaint on file.    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 Patient saw dr. Santamaria (vascular) in December 2023 and he documented \"I explained to the patient and her  that I do not believe the source of her pain to be vascular in origin.  She has an easily palpable dorsalis pedis pulse and the location of the pain is more suggestive of a neuropathic origin.  I suspect perhaps that given the tightness of her feet from her mixed connective tissue disorder the compression stockings is causing somewhat compression of the nerves in that area and I have encouraged her to substitute those stockings to those that involve the feet up to the upper calf.\" Patient/spouse have been in contact with dr. Farmer and in march 2024 reported a dark spot/ulcer on her bilateral lower legs, she was advised to make an appointment with wound care as well as get her blood work done ordered in January 2024 so that further treatment plan could be extablished.  The blood work did show increased inflammation and she was advised to increase her prednisone to 20mg x 5 days (which spouse reports they did do) and take the amlodipine 2.5 mg-which she states she is doing.  They have been leaving the areas open to air and she has not been wearing compression.  She c/o burning sensation in her ankle area.  She was started on gabapentin by dr. Farmer last year, however they stopped it because it was making her too sleepy. We discussed restarting the gabapentin, but only take a noc first.  Both wounds are dried eschar.  Will utilize hydrogel with lidocaine to start debriding off/softening the eschar. I have also ordered santyl. Patient placed into spandagrips for compression    HPI PRIOR TO AUGUST 2024 SEE  INDIVIDUAL PROGRESS NOTES  7-23-24 Patient returns.  We do not have any further samples of the debrimit that we have been utilizing the last 2 weeks for cleansing both the wound and exudate off the periwound.  Will try to order from [facility] as this definitely seems to be helping.  We transitioned to enluxtra to assist with periwound hygeinge and inflammation. Today wounds and periwound are improved overall, but there is a new area on left lateral proximal ankle that was previously atrophie jay that is now open.   Burning pain continues but patient states slightly better.  Patient saw dr farmer earlier today and the plan is to increase the mycophenolate dependent on bloodwork (which she is getting drawn after this appointment).  No s/s of infection.    8-6-24 patient returns.  She got her labs drawn and she had a slight decrease in inflammation but still high plan from dr smith was to \"increase the mycophenolate to a total of 750 mg/day (take 500 in the morning and 250 mg in the evening) and then lets try to decrease the prednisone to 2.5 mg daily\"  the patient/spouse report that they did increase the mycophenolate and she is currently at 5 mg of prednisonse/day.  Patient is to get labs drawn in 4 week is.    We were not able to order the debrisoft, but we were able to secure a few more samples which we will utilize again today.    The wounds and periwound are all improved with current poc. Will continue with enluxtra. Patient seems to be tolerating the cleansing slightly better. Still c/o burning pain.    8-13-24 patient returns.  I touched base with dr. Farmer last week and indeed patient was to decrease to 2.5 mg daily (not 5 daily as she was doing).  Today she is still on 5mg.  I stressed to patient and daughter that she should divide the 5 mg in half and ONLY take 2.5 mg each day.  Her wounds are improving. They are slightly dry. Still with some coagulum/slough that we are unable to cleanse away. Will use small  amount of honey to these areas, cover with xeroform to retain more moisture.  Will also utilize topical steroid. No s/s of infection. Burning pain continues.    8-20-24 patient returns.  She is down to 2.5 mg Prednisone dose without negative effect with her symptoms, last week the wounds and periwounds were quite dry.  We utilizing a small amount of honey over the nonviable areas and covered with xeroform.  Also applied topical steroid.  Today all wounds are improved, the periwounds are also improved, the wounds are primarily granular, no s/s of infection, patient continues with burning pain.    8-27-24 patient returns.  Patient's wounds and periwound were improved last week, we continued with the xeroform and topical steroid. Pain*** Today***  MEDICATIONS:     Current Outpatient Medications:     gabapentin 100 MG Oral Cap, Take 100mg in am and 200mg at bedtime., Disp: , Rfl:     FLUoxetine 10 MG Oral Cap, Take 1 capsule (10 mg total) by mouth at bedtime., Disp: 90 capsule, Rfl: 0    predniSONE 2.5 MG Oral Tab, Take 1 tablet (2.5 mg total) by mouth daily., Disp: 90 tablet, Rfl: 0    mycophenolate mofetil 250 MG Oral Cap, Take 1 capsule (250 mg total) by mouth at bedtime., Disp: 90 capsule, Rfl: 0    amLODIPine 2.5 MG Oral Tab, Take 1 tablet (2.5 mg total) by mouth 2 (two) times daily., Disp: 180 tablet, Rfl: 0    hydroxychloroquine 200 MG Oral Tab, Take 1 tablet (200 mg total) by mouth daily., Disp: 90 tablet, Rfl: 0    Ferrous Sulfate 325 (65 Fe) MG Oral Tab, Take 1 tablet (325 mg total) by mouth 2 (two) times daily with meals., Disp: 180 tablet, Rfl: 0    Mycophenolate Mofetil 500 MG Oral Tab, Take 1 tablet (500 mg total) by mouth daily., Disp: 90 tablet, Rfl: 0  ALLERGIES:   No Known Allergies   REVIEW OF SYSTEMS:   This information was obtained from the patient/family and chart.    See HPI for pertinent positives, otherwise 10 pt ROS negative.    HISTORY:   Past medical, surgical, family and social history  updated where appropriate.    PHYSICAL EXAM:     There were no vitals filed for this visit.    Estimated body mass index is 20.94 kg/m² as calculated from the following:    Height as of 7/23/24: 64\".    Weight as of 7/23/24: 122 lb (55.3 kg).   No results found for: \"PGLU\"    Vital signs reviewed.Appears stated age, well groomed.    Constitutional:  Bp ***wnl for patient. Pulse Regular and wnl for patient. Respirations easy and unlabored. Temperature wnl. Weight normal for height. Appearance neat and clean. Appears in no acute distress. Well nourished and well developed.    Lower extremity:  dp/pt palpable*** bilaterally. Extremities are free of varicosities and edema, they are scarred, scattered changes in pigmentation (hypo/hyper) with atrophie jay. Capillary refill < 3 seconds. Digits are warm. toenails are wnl for color, thickness and hygeine. Skin hydration wnl. + hairgrowth on legs.    Musculoskeletal:  Gait and station stable   Integumentary:  refer to wound characteristics and images   Psychiatric:  Judgment and insight intact. Alert and oriented times 3. No evidence of depression, anxiety, or agitation. Calm, cooperative, and communicative, but very quiet and reserved.  EDEMA:   Calf                    Ankle                          DIAGNOSTICS:     Lab Results   Component Value Date    BUN 8 (L) 07/23/2024    CREATSERUM 0.59 07/23/2024    GFRCKDEPI 121.37 12/17/2020    ALB 4.3 07/23/2024    TP 7.3 07/23/2024    A1C 5.1 12/17/2020     Lab Results   Component Value Date    ESRML 39 (H) 07/23/2024    ESRML 54 (H) 03/18/2024    ESRML 45 (H) 10/19/2023      Lab Results   Component Value Date    CRP <0.40 07/23/2024    CRP 0.37 (H) 03/18/2024    CRP 0.48 (H) 10/19/2023       11-28-23 arterial duplex-dr hilliard  FINDINGS:    LEFT EXTREMITY:  Triphasic waveforms in the left common femoral, profunda, superficial femoral arteries.  Biphasic waveforms below the left knee.   OTHER:  None.     PEAK VELOCITIES (CM/S):    LEFT COMMON FEMORAL:      165    LEFT PROFUNDA FEMORAL:      78      LEFT SUPERFICIAL FEMORAL PROX:    113   LEFT SUPERFICIAL FEMORAL MID:      94   LEFT SUPERFICIAL FEMORAL DISTAL:    92   LEFT POPLITEAL PROX:      110   LEFT POPLITEAL DISTAL:      121   TIBIOPER TRUNK:      66   LEFT PTA PROX:      60   LEFT PTA MID:      62   LEFT PTA DISTAL:      73   LEFT BETH PROX:      82   LEFT BETH MID:      72    LEFT DORSALIS PEDIS::      30   LEFT GREAT TOE PRESSURE:      25 mmHg   CONCLUSION:    1. No high-grade stenosis is identified.  There is suggestion of diffuse mild stenosis throughout most of the left lower extremity.    2. Overall diminished left toe pressure is noted.  Possibility of decreased perfusion in the distal left foot is of consideration.     7-14-20 venous reflux study-dr santamaria  Per Dr. Santamaria review \"no intervention needed\"  WOUND ASSESSMENT:     Wound 04/26/24 #1 Ankle Left;Medial (Active)   Date First Assessed/Time First Assessed: 04/26/24 1031    Wound Number (Wound Clinic Only): #1  Primary Wound Type: (c) Auto-immune  Location: Ankle  Wound Location Orientation: Left;Medial      Assessments 4/26/2024 10:34 AM 8/20/2024 11:07 AM   Wound Image       Drainage Amount Unable to assess Scant   Drainage Description -- Serosanguineous   Treatments Compression Compression   Wound Length (cm) 3.5 cm 0.9 cm   Wound Width (cm) 4.6 cm 0.5 cm   Wound Surface Area (cm^2) 16.1 cm^2 0.45 cm^2   Wound Depth (cm) 0.1 cm 0.1 cm   Wound Volume (cm^3) 1.61 cm^3 0.045 cm^3   Wound Healing % -- 97   Margins Undefined edges Well-defined edges   Non-staged Wound Description Full thickness Full thickness   Soni-wound Assessment Dry;Edema;Atrophy jay Dry;Hemosiderin staining   Wound Granulation Tissue -- Pink;Firm   Wound Bed Granulation (%) -- 50 %   Wound Bed Slough (%) -- 50 %   Wound Odor None None   Shape 100% scabbed --   Tunneling? -- No   Undermining? -- No   Sinus Tracts? -- No       No associated orders.        Wound 04/26/24 #2 Ankle Right;Lateral (Active)   Date First Assessed/Time First Assessed: 04/26/24 1031    Wound Number (Wound Clinic Only): #2  Primary Wound Type: (c) Auto-immune  Location: Ankle  Wound Location Orientation: Right;Lateral      Assessments 4/26/2024 10:34 AM 8/20/2024 11:06 AM   Wound Image       Drainage Amount Unable to assess Scant   Drainage Description -- Serosanguineous   Treatments Compression Compression   Wound Length (cm) 0.5 cm 1.4 cm   Wound Width (cm) 0.7 cm 1.1 cm   Wound Surface Area (cm^2) 0.35 cm^2 1.54 cm^2   Wound Depth (cm) 0.1 cm 0.1 cm   Wound Volume (cm^3) 0.035 cm^3 0.154 cm^3   Wound Healing % -- -340   Margins Well-defined edges Well-defined edges   Non-staged Wound Description Full thickness Full thickness   Soni-wound Assessment Dry;Edema;Atrophy jay Hemosiderin staining;Atrophy jay   Wound Granulation Tissue -- Pink;Pale Grey;Spongy   Wound Bed Granulation (%) -- 95 %   Wound Bed Slough (%) -- 5 %   Wound Odor None None   Shape 100% scabbed --   Tunneling? -- No   Undermining? -- No   Sinus Tracts? -- No       Inactive Orders   Date Order Priority Status Authorizing Provider   06/19/24 1145 Debridement Auto-immune Right;Lateral Ankle Routine Completed Kaz Tapia MD       Compression Wrap 05/21/24 Ankle Anterior;Left (Active)   Placement Date/Time: 05/21/24 1019   Location: Ankle  Wound Location Orientation: Anterior;Left      Assessments 5/21/2024  9:36 AM 8/20/2024 11:07 AM   Response to Treatment Well tolerated Well tolerated   Compression Layers Multilayer Multilayer   Compression Product Type Unna Boot Unna Boot   Dressing Applied Yes Yes   Compression Wrap Location Toes to Knee Toes to Knee   Compression Wrap Status Clean;Dry;Intact Clean;Dry       No associated orders.       Compression Wrap 05/28/24 Ankle Anterior;Right (Active)   Placement Date/Time: 05/28/24 1012   Location: Ankle  Wound Location Orientation: Anterior;Right      Assessments  5/28/2024  9:47 AM 8/20/2024 11:07 AM   Response to Treatment Well tolerated Well tolerated   Compression Layers Multilayer Multilayer   Compression Product Type Unna Boot Unna Boot   Dressing Applied Yes Yes   Compression Wrap Location Toes to Knee Toes to Knee   Compression Wrap Status Dry;Intact;Clean Clean;Dry       No associated orders.       Wound 06/25/24 #3 Ankle Left;Lateral (Active)   Date First Assessed/Time First Assessed: 06/25/24 0959    Wound Number (Wound Clinic Only): #3  Primary Wound Type: Auto-immune  Location: Ankle  Wound Location Orientation: Left;Lateral      Assessments 6/25/2024 10:03 AM 8/20/2024 11:10 AM   Wound Image       Drainage Amount Scant None   Drainage Description Serosanguineous --   Treatments Compression Compression   Wound Length (cm) 0.9 cm 0.1 cm   Wound Width (cm) 0.8 cm 0.1 cm   Wound Surface Area (cm^2) 0.72 cm^2 0.01 cm^2   Wound Depth (cm) 0.1 cm 0 cm   Wound Volume (cm^3) 0.072 cm^3 0 cm^3   Wound Healing % -- 100   Margins Well-defined edges Attached edges   Non-staged Wound Description Full thickness Full thickness   Soni-wound Assessment Blanchable erythema;Dry;Edema;Moist Dry   Wound Granulation Tissue Pink;Pale Grey;Firm --   Wound Bed Granulation (%) 50 % --   Wound Bed Slough (%) 50 % --   Wound Odor None None   Tunneling? -- No   Undermining? -- No   Sinus Tracts? -- No       No associated orders.       Wound 07/23/24 #4 Leg Left;Lateral (Active)   Date First Assessed/Time First Assessed: 07/23/24 1137    Wound Number (Wound Clinic Only): #4  Primary Wound Type: (c) Other (comment)  Location: Leg  Wound Location Orientation: Left;Lateral      Assessments 7/23/2024 11:46 AM 8/20/2024 11:11 AM   Wound Image       Drainage Amount Unable to assess Scant   Drainage Description -- Serosanguineous   Treatments Compression Compression   Wound Length (cm) 2.3 cm 2.2 cm   Wound Width (cm) 1.3 cm 0.3 cm   Wound Surface Area (cm^2) 2.99 cm^2 0.66 cm^2   Wound Depth (cm)  0.1 cm 0.1 cm   Wound Volume (cm^3) 0.299 cm^3 0.066 cm^3   Wound Healing % -- 78   Margins Well-defined edges Well-defined edges   Non-staged Wound Description Full thickness --   Soni-wound Assessment Clean;Dry;Other (Comment) Dry;Hemosiderin staining   Wound Granulation Tissue Pale Grey;Pink;Firm Pink;Firm   Wound Bed Granulation (%) 45 % 25 %   Wound Bed Epithelium (%) 10 % 25 %   Wound Bed Slough (%) 45 % 50 %   Wound Odor None None   Tunneling? -- No   Undermining? -- No   Sinus Tracts? -- No       No associated orders.          ASSESSMENT AND PLAN:    There are no diagnoses linked to this encounter.            Risks, benefits, and alternatives of current treatment plan discussed in detail.  Questions and concerns addressed. Red flags to RTC or ED reviewed.  Patient (or parent) agrees to plan.      NOTE TO PATIENT: The 21st Century Cures Act makes clinical notes like these available to patients in the interest of transparency. Clinical notes are medical documents used by physicians and care providers to communicate with each other. These documents include medical language and terminology, abbreviations, and treatment information that may sound technical and at times possibly unfamiliar. In addition, at times, the verbiage may appear blunt or direct. These documents are one tool providers use to communicate relevant information and clinical opinions of the care providers in a way that allows common understanding of the clinical context.   I spent***minutes with the patient. This time included:    preparing to see the patient (eg, review notes and recent diagnostics),  seeing the patient, obtaining and/or reviewing separately obtained history, performing a medically appropriate examination and/or evaluation, counseling and educating the patient, documenting in the record,***  DISCHARGE INSTRUCTIONS     There are no Patient Instructions on file for this visit.   Martina Mckeon FNP-C, CWCN-AP, CFCN, CSWS, WCC,  DW  8/27/2024        orange or yellow vegetables, cantaloupe, fortified dairy products, liver, fortified cereals  Zinc: Fortified cereals, red meats, seafood  Consider supplementing with Justin by [employer]. It can be purchased on amazon, [employer] website, or local pharmacy may be able to order it for you.  (These are essential branch chain amino acids that help with tissue building and wound healing).     Concerns:  Signs of infection may include the following:  Increase in redness  Red \"streaks\" from wound  Increase in swelling  Fever  Unusual odor  Change in the amount of wound drainage     Should you experience any significant changes in your wound(s) or have any questions regarding your home care instructions please contact the [facility] @ [phone] If after regular business hours, please call your family doctor or local emergency room. The treatment plan has been discussed at length between you and your provider. Follow all instructions carefully, it is very important. If you do not follow all instructions you are at risk of your wound not healing, infection, possible loss of limb and even loss of life.        Martina Mckeon FNP-C, CWCN-AP, CFCN, CSWS, WCC, DWC  8/27/2024

## 2024-08-27 ENCOUNTER — APPOINTMENT (OUTPATIENT)
Dept: WOUND CARE | Facility: HOSPITAL | Age: 49
End: 2024-08-27
Attending: NURSE PRACTITIONER
Payer: COMMERCIAL

## 2024-08-27 VITALS
SYSTOLIC BLOOD PRESSURE: 100 MMHG | RESPIRATION RATE: 16 BRPM | TEMPERATURE: 98 F | DIASTOLIC BLOOD PRESSURE: 64 MMHG | HEART RATE: 88 BPM

## 2024-08-27 DIAGNOSIS — Z79.52 CURRENT CHRONIC USE OF SYSTEMIC STEROIDS: ICD-10-CM

## 2024-08-27 DIAGNOSIS — I77.89 LUPUS VASCULITIS (HCC): ICD-10-CM

## 2024-08-27 DIAGNOSIS — L97.322 NON-PRESSURE CHRONIC ULCER OF LEFT ANKLE WITH FAT LAYER EXPOSED (HCC): ICD-10-CM

## 2024-08-27 DIAGNOSIS — M32.19 LUPUS VASCULITIS (HCC): ICD-10-CM

## 2024-08-27 DIAGNOSIS — M35.1 MIXED CONNECTIVE TISSUE DISEASE (HCC): ICD-10-CM

## 2024-08-27 DIAGNOSIS — L97.312 NON-PRESSURE CHRONIC ULCER OF RIGHT ANKLE WITH FAT LAYER EXPOSED (HCC): Primary | ICD-10-CM

## 2024-08-27 DIAGNOSIS — I87.333 IDIOPATHIC CHRONIC VENOUS HYPERTENSION OF BOTH LOWER EXTREMITIES WITH ULCER AND INFLAMMATION (HCC): ICD-10-CM

## 2024-08-27 PROCEDURE — 99214 OFFICE O/P EST MOD 30 MIN: CPT | Performed by: NURSE PRACTITIONER

## 2024-08-27 NOTE — PATIENT INSTRUCTIONS
Please return:  1 week    Patient discharge and wound care instructions  Raymond Olivo  8/27/2024          You may shower with protection of the wound (ie a cast cover or similar).     Changing your dressing:           Wash your hands with soap and water.  Ensure that the old dressing is removed completely. Place it in a plastic bag and throw it in the trash.  Cleanse the wound with hypochlorous wound cleanser (ie. Anasept, vashe, pure and clean) .  It's ok to “scrub” your wound with the gauze, small amount of bleeding with cleansing is normal and ok.  Apply the following dressings:   clobetesol to periwound bilaterally     Right: open xeroform>abd pad>20-30mmhg VERY light wrap  Left:  sopen xeroform>abd pad>20-30mmhg VERY light wrap  Left (lateral):>open xeroform>abd pad  Protect anterior tibial/ankle prominence    Nutrition and blood sugar control:  Focus on the following:  Protein: Meats, beans, eggs, milk and yogurt particularly Greek yogurt), tofu, soy nuts, soy protein products (Follow the protein handout in your welcome folder)  Vitamin C: Citrus fruits and juices, strawberries, tomatoes, tomato juice, peppers, baked potatoes, spinach, broccoli, cauliflower, [place] sprouts, cabbage  Vitamin A: Dark green, leafy vegetables, orange or yellow vegetables, cantaloupe, fortified dairy products, liver, fortified cereals  Zinc: Fortified cereals, red meats, seafood  Consider supplementing with Justin by [employer]. It can be purchased on amazon, [employer] website, or local pharmacy may be able to order it for you.  (These are essential branch chain amino acids that help with tissue building and wound healing).     Concerns:  Signs of infection may include the following:  Increase in redness  Red \"streaks\" from wound  Increase in swelling  Fever  Unusual odor  Change in the amount of wound drainage     Should you experience any significant changes in your wound(s) or have any questions regarding your home care  instructions please contact the wound center [facility] @ [phone] If after regular business hours, please call your family doctor or local emergency room. The treatment plan has been discussed at length between you and your provider. Follow all instructions carefully, it is very important. If you do not follow all instructions you are at risk of your wound not healing, infection, possible loss of limb and even loss of life.

## 2024-09-02 NOTE — PROGRESS NOTES
CHIEF COMPLAINT:     Chief Complaint   Patient presents with    Wound Care     Patient arrives for a wound care follow up appointment. Patient arrives with bilateral unna wraps. Patient reports mild to moderate pain.      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 Patient saw dr. Santamaria (vascular) in December 2023 and he documented \"I explained to the patient and her  that I do not believe the source of her pain to be vascular in origin.  She has an easily palpable dorsalis pedis pulse and the location of the pain is more suggestive of a neuropathic origin.  I suspect perhaps that given the tightness of her feet from her mixed connective tissue disorder the compression stockings is causing somewhat compression of the nerves in that area and I have encouraged her to substitute those stockings to those that involve the feet up to the upper calf.\" Patient/spouse have been in contact with dr. Farmer and in march 2024 reported a dark spot/ulcer on her bilateral lower legs, she was advised to make an appointment with wound care as well as get her blood work done ordered in January 2024 so that further treatment plan could be extablished.  The blood work did show increased inflammation and she was advised to increase her prednisone to 20mg x 5 days (which spouse reports they did do) and take the amlodipine 2.5 mg-which she states she is doing.  They have been leaving the areas open to air and she has not been wearing compression.  She c/o burning sensation in her ankle area.  She was started on gabapentin by dr. Farmer last year, however they stopped it because it was making her too sleepy. We discussed restarting the gabapentin, but only take a noc first.  Both wounds are dried eschar.  Will utilize hydrogel  [place] [facility] [place] to 5-6 days of 10/10 lower abdomen, vaginal, and rectal pain. Pt has recent h/o GYN surgery in 10/2020 for bladder mesh removal, Cifuentes was removed on 11/19.  CT A/P 11/27: Multiple fluid collections adjacent to bladder. Mild bilateral hydronephrosis.   CT A/P 12/8: Interval resolution of previously identified multilobular fluid collection in the lower pelvis with drainage catheter in position.  Residual scattered small volume of air is noted.   UCx 11/26: E.coli sensitive to CTX, cefepime   BCx 12/5: Peptostrptococcus   Cystography 12/6: No bladder leak    PLAN  - GYN consulted, appreciate consult.  - IR consulted, appreciate recs; Drain in places.  - Pain management: PCA pump initially. PCA pump d/c on 11/30. PRN opiates  - ID consulted for intra-abdominal infection, appreciate recs   - CTX & Flagyl x14 days, repeat imaging to assess for cleared infection prior to d/c abx   - If d/c before 14d course, may switch to augmentin   - 12/9: broadened ABX to cover HAP, switched CTX-->Cefepime, added vanc, and continued flagyl              - 12/10: Transitioned patient levaquin + metronidazole.   - Urology recs appreciated; left CVAT, ordered CT RSS 11/29 hydronephrosis resolved   - Maintain Cifuentes    - 12/6 spoke with urology concerning ROSAURA drain output concern for urinary contents.    - Per Urology, since ROSAURA fluid Cr 1.7 is less than serum Cr 4.5, the fluid is not urine.    - Negative cystogram and normal retrograde pyelogram       with lidocaine to start debriding off/softening the eschar. I have also ordered santyl. Patient placed into spandagrips for compression    HPI PRIOR TO AUGUST 2024 SEE INDIVIDUAL PROGRESS NOTES  8-6-24 patient returns.  She got her labs drawn and she had a slight decrease in inflammation but still high plan from dr smith was to \"increase the mycophenolate to a total of 750 mg/day (take 500 in the morning and 250 mg in the evening) and then lets try to decrease the prednisone to 2.5 mg daily\"  the patient/spouse report that they did increase the mycophenolate and she is currently at 5 mg of prednisonse/day.  Patient is to get labs drawn in 4 week is.    We were not able to order the debrisoft, but we were able to secure a few more samples which we will utilize again today.    The wounds and periwound are all improved with current poc. Will continue with enluxtra. Patient seems to be tolerating the cleansing slightly better. Still c/o burning pain.    8-13-24 patient returns.  I touched base with dr. Farmer last week and indeed patient was to decrease to 2.5 mg daily (not 5 daily as she was doing).  Today she is still on 5mg.  I stressed to patient and daughter that she should divide the 5 mg in half and ONLY take 2.5 mg each day.  Her wounds are improving. They are slightly dry. Still with some coagulum/slough that we are unable to cleanse away. Will use small amount of honey to these areas, cover with xeroform to retain more moisture.  Will also utilize topical steroid. No s/s of infection. Burning pain continues.    8-20-24 patient returns.  She is down to 2.5 mg Prednisone dose without negative effect with her symptoms, last week the wounds and periwounds were quite dry.  We utilizing a small amount of honey over the nonviable areas and covered with xeroform.  Also applied topical steroid.  Today all wounds are improved, the periwounds are also improved, the wounds are primarily granular, no s/s of infection,  patient continues with burning pain.    8-27-24 patient returns.  Patient's wounds and periwound were improved last week, we continued with the xeroform and topical steroid. Pain is more achy today, less burning unles the wounds are being manipulated.   Today, all wounds are again improved.  Debrimit utilized with vashe to limb and wounds. No s/s of infection. Will continue with clobetesol and xeroform    9-3-24 patient returns. We have been cleansing wounds and periwounds weekly with debrimit/vashe and dressing with topical steroid and xeroform with consistent improvement over the last 3 weeks (even with a reduction in her po steroids). Pain is improving. Wounds are improving, with the debrimit cleansing the wounds are not clean enough to utilize gina collagen.  Patient reminded to get labs done for dr davis (standing orders are in the system).  No s/s of infection.  MEDICATIONS:     Current Outpatient Medications:     gabapentin 100 MG Oral Cap, Take 100mg in am and 200mg at bedtime., Disp: , Rfl:     FLUoxetine 10 MG Oral Cap, Take 1 capsule (10 mg total) by mouth at bedtime., Disp: 90 capsule, Rfl: 0    predniSONE 2.5 MG Oral Tab, Take 1 tablet (2.5 mg total) by mouth daily., Disp: 90 tablet, Rfl: 0    mycophenolate mofetil 250 MG Oral Cap, Take 1 capsule (250 mg total) by mouth at bedtime., Disp: 90 capsule, Rfl: 0    amLODIPine 2.5 MG Oral Tab, Take 1 tablet (2.5 mg total) by mouth 2 (two) times daily., Disp: 180 tablet, Rfl: 0    hydroxychloroquine 200 MG Oral Tab, Take 1 tablet (200 mg total) by mouth daily., Disp: 90 tablet, Rfl: 0    Ferrous Sulfate 325 (65 Fe) MG Oral Tab, Take 1 tablet (325 mg total) by mouth 2 (two) times daily with meals., Disp: 180 tablet, Rfl: 0    Mycophenolate Mofetil 500 MG Oral Tab, Take 1 tablet (500 mg total) by mouth daily., Disp: 90 tablet, Rfl: 0  ALLERGIES:   No Known Allergies   REVIEW OF SYSTEMS:   This information was obtained from the patient/family and chart.    See  HPI for pertinent positives, otherwise 10 pt ROS negative.    HISTORY:   Past medical, surgical, family and social history updated where appropriate.    PHYSICAL EXAM:     Vitals:    09/03/24 0700   BP: 101/65   Pulse: 74   Resp: 14   Temp: 97.6 °F (36.4 °C)         Estimated body mass index is 20.94 kg/m² as calculated from the following:    Height as of 7/23/24: 64\".    Weight as of 7/23/24: 122 lb (55.3 kg).   No results found for: \"PGLU\"    Vital signs reviewed.Appears stated age, well groomed.    Constitutional:  Bp wnl for patient. Pulse Regular and wnl for patient. Respirations easy and unlabored. Temperature wnl. Weight normal for height. Appearance neat and clean. Appears in no acute distress. Well nourished and well developed.    Lower extremity:  dp/pt palpable bilaterally. Extremities are free of varicosities and edema, they are scarred, scattered changes in pigmentation (hypo/hyper) with atrophie jay. Capillary refill < 3 seconds. Digits are warm. toenails are wnl for color, thickness and hygeine. Skin hydration wnl. + hairgrowth on legs.    Musculoskeletal:  Gait and station stable   Integumentary:  refer to wound characteristics and images   Psychiatric:  Judgment and insight intact. Alert and oriented times 3. No evidence of depression, anxiety, or agitation. Calm, cooperative, and communicative, but very quiet and reserved.  EDEMA:   Calf  Point of Measurement - Left Calf: 31  Point of Measurement - Right Calf: 31  Left Calf from:: Heel  Calf Left cm:: 28.1  Right Calf from:: Heel  Right Calf cm:: 29  Ankle  Point of Measurement - Left Ankle: 11  Point of Measurement - Right Ankle: 11  Left Ankle from:: Heel  Left Ankle cm:: 17.1     Right Ankle from:: Heel  Right Ankle cm:: 17     DIAGNOSTICS:     Lab Results   Component Value Date    BUN 8 (L) 07/23/2024    CREATSERUM 0.59 07/23/2024    GFRCKDEPI 121.37 12/17/2020    ALB 4.3 07/23/2024    TP 7.3 07/23/2024    A1C 5.1 12/17/2020     Lab Results    Component Value Date    ESRML 39 (H) 07/23/2024    ESRML 54 (H) 03/18/2024    ESRML 45 (H) 10/19/2023      Lab Results   Component Value Date    CRP <0.40 07/23/2024    CRP 0.37 (H) 03/18/2024    CRP 0.48 (H) 10/19/2023       11-28-23 arterial duplex-dr santamaria  FINDINGS:    LEFT EXTREMITY:  Triphasic waveforms in the left common femoral, profunda, superficial femoral arteries.  Biphasic waveforms below the left knee.   OTHER:  None.     PEAK VELOCITIES (CM/S):   LEFT COMMON FEMORAL:      165    LEFT PROFUNDA FEMORAL:      78      LEFT SUPERFICIAL FEMORAL PROX:    113   LEFT SUPERFICIAL FEMORAL MID:      94   LEFT SUPERFICIAL FEMORAL DISTAL:    92   LEFT POPLITEAL PROX:      110   LEFT POPLITEAL DISTAL:      121   TIBIOPER TRUNK:      66   LEFT PTA PROX:      60   LEFT PTA MID:      62   LEFT PTA DISTAL:      73   LEFT BETH PROX:      82   LEFT BETH MID:      72    LEFT DORSALIS PEDIS::      30   LEFT GREAT TOE PRESSURE:      25 mmHg   CONCLUSION:    1. No high-grade stenosis is identified.  There is suggestion of diffuse mild stenosis throughout most of the left lower extremity.    2. Overall diminished left toe pressure is noted.  Possibility of decreased perfusion in the distal left foot is of consideration.     7-14-20 venous reflux study-dr santamaria  Per Dr. Santamaria review \"no intervention needed\"  WOUND ASSESSMENT:     Wound 04/26/24 #1 Ankle Left;Medial (Active)   Date First Assessed/Time First Assessed: 04/26/24 1031    Wound Number (Wound Clinic Only): #1  Primary Wound Type: (c) Auto-immune  Location: Ankle  Wound Location Orientation: Left;Medial      Assessments 4/26/2024 10:34 AM 9/3/2024 10:29 AM   Wound Image       Drainage Amount Unable to assess Small   Drainage Description -- Serosanguineous   Treatments Compression --   Wound Length (cm) 3.5 cm 0.4 cm   Wound Width (cm) 4.6 cm 0.4 cm   Wound Surface Area (cm^2) 16.1 cm^2 0.16 cm^2   Wound Depth (cm) 0.1 cm 0.1 cm   Wound Volume (cm^3) 1.61 cm^3 0.016  cm^3   Wound Healing % -- 99   Margins Undefined edges Well-defined edges   Non-staged Wound Description Full thickness Full thickness   Soni-wound Assessment Dry;Edema;Atrophy jay Hemosiderin staining   Wound Granulation Tissue -- Pink;Pale Grey;Firm   Wound Bed Granulation (%) -- 100 %   Wound Odor None None   Shape 100% scabbed --   Tunneling? -- No   Undermining? -- No   Sinus Tracts? -- No       No associated orders.       Wound 04/26/24 #2 Ankle Right;Lateral (Active)   Date First Assessed/Time First Assessed: 04/26/24 1031    Wound Number (Wound Clinic Only): #2  Primary Wound Type: (c) Auto-immune  Location: Ankle  Wound Location Orientation: Right;Lateral      Assessments 4/26/2024 10:34 AM 9/3/2024 10:23 AM   Wound Image       Drainage Amount Unable to assess Small   Drainage Description -- Serosanguineous   Treatments Compression --   Wound Length (cm) 0.5 cm 0.9 cm   Wound Width (cm) 0.7 cm 0.8 cm   Wound Surface Area (cm^2) 0.35 cm^2 0.72 cm^2   Wound Depth (cm) 0.1 cm 0.1 cm   Wound Volume (cm^3) 0.035 cm^3 0.072 cm^3   Wound Healing % -- -106   Margins Well-defined edges Well-defined edges   Non-staged Wound Description Full thickness Full thickness   Soni-wound Assessment Dry;Edema;Atrophy jay Edema;Hemosiderin staining   Wound Granulation Tissue -- Pink;Pale Grey;Firm   Wound Bed Granulation (%) -- 50 %   Wound Bed Slough (%) -- 50 %   Wound Odor None None   Shape 100% scabbed --   Tunneling? -- No   Undermining? -- No   Sinus Tracts? -- No       Inactive Orders   Date Order Priority Status Authorizing Provider   06/19/24 1145 Debridement Auto-immune Right;Lateral Ankle Routine Completed Kaz Tapia MD       Compression Wrap 05/21/24 Ankle Anterior;Left (Active)   Placement Date/Time: 05/21/24 1019   Location: Ankle  Wound Location Orientation: Anterior;Left      Assessments 5/21/2024  9:36 AM 8/27/2024 10:10 AM   Response to Treatment Well tolerated Well tolerated   Compression  Layers Multilayer Multilayer   Compression Product Type Unna Boot Unna Boot   Dressing Applied Yes Yes   Compression Wrap Location Toes to Knee Toes to Knee   Compression Wrap Status Clean;Dry;Intact Clean;Dry       No associated orders.       Compression Wrap 05/28/24 Ankle Anterior;Right (Active)   Placement Date/Time: 05/28/24 1012   Location: Ankle  Wound Location Orientation: Anterior;Right      Assessments 5/28/2024  9:47 AM 8/27/2024 10:10 AM   Response to Treatment Well tolerated Well tolerated   Compression Layers Multilayer Multilayer   Compression Product Type Unna Boot Unna Boot   Dressing Applied Yes Yes   Compression Wrap Location Toes to Knee Toes to Knee   Compression Wrap Status Dry;Intact;Clean Clean;Dry       No associated orders.       Wound 06/25/24 #3 Ankle Left;Lateral (Active)   Date First Assessed/Time First Assessed: 06/25/24 0959    Wound Number (Wound Clinic Only): #3  Primary Wound Type: Auto-immune  Location: Ankle  Wound Location Orientation: Left;Lateral      Assessments 6/25/2024 10:03 AM 9/3/2024 10:31 AM   Wound Image       Drainage Amount Scant None   Drainage Description Serosanguineous --   Treatments Compression --   Wound Length (cm) 0.9 cm 0.1 cm   Wound Width (cm) 0.8 cm 0.1 cm   Wound Surface Area (cm^2) 0.72 cm^2 0.01 cm^2   Wound Depth (cm) 0.1 cm 0 cm   Wound Volume (cm^3) 0.072 cm^3 0 cm^3   Wound Healing % -- 100   Margins Well-defined edges Well-defined edges   Non-staged Wound Description Full thickness Full thickness   Soni-wound Assessment Blanchable erythema;Dry;Edema;Moist Hemosiderin staining;Edema   Wound Granulation Tissue Pink;Pale Grey;Firm --   Wound Bed Granulation (%) 50 % --   Wound Bed Slough (%) 50 % --   Wound Odor None None   Tunneling? -- No   Undermining? -- No   Sinus Tracts? -- No       No associated orders.       Wound 07/23/24 #4 Leg Left;Lateral (Active)   Date First Assessed/Time First Assessed: 07/23/24 1137    Wound Number (Wound Clinic  Only): #4  Primary Wound Type: (c) Other (comment)  Location: Leg  Wound Location Orientation: Left;Lateral      Assessments 7/23/2024 11:46 AM 9/3/2024 10:27 AM   Wound Image       Drainage Amount Unable to assess Scant   Drainage Description -- Serosanguineous   Treatments Compression --   Wound Length (cm) 2.3 cm 1.1 cm   Wound Width (cm) 1.3 cm 0.5 cm   Wound Surface Area (cm^2) 2.99 cm^2 0.55 cm^2   Wound Depth (cm) 0.1 cm 0.1 cm   Wound Volume (cm^3) 0.299 cm^3 0.055 cm^3   Wound Healing % -- 82   Margins Well-defined edges Well-defined edges   Non-staged Wound Description Full thickness Full thickness   Soni-wound Assessment Clean;Dry;Other (Comment) Dry;Hemosiderin staining   Wound Granulation Tissue Pale Grey;Pink;Firm Pink;Red;Firm   Wound Bed Granulation (%) 45 % 80 %   Wound Bed Epithelium (%) 10 % 10 %   Wound Bed Slough (%) 45 % 10 %   Wound Odor None None   Tunneling? -- No   Undermining? -- No   Sinus Tracts? -- No       No associated orders.          ASSESSMENT AND PLAN:    1. 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 Non-pressure chronic ulcer of left ankle with fat layer exposed (HCC)    3. Idiopathic chronic venous hypertension of both lower extremities with ulcer and inflammation (HCC)    4. Lupus vasculitis (HCC)    5. Mixed connective tissue disease (HCC)    6. Current chronic use of systemic steroids        Risks, benefits, and alternatives of current treatment plan discussed in detail.  Questions and concerns addressed. Red flags to RTC or ED reviewed.  Patient (or parent) agrees to plan.      NOTE TO PATIENT: The 21st Century Cures Act makes clinical notes like these available to patients in the interest of transparency. Clinical notes are medical documents used by physicians and care providers to communicate with each other. These documents include medical language and terminology, abbreviations, and treatment information that may sound technical and at  times possibly unfamiliar. In addition, at times, the verbiage may appear blunt or direct. These documents are one tool providers use to communicate relevant information and clinical opinions of the care providers in a way that allows common understanding of the clinical context.   I [id number] with the patient. This time included:    preparing to see the patient (eg, review notes and recent diagnostics),  seeing the patient, obtaining and/or reviewing separately obtained history, performing a medically appropriate examination and/or evaluation, counseling and educating the patient, documenting in the record,cleansing with debrimit  DISCHARGE INSTRUCTIONS     Patient Instructions   Please return:  1 week    Patient discharge and wound care instructions  Raymond Hayward Pallavi  9/3/2024            You may shower with protection of the wound (ie a cast cover or similar).     Changing your dressing:           Wash your hands with soap and water.  Ensure that the old dressing is removed completely. Place it in a plastic bag and throw it in the trash.  Cleanse the wound with hypochlorous wound cleanser (ie. Anasept, vashe, pure and clean) .  It's ok to “scrub” your wound with the gauze, small amount of bleeding with cleansing is normal and ok.  Apply the following dressings:  betamethasone to periwound bilaterally     Right: gina>folded xeroform>abd pad>20-30mmhg VERY light wrap  Left:  gina>folded xeroform>abd pad>20-30mmhg VERY light wrap  Left (lateral):gina>folded  xeroform>abd pad  Protect anterior tibial/ankle prominence    Nutrition and blood sugar control:  Focus on the following:  Protein: Meats, beans, eggs, milk and yogurt particularly Greek yogurt), tofu, soy nuts, soy protein products (Follow the protein handout in your welcome folder)  Vitamin C: Citrus fruits and juices, strawberries, tomatoes, tomato juice, peppers, baked potatoes, spinach, broccoli, cauliflower, [place] sprouts, cabbage  Vitamin  A: Dark green, leafy vegetables, orange or yellow vegetables, cantaloupe, fortified dairy products, liver, fortified cereals  Zinc: Fortified cereals, red meats, seafood  Consider supplementing with Justin by [employer]. It can be purchased on amazon, [employer] website, or local pharmacy may be able to order it for you.  (These are essential branch chain amino acids that help with tissue building and wound healing).     Concerns:  Signs of infection may include the following:  Increase in redness  Red \"streaks\" from wound  Increase in swelling  Fever  Unusual odor  Change in the amount of wound drainage     Should you experience any significant changes in your wound(s) or have any questions regarding your home care instructions please contact the wound center [facility] @ [phone] If after regular business hours, please call your family doctor or local emergency room. The treatment plan has been discussed at length between you and your provider. Follow all instructions carefully, it is very important. If you do not follow all instructions you are at risk of your wound not healing, infection, possible loss of limb and even loss of life.        Martina Mckeon FNP-C, CWCN-AP, CFCN, CSWS, WCC, DWC  9/3/2024

## 2024-09-03 ENCOUNTER — OFFICE VISIT (OUTPATIENT)
Dept: WOUND CARE | Facility: HOSPITAL | Age: 49
End: 2024-09-03
Attending: NURSE PRACTITIONER
Payer: COMMERCIAL

## 2024-09-03 ENCOUNTER — HOSPITAL ENCOUNTER (OUTPATIENT)
Dept: LAB | Facility: HOSPITAL | Age: 49
Discharge: HOME | End: 2024-09-03
Attending: INTERNAL MEDICINE
Payer: COMMERCIAL

## 2024-09-03 VITALS
RESPIRATION RATE: 14 BRPM | TEMPERATURE: 98 F | HEART RATE: 74 BPM | SYSTOLIC BLOOD PRESSURE: 101 MMHG | DIASTOLIC BLOOD PRESSURE: 65 MMHG

## 2024-09-03 DIAGNOSIS — Z79.899 HIGH RISK MEDICATION USE: ICD-10-CM

## 2024-09-03 DIAGNOSIS — I77.89 LUPUS VASCULITIS (HCC): ICD-10-CM

## 2024-09-03 DIAGNOSIS — L97.312 NON-PRESSURE CHRONIC ULCER OF RIGHT ANKLE WITH FAT LAYER EXPOSED (HCC): Primary | ICD-10-CM

## 2024-09-03 DIAGNOSIS — M35.1 MIXED CONNECTIVE TISSUE DISEASE (HCC): ICD-10-CM

## 2024-09-03 DIAGNOSIS — M32.19 LUPUS VASCULITIS (HCC): ICD-10-CM

## 2024-09-03 DIAGNOSIS — I87.333 IDIOPATHIC CHRONIC VENOUS HYPERTENSION OF BOTH LOWER EXTREMITIES WITH ULCER AND INFLAMMATION (HCC): ICD-10-CM

## 2024-09-03 DIAGNOSIS — Z79.52 CURRENT CHRONIC USE OF SYSTEMIC STEROIDS: ICD-10-CM

## 2024-09-03 DIAGNOSIS — L97.322 NON-PRESSURE CHRONIC ULCER OF LEFT ANKLE WITH FAT LAYER EXPOSED (HCC): ICD-10-CM

## 2024-09-03 LAB
ALBUMIN SERPL-MCNC: 4.9 G/DL (ref 3.2–4.8)
ALBUMIN/GLOB SERPL: 1.6 {RATIO} (ref 1–2)
ALP LIVER SERPL-CCNC: 100 U/L
ALT SERPL-CCNC: 8 U/L
ANION GAP SERPL CALC-SCNC: 3 MMOL/L (ref 0–18)
AST SERPL-CCNC: 16 U/L (ref ?–34)
BASOPHILS # BLD AUTO: 0.01 X10(3) UL (ref 0–0.2)
BASOPHILS NFR BLD AUTO: 0.2 %
BILIRUB SERPL-MCNC: 0.4 MG/DL (ref 0.3–1.2)
BUN BLD-MCNC: 9 MG/DL (ref 9–23)
CALCIUM BLD-MCNC: 10 MG/DL (ref 8.7–10.4)
CHLORIDE SERPL-SCNC: 106 MMOL/L (ref 98–112)
CO2 SERPL-SCNC: 31 MMOL/L (ref 21–32)
CREAT BLD-MCNC: 0.58 MG/DL
CRP SERPL-MCNC: 0.5 MG/DL (ref ?–0.5)
EGFRCR SERPLBLD CKD-EPI 2021: 112 ML/MIN/1.73M2 (ref 60–?)
EOSINOPHIL # BLD AUTO: 0.04 X10(3) UL (ref 0–0.7)
EOSINOPHIL NFR BLD AUTO: 0.9 %
ERYTHROCYTE [DISTWIDTH] IN BLOOD BY AUTOMATED COUNT: 13.2 %
ERYTHROCYTE [SEDIMENTATION RATE] IN BLOOD: 47 MM/HR
FASTING STATUS PATIENT QL REPORTED: NO
GLOBULIN PLAS-MCNC: 3.1 G/DL (ref 2–3.5)
GLUCOSE BLD-MCNC: 91 MG/DL (ref 70–99)
HCT VFR BLD AUTO: 41.1 %
HGB BLD-MCNC: 13.1 G/DL
IMM GRANULOCYTES # BLD AUTO: 0.01 X10(3) UL (ref 0–1)
IMM GRANULOCYTES NFR BLD: 0.2 %
LYMPHOCYTES # BLD AUTO: 0.61 X10(3) UL (ref 1–4)
LYMPHOCYTES NFR BLD AUTO: 13 %
MCH RBC QN AUTO: 28.5 PG (ref 26–34)
MCHC RBC AUTO-ENTMCNC: 31.9 G/DL (ref 31–37)
MCV RBC AUTO: 89.5 FL
MONOCYTES # BLD AUTO: 0.64 X10(3) UL (ref 0.1–1)
MONOCYTES NFR BLD AUTO: 13.6 %
NEUTROPHILS # BLD AUTO: 3.39 X10 (3) UL (ref 1.5–7.7)
NEUTROPHILS # BLD AUTO: 3.39 X10(3) UL (ref 1.5–7.7)
NEUTROPHILS NFR BLD AUTO: 72.1 %
OSMOLALITY SERPL CALC.SUM OF ELEC: 288 MOSM/KG (ref 275–295)
PLATELET # BLD AUTO: 194 10(3)UL (ref 150–450)
POTASSIUM SERPL-SCNC: 3.7 MMOL/L (ref 3.5–5.1)
PROT SERPL-MCNC: 8 G/DL (ref 5.7–8.2)
RBC # BLD AUTO: 4.59 X10(6)UL
SODIUM SERPL-SCNC: 140 MMOL/L (ref 136–145)
WBC # BLD AUTO: 4.7 X10(3) UL (ref 4–11)

## 2024-09-03 PROCEDURE — 85025 COMPLETE CBC W/AUTO DIFF WBC: CPT

## 2024-09-03 PROCEDURE — 86140 C-REACTIVE PROTEIN: CPT

## 2024-09-03 PROCEDURE — 36415 COLL VENOUS BLD VENIPUNCTURE: CPT

## 2024-09-03 PROCEDURE — 85652 RBC SED RATE AUTOMATED: CPT

## 2024-09-03 PROCEDURE — 99214 OFFICE O/P EST MOD 30 MIN: CPT | Performed by: NURSE PRACTITIONER

## 2024-09-03 PROCEDURE — 80053 COMPREHEN METABOLIC PANEL: CPT

## 2024-09-03 NOTE — PROGRESS NOTES
.Weekly Wound Education Note    Teaching Provided To: Patient;Family  Training Topics: Dressing;Edema control;Cleasing and general instructions;Discharge instructions;Compression  Training Method: Explain/Verbal;Written  Training Response: Patient responds and understands        Notes: Wounds stable. Dressing changed to gina, folded xeroform, ABD pad, and calamine unna boot 20-30mmHg wrapped lightly. Betamethsone applied to periwound.

## 2024-09-03 NOTE — PATIENT INSTRUCTIONS
Please return:  1 week    Patient discharge and wound care instructions  Raymond Olivo  9/3/2024            You may shower with protection of the wound (ie a cast cover or similar).     Changing your dressing:           Wash your hands with soap and water.  Ensure that the old dressing is removed completely. Place it in a plastic bag and throw it in the trash.  Cleanse the wound with hypochlorous wound cleanser (ie. Anasept, vashe, pure and clean) .  It's ok to “scrub” your wound with the gauze, small amount of bleeding with cleansing is normal and ok.  Apply the following dressings:  betamethasone to periwound bilaterally     Right: gina>folded xeroform>abd pad>20-30mmhg VERY light wrap  Left:  gina>folded xeroform>abd pad>20-30mmhg VERY light wrap  Left (lateral):gina>folded  xeroform>abd pad  Protect anterior tibial/ankle prominence    Nutrition and blood sugar control:  Focus on the following:  Protein: Meats, beans, eggs, milk and yogurt particularly Greek yogurt), tofu, soy nuts, soy protein products (Follow the protein handout in your welcome folder)  Vitamin C: Citrus fruits and juices, strawberries, tomatoes, tomato juice, peppers, baked potatoes, spinach, broccoli, cauliflower, [place] sprouts, cabbage  Vitamin A: Dark green, leafy vegetables, orange or yellow vegetables, cantaloupe, fortified dairy products, liver, fortified cereals  Zinc: Fortified cereals, red meats, seafood  Consider supplementing with Justin by [employer]. It can be purchased on amazon, [employer] website, or local pharmacy may be able to order it for you.  (These are essential branch chain amino acids that help with tissue building and wound healing).     Concerns:  Signs of infection may include the following:  Increase in redness  Red \"streaks\" from wound  Increase in swelling  Fever  Unusual odor  Change in the amount of wound drainage     Should you experience any significant changes in your wound(s) or have any  questions regarding your home care instructions please contact the wound center [facility] @ [phone] If after regular business hours, please call your family doctor or local emergency room. The treatment plan has been discussed at length between you and your provider. Follow all instructions carefully, it is very important. If you do not follow all instructions you are at risk of your wound not healing, infection, possible loss of limb and even loss of life.

## 2024-09-09 NOTE — PROGRESS NOTES
CHIEF COMPLAINT:     Chief Complaint   Patient presents with    Wound Care     Patient is here for a follow up visit for wounds to the left ankle and right ankle.     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 Patient saw dr. Santamaria (vascular) in December 2023 and he documented \"I explained to the patient and her  that I do not believe the source of her pain to be vascular in origin.  She has an easily palpable dorsalis pedis pulse and the location of the pain is more suggestive of a neuropathic origin.  I suspect perhaps that given the tightness of her feet from her mixed connective tissue disorder the compression stockings is causing somewhat compression of the nerves in that area and I have encouraged her to substitute those stockings to those that involve the feet up to the upper calf.\" Patient/spouse have been in contact with dr. Farmer and in march 2024 reported a dark spot/ulcer on her bilateral lower legs, she was advised to make an appointment with wound care as well as get her blood work done ordered in January 2024 so that further treatment plan could be extablished.  The blood work did show increased inflammation and she was advised to increase her prednisone to 20mg x 5 days (which spouse reports they did do) and take the amlodipine 2.5 mg-which she states she is doing.  They have been leaving the areas open to air and she has not been wearing compression.  She c/o burning sensation in her ankle area.  She was started on gabapentin by dr. Farmer last year, however they stopped it because it was making her too sleepy. We discussed restarting the gabapentin, but only take a noc first.  Both wounds are dried eschar.  Will utilize hydrogel with lidocaine to start debriding off/softening the  eschar. I have also ordered santyl. Patient placed into spandagrips for compression    HPI PRIOR TO SEPTEMBER 2024 SEE INDIVIDUAL PROGRESS NOTES  8-20-24 patient returns.  She is down to 2.5 mg Prednisone dose without negative effect with her symptoms, last week the wounds and periwounds were quite dry.  We utilizing a small amount of honey over the nonviable areas and covered with xeroform.  Also applied topical steroid.  Today all wounds are improved, the periwounds are also improved, the wounds are primarily granular, no s/s of infection, patient continues with burning pain.    8-27-24 patient returns.  Patient's wounds and periwound were improved last week, we continued with the xeroform and topical steroid. Pain is more achy today, less burning unles the wounds are being manipulated.   Today, all wounds are again improved.  Debrimit utilized with vashe to limb and wounds. No s/s of infection. Will continue with clobetesol and xeroform    9-3-24 patient returns. We have been cleansing wounds and periwounds weekly with debrimit/vashe and dressing with topical steroid and xeroform with consistent improvement over the last 3 weeks (even with a reduction in her po steroids). Pain is improving. Wounds are improving, with the debrimit cleansing the wounds are now clean enough to utilize gina collagen.  Patient reminded to get labs done for dr farmer (standing orders are in the system).  No s/s of infection.    9-10-24 patient returns.   She did get her labs drawn last week and she has an appointment with Dr. Farmer on 9-17-24. Her esr is up slightly from July however I suspect this may be related to the decrease in her po steroid.   Last week we added gina and continued with the xeroform.  We also utilize the debrisoft to cleanse the wounds and periwounds.  Today, wounds are improved, but the gina appears to just have stayed in the wound bed and did not integrate, therefore will not utilize gina this week. Her  pain remains mostly on the lateral ankle.  We no longer have the accumulating exudate, overall her skin is much improved and more healthy in appearance.  No s/s of infection.  MEDICATIONS:     Current Outpatient Medications:     gabapentin 100 MG Oral Cap, Take 100mg in am and 200mg at bedtime., Disp: , Rfl:     FLUoxetine 10 MG Oral Cap, Take 1 capsule (10 mg total) by mouth at bedtime., Disp: 90 capsule, Rfl: 0    predniSONE 2.5 MG Oral Tab, Take 1 tablet (2.5 mg total) by mouth daily., Disp: 90 tablet, Rfl: 0    mycophenolate mofetil 250 MG Oral Cap, Take 1 capsule (250 mg total) by mouth at bedtime., Disp: 90 capsule, Rfl: 0    amLODIPine 2.5 MG Oral Tab, Take 1 tablet (2.5 mg total) by mouth 2 (two) times daily., Disp: 180 tablet, Rfl: 0    hydroxychloroquine 200 MG Oral Tab, Take 1 tablet (200 mg total) by mouth daily., Disp: 90 tablet, Rfl: 0    Ferrous Sulfate 325 (65 Fe) MG Oral Tab, Take 1 tablet (325 mg total) by mouth 2 (two) times daily with meals., Disp: 180 tablet, Rfl: 0    Mycophenolate Mofetil 500 MG Oral Tab, Take 1 tablet (500 mg total) by mouth daily., Disp: 90 tablet, Rfl: 0  ALLERGIES:   No Known Allergies   REVIEW OF SYSTEMS:   This information was obtained from the patient/family and chart.    See HPI for pertinent positives, otherwise 10 pt ROS negative.    HISTORY:   Past medical, surgical, family and social history updated where appropriate.    PHYSICAL EXAM:     Vitals:    09/10/24 0700   BP: 97/66   Pulse: 74   Resp: 16   Temp: 97.9 °F (36.6 °C)           Estimated body mass index is 20.94 kg/m² as calculated from the following:    Height as of 7/23/24: 64\".    Weight as of 7/23/24: 122 lb (55.3 kg).   No results found for: \"PGLU\"    Vital signs reviewed.Appears stated age, well groomed.    Constitutional:  Bp wnl for patient. Pulse Regular and wnl for patient. Respirations easy and unlabored. Temperature wnl. Weight normal for height. Appearance neat and clean. Appears in no acute  distress. Well nourished and well developed.    Lower extremity:  dp/pt palpable bilaterally. Extremities are free of varicosities and edema, they are scarred, scattered changes in pigmentation (hypo/hyper) with atrophie jay. Capillary refill < 3 seconds. Digits are warm. toenails are wnl for color, thickness and hygeine. Skin hydration wnl. + hairgrowth on legs.    Musculoskeletal:  Gait and station stable   Integumentary:  refer to wound characteristics and images   Psychiatric:  Judgment and insight intact. Alert and oriented times 3. No evidence of depression, anxiety, or agitation. Calm, cooperative, and communicative, but very quiet and reserved.  EDEMA:   Calf  Point of Measurement - Left Calf: 31  Point of Measurement - Right Calf: 31  Left Calf from:: Heel  Calf Left cm:: 27.3  Right Calf from:: Heel  Right Calf cm:: 27.6  Ankle  Point of Measurement - Left Ankle: 11  Point of Measurement - Right Ankle: 11  Left Ankle from:: Heel  Left Ankle cm:: 16.8     Right Ankle from:: Heel  Right Ankle cm:: 16     DIAGNOSTICS:     Lab Results   Component Value Date    BUN 9 09/03/2024    CREATSERUM 0.58 09/03/2024    GFRCKDEPI 121.37 12/17/2020    ALB 4.9 (H) 09/03/2024    TP 8.0 09/03/2024    A1C 5.1 12/17/2020     Lab Results   Component Value Date    ESRML 47 (H) 09/03/2024    ESRML 39 (H) 07/23/2024    ESRML 54 (H) 03/18/2024      Lab Results   Component Value Date    CRP 0.50 09/03/2024    CRP <0.40 07/23/2024    CRP 0.37 (H) 03/18/2024 11-28-23 arterial duplex-dr hilliard  FINDINGS:    LEFT EXTREMITY:  Triphasic waveforms in the left common femoral, profunda, superficial femoral arteries.  Biphasic waveforms below the left knee.   OTHER:  None.     PEAK VELOCITIES (CM/S):   LEFT COMMON FEMORAL:      165    LEFT PROFUNDA FEMORAL:      78      LEFT SUPERFICIAL FEMORAL PROX:    113   LEFT SUPERFICIAL FEMORAL MID:      94   LEFT SUPERFICIAL FEMORAL DISTAL:    92   LEFT POPLITEAL PROX:      110   LEFT  POPLITEAL DISTAL:      121   TIBIOPER TRUNK:      66   LEFT PTA PROX:      60   LEFT PTA MID:      62   LEFT PTA DISTAL:      73   LEFT BETH PROX:      82   LEFT BETH MID:      72    LEFT DORSALIS PEDIS::      30   LEFT GREAT TOE PRESSURE:      25 mmHg   CONCLUSION:    1. No high-grade stenosis is identified.  There is suggestion of diffuse mild stenosis throughout most of the left lower extremity.    2. Overall diminished left toe pressure is noted.  Possibility of decreased perfusion in the distal left foot is of consideration.     7-14-20 venous reflux study-dr santamaria  Per Dr. Santamaria review \"no intervention needed\"  WOUND ASSESSMENT:     Wound 04/26/24 #1 Ankle Left;Medial (Active)   Date First Assessed/Time First Assessed: 04/26/24 1031    Wound Number (Wound Clinic Only): #1  Primary Wound Type: (c) Auto-immune  Location: Ankle  Wound Location Orientation: Left;Medial      Assessments 4/26/2024 10:34 AM 9/10/2024 10:23 AM   Wound Image       Drainage Amount Unable to assess Scant   Drainage Description -- Serosanguineous   Treatments Compression --   Wound Length (cm) 3.5 cm 0.5 cm   Wound Width (cm) 4.6 cm 0.4 cm   Wound Surface Area (cm^2) 16.1 cm^2 0.2 cm^2   Wound Depth (cm) 0.1 cm 0.1 cm   Wound Volume (cm^3) 1.61 cm^3 0.02 cm^3   Wound Healing % -- 99   Margins Undefined edges Well-defined edges   Non-staged Wound Description Full thickness Full thickness   Soni-wound Assessment Dry;Edema;Atrophy jay Hemosiderin staining   Wound Granulation Tissue -- Pink;Firm   Wound Bed Granulation (%) -- 10 %   Wound Bed Slough (%) -- 90 %   Wound Odor None None   Shape 100% scabbed --   Tunneling? -- No   Undermining? -- No   Sinus Tracts? -- No       No associated orders.       Wound 04/26/24 #2 Ankle Right;Lateral (Active)   Date First Assessed/Time First Assessed: 04/26/24 1031    Wound Number (Wound Clinic Only): #2  Primary Wound Type: (c) Auto-immune  Location: Ankle  Wound Location Orientation: Right;Lateral       Assessments 4/26/2024 10:34 AM 9/10/2024 10:19 AM   Wound Image        Drainage Amount Unable to assess Small   Drainage Description -- Serosanguineous   Treatments Compression --   Wound Length (cm) 0.5 cm 0.9 cm   Wound Width (cm) 0.7 cm 0.9 cm   Wound Surface Area (cm^2) 0.35 cm^2 0.81 cm^2   Wound Depth (cm) 0.1 cm 0.1 cm   Wound Volume (cm^3) 0.035 cm^3 0.081 cm^3   Wound Healing % -- -131   Margins Well-defined edges Well-defined edges   Non-staged Wound Description Full thickness Full thickness   Soni-wound Assessment Dry;Edema;Atrophy jay Hemosiderin staining   Wound Granulation Tissue -- Pink;Firm   Wound Bed Granulation (%) -- 20 %   Wound Bed Slough (%) -- 80 %   Wound Odor None None   Shape 100% scabbed --   Tunneling? -- No   Undermining? -- No   Sinus Tracts? -- No       Inactive Orders   Date Order Priority Status Authorizing Provider   06/19/24 1145 Debridement Auto-immune Right;Lateral Ankle Routine Completed Kaz Tapia MD       Compression Wrap 05/21/24 Ankle Anterior;Left (Active)   Placement Date/Time: 05/21/24 1019   Location: Ankle  Wound Location Orientation: Anterior;Left      Assessments 5/21/2024  9:36 AM 9/3/2024 10:26 AM   Response to Treatment Well tolerated Well tolerated   Compression Layers Multilayer Multilayer   Compression Product Type Unna Boot Unna Boot   Dressing Applied Yes Yes   Compression Wrap Location Toes to Knee Toes to Knee   Compression Wrap Status Clean;Dry;Intact Clean;Dry       No associated orders.       Compression Wrap 05/28/24 Ankle Anterior;Right (Active)   Placement Date/Time: 05/28/24 1012   Location: Ankle  Wound Location Orientation: Anterior;Right      Assessments 5/28/2024  9:47 AM 9/3/2024 10:26 AM   Response to Treatment Well tolerated Well tolerated   Compression Layers Multilayer Multilayer   Compression Product Type Unna Boot Unna Boot   Dressing Applied Yes Yes   Compression Wrap Location Toes to Knee Toes to Knee   Compression Wrap  Status Dry;Intact;Clean Clean;Dry       No associated orders.       Wound 06/25/24 #3 Ankle Left;Lateral (Active)   Date First Assessed/Time First Assessed: 06/25/24 0959    Wound Number (Wound Clinic Only): #3  Primary Wound Type: Auto-immune  Location: Ankle  Wound Location Orientation: Left;Lateral      Assessments 6/25/2024 10:03 AM 9/10/2024 10:21 AM   Wound Image        Drainage Amount Scant None   Drainage Description Serosanguineous Serosanguineous   Treatments Compression --   Wound Length (cm) 0.9 cm 0.1 cm   Wound Width (cm) 0.8 cm 0.1 cm   Wound Surface Area (cm^2) 0.72 cm^2 0.01 cm^2   Wound Depth (cm) 0.1 cm 0 cm   Wound Volume (cm^3) 0.072 cm^3 0 cm^3   Wound Healing % -- 100   Margins Well-defined edges Well-defined edges   Non-staged Wound Description Full thickness Full thickness   Soni-wound Assessment Blanchable erythema;Dry;Edema;Moist --   Wound Granulation Tissue Pink;Pale Grey;Firm --   Wound Bed Granulation (%) 50 % --   Wound Bed Slough (%) 50 % --   Wound Odor None None   Shape -- 100 scab       No associated orders.       Wound 07/23/24 #4 Leg Left;Lateral (Active)   Date First Assessed/Time First Assessed: 07/23/24 1137    Wound Number (Wound Clinic Only): #4  Primary Wound Type: (c) Other (comment)  Location: Leg  Wound Location Orientation: Left;Lateral      Assessments 7/23/2024 11:46 AM 9/10/2024 10:24 AM   Wound Image       Drainage Amount Unable to assess Scant   Drainage Description -- Serosanguineous   Treatments Compression --   Wound Length (cm) 2.3 cm 1.2 cm   Wound Width (cm) 1.3 cm 0.6 cm   Wound Surface Area (cm^2) 2.99 cm^2 0.72 cm^2   Wound Depth (cm) 0.1 cm 0.1 cm   Wound Volume (cm^3) 0.299 cm^3 0.072 cm^3   Wound Healing % -- 76   Margins Well-defined edges Well-defined edges   Non-staged Wound Description Full thickness Full thickness   Soni-wound Assessment Clean;Dry;Other (Comment) Dry;Hemosiderin staining   Wound Granulation Tissue Pale Grey;Pink;Firm --   Wound  Bed Granulation (%) 45 % --   Wound Bed Epithelium (%) 10 % 40 %   Wound Bed Slough (%) 45 % 60 %   Wound Odor None None   Shape -- bridged   Tunneling? -- No   Undermining? -- No   Sinus Tracts? -- No       No associated orders.          ASSESSMENT AND PLAN:    1. 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 Non-pressure chronic ulcer of left ankle with fat layer exposed (HCC)    3. Idiopathic chronic venous hypertension of both lower extremities with ulcer and inflammation (HCC)    4. Lupus vasculitis (HCC)    5. Mixed connective tissue disease (HCC)    6. Current chronic use of systemic steroids          Risks, benefits, and alternatives of current treatment plan discussed in detail.  Questions and concerns addressed. Red flags to RTC or ED reviewed.  Patient (or parent) agrees to plan.      NOTE TO PATIENT: The 21st Century Cures Act makes clinical notes like these available to patients in the interest of transparency. Clinical notes are medical documents used by physicians and care providers to communicate with each other. These documents include medical language and terminology, abbreviations, and treatment information that may sound technical and at times possibly unfamiliar. In addition, at times, the verbiage may appear blunt or direct. These documents are one tool providers use to communicate relevant information and clinical opinions of the care providers in a way that allows common understanding of the clinical context.   I [id number] with the patient. This time included:    preparing to see the patient (eg, review notes and recent diagnostics),  seeing the patient, obtaining and/or reviewing separately obtained history, performing a medically appropriate examination and/or evaluation, counseling and educating the patient, documenting in the record,\  DISCHARGE INSTRUCTIONS     Patient Instructions   Please return:  1 week     Patient discharge and wound care  instructions  Raymond Diamondammrocco Olivo  9/10/2024               You may shower with protection of the wound (ie a cast cover or similar).     Changing your dressing:           Wash your hands with soap and water.  Ensure that the old dressing is removed completely. Place it in a plastic bag and throw it in the trash.  Cleanse the wound with hypochlorous wound cleanser (ie. Anasept, vashe, pure and clean) .  It's ok to “scrub” your wound with the gauze, small amount of bleeding with cleansing is normal and ok.  Apply the following dressings:  betamethasone to periwound bilaterally     Right: folded xeroform>abd pad>20-30mmhg VERY light wrap  Left:  folded xeroform>abd pad>20-30mmhg VERY light wrap  Left (lateral):folded  xeroform>abd pad  Protect anterior tibial/ankle prominence    Nutrition and blood sugar control:  Focus on the following:  Protein: Meats, beans, eggs, milk and yogurt particularly Greek yogurt), tofu, soy nuts, soy protein products (Follow the protein handout in your welcome folder)  Vitamin C: Citrus fruits and juices, strawberries, tomatoes, tomato juice, peppers, baked potatoes, spinach, broccoli, cauliflower, [place] sprouts, cabbage  Vitamin A: Dark green, leafy vegetables, orange or yellow vegetables, cantaloupe, fortified dairy products, liver, fortified cereals  Zinc: Fortified cereals, red meats, seafood  Consider supplementing with Justin by [employer]. It can be purchased on amazon, [employer] website, or local pharmacy may be able to order it for you.  (These are essential branch chain amino acids that help with tissue building and wound healing).     Concerns:  Signs of infection may include the following:  Increase in redness  Red \"streaks\" from wound  Increase in swelling  Fever  Unusual odor  Change in the amount of wound drainage     Should you experience any significant changes in your wound(s) or have any questions regarding your home care instructions please contact the wound center  [facility] @ [phone] If after regular business hours, please call your family doctor or local emergency room. The treatment plan has been discussed at length between you and your provider. Follow all instructions carefully, it is very important. If you do not follow all instructions you are at risk of your wound not healing, infection, possible loss of limb and even loss of life.        Martina Mckeon FNP-C, CWCN-AP, CFCN, CSWS, WCC, DWC  9/10/2024

## 2024-09-10 ENCOUNTER — OFFICE VISIT (OUTPATIENT)
Dept: WOUND CARE | Facility: HOSPITAL | Age: 49
End: 2024-09-10
Attending: NURSE PRACTITIONER
Payer: COMMERCIAL

## 2024-09-10 VITALS
RESPIRATION RATE: 16 BRPM | DIASTOLIC BLOOD PRESSURE: 66 MMHG | HEART RATE: 74 BPM | TEMPERATURE: 98 F | SYSTOLIC BLOOD PRESSURE: 97 MMHG

## 2024-09-10 DIAGNOSIS — I77.89 LUPUS VASCULITIS (HCC): ICD-10-CM

## 2024-09-10 DIAGNOSIS — M32.19 LUPUS VASCULITIS (HCC): ICD-10-CM

## 2024-09-10 DIAGNOSIS — M35.1 MIXED CONNECTIVE TISSUE DISEASE (HCC): ICD-10-CM

## 2024-09-10 DIAGNOSIS — L97.322 NON-PRESSURE CHRONIC ULCER OF LEFT ANKLE WITH FAT LAYER EXPOSED (HCC): ICD-10-CM

## 2024-09-10 DIAGNOSIS — L97.312 NON-PRESSURE CHRONIC ULCER OF RIGHT ANKLE WITH FAT LAYER EXPOSED (HCC): Primary | ICD-10-CM

## 2024-09-10 DIAGNOSIS — I87.333 IDIOPATHIC CHRONIC VENOUS HYPERTENSION OF BOTH LOWER EXTREMITIES WITH ULCER AND INFLAMMATION (HCC): ICD-10-CM

## 2024-09-10 DIAGNOSIS — Z79.52 CURRENT CHRONIC USE OF SYSTEMIC STEROIDS: ICD-10-CM

## 2024-09-10 PROCEDURE — 99213 OFFICE O/P EST LOW 20 MIN: CPT | Performed by: NURSE PRACTITIONER

## 2024-09-10 NOTE — PROGRESS NOTES
.Weekly Wound Education Note    Teaching Provided To: Patient;Family  Training Topics: Dressing;Edema control;Cleasing and general instructions;Compression;Discharge instructions  Training Method: Explain/Verbal;Written  Training Response: Patient responds and understands        Notes: Wounds improving. Continue betamethasone to periwound, folded xeroform, and lightly wrapped calamine unna boot 20-30mmHg to all wounds. ABD pads to anterior ankle for padding.

## 2024-09-10 NOTE — PATIENT INSTRUCTIONS
Please return:  1 week     Patient discharge and wound care instructions  Raymond Martinezu  9/10/2024               You may shower with protection of the wound (ie a cast cover or similar).     Changing your dressing:           Wash your hands with soap and water.  Ensure that the old dressing is removed completely. Place it in a plastic bag and throw it in the trash.  Cleanse the wound with hypochlorous wound cleanser (ie. Anasept, vashe, pure and clean) .  It's ok to “scrub” your wound with the gauze, small amount of bleeding with cleansing is normal and ok.  Apply the following dressings:  betamethasone to periwound bilaterally     Right: folded xeroform>abd pad>20-30mmhg VERY light wrap  Left:  folded xeroform>abd pad>20-30mmhg VERY light wrap  Left (lateral):folded  xeroform>abd pad  Protect anterior tibial/ankle prominence    Nutrition and blood sugar control:  Focus on the following:  Protein: Meats, beans, eggs, milk and yogurt particularly Greek yogurt), tofu, soy nuts, soy protein products (Follow the protein handout in your welcome folder)  Vitamin C: Citrus fruits and juices, strawberries, tomatoes, tomato juice, peppers, baked potatoes, spinach, broccoli, cauliflower, [place] sprouts, cabbage  Vitamin A: Dark green, leafy vegetables, orange or yellow vegetables, cantaloupe, fortified dairy products, liver, fortified cereals  Zinc: Fortified cereals, red meats, seafood  Consider supplementing with Justin by [employer]. It can be purchased on amazon, [employer] website, or local pharmacy may be able to order it for you.  (These are essential branch chain amino acids that help with tissue building and wound healing).     Concerns:  Signs of infection may include the following:  Increase in redness  Red \"streaks\" from wound  Increase in swelling  Fever  Unusual odor  Change in the amount of wound drainage     Should you experience any significant changes in your wound(s) or have any questions regarding  your home care instructions please contact the wound center [facility] @ [phone] If after regular business hours, please call your family doctor or local emergency room. The treatment plan has been discussed at length between you and your provider. Follow all instructions carefully, it is very important. If you do not follow all instructions you are at risk of your wound not healing, infection, possible loss of limb and even loss of life.

## 2024-09-15 NOTE — PROGRESS NOTES
CHIEF COMPLAINT:     No chief complaint on file.    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 Patient saw dr. Santamaria (vascular) in December 2023 and he documented \"I explained to the patient and her  that I do not believe the source of her pain to be vascular in origin.  She has an easily palpable dorsalis pedis pulse and the location of the pain is more suggestive of a neuropathic origin.  I suspect perhaps that given the tightness of her feet from her mixed connective tissue disorder the compression stockings is causing somewhat compression of the nerves in that area and I have encouraged her to substitute those stockings to those that involve the feet up to the upper calf.\" Patient/spouse have been in contact with dr. Farmer and in march 2024 reported a dark spot/ulcer on her bilateral lower legs, she was advised to make an appointment with wound care as well as get her blood work done ordered in January 2024 so that further treatment plan could be extablished.  The blood work did show increased inflammation and she was advised to increase her prednisone to 20mg x 5 days (which spouse reports they did do) and take the amlodipine 2.5 mg-which she states she is doing.  They have been leaving the areas open to air and she has not been wearing compression.  She c/o burning sensation in her ankle area.  She was started on gabapentin by dr. Farmer last year, however they stopped it because it was making her too sleepy. We discussed restarting the gabapentin, but only take a noc first.  Both wounds are dried eschar.  Will utilize hydrogel with lidocaine to start debriding off/softening the eschar. I have also ordered santyl. Patient placed into spandagrips for compression    HPI PRIOR TO SEPTEMBER 2024 SEE  INDIVIDUAL PROGRESS NOTES  8-20-24 patient returns.  She is down to 2.5 mg Prednisone dose without negative effect with her symptoms, last week the wounds and periwounds were quite dry.  We utilizing a small amount of honey over the nonviable areas and covered with xeroform.  Also applied topical steroid.  Today all wounds are improved, the periwounds are also improved, the wounds are primarily granular, no s/s of infection, patient continues with burning pain.    8-27-24 patient returns.  Patient's wounds and periwound were improved last week, we continued with the xeroform and topical steroid. Pain is more achy today, less burning unles the wounds are being manipulated.   Today, all wounds are again improved.  Debrimit utilized with vashe to limb and wounds. No s/s of infection. Will continue with clobetesol and xeroform    9-3-24 patient returns. We have been cleansing wounds and periwounds weekly with debrimit/vashe and dressing with topical steroid and xeroform with consistent improvement over the last 3 weeks (even with a reduction in her po steroids). Pain is improving. Wounds are improving, with the debrimit cleansing the wounds are now clean enough to utilize gina collagen.  Patient reminded to get labs done for dr farmer (standing orders are in the system).  No s/s of infection.    9-10-24 patient returns.   She did get her labs drawn last week and she has an appointment with Dr. Farmer on 9-17-24. Her esr is up slightly from July however I suspect this may be related to the decrease in her po steroid.   Last week we added gina and continued with the xeroform.  We also utilize the debrisoft to cleanse the wounds and periwounds.  Today, wounds are improved, but the gina appears to just have stayed in the wound bed and did not integrate, therefore will not utilize gina this week. Her pain remains mostly on the lateral ankle.  We no longer have the accumulating exudate, overall her skin is much  improved and more healthy in appearance.  No s/s of infection.    9-17-24 patient returns.  She has an appointment with Dr. Farmer after this.  Wounds have been improving (albeit slowly) in size and wound bed characteristics. ***  MEDICATIONS:     Current Outpatient Medications:     gabapentin 100 MG Oral Cap, Take 100mg in am and 200mg at bedtime., Disp: , Rfl:     FLUoxetine 10 MG Oral Cap, Take 1 capsule (10 mg total) by mouth at bedtime., Disp: 90 capsule, Rfl: 0    predniSONE 2.5 MG Oral Tab, Take 1 tablet (2.5 mg total) by mouth daily., Disp: 90 tablet, Rfl: 0    mycophenolate mofetil 250 MG Oral Cap, Take 1 capsule (250 mg total) by mouth at bedtime., Disp: 90 capsule, Rfl: 0    amLODIPine 2.5 MG Oral Tab, Take 1 tablet (2.5 mg total) by mouth 2 (two) times daily., Disp: 180 tablet, Rfl: 0    hydroxychloroquine 200 MG Oral Tab, Take 1 tablet (200 mg total) by mouth daily., Disp: 90 tablet, Rfl: 0    Ferrous Sulfate 325 (65 Fe) MG Oral Tab, Take 1 tablet (325 mg total) by mouth 2 (two) times daily with meals., Disp: 180 tablet, Rfl: 0    Mycophenolate Mofetil 500 MG Oral Tab, Take 1 tablet (500 mg total) by mouth daily., Disp: 90 tablet, Rfl: 0  ALLERGIES:   No Known Allergies   REVIEW OF SYSTEMS:   This information was obtained from the patient/family and chart.    See HPI for pertinent positives, otherwise 10 pt ROS negative.    HISTORY:   Past medical, surgical, family and social history updated where appropriate.    PHYSICAL EXAM:     There were no vitals filed for this visit.          Estimated body mass index is 20.94 kg/m² as calculated from the following:    Height as of 7/23/24: 64\".    Weight as of 7/23/24: 122 lb (55.3 kg).   No results found for: \"PGLU\"    Vital signs reviewed.Appears stated age, well groomed.    Constitutional:  Bp ***wnl for patient. Pulse Regular and wnl for patient. Respirations easy and unlabored. Temperature wnl. Weight normal for height. Appearance neat and clean. Appears in  no acute distress. Well nourished and well developed.    Lower extremity:  dp/pt palpable*** bilaterally. Extremities are free of varicosities and edema, they are scarred, scattered changes in pigmentation (hypo/hyper) with atrophie jay. Capillary refill < 3 seconds. Digits are warm. toenails are wnl for color, thickness and hygeine. Skin hydration wnl. + hairgrowth on legs.    Musculoskeletal:  Gait and station stable   Integumentary:  refer to wound characteristics and images   Psychiatric:  Judgment and insight intact. Alert and oriented times 3. No evidence of depression, anxiety, or agitation. Calm, cooperative, and communicative, but very quiet and reserved.  EDEMA:   Calf                    Ankle                          DIAGNOSTICS:     Lab Results   Component Value Date    BUN 9 09/03/2024    CREATSERUM 0.58 09/03/2024    GFRCKDEPI 121.37 12/17/2020    ALB 4.9 (H) 09/03/2024    TP 8.0 09/03/2024    A1C 5.1 12/17/2020     Lab Results   Component Value Date    ESRML 47 (H) 09/03/2024    ESRML 39 (H) 07/23/2024    ESRML 54 (H) 03/18/2024      Lab Results   Component Value Date    CRP 0.50 09/03/2024    CRP <0.40 07/23/2024    CRP 0.37 (H) 03/18/2024 11-28-23 arterial duplex-dr hilliard  FINDINGS:    LEFT EXTREMITY:  Triphasic waveforms in the left common femoral, profunda, superficial femoral arteries.  Biphasic waveforms below the left knee.   OTHER:  None.     PEAK VELOCITIES (CM/S):   LEFT COMMON FEMORAL:      165    LEFT PROFUNDA FEMORAL:      78      LEFT SUPERFICIAL FEMORAL PROX:    113   LEFT SUPERFICIAL FEMORAL MID:      94   LEFT SUPERFICIAL FEMORAL DISTAL:    92   LEFT POPLITEAL PROX:      110   LEFT POPLITEAL DISTAL:      121   TIBIOPER TRUNK:      66   LEFT PTA PROX:      60   LEFT PTA MID:      62   LEFT PTA DISTAL:      73   LEFT BETH PROX:      82   LEFT BETH MID:      72    LEFT DORSALIS PEDIS::      30   LEFT GREAT TOE PRESSURE:      25 mmHg   CONCLUSION:    1. No high-grade stenosis is  identified.  There is suggestion of diffuse mild stenosis throughout most of the left lower extremity.    2. Overall diminished left toe pressure is noted.  Possibility of decreased perfusion in the distal left foot is of consideration.     7-14-20 venous reflux study-dr santamaria  Per Dr. Santamaria review \"no intervention needed\"  WOUND ASSESSMENT:     Wound 04/26/24 #1 Ankle Left;Medial (Active)   Date First Assessed/Time First Assessed: 04/26/24 1031    Wound Number (Wound Clinic Only): #1  Primary Wound Type: (c) Auto-immune  Location: Ankle  Wound Location Orientation: Left;Medial      Assessments 4/26/2024 10:34 AM 9/10/2024 10:23 AM   Wound Image       Drainage Amount Unable to assess Scant   Drainage Description -- Serosanguineous   Treatments Compression Compression   Dressing -- Xeroform   Wound Length (cm) 3.5 cm 0.5 cm   Wound Width (cm) 4.6 cm 0.4 cm   Wound Surface Area (cm^2) 16.1 cm^2 0.2 cm^2   Wound Depth (cm) 0.1 cm 0.1 cm   Wound Volume (cm^3) 1.61 cm^3 0.02 cm^3   Wound Healing % -- 99   Margins Undefined edges Well-defined edges   Non-staged Wound Description Full thickness Full thickness   Soni-wound Assessment Dry;Edema;Atrophy jay Hemosiderin staining   Wound Granulation Tissue -- Pink;Firm   Wound Bed Granulation (%) -- 10 %   Wound Bed Slough (%) -- 90 %   Wound Odor None None   Shape 100% scabbed --   Tunneling? -- No   Undermining? -- No   Sinus Tracts? -- No       No associated orders.       Wound 04/26/24 #2 Ankle Right;Lateral (Active)   Date First Assessed/Time First Assessed: 04/26/24 1031    Wound Number (Wound Clinic Only): #2  Primary Wound Type: (c) Auto-immune  Location: Ankle  Wound Location Orientation: Right;Lateral      Assessments 4/26/2024 10:34 AM 9/10/2024 10:19 AM   Wound Image        Drainage Amount Unable to assess Small   Drainage Description -- Serosanguineous   Treatments Compression Compression   Dressing -- Xeroform   Wound Length (cm) 0.5 cm 0.9 cm   Wound Width  (cm) 0.7 cm 0.9 cm   Wound Surface Area (cm^2) 0.35 cm^2 0.81 cm^2   Wound Depth (cm) 0.1 cm 0.1 cm   Wound Volume (cm^3) 0.035 cm^3 0.081 cm^3   Wound Healing % -- -131   Margins Well-defined edges Well-defined edges   Non-staged Wound Description Full thickness Full thickness   Soni-wound Assessment Dry;Edema;Atrophy jay Hemosiderin staining   Wound Granulation Tissue -- Pink;Firm   Wound Bed Granulation (%) -- 20 %   Wound Bed Slough (%) -- 80 %   Wound Odor None None   Shape 100% scabbed --   Tunneling? -- No   Undermining? -- No   Sinus Tracts? -- No       Inactive Orders   Date Order Priority Status Authorizing Provider   06/19/24 1145 Debridement Auto-immune Right;Lateral Ankle Routine Completed Kaz Tapia MD       Compression Wrap 05/21/24 Ankle Anterior;Left (Active)   Placement Date/Time: 05/21/24 1019   Location: Ankle  Wound Location Orientation: Anterior;Left      Assessments 5/21/2024  9:36 AM 9/10/2024 10:21 AM   Response to Treatment Well tolerated Well tolerated   Compression Layers Multilayer Multilayer   Compression Product Type Unna Boot Unna Boot   Dressing Applied Yes Yes   Compression Wrap Location Toes to Knee Toes to Knee   Compression Wrap Status Clean;Dry;Intact Clean;Dry       No associated orders.       Compression Wrap 05/28/24 Ankle Anterior;Right (Active)   Placement Date/Time: 05/28/24 1012   Location: Ankle  Wound Location Orientation: Anterior;Right      Assessments 5/28/2024  9:47 AM 9/10/2024 10:21 AM   Response to Treatment Well tolerated Well tolerated   Compression Layers Multilayer Multilayer   Compression Product Type Unna Boot Unna Boot   Dressing Applied Yes Yes   Compression Wrap Location Toes to Knee Toes to Knee   Compression Wrap Status Dry;Intact;Clean Clean;Dry       No associated orders.       Wound 06/25/24 #3 Ankle Left;Lateral (Active)   Date First Assessed/Time First Assessed: 06/25/24 0959    Wound Number (Wound Clinic Only): #3  Primary Wound  Type: Auto-immune  Location: Ankle  Wound Location Orientation: Left;Lateral      Assessments 6/25/2024 10:03 AM 9/10/2024 10:21 AM   Wound Image        Drainage Amount Scant None   Drainage Description Serosanguineous Serosanguineous   Treatments Compression Compression   Dressing -- Xeroform   Wound Length (cm) 0.9 cm 0.1 cm   Wound Width (cm) 0.8 cm 0.1 cm   Wound Surface Area (cm^2) 0.72 cm^2 0.01 cm^2   Wound Depth (cm) 0.1 cm 0 cm   Wound Volume (cm^3) 0.072 cm^3 0 cm^3   Wound Healing % -- 100   Margins Well-defined edges Well-defined edges   Non-staged Wound Description Full thickness Full thickness   Soni-wound Assessment Blanchable erythema;Dry;Edema;Moist --   Wound Granulation Tissue Pink;Pale Grey;Firm --   Wound Bed Granulation (%) 50 % --   Wound Bed Slough (%) 50 % --   Wound Odor None None   Shape -- 100 scab       No associated orders.       Wound 07/23/24 #4 Leg Left;Lateral (Active)   Date First Assessed/Time First Assessed: 07/23/24 1137    Wound Number (Wound Clinic Only): #4  Primary Wound Type: (c) Other (comment)  Location: Leg  Wound Location Orientation: Left;Lateral      Assessments 7/23/2024 11:46 AM 9/10/2024 10:24 AM   Wound Image       Drainage Amount Unable to assess Scant   Drainage Description -- Serosanguineous   Treatments Compression Compression   Dressing -- Xeroform   Wound Length (cm) 2.3 cm 1.2 cm   Wound Width (cm) 1.3 cm 0.6 cm   Wound Surface Area (cm^2) 2.99 cm^2 0.72 cm^2   Wound Depth (cm) 0.1 cm 0.1 cm   Wound Volume (cm^3) 0.299 cm^3 0.072 cm^3   Wound Healing % -- 76   Margins Well-defined edges Well-defined edges   Non-staged Wound Description Full thickness Full thickness   Soni-wound Assessment Clean;Dry;Other (Comment) Dry;Hemosiderin staining   Wound Granulation Tissue Pale Grey;Pink;Firm --   Wound Bed Granulation (%) 45 % --   Wound Bed Epithelium (%) 10 % 40 %   Wound Bed Slough (%) 45 % 60 %   Wound Odor None None   Shape -- bridged   Tunneling? -- No    Undermining? -- No   Sinus Tracts? -- No       No associated orders.          ASSESSMENT AND PLAN:    There are no diagnoses linked to this encounter.          Risks, benefits, and alternatives of current treatment plan discussed in detail.  Questions and concerns addressed. Red flags to RTC or ED reviewed.  Patient (or parent) agrees to plan.      NOTE TO PATIENT: The 21st Century Cures Act makes clinical notes like these available to patients in the interest of transparency. Clinical notes are medical documents used by physicians and care providers to communicate with each other. These documents include medical language and terminology, abbreviations, and treatment information that may sound technical and at times possibly unfamiliar. In addition, at times, the verbiage may appear blunt or direct. These documents are one tool providers use to communicate relevant information and clinical opinions of the care providers in a way that allows common understanding of the clinical context.   I spent***minutes with the patient. This time included:    preparing to see the patient (eg, review notes and recent diagnostics),  seeing the patient, obtaining and/or reviewing separately obtained history, performing a medically appropriate examination and/or evaluation, counseling and educating the patient, documenting in the record,  DISCHARGE INSTRUCTIONS     There are no Patient Instructions on file for this visit.   Martina Mckeon FNP-C, CWCN-AP, CFCN, CSWS, WCC, DWC  9/17/2024        instructions  Raymondha Allenrocco Olivo  9/17/2024                 You may shower with protection of the wound (ie a cast cover or similar).     Changing your dressing:           Wash your hands with soap and water.  Ensure that the old dressing is removed completely. Place it in a plastic bag and throw it in the trash.  Cleanse the wound with hypochlorous wound cleanser (ie. Anasept, vashe, pure and clean) .  It's ok to “scrub” your wound with the gauze, small amount of bleeding with cleansing is normal and ok.  Apply the following dressings:  betamethasone to periwound bilaterally     Right: folded xeroform>abd pad>20-30mmhg VERY light wrap  Left:  folded xeroform>abd pad>20-30mmhg VERY light wrap  Left (lateral):folded  xeroform>abd pad  Protect anterior tibial/ankle prominence    Nutrition and blood sugar control:  Focus on the following:  Protein: Meats, beans, eggs, milk and yogurt particularly Greek yogurt), tofu, soy nuts, soy protein products (Follow the protein handout in your welcome folder)  Vitamin C: Citrus fruits and juices, strawberries, tomatoes, tomato juice, peppers, baked potatoes, spinach, broccoli, cauliflower, [place] sprouts, cabbage  Vitamin A: Dark green, leafy vegetables, orange or yellow vegetables, cantaloupe, fortified dairy products, liver, fortified cereals  Zinc: Fortified cereals, red meats, seafood  Consider supplementing with Justin by [employer]. It can be purchased on amazon, [employer] website, or local pharmacy may be able to order it for you.  (These are essential branch chain amino acids that help with tissue building and wound healing).     Concerns:  Signs of infection may include the following:  Increase in redness  Red \"streaks\" from wound  Increase in swelling  Fever  Unusual odor  Change in the amount of wound drainage     Should you experience any significant changes in your wound(s) or have any questions regarding your home care instructions please contact the wound center  [facility] @ [phone] If after regular business hours, please call your family doctor or local emergency room. The treatment plan has been discussed at length between you and your provider. Follow all instructions carefully, it is very important. If you do not follow all instructions you are at risk of your wound not healing, infection, possible loss of limb and even loss of life.        Martina Mckeon FNP-C, CWCN-AP, CFCN, CSWS, WCC, DWC  9/17/2024

## 2024-09-17 ENCOUNTER — OFFICE VISIT (OUTPATIENT)
Dept: RHEUMATOLOGY | Facility: CLINIC | Age: 49
End: 2024-09-17
Payer: COMMERCIAL

## 2024-09-17 ENCOUNTER — APPOINTMENT (OUTPATIENT)
Dept: WOUND CARE | Facility: HOSPITAL | Age: 49
End: 2024-09-17
Attending: NURSE PRACTITIONER
Payer: COMMERCIAL

## 2024-09-17 VITALS
SYSTOLIC BLOOD PRESSURE: 98 MMHG | HEART RATE: 84 BPM | DIASTOLIC BLOOD PRESSURE: 67 MMHG | TEMPERATURE: 98 F | RESPIRATION RATE: 14 BRPM

## 2024-09-17 VITALS
HEIGHT: 62 IN | BODY MASS INDEX: 22.63 KG/M2 | SYSTOLIC BLOOD PRESSURE: 98 MMHG | RESPIRATION RATE: 16 BRPM | OXYGEN SATURATION: 97 % | WEIGHT: 123 LBS | HEART RATE: 56 BPM | TEMPERATURE: 97 F | DIASTOLIC BLOOD PRESSURE: 52 MMHG

## 2024-09-17 DIAGNOSIS — I77.89 LUPUS VASCULITIS (HCC): ICD-10-CM

## 2024-09-17 DIAGNOSIS — I87.333 IDIOPATHIC CHRONIC VENOUS HYPERTENSION OF BOTH LOWER EXTREMITIES WITH ULCER AND INFLAMMATION (HCC): ICD-10-CM

## 2024-09-17 DIAGNOSIS — L97.322 NON-PRESSURE CHRONIC ULCER OF LEFT ANKLE WITH FAT LAYER EXPOSED (HCC): ICD-10-CM

## 2024-09-17 DIAGNOSIS — Z79.899 LONG-TERM USE OF PLAQUENIL: ICD-10-CM

## 2024-09-17 DIAGNOSIS — M32.19 LUPUS VASCULITIS (HCC): ICD-10-CM

## 2024-09-17 DIAGNOSIS — Z86.2 HISTORY OF ITP: ICD-10-CM

## 2024-09-17 DIAGNOSIS — M35.1 MIXED CONNECTIVE TISSUE DISEASE (HCC): Primary | ICD-10-CM

## 2024-09-17 DIAGNOSIS — I73.01 RAYNAUD'S DISEASE WITH GANGRENE (HCC): ICD-10-CM

## 2024-09-17 DIAGNOSIS — Z79.52 CURRENT CHRONIC USE OF SYSTEMIC STEROIDS: ICD-10-CM

## 2024-09-17 DIAGNOSIS — Z79.52 LONG TERM (CURRENT) USE OF SYSTEMIC STEROIDS: ICD-10-CM

## 2024-09-17 DIAGNOSIS — M35.1 MIXED CONNECTIVE TISSUE DISEASE (HCC): ICD-10-CM

## 2024-09-17 DIAGNOSIS — L97.312 NON-PRESSURE CHRONIC ULCER OF RIGHT ANKLE WITH FAT LAYER EXPOSED (HCC): Primary | ICD-10-CM

## 2024-09-17 DIAGNOSIS — Z79.899 HIGH RISK MEDICATION USE: ICD-10-CM

## 2024-09-17 PROCEDURE — 99214 OFFICE O/P EST MOD 30 MIN: CPT | Performed by: INTERNAL MEDICINE

## 2024-09-17 PROCEDURE — 3078F DIAST BP <80 MM HG: CPT | Performed by: NURSE PRACTITIONER

## 2024-09-17 PROCEDURE — 3074F SYST BP LT 130 MM HG: CPT | Performed by: INTERNAL MEDICINE

## 2024-09-17 PROCEDURE — 3078F DIAST BP <80 MM HG: CPT | Performed by: INTERNAL MEDICINE

## 2024-09-17 PROCEDURE — G2211 COMPLEX E/M VISIT ADD ON: HCPCS | Performed by: INTERNAL MEDICINE

## 2024-09-17 PROCEDURE — 3074F SYST BP LT 130 MM HG: CPT | Performed by: NURSE PRACTITIONER

## 2024-09-17 PROCEDURE — 99214 OFFICE O/P EST MOD 30 MIN: CPT | Performed by: NURSE PRACTITIONER

## 2024-09-17 PROCEDURE — 3008F BODY MASS INDEX DOCD: CPT | Performed by: INTERNAL MEDICINE

## 2024-09-17 RX ORDER — AMLODIPINE BESYLATE 2.5 MG/1
2.5 TABLET ORAL 2 TIMES DAILY
Qty: 180 TABLET | Refills: 0 | Status: SHIPPED | OUTPATIENT
Start: 2024-09-17

## 2024-09-17 RX ORDER — PREDNISONE 2.5 MG/1
2.5 TABLET ORAL DAILY
Qty: 90 TABLET | Refills: 0 | Status: SHIPPED | OUTPATIENT
Start: 2024-09-17

## 2024-09-17 RX ORDER — HYDROXYCHLOROQUINE SULFATE 200 MG/1
200 TABLET, FILM COATED ORAL DAILY
Qty: 90 TABLET | Refills: 0 | Status: SHIPPED | OUTPATIENT
Start: 2024-09-17

## 2024-09-17 NOTE — PROGRESS NOTES
.Weekly Wound Education Note    Teaching Provided To: Patient;Family  Training Topics: Dressing;Edema control;Compression;Cleasing and general instructions;Discharge instructions  Training Method: Explain/Verbal;Written  Training Response: Patient responds and understands        Notes: Wounds improving. Continue folded xeroform, ABD pad, and calamine unna boot 20-30mmHg. ABD pad over anterior ankles for padding.

## 2024-09-17 NOTE — PROGRESS NOTES
?  RHEUMATOLOGY Follow up   Date of visit: 09/17/2024    Chief Complaint   Patient presents with    Mixed Connective Tissue Disease     2 month f/u. Feeling pretty good. Wound on ankles are still healing. No other sores or wounds. No sores on fingers.  No joint pain. No new symtoms. Converted rapid score of 3.3       ASSESSMENT, DISCUSSION & PLAN   Assessment:  1. Mixed connective tissue disease (HCC)    2. Lupus vasculitis (HCC)    3. Raynaud's disease with gangrene (HCC)    4. Long term (current) use of systemic steroids    5. Long-term use of Plaquenil    6. High risk medication use    7. History of ITP        Discussion:  Mrs. Raymond Olivo is a 50 yo woman with previously diagnosed mixed connective tissue disease and recent ulcer/chronic wounds in her bilateral legs. She does seem to have prominent sclerodermatous features with Raynaud's and chronic wounds, skin tightening as well as telangectasias. It seems like she has been on multiple DMARDs in the past including plaquenil, which was stopped due to possible inefficacy and GI upset as well as azathioprine which was discontinued due to cytopenias.     Since establishing care with me, she restarted methotrexate and had significant improvement of skin ulcerations with amlodipine. She did have worsened skin ulcer formation and biopsy performed by wound care confirmed lupus vasculitis. Due to lack of efficacy of methotrexate, decision was made to stop methotrexate, restart plaquenil and mycophenolate.     Patient had to be hospitalized due to ITP.  Unclear etiology but had responded to prolonged steroids.   She decided to  stop both CellCept and prednisone.  She has been off CellCept since hospitalization at the end of November 2022.  And she had been off of her prednisone but had some worsened fatigue and trace ankle swelling bilaterally. She did have some elevated inflammatory markers as well so prednisone was restarted. She had been doing better overall.      There is a comopnent of medical nonadherence and pt has been told multiple times to get  baseline ECHO and PFTs due to other complications from the MCTD which may be contributing to some of her fatigue and low weight.  She had initially delayed initially due to COVID19 pandemic, however, she has not had any baseline done since disease onset and we need to evaluate for possible ILD vs pulmonary HTN. Reiterated again but pt declines further work up.     More recently, she had some worsened foot pain. Arterial duplex showed mild diffuse stenosis but vascular surgeon did not think significant for intervention. Thought her symptoms were more neurologic and she has been doing better with use of compression socks  She did not restart gabapentin or MMF despite my recommendations to do so.    Now, more recently, she has suffered recurrence of ulcerations in both ankles that had progressed fairly quickly. Her and her  had requested urgent wound care evaluation so they were fit and she has been getting slowly better.  Per wound care, progress is slow however patient also cannot tolerate full debridement.  Patient did not get any labs done despite restarting mycophenolate.  Reminded again about importance of medical adherence to wrist instructions and safety/risks of these medications.  Reminded pt that this is likely the lupus vasculitis as was determined a few years ago. Strongly recommended compliance with medication regimen    Due to persistent Raynaud's and questionable perfusion, will also add fluoxetine to the amlodipine.  Discussed at length the risks and benefits of the medication including but not limited to worsening depression/anxiety, suicidal ideations, GI upset as well as the risk of stopping medication abruptly without discussing with me.  Discussed life-threatening complications if patient stops medicine without communicating with me or another provider before stopping.    -- continued MMF to 750mg  daily  -- updated labs in 4 weeks to monitor for toxicities - if sed rate high again, will consider increasing MMF to 500mg twice daily   -- continue prednisone to 2.5mg daily  -- restart gabapentin at 100mg nightly   -- hold off on fluoxetine due to recent side effects   -- continue plaquenil 200mg daily (weight based dosing)  -- continue amlodipine 2.5mg twice daily  -- be sure to get retinal exam by ophthalmology since retinal exam does not seem to have been done by [facility]. If not done by next visit, will stop medication. Have reminded multiple times about importance of getting this done for the past few years.   -- previously discussed xray of foot showing periarticular osteopenia- could be related to bone density vs early inflammation. Encouraged to get updated BMD that was previously ordered.  -- take iron supplements daily  -- follow up in 2 months or sooner as needed     Patient and pt's  verbalized understanding of above instructions. No further questions at this time.    Code selection for this visit was based on time. Time spent (30min) on date of service in preparing to see the patient, obtaining and/or reviewing separately obtained history, performing a medically appropriate examination, counseling and educating the patient/family/caregiver, ordering medications or testing, referring and communicating with other healthcare providers, documenting clinical information in the E HR, independently interpreting results and communicating results to the patient/family/caregiver and care coordination with the patient's other providers.    Additional time spent communicating and coordinating care.    Plan:  Diagnoses and all orders for this visit:    Mixed connective tissue disease (HCC)  -     amLODIPine 2.5 MG Oral Tab; Take 1 tablet (2.5 mg total) by mouth 2 (two) times daily.  -     hydroxychloroquine 200 MG Oral Tab; Take 1 tablet (200 mg total) by mouth daily.  -     predniSONE 2.5 MG Oral Tab;  Take 1 tablet (2.5 mg total) by mouth daily.    Lupus vasculitis (HCC)  -     amLODIPine 2.5 MG Oral Tab; Take 1 tablet (2.5 mg total) by mouth 2 (two) times daily.  -     hydroxychloroquine 200 MG Oral Tab; Take 1 tablet (200 mg total) by mouth daily.  -     predniSONE 2.5 MG Oral Tab; Take 1 tablet (2.5 mg total) by mouth daily.    Raynaud's disease with gangrene (HCC)  -     amLODIPine 2.5 MG Oral Tab; Take 1 tablet (2.5 mg total) by mouth 2 (two) times daily.  -     hydroxychloroquine 200 MG Oral Tab; Take 1 tablet (200 mg total) by mouth daily.  -     predniSONE 2.5 MG Oral Tab; Take 1 tablet (2.5 mg total) by mouth daily.    Long term (current) use of systemic steroids    Long-term use of Plaquenil    High risk medication use    History of ITP                  Return in about 2 months (around 11/17/2024).  ?  JIM   Raymond Olivo is a 49 year old female with the following active problems who is seen for medically necessary follow-up today. She was seen as a new patient several months ago for second opinion regarding underlying connective tissue disease. She had been suffering from multiple leg wounds over the years.  She was diagnosed by another rheumatologist with having mixed connective tissue disorder. She was previously on Plaquenil as well as azathioprine and some dose of prednisone.  Seemed like she was also on methotrexate but unclear why stopped. Since first seeing her, I restarted methotrexate with daily folic acid supplementation as well as added amlodipine. She had some decrease in her WBC count which has stabilized. Decision was made to stop methotrexate and start mycophenolate due to lupus vasculitis dx on skin biopsy. She was hospitalized due to thrombocytopenia.  Was diagnosed with ITP and given IV methylprednisolone as well as IVIG infusion. Initially as outpatient platelets were only 10 and improved most recently. She presents for follow up today.    Her  is here and serves to  translate if pt does not understand what I'm asking/explaining.     Currently taking:  Plaquenil 200mg daily   Amlodipine 2.5mg twice daily  Prednisone 2.5mg daily   MMF 750mg daily   Gabapentin not taking regularly since the pain is better.   Biotin supplementation  Vit d completed prescription previously. Not taking OTC at this time.   B12 daily   Iron once daily (rx for two)     Since her last visit, she has been feeling better.   Has increased MMF to 750mg.- denies side effects.   Tried fluoxetine but stopped after 2 doses. Had itching, subsided after stopping medication.   Continuing to follow with wound care and wounds are slowly healing. No new ulcerations. Started a protein drink to help and thinks helping.   Very slight burning sensation but better overall.   Denies fatigue at this time and no longer as weak    Denies any other joint pain or swelling.   Continues to have hair loss/thinning but details unclear- stable. No changes since restarting MMF  Denies skin rashes   No recent bruising or bleeding  Raynaud's present but not severe, stable.   Feels the tightness of the skin over the fingers and toes are the same.   Has not had obvious bleeding- epistaxis, hemoptysis, hematuria or bloody stools  Denies shortness of breath or chest pain.   Denies cough.   Denies fevers or chills.     Still not been seen by ophthalmology - denies blurred vision. Was seen by optometry in December.   Has not done ECHO or CXR as previously ordered.     HPI from initial consultation  States about 8-10 years ago, she started to have pain in her joints, while living in [place]. There was concern for possible rheumatologic issue at that time. She was given some oral prednisone which were reduced over time. She had a wound on her leg, was told it was a vascular wound. Had a laser vein surgery at that time. No recurrence until one month ago.   About 3 years ago, they moved to [facility]. She was seeing Dr. Keys, diagnosed pt  with mixed connective tissue disease. States her symptoms worsened in winter months- joint pain in her hands as well as change in color of her fingertips. About one month ago, she developed a wound over her leg. She followed with Dr. Ferguson, as Dr. Keys left. Once the wound came, he recommended pt be evaluated by wound care. Also recommended tertiary center.  Was previously on plaquenil, however due to the side effects, this was stopped.  Pt was also on azathioprine but had some cytopenias. Also had been on methotrexate but since this wasn't helping, this was discontinued.   Pt has been following with wound care. States with the topicals and wrapping, she has had some improvement of the wound size.   Pt continues to have burning sensation in both legs, described as 5-6/10.   She is currently on prednisone 5mg daily.   She has never gotten a biopsy of the lesion, unless she had one 8 years ago in [place], but  cannot recall results.  She tried gabapentin, only 100mg nightly but stopped because of feeling drowsy.      Pt does have morning stiffness lasting 10-15 minutes.   Significant hair loss  + ulcers over bottom inside lip  Itching over thighs without redness or rash.   Hx of cytopenia while on DMARDs  + ulcers over toes/bottom of feet  + pain awakening the patient from sleep  + looks thinner, however actual weight is the same.      No history of significant wrist pain or swelling, pain or swelling of the MCPs, pain or swelling of the ankles and bones of the feet, or new skin nodule formation.  The patient denies nasal ulcers, photosensitive rash, elevated or scarring rashes, prior renal or liver disease, or history of seizures.  No history of prior blood clot in the legs or lungs, strokes or ischemic phenomenon.  Denies nonhealing ulcers on the fingertips, trouble swallowing, or severe acid reflux.  The patient denies any history of uveitis, crampy abdominal pain, constipation, diarrhea, or bloody stools.    There are no symptoms of severe dry eyes, dry mouth, or swelling of the cheeks or under the jawbone.   No fevers, chills, lymphadenopathy, night sweats, or easy bruising or bleeding.  Denies chronic sinus infections/disease or epistaxis.  Denies chronic cough or hemoptysis.      Family hx:  Father with possible MCTD  Brothers/sisters healthy       Past Medical History:  Past Medical History:    Mixed connective tissue disease (HCC)    Raynaud's disease     Past Surgical History:  History reviewed. No pertinent surgical history.    Family History:  Family History   Problem Relation Age of Onset    Diabetes Father     Diabetes Mother      Social History:  Social History     Socioeconomic History    Marital status:    Tobacco Use    Smoking status: Never    Smokeless tobacco: Never   Vaping Use    Vaping status: Never Used   Substance and Sexual Activity    Alcohol use: No    Drug use: No    Sexual activity: Yes     Medications:  Outpatient Medications Marked as Taking for the 9/17/24 encounter (Office Visit) with Alisha Farmer DO   Medication Sig Dispense Refill    amLODIPine 2.5 MG Oral Tab Take 1 tablet (2.5 mg total) by mouth 2 (two) times daily. 180 tablet 0    hydroxychloroquine 200 MG Oral Tab Take 1 tablet (200 mg total) by mouth daily. 90 tablet 0    predniSONE 2.5 MG Oral Tab Take 1 tablet (2.5 mg total) by mouth daily. 90 tablet 0    gabapentin 100 MG Oral Cap Take 100mg in am and 200mg at bedtime.      mycophenolate mofetil 250 MG Oral Cap Take 1 capsule (250 mg total) by mouth at bedtime. 90 capsule 0    Ferrous Sulfate 325 (65 Fe) MG Oral Tab Take 1 tablet (325 mg total) by mouth 2 (two) times daily with meals. 180 tablet 0    Mycophenolate Mofetil 500 MG Oral Tab Take 1 tablet (500 mg total) by mouth daily. 90 tablet 0     Modified Medications    Modified Medication Previous Medication    AMLODIPINE 2.5 MG ORAL TAB amLODIPine 2.5 MG Oral Tab       Take 1 tablet (2.5 mg total) by mouth 2 (two)  times daily.    Take 1 tablet (2.5 mg total) by mouth 2 (two) times daily.    HYDROXYCHLOROQUINE 200 MG ORAL TAB hydroxychloroquine 200 MG Oral Tab       Take 1 tablet (200 mg total) by mouth daily.    Take 1 tablet (200 mg total) by mouth daily.    PREDNISONE 2.5 MG ORAL TAB predniSONE 2.5 MG Oral Tab       Take 1 tablet (2.5 mg total) by mouth daily.    Take 1 tablet (2.5 mg total) by mouth daily.     Medications Discontinued During This Encounter   Medication Reason    FLUoxetine 10 MG Oral Cap     amLODIPine 2.5 MG Oral Tab     hydroxychloroquine 200 MG Oral Tab     predniSONE 2.5 MG Oral Tab            ?  Allergies:  No Known Allergies  ?  REVIEW OF SYSTEMS   ?  Review of Systems   Constitutional:  Positive for malaise/fatigue. Negative for chills, fever and weight loss.   HENT:  Negative for nosebleeds.    Eyes:  Negative for blurred vision, double vision, pain and redness.   Respiratory:  Negative for cough, hemoptysis and shortness of breath.    Cardiovascular:  Negative for chest pain, palpitations and leg swelling.   Gastrointestinal:  Negative for abdominal pain, blood in stool, constipation, diarrhea, heartburn and nausea.   Genitourinary:  Negative for dysuria, frequency, hematuria and urgency.   Musculoskeletal:  Negative for back pain, joint pain, myalgias and neck pain.   Skin:  Negative for itching and rash.   Neurological:  Positive for tingling. Negative for dizziness, weakness and headaches.   Endo/Heme/Allergies:  Does not bruise/bleed easily.     PHYSICAL EXAM   Today's Vitals:  Temperature Blood Pressure Heart Rate Resp Rate SpO2   Temp: 97.2 °F (36.2 °C) BP: 98/52 Pulse: 56 Resp: 16 SpO2: 97 %   ?  Current Weight Height BMI BSA Pain   Wt Readings from Last 1 Encounters:   09/17/24 123 lb (55.8 kg)    Height: 5' 2\" (157.5 cm) Body mass index is 22.5 kg/m². Body surface area is 1.55 meters squared.         Physical Exam  Vitals and nursing note reviewed.   Constitutional:       General: She is  not in acute distress.     Appearance: She is well-developed. She is not diaphoretic.      Comments: + thin   HENT:      Head: Normocephalic.   Eyes:      General: No scleral icterus.     Extraocular Movements: Extraocular movements intact.      Conjunctiva/sclera: Conjunctivae normal.   Neck:      Vascular: No JVD.      Trachea: No tracheal deviation.   Cardiovascular:      Rate and Rhythm: Normal rate and regular rhythm.      Heart sounds: Normal heart sounds. No murmur heard.  Pulmonary:      Effort: Pulmonary effort is normal. No respiratory distress.      Breath sounds: Normal breath sounds. No wheezing.   Musculoskeletal:         General: Tenderness and deformity present. No swelling.      Cervical back: Neck supple.      Comments: (Prior exam)  No evidence of heberden or kecia nodes of any of the fingers, no basilar joint tenderness of the 1st CMC bilaterally.  No swelling, tenderness, redness or restriction of motion of the DIPs, PIPs, MCPs, wrists, elbows, or joints of the feet.  Bilateral shoulders with full ROM.  Bilateral knees without medial joint line tenderness, no crepitus, no effusion.  + bilateral ankle tenderness over med/lat malleoli and some swelling most notable over lateral malleolus on left - no more swelling but tenderness over top of feet  Very sensitive to touch over left dorsal foot diffusely  Difficult to palpate pulses due to sensitivity to touch    Lymphadenopathy:      Cervical: No cervical adenopathy.   Skin:     General: Skin is warm and dry.      Comments: No active bruising noted today.   Salt/pepper appearance of skin stable  Abnormal nailfold capillaroscopy diffuse fingers, slightly improved   No digital pits of fingers  + telangectasias over face stable  Skin tightening of distal fingers improved   Ulcers reviewed from wound care pictures on b/l ankles; currently wrapped so did not have pt unwrap   Neurological:      Mental Status: She is alert. She is disoriented.       Cranial Nerves: No cranial nerve deficit.      Gait: Gait abnormal.   Psychiatric:         Mood and Affect: Mood normal.         Behavior: Behavior normal.       ?  Radiology review:       Narrative   PROCEDURE:  US ARTERIAL DUPLEX LOWER EXTREMITY LEFT (CPT=93926)     COMPARISON:  None.     INDICATIONS:  M79.672 Left foot pain I73.01 Raynaud's disease with gangrene (HCC) I77.89 Lupus vasculitis (HCC) M32.19 Lupus vasculitis (HCC) M35.1 Mixed connective tissue d*     TECHNIQUE:  A comprehensive color duplex Doppler ultrasound examination of the left lower extremity was performed. Color imaging, Doppler wave form analysis, and peak systolic flow measurements of the common femoral, profunda femoral, superficial  femoral, and popliteal arteries were performed.       PATIENT STATED HISTORY: (As transcribed by Technologist)           FINDINGS:    LEFT EXTREMITY:  Triphasic waveforms in the left common femoral, profunda, superficial femoral arteries.  Biphasic waveforms below the left knee.  OTHER:  None.       PEAK VELOCITIES (CM/S):  LEFT COMMON FEMORAL:      165    LEFT PROFUNDA FEMORAL:      78      LEFT SUPERFICIAL FEMORAL PROX:    113  LEFT SUPERFICIAL FEMORAL MID:      94  LEFT SUPERFICIAL FEMORAL DISTAL:    92  LEFT POPLITEAL PROX:      110  LEFT POPLITEAL DISTAL:      121  TIBIOPER TRUNK:      66  LEFT PTA PROX:      60  LEFT PTA MID:      62  LEFT PTA DISTAL:      73  LEFT BETH PROX:      82  LEFT BETH MID:      72    LEFT DORSALIS PEDIS::      30  LEFT GREAT TOE PRESSURE:      25 mmHg                       Impression   CONCLUSION:       1. No high-grade stenosis is identified.  There is suggestion of diffuse mild stenosis throughout most of the left lower extremity.     2. Overall diminished left toe pressure is noted.  Possibility of decreased perfusion in the distal left foot is of consideration.          LOCATION:  [place] [id number]           Dictated by (CST): Anselmo Carroll MD on 11/28/2023 at 9:30 AM       Finalized by (CST): Anselmo Carroll MD on 11/28/2023 at 9:35 AM       Narrative   PROCEDURE:  XR FOOT, COMPLETE (MIN 3 VIEWS), LEFT (CPT=73630)     TECHNIQUE:  AP, oblique, and lateral views were obtained.     COMPARISON:  None.     INDICATIONS:  M79.672 Left foot pain I73.01 Raynaud's disease with gangrene (HCC) I77.89 Lupus vasculitis (HCC) M32.19 Lupus vasculitis (HCC) M35.1 Mixed connective tissue d*     PATIENT STATED HISTORY: (As transcribed by Technologist)  Patient states having lateral anterior foot pain that began roughly twenty days ago, with no injury.         FINDINGS:    BONES:  Osseous structures are intact.  Periarticular decreased bone mineralization.  Limited evaluation of the 2nd through 5th distal phalanges is due to constant flexion.  Joint spaces are preserved.  Mild hallux valgus deformity.  No dislocation.    Mild inferior calcaneal enthesopathy.                   Impression   CONCLUSION:    1. Osseous structures are intact.  Please see details as above.        LOCATION:  [facility]        Dictated by ([facility]): Madina Colin MD on 10/19/2023 at 12:16 PM      Finalized by (CST): Madina Colin MD on 10/19/2023 at 12:18 PM        Narrative   PROCEDURE:  XR FOOT, COMPLETE (MIN 3 VIEWS), LEFT (CPT=73630)     TECHNIQUE:  AP, oblique, and lateral views were obtained.     COMPARISON:  None.     INDICATIONS:  M79.672 Left foot pain I73.01 Raynaud's disease with gangrene (HCC) I77.89 Lupus vasculitis (HCC) M32.19 Lupus vasculitis (HCC) M35.1 Mixed connective tissue d*     PATIENT STATED HISTORY: (As transcribed by Technologist)  Patient states having lateral anterior foot pain that began roughly twenty days ago, with no injury.         FINDINGS:    BONES:  Osseous structures are intact.  Periarticular decreased bone mineralization.  Limited evaluation of the 2nd through 5th distal phalanges is due to constant flexion.  Joint spaces are preserved.  Mild hallux valgus deformity.  No dislocation.    Mild inferior  calcaneal enthesopathy.                   Impression   CONCLUSION:    1. Osseous structures are intact.  Please see details as above.        LOCATION:  [facility]        Dictated by (CST): Madina Colin MD on 10/19/2023 at 12:16 PM      Finalized by (CST): Madina Colin MD on 10/19/2023 at 12:18 PM      PROCEDURE:  CT ABDOMEN PELVIS IV CONTRAST, NO ORAL (ER)       COMPARISON:  None.       INDICATIONS:  abd pain, sent by pcp to r/o sbo       TECHNIQUE:  CT scanning was performed from the dome of the diaphragm to the pubic symphysis with non-ionic intravenous contrast material. Post contrast coronal MPR imaging was performed.  Dose reduction techniques were used. Dose information is   transmitted to the ACR (American College of Radiology) NRDR (National Radiology Data Registry) which includes the Dose Index Registry.       FINDINGS:     LUNG BASES:  Dependent atelectasis bilaterally.   LIVER:  Normal in shape and contour.   BILIARY:  Cholelithiasis.  No intrahepatic biliary dilatation.  There is possible small amount of pericholecystic fluid..   SPLEEN:  Normal.  No enlargement or focal lesion.   PANCREAS:  No tyson-pancreatic inflammatory stranding   ADRENALS:  Normal.  No mass or enlargement.     KIDNEYS:  There is moderate bilateral hydronephrosis.  No obstructing urolithiasis.   BOWEL/MESENTERY:  Bowel is normal in caliber. No evidence of obstruction.  Considerable amount of stool noted within the rectum.  Moderate amount of fecal material noted within the remaining portion of the colon.  The appendix is normal appearance.   PELVIS:  Distended bladder.  No free pelvic fluid.  Calcified phleboliths within the pelvis.     AORTA/VASCULAR:  Atheromatous calcifications of the aorta.  Aorta is normal in caliber.   BONES:  No acute fractures.                        Impression   CONCLUSION:     1. Considerably distended bladder with bilateral hydronephrosis.  No obstructing urolithiasis is noted.   2. Prominent mount of stool noted  within the rectum suggestive of fecal impaction.  No evidence of bowel obstruction.     3. Gallstone within the neck of the gallbladder measuring 1.3 x 1.0 cm.  The gallbladder is contracted but there is suggestion of small amount of pericholecystic fluid.  Correlation with clinical symptoms to exclude acute cholecystitis.             Dictated by (CST): Colette Nelson MD on 12/22/2021 at 7:51 PM       Finalized by (CST): Colette Nelson MD on 12/22/2021 at 7:56 PM        Labs:  Lab Results   Component Value Date    WBC 4.7 09/03/2024    RBC 4.59 09/03/2024    HGB 13.1 09/03/2024    HCT 41.1 09/03/2024    .0 09/03/2024    MCV 89.5 09/03/2024    MCH 28.5 09/03/2024    MCHC 31.9 09/03/2024    RDW 13.2 09/03/2024    NEPRELIM 3.39 09/03/2024    NEPERCENT 72.1 09/03/2024    LYPERCENT 13.0 09/03/2024    MOPERCENT 13.6 09/03/2024    EOPERCENT 0.9 09/03/2024    BAPERCENT 0.2 09/03/2024    NE 3.39 09/03/2024    LYMABS 0.61 (L) 09/03/2024    MOABSO 0.64 09/03/2024    EOABSO 0.04 09/03/2024    BAABSO 0.01 09/03/2024     Lab Results   Component Value Date    GLU 91 09/03/2024    BUN 9 09/03/2024    BUNCREA 16.7 07/14/2021    CREATSERUM 0.58 09/03/2024    ANIONGAP 3 09/03/2024    GFRNAA 109 07/28/2022    GFRAA 126 07/28/2022    CA 10.0 09/03/2024    OSMOCALC 288 09/03/2024    ALKPHO 100 09/03/2024    AST 16 09/03/2024    ALT 8 (L) 09/03/2024    BILT 0.4 09/03/2024    TP 8.0 09/03/2024    ALB 4.9 (H) 09/03/2024    GLOBULIN 3.1 09/03/2024     09/03/2024    K 3.7 09/03/2024     09/03/2024    CO2 31.0 09/03/2024       Additional Labs:  09/2023  CRP normal  ESR 47 elevated  CMP grossly normal  CBC with WBC 4.7; hemoglobin 13.1; platelet 194    07/2024 (resulted after visit)  CBC with WBC 5.5; hemoglobin 12.8; platelet 183  CMP grossly normal except potassium 3.3 low  ESR 39 elevated  CRP normal     03/2024  CBC with WBC 3.2, otherwise normal  CMP grossly normal   Iron 45; percent sat 15 borderline low  Ferritin  114.6  B12 726 normal  ESR 54 elevated  CRP 0.37 borderline elevated  Vitamin D 35  C3 103  C4 33.7     10/2023  CBC grossly normal  CMP grossly normal  Iron 43; percent sat 12 low  Ferritin 94.1 normal  B12 726 normal  ESR 45 elevated  CRP 0.48 borderline elevated  Vitamin D 58.8 Normal    07/2023  B12 246 borderline low   Vitamin D 22.9 low  Ferritin 96.6 normal  Iron low  CBC grossly normal  CMP grossly normal    07/2022  B12 302 normal  Vitamin D 14.4 low  Ferritin normal  Iron normal  CBC grossly normal  CMP with potassium 3.3 otherwise grossly normal  CRP normal  ESR 15 normal  C3 97.8 normal  C4 29.1 normal    01/29/2022  WE[place] 1: 640 speckled      Remainder of DARRYL panel negative  CBC with WBC 5.9; hemoglobin 12.2; platelet 296  B12 689  ESR 48 elevated  CRP 0.44 borderline elevated  CMP grossly normal  UA grossly normal with exception of trace leuk esterase squamous epithelial and rare bacteria  Ferritin 245.8 elevated  Iron and percent sat low    01/11/2022  CBC grossly normal (WBC 5.6; hemoglobin 13.2; platelet 214)    11/2021   B12 943  [place]LL[place] positive      Remainder of DARRYL panel negative intrinsic factor blocking antibody negative  MMA normal  ESR 34  dsDNA negative  Total complement normal    11/22/2021  CBC with WBC 3.9; hemoglobin 11.4; platelet 10    7/2021  dsDNA negative  Protein creatinine ratio normal  UA small amount of blood; leuk esterase; bacteria rare; moderate squamous  C4 27.3 normal  C3 112 normal  CRP 0.46 borderline elevated  ESR 40 elevated  CBC with hemoglobin 10.9; WBC 4.0; platelet 213 normal  CMP grossly normal    08/2020  Esr 36  CRP 0.60  C3 and C4 normal     Cutaneous Direct IF, Biopsy See Note    Comment: IMMUNODERMATOLOGY REPORT       Specimen(s):   1. 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, including lupus vasculitis     (See Results and Comments)   ____________________________________________________________     RESULTS   IgG:  Grains within and around basal keratinocytes and         within cell nuclei and grains in focal superficial,         upper, and mid dermal blood vessels         IgG4:  Negative     IgM:  Grains in focal dermal blood vessels     IgA:  Focal grains within basal keratinocytes     C3:   Few grains along basement membrane zone and grains and         clumps within superficial and upper dermal blood         vessels     Fibrinogen:  3+ deposition around superficial, upper, and                mid dermal blood vessels     COMMENTS   By direct immunofluorescence, there is granular deposition of IgG   within and around basal keratinocytes and within cell nuclei. The   presence of granular IgG within the epidermis raises the   consideration of lupus erythematosus, specifically subacute   cutaneous lupus erythematosus (SCLE). The finding of granular   staining of cell nuclei may be indicative of the presence of   anti-nuclear antibodies, further lending support to the   possibility of lupus erythematosus. 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     Correlation with histopathologic examination of formalin-fixed   tissue is needed to further define this process. Correlation with   clinical findings is also needed, including with serologic testing   for lupus associated antibodies, including antibodies to SSA (Ro)   and SSB (La), which are often associated with SCLE.        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

## 2024-09-17 NOTE — PATIENT INSTRUCTIONS
-- continued MMF to 750mg daily  -- updated labs in 4 weeks to monitor for toxicities - if sed rate high again, will consider increasing MMF to 500mg twice daily   -- continue prednisone to 2.5mg daily  -- restart gabapentin at 100mg nightly   -- hold off on fluoxetine due to recent side effects   -- continue plaquenil 200mg daily (weight based dosing)  -- continue amlodipine 2.5mg twice daily  -- be sure to get retinal exam by ophthalmology since retinal exam does not seem to have been done by [facility]. If not done by next visit, will stop medication. Have reminded multiple times about importance of getting this done for the past few years.   -- previously discussed xray of foot showing periarticular osteopenia- could be related to bone density vs early inflammation. Encouraged to get updated BMD that was previously ordered.  -- take iron supplements daily  -- follow up in 2 months or sooner as needed     Dr. Farmer

## 2024-09-17 NOTE — PATIENT INSTRUCTIONS
Please return:  1 week     Patient discharge and wound care instructions  Raymond Olivo  9/17/2024                 You may shower with protection of the wound (ie a cast cover or similar).     Changing your dressing:           Wash your hands with soap and water.  Ensure that the old dressing is removed completely. Place it in a plastic bag and throw it in the trash.  Cleanse the wound with hypochlorous wound cleanser (ie. Anasept, vashe, pure and clean) .  It's ok to “scrub” your wound with the gauze, small amount of bleeding with cleansing is normal and ok.  Apply the following dressings:  betamethasone to periwound bilaterally     Right: folded xeroform>abd pad>20-30mmhg VERY light wrap  Left:  folded xeroform>abd pad>20-30mmhg VERY light wrap  Left (lateral):folded  xeroform>abd pad  Protect anterior tibial/ankle prominence    Nutrition and blood sugar control:  Focus on the following:  Protein: Meats, beans, eggs, milk and yogurt particularly Greek yogurt), tofu, soy nuts, soy protein products (Follow the protein handout in your welcome folder)  Vitamin C: Citrus fruits and juices, strawberries, tomatoes, tomato juice, peppers, baked potatoes, spinach, broccoli, cauliflower, [place] sprouts, cabbage  Vitamin A: Dark green, leafy vegetables, orange or yellow vegetables, cantaloupe, fortified dairy products, liver, fortified cereals  Zinc: Fortified cereals, red meats, seafood  Consider supplementing with Justin by [employer]. It can be purchased on amazon, [employer] website, or local pharmacy may be able to order it for you.  (These are essential branch chain amino acids that help with tissue building and wound healing).     Concerns:  Signs of infection may include the following:  Increase in redness  Red \"streaks\" from wound  Increase in swelling  Fever  Unusual odor  Change in the amount of wound drainage     Should you experience any significant changes in your wound(s) or have any questions regarding  your home care instructions please contact the wound center [facility] @ [phone] If after regular business hours, please call your family doctor or local emergency room. The treatment plan has been discussed at length between you and your provider. Follow all instructions carefully, it is very important. If you do not follow all instructions you are at risk of your wound not healing, infection, possible loss of limb and even loss of life.

## 2024-09-23 NOTE — PROGRESS NOTES
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 Patient saw dr. Santamaria (vascular) in December 2023 and he documented \"I explained to the patient and her  that I do not believe the source of her pain to be vascular in origin.  She has an easily palpable dorsalis pedis pulse and the location of the pain is more suggestive of a neuropathic origin.  I suspect perhaps that given the tightness of her feet from her mixed connective tissue disorder the compression stockings is causing somewhat compression of the nerves in that area and I have encouraged her to substitute those stockings to those that involve the feet up to the upper calf.\" Patient/spouse have been in contact with dr. Farmer and in march 2024 reported a dark spot/ulcer on her bilateral lower legs, she was advised to make an appointment with wound care as well as get her blood work done ordered in January 2024 so that further treatment plan could be extablished.  The blood work did show increased inflammation and she was advised to increase her prednisone to 20mg x 5 days (which spouse reports they did do) and take the amlodipine 2.5 mg-which she states she is doing.  They have been leaving the areas open to air and she has not been wearing compression.  She c/o burning sensation in her ankle area.  She was started on gabapentin by dr. Farmer last year, however they stopped it because it was making her too sleepy. We discussed restarting the gabapentin, but only take a noc first.  Both wounds are dried eschar.  Will utilize hydrogel with lidocaine to start debriding off/softening the  eschar. I have also ordered santyl. Patient placed into spandagrips for compression    HPI PRIOR TO SEPTEMBER 2024 SEE INDIVIDUAL PROGRESS NOTES  8-20-24 patient returns.  She is down to 2.5 mg Prednisone dose without negative effect with her symptoms, last week the wounds and periwounds were quite dry.  We utilizing a small amount of honey over the nonviable areas and covered with xeroform.  Also applied topical steroid.  Today all wounds are improved, the periwounds are also improved, the wounds are primarily granular, no s/s of infection, patient continues with burning pain.    8-27-24 patient returns.  Patient's wounds and periwound were improved last week, we continued with the xeroform and topical steroid. Pain is more achy today, less burning unles the wounds are being manipulated.   Today, all wounds are again improved.  Debrimit utilized with vashe to limb and wounds. No s/s of infection. Will continue with clobetesol and xeroform    9-3-24 patient returns. We have been cleansing wounds and periwounds weekly with debrimit/vashe and dressing with topical steroid and xeroform with consistent improvement over the last 3 weeks (even with a reduction in her po steroids). Pain is improving. Wounds are improving, with the debrimit cleansing the wounds are now clean enough to utilize gina collagen.  Patient reminded to get labs done for dr farmer (standing orders are in the system).  No s/s of infection.    9-10-24 patient returns.   She did get her labs drawn last week and she has an appointment with Dr. Farmer on 9-17-24. Her esr is up slightly from July however I suspect this may be related to the decrease in her po steroid.   Last week we added gina and continued with the xeroform.  We also utilize the debrisoft to cleanse the wounds and periwounds.  Today, wounds are improved, but the gina appears to just have stayed in the wound bed and did not integrate, therefore will not utilize gina this week. Her  pain remains mostly on the lateral ankle.  We no longer have the accumulating exudate, overall her skin is much improved and more healthy in appearance.  No s/s of infection.    9-17-24 patient returns.  She is coming from an appointment with Dr. Farmer, plan is to keep meds same at this time.  Wounds have been improving (albeit slowly) in size and wound bed characteristics. The medial wounds are fully resolved, bilaterally, she is remaining with lateral ankle wounds bilaterally. She continues with the riley.  No s/s of infection. C/o pain with any touch.    9-24-24 patient returns.  She got her retinal exam done as ordered by dr. Farmer this morning, spouse reports everything is good.   Last week the medial wounds were resolved, however today there is a small open area on the left medial distal leg (almost to the heel).  Patient/spouse report that she has not worn any other shoes. There is some surrounding hyperemia to this wound, but patient does not report any increase in pain to the area as compared to other weeks.  The lateral wounds appear a bit moist.  Will utilize ag alginate (instead of xeroform) will also utilize clobetesol topically.  MEDICATIONS:     Current Outpatient Medications:     amLODIPine 2.5 MG Oral Tab, Take 1 tablet (2.5 mg total) by mouth 2 (two) times daily., Disp: 180 tablet, Rfl: 0    hydroxychloroquine 200 MG Oral Tab, Take 1 tablet (200 mg total) by mouth daily., Disp: 90 tablet, Rfl: 0    predniSONE 2.5 MG Oral Tab, Take 1 tablet (2.5 mg total) by mouth daily., Disp: 90 tablet, Rfl: 0    gabapentin 100 MG Oral Cap, Take 100mg in am and 200mg at bedtime., Disp: , Rfl:     mycophenolate mofetil 250 MG Oral Cap, Take 1 capsule (250 mg total) by mouth at bedtime., Disp: 90 capsule, Rfl: 0    Ferrous Sulfate 325 (65 Fe) MG Oral Tab, Take 1 tablet (325 mg total) by mouth 2 (two) times daily with meals., Disp: 180 tablet, Rfl: 0    Mycophenolate Mofetil 500 MG Oral Tab, Take 1 tablet (500 mg  total) by mouth daily., Disp: 90 tablet, Rfl: 0  ALLERGIES:   No Known Allergies   REVIEW OF SYSTEMS:   This information was obtained from the patient/family and chart.    See HPI for pertinent positives, otherwise 10 pt ROS negative.    HISTORY:   Past medical, surgical, family and social history updated where appropriate.    PHYSICAL EXAM:     Vitals:    09/24/24 1100   BP: 103/69   Pulse: 78   Resp: 12   Temp: 97.3 °F (36.3 °C)       Estimated body mass index is 22.5 kg/m² as calculated from the following:    Height as of 9/17/24: 62\".    Weight as of 9/17/24: 123 lb (55.8 kg).   No results found for: \"PGLU\"    Vital signs reviewed.Appears stated age, well groomed.    Constitutional:  Bp wnl for patient. Pulse Regular and wnl for patient. Respirations easy and unlabored. Temperature wnl. Weight normal for height. Appearance neat and clean. Appears in no acute distress. Well nourished and well developed.    Lower extremity:  dp/pt palpable bilaterally. Extremities are free of varicosities and edema, they are scarred, scattered changes in pigmentation (hypo/hyper) with atrophie jay. Capillary refill < 3 seconds. Digits are warm. toenails are wnl for color, thickness and hygeine. Skin hydration wnl. + hairgrowth on legs.    Musculoskeletal:  Gait and station stable   Integumentary:  refer to wound characteristics and images   Psychiatric:  Judgment and insight intact. Alert and oriented times 3. No evidence of depression, anxiety, or agitation. Calm, cooperative, and communicative, but very quiet and reserved.  EDEMA:   Calf  Point of Measurement - Left Calf: 31  Point of Measurement - Right Calf: 31  Left Calf from:: Heel  Calf Left cm:: 28.7  Right Calf from:: Heel  Right Calf cm:: 29  Ankle  Point of Measurement - Left Ankle: 11  Point of Measurement - Right Ankle: 11  Left Ankle from:: Heel  Left Ankle cm:: 17     Right Ankle from:: Heel  Right Ankle cm:: 17.1     DIAGNOSTICS:     Lab Results   Component  Value Date    BUN 9 09/03/2024    CREATSERUM 0.58 09/03/2024    GFRCKDEPI 121.37 12/17/2020    ALB 4.9 (H) 09/03/2024    TP 8.0 09/03/2024    A1C 5.1 12/17/2020     Lab Results   Component Value Date    ESRML 47 (H) 09/03/2024    ESRML 39 (H) 07/23/2024    ESRML 54 (H) 03/18/2024      Lab Results   Component Value Date    CRP 0.50 09/03/2024    CRP <0.40 07/23/2024    CRP 0.37 (H) 03/18/2024 11-28-23 arterial duplex-dr santamaria  FINDINGS:    LEFT EXTREMITY:  Triphasic waveforms in the left common femoral, profunda, superficial femoral arteries.  Biphasic waveforms below the left knee.   OTHER:  None.     PEAK VELOCITIES (CM/S):   LEFT COMMON FEMORAL:      165    LEFT PROFUNDA FEMORAL:      78      LEFT SUPERFICIAL FEMORAL PROX:    113   LEFT SUPERFICIAL FEMORAL MID:      94   LEFT SUPERFICIAL FEMORAL DISTAL:    92   LEFT POPLITEAL PROX:      110   LEFT POPLITEAL DISTAL:      121   TIBIOPER TRUNK:      66   LEFT PTA PROX:      60   LEFT PTA MID:      62   LEFT PTA DISTAL:      73   LEFT BETH PROX:      82   LEFT BETH MID:      72    LEFT DORSALIS PEDIS::      30   LEFT GREAT TOE PRESSURE:      25 mmHg   CONCLUSION:    1. No high-grade stenosis is identified.  There is suggestion of diffuse mild stenosis throughout most of the left lower extremity.    2. Overall diminished left toe pressure is noted.  Possibility of decreased perfusion in the distal left foot is of consideration.     7-14-20 venous reflux study-dr santamaria  Per Dr. Santamaria review \"no intervention needed\"  WOUND ASSESSMENT:     Wound 04/26/24 #2 Ankle Right;Lateral (Active)   Date First Assessed/Time First Assessed: 04/26/24 1031    Wound Number (Wound Clinic Only): #2  Primary Wound Type: (c) Auto-immune  Location: Ankle  Wound Location Orientation: Right;Lateral      Assessments 4/26/2024 10:34 AM 9/24/2024  1:35 PM   Wound Image       Drainage Amount Unable to assess Small   Drainage Description -- Serosanguineous   Treatments Compression --   Wound  Length (cm) 0.5 cm 1 cm   Wound Width (cm) 0.7 cm 0.9 cm   Wound Surface Area (cm^2) 0.35 cm^2 0.9 cm^2   Wound Depth (cm) 0.1 cm 0.1 cm   Wound Volume (cm^3) 0.035 cm^3 0.09 cm^3   Wound Healing % -- -157   Margins Well-defined edges Well-defined edges   Non-staged Wound Description Full thickness Full thickness   Soni-wound Assessment Dry;Edema;Atrophy jay Hemosiderin staining;Edema   Wound Granulation Tissue -- Pink;Firm   Wound Bed Granulation (%) -- 60 %   Wound Bed Slough (%) -- 40 %   Wound Odor None None   Shape 100% scabbed --   Tunneling? -- No   Undermining? -- No   Sinus Tracts? -- No       Inactive Orders   Date Order Priority Status Authorizing Provider   06/19/24 1145 Debridement Auto-immune Right;Lateral Ankle Routine Completed Kaz Tapia MD       Compression Wrap 05/21/24 Ankle Anterior;Left (Active)   Placement Date/Time: 05/21/24 1019   Location: Ankle  Wound Location Orientation: Anterior;Left      Assessments 5/21/2024  9:36 AM 9/17/2024  3:16 PM   Response to Treatment Well tolerated Well tolerated   Compression Layers Multilayer Multilayer   Compression Product Type Unna Boot Unna Boot   Dressing Applied Yes Yes   Compression Wrap Location Toes to Knee Toes to Knee   Compression Wrap Status Clean;Dry;Intact Clean;Dry       No associated orders.       Compression Wrap 05/28/24 Ankle Anterior;Right (Active)   Placement Date/Time: 05/28/24 1012   Location: Ankle  Wound Location Orientation: Anterior;Right      Assessments 5/28/2024  9:47 AM 9/17/2024  3:16 PM   Response to Treatment Well tolerated Well tolerated   Compression Layers Multilayer Multilayer   Compression Product Type Unna Boot Unna Boot   Dressing Applied Yes Yes   Compression Wrap Location Toes to Knee Toes to Knee   Compression Wrap Status Dry;Intact;Clean Clean;Dry       No associated orders.       Wound 07/23/24 #4 Leg Left;Lateral (Active)   Date First Assessed/Time First Assessed: 07/23/24 1137    Wound Number  (Wound Clinic Only): #4  Primary Wound Type: (c) Other (comment)  Location: Leg  Wound Location Orientation: Left;Lateral      Assessments 7/23/2024 11:46 AM 9/24/2024  1:36 PM   Wound Image       Drainage Amount Unable to assess Small   Drainage Description -- Serosanguineous   Treatments Compression --   Wound Length (cm) 2.3 cm 2.7 cm   Wound Width (cm) 1.3 cm 0.7 cm   Wound Surface Area (cm^2) 2.99 cm^2 1.89 cm^2   Wound Depth (cm) 0.1 cm 0.1 cm   Wound Volume (cm^3) 0.299 cm^3 0.189 cm^3   Wound Healing % -- 37   Margins Well-defined edges Well-defined edges   Non-staged Wound Description Full thickness Full thickness   Soni-wound Assessment Clean;Dry;Other (Comment) Hemosiderin staining;Edema   Wound Granulation Tissue Pale Grey;Pink;Firm Pink;Spongy   Wound Bed Granulation (%) 45 % 20 %   Wound Bed Epithelium (%) 10 % 50 %   Wound Bed Slough (%) 45 % 30 %   Wound Odor None None   Shape -- bridged   Tunneling? -- No   Undermining? -- No   Sinus Tracts? -- No       No associated orders.       Wound 09/24/24 #5 Left Medial Ankle Ankle Anterior;Left (Active)   Date First Assessed/Time First Assessed: 09/24/24 1349    Wound Number (Wound Clinic Only): #5 Left Medial Ankle  Primary Wound Type: (c) Other (comment)  Location: Ankle  Wound Location Orientation: Anterior;Left      Assessments 9/24/2024  1:50 PM   Wound Image     Drainage Amount Unable to assess   Wound Length (cm) 0.3 cm   Wound Width (cm) 0.4 cm   Wound Surface Area (cm^2) 0.12 cm^2   Wound Depth (cm) 0.1 cm   Wound Volume (cm^3) 0.012 cm^3   Margins Well-defined edges   Non-staged Wound Description Full thickness   Soni-wound Assessment Dry   Wound Bed Slough (%) 100 %   Wound Odor None       No associated orders.          ASSESSMENT AND PLAN:    1. 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 Non-pressure chronic ulcer of left ankle with fat layer exposed (HCC)    3. Idiopathic chronic venous hypertension of both  lower extremities with ulcer and inflammation (HCC)    4. Lupus vasculitis (HCC)    5. Mixed connective tissue disease (HCC)    6. Current chronic use of systemic steroids      Risks, benefits, and alternatives of current treatment plan discussed in detail.  Questions and concerns addressed. Red flags to RTC or ED reviewed.  Patient (or parent) agrees to plan.      NOTE TO PATIENT: The 21st Century Cures Act makes clinical notes like these available to patients in the interest of transparency. Clinical notes are medical documents used by physicians and care providers to communicate with each other. These documents include medical language and terminology, abbreviations, and treatment information that may sound technical and at times possibly unfamiliar. In addition, at times, the verbiage may appear blunt or direct. These documents are one tool providers use to communicate relevant information and clinical opinions of the care providers in a way that allows common understanding of the clinical context.   I [id number] with the patient. This time included:    preparing to see the patient (eg, review notes and recent diagnostics),  seeing the patient, obtaining and/or reviewing separately obtained history, performing a medically appropriate examination and/or evaluation, counseling and educating the patient, documenting in the record,   DISCHARGE INSTRUCTIONS     Patient Instructions   Please return:  1 week - bring the grey polymem foam with you    Patient discharge and wound care instructions  Raymond Mainor Olivo  9/24/2024          You may shower with protection of the wound (ie a cast cover or similar).     Changing your dressing:           Wash your hands with soap and water.  Ensure that the old dressing is removed completely. Place it in a plastic bag and throw it in the trash.  Cleanse the wound with hypochlorous wound cleanser (ie. Anasept, vashe, pure and clean) .  It's ok to “scrub” your wound with the  gauze, small amount of bleeding with cleansing is normal and ok.  Apply the following dressings:  clobetesol to periwound bilaterally     Right: silver alginate>20-30mmhg VERY light wrap  Left:  silver algainte>20-30mmhg VERY light wrap    Protect anterior tibial/ankle prominence    Nutrition and blood sugar control:  Focus on the following:  Protein: Meats, beans, eggs, milk and yogurt particularly Greek yogurt), tofu, soy nuts, soy protein products (Follow the protein handout in your welcome folder)  Vitamin C: Citrus fruits and juices, strawberries, tomatoes, tomato juice, peppers, baked potatoes, spinach, broccoli, cauliflower, [place] sprouts, cabbage  Vitamin A: Dark green, leafy vegetables, orange or yellow vegetables, cantaloupe, fortified dairy products, liver, fortified cereals  Zinc: Fortified cereals, red meats, seafood  Consider supplementing with Justin by [employer]. It can be purchased on amazon, [employer] website, or local pharmacy may be able to order it for you.  (These are essential branch chain amino acids that help with tissue building and wound healing).     Concerns:  Signs of infection may include the following:  Increase in redness  Red \"streaks\" from wound  Increase in swelling  Fever  Unusual odor  Change in the amount of wound drainage     Should you experience any significant changes in your wound(s) or have any questions regarding your home care instructions please contact the [facility] @ [phone] If after regular business hours, please call your family doctor or local emergency room. The treatment plan has been discussed at length between you and your provider. Follow all instructions carefully, it is very important. If you do not follow all instructions you are at risk of your wound not healing, infection, possible loss of limb and even loss of life.        Martina Mckeon FNP-C, CWCN-AP, CFCN, CSWS, WCC, DWC  9/24/2024

## 2024-09-24 ENCOUNTER — OFFICE VISIT (OUTPATIENT)
Dept: WOUND CARE | Facility: HOSPITAL | Age: 49
End: 2024-09-24
Attending: NURSE PRACTITIONER
Payer: COMMERCIAL

## 2024-09-24 VITALS
TEMPERATURE: 97 F | SYSTOLIC BLOOD PRESSURE: 103 MMHG | HEART RATE: 78 BPM | DIASTOLIC BLOOD PRESSURE: 69 MMHG | RESPIRATION RATE: 12 BRPM

## 2024-09-24 DIAGNOSIS — I77.89 LUPUS VASCULITIS (HCC): ICD-10-CM

## 2024-09-24 DIAGNOSIS — M32.19 LUPUS VASCULITIS (HCC): ICD-10-CM

## 2024-09-24 DIAGNOSIS — M35.1 MIXED CONNECTIVE TISSUE DISEASE (HCC): ICD-10-CM

## 2024-09-24 DIAGNOSIS — Z79.52 CURRENT CHRONIC USE OF SYSTEMIC STEROIDS: ICD-10-CM

## 2024-09-24 DIAGNOSIS — L97.322 NON-PRESSURE CHRONIC ULCER OF LEFT ANKLE WITH FAT LAYER EXPOSED (HCC): ICD-10-CM

## 2024-09-24 DIAGNOSIS — L97.312 NON-PRESSURE CHRONIC ULCER OF RIGHT ANKLE WITH FAT LAYER EXPOSED (HCC): Primary | ICD-10-CM

## 2024-09-24 DIAGNOSIS — I87.333 IDIOPATHIC CHRONIC VENOUS HYPERTENSION OF BOTH LOWER EXTREMITIES WITH ULCER AND INFLAMMATION (HCC): ICD-10-CM

## 2024-09-24 PROCEDURE — 99213 OFFICE O/P EST LOW 20 MIN: CPT | Performed by: NURSE PRACTITIONER

## 2024-09-24 NOTE — PATIENT INSTRUCTIONS
Please return:  1 week - bring the grey polymem foam with you    Patient discharge and wound care instructions  Raymond Olivo  9/24/2024          You may shower with protection of the wound (ie a cast cover or similar).     Changing your dressing:           Wash your hands with soap and water.  Ensure that the old dressing is removed completely. Place it in a plastic bag and throw it in the trash.  Cleanse the wound with hypochlorous wound cleanser (ie. Anasept, vashe, pure and clean) .  It's ok to “scrub” your wound with the gauze, small amount of bleeding with cleansing is normal and ok.  Apply the following dressings:  clobetesol to periwound bilaterally     Right: silver alginate>20-30mmhg VERY light wrap  Left:  silver algainte>20-30mmhg VERY light wrap    Protect anterior tibial/ankle prominence    Nutrition and blood sugar control:  Focus on the following:  Protein: Meats, beans, eggs, milk and yogurt particularly Greek yogurt), tofu, soy nuts, soy protein products (Follow the protein handout in your welcome folder)  Vitamin C: Citrus fruits and juices, strawberries, tomatoes, tomato juice, peppers, baked potatoes, spinach, broccoli, cauliflower, [place] sprouts, cabbage  Vitamin A: Dark green, leafy vegetables, orange or yellow vegetables, cantaloupe, fortified dairy products, liver, fortified cereals  Zinc: Fortified cereals, red meats, seafood  Consider supplementing with Justin by [employer]. It can be purchased on amazon, [employer] website, or local pharmacy may be able to order it for you.  (These are essential branch chain amino acids that help with tissue building and wound healing).     Concerns:  Signs of infection may include the following:  Increase in redness  Red \"streaks\" from wound  Increase in swelling  Fever  Unusual odor  Change in the amount of wound drainage     Should you experience any significant changes in your wound(s) or have any questions regarding your home care instructions  please contact the wound center [facility] @ [phone] If after regular business hours, please call your family doctor or local emergency room. The treatment plan has been discussed at length between you and your provider. Follow all instructions carefully, it is very important. If you do not follow all instructions you are at risk of your wound not healing, infection, possible loss of limb and even loss of life.

## 2024-09-24 NOTE — PROGRESS NOTES
.Weekly Wound Education Note    Teaching Provided To: Patient;Family  Training Topics: Dressing;Edema control;Cleasing and general instructions;Compression;Discharge instructions  Training Method: Explain/Verbal;Written  Training Response: Patient responds and understands        Notes: Wounds stable. New wound to left medial ankle. Dressing changed to silver alginate to all wounds. Continue calamine unna boot 20-30mmHg with ABD pads to anterior ankle for padding.

## 2024-09-29 NOTE — PROGRESS NOTES
CHIEF COMPLAINT:     Chief Complaint   Patient presents with    Wound Care     Patient is here for a follow up visit for wounds to both legs.     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 Patient saw dr. Santamaria (vascular) in December 2023 and he documented \"I explained to the patient and her  that I do not believe the source of her pain to be vascular in origin.  She has an easily palpable dorsalis pedis pulse and the location of the pain is more suggestive of a neuropathic origin.  I suspect perhaps that given the tightness of her feet from her mixed connective tissue disorder the compression stockings is causing somewhat compression of the nerves in that area and I have encouraged her to substitute those stockings to those that involve the feet up to the upper calf.\" Patient/spouse have been in contact with dr. Farmer and in march 2024 reported a dark spot/ulcer on her bilateral lower legs, she was advised to make an appointment with wound care as well as get her blood work done ordered in January 2024 so that further treatment plan could be extablished.  The blood work did show increased inflammation and she was advised to increase her prednisone to 20mg x 5 days (which spouse reports they did do) and take the amlodipine 2.5 mg-which she states she is doing.  They have been leaving the areas open to air and she has not been wearing compression.  She c/o burning sensation in her ankle area.  She was started on gabapentin by dr. Farmer last year, however they stopped it because it was making her too sleepy. We discussed restarting the gabapentin, but only take a noc first.  Both wounds are dried eschar.  Will utilize hydrogel with lidocaine to start debriding off/softening the eschar. I have also  ordered santyl. Patient placed into spandagrips for compression    HPI PRIOR TO OCTOBER 2024 SEE INDIVIDUAL PROGRESS NOTES  9-10-24 patient returns.   She did get her labs drawn last week and she has an appointment with Dr. Farmer on 9-17-24. Her esr is up slightly from July however I suspect this may be related to the decrease in her po steroid.   Last week we added gina and continued with the xeroform.  We also utilize the debrisoft to cleanse the wounds and periwounds.  Today, wounds are improved, but the gina appears to just have stayed in the wound bed and did not integrate, therefore will not utilize gina this week. Her pain remains mostly on the lateral ankle.  We no longer have the accumulating exudate, overall her skin is much improved and more healthy in appearance.  No s/s of infection.    9-17-24 patient returns.  She is coming from an appointment with Dr. Farmer, plan is to keep meds same at this time.  Wounds have been improving (albeit slowly) in size and wound bed characteristics. The medial wounds are fully resolved, bilaterally, she is remaining with lateral ankle wounds bilaterally. She continues with the riley.  No s/s of infection. C/o pain with any touch.    9-24-24 patient returns.  She got her retinal exam done as ordered by dr. Farmer this morning, spouse reports everything is good.   Last week the medial wounds were resolved, however today there is a small open area on the left medial distal leg (almost to the heel).  Patient/spouse report that she has not worn any other shoes. There is some surrounding hyperemia to this wound, but patient does not report any increase in pain to the area as compared to other weeks.  The lateral wounds appear a bit moist.  Will utilize ag alginate (instead of xeroform) will also utilize clobetesol topically.'    10-1-24 patient returns.  Last week we utilize ag alginate as the wounds/periwounds were moist. Today wounds are improved.  She did bring the  polymem ag.  Patient has a blancheable area along anterior ankle on the left.  Will order spandagrip and silicone tape from [facility].  I will also rx topical steroid. Patient will change dressings 1 x this week. No s/s of infection. She states the right lower extremity pain is improved. She still has burning on the left.   MEDICATIONS:     Current Outpatient Medications:     clobetasol 0.05 % External Solution, Apply topically to lower legs after shower 3 x weekly, Disp: 50 mL, Rfl: 0    amLODIPine 2.5 MG Oral Tab, Take 1 tablet (2.5 mg total) by mouth 2 (two) times daily., Disp: 180 tablet, Rfl: 0    hydroxychloroquine 200 MG Oral Tab, Take 1 tablet (200 mg total) by mouth daily., Disp: 90 tablet, Rfl: 0    predniSONE 2.5 MG Oral Tab, Take 1 tablet (2.5 mg total) by mouth daily., Disp: 90 tablet, Rfl: 0    gabapentin 100 MG Oral Cap, Take 100mg in am and 200mg at bedtime., Disp: , Rfl:     mycophenolate mofetil 250 MG Oral Cap, Take 1 capsule (250 mg total) by mouth at bedtime., Disp: 90 capsule, Rfl: 0    Ferrous Sulfate 325 (65 Fe) MG Oral Tab, Take 1 tablet (325 mg total) by mouth 2 (two) times daily with meals., Disp: 180 tablet, Rfl: 0    Mycophenolate Mofetil 500 MG Oral Tab, Take 1 tablet (500 mg total) by mouth daily., Disp: 90 tablet, Rfl: 0  ALLERGIES:   No Known Allergies   REVIEW OF SYSTEMS:   This information was obtained from the patient/family and chart.    See HPI for pertinent positives, otherwise 10 pt ROS negative.    HISTORY:   Past medical, surgical, family and social history updated where appropriate.    PHYSICAL EXAM:     Vitals:    10/01/24 1008   BP: 104/70   Pulse: 82   Resp: 14   Temp: 98 °F (36.7 °C)         Estimated body mass index is 22.5 kg/m² as calculated from the following:    Height as of 9/17/24: 62\".    Weight as of 9/17/24: 123 lb (55.8 kg).   No results found for: \"PGLU\"    Vital signs reviewed.Appears stated age, well groomed.    Constitutional:  Bp wnl for patient. Pulse  Regular and wnl for patient. Respirations easy and unlabored. Temperature wnl. Weight normal for height. Appearance neat and clean. Appears in no acute distress. Well nourished and well developed.    Lower extremity:  dp/pt palpable bilaterally. Extremities are free of varicosities and edema, they are scarred, scattered changes in pigmentation (hypo/hyper) with atrophie jay. Capillary refill < 3 seconds. Digits are warm. toenails are wnl for color, thickness and hygeine. Skin hydration wnl. + hairgrowth on legs.    Musculoskeletal:  Gait and station stable   Integumentary:  refer to wound characteristics and images   Psychiatric:  Judgment and insight intact. Alert and oriented times 3. No evidence of depression, anxiety, or agitation. Calm, cooperative, and communicative, but very quiet and reserved.  EDEMA:   Calf  Point of Measurement - Left Calf: 31  Point of Measurement - Right Calf: 31     Calf Left cm:: 27.8     Right Calf cm:: 29.8  Ankle  Point of Measurement - Left Ankle: 11  Point of Measurement - Right Ankle: 11     Left Ankle cm:: 17        Right Ankle cm:: 16.8     DIAGNOSTICS:     Lab Results   Component Value Date    BUN 9 09/03/2024    CREATSERUM 0.58 09/03/2024    GFRCKDEPI 121.37 12/17/2020    ALB 4.9 (H) 09/03/2024    TP 8.0 09/03/2024    A1C 5.1 12/17/2020     Lab Results   Component Value Date    ESRML 47 (H) 09/03/2024    ESRML 39 (H) 07/23/2024    ESRML 54 (H) 03/18/2024      Lab Results   Component Value Date    CRP 0.50 09/03/2024    CRP <0.40 07/23/2024    CRP 0.37 (H) 03/18/2024 11-28-23 arterial duplex-dr hilliard  FINDINGS:    LEFT EXTREMITY:  Triphasic waveforms in the left common femoral, profunda, superficial femoral arteries.  Biphasic waveforms below the left knee.   OTHER:  None.     PEAK VELOCITIES (CM/S):   LEFT COMMON FEMORAL:      165    LEFT PROFUNDA FEMORAL:      78      LEFT SUPERFICIAL FEMORAL PROX:    113   LEFT SUPERFICIAL FEMORAL MID:      94   LEFT SUPERFICIAL  FEMORAL DISTAL:    92   LEFT POPLITEAL PROX:      110   LEFT POPLITEAL DISTAL:      121   TIBIOPER TRUNK:      66   LEFT PTA PROX:      60   LEFT PTA MID:      62   LEFT PTA DISTAL:      73   LEFT BETH PROX:      82   LEFT BETH MID:      72    LEFT DORSALIS PEDIS::      30   LEFT GREAT TOE PRESSURE:      25 mmHg   CONCLUSION:    1. No high-grade stenosis is identified.  There is suggestion of diffuse mild stenosis throughout most of the left lower extremity.    2. Overall diminished left toe pressure is noted.  Possibility of decreased perfusion in the distal left foot is of consideration.     7-14-20 venous reflux study-dr santamaria  Per Dr. Santamaria review \"no intervention needed\"  WOUND ASSESSMENT:     Wound 04/26/24 #2 Ankle Right;Lateral (Active)   Date First Assessed/Time First Assessed: 04/26/24 1031    Wound Number (Wound Clinic Only): #2  Primary Wound Type: (c) Auto-immune  Location: Ankle  Wound Location Orientation: Right;Lateral      Assessments 4/26/2024 10:34 AM 10/1/2024 10:03 AM   Wound Image       Drainage Amount Unable to assess Scant   Drainage Description -- Serosanguineous   Treatments Compression --   Wound Length (cm) 0.5 cm 1 cm   Wound Width (cm) 0.7 cm 0.7 cm   Wound Surface Area (cm^2) 0.35 cm^2 0.7 cm^2   Wound Depth (cm) 0.1 cm 0.1 cm   Wound Volume (cm^3) 0.035 cm^3 0.07 cm^3   Wound Healing % -- -100   Margins Well-defined edges Well-defined edges   Non-staged Wound Description Full thickness Full thickness   Soni-wound Assessment Dry;Edema;Atrophy jay Hemosiderin staining   Wound Bed Epithelium (%) -- 5 %   Wound Odor None None   Shape 100% scabbed 95% scabbed       Inactive Orders   Date Order Priority Status Authorizing Provider   06/19/24 1145 Debridement Auto-immune Right;Lateral Ankle Routine Completed Kaz Tapia MD       Compression Wrap 05/21/24 Ankle Anterior;Left (Active)   Placement Date/Time: 05/21/24 1019   Location: Ankle  Wound Location Orientation: Anterior;Left       Assessments 5/21/2024  9:36 AM 9/24/2024  1:35 PM   Response to Treatment Well tolerated Well tolerated   Compression Layers Multilayer Multilayer   Compression Product Type Unna Boot Unna Boot   Dressing Applied Yes Yes   Compression Wrap Location Toes to Knee Toes to Knee   Compression Wrap Status Clean;Dry;Intact Clean;Dry       No associated orders.       Compression Wrap 05/28/24 Ankle Anterior;Right (Active)   Placement Date/Time: 05/28/24 1012   Location: Ankle  Wound Location Orientation: Anterior;Right      Assessments 5/28/2024  9:47 AM 9/24/2024  1:35 PM   Response to Treatment Well tolerated Well tolerated   Compression Layers Multilayer Multilayer   Compression Product Type Unna Boot Unna Boot   Dressing Applied Yes Yes   Compression Wrap Location Toes to Knee Toes to Knee   Compression Wrap Status Dry;Intact;Clean Clean;Dry       No associated orders.       Wound 07/23/24 #4 Leg Left;Lateral (Active)   Date First Assessed/Time First Assessed: 07/23/24 1137    Wound Number (Wound Clinic Only): #4  Primary Wound Type: (c) Other (comment)  Location: Leg  Wound Location Orientation: Left;Lateral      Assessments 7/23/2024 11:46 AM 10/1/2024 10:05 AM   Wound Image       Drainage Amount Unable to assess Scant   Drainage Description -- Serosanguineous   Treatments Compression --   Wound Length (cm) 2.3 cm 1.8 cm   Wound Width (cm) 1.3 cm 0.5 cm   Wound Surface Area (cm^2) 2.99 cm^2 0.9 cm^2   Wound Depth (cm) 0.1 cm 0.1 cm   Wound Volume (cm^3) 0.299 cm^3 0.09 cm^3   Wound Healing % -- 70   Margins Well-defined edges Well-defined edges   Non-staged Wound Description Full thickness Full thickness   Soni-wound Assessment Clean;Dry;Other (Comment) Hemosiderin staining   Wound Granulation Tissue Pale Grey;Pink;Firm Pink;Firm   Wound Bed Granulation (%) 45 % 20 %   Wound Bed Epithelium (%) 10 % 10 %   Wound Bed Slough (%) 45 % --   Wound Odor None None   Shape -- 70 scabbed, bridged       No associated  orders.       Wound 09/24/24 #5 Left Medial Ankle Ankle Anterior;Left (Active)   Date First Assessed/Time First Assessed: 09/24/24 1349    Wound Number (Wound Clinic Only): #5 Left Medial Ankle  Primary Wound Type: (c) Other (comment)  Location: Ankle  Wound Location Orientation: Anterior;Left      Assessments 9/24/2024  1:50 PM 10/1/2024 10:07 AM   Wound Image       Drainage Amount Unable to assess None   Treatments Compression --   Wound Length (cm) 0.3 cm 0.3 cm   Wound Width (cm) 0.4 cm 0.3 cm   Wound Surface Area (cm^2) 0.12 cm^2 0.09 cm^2   Wound Depth (cm) 0.1 cm 0 cm   Wound Volume (cm^3) 0.012 cm^3 0 cm^3   Wound Healing % -- 100   Margins Well-defined edges Well-defined edges   Non-staged Wound Description Full thickness Full thickness   Soni-wound Assessment Dry Dry;Hemosiderin staining   Wound Bed Slough (%) 100 % --   Wound Odor None None   Shape -- 100% scabbed       No associated orders.          ASSESSMENT AND PLAN:    1. 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 Non-pressure chronic ulcer of left ankle with fat layer exposed (HCC)    3. Idiopathic chronic venous hypertension of both lower extremities with ulcer and inflammation (HCC)    4. Lupus vasculitis (HCC)    5. Mixed connective tissue disease (HCC)    6. Current chronic use of systemic steroids        Risks, benefits, and alternatives of current treatment plan discussed in detail.  Questions and concerns addressed. Red flags to RTC or ED reviewed.  Patient (or parent) agrees to plan.      NOTE TO PATIENT: The 21st Century Cures Act makes clinical notes like these available to patients in the interest of transparency. Clinical notes are medical documents used by physicians and care providers to communicate with each other. These documents include medical language and terminology, abbreviations, and treatment information that may sound technical and at times possibly unfamiliar. In addition, at times, the  verbiage may appear blunt or direct. These documents are one tool providers use to communicate relevant information and clinical opinions of the care providers in a way that allows common understanding of the clinical context.   I [id number] with the patient. This time included:    preparing to see the patient (eg, review notes and recent diagnostics),  seeing the patient, obtaining and/or reviewing separately obtained history, performing a medically appropriate examination and/or evaluation, counseling and educating the patient, documenting in the record, rx  DISCHARGE INSTRUCTIONS     Patient Instructions   Please return:  1 week - bring the grey polymem foam with you    Patient discharge and wound care instructions  Raymond Olivo  10/1/2024           You may shower and cleanse area with mild soap and water, rinse wound with saline or wound cleanser, dab dry with gauze apply moisturizer and topical clobetesol, then your polymem ag dressing/secure with silicone tape and spandagrip     DO NOT LEAVE WOUNDS OPEN TO AIR, COVER IMMEDIATELY AFTER SHOWER        Nutrition and blood sugar control:  Focus on the following:  Protein: Meats, beans, eggs, milk and yogurt particularly Greek yogurt), tofu, soy nuts, soy protein products (Follow the protein handout in your welcome folder)  Vitamin C: Citrus fruits and juices, strawberries, tomatoes, tomato juice, peppers, baked potatoes, spinach, broccoli, cauliflower, [place] sprouts, cabbage  Vitamin A: Dark green, leafy vegetables, orange or yellow vegetables, cantaloupe, fortified dairy products, liver, fortified cereals  Zinc: Fortified cereals, red meats, seafood  Consider supplementing with Justin by [employer]. It can be purchased on amazon, [employer] website, or local pharmacy may be able to order it for you.  (These are essential branch chain amino acids that help with tissue building and wound healing).     Concerns:  Signs of infection may include the  following:  Increase in redness  Red \"streaks\" from wound  Increase in swelling  Fever  Unusual odor  Change in the amount of wound drainage     Should you experience any significant changes in your wound(s) or have any questions regarding your home care instructions please contact the [facility] @ [phone] If after regular business hours, please call your family doctor or local emergency room. The treatment plan has been discussed at length between you and your provider. Follow all instructions carefully, it is very important. If you do not follow all instructions you are at risk of your wound not healing, infection, possible loss of limb and even loss of life.        Martina Mckeon FNP-C, CWCN-AP, CFCN, CSWS, WCC, DWC  10/1/2024

## 2024-10-01 ENCOUNTER — OFFICE VISIT (OUTPATIENT)
Dept: WOUND CARE | Facility: HOSPITAL | Age: 49
End: 2024-10-01
Attending: NURSE PRACTITIONER
Payer: COMMERCIAL

## 2024-10-01 VITALS
RESPIRATION RATE: 14 BRPM | SYSTOLIC BLOOD PRESSURE: 104 MMHG | DIASTOLIC BLOOD PRESSURE: 70 MMHG | HEART RATE: 82 BPM | TEMPERATURE: 98 F

## 2024-10-01 DIAGNOSIS — L97.312 NON-PRESSURE CHRONIC ULCER OF RIGHT ANKLE WITH FAT LAYER EXPOSED (HCC): Primary | ICD-10-CM

## 2024-10-01 DIAGNOSIS — L97.322 NON-PRESSURE CHRONIC ULCER OF LEFT ANKLE WITH FAT LAYER EXPOSED (HCC): ICD-10-CM

## 2024-10-01 DIAGNOSIS — I77.89 LUPUS VASCULITIS (HCC): ICD-10-CM

## 2024-10-01 DIAGNOSIS — Z79.52 CURRENT CHRONIC USE OF SYSTEMIC STEROIDS: ICD-10-CM

## 2024-10-01 DIAGNOSIS — I87.333 IDIOPATHIC CHRONIC VENOUS HYPERTENSION OF BOTH LOWER EXTREMITIES WITH ULCER AND INFLAMMATION (HCC): ICD-10-CM

## 2024-10-01 DIAGNOSIS — M32.19 LUPUS VASCULITIS (HCC): ICD-10-CM

## 2024-10-01 DIAGNOSIS — M35.1 MIXED CONNECTIVE TISSUE DISEASE (HCC): ICD-10-CM

## 2024-10-01 PROCEDURE — 99214 OFFICE O/P EST MOD 30 MIN: CPT | Performed by: NURSE PRACTITIONER

## 2024-10-01 RX ORDER — CLOBETASOL PROPIONATE 0.5 MG/ML
SOLUTION TOPICAL
Qty: 50 ML | Refills: 0 | Status: SHIPPED | OUTPATIENT
Start: 2024-10-01

## 2024-10-01 NOTE — PATIENT INSTRUCTIONS
Please return:  1 week - bring the grey polymem foam with you    Patient discharge and wound care instructions  Raymond Olivo  10/1/2024           You may shower and cleanse area with mild soap and water, rinse wound with saline or wound cleanser, dab dry with gauze apply moisturizer and topical clobetesol, then your polymem ag dressing/secure with silicone tape and spandagrip     DO NOT LEAVE WOUNDS OPEN TO AIR, COVER IMMEDIATELY AFTER SHOWER        Nutrition and blood sugar control:  Focus on the following:  Protein: Meats, beans, eggs, milk and yogurt particularly Greek yogurt), tofu, soy nuts, soy protein products (Follow the protein handout in your welcome folder)  Vitamin C: Citrus fruits and juices, strawberries, tomatoes, tomato juice, peppers, baked potatoes, spinach, broccoli, cauliflower, [place] sprouts, cabbage  Vitamin A: Dark green, leafy vegetables, orange or yellow vegetables, cantaloupe, fortified dairy products, liver, fortified cereals  Zinc: Fortified cereals, red meats, seafood  Consider supplementing with Justin by [employer]. It can be purchased on amazon, [employer] website, or local pharmacy may be able to order it for you.  (These are essential branch chain amino acids that help with tissue building and wound healing).     Concerns:  Signs of infection may include the following:  Increase in redness  Red \"streaks\" from wound  Increase in swelling  Fever  Unusual odor  Change in the amount of wound drainage     Should you experience any significant changes in your wound(s) or have any questions regarding your home care instructions please contact the [facility] @ [phone] If after regular business hours, please call your family doctor or local emergency room. The treatment plan has been discussed at length between you and your provider. Follow all instructions carefully, it is very important. If you do not follow all instructions you are at risk of your wound not  healing, infection, possible loss of limb and even loss of life.

## 2024-10-01 NOTE — PROGRESS NOTES
.Weekly Wound Education Note    Teaching Provided To: Patient;Family  Training Topics: Dressing;Edema control;Compression;Cleasing and general instructions;Discharge instructions  Training Method: Explain/Verbal;Written  Training Response: Patient responds and understands        Notes: Wounds improving. Dressing changed to polymem silver max, and silicone tape. Compression decreased to spandagrip D. Will order spandagrip and silicone tape this visit.

## 2024-10-07 NOTE — PROGRESS NOTES
CHIEF COMPLAINT:     Chief Complaint   Patient presents with    Wound Recheck     Pt arrives for wound care follow-up with bilateral legs in spandagrip compression. Her wounds are dressed with polymem foam covered by medipore tape. Denies new concerns.     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 Patient saw dr. Santamaria (vascular) in December 2023 and he documented \"I explained to the patient and her  that I do not believe the source of her pain to be vascular in origin.  She has an easily palpable dorsalis pedis pulse and the location of the pain is more suggestive of a neuropathic origin.  I suspect perhaps that given the tightness of her feet from her mixed connective tissue disorder the compression stockings is causing somewhat compression of the nerves in that area and I have encouraged her to substitute those stockings to those that involve the feet up to the upper calf.\" Patient/spouse have been in contact with dr. Farmer and in march 2024 reported a dark spot/ulcer on her bilateral lower legs, she was advised to make an appointment with wound care as well as get her blood work done ordered in January 2024 so that further treatment plan could be extablished.  The blood work did show increased inflammation and she was advised to increase her prednisone to 20mg x 5 days (which spouse reports they did do) and take the amlodipine 2.5 mg-which she states she is doing.  They have been leaving the areas open to air and she has not been wearing compression.  She c/o burning sensation in her ankle area.  She was started on gabapentin by dr. Farmer last year, however they stopped it because it was making her too sleepy. We discussed restarting the gabapentin, but only take a noc first.  Both wounds are  dried eschar.  Will utilize hydrogel with lidocaine to start debriding off/softening the eschar. I have also ordered santyl. Patient placed into spandagrips for compression    HPI PRIOR TO OCTOBER 2024 SEE INDIVIDUAL PROGRESS NOTES  9-10-24 patient returns.   She did get her labs drawn last week and she has an appointment with Dr. Farmer on 9-17-24. Her esr is up slightly from July however I suspect this may be related to the decrease in her po steroid.   Last week we added gina and continued with the xeroform.  We also utilize the debrisoft to cleanse the wounds and periwounds.  Today, wounds are improved, but the gina appears to just have stayed in the wound bed and did not integrate, therefore will not utilize gina this week. Her pain remains mostly on the lateral ankle.  We no longer have the accumulating exudate, overall her skin is much improved and more healthy in appearance.  No s/s of infection.    9-17-24 patient returns.  She is coming from an appointment with Dr. Farmer, plan is to keep meds same at this time.  Wounds have been improving (albeit slowly) in size and wound bed characteristics. The medial wounds are fully resolved, bilaterally, she is remaining with lateral ankle wounds bilaterally. She continues with the riley.  No s/s of infection. C/o pain with any touch.    9-24-24 patient returns.  She got her retinal exam done as ordered by dr. Farmer this morning, spouse reports everything is good.   Last week the medial wounds were resolved, however today there is a small open area on the left medial distal leg (almost to the heel).  Patient/spouse report that she has not worn any other shoes. There is some surrounding hyperemia to this wound, but patient does not report any increase in pain to the area as compared to other weeks.  The lateral wounds appear a bit moist.  Will utilize ag alginate (instead of xeroform) will also utilize clobetesol topically.'    10-1-24 patient returns.  Last  week we utilize ag alginate as the wounds/periwounds were moist. Today wounds are improved.  She did bring the polymem ag.  Patient has a blancheable area along anterior ankle on the left.  Will order spandagrip and silicone tape from [facility].  I will also rx topical steroid. Patient will change dressings 1 x this week. No s/s of infection. She states the right lower extremity pain is improved. She still has burning on the left.     10-8-24 patient returns. Last week we removed the calamine wraps and went over self care, cleansing, dressing, and compression. She was to change the dressings after shower/cleansing once this week. Today wounds are very dessicated with surrounding dried exudate, her right does not have pain, left lateral is still painful.  We used debrimit and forceps to remove the surrounding exudate/scabbing as patient could tolerate. Will utilize small amount of hydrogel to the left. Will dress with xeroform and foam. Continue with spandagrip. Patient to leave all dressings in place for the next week. No s/s of infection  MEDICATIONS:     Current Outpatient Medications:     clobetasol 0.05 % External Solution, Apply topically to lower legs after shower 3 x weekly, Disp: 50 mL, Rfl: 0    amLODIPine 2.5 MG Oral Tab, Take 1 tablet (2.5 mg total) by mouth 2 (two) times daily., Disp: 180 tablet, Rfl: 0    hydroxychloroquine 200 MG Oral Tab, Take 1 tablet (200 mg total) by mouth daily., Disp: 90 tablet, Rfl: 0    predniSONE 2.5 MG Oral Tab, Take 1 tablet (2.5 mg total) by mouth daily., Disp: 90 tablet, Rfl: 0    gabapentin 100 MG Oral Cap, Take 100mg in am and 200mg at bedtime., Disp: , Rfl:     mycophenolate mofetil 250 MG Oral Cap, Take 1 capsule (250 mg total) by mouth at bedtime., Disp: 90 capsule, Rfl: 0    Ferrous Sulfate 325 (65 Fe) MG Oral Tab, Take 1 tablet (325 mg total) by mouth 2 (two) times daily with meals., Disp: 180 tablet, Rfl: 0    Mycophenolate Mofetil 500 MG Oral Tab, Take 1 tablet (500  mg total) by mouth daily., Disp: 90 tablet, Rfl: 0  ALLERGIES:   No Known Allergies   REVIEW OF SYSTEMS:   This information was obtained from the patient/family and chart.    See HPI for pertinent positives, otherwise 10 pt ROS negative.    HISTORY:   Past medical, surgical, family and social history updated where appropriate.    PHYSICAL EXAM:     Vitals:    10/08/24 1004   BP: 104/68   Pulse: 87   Resp: 16   Temp: 97.9 °F (36.6 °C)           Estimated body mass index is 22.5 kg/m² as calculated from the following:    Height as of 9/17/24: 62\".    Weight as of 9/17/24: 123 lb (55.8 kg).   No results found for: \"PGLU\"    Vital signs reviewed.Appears stated age, well groomed.    Constitutional:  Bp wnl for patient. Pulse Regular and wnl for patient. Respirations easy and unlabored. Temperature wnl. Weight normal for height. Appearance neat and clean. Appears in no acute distress. Well nourished and well developed.    Lower extremity:  dp/pt palpable bilaterally. Extremities are free of varicosities and edema, they are scarred, scattered changes in pigmentation (hypo/hyper) with atrophie jay. Capillary refill < 3 seconds. Digits are warm. toenails are wnl for color, thickness and hygeine. Skin hydration wnl. + hairgrowth on legs.    Musculoskeletal:  Gait and station stable   Integumentary:  refer to wound characteristics and images   Psychiatric:  Judgment and insight intact. Alert and oriented times 3. No evidence of depression, anxiety, or agitation. Calm, cooperative, and communicative, but very quiet and reserved.  EDEMA:   Calf  Point of Measurement - Left Calf: 31  Point of Measurement - Right Calf: 31  Left Calf from:: Heel  Calf Left cm:: 28  Right Calf from:: Heel  Right Calf cm:: 29.1  Ankle  Point of Measurement - Left Ankle: 11  Point of Measurement - Right Ankle: 11  Left Ankle from:: Heel  Left Ankle cm:: 17.2     Right Ankle from:: Heel  Right Ankle cm:: 16.7     DIAGNOSTICS:     Lab Results    Component Value Date    BUN 9 09/03/2024    CREATSERUM 0.58 09/03/2024    GFRCKDEPI 121.37 12/17/2020    ALB 4.9 (H) 09/03/2024    TP 8.0 09/03/2024    A1C 5.1 12/17/2020     Lab Results   Component Value Date    ESRML 47 (H) 09/03/2024    ESRML 39 (H) 07/23/2024    ESRML 54 (H) 03/18/2024      Lab Results   Component Value Date    CRP 0.50 09/03/2024    CRP <0.40 07/23/2024    CRP 0.37 (H) 03/18/2024 11-28-23 arterial duplex-dr santamaria  FINDINGS:    LEFT EXTREMITY:  Triphasic waveforms in the left common femoral, profunda, superficial femoral arteries.  Biphasic waveforms below the left knee.   OTHER:  None.     PEAK VELOCITIES (CM/S):   LEFT COMMON FEMORAL:      165    LEFT PROFUNDA FEMORAL:      78      LEFT SUPERFICIAL FEMORAL PROX:    113   LEFT SUPERFICIAL FEMORAL MID:      94   LEFT SUPERFICIAL FEMORAL DISTAL:    92   LEFT POPLITEAL PROX:      110   LEFT POPLITEAL DISTAL:      121   TIBIOPER TRUNK:      66   LEFT PTA PROX:      60   LEFT PTA MID:      62   LEFT PTA DISTAL:      73   LEFT BETH PROX:      82   LEFT BETH MID:      72    LEFT DORSALIS PEDIS::      30   LEFT GREAT TOE PRESSURE:      25 mmHg   CONCLUSION:    1. No high-grade stenosis is identified.  There is suggestion of diffuse mild stenosis throughout most of the left lower extremity.    2. Overall diminished left toe pressure is noted.  Possibility of decreased perfusion in the distal left foot is of consideration.     7-14-20 venous reflux study-dr santamaria  Per Dr. Santamaria review \"no intervention needed\"  WOUND ASSESSMENT:     Wound 04/26/24 #2 Ankle Right;Lateral (Active)   Date First Assessed/Time First Assessed: 04/26/24 1031    Wound Number (Wound Clinic Only): #2  Primary Wound Type: (c) Auto-immune  Location: Ankle  Wound Location Orientation: Right;Lateral      Assessments 4/26/2024 10:34 AM 10/8/2024  9:58 AM   Wound Image       Drainage Amount Unable to assess None   Treatments Compression --   Wound Length (cm) 0.5 cm 0.9 cm   Wound  Width (cm) 0.7 cm 0.7 cm   Wound Surface Area (cm^2) 0.35 cm^2 0.63 cm^2   Wound Depth (cm) 0.1 cm 0 cm   Wound Volume (cm^3) 0.035 cm^3 0 cm^3   Wound Healing % -- 100   Margins Well-defined edges Well-defined edges   Non-staged Wound Description Full thickness Full thickness   Soni-wound Assessment Dry;Edema;Atrophy jay Hemosiderin staining;Dry   Wound Bed Epithelium (%) -- 10 %   Wound Odor None None   Shape 100% scabbed Bridged, 90% scab   Tunneling? -- No   Undermining? -- No   Sinus Tracts? -- No       Inactive Orders   Date Order Priority Status Authorizing Provider   06/19/24 1145 Debridement Auto-immune Right;Lateral Ankle Routine Completed Kaz Tapia MD       Compression Wrap 05/21/24 Ankle Anterior;Left (Active)   Placement Date/Time: 05/21/24 1019   Location: Ankle  Wound Location Orientation: Anterior;Left      Assessments 5/21/2024  9:36 AM 9/24/2024  1:35 PM   Response to Treatment Well tolerated Well tolerated   Compression Layers Multilayer Multilayer   Compression Product Type Unna Boot Unna Boot   Dressing Applied Yes Yes   Compression Wrap Location Toes to Knee Toes to Knee   Compression Wrap Status Clean;Dry;Intact Clean;Dry       No associated orders.       Compression Wrap 05/28/24 Ankle Anterior;Right (Active)   Placement Date/Time: 05/28/24 1012   Location: Ankle  Wound Location Orientation: Anterior;Right      Assessments 5/28/2024  9:47 AM 9/24/2024  1:35 PM   Response to Treatment Well tolerated Well tolerated   Compression Layers Multilayer Multilayer   Compression Product Type Unna Boot Unna Boot   Dressing Applied Yes Yes   Compression Wrap Location Toes to Knee Toes to Knee   Compression Wrap Status Dry;Intact;Clean Clean;Dry       No associated orders.       Wound 07/23/24 #4 Leg Left;Lateral (Active)   Date First Assessed/Time First Assessed: 07/23/24 1137    Wound Number (Wound Clinic Only): #4  Primary Wound Type: (c) Other (comment)  Location: Leg  Wound Location  Orientation: Left;Lateral      Assessments 7/23/2024 11:46 AM 10/8/2024  9:58 AM   Wound Image       Drainage Amount Unable to assess Scant   Drainage Description -- Serosanguineous   Treatments Compression --   Wound Length (cm) 2.3 cm 2.1 cm   Wound Width (cm) 1.3 cm 0.3 cm   Wound Surface Area (cm^2) 2.99 cm^2 0.63 cm^2   Wound Depth (cm) 0.1 cm 0.1 cm   Wound Volume (cm^3) 0.299 cm^3 0.063 cm^3   Wound Healing % -- 79   Margins Well-defined edges Well-defined edges   Non-staged Wound Description Full thickness Full thickness   Soni-wound Assessment Clean;Dry;Other (Comment) Hemosiderin staining   Wound Granulation Tissue Pale Grey;Pink;Firm Pink;Firm   Wound Bed Granulation (%) 45 % 10 %   Wound Bed Epithelium (%) 10 % 30 %   Wound Bed Slough (%) 45 % --   Wound Odor None None   Shape -- Bridged, 60% scabbed   Tunneling? -- No   Undermining? -- No   Sinus Tracts? -- No       No associated orders.       Wound 09/24/24 #5 Left Medial Ankle Ankle Anterior;Left (Active)   Date First Assessed/Time First Assessed: 09/24/24 1349    Wound Number (Wound Clinic Only): #5 Left Medial Ankle  Primary Wound Type: (c) Other (comment)  Location: Ankle  Wound Location Orientation: Anterior;Left      Assessments 9/24/2024  1:50 PM 10/8/2024  9:58 AM   Wound Image       Drainage Amount Unable to assess None   Treatments Compression --   Wound Length (cm) 0.3 cm 0.4 cm   Wound Width (cm) 0.4 cm 0.3 cm   Wound Surface Area (cm^2) 0.12 cm^2 0.12 cm^2   Wound Depth (cm) 0.1 cm 0 cm   Wound Volume (cm^3) 0.012 cm^3 0 cm^3   Wound Healing % -- 100   Margins Well-defined edges Well-defined edges   Non-staged Wound Description Full thickness Full thickness   Soni-wound Assessment Dry Dry;Hemosiderin staining   Wound Bed Slough (%) 100 % --   Wound Odor None None   Shape -- 100% scab   Tunneling? -- No   Undermining? -- No   Sinus Tracts? -- No       No associated orders.          ASSESSMENT AND PLAN:    1. Non-pressure chronic ulcer of  right ankle with fat layer exposed (HCC)    2. Non-pressure chronic ulcer of left ankle with fat layer exposed (HCC)    3. Idiopathic chronic venous hypertension of both lower extremities with ulcer and inflammation (HCC)    4. Lupus vasculitis (HCC)    5. Mixed connective tissue disease (HCC)    6. Current chronic use of systemic steroids          Risks, benefits, and alternatives of current treatment plan discussed in detail.  Questions and concerns addressed. Red flags to RTC or ED reviewed.  Patient (or parent) agrees to plan.      NOTE TO PATIENT: The 21st Century Cures Act makes clinical notes like these available to patients in the interest of transparency. Clinical notes are medical documents used by physicians and care providers to communicate with each other. These documents include medical language and terminology, abbreviations, and treatment information that may sound technical and at times possibly unfamiliar. In addition, at times, the verbiage may appear blunt or direct. These documents are one tool providers use to communicate relevant information and clinical opinions of the care providers in a way that allows common understanding of the clinical context.   I [id number] with the patient. This time included:    preparing to see the patient (eg, review notes and recent diagnostics),  seeing the patient, obtaining and/or reviewing separately obtained history, performing a medically appropriate examination and/or evaluation, counseling and educating the patient, documenting in the record  DISCHARGE INSTRUCTIONS     Patient Instructions   Please return:  1 week     Patient discharge and wound care instructions  Raymond Olivo  10/8/2024         You may shower with protection of the wound (ie a cast cover or similar).     Changing your dressing:            leave in place for 1 week    Wash your hands with soap and water.  Ensure that the old dressing is removed completely. Place it in a plastic  bag and throw it in the trash.  Cleanse the wound with hypochlorous wound cleanser (ie. Anasept, vashe, pure and clean).  It's ok to “scrub” your wound with the gauze, small amount of bleeding with cleansing is normal and ok.  Apply the following dressings:  clobetesol    left: small amount silver hydrogel>folded xerform>border foam  Right: folded xeroform>bordered foam     Spandagrip from base of toes to knee    Nutrition and blood sugar control:  Focus on the following:  Protein: Meats, beans, eggs, milk and yogurt particularly Greek yogurt), tofu, soy nuts, soy protein products (Follow the protein handout in your welcome folder)  Vitamin C: Citrus fruits and juices, strawberries, tomatoes, tomato juice, peppers, baked potatoes, spinach, broccoli, cauliflower, [place] sprouts, cabbage  Vitamin A: Dark green, leafy vegetables, orange or yellow vegetables, cantaloupe, fortified dairy products, liver, fortified cereals  Zinc: Fortified cereals, red meats, seafood  Consider supplementing with Justin by [email or web address]. It can be purchased on amazon, [employer] website, or local pharmacy may be able to order it for you.  (These are essential branch chain amino acids that help with tissue building and wound healing).     Concerns:  Signs of infection may include the following:  Increase in redness  Red \"streaks\" from wound  Increase in swelling  Fever  Unusual odor  Change in the amount of wound drainage     Should you experience any significant changes in your wound(s) or have any questions regarding your home care instructions please contact the [facility] @ [phone] If after regular business hours, please call your family doctor or local emergency room. The treatment plan has been discussed at length between you and your provider. Follow all instructions carefully, it is very important. If you do not follow all instructions you are at risk of your wound not healing, infection, possible loss of limb and  even loss of life.        Martina Mckeon FNP-C, CWCN-AP, CFCN, CSWS, WCC, DWC  10/8/2024

## 2024-10-08 ENCOUNTER — OFFICE VISIT (OUTPATIENT)
Dept: WOUND CARE | Facility: HOSPITAL | Age: 49
End: 2024-10-08
Attending: NURSE PRACTITIONER
Payer: COMMERCIAL

## 2024-10-08 VITALS
DIASTOLIC BLOOD PRESSURE: 68 MMHG | SYSTOLIC BLOOD PRESSURE: 104 MMHG | HEART RATE: 87 BPM | RESPIRATION RATE: 16 BRPM | TEMPERATURE: 98 F

## 2024-10-08 DIAGNOSIS — M32.19 LUPUS VASCULITIS (HCC): ICD-10-CM

## 2024-10-08 DIAGNOSIS — Z79.52 CURRENT CHRONIC USE OF SYSTEMIC STEROIDS: ICD-10-CM

## 2024-10-08 DIAGNOSIS — L97.312 NON-PRESSURE CHRONIC ULCER OF RIGHT ANKLE WITH FAT LAYER EXPOSED (HCC): Primary | ICD-10-CM

## 2024-10-08 DIAGNOSIS — M35.1 MIXED CONNECTIVE TISSUE DISEASE (HCC): ICD-10-CM

## 2024-10-08 DIAGNOSIS — I87.333 IDIOPATHIC CHRONIC VENOUS HYPERTENSION OF BOTH LOWER EXTREMITIES WITH ULCER AND INFLAMMATION (HCC): ICD-10-CM

## 2024-10-08 DIAGNOSIS — I77.89 LUPUS VASCULITIS (HCC): ICD-10-CM

## 2024-10-08 DIAGNOSIS — L97.322 NON-PRESSURE CHRONIC ULCER OF LEFT ANKLE WITH FAT LAYER EXPOSED (HCC): ICD-10-CM

## 2024-10-08 PROCEDURE — 99214 OFFICE O/P EST MOD 30 MIN: CPT | Performed by: NURSE PRACTITIONER

## 2024-10-08 NOTE — PROGRESS NOTES
.Weekly Wound Education Note    Teaching Provided To: Patient;Family  Training Topics: Dressing;Edema control;Cleasing and general instructions;Compression;Discharge instructions  Training Method: Explain/Verbal;Written  Training Response: Patient responds and understands        Notes: Wounds stable. Dressing changed to clobetasol and folded xeroform with bordered foam to the right ankle. Dressing changed to silver hydrogel and folded xeroform with bordered foam to both left ankle wounds. Spandagrip D applied to both legs.

## 2024-10-08 NOTE — PATIENT INSTRUCTIONS
Please return:  1 week     Patient discharge and wound care instructions  Raymond Martinezu  10/8/2024         You may shower with protection of the wound (ie a cast cover or similar).     Changing your dressing:            leave in place for 1 week    Wash your hands with soap and water.  Ensure that the old dressing is removed completely. Place it in a plastic bag and throw it in the trash.  Cleanse the wound with hypochlorous wound cleanser (ie. Anasept, vashe, pure and clean).  It's ok to “scrub” your wound with the gauze, small amount of bleeding with cleansing is normal and ok.  Apply the following dressings:  clobetesol    left: small amount silver hydrogel>folded xerform>border foam  Right: folded xeroform>bordered foam     Spandagrip from base of toes to knee    Nutrition and blood sugar control:  Focus on the following:  Protein: Meats, beans, eggs, milk and yogurt particularly Greek yogurt), tofu, soy nuts, soy protein products (Follow the protein handout in your welcome folder)  Vitamin C: Citrus fruits and juices, strawberries, tomatoes, tomato juice, peppers, baked potatoes, spinach, broccoli, cauliflower, [place] sprouts, cabbage  Vitamin A: Dark green, leafy vegetables, orange or yellow vegetables, cantaloupe, fortified dairy products, liver, fortified cereals  Zinc: Fortified cereals, red meats, seafood  Consider supplementing with Justin by [employer]. It can be purchased on amazon, [employer] website, or local pharmacy may be able to order it for you.  (These are essential branch chain amino acids that help with tissue building and wound healing).     Concerns:  Signs of infection may include the following:  Increase in redness  Red \"streaks\" from wound  Increase in swelling  Fever  Unusual odor  Change in the amount of wound drainage     Should you experience any significant changes in your wound(s) or have any questions regarding your home care instructions please contact the wound center [facility] @ [phone] If after regular business hours, please call your family doctor or local emergency room. The treatment plan has been discussed at length between you and your provider. Follow all instructions carefully, it is very important. If you do not follow all instructions you are at risk of your wound not healing, infection, possible loss of limb and even loss of life.

## 2024-10-14 NOTE — PROGRESS NOTES
CHIEF COMPLAINT:     No chief complaint on file.    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 Patient saw dr. Santamaria (vascular) in December 2023 and he documented \"I explained to the patient and her  that I do not believe the source of her pain to be vascular in origin.  She has an easily palpable dorsalis pedis pulse and the location of the pain is more suggestive of a neuropathic origin.  I suspect perhaps that given the tightness of her feet from her mixed connective tissue disorder the compression stockings is causing somewhat compression of the nerves in that area and I have encouraged her to substitute those stockings to those that involve the feet up to the upper calf.\" Patient/spouse have been in contact with dr. Farmer and in march 2024 reported a dark spot/ulcer on her bilateral lower legs, she was advised to make an appointment with wound care as well as get her blood work done ordered in January 2024 so that further treatment plan could be extablished.  The blood work did show increased inflammation and she was advised to increase her prednisone to 20mg x 5 days (which spouse reports they did do) and take the amlodipine 2.5 mg-which she states she is doing.  They have been leaving the areas open to air and she has not been wearing compression.  She c/o burning sensation in her ankle area.  She was started on gabapentin by dr. Farmer last year, however they stopped it because it was making her too sleepy. We discussed restarting the gabapentin, but only take a noc first.  Both wounds are dried eschar.  Will utilize hydrogel with lidocaine to start debriding off/softening the eschar. I have also ordered santyl. Patient placed into spandagrips for compression    HPI PRIOR TO OCTOBER 2024 SEE  INDIVIDUAL PROGRESS NOTES  9-10-24 patient returns.   She did get her labs drawn last week and she has an appointment with Dr. Farmer on 9-17-24. Her esr is up slightly from July however I suspect this may be related to the decrease in her po steroid.   Last week we added gina and continued with the xeroform.  We also utilize the debrisoft to cleanse the wounds and periwounds.  Today, wounds are improved, but the gina appears to just have stayed in the wound bed and did not integrate, therefore will not utilize gina this week. Her pain remains mostly on the lateral ankle.  We no longer have the accumulating exudate, overall her skin is much improved and more healthy in appearance.  No s/s of infection.    9-17-24 patient returns.  She is coming from an appointment with Dr. Farmer, plan is to keep meds same at this time.  Wounds have been improving (albeit slowly) in size and wound bed characteristics. The medial wounds are fully resolved, bilaterally, she is remaining with lateral ankle wounds bilaterally. She continues with the riley.  No s/s of infection. C/o pain with any touch.    9-24-24 patient returns.  She got her retinal exam done as ordered by dr. Farmer this morning, spouse reports everything is good.   Last week the medial wounds were resolved, however today there is a small open area on the left medial distal leg (almost to the heel).  Patient/spouse report that she has not worn any other shoes. There is some surrounding hyperemia to this wound, but patient does not report any increase in pain to the area as compared to other weeks.  The lateral wounds appear a bit moist.  Will utilize ag alginate (instead of xeroform) will also utilize clobetesol topically.'    10-1-24 patient returns.  Last week we utilize ag alginate as the wounds/periwounds were moist. Today wounds are improved.  She did bring the polymem ag.  Patient has a blancheable area along anterior ankle on the left.  Will order  spandagrip and silicone tape from [facility].  I will also rx topical steroid. Patient will change dressings 1 x this week. No s/s of infection. She states the right lower extremity pain is improved. She still has burning on the left.     10-8-24 patient returns. Last week we removed the calamine wraps and went over self care, cleansing, dressing, and compression. She was to change the dressings after shower/cleansing once this week. Today wounds are very dessicated with surrounding dried exudate, her right does not have pain, left lateral is still painful.  We used debrimit and forceps to remove the surrounding exudate/scabbing as patient could tolerate. Will utilize small amount of hydrogel to the left. Will dress with xeroform and foam. Continue with spandagrip. Patient to leave all dressings in place for the next week. No s/s of infection    10-15-24 patient returns.  We returned to leaving dressings in place for the full week last week but using spandagrip (not wrap) for compression.  Today wounds are ****moist?*** painful?**** smaller?****  MEDICATIONS:     Current Outpatient Medications:     clobetasol 0.05 % External Solution, Apply topically to lower legs after shower 3 x weekly, Disp: 50 mL, Rfl: 0    amLODIPine 2.5 MG Oral Tab, Take 1 tablet (2.5 mg total) by mouth 2 (two) times daily., Disp: 180 tablet, Rfl: 0    hydroxychloroquine 200 MG Oral Tab, Take 1 tablet (200 mg total) by mouth daily., Disp: 90 tablet, Rfl: 0    predniSONE 2.5 MG Oral Tab, Take 1 tablet (2.5 mg total) by mouth daily., Disp: 90 tablet, Rfl: 0    gabapentin 100 MG Oral Cap, Take 100mg in am and 200mg at bedtime., Disp: , Rfl:     mycophenolate mofetil 250 MG Oral Cap, Take 1 capsule (250 mg total) by mouth at bedtime., Disp: 90 capsule, Rfl: 0    Ferrous Sulfate 325 (65 Fe) MG Oral Tab, Take 1 tablet (325 mg total) by mouth 2 (two) times daily with meals., Disp: 180 tablet, Rfl: 0    Mycophenolate Mofetil 500 MG Oral Tab, Take 1 tablet  (500 mg total) by mouth daily., Disp: 90 tablet, Rfl: 0  ALLERGIES:   No Known Allergies   REVIEW OF SYSTEMS:   This information was obtained from the patient/family and chart.    See HPI for pertinent positives, otherwise 10 pt ROS negative.    HISTORY:   Past medical, surgical, family and social history updated where appropriate.    PHYSICAL EXAM:     There were no vitals filed for this visit.          Estimated body mass index is 22.5 kg/m² as calculated from the following:    Height as of 9/17/24: 62\".    Weight as of 9/17/24: 123 lb (55.8 kg).   No results found for: \"PGLU\"    Vital signs reviewed.Appears stated age, well groomed.    Constitutional:  Bp ***wnl for patient. Pulse Regular and wnl for patient. Respirations easy and unlabored. Temperature wnl. Weight normal for height. Appearance neat and clean. Appears in no acute distress. Well nourished and well developed.    Lower extremity:  dp/pt palpable*** bilaterally. Extremities are free of varicosities and edema, they are scarred, scattered changes in pigmentation (hypo/hyper) with atrophie jay. Capillary refill < 3 seconds. Digits are warm. toenails are wnl for color, thickness and hygeine. Skin hydration wnl. + hairgrowth on legs.    Musculoskeletal:  Gait and station stable   Integumentary:  refer to wound characteristics and images   Psychiatric:  Judgment and insight intact. Alert and oriented times 3. No evidence of depression, anxiety, or agitation. Calm, cooperative, and communicative, but very quiet and reserved.  EDEMA:   Calf                    Ankle                          DIAGNOSTICS:     Lab Results   Component Value Date    BUN 9 09/03/2024    CREATSERUM 0.58 09/03/2024    GFRCKDEPI 121.37 12/17/2020    ALB 4.9 (H) 09/03/2024    TP 8.0 09/03/2024    A1C 5.1 12/17/2020     Lab Results   Component Value Date    ESRML 47 (H) 09/03/2024    ESRML 39 (H) 07/23/2024    ESRML 54 (H) 03/18/2024      Lab Results   Component Value Date    CRP  0.50 09/03/2024    CRP <0.40 07/23/2024    CRP 0.37 (H) 03/18/2024 11-28-23 arterial duplex-dr santamaria  FINDINGS:    LEFT EXTREMITY:  Triphasic waveforms in the left common femoral, profunda, superficial femoral arteries.  Biphasic waveforms below the left knee.   OTHER:  None.     PEAK VELOCITIES (CM/S):   LEFT COMMON FEMORAL:      165    LEFT PROFUNDA FEMORAL:      78      LEFT SUPERFICIAL FEMORAL PROX:    113   LEFT SUPERFICIAL FEMORAL MID:      94   LEFT SUPERFICIAL FEMORAL DISTAL:    92   LEFT POPLITEAL PROX:      110   LEFT POPLITEAL DISTAL:      121   TIBIOPER TRUNK:      66   LEFT PTA PROX:      60   LEFT PTA MID:      62   LEFT PTA DISTAL:      73   LEFT BETH PROX:      82   LEFT BETH MID:      72    LEFT DORSALIS PEDIS::      30   LEFT GREAT TOE PRESSURE:      25 mmHg   CONCLUSION:    1. No high-grade stenosis is identified.  There is suggestion of diffuse mild stenosis throughout most of the left lower extremity.    2. Overall diminished left toe pressure is noted.  Possibility of decreased perfusion in the distal left foot is of consideration.     7-14-20 venous reflux study-dr santamaria  Per Dr. Santamaria review \"no intervention needed\"  WOUND ASSESSMENT:     Wound 04/26/24 #2 Ankle Right;Lateral (Active)   Date First Assessed/Time First Assessed: 04/26/24 1031    Wound Number (Wound Clinic Only): #2  Primary Wound Type: (c) Auto-immune  Location: Ankle  Wound Location Orientation: Right;Lateral      Assessments 4/26/2024 10:34 AM 10/8/2024  9:58 AM   Wound Image       Drainage Amount Unable to assess None   Treatments Compression Compression   Wound Length (cm) 0.5 cm 0.9 cm   Wound Width (cm) 0.7 cm 0.7 cm   Wound Surface Area (cm^2) 0.35 cm^2 0.63 cm^2   Wound Depth (cm) 0.1 cm 0 cm   Wound Volume (cm^3) 0.035 cm^3 0 cm^3   Wound Healing % -- 100   Margins Well-defined edges Well-defined edges   Non-staged Wound Description Full thickness Full thickness   Soni-wound Assessment Dry;Edema;Atrophy jay  Hemosiderin staining;Dry   Wound Bed Epithelium (%) -- 10 %   Wound Odor None None   Shape 100% scabbed Bridged, 90% scab   Tunneling? -- No   Undermining? -- No   Sinus Tracts? -- No       Inactive Orders   Date Order Priority Status Authorizing Provider   06/19/24 1145 Debridement Auto-immune Right;Lateral Ankle Routine Completed Kaz Tapia MD       Compression Wrap 05/21/24 Ankle Anterior;Left (Active)   Placement Date/Time: 05/21/24 1019   Location: Ankle  Wound Location Orientation: Anterior;Left      Assessments 5/21/2024  9:36 AM 9/24/2024  1:35 PM   Response to Treatment Well tolerated Well tolerated   Compression Layers Multilayer Multilayer   Compression Product Type Unna Boot Unna Boot   Dressing Applied Yes Yes   Compression Wrap Location Toes to Knee Toes to Knee   Compression Wrap Status Clean;Dry;Intact Clean;Dry       No associated orders.       Compression Wrap 05/28/24 Ankle Anterior;Right (Active)   Placement Date/Time: 05/28/24 1012   Location: Ankle  Wound Location Orientation: Anterior;Right      Assessments 5/28/2024  9:47 AM 9/24/2024  1:35 PM   Response to Treatment Well tolerated Well tolerated   Compression Layers Multilayer Multilayer   Compression Product Type Unna Boot Unna Boot   Dressing Applied Yes Yes   Compression Wrap Location Toes to Knee Toes to Knee   Compression Wrap Status Dry;Intact;Clean Clean;Dry       No associated orders.       Wound 07/23/24 #4 Leg Left;Lateral (Active)   Date First Assessed/Time First Assessed: 07/23/24 1137    Wound Number (Wound Clinic Only): #4  Primary Wound Type: (c) Other (comment)  Location: Leg  Wound Location Orientation: Left;Lateral      Assessments 7/23/2024 11:46 AM 10/8/2024  9:58 AM   Wound Image       Drainage Amount Unable to assess Scant   Drainage Description -- Serosanguineous   Treatments Compression Compression   Wound Length (cm) 2.3 cm 2.1 cm   Wound Width (cm) 1.3 cm 0.3 cm   Wound Surface Area (cm^2) 2.99 cm^2 0.63  cm^2   Wound Depth (cm) 0.1 cm 0.1 cm   Wound Volume (cm^3) 0.299 cm^3 0.063 cm^3   Wound Healing % -- 79   Margins Well-defined edges Well-defined edges   Non-staged Wound Description Full thickness Full thickness   Soni-wound Assessment Clean;Dry;Other (Comment) Hemosiderin staining   Wound Granulation Tissue Pale Grey;Pink;Firm Pink;Firm   Wound Bed Granulation (%) 45 % 10 %   Wound Bed Epithelium (%) 10 % 30 %   Wound Bed Slough (%) 45 % --   Wound Odor None None   Shape -- Bridged, 60% scabbed   Tunneling? -- No   Undermining? -- No   Sinus Tracts? -- No       No associated orders.       Wound 09/24/24 #5 Left Medial Ankle Ankle Anterior;Left (Active)   Date First Assessed/Time First Assessed: 09/24/24 1349    Wound Number (Wound Clinic Only): #5 Left Medial Ankle  Primary Wound Type: (c) Other (comment)  Location: Ankle  Wound Location Orientation: Anterior;Left      Assessments 9/24/2024  1:50 PM 10/8/2024  9:58 AM   Wound Image       Drainage Amount Unable to assess None   Treatments Compression Compression   Wound Length (cm) 0.3 cm 0.4 cm   Wound Width (cm) 0.4 cm 0.3 cm   Wound Surface Area (cm^2) 0.12 cm^2 0.12 cm^2   Wound Depth (cm) 0.1 cm 0 cm   Wound Volume (cm^3) 0.012 cm^3 0 cm^3   Wound Healing % -- 100   Margins Well-defined edges Well-defined edges   Non-staged Wound Description Full thickness Full thickness   Soni-wound Assessment Dry Dry;Hemosiderin staining   Wound Bed Slough (%) 100 % --   Wound Odor None None   Shape -- 100% scab   Tunneling? -- No   Undermining? -- No   Sinus Tracts? -- No       No associated orders.          ASSESSMENT AND PLAN:    There are no diagnoses linked to this encounter.          Risks, benefits, and alternatives of current treatment plan discussed in detail.  Questions and concerns addressed. Red flags to RTC or ED reviewed.  Patient (or parent) agrees to plan.      NOTE TO PATIENT: The 21st Century Cures Act makes clinical notes like these available to patients  in the interest of transparency. Clinical notes are medical documents used by physicians and care providers to communicate with each other. These documents include medical language and terminology, abbreviations, and treatment information that may sound technical and at times possibly unfamiliar. In addition, at times, the verbiage may appear blunt or direct. These documents are one tool providers use to communicate relevant information and clinical opinions of the care providers in a way that allows common understanding of the clinical context.   I spent***minutes with the patient. This time included:    preparing to see the patient (eg, review notes and recent diagnostics),  seeing the patient, obtaining and/or reviewing separately obtained history, performing a medically appropriate examination and/or evaluation, counseling and educating the patient, documenting in the record  DISCHARGE INSTRUCTIONS     There are no Patient Instructions on file for this visit.   Martina Mckeon FNP-C, CWCN-AP, CFCN, CSWS, WCC, DWC  10/15/2024        recent diagnostics),  seeing the patient, obtaining and/or reviewing separately obtained history, performing a medically appropriate examination and/or evaluation, counseling and educating the patient, documenting in the record  DISCHARGE INSTRUCTIONS     Patient Instructions   Please return:  1 week     Patient discharge and wound care instructions  Raymond Olivo  10/15/2024           You may shower with protection of the wound (ie a cast cover or similar).     Changing your dressing:            leave in place for 1 week    Wash your hands with soap and water.  Ensure that the old dressing is removed completely. Place it in a plastic bag and throw it in the trash.  Cleanse the wound with hypochlorous wound cleanser (ie. Anasept, vashe, pure and clean).  It's ok to “scrub” your wound with the gauze, small amount of bleeding with cleansing is normal and ok.  Apply the following dressings:  clobetesol    left: folded xerform>border foam  Right: folded xeroform>bordered foam     Spandagrip from base of toes to knee    Nutrition and blood sugar control:  Focus on the following:  Protein: Meats, beans, eggs, milk and yogurt particularly Greek yogurt), tofu, soy nuts, soy protein products (Follow the protein handout in your welcome folder)  Vitamin C: Citrus fruits and juices, strawberries, tomatoes, tomato juice, peppers, baked potatoes, spinach, broccoli, cauliflower, [place] sprouts, cabbage  Vitamin A: Dark green, leafy vegetables, orange or yellow vegetables, cantaloupe, fortified dairy products, liver, fortified cereals  Zinc: Fortified cereals, red meats, seafood  Consider supplementing with Justin by [employer]. It can be purchased on amazon, [employer] website, or local pharmacy may be able to order it for you.  (These are essential branch chain amino acids that help with tissue building and wound healing).     Concerns:  Signs of infection may include the following:  Increase in redness  Red \"streaks\" from  wound  Increase in swelling  Fever  Unusual odor  Change in the amount of wound drainage     Should you experience any significant changes in your wound(s) or have any questions regarding your home care instructions please contact the [facility] @ [phone] If after regular business hours, please call your family doctor or local emergency room. The treatment plan has been discussed at length between you and your provider. Follow all instructions carefully, it is very important. If you do not follow all instructions you are at risk of your wound not healing, infection, possible loss of limb and even loss of life.        Martina Mckeon FNP-C, CWCN-AP, CFCN, CSWS, WCC, DWC  10/15/2024

## 2024-10-15 ENCOUNTER — OFFICE VISIT (OUTPATIENT)
Dept: WOUND CARE | Facility: HOSPITAL | Age: 49
End: 2024-10-15
Attending: NURSE PRACTITIONER
Payer: COMMERCIAL

## 2024-10-15 VITALS
SYSTOLIC BLOOD PRESSURE: 102 MMHG | DIASTOLIC BLOOD PRESSURE: 59 MMHG | HEART RATE: 78 BPM | TEMPERATURE: 98 F | RESPIRATION RATE: 14 BRPM

## 2024-10-15 DIAGNOSIS — L97.312 NON-PRESSURE CHRONIC ULCER OF RIGHT ANKLE WITH FAT LAYER EXPOSED (HCC): Primary | ICD-10-CM

## 2024-10-15 DIAGNOSIS — Z79.52 CURRENT CHRONIC USE OF SYSTEMIC STEROIDS: ICD-10-CM

## 2024-10-15 DIAGNOSIS — M32.19 LUPUS VASCULITIS (HCC): ICD-10-CM

## 2024-10-15 DIAGNOSIS — L97.322 NON-PRESSURE CHRONIC ULCER OF LEFT ANKLE WITH FAT LAYER EXPOSED (HCC): ICD-10-CM

## 2024-10-15 DIAGNOSIS — M35.1 MIXED CONNECTIVE TISSUE DISEASE (HCC): ICD-10-CM

## 2024-10-15 DIAGNOSIS — I77.89 LUPUS VASCULITIS (HCC): ICD-10-CM

## 2024-10-15 DIAGNOSIS — I87.333 IDIOPATHIC CHRONIC VENOUS HYPERTENSION OF BOTH LOWER EXTREMITIES WITH ULCER AND INFLAMMATION (HCC): ICD-10-CM

## 2024-10-15 PROCEDURE — 99213 OFFICE O/P EST LOW 20 MIN: CPT | Performed by: NURSE PRACTITIONER

## 2024-10-15 NOTE — PATIENT INSTRUCTIONS
Please return:  1 week     Patient discharge and wound care instructions  Raymond Olivo  10/15/2024           You may shower with protection of the wound (ie a cast cover or similar).     Changing your dressing:            leave in place for 1 week    Wash your hands with soap and water.  Ensure that the old dressing is removed completely. Place it in a plastic bag and throw it in the trash.  Cleanse the wound with hypochlorous wound cleanser (ie. Anasept, vashe, pure and clean).  It's ok to “scrub” your wound with the gauze, small amount of bleeding with cleansing is normal and ok.  Apply the following dressings:  clobetesol    left: folded xerform>border foam  Right: folded xeroform>bordered foam     Spandagrip from base of toes to knee    Nutrition and blood sugar control:  Focus on the following:  Protein: Meats, beans, eggs, milk and yogurt particularly Greek yogurt), tofu, soy nuts, soy protein products (Follow the protein handout in your welcome folder)  Vitamin C: Citrus fruits and juices, strawberries, tomatoes, tomato juice, peppers, baked potatoes, spinach, broccoli, cauliflower, [place] sprouts, cabbage  Vitamin A: Dark green, leafy vegetables, orange or yellow vegetables, cantaloupe, fortified dairy products, liver, fortified cereals  Zinc: Fortified cereals, red meats, seafood  Consider supplementing with Justin by [employer]. It can be purchased on amazon, [employer] website, or local pharmacy may be able to order it for you.  (These are essential branch chain amino acids that help with tissue building and wound healing).     Concerns:  Signs of infection may include the following:  Increase in redness  Red \"streaks\" from wound  Increase in swelling  Fever  Unusual odor  Change in the amount of wound drainage     Should you experience any significant changes in your wound(s) or have any questions regarding your home care instructions please contact the [facility] @ [phone]  If after regular business hours, please call your family doctor or local emergency room. The treatment plan has been discussed at length between you and your provider. Follow all instructions carefully, it is very important. If you do not follow all instructions you are at risk of your wound not healing, infection, possible loss of limb and even loss of life.

## 2024-10-15 NOTE — PROGRESS NOTES
.Weekly Wound Education Note    Teaching Provided To: Patient;Family  Training Topics: Dressing;Edema control;Cleasing and general instructions;Compression;Discharge instructions  Training Method: Explain/Verbal;Written  Training Response: Patient responds and understands        Notes: Wounds improving. All wounds dressed with small amount of clobetasol, folded xeroform, bordered foam and spandagrip D.

## 2024-10-19 DIAGNOSIS — I73.01 RAYNAUD'S DISEASE WITH GANGRENE (HCC): ICD-10-CM

## 2024-10-19 DIAGNOSIS — M35.1 MIXED CONNECTIVE TISSUE DISEASE (HCC): ICD-10-CM

## 2024-10-19 DIAGNOSIS — M32.19 LUPUS VASCULITIS (HCC): ICD-10-CM

## 2024-10-19 DIAGNOSIS — I77.89 LUPUS VASCULITIS (HCC): ICD-10-CM

## 2024-10-21 RX ORDER — HYDROXYCHLOROQUINE SULFATE 200 MG/1
200 TABLET, FILM COATED ORAL DAILY
Qty: 90 TABLET | Refills: 0 | Status: SHIPPED | OUTPATIENT
Start: 2024-10-21

## 2024-10-21 NOTE — PROGRESS NOTES
CHIEF COMPLAINT:     Chief Complaint   Patient presents with    Wound Recheck     Pt arrives to wound care follow up in bilateral spandagrip compression with bordered foam dressings over her wounds.     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 Patient saw dr. Santamaria (vascular) in December 2023 and he documented \"I explained to the patient and her  that I do not believe the source of her pain to be vascular in origin.  She has an easily palpable dorsalis pedis pulse and the location of the pain is more suggestive of a neuropathic origin.  I suspect perhaps that given the tightness of her feet from her mixed connective tissue disorder the compression stockings is causing somewhat compression of the nerves in that area and I have encouraged her to substitute those stockings to those that involve the feet up to the upper calf.\" Patient/spouse have been in contact with dr. Farmer and in march 2024 reported a dark spot/ulcer on her bilateral lower legs, she was advised to make an appointment with wound care as well as get her blood work done ordered in January 2024 so that further treatment plan could be extablished.  The blood work did show increased inflammation and she was advised to increase her prednisone to 20mg x 5 days (which spouse reports they did do) and take the amlodipine 2.5 mg-which she states she is doing.  They have been leaving the areas open to air and she has not been wearing compression.  She c/o burning sensation in her ankle area.  She was started on gabapentin by dr. Farmer last year, however they stopped it because it was making her too sleepy. We discussed restarting the gabapentin, but only take a noc first.  Both wounds are dried eschar.  Will utilize hydrogel with lidocaine to  start debriding off/softening the eschar. I have also ordered santyl. Patient placed into spandagrips for compression    HPI PRIOR TO OCTOBER 2024 SEE INDIVIDUAL PROGRESS NOTES  9-10-24 patient returns.   She did get her labs drawn last week and she has an appointment with Dr. Farmer on 9-17-24. Her esr is up slightly from July however I suspect this may be related to the decrease in her po steroid.   Last week we added gina and continued with the xeroform.  We also utilize the debrisoft to cleanse the wounds and periwounds.  Today, wounds are improved, but the gina appears to just have stayed in the wound bed and did not integrate, therefore will not utilize gina this week. Her pain remains mostly on the lateral ankle.  We no longer have the accumulating exudate, overall her skin is much improved and more healthy in appearance.  No s/s of infection.    9-17-24 patient returns.  She is coming from an appointment with Dr. Farmer, plan is to keep meds same at this time.  Wounds have been improving (albeit slowly) in size and wound bed characteristics. The medial wounds are fully resolved, bilaterally, she is remaining with lateral ankle wounds bilaterally. She continues with the riley.  No s/s of infection. C/o pain with any touch.    9-24-24 patient returns.  She got her retinal exam done as ordered by dr. Farmer this morning, spouse reports everything is good.   Last week the medial wounds were resolved, however today there is a small open area on the left medial distal leg (almost to the heel).  Patient/spouse report that she has not worn any other shoes. There is some surrounding hyperemia to this wound, but patient does not report any increase in pain to the area as compared to other weeks.  The lateral wounds appear a bit moist.  Will utilize ag alginate (instead of xeroform) will also utilize clobetesol topically.'    10-1-24 patient returns.  Last week we utilize ag alginate as the wounds/periwounds were  moist. Today wounds are improved.  She did bring the polymem ag.  Patient has a blancheable area along anterior ankle on the left.  Will order spandagrip and silicone tape from [employer].  I will also rx topical steroid. Patient will change dressings 1 x this week. No s/s of infection. She states the right lower extremity pain is improved. She still has burning on the left.     10-8-24 patient returns. Last week we removed the calamine wraps and went over self care, cleansing, dressing, and compression. She was to change the dressings after shower/cleansing once this week. Today wounds are very dessicated with surrounding dried exudate, her right does not have pain, left lateral is still painful.  We used debrimit and forceps to remove the surrounding exudate/scabbing as patient could tolerate. Will utilize small amount of hydrogel to the left. Will dress with xeroform and foam. Continue with spandagrip. Patient to leave all dressings in place for the next week. No s/s of infection    10-15-24 patient returns.  We returned to leaving dressings in place for the full week last week but was to be using spandagrip (not wrap) for compression, however patient returns today without any compression in place. She states that she took it off on the way to clinic. Today wounds are improved, moisture is optimized. The right lateral wound and the left medial wound are primarily epithelial tissue.  Her pain is much improved except for the left lateral wound still is painful to touch.  No s/s of infection. Patient continues to do the riley.    10-22-24 patient returns. Her wounds have continued to improve.  Last week we have dressed with xeroform and spandagrip, patient to leave in place x 1 week.  She states that she only has pain on lateral left. The lateral right is open just a pinpoint. The medial wounds bilaterally are resolved. No s/s of infection continue current poc with clobetesol, xeroform, foam.  Important to keep the  medial ankles moisturized.  MEDICATIONS:     Current Outpatient Medications:     hydroxychloroquine 200 MG Oral Tab, Take 1 tablet (200 mg total) by mouth daily., Disp: 90 tablet, Rfl: 0    clobetasol 0.05 % External Solution, Apply topically to lower legs after shower 3 x weekly, Disp: 50 mL, Rfl: 0    amLODIPine 2.5 MG Oral Tab, Take 1 tablet (2.5 mg total) by mouth 2 (two) times daily., Disp: 180 tablet, Rfl: 0    predniSONE 2.5 MG Oral Tab, Take 1 tablet (2.5 mg total) by mouth daily., Disp: 90 tablet, Rfl: 0    gabapentin 100 MG Oral Cap, Take 100mg in am and 200mg at bedtime., Disp: , Rfl:     mycophenolate mofetil 250 MG Oral Cap, Take 1 capsule (250 mg total) by mouth at bedtime., Disp: 90 capsule, Rfl: 0    Ferrous Sulfate 325 (65 Fe) MG Oral Tab, Take 1 tablet (325 mg total) by mouth 2 (two) times daily with meals., Disp: 180 tablet, Rfl: 0    Mycophenolate Mofetil 500 MG Oral Tab, Take 1 tablet (500 mg total) by mouth daily., Disp: 90 tablet, Rfl: 0  ALLERGIES:   No Known Allergies   REVIEW OF SYSTEMS:   This information was obtained from the patient/family and chart.    See HPI for pertinent positives, otherwise 10 pt ROS negative.    HISTORY:   Past medical, surgical, family and social history updated where appropriate.    PHYSICAL EXAM:     Vitals:    10/22/24 0700   BP: 93/60   Pulse: 77   Resp: 16   Temp: 97.9 °F (36.6 °C)         Estimated body mass index is 22.5 kg/m² as calculated from the following:    Height as of 9/17/24: 62\".    Weight as of 9/17/24: 123 lb (55.8 kg).   No results found for: \"PGLU\"    Vital signs reviewed.Appears stated age, well groomed.    Constitutional:  Bp wnl for patient. Pulse Regular and wnl for patient. Respirations easy and unlabored. Temperature wnl. Weight normal for height. Appearance neat and clean. Appears in no acute distress. Well nourished and well developed.    Lower extremity:  dp/pt palpable bilaterally. Extremities are free of varicosities and edema, they  are scarred, scattered changes in pigmentation (hypo/hyper) with atrophie jay. Capillary refill < 3 seconds. Digits are warm. toenails are wnl for color, thickness and hygeine. Skin hydration wnl. + hairgrowth on legs.    Musculoskeletal:  Gait and station stable   Integumentary:  refer to wound characteristics and images   Psychiatric:  Judgment and insight intact. Alert and oriented times 3. No evidence of depression, anxiety, or agitation. Calm, cooperative, and communicative, but very quiet and reserved.  EDEMA:   Calf  Point of Measurement - Left Calf: 31  Point of Measurement - Right Calf: 31  Left Calf from:: Heel  Calf Left cm:: 28.3  Right Calf from:: Heel  Right Calf cm:: 30.4  Ankle  Point of Measurement - Left Ankle: 11  Point of Measurement - Right Ankle: 11  Left Ankle from:: Heel  Left Ankle cm:: 17     Right Ankle from:: Heel  Right Ankle cm:: 16.5     DIAGNOSTICS:     Lab Results   Component Value Date    BUN 9 09/03/2024    CREATSERUM 0.58 09/03/2024    GFRCKDEPI 121.37 12/17/2020    ALB 4.9 (H) 09/03/2024    TP 8.0 09/03/2024    A1C 5.1 12/17/2020     Lab Results   Component Value Date    ESRML 47 (H) 09/03/2024    ESRML 39 (H) 07/23/2024    ESRML 54 (H) 03/18/2024      Lab Results   Component Value Date    CRP 0.50 09/03/2024    CRP <0.40 07/23/2024    CRP 0.37 (H) 03/18/2024 11-28-23 arterial duplex-dr hilliard  FINDINGS:    LEFT EXTREMITY:  Triphasic waveforms in the left common femoral, profunda, superficial femoral arteries.  Biphasic waveforms below the left knee.   OTHER:  None.     PEAK VELOCITIES (CM/S):   LEFT COMMON FEMORAL:      165    LEFT PROFUNDA FEMORAL:      78      LEFT SUPERFICIAL FEMORAL PROX:    113   LEFT SUPERFICIAL FEMORAL MID:      94   LEFT SUPERFICIAL FEMORAL DISTAL:    92   LEFT POPLITEAL PROX:      110   LEFT POPLITEAL DISTAL:      121   TIBIOPER TRUNK:      66   LEFT PTA PROX:      60   LEFT PTA MID:      62   LEFT PTA DISTAL:      73   LEFT BETH PROX:      82    LEFT BETH MID:      72    LEFT DORSALIS PEDIS::      30   LEFT GREAT TOE PRESSURE:      25 mmHg   CONCLUSION:    1. No high-grade stenosis is identified.  There is suggestion of diffuse mild stenosis throughout most of the left lower extremity.    2. Overall diminished left toe pressure is noted.  Possibility of decreased perfusion in the distal left foot is of consideration.     7-14-20 venous reflux study-dr santamaria  Per Dr. Santamaria review \"no intervention needed\"  WOUND ASSESSMENT:     Wound 04/26/24 #2 Ankle Right;Lateral (Active)   Date First Assessed/Time First Assessed: 04/26/24 1031    Wound Number (Wound Clinic Only): #2  Primary Wound Type: (c) Auto-immune  Location: Ankle  Wound Location Orientation: Right;Lateral      Assessments 4/26/2024 10:34 AM 10/22/2024 10:00 AM   Wound Image       Drainage Amount Unable to assess Scant   Drainage Description -- Serosanguineous   Treatments Compression --   Wound Length (cm) 0.5 cm 0.2 cm   Wound Width (cm) 0.7 cm 0.1 cm   Wound Surface Area (cm^2) 0.35 cm^2 0.02 cm^2   Wound Depth (cm) 0.1 cm 0.1 cm   Wound Volume (cm^3) 0.035 cm^3 0.002 cm^3   Wound Healing % -- 94   Margins Well-defined edges Well-defined edges   Non-staged Wound Description Full thickness Full thickness   Soni-wound Assessment Dry;Edema;Atrophy jay Blanchable erythema;Moist   Wound Granulation Tissue -- Pale Grey;Firm   Wound Bed Granulation (%) -- 100 %   Wound Odor None None   Shape 100% scabbed --   Tunneling? -- No   Undermining? -- No   Sinus Tracts? -- No       Inactive Orders   Date Order Priority Status Authorizing Provider   06/19/24 1145 Debridement Auto-immune Right;Lateral Ankle Routine Completed Kaz Tapia MD       Compression Wrap 05/21/24 Ankle Anterior;Left (Active)   Placement Date/Time: 05/21/24 1019   Location: Ankle  Wound Location Orientation: Anterior;Left      Assessments 5/21/2024  9:36 AM 9/24/2024  1:35 PM   Response to Treatment Well tolerated Well  tolerated   Compression Layers Multilayer Multilayer   Compression Product Type Unna Boot Unna Boot   Dressing Applied Yes Yes   Compression Wrap Location Toes to Knee Toes to Knee   Compression Wrap Status Clean;Dry;Intact Clean;Dry       No associated orders.       Compression Wrap 05/28/24 Ankle Anterior;Right (Active)   Placement Date/Time: 05/28/24 1012   Location: Ankle  Wound Location Orientation: Anterior;Right      Assessments 5/28/2024  9:47 AM 9/24/2024  1:35 PM   Response to Treatment Well tolerated Well tolerated   Compression Layers Multilayer Multilayer   Compression Product Type Unna Boot Unna Boot   Dressing Applied Yes Yes   Compression Wrap Location Toes to Knee Toes to Knee   Compression Wrap Status Dry;Intact;Clean Clean;Dry       No associated orders.       Wound 07/23/24 #4 Leg Left;Lateral (Active)   Date First Assessed/Time First Assessed: 07/23/24 1137    Wound Number (Wound Clinic Only): #4  Primary Wound Type: (c) Other (comment)  Location: Leg  Wound Location Orientation: Left;Lateral      Assessments 7/23/2024 11:46 AM 10/22/2024 10:01 AM   Wound Image       Drainage Amount Unable to assess Scant   Drainage Description -- Serosanguineous   Treatments Compression --   Wound Length (cm) 2.3 cm 0.8 cm   Wound Width (cm) 1.3 cm 0.3 cm   Wound Surface Area (cm^2) 2.99 cm^2 0.24 cm^2   Wound Depth (cm) 0.1 cm 0.1 cm   Wound Volume (cm^3) 0.299 cm^3 0.024 cm^3   Wound Healing % -- 92   Margins Well-defined edges Well-defined edges   Non-staged Wound Description Full thickness Full thickness   Soni-wound Assessment Clean;Dry;Other (Comment) Hemosiderin staining   Wound Granulation Tissue Pale Grey;Pink;Firm Red;Spongy   Wound Bed Granulation (%) 45 % 20 %   Wound Bed Epithelium (%) 10 % --   Wound Bed Slough (%) 45 % 80 %   Wound Odor None None       No associated orders.       Wound 09/24/24 #5 Left Medial Ankle Ankle Anterior;Left (Active)   Date First Assessed/Time First Assessed: 09/24/24  1349    Wound Number (Wound Clinic Only): #5 Left Medial Ankle  Primary Wound Type: (c) Other (comment)  Location: Ankle  Wound Location Orientation: Anterior;Left      Assessments 9/24/2024  1:50 PM 10/22/2024 10:02 AM   Wound Image       Drainage Amount Unable to assess None   Treatments Compression --   Wound Length (cm) 0.3 cm 0.1 cm   Wound Width (cm) 0.4 cm 0.1 cm   Wound Surface Area (cm^2) 0.12 cm^2 0.01 cm^2   Wound Depth (cm) 0.1 cm 0.1 cm   Wound Volume (cm^3) 0.012 cm^3 0.001 cm^3   Wound Healing % -- 92   Margins Well-defined edges --   Non-staged Wound Description Full thickness --   Soni-wound Assessment Dry --   Wound Bed Slough (%) 100 % --   Wound Odor None --       No associated orders.          ASSESSMENT AND PLAN:    1. 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 Non-pressure chronic ulcer of left ankle with fat layer exposed (HCC)    3. Idiopathic chronic venous hypertension of both lower extremities with ulcer and inflammation (HCC)    4. Lupus vasculitis (HCC)    5. Mixed connective tissue disease (HCC)    6. Current chronic use of systemic steroids        Risks, benefits, and alternatives of current treatment plan discussed in detail.  Questions and concerns addressed. Red flags to RTC or ED reviewed.  Patient (or parent) agrees to plan.      NOTE TO PATIENT: The 21st Century Cures Act makes clinical notes like these available to patients in the interest of transparency. Clinical notes are medical documents used by physicians and care providers to communicate with each other. These documents include medical language and terminology, abbreviations, and treatment information that may sound technical and at times possibly unfamiliar. In addition, at times, the verbiage may appear blunt or direct. These documents are one tool providers use to communicate relevant information and clinical opinions of the care providers in a way that allows common understanding of  the clinical context.     I [id number] with the patient. This time included:    preparing to see the patient (eg, review notes and recent diagnostics),  seeing the patient, obtaining and/or reviewing separately obtained history, performing a medically appropriate examination and/or evaluation, counseling and educating the patient, documenting in the record  DISCHARGE INSTRUCTIONS     Patient Instructions   Please return:  1 week      Patient discharge and wound care instructions  Raymond Olivo  10/22/2024              You may shower with protection of the wound (ie a cast cover or similar).     Changing your dressing:            leave in place for 1 week    Wash your hands with soap and water.  Ensure that the old dressing is removed completely. Place it in a plastic bag and throw it in the trash.  Cleanse the wound with hypochlorous wound cleanser (ie. Anasept, vashe, pure and clean).  It's ok to “scrub” your wound with the gauze, small amount of bleeding with cleansing is normal and ok.  Apply the following dressings:  clobetesol  and moisturizer  left: folded xerform>border foam  Right: folded xeroform>bordered foam     Spandagrip from base of toes to knee    Nutrition and blood sugar control:  Focus on the following:  Protein: Meats, beans, eggs, milk and yogurt particularly Greek yogurt), tofu, soy nuts, soy protein products (Follow the protein handout in your welcome folder)  Vitamin C: Citrus fruits and juices, strawberries, tomatoes, tomato juice, peppers, baked potatoes, spinach, broccoli, cauliflower, [place] sprouts, cabbage  Vitamin A: Dark green, leafy vegetables, orange or yellow vegetables, cantaloupe, fortified dairy products, liver, fortified cereals  Zinc: Fortified cereals, red meats, seafood  Consider supplementing with Justin by [email or web address]. It can be purchased on amazon, [employer], or local pharmacy may be able to order it for you.  (These are essential branch chain amino acids  that help with tissue building and wound healing).     Concerns:  Signs of infection may include the following:  Increase in redness  Red \"streaks\" from wound  Increase in swelling  Fever  Unusual odor  Change in the amount of wound drainage     Should you experience any significant changes in your wound(s) or have any questions regarding your home care instructions please contact the [facility] @ [phone] If after regular business hours, please call your family doctor or local emergency room. The treatment plan has been discussed at length between you and your provider. Follow all instructions carefully, it is very important. If you do not follow all instructions you are at risk of your wound not healing, infection, possible loss of limb and even loss of life.        Martina Mckeon FNP-C, CWCN-AP, CFCN, CSWS, WCC, DWC  10/22/2024

## 2024-10-22 ENCOUNTER — OFFICE VISIT (OUTPATIENT)
Dept: WOUND CARE | Facility: HOSPITAL | Age: 49
End: 2024-10-22
Attending: NURSE PRACTITIONER
Payer: COMMERCIAL

## 2024-10-22 VITALS
RESPIRATION RATE: 16 BRPM | HEART RATE: 77 BPM | TEMPERATURE: 98 F | SYSTOLIC BLOOD PRESSURE: 93 MMHG | DIASTOLIC BLOOD PRESSURE: 60 MMHG

## 2024-10-22 DIAGNOSIS — L97.322 NON-PRESSURE CHRONIC ULCER OF LEFT ANKLE WITH FAT LAYER EXPOSED (HCC): ICD-10-CM

## 2024-10-22 DIAGNOSIS — I77.89 LUPUS VASCULITIS (HCC): ICD-10-CM

## 2024-10-22 DIAGNOSIS — I87.333 IDIOPATHIC CHRONIC VENOUS HYPERTENSION OF BOTH LOWER EXTREMITIES WITH ULCER AND INFLAMMATION (HCC): ICD-10-CM

## 2024-10-22 DIAGNOSIS — Z79.52 CURRENT CHRONIC USE OF SYSTEMIC STEROIDS: ICD-10-CM

## 2024-10-22 DIAGNOSIS — M32.19 LUPUS VASCULITIS (HCC): ICD-10-CM

## 2024-10-22 DIAGNOSIS — L97.312 NON-PRESSURE CHRONIC ULCER OF RIGHT ANKLE WITH FAT LAYER EXPOSED (HCC): Primary | ICD-10-CM

## 2024-10-22 DIAGNOSIS — M35.1 MIXED CONNECTIVE TISSUE DISEASE (HCC): ICD-10-CM

## 2024-10-22 PROCEDURE — 99213 OFFICE O/P EST LOW 20 MIN: CPT | Performed by: NURSE PRACTITIONER

## 2024-10-22 NOTE — TELEPHONE ENCOUNTER
Called pts spouse to inform him that Dr. Farmer put the script in as a tablet instead of capsule. Stated that he can call the pharmacy to confirm the filled as such. Pts spouse verbalized understanding.

## 2024-10-22 NOTE — PATIENT INSTRUCTIONS
Please return:  1 week      Patient discharge and wound care instructions  Raymond Olivo  10/22/2024              You may shower with protection of the wound (ie a cast cover or similar).     Changing your dressing:            leave in place for 1 week    Wash your hands with soap and water.  Ensure that the old dressing is removed completely. Place it in a plastic bag and throw it in the trash.  Cleanse the wound with hypochlorous wound cleanser (ie. Anasept, vashe, pure and clean).  It's ok to “scrub” your wound with the gauze, small amount of bleeding with cleansing is normal and ok.  Apply the following dressings:  clobetesol  and moisturizer  left: folded xerform>border foam  Right: folded xeroform>bordered foam     Spandagrip from base of toes to knee    Nutrition and blood sugar control:  Focus on the following:  Protein: Meats, beans, eggs, milk and yogurt particularly Greek yogurt), tofu, soy nuts, soy protein products (Follow the protein handout in your welcome folder)  Vitamin C: Citrus fruits and juices, strawberries, tomatoes, tomato juice, peppers, baked potatoes, spinach, broccoli, cauliflower, [place] sprouts, cabbage  Vitamin A: Dark green, leafy vegetables, orange or yellow vegetables, cantaloupe, fortified dairy products, liver, fortified cereals  Zinc: Fortified cereals, red meats, seafood  Consider supplementing with Justin by [employer]. It can be purchased on amazon, [employer] website, or local pharmacy may be able to order it for you.  (These are essential branch chain amino acids that help with tissue building and wound healing).     Concerns:  Signs of infection may include the following:  Increase in redness  Red \"streaks\" from wound  Increase in swelling  Fever  Unusual odor  Change in the amount of wound drainage     Should you experience any significant changes in your wound(s) or have any questions regarding your home care instructions please contact the wound center [facility] @ [phone] If after regular business hours, please call your family doctor or local emergency room. The treatment plan has been discussed at length between you and your provider. Follow all instructions carefully, it is very important. If you do not follow all instructions you are at risk of your wound not healing, infection, possible loss of limb and even loss of life.

## 2024-10-22 NOTE — PROGRESS NOTES
.Weekly Wound Education Note    Teaching Provided To: Patient  Training Topics: Dressing;Edema control;Cleasing and general instructions;Compression;Discharge instructions  Training Method: Explain/Verbal;Written  Training Response: Patient responds and understands        Notes: Wounds improving. Continue clobetasol, folded xeroform, bordered foam to both lateral wounds. Clobetasol and moisturizer to left medial ankle area. Spandagrip D applied to both legs.

## 2024-10-23 DIAGNOSIS — M32.19 LUPUS VASCULITIS (HCC): ICD-10-CM

## 2024-10-23 DIAGNOSIS — M79.672 LEFT FOOT PAIN: ICD-10-CM

## 2024-10-23 DIAGNOSIS — M35.1 MIXED CONNECTIVE TISSUE DISEASE (HCC): Primary | ICD-10-CM

## 2024-10-23 DIAGNOSIS — I77.89 LUPUS VASCULITIS (HCC): ICD-10-CM

## 2024-10-23 DIAGNOSIS — M35.1 MIXED CONNECTIVE TISSUE DISEASE (HCC): ICD-10-CM

## 2024-10-23 RX ORDER — MYCOPHENOLATE MOFETIL 250 MG/1
250 CAPSULE ORAL NIGHTLY
Qty: 90 CAPSULE | Refills: 0 | Status: SHIPPED | OUTPATIENT
Start: 2024-10-23

## 2024-10-23 NOTE — TELEPHONE ENCOUNTER
Last office visit: 9/17/2024    Next Rheum Apt:11/1/2024 Alisha Farmer DO    Last fill: 7/23/2024 90 caps, 0 refills     Labs:   Lab Results   Component Value Date    CREATSERUM 0.58 09/03/2024    ALKPHO 100 09/03/2024    AST 16 09/03/2024    ALT 8 (L) 09/03/2024    BILT 0.4 09/03/2024    TP 8.0 09/03/2024    ALB 4.9 (H) 09/03/2024       Lab Results   Component Value Date    WBC 4.7 09/03/2024    HGB 13.1 09/03/2024    .0 09/03/2024    NEPRELIM 3.39 09/03/2024    NEPERCENT 72.1 09/03/2024    LYPERCENT 13.0 09/03/2024    NE 3.39 09/03/2024    LYMABS 0.61 (L) 09/03/2024

## 2024-10-24 NOTE — TELEPHONE ENCOUNTER
Sent to pharmacy on 10/23/2024, duplicate refill request    Please determine if this medication refill request can be declined

## 2024-10-27 NOTE — PROGRESS NOTES
CHIEF COMPLAINT:     Chief Complaint   Patient presents with    Wound Care     Patients is here for a follow up. Patients no issue with the wound. 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 Patient saw dr. Santamaria (vascular) in December 2023 and he documented \"I explained to the patient and her  that I do not believe the source of her pain to be vascular in origin.  She has an easily palpable dorsalis pedis pulse and the location of the pain is more suggestive of a neuropathic origin.  I suspect perhaps that given the tightness of her feet from her mixed connective tissue disorder the compression stockings is causing somewhat compression of the nerves in that area and I have encouraged her to substitute those stockings to those that involve the feet up to the upper calf.\" Patient/spouse have been in contact with dr. Farmer and in march 2024 reported a dark spot/ulcer on her bilateral lower legs, she was advised to make an appointment with wound care as well as get her blood work done ordered in January 2024 so that further treatment plan could be extablished.  The blood work did show increased inflammation and she was advised to increase her prednisone to 20mg x 5 days (which spouse reports they did do) and take the amlodipine 2.5 mg-which she states she is doing.  They have been leaving the areas open to air and she has not been wearing compression.  She c/o burning sensation in her ankle area.  She was started on gabapentin by dr. Farmer last year, however they stopped it because it was making her too sleepy. We discussed restarting the gabapentin, but only take a noc first.  Both wounds are dried eschar.  Will utilize hydrogel with  lidocaine to start debriding off/softening the eschar. I have also ordered santyl. Patient placed into spandagrips for compression    HPI PRIOR TO OCTOBER 2024 SEE INDIVIDUAL PROGRESS NOTES  9-10-24 patient returns.   She did get her labs drawn last week and she has an appointment with Dr. Farmer on 9-17-24. Her esr is up slightly from July however I suspect this may be related to the decrease in her po steroid.   Last week we added gina and continued with the xeroform.  We also utilize the debrisoft to cleanse the wounds and periwounds.  Today, wounds are improved, but the gina appears to just have stayed in the wound bed and did not integrate, therefore will not utilize gina this week. Her pain remains mostly on the lateral ankle.  We no longer have the accumulating exudate, overall her skin is much improved and more healthy in appearance.  No s/s of infection.    9-17-24 patient returns.  She is coming from an appointment with Dr. Farmer, plan is to keep meds same at this time.  Wounds have been improving (albeit slowly) in size and wound bed characteristics. The medial wounds are fully resolved, bilaterally, she is remaining with lateral ankle wounds bilaterally. She continues with the riley.  No s/s of infection. C/o pain with any touch.    9-24-24 patient returns.  She got her retinal exam done as ordered by dr. Farmer this morning, spouse reports everything is good.   Last week the medial wounds were resolved, however today there is a small open area on the left medial distal leg (almost to the heel).  Patient/spouse report that she has not worn any other shoes. There is some surrounding hyperemia to this wound, but patient does not report any increase in pain to the area as compared to other weeks.  The lateral wounds appear a bit moist.  Will utilize ag alginate (instead of xeroform) will also utilize clobetesol topically.'    10-1-24 patient returns.  Last week we utilize ag alginate as the  wounds/periwounds were moist. Today wounds are improved.  She did bring the polymem ag.  Patient has a blancheable area along anterior ankle on the left.  Will order spandagrip and silicone tape from [employer].  I will also rx topical steroid. Patient will change dressings 1 x this week. No s/s of infection. She states the right lower extremity pain is improved. She still has burning on the left.     10-8-24 patient returns. Last week we removed the calamine wraps and went over self care, cleansing, dressing, and compression. She was to change the dressings after shower/cleansing once this week. Today wounds are very dessicated with surrounding dried exudate, her right does not have pain, left lateral is still painful.  We used debrimit and forceps to remove the surrounding exudate/scabbing as patient could tolerate. Will utilize small amount of hydrogel to the left. Will dress with xeroform and foam. Continue with spandagrip. Patient to leave all dressings in place for the next week. No s/s of infection    10-15-24 patient returns.  We returned to leaving dressings in place for the full week last week but was to be using spandagrip (not wrap) for compression, however patient returns today without any compression in place. She states that she took it off on the way to clinic. Today wounds are improved, moisture is optimized. The right lateral wound and the left medial wound are primarily epithelial tissue.  Her pain is much improved except for the left lateral wound still is painful to touch.  No s/s of infection. Patient continues to do the riley.    10-22-24 patient returns. Her wounds have continued to improve.  Last week we have dressed with xeroform and spandagrip, patient to leave in place x 1 week.  She states that she only has pain on lateral left. The lateral right is open just a pinpoint. The medial wounds bilaterally are resolved. No s/s of infection continue current poc with clobetesol, xeroform, foam.   Important to keep the medial ankles moisturized.    10-29-24 patient returns, medial ankle wounds were resolved last week. We continued to treat the lateral with moist wound product (xerforom) and light compression. No changes in medications.  Pain is not present on the right, all wounds are resolved on the right. We discussed moisturization, use of clobetsol sparingly as needed and light compression on the right. Wound remains open on the left lateral but improved. Continue current poc. No s/s of infection. Tenderness to touch over the wound on the left lateral  MEDICATIONS:     Current Outpatient Medications:     mycophenolate mofetil 250 MG Oral Cap, Take 1 capsule (250 mg total) by mouth at bedtime., Disp: 90 capsule, Rfl: 0    hydroxychloroquine 200 MG Oral Tab, Take 1 tablet (200 mg total) by mouth daily., Disp: 90 tablet, Rfl: 0    clobetasol 0.05 % External Solution, Apply topically to lower legs after shower 3 x weekly, Disp: 50 mL, Rfl: 0    amLODIPine 2.5 MG Oral Tab, Take 1 tablet (2.5 mg total) by mouth 2 (two) times daily., Disp: 180 tablet, Rfl: 0    predniSONE 2.5 MG Oral Tab, Take 1 tablet (2.5 mg total) by mouth daily., Disp: 90 tablet, Rfl: 0    gabapentin 100 MG Oral Cap, Take 100mg in am and 200mg at bedtime., Disp: , Rfl:     Ferrous Sulfate 325 (65 Fe) MG Oral Tab, Take 1 tablet (325 mg total) by mouth 2 (two) times daily with meals., Disp: 180 tablet, Rfl: 0    Mycophenolate Mofetil 500 MG Oral Tab, Take 1 tablet (500 mg total) by mouth daily., Disp: 90 tablet, Rfl: 0  ALLERGIES:   No Known Allergies   REVIEW OF SYSTEMS:   This information was obtained from the patient/family and chart.    See HPI for pertinent positives, otherwise 10 pt ROS negative.    HISTORY:   Past medical, surgical, family and social history updated where appropriate.    PHYSICAL EXAM:     Vitals:    10/29/24 0956   BP: 100/64   Pulse: 81   Resp: 16   Temp: 97.8 °F (36.6 °C)     Estimated body mass index is 22.5 kg/m²  as calculated from the following:    Height as of 9/17/24: 62\".    Weight as of 9/17/24: 123 lb (55.8 kg).   No results found for: \"PGLU\"    Vital signs reviewed.Appears stated age, well groomed.    Constitutional:  Bp wnl for patient. Pulse Regular and wnl for patient. Respirations easy and unlabored. Temperature wnl. Weight normal for height. Appearance neat and clean. Appears in no acute distress. Well nourished and well developed.    Lower extremity:  dp/pt palpable bilaterally. Extremities are free of varicosities and edema, they are scarred, scattered changes in pigmentation (hypo/hyper) with atrophie jay. Capillary refill < 3 seconds. Digits are warm. toenails are wnl for color, thickness and hygeine. Skin hydration wnl. + hairgrowth on legs.    Musculoskeletal:  Gait and station stable   Integumentary:  refer to wound characteristics and images   Psychiatric:  Judgment and insight intact. Alert and oriented times 3. No evidence of depression, anxiety, or agitation. Calm, cooperative, and communicative, but very quiet and reserved.  EDEMA:   Calf  Point of Measurement - Left Calf: 31  Point of Measurement - Right Calf: 31  Left Calf from:: Heel  Calf Left cm:: 29.4  Right Calf from:: Heel  Right Calf cm:: 29.5  Ankle  Point of Measurement - Left Ankle: 11  Point of Measurement - Right Ankle: 11  Left Ankle from:: Heel  Left Ankle cm:: 17     Right Ankle from:: Heel  Right Ankle cm:: 17     DIAGNOSTICS:     Lab Results   Component Value Date    BUN 9 09/03/2024    CREATSERUM 0.58 09/03/2024    GFRCKDEPI 121.37 12/17/2020    ALB 4.9 (H) 09/03/2024    TP 8.0 09/03/2024    A1C 5.1 12/17/2020     Lab Results   Component Value Date    ESRML 47 (H) 09/03/2024    ESRML 39 (H) 07/23/2024    ESRML 54 (H) 03/18/2024      Lab Results   Component Value Date    CRP 0.50 09/03/2024    CRP <0.40 07/23/2024    CRP 0.37 (H) 03/18/2024 11-28-23 arterial duplex-dr hilliard  FINDINGS:    LEFT EXTREMITY:  Triphasic  waveforms in the left common femoral, profunda, superficial femoral arteries.  Biphasic waveforms below the left knee.   OTHER:  None.     PEAK VELOCITIES (CM/S):   LEFT COMMON FEMORAL:      165    LEFT PROFUNDA FEMORAL:      78      LEFT SUPERFICIAL FEMORAL PROX:    113   LEFT SUPERFICIAL FEMORAL MID:      94   LEFT SUPERFICIAL FEMORAL DISTAL:    92   LEFT POPLITEAL PROX:      110   LEFT POPLITEAL DISTAL:      121   TIBIOPER TRUNK:      66   LEFT PTA PROX:      60   LEFT PTA MID:      62   LEFT PTA DISTAL:      73   LEFT BETH PROX:      82   LEFT BETH MID:      72    LEFT DORSALIS PEDIS::      30   LEFT GREAT TOE PRESSURE:      25 mmHg   CONCLUSION:    1. No high-grade stenosis is identified.  There is suggestion of diffuse mild stenosis throughout most of the left lower extremity.    2. Overall diminished left toe pressure is noted.  Possibility of decreased perfusion in the distal left foot is of consideration.     7-14-20 venous reflux study-dr santamaria  Per Dr. Santamaria review \"no intervention needed\"  WOUND ASSESSMENT:     Wound 04/26/24 #2 Ankle Right;Lateral (Active)   Date First Assessed/Time First Assessed: 04/26/24 1031    Wound Number (Wound Clinic Only): #2  Primary Wound Type: (c) Auto-immune  Location: Ankle  Wound Location Orientation: Right;Lateral      Assessments 4/26/2024 10:34 AM 10/29/2024  9:53 AM   Wound Image       Drainage Amount Unable to assess None   Treatments Compression --   Wound Length (cm) 0.5 cm 0.1 cm   Wound Width (cm) 0.7 cm 0.1 cm   Wound Surface Area (cm^2) 0.35 cm^2 0.01 cm^2   Wound Depth (cm) 0.1 cm 0 cm   Wound Volume (cm^3) 0.035 cm^3 0 cm^3   Wound Healing % -- 100   Margins Well-defined edges Well-defined edges   Non-staged Wound Description Full thickness Full thickness   Soni-wound Assessment Dry;Edema;Atrophy jay Moist   Wound Granulation Tissue -- Firm;Pink   Wound Bed Granulation (%) -- 100 %   Wound Odor None None   Shape 100% scabbed --       Inactive Orders   Date  Order Priority Status Authorizing Provider   06/19/24 1145 Debridement Auto-immune Right;Lateral Ankle Routine Completed Kaz Tapia MD       Compression Wrap 05/21/24 Ankle Anterior;Left (Active)   Placement Date/Time: 05/21/24 1019   Location: Ankle  Wound Location Orientation: Anterior;Left      Assessments 5/21/2024  9:36 AM 9/24/2024  1:35 PM   Response to Treatment Well tolerated Well tolerated   Compression Layers Multilayer Multilayer   Compression Product Type Unna Boot Unna Boot   Dressing Applied Yes Yes   Compression Wrap Location Toes to Knee Toes to Knee   Compression Wrap Status Clean;Dry;Intact Clean;Dry       No associated orders.       Compression Wrap 05/28/24 Ankle Anterior;Right (Active)   Placement Date/Time: 05/28/24 1012   Location: Ankle  Wound Location Orientation: Anterior;Right      Assessments 5/28/2024  9:47 AM 9/24/2024  1:35 PM   Response to Treatment Well tolerated Well tolerated   Compression Layers Multilayer Multilayer   Compression Product Type Unna Boot Unna Boot   Dressing Applied Yes Yes   Compression Wrap Location Toes to Knee Toes to Knee   Compression Wrap Status Dry;Intact;Clean Clean;Dry       No associated orders.       Wound 07/23/24 #4 Leg Left;Lateral (Active)   Date First Assessed/Time First Assessed: 07/23/24 1137    Wound Number (Wound Clinic Only): #4  Primary Wound Type: (c) Other (comment)  Location: Leg  Wound Location Orientation: Left;Lateral      Assessments 7/23/2024 11:46 AM 10/29/2024  9:54 AM   Wound Image       Drainage Amount Unable to assess Small   Drainage Description -- Serosanguineous   Treatments Compression --   Wound Length (cm) 2.3 cm 0.5 cm   Wound Width (cm) 1.3 cm 0.3 cm   Wound Surface Area (cm^2) 2.99 cm^2 0.15 cm^2   Wound Depth (cm) 0.1 cm 0.1 cm   Wound Volume (cm^3) 0.299 cm^3 0.015 cm^3   Wound Healing % -- 95   Margins Well-defined edges Well-defined edges   Non-staged Wound Description Full thickness Full thickness    Soni-wound Assessment Clean;Dry;Other (Comment) Hemosiderin staining   Wound Granulation Tissue Pale Grey;Pink;Firm Spongy;Red   Wound Bed Granulation (%) 45 % 40 %   Wound Bed Epithelium (%) 10 % --   Wound Bed Slough (%) 45 % 60 %   Wound Odor None None       No associated orders.          ASSESSMENT AND PLAN:    1. 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 Non-pressure chronic ulcer of right ankle with fat layer exposed (HCC)    3. Idiopathic chronic venous hypertension of both lower extremities with ulcer and inflammation (HCC)    4. Lupus vasculitis (HCC)    5. Mixed connective tissue disease (HCC)    6. Current chronic use of systemic steroids          Risks, benefits, and alternatives of current treatment plan discussed in detail.  Questions and concerns addressed. Red flags to RTC or ED reviewed.  Patient (or parent) agrees to plan.      NOTE TO PATIENT: The 21st Century Cures Act makes clinical notes like these available to patients in the interest of transparency. Clinical notes are medical documents used by physicians and care providers to communicate with each other. These documents include medical language and terminology, abbreviations, and treatment information that may sound technical and at times possibly unfamiliar. In addition, at times, the verbiage may appear blunt or direct. These documents are one tool providers use to communicate relevant information and clinical opinions of the care providers in a way that allows common understanding of the clinical context.     I [id number] with the patient. This time included:    preparing to see the patient (eg, review notes and recent diagnostics),  seeing the patient, obtaining and/or reviewing separately obtained history, performing a medically appropriate examination and/or evaluation, counseling and educating the patient, documenting in the record  DISCHARGE INSTRUCTIONS     Patient Instructions   Please  return:  1 week      RAYMOND!  Moisturize the right leg twice daily, you can use the clobetesol solution as needed, wear light compression (10mmhg)    Patient discharge and wound care instructions  Raymond Martinezu  10/29/2024       You may shower with protection of the wound (ie a cast cover or similar).     Changing your dressing:            leave in place for 1 week    Wash your hands with soap and water.  Ensure that the old dressing is removed completely. Place it in a plastic bag and throw it in the trash.  Cleanse the wound with hypochlorous wound cleanser (ie. Anasept, vashe, pure and clean).  It's ok to “scrub” your wound with the gauze, small amount of bleeding with cleansing is normal and ok.  Apply the following dressings:  clobetesol  and moisturizer  left: folded xerform>border foam      Spandagrip from base of toes to knee    Nutrition and blood sugar control:  Focus on the following:  Protein: Meats, beans, eggs, milk and yogurt particularly Greek yogurt), tofu, soy nuts, soy protein products (Follow the protein handout in your welcome folder)  Vitamin C: Citrus fruits and juices, strawberries, tomatoes, tomato juice, peppers, baked potatoes, spinach, broccoli, cauliflower, [place] sprouts, cabbage  Vitamin A: Dark green, leafy vegetables, orange or yellow vegetables, cantaloupe, fortified dairy products, liver, fortified cereals  Zinc: Fortified cereals, red meats, seafood  Consider supplementing with Justin by [employer]. It can be purchased on amazon, [employer] website, or local pharmacy may be able to order it for you.  (These are essential branch chain amino acids that help with tissue building and wound healing).     Concerns:  Signs of infection may include the following:  Increase in redness  Red \"streaks\" from wound  Increase in swelling  Fever  Unusual odor  Change in the amount of wound drainage     Should you experience any significant changes in your wound(s) or have any questions  regarding your home care instructions please contact the wound center [facility] @ [phone] If after regular business hours, please call your family doctor or local emergency room. The treatment plan has been discussed at length between you and your provider. Follow all instructions carefully, it is very important. If you do not follow all instructions you are at risk of your wound not healing, infection, possible loss of limb and even loss of life.        Martina Mckeon FNP-C, CWCN-AP, CFCN, CSWS, WCC, DWC  10/29/2024

## 2024-10-28 RX ORDER — MYCOPHENOLATE MOFETIL 500 MG/1
500 TABLET ORAL DAILY
Qty: 90 TABLET | Refills: 0 | OUTPATIENT
Start: 2024-10-28

## 2024-10-29 ENCOUNTER — OFFICE VISIT (OUTPATIENT)
Dept: WOUND CARE | Facility: HOSPITAL | Age: 49
End: 2024-10-29
Attending: NURSE PRACTITIONER
Payer: COMMERCIAL

## 2024-10-29 VITALS
RESPIRATION RATE: 16 BRPM | DIASTOLIC BLOOD PRESSURE: 64 MMHG | HEART RATE: 81 BPM | TEMPERATURE: 98 F | SYSTOLIC BLOOD PRESSURE: 100 MMHG

## 2024-10-29 DIAGNOSIS — Z79.52 CURRENT CHRONIC USE OF SYSTEMIC STEROIDS: ICD-10-CM

## 2024-10-29 DIAGNOSIS — L97.312 NON-PRESSURE CHRONIC ULCER OF RIGHT ANKLE WITH FAT LAYER EXPOSED (HCC): ICD-10-CM

## 2024-10-29 DIAGNOSIS — I77.89 LUPUS VASCULITIS (HCC): ICD-10-CM

## 2024-10-29 DIAGNOSIS — M35.1 MIXED CONNECTIVE TISSUE DISEASE (HCC): ICD-10-CM

## 2024-10-29 DIAGNOSIS — I87.333 IDIOPATHIC CHRONIC VENOUS HYPERTENSION OF BOTH LOWER EXTREMITIES WITH ULCER AND INFLAMMATION (HCC): ICD-10-CM

## 2024-10-29 DIAGNOSIS — M32.19 LUPUS VASCULITIS (HCC): ICD-10-CM

## 2024-10-29 DIAGNOSIS — L97.322 NON-PRESSURE CHRONIC ULCER OF LEFT ANKLE WITH FAT LAYER EXPOSED (HCC): Primary | ICD-10-CM

## 2024-10-29 PROCEDURE — 99213 OFFICE O/P EST LOW 20 MIN: CPT | Performed by: NURSE PRACTITIONER

## 2024-10-29 NOTE — PROGRESS NOTES
.Weekly Wound Education Note    Teaching Provided To: Patient;Family  Training Topics: Dressing;Edema control;Cleasing and general instructions;Compression;Discharge instructions  Training Method: Explain/Verbal;Written  Training Response: Patient responds and understands        Notes: Wounds improving. Per provider, right ankle is healed. Pt is to moisturize twice per day and continue to wear spandagrip D. Continue Clobetasol. folded xeroform, and bordered foam, spandagrip D to left leg.

## 2024-10-29 NOTE — PATIENT INSTRUCTIONS
Please return:  1 week      RAYMOND!  Moisturize the right leg twice daily, you can use the clobetesol solution as needed, wear light compression (10mmhg)    Patient discharge and wound care instructions  Raymond Olivo  10/29/2024       You may shower with protection of the wound (ie a cast cover or similar).     Changing your dressing:            leave in place for 1 week    Wash your hands with soap and water.  Ensure that the old dressing is removed completely. Place it in a plastic bag and throw it in the trash.  Cleanse the wound with hypochlorous wound cleanser (ie. Anasept, vashe, pure and clean).  It's ok to “scrub” your wound with the gauze, small amount of bleeding with cleansing is normal and ok.  Apply the following dressings:  clobetesol  and moisturizer  left: folded xerform>border foam      Spandagrip from base of toes to knee    Nutrition and blood sugar control:  Focus on the following:  Protein: Meats, beans, eggs, milk and yogurt particularly Greek yogurt), tofu, soy nuts, soy protein products (Follow the protein handout in your welcome folder)  Vitamin C: Citrus fruits and juices, strawberries, tomatoes, tomato juice, peppers, baked potatoes, spinach, broccoli, cauliflower, [place] sprouts, cabbage  Vitamin A: Dark green, leafy vegetables, orange or yellow vegetables, cantaloupe, fortified dairy products, liver, fortified cereals  Zinc: Fortified cereals, red meats, seafood  Consider supplementing with Justin by [employer]. It can be purchased on amazon, [employer] website, or local pharmacy may be able to order it for you.  (These are essential branch chain amino acids that help with tissue building and wound healing).     Concerns:  Signs of infection may include the following:  Increase in redness  Red \"streaks\" from wound  Increase in swelling  Fever  Unusual odor  Change in the amount of wound drainage     Should you experience any significant changes in your wound(s) or have any  questions regarding your home care instructions please contact the wound center [facility] @ [phone] If after regular business hours, please call your family doctor or local emergency room. The treatment plan has been discussed at length between you and your provider. Follow all instructions carefully, it is very important. If you do not follow all instructions you are at risk of your wound not healing, infection, possible loss of limb and even loss of life.

## 2024-11-01 ENCOUNTER — OFFICE VISIT (OUTPATIENT)
Dept: RHEUMATOLOGY | Facility: CLINIC | Age: 49
End: 2024-11-01
Payer: COMMERCIAL

## 2024-11-01 VITALS
DIASTOLIC BLOOD PRESSURE: 54 MMHG | SYSTOLIC BLOOD PRESSURE: 92 MMHG | RESPIRATION RATE: 16 BRPM | BODY MASS INDEX: 21.03 KG/M2 | TEMPERATURE: 98 F | WEIGHT: 123.19 LBS | HEART RATE: 81 BPM | HEIGHT: 64 IN | OXYGEN SATURATION: 100 %

## 2024-11-01 DIAGNOSIS — Z79.52 LONG TERM (CURRENT) USE OF SYSTEMIC STEROIDS: ICD-10-CM

## 2024-11-01 DIAGNOSIS — D84.821 IMMUNOCOMPROMISED STATE DUE TO DRUG THERAPY (HCC): ICD-10-CM

## 2024-11-01 DIAGNOSIS — M79.672 LEFT FOOT PAIN: ICD-10-CM

## 2024-11-01 DIAGNOSIS — M35.1 MIXED CONNECTIVE TISSUE DISEASE (HCC): Primary | ICD-10-CM

## 2024-11-01 DIAGNOSIS — I77.89 LUPUS VASCULITIS (HCC): ICD-10-CM

## 2024-11-01 DIAGNOSIS — Z86.2 HISTORY OF ITP: ICD-10-CM

## 2024-11-01 DIAGNOSIS — Z79.899 HIGH RISK MEDICATION USE: ICD-10-CM

## 2024-11-01 DIAGNOSIS — I73.01 RAYNAUD'S DISEASE WITH GANGRENE (HCC): ICD-10-CM

## 2024-11-01 DIAGNOSIS — Z79.899 IMMUNOCOMPROMISED STATE DUE TO DRUG THERAPY (HCC): ICD-10-CM

## 2024-11-01 DIAGNOSIS — E61.1 IRON DEFICIENCY: ICD-10-CM

## 2024-11-01 DIAGNOSIS — M32.19 LUPUS VASCULITIS (HCC): ICD-10-CM

## 2024-11-01 DIAGNOSIS — E55.9 VITAMIN D DEFICIENCY: ICD-10-CM

## 2024-11-01 DIAGNOSIS — Z79.899 LONG-TERM USE OF PLAQUENIL: ICD-10-CM

## 2024-11-01 PROCEDURE — G2211 COMPLEX E/M VISIT ADD ON: HCPCS | Performed by: INTERNAL MEDICINE

## 2024-11-01 PROCEDURE — 3008F BODY MASS INDEX DOCD: CPT | Performed by: INTERNAL MEDICINE

## 2024-11-01 PROCEDURE — 99213 OFFICE O/P EST LOW 20 MIN: CPT | Performed by: INTERNAL MEDICINE

## 2024-11-01 PROCEDURE — 3074F SYST BP LT 130 MM HG: CPT | Performed by: INTERNAL MEDICINE

## 2024-11-01 PROCEDURE — 3078F DIAST BP <80 MM HG: CPT | Performed by: INTERNAL MEDICINE

## 2024-11-01 RX ORDER — FERROUS SULFATE 325(65) MG
1 TABLET ORAL 2 TIMES DAILY WITH MEALS
Qty: 180 TABLET | Refills: 0 | Status: SHIPPED | OUTPATIENT
Start: 2024-11-01

## 2024-11-01 RX ORDER — MYCOPHENOLATE MOFETIL 500 MG/1
500 TABLET ORAL DAILY
Qty: 90 TABLET | Refills: 0 | Status: SHIPPED | OUTPATIENT
Start: 2024-11-01

## 2024-11-01 NOTE — PROGRESS NOTES
?  RHEUMATOLOGY Follow up   Date of visit: 11/01/2024    Chief Complaint   Patient presents with    Follow - Up     LOV 9/17/2024       ASSESSMENT, DISCUSSION & PLAN   Assessment:  1. Mixed connective tissue disease (HCC)    2. Lupus vasculitis (HCC)    3. Left foot pain    4. Vitamin D deficiency    5. Iron deficiency    6. History of ITP    7. High risk medication use    8. Long-term use of Plaquenil    9. Long term (current) use of systemic steroids    10. Raynaud's disease with gangrene (HCC)    11. Immunocompromised state due to drug therapy (HCC)        Discussion:  Mrs. Raymond Olivo is a 50 yo woman with previously diagnosed mixed connective tissue disease and recent ulcer/chronic wounds in her bilateral legs. She does seem to have prominent sclerodermatous features with Raynaud's and chronic wounds, skin tightening as well as telangectasias. It seems like she has been on multiple DMARDs in the past including plaquenil, which was stopped due to possible inefficacy and GI upset as well as azathioprine which was discontinued due to cytopenias.     Since establishing care with me, she restarted methotrexate and had significant improvement of skin ulcerations with amlodipine. She did have worsened skin ulcer formation and biopsy performed by wound care confirmed lupus vasculitis. Due to lack of efficacy of methotrexate, decision was made to stop methotrexate, restart plaquenil and mycophenolate.     Patient had to be hospitalized due to ITP.  Unclear etiology but had responded to prolonged steroids.   She decided to  stop both CellCept and prednisone.  She has been off CellCept since hospitalization at the end of November 2022.  And she had been off of her prednisone but had some worsened fatigue and trace ankle swelling bilaterally. She did have some elevated inflammatory markers as well so prednisone was restarted. She had been doing better overall.     There is a comopnent of medical nonadherence and pt has  been told multiple times to get  baseline ECHO and PFTs due to other complications from the MCTD which may be contributing to some of her fatigue and low weight.  She had initially delayed initially due to COVID19 pandemic, however, she has not had any baseline done since disease onset and we need to evaluate for possible ILD vs pulmonary HTN. Reiterated again but pt declines further work up.     More recently, she had some worsened foot pain. Arterial duplex showed mild diffuse stenosis but vascular surgeon did not think significant for intervention. Thought her symptoms were more neurologic and she has been doing better with use of compression socks. Previously, she did not restart gabapentin or MMF despite my recommendations to do so.    Now, more recently, she has suffered recurrence of ulcerations in both ankles that had progressed fairly quickly. Her and her  had requested urgent wound care evaluation so they were fit in and she has been getting slowly better.  Per wound care, progress is slow however patient also could not tolerate full debridement.  Reminded pt that this is likely the lupus vasculitis as was determined a few years ago. Strongly recommended compliance with medication regimen.  She has been on increased dose of MMF.  While she has tolerated and the skin lesions have improved, they were interested in stopping or decreasing MMF with I strongly recommended against. I worry that she will just flare again without the proper immunosuppression  Reminded again of the importance of getting regular labs to monitor for toxicities  She was finally able to get ophthalmology eye exam, so will continue plaquenil daily.     Due to persistent Raynaud's and questionable perfusion, we tried fluoxetine to the amlodipine. She did not like how she felt, so medication was discontinued.     -- continued MMF 750mg daily  -- updated labs  -- continue prednisone to 2.5mg daily  -- okay to stay off gabapentin for  now  -- hold off on fluoxetine due to recent side effects   -- continue plaquenil 200mg daily (weight based dosing)  -- continue amlodipine 2.5mg twice daily  -- remember to get annual eye exams to monitor for toxicity from the plaquenil   -- previously discussed xray of foot showing periarticular osteopenia- could be related to bone density vs early inflammation. Encouraged to get updated BMD that was previously ordered.  -- take iron supplements daily- will repeat iron studies and vitamin d levels   -- follow up in 3 months or sooner as needed     Patient and pt's  verbalized understanding of above instructions. No further questions at this time.    Code selection for this visit was based on time. Time spent (25min) on date of service in preparing to see the patient, obtaining and/or reviewing separately obtained history, performing a medically appropriate examination, counseling and educating the patient/family/caregiver, ordering medications or testing, referring and communicating with other healthcare providers, documenting clinical information in the E HR, independently interpreting results and communicating results to the patient/family/caregiver and care coordination with the patient's other providers.        Plan:  Diagnoses and all orders for this visit:    Mixed connective tissue disease (HCC)  -     Mycophenolate Mofetil 500 MG Oral Tab; Take 1 tablet (500 mg total) by mouth daily.    Lupus vasculitis (HCC)  -     Mycophenolate Mofetil 500 MG Oral Tab; Take 1 tablet (500 mg total) by mouth daily.    Left foot pain  -     Mycophenolate Mofetil 500 MG Oral Tab; Take 1 tablet (500 mg total) by mouth daily.    Vitamin D deficiency  -     Vitamin D; Future    Iron deficiency  -     Iron And Tibc; Future  -     Ferritin; Future  -     Ferrous Sulfate 325 (65 Fe) MG Oral Tab; Take 1 tablet (325 mg total) by mouth 2 (two) times daily with meals.    History of ITP    High risk medication use    Long-term  use of Plaquenil    Long term (current) use of systemic steroids    Raynaud's disease with gangrene (HCC)    Immunocompromised state due to drug therapy (HCC)                    Return in about 3 months (around 2/1/2025).  ?  HPI   Raymond Olivo is a 49 year old female with the following active problems who is seen for medically necessary follow-up today. She was seen as a new patient several months ago for second opinion regarding underlying connective tissue disease. She had been suffering from multiple leg wounds over the years.  She was diagnosed by another rheumatologist with having mixed connective tissue disorder. She was previously on Plaquenil as well as azathioprine and some dose of prednisone.  Seemed like she was also on methotrexate but unclear why stopped. Since first seeing her, I restarted methotrexate with daily folic acid supplementation as well as added amlodipine. She had some decrease in her WBC count which has stabilized. Decision was made to stop methotrexate and start mycophenolate due to lupus vasculitis dx on skin biopsy. She was hospitalized due to thrombocytopenia.  Was diagnosed with ITP and given IV methylprednisolone as well as IVIG infusion. Initially as outpatient platelets were only 10 and improved most recently. She presents for follow up today.    Her  is here and serves to translate if pt does not understand what I'm asking/explaining.     Currently taking:  Plaquenil 200mg daily   Amlodipine 2.5mg twice daily  Prednisone 2.5mg daily   MMF 750mg daily   Gabapentin not taking anymore   Biotin supplementation  Vit d completed prescription previously. Not taking OTC at this time.   B12 not taking any more    Iron once daily (rx for two)     Since her last visit, she has been feeling better.   Has continues with MMF to 750mg.- denies side effects.   Tried fluoxetine but stopped after 2 doses. Had itching, subsided after stopping medication.   Continuing to follow with  wound care and wounds are slowly healing. No new ulcerations. Continues with protein drink to help  Very slight burning sensation but better overall just on the left side  Denies fatigue at this time and no longer as weak  Denies any other joint pain or swelling.   Continues to have hair loss/thinning but details unclear- stable. No changes since restarting MMF. No alopecia spots.   Denies skin rashes   No recent bruising or bleeding  Raynaud's present but not severe, stable.   Feels the tightness of the skin over the fingers and toes are the same.   Has not had obvious bleeding- epistaxis, hemoptysis, hematuria or bloody stools  Denies shortness of breath or chest pain.   Denies cough.   Denies fevers or chills.     Has not done ECHO or CXR as previously ordered.     HPI from initial consultation  States about 8-10 years ago, she started to have pain in her joints, while living in [place]. There was concern for possible rheumatologic issue at that time. She was given some oral prednisone which were reduced over time. She had a wound on her leg, was told it was a vascular wound. Had a laser vein surgery at that time. No recurrence until one month ago.   About 3 years ago, they moved to [facility]. She was seeing Dr. Keys, diagnosed pt with mixed connective tissue disease. States her symptoms worsened in winter months- joint pain in her hands as well as change in color of her fingertips. About one month ago, she developed a wound over her leg. She followed with Dr. Ferguson, as Dr. Keys left. Once the wound came, he recommended pt be evaluated by wound care. Also recommended tertiary center.  Was previously on plaquenil, however due to the side effects, this was stopped.  Pt was also on azathioprine but had some cytopenias. Also had been on methotrexate but since this wasn't helping, this was discontinued.   Pt has been following with wound care. States with the topicals and wrapping, she has had some  improvement of the wound size.   Pt continues to have burning sensation in both legs, described as 5-6/10.   She is currently on prednisone 5mg daily.   She has never gotten a biopsy of the lesion, unless she had one 8 years ago in [place], but  cannot recall results.  She tried gabapentin, only 100mg nightly but stopped because of feeling drowsy.      Pt does have morning stiffness lasting 10-15 minutes.   Significant hair loss  + ulcers over bottom inside lip  Itching over thighs without redness or rash.   Hx of cytopenia while on DMARDs  + ulcers over toes/bottom of feet  + pain awakening the patient from sleep  + looks thinner, however actual weight is the same.      No history of significant wrist pain or swelling, pain or swelling of the MCPs, pain or swelling of the ankles and bones of the feet, or new skin nodule formation.  The patient denies nasal ulcers, photosensitive rash, elevated or scarring rashes, prior renal or liver disease, or history of seizures.  No history of prior blood clot in the legs or lungs, strokes or ischemic phenomenon.  Denies nonhealing ulcers on the fingertips, trouble swallowing, or severe acid reflux.  The patient denies any history of uveitis, crampy abdominal pain, constipation, diarrhea, or bloody stools.   There are no symptoms of severe dry eyes, dry mouth, or swelling of the cheeks or under the jawbone.   No fevers, chills, lymphadenopathy, night sweats, or easy bruising or bleeding.  Denies chronic sinus infections/disease or epistaxis.  Denies chronic cough or hemoptysis.      Family hx:  Father with possible MCTD  Brothers/sisters healthy       Past Medical History:  Past Medical History:    Mixed connective tissue disease (HCC)    Raynaud's disease     Past Surgical History:  History reviewed. No pertinent surgical history.    Family History:  Family History   Problem Relation Age of Onset    Diabetes Father     Diabetes Mother      Social History:  Social History      Socioeconomic History    Marital status:    Tobacco Use    Smoking status: Never    Smokeless tobacco: Never   Vaping Use    Vaping status: Never Used   Substance and Sexual Activity    Alcohol use: No    Drug use: No    Sexual activity: Yes     Medications:  Outpatient Medications Marked as Taking for the 11/1/24 encounter (Office Visit) with Alisha Farmer DO   Medication Sig Dispense Refill    Mycophenolate Mofetil 500 MG Oral Tab Take 1 tablet (500 mg total) by mouth daily. 90 tablet 0    Ferrous Sulfate 325 (65 Fe) MG Oral Tab Take 1 tablet (325 mg total) by mouth 2 (two) times daily with meals. 180 tablet 0    hydroxychloroquine 200 MG Oral Tab Take 1 tablet (200 mg total) by mouth daily. 90 tablet 0    clobetasol 0.05 % External Solution Apply topically to lower legs after shower 3 x weekly 50 mL 0    amLODIPine 2.5 MG Oral Tab Take 1 tablet (2.5 mg total) by mouth 2 (two) times daily. (Patient taking differently: Take 1 tablet (2.5 mg total) by mouth daily.) 180 tablet 0    predniSONE 2.5 MG Oral Tab Take 1 tablet (2.5 mg total) by mouth daily. 90 tablet 0     Modified Medications    Modified Medication Previous Medication    FERROUS SULFATE 325 (65 FE) MG ORAL TAB Ferrous Sulfate 325 (65 Fe) MG Oral Tab       Take 1 tablet (325 mg total) by mouth 2 (two) times daily with meals.    Take 1 tablet (325 mg total) by mouth 2 (two) times daily with meals.    MYCOPHENOLATE MOFETIL 500 MG ORAL TAB Mycophenolate Mofetil 500 MG Oral Tab       Take 1 tablet (500 mg total) by mouth daily.    Take 1 tablet (500 mg total) by mouth daily.     Medications Discontinued During This Encounter   Medication Reason    gabapentin 100 MG Oral Cap     mycophenolate mofetil 250 MG Oral Cap     Mycophenolate Mofetil 500 MG Oral Tab     Ferrous Sulfate 325 (65 Fe) MG Oral Tab            ?  Allergies:  No Known Allergies  ?  REVIEW OF SYSTEMS   ?  Review of Systems   Constitutional:  Negative for chills, fever,  malaise/fatigue and weight loss.   HENT:  Negative for nosebleeds.    Eyes:  Negative for blurred vision, pain and redness.   Respiratory:  Negative for cough, hemoptysis and shortness of breath.    Cardiovascular:  Negative for chest pain, palpitations and leg swelling.   Gastrointestinal:  Negative for abdominal pain, blood in stool, constipation, diarrhea, heartburn and nausea.   Genitourinary:  Negative for dysuria, frequency, hematuria and urgency.   Musculoskeletal:  Negative for back pain, joint pain, myalgias and neck pain.   Skin:  Negative for itching and rash.   Neurological:  Positive for tingling. Negative for dizziness, weakness and headaches.   Endo/Heme/Allergies:  Does not bruise/bleed easily.     PHYSICAL EXAM   Today's Vitals:  Temperature Blood Pressure Heart Rate Resp Rate SpO2   Temp: 97.6 °F (36.4 °C) BP: 92/54 Pulse: 81 Resp: 16 SpO2: 100 %   ?  Current Weight Height BMI BSA Pain   Wt Readings from Last 1 Encounters:   11/01/24 123 lb 3.2 oz (55.9 kg)    Height: 5' 4\" (162.6 cm) Body mass index is 21.15 kg/m². Body surface area is 1.59 meters squared.         Physical Exam  Vitals and nursing note reviewed.   Constitutional:       General: She is not in acute distress.     Appearance: She is well-developed. She is not diaphoretic.      Comments: + thin   HENT:      Head: Normocephalic.   Eyes:      General: No scleral icterus.     Extraocular Movements: Extraocular movements intact.      Conjunctiva/sclera: Conjunctivae normal.   Neck:      Vascular: No JVD.      Trachea: No tracheal deviation.   Cardiovascular:      Rate and Rhythm: Normal rate and regular rhythm.      Heart sounds: Normal heart sounds. No murmur heard.  Pulmonary:      Effort: Pulmonary effort is normal. No respiratory distress.      Breath sounds: Normal breath sounds. No wheezing.   Musculoskeletal:         General: Tenderness and deformity present. No swelling.      Cervical back: Neck supple.      Comments: No evidence  of heberden or kecia nodes of any of the fingers, no basilar joint tenderness of the 1st CMC bilaterally.  No swelling, tenderness, redness or restriction of motion of the DIPs, PIPs, MCPs, wrists, elbows, or joints of the feet.  Bilateral shoulders with full ROM.  Bilateral knees without medial joint line tenderness, no crepitus, no effusion.  + bilateral ankle tenderness over med/lat malleoli and some swelling most notable over lateral malleolus on left - no more swelling but tenderness over top of feet  Very sensitive to touch over left dorsal foot diffusely- improved   Lymphadenopathy:      Cervical: No cervical adenopathy.   Skin:     General: Skin is warm and dry.      Comments: No active bruising noted today.   Salt/pepper appearance of skin stable  Abnormal nailfold capillaroscopy diffuse fingers, slightly improved   No digital pits of fingers  + telangectasias over face stable  Skin tightening of distal fingers improved   Ulcers reviewed from wound care pictures on b/l ankles and able to visualize today which looks much better overall    Neurological:      Mental Status: She is alert. She is disoriented.      Cranial Nerves: No cranial nerve deficit.      Gait: Gait abnormal.   Psychiatric:         Mood and Affect: Mood normal.         Behavior: Behavior normal.       ?  Radiology review:       Narrative   PROCEDURE:  US ARTERIAL DUPLEX LOWER EXTREMITY LEFT (CPT=93926)     COMPARISON:  None.     INDICATIONS:  M79.672 Left foot pain I73.01 Raynaud's disease with gangrene ([facility]) I77.89 Lupus vasculitis ([facility]) M32.19 Lupus vasculitis ([facility]) M35.1 Mixed connective tissue d*     TECHNIQUE:  A comprehensive color duplex Doppler ultrasound examination of the left lower extremity was performed. Color imaging, Doppler wave form analysis, and peak systolic flow measurements of the common femoral, profunda femoral, superficial  femoral, and popliteal arteries were performed.       PATIENT STATED HISTORY: (As  transcribed by Technologist)           FINDINGS:    LEFT EXTREMITY:  Triphasic waveforms in the left common femoral, profunda, superficial femoral arteries.  Biphasic waveforms below the left knee.  OTHER:  None.       PEAK VELOCITIES (CM/S):  LEFT COMMON FEMORAL:      165    LEFT PROFUNDA FEMORAL:      78      LEFT SUPERFICIAL FEMORAL PROX:    113  LEFT SUPERFICIAL FEMORAL MID:      94  LEFT SUPERFICIAL FEMORAL DISTAL:    92  LEFT POPLITEAL PROX:      110  LEFT POPLITEAL DISTAL:      121  TIBIOPER TRUNK:      66  LEFT PTA PROX:      60  LEFT PTA MID:      62  LEFT PTA DISTAL:      73  LEFT BETH PROX:      82  LEFT BETH MID:      72    LEFT DORSALIS PEDIS::      30  LEFT GREAT TOE PRESSURE:      25 mmHg                       Impression   CONCLUSION:       1. No high-grade stenosis is identified.  There is suggestion of diffuse mild stenosis throughout most of the left lower extremity.     2. Overall diminished left toe pressure is noted.  Possibility of decreased perfusion in the distal left foot is of consideration.          LOCATION:  [place] [address]           Dictated by (CST): Anselmo Carroll MD on 11/28/2023 at 9:30 AM      Finalized by (CST): Anselmo Carroll MD on 11/28/2023 at 9:35 AM       Narrative   PROCEDURE:  XR FOOT, COMPLETE (MIN 3 VIEWS), LEFT (CPT=73630)     TECHNIQUE:  AP, oblique, and lateral views were obtained.     COMPARISON:  None.     INDICATIONS:  M79.672 Left foot pain I73.01 Raynaud's disease with gangrene ([facility]) I77.89 Lupus vasculitis ([facility]) M32.19 Lupus vasculitis ([facility]) M35.1 Mixed connective tissue d*     PATIENT STATED HISTORY: (As transcribed by Technologist)  Patient states having lateral anterior foot pain that began roughly twenty days ago, with no injury.         FINDINGS:    BONES:  Osseous structures are intact.  Periarticular decreased bone mineralization.  Limited evaluation of the 2nd through 5th distal phalanges is due to constant flexion.  Joint spaces are preserved.  Mild hallux  valgus deformity.  No dislocation.    Mild inferior calcaneal enthesopathy.                   Impression   CONCLUSION:    1. Osseous structures are intact.  Please see details as above.        LOCATION:  [place]        Dictated by ([facility]): Madina Colin MD on 10/19/2023 at 12:16 PM      Finalized by (CST): Madina Colin MD on 10/19/2023 at 12:18 PM        Narrative   PROCEDURE:  XR FOOT, COMPLETE (MIN 3 VIEWS), LEFT (CPT=73630)     TECHNIQUE:  AP, oblique, and lateral views were obtained.     COMPARISON:  None.     INDICATIONS:  M79.672 Left foot pain I73.01 Raynaud's disease with gangrene (HCC) I77.89 Lupus vasculitis ([facility]) M32.19 Lupus vasculitis ([facility]) M35.1 Mixed connective tissue d*     PATIENT STATED HISTORY: (As transcribed by Technologist)  Patient states having lateral anterior foot pain that began roughly twenty days ago, with no injury.         FINDINGS:    BONES:  Osseous structures are intact.  Periarticular decreased bone mineralization.  Limited evaluation of the 2nd through 5th distal phalanges is due to constant flexion.  Joint spaces are preserved.  Mild hallux valgus deformity.  No dislocation.    Mild inferior calcaneal enthesopathy.                   Impression   CONCLUSION:    1. Osseous structures are intact.  Please see details as above.        LOCATION:  [place]        Dictated by ([facility]): Madina Colin MD on 10/19/2023 at 12:16 PM      Finalized by (CST): Madina Colin MD on 10/19/2023 at 12:18 PM      PROCEDURE:  CT ABDOMEN PELVIS IV CONTRAST, NO ORAL (ER)       COMPARISON:  None.       INDICATIONS:  abd pain, sent by pcp to r/o sbo       TECHNIQUE:  CT scanning was performed from the dome of the diaphragm to the pubic symphysis with non-ionic intravenous contrast material. Post contrast coronal MPR imaging was performed.  Dose reduction techniques were used. Dose information is   transmitted to the ACR (American College of Radiology) NRDR (National Radiology Data Registry) which includes the Dose Index  Registry.       FINDINGS:     LUNG BASES:  Dependent atelectasis bilaterally.   LIVER:  Normal in shape and contour.   BILIARY:  Cholelithiasis.  No intrahepatic biliary dilatation.  There is possible small amount of pericholecystic fluid..   SPLEEN:  Normal.  No enlargement or focal lesion.   PANCREAS:  No tyson-pancreatic inflammatory stranding   ADRENALS:  Normal.  No mass or enlargement.     KIDNEYS:  There is moderate bilateral hydronephrosis.  No obstructing urolithiasis.   BOWEL/MESENTERY:  Bowel is normal in caliber. No evidence of obstruction.  Considerable amount of stool noted within the rectum.  Moderate amount of fecal material noted within the remaining portion of the colon.  The appendix is normal appearance.   PELVIS:  Distended bladder.  No free pelvic fluid.  Calcified phleboliths within the pelvis.     AORTA/VASCULAR:  Atheromatous calcifications of the aorta.  Aorta is normal in caliber.   BONES:  No acute fractures.                        Impression   CONCLUSION:     1. Considerably distended bladder with bilateral hydronephrosis.  No obstructing urolithiasis is noted.   2. Prominent mount of stool noted within the rectum suggestive of fecal impaction.  No evidence of bowel obstruction.     3. Gallstone within the neck of the gallbladder measuring 1.3 x 1.0 cm.  The gallbladder is contracted but there is suggestion of small amount of pericholecystic fluid.  Correlation with clinical symptoms to exclude acute cholecystitis.             Dictated by (CST): Colette Nelson MD on 12/22/2021 at 7:51 PM       Finalized by (CST): Colette Nelson MD on 12/22/2021 at 7:56 PM        Labs:  Lab Results   Component Value Date    WBC 4.7 09/03/2024    RBC 4.59 09/03/2024    HGB 13.1 09/03/2024    HCT 41.1 09/03/2024    .0 09/03/2024    MCV 89.5 09/03/2024    MCH 28.5 09/03/2024    MCHC 31.9 09/03/2024    RDW 13.2 09/03/2024    NEPRELIM 3.39 09/03/2024    NEPERCENT 72.1 09/03/2024    LYPERCENT 13.0  09/03/2024    MOPERCENT 13.6 09/03/2024    EOPERCENT 0.9 09/03/2024    BAPERCENT 0.2 09/03/2024    NE 3.39 09/03/2024    LYMABS 0.61 (L) 09/03/2024    MOABSO 0.64 09/03/2024    EOABSO 0.04 09/03/2024    BAABSO 0.01 09/03/2024     Lab Results   Component Value Date    GLU 91 09/03/2024    BUN 9 09/03/2024    BUNCREA 16.7 07/14/2021    CREATSERUM 0.58 09/03/2024    ANIONGAP 3 09/03/2024    GFRNAA 109 07/28/2022    GFRAA 126 07/28/2022    CA 10.0 09/03/2024    OSMOCALC 288 09/03/2024    ALKPHO 100 09/03/2024    AST 16 09/03/2024    ALT 8 (L) 09/03/2024    BILT 0.4 09/03/2024    TP 8.0 09/03/2024    ALB 4.9 (H) 09/03/2024    GLOBULIN 3.1 09/03/2024     09/03/2024    K 3.7 09/03/2024     09/03/2024    CO2 31.0 09/03/2024       Additional Labs:  09/2023  CRP normal  ESR 47 elevated  CMP grossly normal  CBC with WBC 4.7; hemoglobin 13.1; platelet 194    07/2024 (resulted after visit)  CBC with WBC 5.5; hemoglobin 12.8; platelet 183  CMP grossly normal except potassium 3.3 low  ESR 39 elevated  CRP normal     03/2024  CBC with WBC 3.2, otherwise normal  CMP grossly normal   Iron 45; percent sat 15 borderline low  Ferritin 114.6  B12 726 normal  ESR 54 elevated  CRP 0.37 borderline elevated  Vitamin D 35  C3 103  C4 33.7     10/2023  CBC grossly normal  CMP grossly normal  Iron 43; percent sat 12 low  Ferritin 94.1 normal  B12 726 normal  ESR 45 elevated  CRP 0.48 borderline elevated  Vitamin D 58.8 Normal    07/2023  B12 246 borderline low   Vitamin D 22.9 low  Ferritin 96.6 normal  Iron low  CBC grossly normal  CMP grossly normal    07/2022  B12 302 normal  Vitamin D 14.4 low  Ferritin normal  Iron normal  CBC grossly normal  CMP with potassium 3.3 otherwise grossly normal  CRP normal  ESR 15 normal  C3 97.8 normal  C4 29.1 normal    01/29/2022  WELL[place] 1: 640 speckled      Remainder of DARRYL panel negative  CBC with WBC 5.9; hemoglobin 12.2; platelet 296  B12 689  ESR 48 elevated  CRP 0.44 borderline  elevated  CMP grossly normal  UA grossly normal with exception of trace leuk esterase squamous epithelial and rare bacteria  Ferritin 245.8 elevated  Iron and percent sat low    01/11/2022  CBC grossly normal (WBC 5.6; hemoglobin 13.2; platelet 214)    11/2021   B12 943  WELL[place] positive      Remainder of DARRYL panel negative intrinsic factor blocking antibody negative  MMA normal  ESR 34  dsDNA negative  Total complement normal    11/22/2021  CBC with WBC 3.9; hemoglobin 11.4; platelet 10    7/2021  dsDNA negative  Protein creatinine ratio normal  UA small amount of blood; leuk esterase; bacteria rare; moderate squamous  C4 27.3 normal  C3 112 normal  CRP 0.46 borderline elevated  ESR 40 elevated  CBC with hemoglobin 10.9; WBC 4.0; platelet 213 normal  CMP grossly normal    08/2020  Esr 36  CRP 0.60  C3 and C4 normal     Cutaneous Direct IF, Biopsy See Note    Comment: IMMUNODERMATOLOGY REPORT       Specimen(s):   1. 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, including lupus vasculitis     (See Results and Comments)   ____________________________________________________________     RESULTS   IgG:  Grains within and around basal keratinocytes and         within cell nuclei and grains in focal superficial,         upper, and mid dermal blood vessels         IgG4:  Negative     IgM:  Grains in focal dermal blood vessels     IgA:  Focal grains within basal keratinocytes     C3:   Few grains along basement membrane zone and grains and         clumps within superficial and upper dermal blood         vessels     Fibrinogen:  3+ deposition around superficial, upper, and                mid dermal blood vessels     COMMENTS   By direct immunofluorescence, there is granular deposition of IgG    within and around basal keratinocytes and within cell nuclei. The   presence of granular IgG within the epidermis raises the   consideration of lupus erythematosus, specifically subacute   cutaneous lupus erythematosus (SCLE). The finding of granular   staining of cell nuclei may be indicative of the presence of   anti-nuclear antibodies, further lending support to the   possibility of lupus erythematosus. 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     Correlation with histopathologic examination of formalin-fixed   tissue is needed to further define this process. Correlation with   clinical findings is also needed, including with serologic testing   for lupus associated antibodies, including antibodies to SSA (Ro)   and SSB (La), which are often associated with SCLE.        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

## 2024-11-01 NOTE — PATIENT INSTRUCTIONS
-- continued MMF 750mg daily  -- updated labs  -- continue prednisone to 2.5mg daily  -- okay to stay off gabapentin for now  -- hold off on fluoxetine due to recent side effects   -- continue plaquenil 200mg daily (weight based dosing)  -- continue amlodipine 2.5mg twice daily  -- remember to get annual eye exams to monitor for toxicity from the plaquenil   -- previously discussed xray of foot showing periarticular osteopenia- could be related to bone density vs early inflammation. Encouraged to get updated BMD that was previously ordered.  -- take iron supplements daily- will repeat iron studies and vitamin d levels   -- follow up in 3 months or sooner as needed     Dr. Farmer

## 2024-11-05 ENCOUNTER — OFFICE VISIT (OUTPATIENT)
Dept: WOUND CARE | Facility: HOSPITAL | Age: 49
End: 2024-11-05
Attending: NURSE PRACTITIONER
Payer: COMMERCIAL

## 2024-11-05 VITALS
DIASTOLIC BLOOD PRESSURE: 62 MMHG | SYSTOLIC BLOOD PRESSURE: 95 MMHG | TEMPERATURE: 98 F | RESPIRATION RATE: 16 BRPM | HEART RATE: 79 BPM

## 2024-11-05 DIAGNOSIS — Z79.52 CURRENT CHRONIC USE OF SYSTEMIC STEROIDS: ICD-10-CM

## 2024-11-05 DIAGNOSIS — I77.89 LUPUS VASCULITIS (HCC): ICD-10-CM

## 2024-11-05 DIAGNOSIS — I87.333 IDIOPATHIC CHRONIC VENOUS HYPERTENSION OF BOTH LOWER EXTREMITIES WITH ULCER AND INFLAMMATION (HCC): ICD-10-CM

## 2024-11-05 DIAGNOSIS — L97.322 NON-PRESSURE CHRONIC ULCER OF LEFT ANKLE WITH FAT LAYER EXPOSED (HCC): Primary | ICD-10-CM

## 2024-11-05 DIAGNOSIS — M32.19 LUPUS VASCULITIS (HCC): ICD-10-CM

## 2024-11-05 DIAGNOSIS — M35.1 MIXED CONNECTIVE TISSUE DISEASE (HCC): ICD-10-CM

## 2024-11-05 PROCEDURE — 99213 OFFICE O/P EST LOW 20 MIN: CPT | Performed by: NURSE PRACTITIONER

## 2024-11-05 NOTE — PROGRESS NOTES
.Weekly Wound Education Note    Teaching Provided To: Patient;Family  Training Topics: Dressing;Edema control;Cleasing and general instructions;Compression;Discharge instructions  Training Method: Written;Explain/Verbal  Training Response: Patient responds and understands        Notes: Wound stable. Continue clobetasol, folded xeroform and bordered foam, with spandagrip E. Spandagrip E applied to both legs.

## 2024-11-05 NOTE — PATIENT INSTRUCTIONS
Please return:  1 week      RAYMOND!  Moisturize the right leg twice daily, you can use the clobetesol solution as needed, wear light compression (10mmhg)    Patient discharge and wound care instructions  Raymond Olivo  11/5/2024         You may shower with protection of the wound (ie a cast cover or similar).     Changing your dressing:            leave in place for 1 week    Wash your hands with soap and water.  Ensure that the old dressing is removed completely. Place it in a plastic bag and throw it in the trash.  Cleanse the wound with hypochlorous wound cleanser (ie. Anasept, vashe, pure and clean).  It's ok to “scrub” your wound with the gauze, small amount of bleeding with cleansing is normal and ok.  Apply the following dressings:  clobetesol  and moisturizer  left: folded xerform>border foam      Spandagrip from base of toes to knee    Nutrition and blood sugar control:  Focus on the following:  Protein: Meats, beans, eggs, milk and yogurt particularly Greek yogurt), tofu, soy nuts, soy protein products (Follow the protein handout in your welcome folder)  Vitamin C: Citrus fruits and juices, strawberries, tomatoes, tomato juice, peppers, baked potatoes, spinach, broccoli, cauliflower, [place] sprouts, cabbage  Vitamin A: Dark green, leafy vegetables, orange or yellow vegetables, cantaloupe, fortified dairy products, liver, fortified cereals  Zinc: Fortified cereals, red meats, seafood  Consider supplementing with Justin by [employer]. It can be purchased on amazon, [employer] website, or local pharmacy may be able to order it for you.  (These are essential branch chain amino acids that help with tissue building and wound healing).     Concerns:  Signs of infection may include the following:  Increase in redness  Red \"streaks\" from wound  Increase in swelling  Fever  Unusual odor  Change in the amount of wound drainage     Should you experience any significant changes in your wound(s) or have any  questions regarding your home care instructions please contact the wound center [facility] @ [phone] If after regular business hours, please call your family doctor or local emergency room. The treatment plan has been discussed at length between you and your provider. Follow all instructions carefully, it is very important. If you do not follow all instructions you are at risk of your wound not healing, infection, possible loss of limb and even loss of life.

## 2024-11-12 ENCOUNTER — OFFICE VISIT (OUTPATIENT)
Dept: WOUND CARE | Facility: HOSPITAL | Age: 49
End: 2024-11-12
Attending: NURSE PRACTITIONER
Payer: COMMERCIAL

## 2024-11-12 VITALS
HEART RATE: 82 BPM | DIASTOLIC BLOOD PRESSURE: 62 MMHG | SYSTOLIC BLOOD PRESSURE: 91 MMHG | RESPIRATION RATE: 14 BRPM | TEMPERATURE: 98 F

## 2024-11-12 DIAGNOSIS — L97.322 NON-PRESSURE CHRONIC ULCER OF LEFT ANKLE WITH FAT LAYER EXPOSED (HCC): Primary | ICD-10-CM

## 2024-11-12 DIAGNOSIS — Z79.52 CURRENT CHRONIC USE OF SYSTEMIC STEROIDS: ICD-10-CM

## 2024-11-12 DIAGNOSIS — I87.333 IDIOPATHIC CHRONIC VENOUS HYPERTENSION OF BOTH LOWER EXTREMITIES WITH ULCER AND INFLAMMATION (HCC): ICD-10-CM

## 2024-11-12 DIAGNOSIS — M32.19 LUPUS VASCULITIS (HCC): ICD-10-CM

## 2024-11-12 DIAGNOSIS — I77.89 LUPUS VASCULITIS (HCC): ICD-10-CM

## 2024-11-12 DIAGNOSIS — M35.1 MIXED CONNECTIVE TISSUE DISEASE (HCC): ICD-10-CM

## 2024-11-12 PROCEDURE — 99213 OFFICE O/P EST LOW 20 MIN: CPT | Performed by: NURSE PRACTITIONER

## 2024-11-12 NOTE — PROGRESS NOTES
CHIEF COMPLAINT:     Chief Complaint   Patient presents with    Wound Care     Patient arrives for a wound care follow up appointment. Patient arrives wearing spandagrip to both legs. Patient reports no pain to the wounds.      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 Patient saw dr. Santamaria (vascular) in December 2023 and he documented \"I explained to the patient and her  that I do not believe the source of her pain to be vascular in origin.  She has an easily palpable dorsalis pedis pulse and the location of the pain is more suggestive of a neuropathic origin.  I suspect perhaps that given the tightness of her feet from her mixed connective tissue disorder the compression stockings is causing somewhat compression of the nerves in that area and I have encouraged her to substitute those stockings to those that involve the feet up to the upper calf.\" Patient/spouse have been in contact with dr. Farmer and in march 2024 reported a dark spot/ulcer on her bilateral lower legs, she was advised to make an appointment with wound care as well as get her blood work done ordered in January 2024 so that further treatment plan could be extablished.  The blood work did show increased inflammation and she was advised to increase her prednisone to 20mg x 5 days (which spouse reports they did do) and take the amlodipine 2.5 mg-which she states she is doing.  They have been leaving the areas open to air and she has not been wearing compression.  She c/o burning sensation in her ankle area.  She was started on gabapentin by dr. Farmer last year, however they stopped it because it was making her too sleepy. We discussed restarting the gabapentin, but only take a noc first.  Both wounds are dried eschar.  Will utilize  hydrogel with lidocaine to start debriding off/softening the eschar. I have also ordered santyl. Patient placed into spandagrips for compression    HPI PRIOR TO OCTOBER 2024 SEE INDIVIDUAL PROGRESS NOTES  9-10-24 patient returns.   She did get her labs drawn last week and she has an appointment with Dr. Farmer on 9-17-24. Her esr is up slightly from July however I suspect this may be related to the decrease in her po steroid.   Last week we added gina and continued with the xeroform.  We also utilize the debrisoft to cleanse the wounds and periwounds.  Today, wounds are improved, but the gina appears to just have stayed in the wound bed and did not integrate, therefore will not utilize gina this week. Her pain remains mostly on the lateral ankle.  We no longer have the accumulating exudate, overall her skin is much improved and more healthy in appearance.  No s/s of infection.    9-17-24 patient returns.  She is coming from an appointment with Dr. Farmer, plan is to keep meds same at this time.  Wounds have been improving (albeit slowly) in size and wound bed characteristics. The medial wounds are fully resolved, bilaterally, she is remaining with lateral ankle wounds bilaterally. She continues with the riley.  No s/s of infection. C/o pain with any touch.    9-24-24 patient returns.  She got her retinal exam done as ordered by dr. Farmer this morning, spouse reports everything is good.   Last week the medial wounds were resolved, however today there is a small open area on the left medial distal leg (almost to the heel).  Patient/spouse report that she has not worn any other shoes. There is some surrounding hyperemia to this wound, but patient does not report any increase in pain to the area as compared to other weeks.  The lateral wounds appear a bit moist.  Will utilize ag alginate (instead of xeroform) will also utilize clobetesol topically.'    10-1-24 patient returns.  Last week we utilize ag alginate as  the wounds/periwounds were moist. Today wounds are improved.  She did bring the polymem ag.  Patient has a blancheable area along anterior ankle on the left.  Will order spandagrip and silicone tape from [employer].  I will also rx topical steroid. Patient will change dressings 1 x this week. No s/s of infection. She states the right lower extremity pain is improved. She still has burning on the left.     10-8-24 patient returns. Last week we removed the calamine wraps and went over self care, cleansing, dressing, and compression. She was to change the dressings after shower/cleansing once this week. Today wounds are very dessicated with surrounding dried exudate, her right does not have pain, left lateral is still painful.  We used debrimit and forceps to remove the surrounding exudate/scabbing as patient could tolerate. Will utilize small amount of hydrogel to the left. Will dress with xeroform and foam. Continue with spandagrip. Patient to leave all dressings in place for the next week. No s/s of infection    10-15-24 patient returns.  We returned to leaving dressings in place for the full week last week but was to be using spandagrip (not wrap) for compression, however patient returns today without any compression in place. She states that she took it off on the way to clinic. Today wounds are improved, moisture is optimized. The right lateral wound and the left medial wound are primarily epithelial tissue.  Her pain is much improved except for the left lateral wound still is painful to touch.  No s/s of infection. Patient continues to do the riley.    10-22-24 patient returns. Her wounds have continued to improve.  Last week we have dressed with xeroform and spandagrip, patient to leave in place x 1 week.  She states that she only has pain on lateral left. The lateral right is open just a pinpoint. The medial wounds bilaterally are resolved. No s/s of infection continue current poc with clobetesol, xeroform, foam.   Important to keep the medial ankles moisturized.    10-29-24 patient returns, medial ankle wounds were resolved last week. We continued to treat the lateral with moist wound product (xerforom) and light compression. No changes in medications.  Pain is not present on the right, all wounds are resolved on the right. We discussed moisturization, use of clobetsol sparingly as needed and light compression on the right. Wound remains open on the left lateral but improved. Continue current poc. No s/s of infection. Tenderness to touch over the wound on the left lateral    11-5-24 patient returns. The remaining open wound is the left lateral. She followed up with dr. Farmer and she was interested in stopping or decreasing MMF.  Dr. Farmer strongly recommended against this as she is understandably concerned that patient will just reflare again without any immunosuppression.  Patient has been advised to get echo and PFT's done but she is declining. The remaining left lateral has epithelial bridge. The right inspected and skin hydration looks good, no open areas. No s/s of infection, no c/o pain except with touch to the left lateral.  Will use a larger spandagrip as patient does have a reddened area to anterior ankle area.     11-12-24 patient returns. Last week we decreased the amount of compression. The left lateral ankle wound was remaining open, we continued with xeroform and bordered foam.  The opening is smaller today. All other areas remain resolved. Patient states pain is much improved. No acute s/s of infection. Continue current poc-suspect she will be fully resolved within next 2 weeks.  MEDICATIONS:     Current Outpatient Medications:     Mycophenolate Mofetil 500 MG Oral Tab, Take 1 tablet (500 mg total) by mouth daily., Disp: 90 tablet, Rfl: 0    Ferrous Sulfate 325 (65 Fe) MG Oral Tab, Take 1 tablet (325 mg total) by mouth 2 (two) times daily with meals., Disp: 180 tablet, Rfl: 0    hydroxychloroquine 200 MG Oral  Tab, Take 1 tablet (200 mg total) by mouth daily., Disp: 90 tablet, Rfl: 0    clobetasol 0.05 % External Solution, Apply topically to lower legs after shower 3 x weekly, Disp: 50 mL, Rfl: 0    amLODIPine 2.5 MG Oral Tab, Take 1 tablet (2.5 mg total) by mouth 2 (two) times daily. (Patient taking differently: Take 1 tablet (2.5 mg total) by mouth daily.), Disp: 180 tablet, Rfl: 0    predniSONE 2.5 MG Oral Tab, Take 1 tablet (2.5 mg total) by mouth daily., Disp: 90 tablet, Rfl: 0  ALLERGIES:   No Known Allergies   REVIEW OF SYSTEMS:   This information was obtained from the patient/family and chart.    See HPI for pertinent positives, otherwise 10 pt ROS negative.    HISTORY:   Past medical, surgical, family and social history updated where appropriate.    PHYSICAL EXAM:     Vitals:    11/12/24 1006   BP: 91/62   Pulse: 82   Resp: 14   Temp: 98.2 °F (36.8 °C)         Estimated body mass index is 21.15 kg/m² as calculated from the following:    Height as of 11/1/24: 64\".    Weight as of 11/1/24: 123 lb 3.2 oz (55.9 kg).   No results found for: \"PGLU\"    Vital signs reviewed.Appears stated age, well groomed.    Constitutional:  Bp wnl for patient. Pulse Regular and wnl for patient. Respirations easy and unlabored. Temperature wnl. Weight normal for height. Appearance neat and clean. Appears in no acute distress. Well nourished and well developed.    Lower extremity:  dp/pt palpable bilaterally. Extremities are free of varicosities and edema, they are scarred, scattered changes in pigmentation (hypo/hyper) with atrophie jay. Capillary refill < 3 seconds. Digits are warm. toenails are wnl for color, thickness and hygeine. Skin hydration wnl. + hairgrowth on legs.    Musculoskeletal:  Gait and station stable   Integumentary:  refer to wound characteristics and images   Psychiatric:  Judgment and insight intact. Alert and oriented times 3. No evidence of depression, anxiety, or agitation. Calm, cooperative, and  communicative, but very quiet and reserved.  EDEMA:   Calf  Point of Measurement - Left Calf: 31  Point of Measurement - Right Calf: 31  Left Calf from:: Heel  Calf Left cm:: 28.2  Right Calf from:: Heel  Right Calf cm:: 28.6  Ankle  Point of Measurement - Left Ankle: 11  Point of Measurement - Right Ankle: 11  Left Ankle from:: Heel  Left Ankle cm:: 17.1     Right Ankle from:: Heel  Right Ankle cm:: 17     DIAGNOSTICS:     Lab Results   Component Value Date    BUN 9 09/03/2024    CREATSERUM 0.58 09/03/2024    GFRCKDEPI 121.37 12/17/2020    ALB 4.9 (H) 09/03/2024    TP 8.0 09/03/2024    A1C 5.1 12/17/2020     Lab Results   Component Value Date    ESRML 47 (H) 09/03/2024    ESRML 39 (H) 07/23/2024    ESRML 54 (H) 03/18/2024      Lab Results   Component Value Date    CRP 0.50 09/03/2024    CRP <0.40 07/23/2024    CRP 0.37 (H) 03/18/2024 11-28-23 arterial duplex-dr santamaria  FINDINGS:    LEFT EXTREMITY:  Triphasic waveforms in the left common femoral, profunda, superficial femoral arteries.  Biphasic waveforms below the left knee.   OTHER:  None.     PEAK VELOCITIES (CM/S):   LEFT COMMON FEMORAL:      165    LEFT PROFUNDA FEMORAL:      78      LEFT SUPERFICIAL FEMORAL PROX:    113   LEFT SUPERFICIAL FEMORAL MID:      94   LEFT SUPERFICIAL FEMORAL DISTAL:    92   LEFT POPLITEAL PROX:      110   LEFT POPLITEAL DISTAL:      121   TIBIOPER TRUNK:      66   LEFT PTA PROX:      60   LEFT PTA MID:      62   LEFT PTA DISTAL:      73   LEFT BETH PROX:      82   LEFT BETH MID:      72    LEFT DORSALIS PEDIS::      30   LEFT GREAT TOE PRESSURE:      25 mmHg   CONCLUSION:    1. No high-grade stenosis is identified.  There is suggestion of diffuse mild stenosis throughout most of the left lower extremity.    2. Overall diminished left toe pressure is noted.  Possibility of decreased perfusion in the distal left foot is of consideration.     7-14-20 venous reflux study-dr santamaria  Per Dr. Santamaria review \"no intervention needed\"  WOUND  ASSESSMENT:     Compression Wrap 05/21/24 Ankle Anterior;Left (Active)   Placement Date/Time: 05/21/24 1019   Location: Ankle  Wound Location Orientation: Anterior;Left      Assessments 5/21/2024  9:36 AM 9/24/2024  1:35 PM   Response to Treatment Well tolerated Well tolerated   Compression Layers Multilayer Multilayer   Compression Product Type Unna Boot Unna Boot   Dressing Applied Yes Yes   Compression Wrap Location Toes to Knee Toes to Knee   Compression Wrap Status Clean;Dry;Intact Clean;Dry       No associated orders.       Wound 07/23/24 #4 Leg Left;Lateral (Active)   Date First Assessed/Time First Assessed: 07/23/24 1137    Wound Number (Wound Clinic Only): #4  Primary Wound Type: (c) Other (comment)  Location: Leg  Wound Location Orientation: Left;Lateral      Assessments 7/23/2024 11:46 AM 11/12/2024 10:07 AM   Wound Image       Drainage Amount Unable to assess Scant   Drainage Description -- Serosanguineous   Treatments Compression Compression   Wound Length (cm) 2.3 cm 0.4 cm   Wound Width (cm) 1.3 cm 0.3 cm   Wound Surface Area (cm^2) 2.99 cm^2 0.12 cm^2   Wound Depth (cm) 0.1 cm 0.1 cm   Wound Volume (cm^3) 0.299 cm^3 0.012 cm^3   Wound Healing % -- 96   Margins Well-defined edges Well-defined edges   Non-staged Wound Description Full thickness Full thickness   Soni-wound Assessment Clean;Dry;Other (Comment) Hemosiderin staining;Moist   Wound Granulation Tissue Pale Grey;Pink;Firm Pale Grey;Firm   Wound Bed Granulation (%) 45 % 100 %   Wound Bed Epithelium (%) 10 % --   Wound Bed Slough (%) 45 % --   Wound Odor None None   Tunneling? -- No   Undermining? -- No   Sinus Tracts? -- No       No associated orders.          ASSESSMENT AND PLAN:    1. 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 Idiopathic chronic venous hypertension of both lower extremities with ulcer and inflammation (HCC)    3. Lupus vasculitis (HCC)    4. Mixed connective tissue disease (HCC)    5. Current  chronic use of systemic steroids              Risks, benefits, and alternatives of current treatment plan discussed in detail.  Questions and concerns addressed. Red flags to RTC or ED reviewed.  Patient (or parent) agrees to plan.      NOTE TO PATIENT: The 21st Century Cures Act makes clinical notes like these available to patients in the interest of transparency. Clinical notes are medical documents used by physicians and care providers to communicate with each other. These documents include medical language and terminology, abbreviations, and treatment information that may sound technical and at times possibly unfamiliar. In addition, at times, the verbiage may appear blunt or direct. These documents are one tool providers use to communicate relevant information and clinical opinions of the care providers in a way that allows common understanding of the clinical context.     I [id number] with the patient. This time included:    preparing to see the patient (eg, review notes and recent diagnostics),  seeing the patient, obtaining and/or reviewing separately obtained history, performing a medically appropriate examination and/or evaluation, counseling and educating the patient, documenting in the record  DISCHARGE INSTRUCTIONS     Patient Instructions   Please return:  1 week      RAYMOND!  Moisturize the right leg twice daily, you can use the clobetesol solution as needed, wear light compression (10mmhg)    Patient discharge and wound care instructions  Raymond Olivo  11/12/2024           You may shower with protection of the wound (ie a cast cover or similar).     Changing your dressing:            leave in place for 1 week    Wash your hands with soap and water.  Ensure that the old dressing is removed completely. Place it in a plastic bag and throw it in the trash.  Cleanse the wound with hypochlorous wound cleanser (ie. Anasept, vashe, pure and clean).  It's ok to “scrub” your wound with the gauze,  small amount of bleeding with cleansing is normal and ok.  Apply the following dressings:  clobetesol  and moisturizer  left: folded xerform>border foam      Spandagrip from base of toes to knee    Nutrition and blood sugar control:  Focus on the following:  Protein: Meats, beans, eggs, milk and yogurt particularly Greek yogurt), tofu, soy nuts, soy protein products (Follow the protein handout in your welcome folder)  Vitamin C: Citrus fruits and juices, strawberries, tomatoes, tomato juice, peppers, baked potatoes, spinach, broccoli, cauliflower, [place] sprouts, cabbage  Vitamin A: Dark green, leafy vegetables, orange or yellow vegetables, cantaloupe, fortified dairy products, liver, fortified cereals  Zinc: Fortified cereals, red meats, seafood  Consider supplementing with Justin by [employer]. It can be purchased on amazon, [employer] website, or local pharmacy may be able to order it for you.  (These are essential branch chain amino acids that help with tissue building and wound healing).     Concerns:  Signs of infection may include the following:  Increase in redness  Red \"streaks\" from wound  Increase in swelling  Fever  Unusual odor  Change in the amount of wound drainage     Should you experience any significant changes in your wound(s) or have any questions regarding your home care instructions please contact the [facility] @ [phone] If after regular business hours, please call your family doctor or local emergency room. The treatment plan has been discussed at length between you and your provider. Follow all instructions carefully, it is very important. If you do not follow all instructions you are at risk of your wound not healing, infection, possible loss of limb and even loss of life.        Martina Mckeon FNP-C, CWCN-AP, CFCN, CSWS, WCC, DWC  11/12/2024

## 2024-11-12 NOTE — PROGRESS NOTES
.Weekly Wound Education Note    Teaching Provided To: Patient;Family  Training Topics: Discharge instructions;Dressing;Compression;Edema control  Training Method: Explain/Verbal;Written  Training Response: Patient responds and understands;Reinforcement needed            Clobetasol to tyson area.  Folded xeroform, border foam.  Spanda  size E.

## 2024-11-12 NOTE — PATIENT INSTRUCTIONS
Please return:  1 week      RAYMOND!  Moisturize the right leg twice daily, you can use the clobetesol solution as needed, wear light compression (10mmhg)    Patient discharge and wound care instructions  Raymond Martinezu  11/12/2024           You may shower with protection of the wound (ie a cast cover or similar).     Changing your dressing:            leave in place for 1 week    Wash your hands with soap and water.  Ensure that the old dressing is removed completely. Place it in a plastic bag and throw it in the trash.  Cleanse the wound with hypochlorous wound cleanser (ie. Anasept, vashe, pure and clean).  It's ok to “scrub” your wound with the gauze, small amount of bleeding with cleansing is normal and ok.  Apply the following dressings:  clobetesol  and moisturizer  left: folded xerform>border foam      Spandagrip from base of toes to knee    Nutrition and blood sugar control:  Focus on the following:  Protein: Meats, beans, eggs, milk and yogurt particularly Greek yogurt), tofu, soy nuts, soy protein products (Follow the protein handout in your welcome folder)  Vitamin C: Citrus fruits and juices, strawberries, tomatoes, tomato juice, peppers, baked potatoes, spinach, broccoli, cauliflower, [place] sprouts, cabbage  Vitamin A: Dark green, leafy vegetables, orange or yellow vegetables, cantaloupe, fortified dairy products, liver, fortified cereals  Zinc: Fortified cereals, red meats, seafood  Consider supplementing with Justin by [employer]. It can be purchased on amazon, [employer] website, or local pharmacy may be able to order it for you.  (These are essential branch chain amino acids that help with tissue building and wound healing).     Concerns:  Signs of infection may include the following:  Increase in redness  Red \"streaks\" from wound  Increase in swelling  Fever  Unusual odor  Change in the amount of wound drainage     Should you experience any significant changes in your wound(s) or have any  questions regarding your home care instructions please contact the wound center [facility] @ [phone] If after regular business hours, please call your family doctor or local emergency room. The treatment plan has been discussed at length between you and your provider. Follow all instructions carefully, it is very important. If you do not follow all instructions you are at risk of your wound not healing, infection, possible loss of limb and even loss of life.

## 2024-11-18 NOTE — PROGRESS NOTES
CHIEF COMPLAINT:     Chief Complaint   Patient presents with    Wound Care     Patients is here for a follow up. 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 Patient saw dr. Santamaria (vascular) in December 2023 and he documented \"I explained to the patient and her  that I do not believe the source of her pain to be vascular in origin.  She has an easily palpable dorsalis pedis pulse and the location of the pain is more suggestive of a neuropathic origin.  I suspect perhaps that given the tightness of her feet from her mixed connective tissue disorder the compression stockings is causing somewhat compression of the nerves in that area and I have encouraged her to substitute those stockings to those that involve the feet up to the upper calf.\" Patient/spouse have been in contact with dr. Farmer and in march 2024 reported a dark spot/ulcer on her bilateral lower legs, she was advised to make an appointment with wound care as well as get her blood work done ordered in January 2024 so that further treatment plan could be extablished.  The blood work did show increased inflammation and she was advised to increase her prednisone to 20mg x 5 days (which spouse reports they did do) and take the amlodipine 2.5 mg-which she states she is doing.  They have been leaving the areas open to air and she has not been wearing compression.  She c/o burning sensation in her ankle area.  She was started on gabapentin by dr. Farmer last year, however they stopped it because it was making her too sleepy. We discussed restarting the gabapentin, but only take a noc first.  Both wounds are dried eschar.  Will utilize hydrogel with lidocaine to start debriding off/softening the eschar. I have  also ordered santyl. Patient placed into spandagrips for compression    HPI PRIOR TO NOVEMBER 2024 SEE INDIVIDUAL PROGRESS NOTES  11-5-24 patient returns. The remaining open wound is the left lateral. She followed up with dr. Farmer and she was interested in stopping or decreasing MMF.  Dr. Farmer strongly recommended against this as she is understandably concerned that patient will just reflare again without any immunosuppression.  Patient has been advised to get echo and PFT's done but she is declining. The remaining left lateral has epithelial bridge. The right inspected and skin hydration looks good, no open areas. No s/s of infection, no c/o pain except with touch to the left lateral.  Will use a larger spandagrip as patient does have a reddened area to anterior ankle area.     11-12-24 patient returns. Last week we decreased the amount of compression. The left lateral ankle wound was remaining open, we continued with xeroform and bordered foam.  The opening is smaller today. All other areas remain resolved. Patient states pain is much improved. No acute s/s of infection. Continue current poc-suspect she will be fully resolved within next 2 weeks.    11-19-24 patient returns.  The left lateral ankle was the only wound remaining open last week, today wound is resolved. We discussed cleansing, moisturization multiple times a day, use of topical steroid as needed, use of light compression when legs are dependent. Patient discharged from clinic to return as needed.  MEDICATIONS:     Current Outpatient Medications:     Mycophenolate Mofetil 500 MG Oral Tab, Take 1 tablet (500 mg total) by mouth daily., Disp: 90 tablet, Rfl: 0    Ferrous Sulfate 325 (65 Fe) MG Oral Tab, Take 1 tablet (325 mg total) by mouth 2 (two) times daily with meals., Disp: 180 tablet, Rfl: 0    hydroxychloroquine 200 MG Oral Tab, Take 1 tablet (200 mg total) by mouth daily., Disp: 90 tablet, Rfl: 0    clobetasol 0.05 % External Solution, Apply  topically to lower legs after shower 3 x weekly, Disp: 50 mL, Rfl: 0    amLODIPine 2.5 MG Oral Tab, Take 1 tablet (2.5 mg total) by mouth 2 (two) times daily. (Patient taking differently: Take 1 tablet (2.5 mg total) by mouth daily.), Disp: 180 tablet, Rfl: 0    predniSONE 2.5 MG Oral Tab, Take 1 tablet (2.5 mg total) by mouth daily., Disp: 90 tablet, Rfl: 0  ALLERGIES:   No Known Allergies   REVIEW OF SYSTEMS:   This information was obtained from the patient/family and chart.    See HPI for pertinent positives, otherwise 10 pt ROS negative.    HISTORY:   Past medical, surgical, family and social history updated where appropriate.    PHYSICAL EXAM:     Vitals:    11/19/24 0700   BP: 103/67   Pulse: 84   Resp: 16   Temp: 97.8 °F (36.6 °C)       Estimated body mass index is 21.15 kg/m² as calculated from the following:    Height as of 11/1/24: 64\".    Weight as of 11/1/24: 123 lb 3.2 oz (55.9 kg).   No results found for: \"PGLU\"    Vital signs reviewed.Appears stated age, well groomed.    Constitutional:  Bp wnl for patient. Pulse Regular and wnl for patient. Respirations easy and unlabored. Temperature wnl. Weight normal for height. Appearance neat and clean. Appears in no acute distress. Well nourished and well developed.    Lower extremity:  dp/pt palpable left. Extremities are free of varicosities and edema, they are scarred, scattered changes in pigmentation (hypo/hyper) with atrophie jay. Capillary refill < 3 seconds. Digits are warm. toenails are wnl for color, thickness and hygeine. Skin hydration wnl. + hairgrowth on legs.    Musculoskeletal:  Gait and station stable   Integumentary:  refer to wound characteristics and images   Psychiatric:  Judgment and insight intact. Alert and oriented times 3. No evidence of depression, anxiety, or agitation. Calm, cooperative, and communicative, but very quiet and reserved.  EDEMA:   Calf  Point of Measurement - Left Calf: 31  Point of Measurement - Right Calf:  31  Left Calf from:: Heel  Calf Left cm:: 28.6  Right Calf from:: Heel  Right Calf cm:: 28.3  Ankle  Point of Measurement - Left Ankle: 11  Point of Measurement - Right Ankle: 11  Left Ankle from:: Heel  Left Ankle cm:: 17.1     Right Ankle from:: Heel  Right Ankle cm:: 16.8     DIAGNOSTICS:     Lab Results   Component Value Date    BUN 9 09/03/2024    CREATSERUM 0.58 09/03/2024    GFRCKDEPI 121.37 12/17/2020    ALB 4.9 (H) 09/03/2024    TP 8.0 09/03/2024    A1C 5.1 12/17/2020     Lab Results   Component Value Date    ESRML 47 (H) 09/03/2024    ESRML 39 (H) 07/23/2024    ESRML 54 (H) 03/18/2024      Lab Results   Component Value Date    CRP 0.50 09/03/2024    CRP <0.40 07/23/2024    CRP 0.37 (H) 03/18/2024 11-28-23 arterial duplex-dr santamaria  FINDINGS:    LEFT EXTREMITY:  Triphasic waveforms in the left common femoral, profunda, superficial femoral arteries.  Biphasic waveforms below the left knee.   OTHER:  None.     PEAK VELOCITIES (CM/S):   LEFT COMMON FEMORAL:      165    LEFT PROFUNDA FEMORAL:      78      LEFT SUPERFICIAL FEMORAL PROX:    113   LEFT SUPERFICIAL FEMORAL MID:      94   LEFT SUPERFICIAL FEMORAL DISTAL:    92   LEFT POPLITEAL PROX:      110   LEFT POPLITEAL DISTAL:      121   TIBIOPER TRUNK:      66   LEFT PTA PROX:      60   LEFT PTA MID:      62   LEFT PTA DISTAL:      73   LEFT BETH PROX:      82   LEFT BETH MID:      72    LEFT DORSALIS PEDIS::      30   LEFT GREAT TOE PRESSURE:      25 mmHg   CONCLUSION:    1. No high-grade stenosis is identified.  There is suggestion of diffuse mild stenosis throughout most of the left lower extremity.    2. Overall diminished left toe pressure is noted.  Possibility of decreased perfusion in the distal left foot is of consideration.     7-14-20 venous reflux study-dr santamaria  Per Dr. Santamaria review \"no intervention needed\"  WOUND ASSESSMENT:     Compression Wrap 05/21/24 Ankle Anterior;Left (Active)   Placement Date/Time: 05/21/24 1019   Location: Ankle   Wound Location Orientation: Anterior;Left      Assessments 5/21/2024  9:36 AM 9/24/2024  1:35 PM   Response to Treatment Well tolerated Well tolerated   Compression Layers Multilayer Multilayer   Compression Product Type Unna Boot Unna Boot   Dressing Applied Yes Yes   Compression Wrap Location Toes to Knee Toes to Knee   Compression Wrap Status Clean;Dry;Intact Clean;Dry       No associated orders.       Wound 07/23/24 #4 Leg Left;Lateral (Active)   Date First Assessed/Time First Assessed: 07/23/24 1137    Wound Number (Wound Clinic Only): #4  Primary Wound Type: (c) Other (comment)  Location: Leg  Wound Location Orientation: Left;Lateral      Assessments 7/23/2024 11:46 AM 11/19/2024 10:06 AM   Wound Image       Drainage Amount Unable to assess None   Treatments Compression --   Wound Length (cm) 2.3 cm 0.1 cm   Wound Width (cm) 1.3 cm 0.1 cm   Wound Surface Area (cm^2) 2.99 cm^2 0.01 cm^2   Wound Depth (cm) 0.1 cm 0 cm   Wound Volume (cm^3) 0.299 cm^3 0 cm^3   Wound Healing % -- 100   Margins Well-defined edges Well-defined edges   Non-staged Wound Description Full thickness Full thickness   Soni-wound Assessment Clean;Dry;Other (Comment) Hemosiderin staining;Moist   Wound Granulation Tissue Pale Grey;Pink;Firm Firm;Pink   Wound Bed Granulation (%) 45 % 100 %   Wound Bed Epithelium (%) 10 % --   Wound Bed Slough (%) 45 % --   Wound Odor None None   Tunneling? -- No   Undermining? -- No   Sinus Tracts? -- No       No associated orders.          ASSESSMENT AND PLAN:    1. 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 Idiopathic chronic venous hypertension of both lower extremities with ulcer and inflammation (HCC)    3. Lupus vasculitis (HCC)    4. Mixed connective tissue disease (HCC)    5. Current chronic use of systemic steroids      Risks, benefits, and alternatives of current treatment plan discussed in detail.  Questions and concerns addressed. Red flags to RTC or ED reviewed.   Patient (or parent) agrees to plan.      NOTE TO PATIENT: The 21st Century Cures Act makes clinical notes like these available to patients in the interest of transparency. Clinical notes are medical documents used by physicians and care providers to communicate with each other. These documents include medical language and terminology, abbreviations, and treatment information that may sound technical and at times possibly unfamiliar. In addition, at times, the verbiage may appear blunt or direct. These documents are one tool providers use to communicate relevant information and clinical opinions of the care providers in a way that allows common understanding of the clinical context.     I [id number] with the patient. This time included:    preparing to see the patient (eg, review notes and recent diagnostics),  seeing the patient, obtaining and/or reviewing separately obtained history, performing a medically appropriate examination and/or evaluation, counseling and educating the patient, documenting in the record  DISCHARGE INSTRUCTIONS     Patient Instructions   Patient discharge and wound care instructions  Raymond Olivo  11/19/2024     You may shower and cleanse area with mild soap and water,dry, and apply moisturizer right away  Use steroid lotion as needed for inflammation, itching.  May remove spandagrip at night and place back on in the morning  Return to clinic if you notice any drainage or concerns.   Martina Mckeon FNP-C, CWCN-AP, CFCN, CSWS, WCC, DWC  11/19/2024

## 2024-11-19 ENCOUNTER — OFFICE VISIT (OUTPATIENT)
Dept: WOUND CARE | Facility: HOSPITAL | Age: 49
End: 2024-11-19
Attending: NURSE PRACTITIONER
Payer: COMMERCIAL

## 2024-11-19 VITALS
DIASTOLIC BLOOD PRESSURE: 67 MMHG | SYSTOLIC BLOOD PRESSURE: 103 MMHG | TEMPERATURE: 98 F | RESPIRATION RATE: 16 BRPM | HEART RATE: 84 BPM

## 2024-11-19 DIAGNOSIS — M32.19 LUPUS VASCULITIS (HCC): ICD-10-CM

## 2024-11-19 DIAGNOSIS — I77.89 LUPUS VASCULITIS (HCC): ICD-10-CM

## 2024-11-19 DIAGNOSIS — M35.1 MIXED CONNECTIVE TISSUE DISEASE (HCC): ICD-10-CM

## 2024-11-19 DIAGNOSIS — I87.333 IDIOPATHIC CHRONIC VENOUS HYPERTENSION OF BOTH LOWER EXTREMITIES WITH ULCER AND INFLAMMATION (HCC): ICD-10-CM

## 2024-11-19 DIAGNOSIS — Z79.52 CURRENT CHRONIC USE OF SYSTEMIC STEROIDS: ICD-10-CM

## 2024-11-19 DIAGNOSIS — L97.322 NON-PRESSURE CHRONIC ULCER OF LEFT ANKLE WITH FAT LAYER EXPOSED (HCC): Primary | ICD-10-CM

## 2024-11-19 PROCEDURE — 99213 OFFICE O/P EST LOW 20 MIN: CPT | Performed by: NURSE PRACTITIONER

## 2024-11-19 NOTE — PROGRESS NOTES
.Weekly Wound Education Note    Teaching Provided To: Patient  Training Topics: Edema control;Compression;Discharge instructions  Training Method: Explain/Verbal;Written  Training Response: Patient responds and understands        Notes: Per provider, wound is healed. Pt is to moisturize area daily and continue to wear spandagrips. New spandagrip E provided. Pt discharged from clinic a this time. Pt instructed to call clinic for appointment if wound reopens or has any issues.

## 2024-11-19 NOTE — PATIENT INSTRUCTIONS
Patient discharge and wound care instructions  Raymond Olivo  11/19/2024     You may shower and cleanse area with mild soap and water,dry, and apply moisturizer right away  Use steroid lotion as needed for inflammation, itching.  May remove spandagrip at night and place back on in the morning  Return to clinic if you notice any drainage or concerns.

## 2024-11-26 ENCOUNTER — APPOINTMENT (OUTPATIENT)
Dept: WOUND CARE | Facility: HOSPITAL | Age: 49
End: 2024-11-26
Attending: NURSE PRACTITIONER
Payer: COMMERCIAL

## 2024-12-03 ENCOUNTER — APPOINTMENT (OUTPATIENT)
Dept: WOUND CARE | Facility: HOSPITAL | Age: 49
End: 2024-12-03
Attending: NURSE PRACTITIONER
Payer: COMMERCIAL

## 2024-12-10 ENCOUNTER — APPOINTMENT (OUTPATIENT)
Dept: WOUND CARE | Facility: HOSPITAL | Age: 49
End: 2024-12-10
Attending: NURSE PRACTITIONER
Payer: COMMERCIAL

## 2024-12-17 ENCOUNTER — APPOINTMENT (OUTPATIENT)
Dept: WOUND CARE | Facility: HOSPITAL | Age: 49
End: 2024-12-17
Attending: NURSE PRACTITIONER
Payer: COMMERCIAL

## 2024-12-24 ENCOUNTER — APPOINTMENT (OUTPATIENT)
Dept: WOUND CARE | Facility: HOSPITAL | Age: 49
End: 2024-12-24
Attending: NURSE PRACTITIONER
Payer: COMMERCIAL

## 2024-12-31 ENCOUNTER — APPOINTMENT (OUTPATIENT)
Dept: WOUND CARE | Facility: HOSPITAL | Age: 49
End: 2024-12-31
Attending: NURSE PRACTITIONER
Payer: COMMERCIAL

## 2025-01-11 DIAGNOSIS — M32.19 LUPUS VASCULITIS (HCC): ICD-10-CM

## 2025-01-11 DIAGNOSIS — M35.1 MIXED CONNECTIVE TISSUE DISEASE (HCC): ICD-10-CM

## 2025-01-11 DIAGNOSIS — I77.89 LUPUS VASCULITIS (HCC): ICD-10-CM

## 2025-01-11 DIAGNOSIS — M79.672 LEFT FOOT PAIN: ICD-10-CM

## 2025-01-13 DIAGNOSIS — M35.1 MIXED CONNECTIVE TISSUE DISEASE (HCC): ICD-10-CM

## 2025-01-13 DIAGNOSIS — I77.89 LUPUS VASCULITIS (HCC): ICD-10-CM

## 2025-01-13 DIAGNOSIS — M32.19 LUPUS VASCULITIS (HCC): ICD-10-CM

## 2025-01-13 RX ORDER — MYCOPHENOLATE MOFETIL 500 MG/1
TABLET ORAL
Qty: 90 TABLET | Refills: 0 | Status: SHIPPED | OUTPATIENT
Start: 2025-01-13

## 2025-01-13 RX ORDER — MYCOPHENOLATE MOFETIL 250 MG/1
250 CAPSULE ORAL NIGHTLY
Qty: 90 CAPSULE | Refills: 0 | OUTPATIENT
Start: 2025-01-13

## 2025-01-13 NOTE — TELEPHONE ENCOUNTER
Last office visit: 11/1/2024    Next Rheum Apt:2/26/2025 Alisha Farmer DO    Last fill: 11/1/2024 90 tab, 0 refills    Sending my chart message to remind pt to get lab work done  Labs:   Lab Results   Component Value Date    CREATSERUM 0.58 09/03/2024    ALKPHO 100 09/03/2024    AST 16 09/03/2024    ALT 8 (L) 09/03/2024    BILT 0.4 09/03/2024    TP 8.0 09/03/2024    ALB 4.9 (H) 09/03/2024       Lab Results   Component Value Date    WBC 4.7 09/03/2024    HGB 13.1 09/03/2024    .0 09/03/2024    NEPRELIM 3.39 09/03/2024    NEPERCENT 72.1 09/03/2024    LYPERCENT 13.0 09/03/2024    NE 3.39 09/03/2024    LYMABS 0.61 (L) 09/03/2024

## 2025-01-31 DIAGNOSIS — E61.1 IRON DEFICIENCY: ICD-10-CM

## 2025-01-31 RX ORDER — FERROUS SULFATE 325(65) MG
1 TABLET ORAL 2 TIMES DAILY WITH MEALS
Qty: 180 TABLET | Refills: 0 | Status: SHIPPED | OUTPATIENT
Start: 2025-01-31

## 2025-01-31 NOTE — TELEPHONE ENCOUNTER
LOV: 11/01/2024    Future Appointments   Date Time Provider Department Center   2/26/2025 10:30 AM Alisha Farmer, DO [facility]   5/23/2025  8:30 AM DarianAlisha small, DO [facility]   8/22/2025  8:30 AM DarianAlisha small, DO [facility]   11/21/2025  9:45 AM DarianAlihsa small, DO [facility] [place]       LF: 11/01/2024    QTY: 180    Refills: 0    LABS:   Component      Latest Ref Rng 3/18/2024   Iron, Serum      50 - 170 ug/dL 45 (L)    Transferrin      200 - 360 mg/dL 208    Iron Bind.Cap.(TIBC)      240 - 450 ug/dL 310    Iron Saturation      15 - 50 % 15    FERRITIN      12.0 - 240.0 ng/mL 114.6       Legend:  (L) Low

## 2025-02-25 ENCOUNTER — HOSPITAL ENCOUNTER (OUTPATIENT)
Dept: LAB | Facility: HOSPITAL | Age: 50
Discharge: HOME | End: 2025-02-25
Attending: INTERNAL MEDICINE
Payer: COMMERCIAL

## 2025-02-25 DIAGNOSIS — M35.1 MIXED CONNECTIVE TISSUE DISEASE (HCC): ICD-10-CM

## 2025-02-25 DIAGNOSIS — E55.9 VITAMIN D DEFICIENCY: ICD-10-CM

## 2025-02-25 DIAGNOSIS — I77.89 LUPUS VASCULITIS (HCC): ICD-10-CM

## 2025-02-25 DIAGNOSIS — E61.1 IRON DEFICIENCY: ICD-10-CM

## 2025-02-25 DIAGNOSIS — M32.19 LUPUS VASCULITIS (HCC): ICD-10-CM

## 2025-02-25 DIAGNOSIS — Z79.899 HIGH RISK MEDICATION USE: ICD-10-CM

## 2025-02-25 LAB
ALBUMIN SERPL-MCNC: 4.8 G/DL (ref 3.2–4.8)
ALBUMIN/GLOB SERPL: 1.4 {RATIO} (ref 1–2)
ALP LIVER SERPL-CCNC: 85 U/L
ALT SERPL-CCNC: 13 U/L
ANION GAP SERPL CALC-SCNC: 3 MMOL/L (ref 0–18)
AST SERPL-CCNC: 24 U/L (ref ?–34)
BASOPHILS # BLD AUTO: 0.01 X10(3) UL (ref 0–0.2)
BASOPHILS NFR BLD AUTO: 0.2 %
BILIRUB SERPL-MCNC: 0.5 MG/DL (ref 0.3–1.2)
BUN BLD-MCNC: 6 MG/DL (ref 9–23)
CALCIUM BLD-MCNC: 9.4 MG/DL (ref 8.7–10.6)
CHLORIDE SERPL-SCNC: 102 MMOL/L (ref 98–112)
CO2 SERPL-SCNC: 32 MMOL/L (ref 21–32)
CREAT BLD-MCNC: 0.63 MG/DL
CRP SERPL-MCNC: <0.4 MG/DL (ref ?–0.5)
DEPRECATED HBV CORE AB SER IA-ACNC: 151 NG/ML
EGFRCR SERPLBLD CKD-EPI 2021: 109 ML/MIN/1.73M2 (ref 60–?)
EOSINOPHIL # BLD AUTO: 0.03 X10(3) UL (ref 0–0.7)
EOSINOPHIL NFR BLD AUTO: 0.7 %
ERYTHROCYTE [DISTWIDTH] IN BLOOD BY AUTOMATED COUNT: 14.3 %
ERYTHROCYTE [SEDIMENTATION RATE] IN BLOOD: 54 MM/HR
FASTING STATUS PATIENT QL REPORTED: NO
GLOBULIN PLAS-MCNC: 3.4 G/DL (ref 2–3.5)
GLUCOSE BLD-MCNC: 82 MG/DL (ref 70–99)
HCT VFR BLD AUTO: 39.2 %
HGB BLD-MCNC: 12.7 G/DL
IMM GRANULOCYTES # BLD AUTO: 0.01 X10(3) UL (ref 0–1)
IMM GRANULOCYTES NFR BLD: 0.2 %
IRON SATN MFR SERPL: 15 %
IRON SERPL-MCNC: 42 UG/DL
LYMPHOCYTES # BLD AUTO: 0.78 X10(3) UL (ref 1–4)
LYMPHOCYTES NFR BLD AUTO: 17.3 %
MCH RBC QN AUTO: 28.5 PG (ref 26–34)
MCHC RBC AUTO-ENTMCNC: 32.4 G/DL (ref 31–37)
MCV RBC AUTO: 88.1 FL
MONOCYTES # BLD AUTO: 0.55 X10(3) UL (ref 0.1–1)
MONOCYTES NFR BLD AUTO: 12.2 %
NEUTROPHILS # BLD AUTO: 3.12 X10 (3) UL (ref 1.5–7.7)
NEUTROPHILS # BLD AUTO: 3.12 X10(3) UL (ref 1.5–7.7)
NEUTROPHILS NFR BLD AUTO: 69.4 %
OSMOLALITY SERPL CALC.SUM OF ELEC: 281 MOSM/KG (ref 275–295)
PLATELET # BLD AUTO: 192 10(3)UL (ref 150–450)
POTASSIUM SERPL-SCNC: 3.8 MMOL/L (ref 3.5–5.1)
PROT SERPL-MCNC: 8.2 G/DL (ref 5.7–8.2)
RBC # BLD AUTO: 4.45 X10(6)UL
SODIUM SERPL-SCNC: 137 MMOL/L (ref 136–145)
TOTAL IRON BINDING CAPACITY: 279 UG/DL (ref 250–425)
TRANSFERRIN SERPL-MCNC: 203 MG/DL (ref 250–380)
VIT D+METAB SERPL-MCNC: 19.2 NG/ML (ref 30–100)
WBC # BLD AUTO: 4.5 X10(3) UL (ref 4–11)

## 2025-02-25 PROCEDURE — 83550 IRON BINDING TEST: CPT

## 2025-02-25 PROCEDURE — 36415 COLL VENOUS BLD VENIPUNCTURE: CPT

## 2025-02-25 PROCEDURE — 85652 RBC SED RATE AUTOMATED: CPT

## 2025-02-25 PROCEDURE — 82306 VITAMIN D 25 HYDROXY: CPT

## 2025-02-25 PROCEDURE — 86140 C-REACTIVE PROTEIN: CPT

## 2025-02-25 PROCEDURE — 82728 ASSAY OF FERRITIN: CPT

## 2025-02-25 PROCEDURE — 85025 COMPLETE CBC W/AUTO DIFF WBC: CPT

## 2025-02-25 PROCEDURE — 83540 ASSAY OF IRON: CPT

## 2025-02-25 PROCEDURE — 80053 COMPREHEN METABOLIC PANEL: CPT

## 2025-02-26 ENCOUNTER — OFFICE VISIT (OUTPATIENT)
Dept: RHEUMATOLOGY | Facility: CLINIC | Age: 50
End: 2025-02-26
Payer: COMMERCIAL

## 2025-02-26 VITALS
SYSTOLIC BLOOD PRESSURE: 100 MMHG | DIASTOLIC BLOOD PRESSURE: 52 MMHG | BODY MASS INDEX: 21 KG/M2 | HEART RATE: 70 BPM | RESPIRATION RATE: 16 BRPM | TEMPERATURE: 98 F | OXYGEN SATURATION: 100 % | WEIGHT: 120 LBS

## 2025-02-26 DIAGNOSIS — I73.01 RAYNAUD'S DISEASE WITH GANGRENE (HCC): ICD-10-CM

## 2025-02-26 DIAGNOSIS — M35.1 MIXED CONNECTIVE TISSUE DISEASE (HCC): Primary | ICD-10-CM

## 2025-02-26 DIAGNOSIS — I77.89 LUPUS VASCULITIS (HCC): ICD-10-CM

## 2025-02-26 DIAGNOSIS — D84.821 IMMUNOCOMPROMISED STATE DUE TO DRUG THERAPY (HCC): ICD-10-CM

## 2025-02-26 DIAGNOSIS — E55.9 VITAMIN D DEFICIENCY: ICD-10-CM

## 2025-02-26 DIAGNOSIS — Z79.899 IMMUNOCOMPROMISED STATE DUE TO DRUG THERAPY (HCC): ICD-10-CM

## 2025-02-26 DIAGNOSIS — Z79.52 LONG TERM (CURRENT) USE OF SYSTEMIC STEROIDS: ICD-10-CM

## 2025-02-26 DIAGNOSIS — Z79.899 LONG-TERM USE OF PLAQUENIL: ICD-10-CM

## 2025-02-26 DIAGNOSIS — E61.1 IRON DEFICIENCY: ICD-10-CM

## 2025-02-26 DIAGNOSIS — M32.19 LUPUS VASCULITIS (HCC): ICD-10-CM

## 2025-02-26 DIAGNOSIS — M79.672 LEFT FOOT PAIN: ICD-10-CM

## 2025-02-26 DIAGNOSIS — Z79.899 HIGH RISK MEDICATION USE: ICD-10-CM

## 2025-02-26 DIAGNOSIS — Z86.2 HISTORY OF ITP: ICD-10-CM

## 2025-02-26 DIAGNOSIS — Z13.820 SCREENING FOR OSTEOPOROSIS: ICD-10-CM

## 2025-02-26 PROCEDURE — 99214 OFFICE O/P EST MOD 30 MIN: CPT | Performed by: INTERNAL MEDICINE

## 2025-02-26 RX ORDER — AMLODIPINE BESYLATE 2.5 MG/1
2.5 TABLET ORAL 2 TIMES DAILY
Qty: 180 TABLET | Refills: 0 | Status: SHIPPED | OUTPATIENT
Start: 2025-02-26

## 2025-02-26 RX ORDER — MYCOPHENOLATE MOFETIL 500 MG/1
500 TABLET ORAL DAILY
Qty: 90 TABLET | Refills: 0 | Status: SHIPPED | OUTPATIENT
Start: 2025-02-26

## 2025-02-26 RX ORDER — MYCOPHENOLATE MOFETIL 250 MG/1
250 CAPSULE ORAL ONCE
COMMUNITY
End: 2025-02-26

## 2025-02-26 RX ORDER — ERGOCALCIFEROL 1.25 MG/1
50000 CAPSULE, LIQUID FILLED ORAL WEEKLY
Qty: 12 CAPSULE | Refills: 0 | Status: SHIPPED | OUTPATIENT
Start: 2025-02-26 | End: 2025-05-21

## 2025-02-26 RX ORDER — MYCOPHENOLATE MOFETIL 250 MG/1
250 CAPSULE ORAL ONCE
Qty: 1 CAPSULE | Refills: 0 | Status: SHIPPED | OUTPATIENT
Start: 2025-02-26 | End: 2025-02-26

## 2025-02-26 RX ORDER — HYDROXYCHLOROQUINE SULFATE 200 MG/1
200 TABLET, FILM COATED ORAL DAILY
Qty: 90 TABLET | Refills: 0 | Status: SHIPPED | OUTPATIENT
Start: 2025-02-26

## 2025-02-26 RX ORDER — PREDNISONE 2.5 MG/1
2.5 TABLET ORAL DAILY
Qty: 90 TABLET | Refills: 0 | Status: SHIPPED | OUTPATIENT
Start: 2025-02-26

## 2025-02-26 NOTE — PROGRESS NOTES
?  RHEUMATOLOGY Follow up   Date of visit: 02/26/2025    Chief Complaint   Patient presents with    Mixed Connective Tissue Disease     3 month f/u. Not feeling great. Having more hand pain. No sores or wounds that wont heal.        ASSESSMENT, DISCUSSION & PLAN   Assessment:  1. Mixed connective tissue disease (HCC)    2. Lupus vasculitis (HCC)    3. Raynaud's disease with gangrene (HCC)    4. Left foot pain    5. Vitamin D deficiency    6. History of ITP    7. Iron deficiency    8. High risk medication use    9. Long-term use of Plaquenil    10. Long term (current) use of systemic steroids    11. Immunocompromised state due to drug therapy (HCC)    12. Screening for osteoporosis          Discussion:  Mrs. Raymond Olivo is a 48 yo woman with previously diagnosed mixed connective tissue disease and recent ulcer/chronic wounds in her bilateral legs. She does seem to have prominent sclerodermatous features with Raynaud's and chronic wounds, skin tightening as well as telangectasias. It seems like she has been on multiple DMARDs in the past including plaquenil, which was stopped due to possible inefficacy and GI upset as well as azathioprine which was discontinued due to cytopenias.     Since establishing care with me, she restarted methotrexate and had significant improvement of skin ulcerations with amlodipine. She did have worsened skin ulcer formation and biopsy performed by wound care confirmed lupus vasculitis. Due to lack of efficacy of methotrexate, decision was made to stop methotrexate, restart plaquenil and mycophenolate.     Patient had to be hospitalized due to ITP.  Unclear etiology but had responded to prolonged steroids.   She decided to  stop both CellCept and prednisone.  She has been off CellCept since hospitalization at the end of November 2022.  And she had been off of her prednisone but had some worsened fatigue and trace ankle swelling bilaterally. She did have some elevated inflammatory markers  as well so prednisone was restarted. She had been doing better overall.     There is a comopnent of medical nonadherence and pt has been told multiple times to get  baseline ECHO and PFTs due to other complications from the MCTD which may be contributing to some of her fatigue and low weight.  She had initially delayed initially due to COVID19 pandemic, however, she has not had any baseline done since disease onset and we need to evaluate for possible ILD vs pulmonary HTN. Reiterated again but pt declines further work up.     More recently, she had some worsened foot pain. Arterial duplex showed mild diffuse stenosis but vascular surgeon did not think significant for intervention. Thought her symptoms were more neurologic and she has been doing better with use of compression socks. Previously, she did not restart gabapentin or MMF despite my recommendations to do so.    Now, more recently, she has suffered recurrence of ulcerations in both ankles that had progressed fairly quickly. Her and her  had requested urgent wound care evaluation so they were fit in and she has been getting slowly better.  Per wound care, progress is slow however patient also could not tolerate full debridement.  Reminded pt that this is likely the lupus vasculitis as was determined a few years ago. Strongly recommended compliance with medication regimen.  She has been on increased dose of MMF.  While she has tolerated and the skin lesions have improved, they were interested in stopping or decreasing MMF with I strongly recommended against. I worry that she will just flare again without the proper immunosuppression  Reminded again of the importance of getting regular labs to monitor for toxicities  She was finally able to get ophthalmology eye exam, so will continue plaquenil daily.     Due to persistent Raynaud's and questionable perfusion, we tried fluoxetine to the amlodipine. She did not like how she felt, so medication was  discontinued.     -- continued MMF 750mg daily  -- continue prednisone to 2.5mg daily  -- continue plaquenil 200mg daily (weight based dosing)  -- continue amlodipine 2.5mg twice daily  -- remember to get annual eye exams to monitor for toxicity from the plaquenil   -- previously discussed xray of foot showing periarticular osteopenia- could be related to bone density vs early inflammation. Encouraged to get updated BMD.  -- take iron supplements daily; I have reached out to hematology to see if you need iron infusions  -- consider following back with the vascular surgeon given the feet/ankle discomfort and discoloration of the skin   -- follow up in 3 months or sooner as needed   -- labs prior to next follow up   -- start vitamin prescription once weekly x 12 weeks, then start 2000IU vit d3 over the counter daily when the prescription is complete     Patient and pt's daughter verbalized understanding of above instructions. No further questions at this time.    Code selection for this visit was based on time. Time spent (25min) on date of service in preparing to see the patient, obtaining and/or reviewing separately obtained history, performing a medically appropriate examination, counseling and educating the patient/family/caregiver, ordering medications or testing, referring and communicating with other healthcare providers, documenting clinical information in the E HR, independently interpreting results and communicating results to the patient/family/caregiver and care coordination with the patient's other providers.        Plan:  Diagnoses and all orders for this visit:    Mixed connective tissue disease (HCC)  -     hydroxychloroquine 200 MG Oral Tab; Take 1 tablet (200 mg total) by mouth daily.  -     Mycophenolate Mofetil 500 MG Oral Tab; Take 1 tablet (500 mg total) by mouth daily.  -     predniSONE 2.5 MG Oral Tab; Take 1 tablet (2.5 mg total) by mouth daily.  -     amLODIPine 2.5 MG Oral Tab; Take 1 tablet  (2.5 mg total) by mouth 2 (two) times daily.  -     CBC With Differential With Platelet; Future  -     Comp Metabolic Panel (14); Future  -     C-Reactive Protein; Future  -     Sed Rate, Westergren (Automated); Future  -     Vitamin D; Future  -     Complement C3, Serum; Future  -     Complement C4, Serum; Future    Lupus vasculitis (HCC)  -     hydroxychloroquine 200 MG Oral Tab; Take 1 tablet (200 mg total) by mouth daily.  -     Mycophenolate Mofetil 500 MG Oral Tab; Take 1 tablet (500 mg total) by mouth daily.  -     predniSONE 2.5 MG Oral Tab; Take 1 tablet (2.5 mg total) by mouth daily.  -     amLODIPine 2.5 MG Oral Tab; Take 1 tablet (2.5 mg total) by mouth 2 (two) times daily.  -     CBC With Differential With Platelet; Future  -     Comp Metabolic Panel (14); Future  -     C-Reactive Protein; Future  -     Sed Rate, Westergren (Automated); Future  -     Vitamin D; Future  -     Complement C3, Serum; Future  -     Complement C4, Serum; Future    Raynaud's disease with gangrene (HCC)  -     hydroxychloroquine 200 MG Oral Tab; Take 1 tablet (200 mg total) by mouth daily.  -     predniSONE 2.5 MG Oral Tab; Take 1 tablet (2.5 mg total) by mouth daily.  -     amLODIPine 2.5 MG Oral Tab; Take 1 tablet (2.5 mg total) by mouth 2 (two) times daily.  -     CBC With Differential With Platelet; Future  -     Comp Metabolic Panel (14); Future  -     C-Reactive Protein; Future  -     Sed Rate, Westergren (Automated); Future  -     Vitamin D; Future  -     Complement C3, Serum; Future  -     Complement C4, Serum; Future    Left foot pain    Vitamin D deficiency  -     ergocalciferol 1.25 MG (80428 UT) Oral Cap; Take 1 capsule (50,000 Units total) by mouth once a week.  -     Vitamin D; Future    History of ITP    Iron deficiency    High risk medication use    Long-term use of Plaquenil    Long term (current) use of systemic steroids  -     XR DEXA BONE DENSITOMETRY (CPT=77080); Future    Immunocompromised state due to  drug therapy (HCC)    Screening for osteoporosis  -     XR DEXA BONE DENSITOMETRY (CPT=77080); Future    Other orders  -     mycophenolate mofetil 250 MG Oral Cap; Take 1 capsule (250 mg total) by mouth one time for 1 dose.                      Return in about 3 months (around 5/26/2025).  ?  HPI   Raymond Olivo is a 49 year old female with the following active problems who is seen for medically necessary follow-up today. She was seen as a new patient several months ago for second opinion regarding underlying connective tissue disease. She had been suffering from multiple leg wounds over the years.  She was diagnosed by another rheumatologist with having mixed connective tissue disorder. She was previously on Plaquenil as well as azathioprine and some dose of prednisone.  Seemed like she was also on methotrexate but unclear why stopped. Since first seeing her, I restarted methotrexate with daily folic acid supplementation as well as added amlodipine. She had some decrease in her WBC count which has stabilized. Decision was made to stop methotrexate and start mycophenolate due to lupus vasculitis dx on skin biopsy. She was hospitalized due to thrombocytopenia.  Was diagnosed with ITP and given IV methylprednisolone as well as IVIG infusion. Initially as outpatient platelets were only 10 and improved most recently. She presents for follow up today.    Her  is here and serves to translate if pt does not understand what I'm asking/explaining.     Currently taking:  Plaquenil 200mg daily   Amlodipine 2.5mg twice daily  Prednisone 2.5mg daily   MMF 750mg daily   Biotin supplementation  Vit d completed prescription previously. Not taking OTC at this time.   B12 not taking any more    Iron once daily (rx for two)     Since her last visit, she has been feeling better.   Has continues with MMF to 750mg.- denies side effects.   Some raynauds but not severe. Denies finger tip ulcers  No recent ankle ulcerations    Completed wound care in November shortly after her last apt with me.   Burning sensation has subsided in the left ankle   Not feeling fatigued anymore   Denies any other joint pain or swelling.     Not drinking protein shakes daily anymore     Continues to have hair loss/thinning but feel stable. No alopecia spots.   Denies skin rashes   No recent bruising or bleeding  Feels the tightness of the skin over the fingers and toes are the same but some tightness and numbness over the top of both feet/front of ankle  Has not had obvious bleeding- epistaxis, hemoptysis, hematuria or bloody stools  Denies shortness of breath or chest pain.   Denies cough.   Denies fevers or chills.     Has not done ECHO or CXR as previously ordered.     HPI from initial consultation  States about 8-10 years ago, she started to have pain in her joints, while living in [place]. There was concern for possible rheumatologic issue at that time. She was given some oral prednisone which were reduced over time. She had a wound on her leg, was told it was a vascular wound. Had a laser vein surgery at that time. No recurrence until one month ago.   About 3 years ago, they moved to [facility]. She was seeing Dr. Keys, diagnosed pt with mixed connective tissue disease. States her symptoms worsened in winter months- joint pain in her hands as well as change in color of her fingertips. About one month ago, she developed a wound over her leg. She followed with Dr. Ferguson, as Dr. Keys left. Once the wound came, he recommended pt be evaluated by wound care. Also recommended tertiary center.  Was previously on plaquenil, however due to the side effects, this was stopped.  Pt was also on azathioprine but had some cytopenias. Also had been on methotrexate but since this wasn't helping, this was discontinued.   Pt has been following with wound care. States with the topicals and wrapping, she has had some improvement of the wound size.   Pt continues  to have burning sensation in both legs, described as 5-6/10.   She is currently on prednisone 5mg daily.   She has never gotten a biopsy of the lesion, unless she had one 8 years ago in [place], but  cannot recall results.  She tried gabapentin, only 100mg nightly but stopped because of feeling drowsy.      Pt does have morning stiffness lasting 10-15 minutes.   Significant hair loss  + ulcers over bottom inside lip  Itching over thighs without redness or rash.   Hx of cytopenia while on DMARDs  + ulcers over toes/bottom of feet  + pain awakening the patient from sleep  + looks thinner, however actual weight is the same.      No history of significant wrist pain or swelling, pain or swelling of the MCPs, pain or swelling of the ankles and bones of the feet, or new skin nodule formation.  The patient denies nasal ulcers, photosensitive rash, elevated or scarring rashes, prior renal or liver disease, or history of seizures.  No history of prior blood clot in the legs or lungs, strokes or ischemic phenomenon.  Denies nonhealing ulcers on the fingertips, trouble swallowing, or severe acid reflux.  The patient denies any history of uveitis, crampy abdominal pain, constipation, diarrhea, or bloody stools.   There are no symptoms of severe dry eyes, dry mouth, or swelling of the cheeks or under the jawbone.   No fevers, chills, lymphadenopathy, night sweats, or easy bruising or bleeding.  Denies chronic sinus infections/disease or epistaxis.  Denies chronic cough or hemoptysis.      Family hx:  Father with possible MCTD  Brothers/sisters healthy       Past Medical History:  Past Medical History:    Mixed connective tissue disease (HCC)    Raynaud's disease     Past Surgical History:  History reviewed. No pertinent surgical history.    Family History:  Family History   Problem Relation Age of Onset    Diabetes Father     Diabetes Mother      Social History:  Social History     Socioeconomic History    Marital status:     Tobacco Use    Smoking status: Never    Smokeless tobacco: Never   Vaping Use    Vaping status: Never Used   Substance and Sexual Activity    Alcohol use: No    Drug use: No    Sexual activity: Yes     Medications:  Outpatient Medications Marked as Taking for the 2/26/25 encounter (Office Visit) with Alisha Farmer DO   Medication Sig Dispense Refill    ergocalciferol 1.25 MG (95216 UT) Oral Cap Take 1 capsule (50,000 Units total) by mouth once a week. 12 capsule 0    hydroxychloroquine 200 MG Oral Tab Take 1 tablet (200 mg total) by mouth daily. 90 tablet 0    mycophenolate mofetil 250 MG Oral Cap Take 1 capsule (250 mg total) by mouth one time for 1 dose. 1 capsule 0    Mycophenolate Mofetil 500 MG Oral Tab Take 1 tablet (500 mg total) by mouth daily. 90 tablet 0    predniSONE 2.5 MG Oral Tab Take 1 tablet (2.5 mg total) by mouth daily. 90 tablet 0    amLODIPine 2.5 MG Oral Tab Take 1 tablet (2.5 mg total) by mouth 2 (two) times daily. 180 tablet 0    FERROUS SULFATE 325 (65 Fe) MG Oral Tab TAKE 1 TABLET BY MOUTH 2 TIMES DAILY WITH MEALS. 180 tablet 0    clobetasol 0.05 % External Solution Apply topically to lower legs after shower 3 x weekly 50 mL 0     Modified Medications    Modified Medication Previous Medication    AMLODIPINE 2.5 MG ORAL TAB amLODIPine 2.5 MG Oral Tab       Take 1 tablet (2.5 mg total) by mouth 2 (two) times daily.    Take 1 tablet (2.5 mg total) by mouth 2 (two) times daily.    HYDROXYCHLOROQUINE 200 MG ORAL TAB hydroxychloroquine 200 MG Oral Tab       Take 1 tablet (200 mg total) by mouth daily.    Take 1 tablet (200 mg total) by mouth daily.    MYCOPHENOLATE MOFETIL 500 MG ORAL TAB MYCOPHENOLATE MOFETIL 500 MG Oral Tab       Take 1 tablet (500 mg total) by mouth daily.    TAKE 1 TABLET BY MOUTH DAILY. TAKE IN ADDITION TO 250MG.    PREDNISONE 2.5 MG ORAL TAB predniSONE 2.5 MG Oral Tab       Take 1 tablet (2.5 mg total) by mouth daily.    Take 1 tablet (2.5 mg total) by mouth  daily.     Medications Discontinued During This Encounter   Medication Reason    amLODIPine 2.5 MG Oral Tab     predniSONE 2.5 MG Oral Tab     hydroxychloroquine 200 MG Oral Tab     MYCOPHENOLATE MOFETIL 500 MG Oral Tab     mycophenolate mofetil 250 MG Oral Cap              ?  Allergies:  No Known Allergies  ?  REVIEW OF SYSTEMS   ?  Review of Systems   Constitutional:  Negative for chills, fever, malaise/fatigue and weight loss.   HENT:  Negative for nosebleeds.    Eyes:  Negative for blurred vision, pain and redness.   Respiratory:  Negative for cough, hemoptysis and shortness of breath.    Cardiovascular:  Negative for chest pain, palpitations and leg swelling.   Gastrointestinal:  Negative for abdominal pain, blood in stool, constipation, diarrhea, heartburn and nausea.   Genitourinary:  Negative for dysuria, frequency, hematuria and urgency.   Musculoskeletal:  Positive for joint pain (some left shoulder pain). Negative for back pain, myalgias and neck pain.   Skin:  Negative for itching and rash.   Neurological:  Positive for tingling. Negative for dizziness, weakness and headaches.   Endo/Heme/Allergies:  Does not bruise/bleed easily.     PHYSICAL EXAM   Today's Vitals:  Temperature Blood Pressure Heart Rate Resp Rate SpO2   Temp: 97.7 °F (36.5 °C) BP: 100/52 Pulse: 70 Resp: 16 SpO2: 100 %   ?  Current Weight Height BMI BSA Pain   Wt Readings from Last 1 Encounters:   02/26/25 120 lb (54.4 kg)      Body mass index is 20.6 kg/m². Body surface area is 1.57 meters squared.         Physical Exam  Vitals and nursing note reviewed.   Constitutional:       General: She is not in acute distress.     Appearance: She is well-developed. She is not diaphoretic.      Comments: + thin   HENT:      Head: Normocephalic.   Eyes:      General: No scleral icterus.     Extraocular Movements: Extraocular movements intact.      Conjunctiva/sclera: Conjunctivae normal.   Neck:      Vascular: No JVD.      Trachea: No tracheal  deviation.   Cardiovascular:      Rate and Rhythm: Normal rate and regular rhythm.      Heart sounds: Normal heart sounds. No murmur heard.  Pulmonary:      Effort: Pulmonary effort is normal. No respiratory distress.      Breath sounds: Normal breath sounds. No wheezing.   Musculoskeletal:         General: Tenderness and deformity present. No swelling.      Cervical back: Neck supple.      Comments: No evidence of heberden or kecia nodes of any of the fingers, no basilar joint tenderness of the 1st CMC bilaterally.  No swelling, tenderness, redness or restriction of motion of the DIPs, PIPs, MCPs, wrists, elbows, or joints of the feet.  Bilateral shoulders with full ROM.  Bilateral knees without medial joint line tenderness, no crepitus, no effusion.  + bilateral ankle tenderness over med/lat malleoli and some swelling most notable over lateral malleolus on left - no more swelling but tenderness over top of feet  Very sensitive to touch over left dorsal foot diffusely- improved   Lymphadenopathy:      Cervical: No cervical adenopathy.   Skin:     General: Skin is warm and dry.      Comments: No active bruising noted today.   Salt/pepper appearance of skin stable  Abnormal nailfold capillaroscopy diffuse fingers, stable  No digital pits of fingers  + telangectasias over face stable  Skin tightening of distal fingers improved   No current ulcers   Neurological:      Mental Status: She is alert. She is disoriented.      Cranial Nerves: No cranial nerve deficit.      Gait: Gait normal.   Psychiatric:         Mood and Affect: Mood normal.         Behavior: Behavior normal.       ?  Radiology review:       Narrative   PROCEDURE:  US ARTERIAL DUPLEX LOWER EXTREMITY LEFT (CPT=93926)     COMPARISON:  None.     INDICATIONS:  M79.672 Left foot pain I73.01 Raynaud's disease with gangrene ([facility]) I77.89 Lupus vasculitis ([facility]) M32.19 Lupus vasculitis ([facility]) M35.1 Mixed connective tissue d*     TECHNIQUE:  A comprehensive color  duplex Doppler ultrasound examination of the left lower extremity was performed. Color imaging, Doppler wave form analysis, and peak systolic flow measurements of the common femoral, profunda femoral, superficial  femoral, and popliteal arteries were performed.       PATIENT STATED HISTORY: (As transcribed by Technologist)           FINDINGS:    LEFT EXTREMITY:  Triphasic waveforms in the left common femoral, profunda, superficial femoral arteries.  Biphasic waveforms below the left knee.  OTHER:  None.       PEAK VELOCITIES (CM/S):  LEFT COMMON FEMORAL:      165    LEFT PROFUNDA FEMORAL:      78      LEFT SUPERFICIAL FEMORAL PROX:    113  LEFT SUPERFICIAL FEMORAL MID:      94  LEFT SUPERFICIAL FEMORAL DISTAL:    92  LEFT POPLITEAL PROX:      110  LEFT POPLITEAL DISTAL:      121  TIBIOPER TRUNK:      66  LEFT PTA PROX:      60  LEFT PTA MID:      62  LEFT PTA DISTAL:      73  LEFT BETH PROX:      82  LEFT BETH MID:      72    LEFT DORSALIS PEDIS::      30  LEFT GREAT TOE PRESSURE:      25 mmHg                       Impression   CONCLUSION:       1. No high-grade stenosis is identified.  There is suggestion of diffuse mild stenosis throughout most of the left lower extremity.     2. Overall diminished left toe pressure is noted.  Possibility of decreased perfusion in the distal left foot is of consideration.          LOCATION:  [place] [id number]           Dictated by (CST): Anselmo Carroll MD on 11/28/2023 at 9:30 AM      Finalized by (CST): Anselmo Carroll MD on 11/28/2023 at 9:35 AM       Narrative   PROCEDURE:  XR FOOT, COMPLETE (MIN 3 VIEWS), LEFT (CPT=73630)     TECHNIQUE:  AP, oblique, and lateral views were obtained.     COMPARISON:  None.     INDICATIONS:  M79.672 Left foot pain I73.01 Raynaud's disease with gangrene ([facility]) I77.89 Lupus vasculitis ([facility]) M32.19 Lupus vasculitis ([facility]) M35.1 Mixed connective tissue d*     PATIENT STATED HISTORY: (As transcribed by Technologist)  Patient states having lateral anterior  foot pain that began roughly twenty days ago, with no injury.         FINDINGS:    BONES:  Osseous structures are intact.  Periarticular decreased bone mineralization.  Limited evaluation of the 2nd through 5th distal phalanges is due to constant flexion.  Joint spaces are preserved.  Mild hallux valgus deformity.  No dislocation.    Mild inferior calcaneal enthesopathy.                   Impression   CONCLUSION:    1. Osseous structures are intact.  Please see details as above.        LOCATION:  [place]        Dictated by (CST): Madina Colin MD on 10/19/2023 at 12:16 PM      Finalized by (CST): Madina Colin MD on 10/19/2023 at 12:18 PM        Narrative   PROCEDURE:  XR FOOT, COMPLETE (MIN 3 VIEWS), LEFT (CPT=73630)     TECHNIQUE:  AP, oblique, and lateral views were obtained.     COMPARISON:  None.     INDICATIONS:  M79.672 Left foot pain I73.01 Raynaud's disease with gangrene (HCC) I77.89 Lupus vasculitis (HCC) M32.19 Lupus vasculitis (HCC) M35.1 Mixed connective tissue d*     PATIENT STATED HISTORY: (As transcribed by Technologist)  Patient states having lateral anterior foot pain that began roughly twenty days ago, with no injury.         FINDINGS:    BONES:  Osseous structures are intact.  Periarticular decreased bone mineralization.  Limited evaluation of the 2nd through 5th distal phalanges is due to constant flexion.  Joint spaces are preserved.  Mild hallux valgus deformity.  No dislocation.    Mild inferior calcaneal enthesopathy.                   Impression   CONCLUSION:    1. Osseous structures are intact.  Please see details as above.        LOCATION:  [facility]        Dictated by ([facility]): Madina Colin MD on 10/19/2023 at 12:16 PM      Finalized by (CST): Madina Colin MD on 10/19/2023 at 12:18 PM      PROCEDURE:  CT ABDOMEN PELVIS IV CONTRAST, NO ORAL (ER)       COMPARISON:  None.       INDICATIONS:  abd pain, sent by pcp to r/o sbo       TECHNIQUE:  CT scanning was performed from the dome of the diaphragm to the  pubic symphysis with non-ionic intravenous contrast material. Post contrast coronal MPR imaging was performed.  Dose reduction techniques were used. Dose information is   transmitted to the ACR (American College of Radiology) NRDR (National Radiology Data Registry) which includes the Dose Index Registry.       FINDINGS:     LUNG BASES:  Dependent atelectasis bilaterally.   LIVER:  Normal in shape and contour.   BILIARY:  Cholelithiasis.  No intrahepatic biliary dilatation.  There is possible small amount of pericholecystic fluid..   SPLEEN:  Normal.  No enlargement or focal lesion.   PANCREAS:  No tyson-pancreatic inflammatory stranding   ADRENALS:  Normal.  No mass or enlargement.     KIDNEYS:  There is moderate bilateral hydronephrosis.  No obstructing urolithiasis.   BOWEL/MESENTERY:  Bowel is normal in caliber. No evidence of obstruction.  Considerable amount of stool noted within the rectum.  Moderate amount of fecal material noted within the remaining portion of the colon.  The appendix is normal appearance.   PELVIS:  Distended bladder.  No free pelvic fluid.  Calcified phleboliths within the pelvis.     AORTA/VASCULAR:  Atheromatous calcifications of the aorta.  Aorta is normal in caliber.   BONES:  No acute fractures.                        Impression   CONCLUSION:     1. Considerably distended bladder with bilateral hydronephrosis.  No obstructing urolithiasis is noted.   2. Prominent mount of stool noted within the rectum suggestive of fecal impaction.  No evidence of bowel obstruction.     3. Gallstone within the neck of the gallbladder measuring 1.3 x 1.0 cm.  The gallbladder is contracted but there is suggestion of small amount of pericholecystic fluid.  Correlation with clinical symptoms to exclude acute cholecystitis.             Dictated by (CST): Colette Nelson MD on 12/22/2021 at 7:51 PM       Finalized by (CST): Colette Nelson MD on 12/22/2021 at 7:56 PM        Labs:  Lab Results   Component  Value Date    WBC 4.5 02/25/2025    RBC 4.45 02/25/2025    HGB 12.7 02/25/2025    HCT 39.2 02/25/2025    .0 02/25/2025    MCV 88.1 02/25/2025    MCH 28.5 02/25/2025    MCHC 32.4 02/25/2025    RDW 14.3 02/25/2025    NEPRELIM 3.12 02/25/2025    NEPERCENT 69.4 02/25/2025    LYPERCENT 17.3 02/25/2025    MOPERCENT 12.2 02/25/2025    EOPERCENT 0.7 02/25/2025    BAPERCENT 0.2 02/25/2025    NE 3.12 02/25/2025    LYMABS 0.78 (L) 02/25/2025    MOABSO 0.55 02/25/2025    EOABSO 0.03 02/25/2025    BAABSO 0.01 02/25/2025     Lab Results   Component Value Date    GLU 82 02/25/2025    BUN 6 (L) 02/25/2025    BUNCREA 16.7 07/14/2021    CREATSERUM 0.63 02/25/2025    ANIONGAP 3 02/25/2025    GFRNAA 109 07/28/2022    GFRAA 126 07/28/2022    CA 9.4 02/25/2025    OSMOCALC 281 02/25/2025    ALKPHO 85 02/25/2025    AST 24 02/25/2025    ALT 13 02/25/2025    BILT 0.5 02/25/2025    TP 8.2 02/25/2025    ALB 4.8 02/25/2025    GLOBULIN 3.4 02/25/2025     02/25/2025    K 3.8 02/25/2025     02/25/2025    CO2 32.0 02/25/2025       Additional Labs:  02/2025  Iron 42 low  ESR 54 elevated  CRP normal  Ferritin 151 normal  Vitamin D19.2 Low    09/2024  CRP normal  ESR 47 elevated  CMP grossly normal  CBC with WBC 4.7; hemoglobin 13.1; platelet 194    07/2024 (resulted after visit)  CBC with WBC 5.5; hemoglobin 12.8; platelet 183  CMP grossly normal except potassium 3.3 low  ESR 39 elevated  CRP normal     03/2024  CBC with WBC 3.2, otherwise normal  CMP grossly normal   Iron 45; percent sat 15 borderline low  Ferritin 114.6  B12 726 normal  ESR 54 elevated  CRP 0.37 borderline elevated  Vitamin D 35  C3 103  C4 33.7     10/2023  CBC grossly normal  CMP grossly normal  Iron 43; percent sat 12 low  Ferritin 94.1 normal  B12 726 normal  ESR 45 elevated  CRP 0.48 borderline elevated  Vitamin D 58.8 Normal    07/2023  B12 246 borderline low   Vitamin D 22.9 low  Ferritin 96.6 normal  Iron low  CBC grossly normal  CMP grossly  normal    07/2022  B12 302 normal  Vitamin D 14.4 low  Ferritin normal  Iron normal  CBC grossly normal  CMP with potassium 3.3 otherwise grossly normal  CRP normal  ESR 15 normal  C3 97.8 normal  C4 29.1 normal    01/29/2022  WELLINGTON 1: 640 speckled      Remainder of DARRYL panel negative  CBC with WBC 5.9; hemoglobin 12.2; platelet 296  B12 689  ESR 48 elevated  CRP 0.44 borderline elevated  CMP grossly normal  UA grossly normal with exception of trace leuk esterase squamous epithelial and rare bacteria  Ferritin 245.8 elevated  Iron and percent sat low    01/11/2022  CBC grossly normal (WBC 5.6; hemoglobin 13.2; platelet 214)    11/2021   B12 943  WELLINGTON positive      Remainder of DARRYL panel negative intrinsic factor blocking antibody negative  MMA normal  ESR 34  dsDNA negative  Total complement normal    11/22/2021  CBC with WBC 3.9; hemoglobin 11.4; platelet 10    7/2021  dsDNA negative  Protein creatinine ratio normal  UA small amount of blood; leuk esterase; bacteria rare; moderate squamous  C4 27.3 normal  C3 112 normal  CRP 0.46 borderline elevated  ESR 40 elevated  CBC with hemoglobin 10.9; WBC 4.0; platelet 213 normal  CMP grossly normal    08/2020  Esr 36  CRP 0.60  C3 and C4 normal     Cutaneous Direct IF, Biopsy See Note    Comment: IMMUNODERMATOLOGY REPORT       Specimen(s):   1. 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, including lupus vasculitis     (See Results and Comments)   ____________________________________________________________     RESULTS   IgG:  Grains within and around basal keratinocytes and         within cell nuclei and grains in focal superficial,         upper, and mid dermal blood vessels         IgG4:  Negative     IgM:  Grains in focal  dermal blood vessels     IgA:  Focal grains within basal keratinocytes     C3:   Few grains along basement membrane zone and grains and         clumps within superficial and upper dermal blood         vessels     Fibrinogen:  3+ deposition around superficial, upper, and                mid dermal blood vessels     COMMENTS   By direct immunofluorescence, there is granular deposition of IgG   within and around basal keratinocytes and within cell nuclei. The   presence of granular IgG within the epidermis raises the   consideration of lupus erythematosus, specifically subacute   cutaneous lupus erythematosus (SCLE). The finding of granular   staining of cell nuclei may be indicative of the presence of   anti-nuclear antibodies, further lending support to the   possibility of lupus erythematosus. 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     Correlation with histopathologic examination of formalin-fixed   tissue is needed to further define this process. Correlation with   clinical findings is also needed, including with serologic testing   for lupus associated antibodies, including antibodies to SSA (Ro)   and SSB (La), which are often associated with SCLE.        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

## 2025-02-26 NOTE — PATIENT INSTRUCTIONS
-- continued MMF 750mg daily  -- continue prednisone to 2.5mg daily  -- continue plaquenil 200mg daily (weight based dosing)  -- continue amlodipine 2.5mg twice daily  -- remember to get annual eye exams to monitor for toxicity from the plaquenil   -- previously discussed xray of foot showing periarticular osteopenia- could be related to bone density vs early inflammation. Encouraged to get updated BMD.  -- take iron supplements daily; I have reached out to hematology to see if you need iron infusions  -- consider following back with the vascular surgeon given the feet/ankle discomfort and discoloration of the skin   -- follow up in 3 months or sooner as needed   -- labs prior to next follow up   -- start vitamin prescription once weekly x 12 weeks, then start 2000IU vit d3 over the counter daily when the prescription is complete     Dr. Farmer

## 2025-04-03 ENCOUNTER — PATIENT MESSAGE (OUTPATIENT)
Dept: RHEUMATOLOGY | Facility: CLINIC | Age: 50
End: 2025-04-03

## 2025-04-07 NOTE — TELEPHONE ENCOUNTER
Spoke with patient's  (HIPAA verified). States patient did see vascular surgeon 3-4 weeks ago \"did not find anything\".     Requested vascular surgeon evaluation to be sent to Dr. Farmer.     Informed Dr. Farmer forwarded message to Martina. Understanding verbalized.       Please call pt and follow up on recommendations to follow with vascular surgeon. I did forward her message to Martina and hopefully she can get pt in sooner.

## 2025-04-09 NOTE — PROGRESS NOTES
CHIEF COMPLAINT:     Chief Complaint   Patient presents with    Wound Care     Patient arrives for an initial wound care visit; patient is a past patient of the clinic. Patient reports mild pain to the wound. Patient noticed wound opened one week ago. Patient arrives with bilateral spandagrip.      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 Patient saw dr. Santamaria (vascular) in December 2023 and he documented \"I explained to the patient and her  that I do not believe the source of her pain to be vascular in origin.  She has an easily palpable dorsalis pedis pulse and the location of the pain is more suggestive of a neuropathic origin.  I suspect perhaps that given the tightness of her feet from her mixed connective tissue disorder the compression stockings is causing somewhat compression of the nerves in that area and I have encouraged her to substitute those stockings to those that involve the feet up to the upper calf.\" Patient last saw dr. Farmer in February 2025 and overall doing well. She was to continue on mmf 750mg daily, prednisone 2.5 mg daily, plaquenil 200mg daily and amlodipine 2.5 mg tiwce daily along with vitamine prscription and d3.  Spouse contacted dr farmer late last week regarding a small wound on her left leg, so patient was fit into the schedule.  They have not been dressing the wound with anything, she did have spandagrip on.  There was surrounding dry drainage. Patient states she noted something last Friday and then by Monday this week she noted it was a definite ulceration.  The drainage is clear and increases when patient plantar/dorsiflexes.  Most likely a lymph vessel leaking. We discussed utilizing doxy-they will bring to next appointment. Her skin  remains very dry/flaky she states she has been utilizing resta daily.  We discussed that she may need something with ammonium lactate or urea in it.  No s/s of infection. The area is not as painful as it has been previously.   MEDICATIONS:     Current Outpatient Medications:     doxycycline 100 MG Oral Cap, Bring capsules to wound clinic to use as directed, Disp: 5 capsule, Rfl: 0    ergocalciferol 1.25 MG (68532 UT) Oral Cap, Take 1 capsule (50,000 Units total) by mouth once a week., Disp: 12 capsule, Rfl: 0    hydroxychloroquine 200 MG Oral Tab, Take 1 tablet (200 mg total) by mouth daily., Disp: 90 tablet, Rfl: 0    Mycophenolate Mofetil 500 MG Oral Tab, Take 1 tablet (500 mg total) by mouth daily., Disp: 90 tablet, Rfl: 0    predniSONE 2.5 MG Oral Tab, Take 1 tablet (2.5 mg total) by mouth daily., Disp: 90 tablet, Rfl: 0    amLODIPine 2.5 MG Oral Tab, Take 1 tablet (2.5 mg total) by mouth 2 (two) times daily., Disp: 180 tablet, Rfl: 0    FERROUS SULFATE 325 (65 Fe) MG Oral Tab, TAKE 1 TABLET BY MOUTH 2 TIMES DAILY WITH MEALS., Disp: 180 tablet, Rfl: 0    clobetasol 0.05 % External Solution, Apply topically to lower legs after shower 3 x weekly, Disp: 50 mL, Rfl: 0  ALLERGIES:   No Known Allergies   REVIEW OF SYSTEMS:   This information was obtained from the patient/family and chart.    See HPI for pertinent positives, otherwise 10 pt ROS negative.    HISTORY:   Past medical, surgical, family and social history updated where appropriate.    PHYSICAL EXAM:     Vitals:    04/10/25 0804   BP: 106/71   Pulse: 87   Resp: 15   Temp: 97.6 °F (36.4 °C)   TempSrc: Temporal   Weight: 120 lb (54.4 kg)   Height: 62\"         Estimated body mass index is 21.95 kg/m² as calculated from the following:    Height as of this encounter: 62\".    Weight as of this encounter: 120 lb (54.4 kg).   No results found for: \"PGLU\"    Vital signs reviewed.Appears stated age, well groomed.    Constitutional:  Bp wnl for patient. Pulse Regular  and wnl for patient. Respirations easy and unlabored. Temperature wnl. Weight normal for height. Appearance neat and clean. Appears in no acute distress. Well nourished and well developed.    Lower extremity:  dp/pt palpable left. Pulsatile signals at the dp/pt/distal hallux.  Extremities are free of varicosities and edema, they are scarred, scattered changes in pigmentation (hypo/hyper) with atrophie jay. Capillary refill < 3 seconds. Digits are warm. toenails are wnl for color, thickness and hygeine. Skin is dry and flaky. + hairgrowth on legs.    Musculoskeletal:  Gait and station stable   Integumentary:  refer to wound characteristics and images   Psychiatric:  Judgment and insight intact. Alert and oriented times 3. No evidence of depression, anxiety, or agitation. Calm, cooperative, and communicative, but very quiet and reserved.  EDEMA:   Calf  Point of Measurement - Left Calf: 31  Point of Measurement - Right Calf: 31  Left Calf from:: Heel  Calf Left cm:: 28  Right Calf from:: Heel  Right Calf cm:: 28  Ankle  Point of Measurement - Left Ankle: 10  Point of Measurement - Right Ankle: 10  Left Ankle from:: Heel  Left Ankle cm:: 17     Right Ankle from:: Heel  Right Ankle cm:: 16.5     DIAGNOSTICS:     Lab Results   Component Value Date    BUN 6 (L) 02/25/2025    CREATSERUM 0.63 02/25/2025    GFRCKDEPI 121.37 12/17/2020    ALB 4.8 02/25/2025    TP 8.2 02/25/2025    A1C 5.1 12/17/2020     Lab Results   Component Value Date    ESRML 54 (H) 02/25/2025    ESRML 47 (H) 09/03/2024    ESRML 39 (H) 07/23/2024      Lab Results   Component Value Date    CRP <0.40 02/25/2025    CRP 0.50 09/03/2024    CRP <0.40 07/23/2024 11-28-23 arterial duplex-dr hilliard  FINDINGS:    LEFT EXTREMITY:  Triphasic waveforms in the left common femoral, profunda, superficial femoral arteries.  Biphasic waveforms below the left knee.   OTHER:  None.     PEAK VELOCITIES (CM/S):   LEFT COMMON FEMORAL:      165    LEFT PROFUNDA FEMORAL:       78      LEFT SUPERFICIAL FEMORAL PROX:    113   LEFT SUPERFICIAL FEMORAL MID:      94   LEFT SUPERFICIAL FEMORAL DISTAL:    92   LEFT POPLITEAL PROX:      110   LEFT POPLITEAL DISTAL:      121   TIBIOPER TRUNK:      66   LEFT PTA PROX:      60   LEFT PTA MID:      62   LEFT PTA DISTAL:      73   LEFT BETH PROX:      82   LEFT BETH MID:      72    LEFT DORSALIS PEDIS::      30   LEFT GREAT TOE PRESSURE:      25 mmHg   CONCLUSION:    1. No high-grade stenosis is identified.  There is suggestion of diffuse mild stenosis throughout most of the left lower extremity.    2. Overall diminished left toe pressure is noted.  Possibility of decreased perfusion in the distal left foot is of consideration.     7-14-20 venous reflux study-dr santamaria  Per Dr. Santamaria review \"no intervention needed\"  WOUND ASSESSMENT:     Wound 04/10/25 #1 Ankle Left;Medial (Active)   Date First Assessed/Time First Assessed: 04/10/25 0757    Wound Number (Wound Clinic Only): #1  Primary Wound Type: Venous Ulcer  Location: Ankle  Wound Location Orientation: Left;Medial      Assessments 4/10/2025  7:57 AM   Wound Image      Drainage Amount Unable to assess   Wound Length (cm) 0.7 cm   Wound Width (cm) 0.7 cm   Wound Surface Area (cm^2) 0.38 cm^2   Wound Depth (cm) 0.2 cm   Wound Volume (cm^3) 0.051 cm^3   Margins Well-defined edges   Non-staged Wound Description Full thickness   Soni-wound Assessment Dry;Hemosiderin staining   Wound Granulation Tissue Pale Grey;Pink;Firm   Wound Bed Granulation (%) 100 %   Wound Odor None   Tunneling? No   Undermining? No   Sinus Tracts? No       No associated orders.            ASSESSMENT AND PLAN:    1. 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 Lymphorrhea    3. Lupus vasculitis (HCC)    4. Mixed connective tissue disease (HCC)    5. Current chronic use of systemic steroids        Risks, benefits, and alternatives of current treatment plan discussed in detail.  Questions and concerns  addressed. Red flags to RTC or ED reviewed.  Patient (or parent) agrees to plan.      NOTE TO PATIENT: The 21st Century Cures Act makes clinical notes like these available to patients in the interest of transparency. Clinical notes are medical documents used by physicians and care providers to communicate with each other. These documents include medical language and terminology, abbreviations, and treatment information that may sound technical and at times possibly unfamiliar. In addition, at times, the verbiage may appear blunt or direct. These documents are one tool providers use to communicate relevant information and clinical opinions of the care providers in a way that allows common understanding of the clinical context.     I [id number] with the patient. This time included:    preparing to see the patient (eg, review notes and recent diagnostics),  seeing the patient, obtaining and/or reviewing separately obtained history, performing a medically appropriate examination and/or evaluation, counseling and educating the patient, documenting in the record, rx  DISCHARGE INSTRUCTIONS     Patient Instructions   Please return: 1 week    BRING CAPSULES TO WOUND CLINIC  Requested Prescriptions     Signed Prescriptions Disp Refills    doxycycline 100 MG Oral Cap 5 capsule 0     Sig: Bring capsules to wound clinic to use as directed     Look for diabetic foot moisturizer that contains active ingredient of :  UREA OR AMMONIUM LACTATE      Patient discharge and wound care instructions  Raymond Olivo  4/10/2025       You may shower with protection of the wound (ie a cast cover or similar).     Cleansing/dressing:     In clinic, with each dressing change:    please cleanse the limb, foot, and between toes with soap, water and washcloth.   Dry thoroughly.    Cleanse/soak the wound with VASHE (or other hypochlorous wound cleanser), dab dry.    Apply the following dressings:    stacked enluxtra>20-30mmhg  calamine    Managing your edema:  Avoid prolonged standing in one place. It is better to have your calf muscles moving to pump fluid out of the legs      Elevate leg(s) above the level of the heart when sitting or as much as possible.  Take you diuretics as directed by your physician. Do not skip doses or change doses unless instructed to do so by your physician.  Decrease salt intake, follow recommended 2 grams daily.  Do not get leg(s) with compression wrap wet. If wraps are too tight as indicated by pain, numbness/tingling or discoloration of toes remove wrap completely and call the wound center @ [phone].  Refer to the \"Multi-layer compression bandage application patient information sheet\" given to you in clinic.    Nutrition and blood sugar control:  Focus on the following:  Protein: Meats, beans, eggs, milk and yogurt particularly Greek yogurt), tofu, soy nuts, soy protein products (Follow the protein handout in your welcome folder)  Vitamin C: Citrus fruits and juices, strawberries, tomatoes, tomato juice, peppers, baked potatoes, spinach, broccoli, cauliflower, [place] sprouts, cabbage  Vitamin A: Dark green, leafy vegetables, orange or yellow vegetables, cantaloupe, fortified dairy products, liver, fortified cereals  Zinc: Fortified cereals, red meats, seafood  Consider supplementing with Justin by [employer]. It can be purchased on amazon, [employer] website, or local pharmacy may be able to order it for you.  (These are essential branch chain amino acids that help with tissue building and wound healing).   When your blood sugar is consistently elevated greater than 180 your body can't heal or fight infection.     Concerns:  Signs of infection may include the following:  Increase in redness  Red \"streaks\" from wound  Increase in swelling  Fever  Unusual odor  Change in the amount of wound drainage     Should you experience any significant changes in your wound(s) or have any questions regarding your home  care instructions please contact the wound center [facility] @ [phone] If after regular business hours, please call your family doctor or local emergency room. The treatment plan has been discussed at length between you and your provider. Follow all instructions carefully, it is very important. If you do not follow all instructions you are at risk of your wound not healing, infection, possible loss of limb and even loss of life.    Martina Mckeon FNP-C, CWCN-AP, CFCN, CSWS, WCC, DWC  4/10/2025

## 2025-04-10 ENCOUNTER — OFFICE VISIT (OUTPATIENT)
Dept: WOUND CARE | Facility: HOSPITAL | Age: 50
End: 2025-04-10
Attending: NURSE PRACTITIONER
Payer: COMMERCIAL

## 2025-04-10 VITALS
WEIGHT: 120 LBS | TEMPERATURE: 98 F | DIASTOLIC BLOOD PRESSURE: 71 MMHG | RESPIRATION RATE: 15 BRPM | BODY MASS INDEX: 22.08 KG/M2 | HEIGHT: 62 IN | SYSTOLIC BLOOD PRESSURE: 106 MMHG | HEART RATE: 87 BPM

## 2025-04-10 DIAGNOSIS — L97.322 NON-PRESSURE CHRONIC ULCER OF LEFT ANKLE WITH FAT LAYER EXPOSED (HCC): Primary | ICD-10-CM

## 2025-04-10 DIAGNOSIS — Z79.52 CURRENT CHRONIC USE OF SYSTEMIC STEROIDS: ICD-10-CM

## 2025-04-10 DIAGNOSIS — I89.8 LYMPHORRHEA: ICD-10-CM

## 2025-04-10 DIAGNOSIS — M35.1 MIXED CONNECTIVE TISSUE DISEASE (HCC): ICD-10-CM

## 2025-04-10 DIAGNOSIS — I77.89 LUPUS VASCULITIS (HCC): ICD-10-CM

## 2025-04-10 DIAGNOSIS — M32.19 LUPUS VASCULITIS (HCC): ICD-10-CM

## 2025-04-10 PROCEDURE — 99215 OFFICE O/P EST HI 40 MIN: CPT | Performed by: NURSE PRACTITIONER

## 2025-04-10 RX ORDER — DOXYCYCLINE 100 MG/1
CAPSULE ORAL
Qty: 5 CAPSULE | Refills: 0 | Status: SHIPPED | OUTPATIENT
Start: 2025-04-10

## 2025-04-10 NOTE — PATIENT INSTRUCTIONS
Please return: 1 week    BRING CAPSULES TO WOUND CLINIC  Requested Prescriptions     Signed Prescriptions Disp Refills    doxycycline 100 MG Oral Cap 5 capsule 0     Sig: Bring capsules to wound clinic to use as directed     Look for diabetic foot moisturizer that contains active ingredient of :  UREA OR AMMONIUM LACTATE      Patient discharge and wound care instructions  Raymondha Hayward Pallavi  4/10/2025       You may shower with protection of the wound (ie a cast cover or similar).     Cleansing/dressing:     In clinic, with each dressing change:    please cleanse the limb, foot, and between toes with soap, water and washcloth.   Dry thoroughly.    Cleanse/soak the wound with VASHE (or other hypochlorous wound cleanser), dab dry.    Apply the following dressings:    stacked enluxtra>20-30mmhg calamine    Managing your edema:  Avoid prolonged standing in one place. It is better to have your calf muscles moving to pump fluid out of the legs      Elevate leg(s) above the level of the heart when sitting or as much as possible.  Take you diuretics as directed by your physician. Do not skip doses or change doses unless instructed to do so by your physician.  Decrease salt intake, follow recommended 2 grams daily.  Do not get leg(s) with compression wrap wet. If wraps are too tight as indicated by pain, numbness/tingling or discoloration of toes remove wrap completely and call the wound center @ [phone].  Refer to the \"Multi-layer compression bandage application patient information sheet\" given to you in clinic.    Nutrition and blood sugar control:  Focus on the following:  Protein: Meats, beans, eggs, milk and yogurt particularly Greek yogurt), tofu, soy nuts, soy protein products (Follow the protein handout in your welcome folder)  Vitamin C: Citrus fruits and juices, strawberries, tomatoes, tomato juice, peppers, baked potatoes, spinach, broccoli, cauliflower, [place] sprouts, cabbage  Vitamin A: Dark green,  leafy vegetables, orange or yellow vegetables, cantaloupe, fortified dairy products, liver, fortified cereals  Zinc: Fortified cereals, red meats, seafood  Consider supplementing with Justin by [employer]. It can be purchased on amazon, [employer] website, or local pharmacy may be able to order it for you.  (These are essential branch chain amino acids that help with tissue building and wound healing).   When your blood sugar is consistently elevated greater than 180 your body can't heal or fight infection.     Concerns:  Signs of infection may include the following:  Increase in redness  Red \"streaks\" from wound  Increase in swelling  Fever  Unusual odor  Change in the amount of wound drainage     Should you experience any significant changes in your wound(s) or have any questions regarding your home care instructions please contact the wound center [facility] @ [phone] If after regular business hours, please call your family doctor or local emergency room. The treatment plan has been discussed at length between you and your provider. Follow all instructions carefully, it is very important. If you do not follow all instructions you are at risk of your wound not healing, infection, possible loss of limb and even loss of life.

## 2025-04-10 NOTE — PROGRESS NOTES
.Weekly Wound Education Note    Teaching Provided To: Patient, Family  Training Topics: Dressing, Cleasing and general instructions, Discharge instructions  Training Method: Explain/Verbal, Written  Training Response: Patient responds and understands        Notes: Inital visit for left leg wound. Doxycycline ordered. Start stacked enluxtra and lightly wrapped calamine unna boot 20-30mmHg. ABD pad to anterior ankle for padding.

## 2025-04-16 NOTE — PROGRESS NOTES
CHIEF COMPLAINT:     Chief Complaint   Patient presents with    Wound Care     Patients is here for a follow up. 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 Patient saw dr. Santamaria (vascular) in December 2023 and he documented \"I explained to the patient and her  that I do not believe the source of her pain to be vascular in origin.  She has an easily palpable dorsalis pedis pulse and the location of the pain is more suggestive of a neuropathic origin.  I suspect perhaps that given the tightness of her feet from her mixed connective tissue disorder the compression stockings is causing somewhat compression of the nerves in that area and I have encouraged her to substitute those stockings to those that involve the feet up to the upper calf.\" Patient last saw dr. Farmer in February 2025 and overall doing well. She was to continue on mmf 750mg daily, prednisone 2.5 mg daily, plaquenil 200mg daily and amlodipine 2.5 mg tiwce daily along with vitamine prscription and d3.  Spouse contacted dr farmer late last week regarding a small wound on her left leg, so patient was fit into the schedule.  They have not been dressing the wound with anything, she did have spandagrip on.  There was surrounding dry drainage. Patient states she noted something last Friday and then by Monday this week she noted it was a definite ulceration.  The drainage is clear and increases when patient plantar/dorsiflexes.  Most likely a lymph vessel leaking. We discussed utilizing doxy-they will bring to next appointment. Her skin remains very dry/flaky she states she has been utilizing resta daily.  We discussed that she may need something with ammonium lactate or urea in  it.  No s/s of infection. The area is not as painful as it has been previously.     4-17-25 patient returns. Last week there was lymphorrhea with dorsi/plantar flexion, patient brought the doxycycline to clinic today. Today wound is about the same size, but is more fibrinous. There is not any lymphorrhea noted even with dorsi/plantar flexion. The wound is not enlarging. Pain is manageable except with touch/cleansing. No s/s of infection. Patient able to ambulate without difficulty.   MEDICATIONS:     Current Outpatient Medications:     doxycycline 100 MG Oral Cap, Bring capsules to wound clinic to use as directed, Disp: 5 capsule, Rfl: 0    ergocalciferol 1.25 MG (16665 UT) Oral Cap, Take 1 capsule (50,000 Units total) by mouth once a week., Disp: 12 capsule, Rfl: 0    hydroxychloroquine 200 MG Oral Tab, Take 1 tablet (200 mg total) by mouth daily., Disp: 90 tablet, Rfl: 0    Mycophenolate Mofetil 500 MG Oral Tab, Take 1 tablet (500 mg total) by mouth daily., Disp: 90 tablet, Rfl: 0    predniSONE 2.5 MG Oral Tab, Take 1 tablet (2.5 mg total) by mouth daily., Disp: 90 tablet, Rfl: 0    amLODIPine 2.5 MG Oral Tab, Take 1 tablet (2.5 mg total) by mouth 2 (two) times daily., Disp: 180 tablet, Rfl: 0    FERROUS SULFATE 325 (65 Fe) MG Oral Tab, TAKE 1 TABLET BY MOUTH 2 TIMES DAILY WITH MEALS., Disp: 180 tablet, Rfl: 0    clobetasol 0.05 % External Solution, Apply topically to lower legs after shower 3 x weekly, Disp: 50 mL, Rfl: 0  ALLERGIES:   No Known Allergies   REVIEW OF SYSTEMS:   This information was obtained from the patient/family and chart.    See HPI for pertinent positives, otherwise 10 pt ROS negative.    HISTORY:   Past medical, surgical, family and social history updated where appropriate.    PHYSICAL EXAM:     Vitals:    04/17/25 0700   BP: 101/68   Pulse: 88   Resp: 16   Temp: 98 °F (36.7 °C)     Estimated body mass index is 21.95 kg/m² as calculated from the following:    Height as of 4/10/25: 62\".     Weight as of 4/10/25: 120 lb (54.4 kg).   No results found for: \"PGLU\"    Vital signs reviewed.Appears stated age, well groomed.    Constitutional:  Bp wnl for patient. Pulse Regular and wnl for patient. Respirations easy and unlabored. Temperature wnl. Weight normal for height. Appearance neat and clean. Appears in no acute distress. Well nourished and well developed.    Lower extremity:  dp/pt palpable left.  Left lower extremity is free of varicosities and edema, they are scarred, scattered changes in pigmentation (hypo/hyper) with atrophie jay. Capillary refill < 3 seconds. Digits are warm. toenails are wnl for color, thickness and hygeine. Skin is dry and flaky. + hairgrowth on legs.    Musculoskeletal:  Gait and station stable   Integumentary:  refer to wound characteristics and images   Psychiatric:  Judgment and insight intact. Alert and oriented times 3. No evidence of depression, anxiety, or agitation. Calm, cooperative, and communicative, but very quiet and reserved.  EDEMA:   Calf  Point of Measurement - Left Calf: 31     Left Calf from:: Heel  Calf Left cm:: 27.9        Ankle  Point of Measurement - Left Ankle: 10     Left Ankle from:: Heel  Left Ankle cm:: 17              DIAGNOSTICS:     Lab Results   Component Value Date    BUN 6 (L) 02/25/2025    CREATSERUM 0.63 02/25/2025    GFRCKDEPI 121.37 12/17/2020    ALB 4.8 02/25/2025    TP 8.2 02/25/2025    A1C 5.1 12/17/2020     Lab Results   Component Value Date    ESRML 54 (H) 02/25/2025    ESRML 47 (H) 09/03/2024    ESRML 39 (H) 07/23/2024      Lab Results   Component Value Date    CRP <0.40 02/25/2025    CRP 0.50 09/03/2024    CRP <0.40 07/23/2024 11-28-23 arterial duplex-dr hilliard  FINDINGS:    LEFT EXTREMITY:  Triphasic waveforms in the left common femoral, profunda, superficial femoral arteries.  Biphasic waveforms below the left knee.   OTHER:  None.     PEAK VELOCITIES (CM/S):   LEFT COMMON FEMORAL:      165    LEFT PROFUNDA FEMORAL:       78      LEFT SUPERFICIAL FEMORAL PROX:    113   LEFT SUPERFICIAL FEMORAL MID:      94   LEFT SUPERFICIAL FEMORAL DISTAL:    92   LEFT POPLITEAL PROX:      110   LEFT POPLITEAL DISTAL:      121   TIBIOPER TRUNK:      66   LEFT PTA PROX:      60   LEFT PTA MID:      62   LEFT PTA DISTAL:      73   LEFT BETH PROX:      82   LEFT BETH MID:      72    LEFT DORSALIS PEDIS::      30   LEFT GREAT TOE PRESSURE:      25 mmHg   CONCLUSION:    1. No high-grade stenosis is identified.  There is suggestion of diffuse mild stenosis throughout most of the left lower extremity.    2. Overall diminished left toe pressure is noted.  Possibility of decreased perfusion in the distal left foot is of consideration.     7-14-20 venous reflux study-dr santamaria  Per Dr. Santamaria review \"no intervention needed\"  WOUND ASSESSMENT:     Wound 04/10/25 #1 Ankle Left;Medial (Active)   Date First Assessed/Time First Assessed: 04/10/25 0757    Wound Number (Wound Clinic Only): #1  Primary Wound Type: Venous Ulcer  Location: Ankle  Wound Location Orientation: Left;Medial      Assessments 4/10/2025  7:57 AM 4/17/2025  9:36 AM   Wound Image        Drainage Amount Unable to assess Scant   Drainage Description -- Serosanguineous   Treatments Compression Compression   Wound Length (cm) 0.7 cm 0.6 cm   Wound Width (cm) 0.7 cm 0.5 cm   Wound Surface Area (cm^2) 0.38 cm^2 0.24 cm^2   Wound Depth (cm) 0.2 cm 0.2 cm   Wound Volume (cm^3) 0.051 cm^3 0.031 cm^3   Wound Healing % -- 39   Margins Well-defined edges Well-defined edges   Non-staged Wound Description Full thickness Full thickness   Soni-wound Assessment Dry;Hemosiderin staining Dry;Hemosiderin staining   Wound Granulation Tissue Pale Grey;Pink;Firm Firm;Red   Wound Bed Granulation (%) 100 % 10 %   Wound Bed Slough (%) -- 90 %   Wound Odor None None   Tunneling? No --   Undermining? No --   Sinus Tracts? No --       No associated orders.       Compression Wrap 04/10/25 Ankle Anterior;Left (Active)   Placement  Date/Time: 04/10/25 0846   Location: Ankle  Wound Location Orientation: Anterior;Left      Assessments 4/10/2025  7:57 AM 4/17/2025 10:04 AM   Response to Treatment Well tolerated Well tolerated   Compression Layers Multilayer Multilayer   Compression Product Type Unna Boot Unna Boot   Dressing Applied Yes Yes   Compression Wrap Location Toes to Knee Toes to Knee   Compression Wrap Status Dry;Intact;Clean Dry;Clean       No associated orders.            ASSESSMENT AND PLAN:    1. 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 Lymphorrhea    3. Lupus vasculitis (HCC)    4. Mixed connective tissue disease (HCC)    5. Current chronic use of systemic steroids      Risks, benefits, and alternatives of current treatment plan discussed in detail.  Questions and concerns addressed. Red flags to RTC or ED reviewed.  Patient (or parent) agrees to plan.      NOTE TO PATIENT: The 21st Century Cures Act makes clinical notes like these available to patients in the interest of transparency. Clinical notes are medical documents used by physicians and care providers to communicate with each other. These documents include medical language and terminology, abbreviations, and treatment information that may sound technical and at times possibly unfamiliar. In addition, at times, the verbiage may appear blunt or direct. These documents are one tool providers use to communicate relevant information and clinical opinions of the care providers in a way that allows common understanding of the clinical context.     I [id number] with the patient. This time included:    preparing to see the patient (eg, review notes and recent diagnostics),  seeing the patient, obtaining and/or reviewing separately obtained history, performing a medically appropriate examination and/or evaluation, counseling and educating the patient, documenting in the record,   DISCHARGE INSTRUCTIONS     Patient Instructions   Please return: 1  week       Look for diabetic foot moisturizer that contains active ingredient of :  UREA OR AMMONIUM LACTATE      Patient discharge and wound care instructions  Raymond Martinezu  4/17/2025         You may shower with protection of the wound (ie a cast cover or similar).     Cleansing/dressing:     In clinic, with each dressing change:    please cleanse the limb, foot, and between toes with soap, water and washcloth.   Dry thoroughly.    Cleanse/soak the wound with VASHE (or other hypochlorous wound cleanser), dab dry.    Apply the following dressings:    honey gel>thick foam>20-30mmhg calamine    Managing your edema:  Avoid prolonged standing in one place. It is better to have your calf muscles moving to pump fluid out of the legs      Elevate leg(s) above the level of the heart when sitting or as much as possible.  Take you diuretics as directed by your physician. Do not skip doses or change doses unless instructed to do so by your physician.  Decrease salt intake, follow recommended 2 grams daily.  Do not get leg(s) with compression wrap wet. If wraps are too tight as indicated by pain, numbness/tingling or discoloration of toes remove wrap completely and call the wound center @ [phone].  Refer to the \"Multi-layer compression bandage application patient information sheet\" given to you in clinic.    Nutrition and blood sugar control:  Focus on the following:  Protein: Meats, beans, eggs, milk and yogurt particularly Greek yogurt), tofu, soy nuts, soy protein products (Follow the protein handout in your welcome folder)  Vitamin C: Citrus fruits and juices, strawberries, tomatoes, tomato juice, peppers, baked potatoes, spinach, broccoli, cauliflower, [place] sprouts, cabbage  Vitamin A: Dark green, leafy vegetables, orange or yellow vegetables, cantaloupe, fortified dairy products, liver, fortified cereals  Zinc: Fortified cereals, red meats, seafood  Consider supplementing with Justin by [employer]. It can  be purchased on amazon, [employer], or local pharmacy may be able to order it for you.  (These are essential branch chain amino acids that help with tissue building and wound healing).   When your blood sugar is consistently elevated greater than 180 your body can't heal or fight infection.     Concerns:  Signs of infection may include the following:  Increase in redness  Red \"streaks\" from wound  Increase in swelling  Fever  Unusual odor  Change in the amount of wound drainage     Should you experience any significant changes in your wound(s) or have any questions regarding your home care instructions please contact the [facility] @ [phone] If after regular business hours, please call your family doctor or local emergency room. The treatment plan has been discussed at length between you and your provider. Follow all instructions carefully, it is very important. If you do not follow all instructions you are at risk of your wound not healing, infection, possible loss of limb and even loss of life.    Martina Mckeon FNP-C, CWCN-AP, CFCN, CSWS, WCC, DWC  4/17/2025

## 2025-04-17 ENCOUNTER — OFFICE VISIT (OUTPATIENT)
Dept: WOUND CARE | Facility: HOSPITAL | Age: 50
End: 2025-04-17
Attending: NURSE PRACTITIONER
Payer: COMMERCIAL

## 2025-04-17 VITALS
HEART RATE: 88 BPM | SYSTOLIC BLOOD PRESSURE: 101 MMHG | TEMPERATURE: 98 F | DIASTOLIC BLOOD PRESSURE: 68 MMHG | RESPIRATION RATE: 16 BRPM

## 2025-04-17 DIAGNOSIS — I89.8 LYMPHORRHEA: ICD-10-CM

## 2025-04-17 DIAGNOSIS — I77.89 LUPUS VASCULITIS (HCC): ICD-10-CM

## 2025-04-17 DIAGNOSIS — M32.19 LUPUS VASCULITIS (HCC): ICD-10-CM

## 2025-04-17 DIAGNOSIS — M35.1 MIXED CONNECTIVE TISSUE DISEASE (HCC): ICD-10-CM

## 2025-04-17 DIAGNOSIS — L97.322 NON-PRESSURE CHRONIC ULCER OF LEFT ANKLE WITH FAT LAYER EXPOSED (HCC): Primary | ICD-10-CM

## 2025-04-17 DIAGNOSIS — Z79.52 CURRENT CHRONIC USE OF SYSTEMIC STEROIDS: ICD-10-CM

## 2025-04-17 PROCEDURE — 99214 OFFICE O/P EST MOD 30 MIN: CPT | Performed by: NURSE PRACTITIONER

## 2025-04-17 NOTE — PATIENT INSTRUCTIONS
Please return: 1 week       Look for diabetic foot moisturizer that contains active ingredient of :  UREA OR AMMONIUM LACTATE      Patient discharge and wound care instructions  Raymond Hayward Pallavi  4/17/2025         You may shower with protection of the wound (ie a cast cover or similar).     Cleansing/dressing:     In clinic, with each dressing change:    please cleanse the limb, foot, and between toes with soap, water and washcloth.   Dry thoroughly.    Cleanse/soak the wound with VASHE (or other hypochlorous wound cleanser), dab dry.    Apply the following dressings:    honey gel>thick foam>20-30mmhg calamine    Managing your edema:  Avoid prolonged standing in one place. It is better to have your calf muscles moving to pump fluid out of the legs      Elevate leg(s) above the level of the heart when sitting or as much as possible.  Take you diuretics as directed by your physician. Do not skip doses or change doses unless instructed to do so by your physician.  Decrease salt intake, follow recommended 2 grams daily.  Do not get leg(s) with compression wrap wet. If wraps are too tight as indicated by pain, numbness/tingling or discoloration of toes remove wrap completely and call the wound center @ [phone].  Refer to the \"Multi-layer compression bandage application patient information sheet\" given to you in clinic.    Nutrition and blood sugar control:  Focus on the following:  Protein: Meats, beans, eggs, milk and yogurt particularly Greek yogurt), tofu, soy nuts, soy protein products (Follow the protein handout in your welcome folder)  Vitamin C: Citrus fruits and juices, strawberries, tomatoes, tomato juice, peppers, baked potatoes, spinach, broccoli, cauliflower, [place] sprouts, cabbage  Vitamin A: Dark green, leafy vegetables, orange or yellow vegetables, cantaloupe, fortified dairy products, liver, fortified cereals  Zinc: Fortified cereals, red meats, seafood  Consider supplementing with Justin by  [employer]. It can be purchased on amazon, [employer] website, or local pharmacy may be able to order it for you.  (These are essential branch chain amino acids that help with tissue building and wound healing).   When your blood sugar is consistently elevated greater than 180 your body can't heal or fight infection.     Concerns:  Signs of infection may include the following:  Increase in redness  Red \"streaks\" from wound  Increase in swelling  Fever  Unusual odor  Change in the amount of wound drainage     Should you experience any significant changes in your wound(s) or have any questions regarding your home care instructions please contact the wound center [facility] @ [phone] If after regular business hours, please call your family doctor or local emergency room. The treatment plan has been discussed at length between you and your provider. Follow all instructions carefully, it is very important. If you do not follow all instructions you are at risk of your wound not healing, infection, possible loss of limb and even loss of life.

## 2025-04-17 NOTE — PROGRESS NOTES
.Weekly Wound Education Note    Teaching Provided To: Patient, Family  Training Topics: Discharge instructions, Dressing, Edema control, Compression  Training Method: Explain/Verbal, Written  Training Response: Patient responds and understands            Honey gel, thick foam to wound.  Calamine unna boot 20-30mmHg.  Patient to bring the Doxycycline to visits so it is available if needed.

## 2025-04-23 NOTE — PROGRESS NOTES
CHIEF COMPLAINT:     Chief Complaint   Patient presents with    Wound Care     Arrives for follow-up. Denies new concerns. No issues with calamine unna-boot.     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 Patient saw dr. Santamaria (vascular) in December 2023 and he documented \"I explained to the patient and her  that I do not believe the source of her pain to be vascular in origin.  She has an easily palpable dorsalis pedis pulse and the location of the pain is more suggestive of a neuropathic origin.  I suspect perhaps that given the tightness of her feet from her mixed connective tissue disorder the compression stockings is causing somewhat compression of the nerves in that area and I have encouraged her to substitute those stockings to those that involve the feet up to the upper calf.\" Patient last saw dr. Farmer in February 2025 and overall doing well. She was to continue on mmf 750mg daily, prednisone 2.5 mg daily, plaquenil 200mg daily and amlodipine 2.5 mg tiwce daily along with vitamine prscription and d3.  Spouse contacted dr farmer late last week regarding a small wound on her left leg, so patient was fit into the schedule.  They have not been dressing the wound with anything, she did have spandagrip on.  There was surrounding dry drainage. Patient states she noted something last Friday and then by Monday this week she noted it was a definite ulceration.  The drainage is clear and increases when patient plantar/dorsiflexes.  Most likely a lymph vessel leaking. We discussed utilizing doxy-they will bring to next appointment. Her skin remains very dry/flaky she states she has been utilizing resta daily.  We discussed that she may need something with ammonium lactate or urea  in it.  No s/s of infection. The area is not as painful as it has been previously.     4-17-25 patient returns. Last week there was lymphorrhea with dorsi/plantar flexion, patient brought the doxycycline to clinic today. Today wound is about the same size, but is more fibrinous. There is not any lymphorrhea noted even with dorsi/plantar flexion. The wound is not enlarging. Pain is manageable except with touch/cleansing. No s/s of infection. Patient able to ambulate without difficulty.     4-24-25 patient returns.  Last week the wound was less granular/more fibrinous we utilized honey and thick foam. today wound is much , more granular, but also larger with a small area of concern distal to the wound that appears as though it may open up.  There is not any lymphorrhea noted. her last venous reflux study was in 2020, this may be something we need to revisit should the wound continue to not progress as expected.  Today will have family apply gentamicin gtts and cover with petroleum gauze twice daily and compress with spandagrip. Rx sent to pharmacy. Patient is not doing riley, daughter/patient encouraged to start that again.  MEDICATIONS:     Current Outpatient Medications:     gentamicin 0.3 % Ophthalmic Solution, Apply 3 drops to wound twice daily, cover with petroleum gauze and cover dressing, Disp: 15 mL, Rfl: 0    doxycycline 100 MG Oral Cap, Bring capsules to wound clinic to use as directed, Disp: 5 capsule, Rfl: 0    ergocalciferol 1.25 MG (16005 UT) Oral Cap, Take 1 capsule (50,000 Units total) by mouth once a week., Disp: 12 capsule, Rfl: 0    hydroxychloroquine 200 MG Oral Tab, Take 1 tablet (200 mg total) by mouth daily., Disp: 90 tablet, Rfl: 0    Mycophenolate Mofetil 500 MG Oral Tab, Take 1 tablet (500 mg total) by mouth daily., Disp: 90 tablet, Rfl: 0    predniSONE 2.5 MG Oral Tab, Take 1 tablet (2.5 mg total) by mouth daily., Disp: 90 tablet, Rfl: 0    amLODIPine 2.5 MG Oral Tab, Take 1 tablet  (2.5 mg total) by mouth 2 (two) times daily., Disp: 180 tablet, Rfl: 0    FERROUS SULFATE 325 (65 Fe) MG Oral Tab, TAKE 1 TABLET BY MOUTH 2 TIMES DAILY WITH MEALS., Disp: 180 tablet, Rfl: 0    clobetasol 0.05 % External Solution, Apply topically to lower legs after shower 3 x weekly, Disp: 50 mL, Rfl: 0  ALLERGIES:   No Known Allergies   REVIEW OF SYSTEMS:   This information was obtained from the patient/family and chart.    See HPI for pertinent positives, otherwise 10 pt ROS negative.    HISTORY:   Past medical, surgical, family and social history updated where appropriate.    PHYSICAL EXAM:     Vitals:    04/24/25 0839   BP: 100/61   Pulse: 78   Resp: 16   Temp: 98 °F (36.7 °C)       Estimated body mass index is 21.95 kg/m² as calculated from the following:    Height as of 4/10/25: 62\".    Weight as of 4/10/25: 120 lb (54.4 kg).   No results found for: \"PGLU\"    Vital signs reviewed.Appears stated age, well groomed.    Constitutional:  Bp wnl for patient. Pulse Regular and wnl for patient. Respirations easy and unlabored. Temperature wnl. Weight normal for height. Appearance neat and clean. Appears in no acute distress. Well nourished and well developed.    Lower extremity:  dp/pt palpable left.  Left lower extremity is free of varicosities and edema, they are scarred, scattered changes in pigmentation (hypo/hyper) with atrophie jay. Capillary refill < 3 seconds. Digits are warm. toenails are wnl for color, thickness and hygeine. Skin is dry and flaky. + hairgrowth on legs.    Musculoskeletal:  Gait and station stable   Integumentary:  refer to wound characteristics and images   Psychiatric:  Judgment and insight intact. Alert and oriented times 3. No evidence of depression, anxiety, or agitation. Calm, cooperative, and communicative, but very quiet and reserved.  EDEMA:   Calf  Point of Measurement - Left Calf: 31     Left Calf from:: Heel  Calf Left cm:: 27.5        Ankle  Point of Measurement - Left Ankle:  10     Left Ankle from:: Heel  Left Ankle cm:: 16.7              DIAGNOSTICS:     Lab Results   Component Value Date    BUN 6 (L) 02/25/2025    CREATSERUM 0.63 02/25/2025    GFRCKDEPI 121.37 12/17/2020    ALB 4.8 02/25/2025    TP 8.2 02/25/2025    A1C 5.1 12/17/2020     Lab Results   Component Value Date    ESRML 54 (H) 02/25/2025    ESRML 47 (H) 09/03/2024    ESRML 39 (H) 07/23/2024      Lab Results   Component Value Date    CRP <0.40 02/25/2025    CRP 0.50 09/03/2024    CRP <0.40 07/23/2024 11-28-23 arterial duplex-dr santamaria  FINDINGS:    LEFT EXTREMITY:  Triphasic waveforms in the left common femoral, profunda, superficial femoral arteries.  Biphasic waveforms below the left knee.   OTHER:  None.     PEAK VELOCITIES (CM/S):   LEFT COMMON FEMORAL:      165    LEFT PROFUNDA FEMORAL:      78      LEFT SUPERFICIAL FEMORAL PROX:    113   LEFT SUPERFICIAL FEMORAL MID:      94   LEFT SUPERFICIAL FEMORAL DISTAL:    92   LEFT POPLITEAL PROX:      110   LEFT POPLITEAL DISTAL:      121   TIBIOPER TRUNK:      66   LEFT PTA PROX:      60   LEFT PTA MID:      62   LEFT PTA DISTAL:      73   LEFT BETH PROX:      82   LEFT BETH MID:      72    LEFT DORSALIS PEDIS::      30   LEFT GREAT TOE PRESSURE:      25 mmHg   CONCLUSION:    1. No high-grade stenosis is identified.  There is suggestion of diffuse mild stenosis throughout most of the left lower extremity.    2. Overall diminished left toe pressure is noted.  Possibility of decreased perfusion in the distal left foot is of consideration.     7-14-20 venous reflux study-dr santamaria  Per Dr. Santamaria review \"no intervention needed\"  WOUND ASSESSMENT:     Wound 04/10/25 #1 Ankle Left;Medial (Active)   Date First Assessed/Time First Assessed: 04/10/25 0757    Wound Number (Wound Clinic Only): #1  Primary Wound Type: Venous Ulcer  Location: Ankle  Wound Location Orientation: Left;Medial      Assessments 4/10/2025  7:57 AM 4/24/2025  8:40 AM   Wound Image        Drainage Amount Unable  to assess Small   Drainage Description -- Serosanguineous   Treatments Compression --   Wound Length (cm) 0.7 cm 1 cm   Wound Width (cm) 0.7 cm 0.8 cm   Wound Surface Area (cm^2) 0.38 cm^2 0.63 cm^2   Wound Depth (cm) 0.2 cm 0.1 cm   Wound Volume (cm^3) 0.051 cm^3 0.042 cm^3   Wound Healing % -- 18   Margins Well-defined edges Well-defined edges   Non-staged Wound Description Full thickness Full thickness   Soni-wound Assessment Dry;Hemosiderin staining Dry;Hemosiderin staining;Edema   Wound Granulation Tissue Pale Grey;Pink;Firm Firm;Pink;Red   Wound Bed Granulation (%) 100 % 80 %   Wound Bed Slough (%) -- 20 %   Wound Odor None None   Tunneling? No No   Undermining? No No   Sinus Tracts? No No       No associated orders.       Compression Wrap 04/10/25 Ankle Anterior;Left (Active)   Placement Date/Time: 04/10/25 0846   Location: Ankle  Wound Location Orientation: Anterior;Left      Assessments 4/10/2025  7:57 AM 4/17/2025 10:04 AM   Response to Treatment Well tolerated Well tolerated   Compression Layers Multilayer Multilayer   Compression Product Type Unna Boot Unna Boot   Dressing Applied Yes Yes   Compression Wrap Location Toes to Knee Toes to Knee   Compression Wrap Status Dry;Intact;Clean Dry;Clean       No associated orders.            ASSESSMENT AND PLAN:    1. 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 Lupus vasculitis (HCC)    3. Mixed connective tissue disease (HCC)        Risks, benefits, and alternatives of current treatment plan discussed in detail.  Questions and concerns addressed. Red flags to RTC or ED reviewed.  Patient (or parent) agrees to plan.      NOTE TO PATIENT: The 21st Century Cures Act makes clinical notes like these available to patients in the interest of transparency. Clinical notes are medical documents used by physicians and care providers to communicate with each other. These documents include medical language and terminology, abbreviations, and  treatment information that may sound technical and at times possibly unfamiliar. In addition, at times, the verbiage may appear blunt or direct. These documents are one tool providers use to communicate relevant information and clinical opinions of the care providers in a way that allows common understanding of the clinical context.     I [id number] with the patient. This time included:    preparing to see the patient (eg, review notes and recent diagnostics),  seeing the patient, obtaining and/or reviewing separately obtained history, performing a medically appropriate examination and/or evaluation, counseling and educating the patient, documenting in the record, rx  DISCHARGE INSTRUCTIONS     Patient Instructions   Please return: 1 week       Patient discharge and wound care instructions  Raymond Hayward Pallavi  4/24/2025       You may shower and cleanse area with mild soap and water, rinse wound with saline or wound cleanser, dab dry with gauze and apply your dressings as directed.     Changing your dressing:         twice daily       Wash your hands with soap and water.  Ensure that the old dressing is removed completely. Place it in a plastic bag and throw it in the trash.  Cleanse the wound with hypochlorous wound cleanser (ie. Anasept, vashe, pure and clean).  It's ok to “scrub” your wound with the gauze, small amount of bleeding with cleansing is normal and ok.  Apply the following dressings:  gentamicin drops>petroleum gauze>border gauze>spandagrip    Managing your edema:  Avoid prolonged standing in one place. It is better to have your calf muscles moving to pump fluid out of the legs      Elevate leg(s) above the level of the heart when sitting or as much as possible.  Take you diuretics as directed by your physician. Do not skip doses or change doses unless instructed to do so by your physician.  Decrease salt intake, follow recommended 2 grams daily.      Nutrition and blood sugar control:  Focus on  the following:  Protein: Meats, beans, eggs, milk and yogurt particularly Greek yogurt), tofu, soy nuts, soy protein products (Follow the protein handout in your welcome folder)  Vitamin C: Citrus fruits and juices, strawberries, tomatoes, tomato juice, peppers, baked potatoes, spinach, broccoli, cauliflower, [place] sprouts, cabbage  Vitamin A: Dark green, leafy vegetables, orange or yellow vegetables, cantaloupe, fortified dairy products, liver, fortified cereals  Zinc: Fortified cereals, red meats, seafood  Consider supplementing with Justin by [employer]. It can be purchased on amazon, [employer] website, or local pharmacy may be able to order it for you.  (These are essential branch chain amino acids that help with tissue building and wound healing).   When your blood sugar is consistently elevated greater than 180 your body can't heal or fight infection.     Concerns:  Signs of infection may include the following:  Increase in redness  Red \"streaks\" from wound  Increase in swelling  Fever  Unusual odor  Change in the amount of wound drainage     Should you experience any significant changes in your wound(s) or have any questions regarding your home care instructions please contact the wound center [facility] @ [phone] If after regular business hours, please call your family doctor or local emergency room. The treatment plan has been discussed at length between you and your provider. Follow all instructions carefully, it is very important. If you do not follow all instructions you are at risk of your wound not healing, infection, possible loss of limb and even loss of life.    Martina Mckeon FNP-C, CWCN-AP, CFCN, CSWS, WCC, DWC  4/24/2025

## 2025-04-24 ENCOUNTER — OFFICE VISIT (OUTPATIENT)
Dept: WOUND CARE | Facility: HOSPITAL | Age: 50
End: 2025-04-24
Attending: NURSE PRACTITIONER
Payer: COMMERCIAL

## 2025-04-24 VITALS
RESPIRATION RATE: 16 BRPM | TEMPERATURE: 98 F | HEART RATE: 78 BPM | DIASTOLIC BLOOD PRESSURE: 61 MMHG | SYSTOLIC BLOOD PRESSURE: 100 MMHG

## 2025-04-24 DIAGNOSIS — L97.322 NON-PRESSURE CHRONIC ULCER OF LEFT ANKLE WITH FAT LAYER EXPOSED (HCC): Primary | ICD-10-CM

## 2025-04-24 DIAGNOSIS — M32.19 LUPUS VASCULITIS (HCC): ICD-10-CM

## 2025-04-24 DIAGNOSIS — I77.89 LUPUS VASCULITIS (HCC): ICD-10-CM

## 2025-04-24 DIAGNOSIS — M35.1 MIXED CONNECTIVE TISSUE DISEASE (HCC): ICD-10-CM

## 2025-04-24 PROCEDURE — 99214 OFFICE O/P EST MOD 30 MIN: CPT | Performed by: NURSE PRACTITIONER

## 2025-04-24 RX ORDER — GENTAMICIN SULFATE 3 MG/ML
SOLUTION/ DROPS OPHTHALMIC
Qty: 15 ML | Refills: 0 | Status: SHIPPED | OUTPATIENT
Start: 2025-04-24

## 2025-04-24 NOTE — PATIENT INSTRUCTIONS
Please return: 1 week       Patient discharge and wound care instructions  Raymond Olivo  4/24/2025       You may shower and cleanse area with mild soap and water, rinse wound with saline or wound cleanser, dab dry with gauze and apply your dressings as directed.     Changing your dressing:         twice daily       Wash your hands with soap and water.  Ensure that the old dressing is removed completely. Place it in a plastic bag and throw it in the trash.  Cleanse the wound with hypochlorous wound cleanser (ie. Anasept, vashe, pure and clean).  It's ok to “scrub” your wound with the gauze, small amount of bleeding with cleansing is normal and ok.  Apply the following dressings:  gentamicin drops>petroleum gauze>border gauze>spandagrip    Managing your edema:  Avoid prolonged standing in one place. It is better to have your calf muscles moving to pump fluid out of the legs      Elevate leg(s) above the level of the heart when sitting or as much as possible.  Take you diuretics as directed by your physician. Do not skip doses or change doses unless instructed to do so by your physician.  Decrease salt intake, follow recommended 2 grams daily.      Nutrition and blood sugar control:  Focus on the following:  Protein: Meats, beans, eggs, milk and yogurt particularly Greek yogurt), tofu, soy nuts, soy protein products (Follow the protein handout in your welcome folder)  Vitamin C: Citrus fruits and juices, strawberries, tomatoes, tomato juice, peppers, baked potatoes, spinach, broccoli, cauliflower, [place] sprouts, cabbage  Vitamin A: Dark green, leafy vegetables, orange or yellow vegetables, cantaloupe, fortified dairy products, liver, fortified cereals  Zinc: Fortified cereals, red meats, seafood  Consider supplementing with Justin by [employer]. It can be purchased on amazon, [employer] website, or local pharmacy may be able to order it for you.  (These are essential branch chain amino acids that help with  tissue building and wound healing).   When your blood sugar is consistently elevated greater than 180 your body can't heal or fight infection.     Concerns:  Signs of infection may include the following:  Increase in redness  Red \"streaks\" from wound  Increase in swelling  Fever  Unusual odor  Change in the amount of wound drainage     Should you experience any significant changes in your wound(s) or have any questions regarding your home care instructions please contact the [facility] center [facility] @ [phone] If after regular business hours, please call your family doctor or local emergency room. The treatment plan has been discussed at length between you and your provider. Follow all instructions carefully, it is very important. If you do not follow all instructions you are at risk of your wound not healing, infection, possible loss of limb and even loss of life.

## 2025-04-24 NOTE — PROGRESS NOTES
.Weekly Wound Education Note    Teaching Provided To: Patient, Family  Training Topics: Dressing, Edema control, Cleasing and general instructions, Compression, Discharge instructions  Training Method: Explain/Verbal, Written  Training Response: Patient responds and understands        Notes: Wound stable. Dressing changed to gentamicin drops,petroleum gauze and bordered gauze with spandagrip D. Sample riley provided.

## 2025-04-30 NOTE — PROGRESS NOTES
CHIEF COMPLAINT:     Chief Complaint   Patient presents with    Wound Recheck     [facility] is dressed with adaptic under bordered gauze. She denies pain to her wound. No new questions or concerns for provider today.     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 Patient saw dr. Santamaria (vascular) in December 2023 and he documented \"I explained to the patient and her  that I do not believe the source of her pain to be vascular in origin.  She has an easily palpable dorsalis pedis pulse and the location of the pain is more suggestive of a neuropathic origin.  I suspect perhaps that given the tightness of her feet from her mixed connective tissue disorder the compression stockings is causing somewhat compression of the nerves in that area and I have encouraged her to substitute those stockings to those that involve the feet up to the upper calf.\" Patient last saw dr. Farmer in February 2025 and overall doing well. She was to continue on mmf 750mg daily, prednisone 2.5 mg daily, plaquenil 200mg daily and amlodipine 2.5 mg tiwce daily along with vitamine prscription and d3.  Spouse contacted dr farmer late last week regarding a small wound on her left leg, so patient was fit into the schedule.  They have not been dressing the wound with anything, she did have spandagrip on.  There was surrounding dry drainage. Patient states she noted something last Friday and then by Monday this week she noted it was a definite ulceration.  The drainage is clear and increases when patient plantar/dorsiflexes.  Most likely a lymph vessel leaking. We discussed utilizing doxy-they will bring to next appointment. Her skin remains very  dry/flaky she states she has been utilizing resta daily.  We discussed that she may need something with ammonium lactate or urea in it.  No s/s of infection. The area is not as painful as it has been previously.     4-17-25 patient returns. Last week there was lymphorrhea with dorsi/plantar flexion, patient brought the doxycycline to clinic today. Today wound is about the same size, but is more fibrinous. There is not any lymphorrhea noted even with dorsi/plantar flexion. The wound is not enlarging. Pain is manageable except with touch/cleansing. No s/s of infection. Patient able to ambulate without difficulty.     4-24-25 patient returns.  Last week the wound was less granular/more fibrinous we utilized honey and thick foam. today wound is much , more granular, but also larger with a small area of concern distal to the wound that appears as though it may open up.  There is not any lymphorrhea noted. her last venous reflux study was in 2020, this may be something we need to revisit should the wound continue to not progress as expected.  Today will have family apply gentamicin gtts and cover with petroleum gauze twice daily and compress with spandagrip. Rx sent to pharmacy. Patient is not doing justin, daughter/patient encouraged to start that again.    4-30-25 patient returns with spouse.  She has restarted the Justin.  Has been dressing with gent, the pain is about the same, wound is bout the same.  We discussed vascular follow-up for venous reflux study.  Will continue with gent for the next week.  I will reach out to vascular and update dr. Farmer.  MEDICATIONS:     Current Outpatient Medications:     gentamicin 0.3 % Ophthalmic Solution, Apply 3 drops to wound twice daily, cover with petroleum gauze and cover dressing, Disp: 15 mL, Rfl: 0    doxycycline 100 MG Oral Cap, Bring capsules to wound clinic to use as directed, Disp: 5 capsule, Rfl: 0    ergocalciferol 1.25 MG (88162 UT) Oral Cap, Take 1 capsule  (50,000 Units total) by mouth once a week., Disp: 12 capsule, Rfl: 0    hydroxychloroquine 200 MG Oral Tab, Take 1 tablet (200 mg total) by mouth daily., Disp: 90 tablet, Rfl: 0    Mycophenolate Mofetil 500 MG Oral Tab, Take 1 tablet (500 mg total) by mouth daily., Disp: 90 tablet, Rfl: 0    predniSONE 2.5 MG Oral Tab, Take 1 tablet (2.5 mg total) by mouth daily., Disp: 90 tablet, Rfl: 0    amLODIPine 2.5 MG Oral Tab, Take 1 tablet (2.5 mg total) by mouth 2 (two) times daily., Disp: 180 tablet, Rfl: 0    FERROUS SULFATE 325 (65 Fe) MG Oral Tab, TAKE 1 TABLET BY MOUTH 2 TIMES DAILY WITH MEALS., Disp: 180 tablet, Rfl: 0    clobetasol 0.05 % External Solution, Apply topically to lower legs after shower 3 x weekly, Disp: 50 mL, Rfl: 0  ALLERGIES:   No Known Allergies   REVIEW OF SYSTEMS:   This information was obtained from the patient/family and chart.    See HPI for pertinent positives, otherwise 10 pt ROS negative.    HISTORY:   Past medical, surgical, family and social history updated where appropriate.    PHYSICAL EXAM:     Vitals:    05/01/25 0902   BP: 101/67   Pulse: 85   Resp: 14   Temp: 98 °F (36.7 °C)         Estimated body mass index is 21.95 kg/m² as calculated from the following:    Height as of 4/10/25: 62\".    Weight as of 4/10/25: 120 lb (54.4 kg).   No results found for: \"PGLU\"    Vital signs reviewed.Appears stated age, well groomed.    Constitutional:  Bp wnl for patient. Pulse Regular and wnl for patient. Respirations easy and unlabored. Temperature wnl. Weight normal for height. Appearance neat and clean. Appears in no acute distress. Well nourished and well developed.    Lower extremity:  dp/pt palpable left.  Left lower extremity is free of varicosities and no edema, they are scarred, scattered changes in pigmentation (hypo/hyper) with atrophie jay. Capillary refill < 3 seconds. Digits are warm. toenails are wnl for color, thickness and hygeine. Skin is dry-but improving. + hairgrowth on  legs.    Musculoskeletal:  Gait and station stable   Integumentary:  refer to wound characteristics and images   Psychiatric:  Judgment and insight intact. Alert and oriented times 3. No evidence of depression, anxiety, or agitation. Calm, cooperative, and communicative, but very quiet and reserved.  EDEMA:   Calf  Point of Measurement - Left Calf: 31     Left Calf from:: Heel  Calf Left cm:: 28.5        Ankle  Point of Measurement - Left Ankle: 10     Left Ankle from:: Heel  Left Ankle cm:: 17.8              DIAGNOSTICS:     Lab Results   Component Value Date    BUN 6 (L) 02/25/2025    CREATSERUM 0.63 02/25/2025    GFRCKDEPI 121.37 12/17/2020    ALB 4.8 02/25/2025    TP 8.2 02/25/2025    A1C 5.1 12/17/2020     Lab Results   Component Value Date    ESRML 54 (H) 02/25/2025    ESRML 47 (H) 09/03/2024    ESRML 39 (H) 07/23/2024      Lab Results   Component Value Date    CRP <0.40 02/25/2025    CRP 0.50 09/03/2024    CRP <0.40 07/23/2024 11-28-23 arterial duplex-dr hilliard  FINDINGS:    LEFT EXTREMITY:  Triphasic waveforms in the left common femoral, profunda, superficial femoral arteries.  Biphasic waveforms below the left knee.   OTHER:  None.     PEAK VELOCITIES (CM/S):   LEFT COMMON FEMORAL:      165    LEFT PROFUNDA FEMORAL:      78      LEFT SUPERFICIAL FEMORAL PROX:    113   LEFT SUPERFICIAL FEMORAL MID:      94   LEFT SUPERFICIAL FEMORAL DISTAL:    92   LEFT POPLITEAL PROX:      110   LEFT POPLITEAL DISTAL:      121   TIBIOPER TRUNK:      66   LEFT PTA PROX:      60   LEFT PTA MID:      62   LEFT PTA DISTAL:      73   LEFT BETH PROX:      82   LEFT BETH MID:      72    LEFT DORSALIS PEDIS::      30   LEFT GREAT TOE PRESSURE:      25 mmHg   CONCLUSION:    1. No high-grade stenosis is identified.  There is suggestion of diffuse mild stenosis throughout most of the left lower extremity.    2. Overall diminished left toe pressure is noted.  Possibility of decreased perfusion in the distal left foot is of  consideration.     7-14-20 venous reflux study-dr santamaria  Per Dr. Santamaria review \"no intervention needed\"  WOUND ASSESSMENT:     Wound 04/10/25 #1 Ankle Left;Medial (Active)   Date First Assessed/Time First Assessed: 04/10/25 0757    Wound Number (Wound Clinic Only): #1  Primary Wound Type: Venous Ulcer  Location: Ankle  Wound Location Orientation: Left;Medial      Assessments 4/10/2025  7:57 AM 5/1/2025  9:01 AM   Wound Image        Drainage Amount Unable to assess Small   Drainage Description -- Serosanguineous   Treatments Compression --   Wound Length (cm) 0.7 cm 0.9 cm   Wound Width (cm) 0.7 cm 0.8 cm   Wound Surface Area (cm^2) 0.38 cm^2 0.57 cm^2   Wound Depth (cm) 0.2 cm 0.1 cm   Wound Volume (cm^3) 0.051 cm^3 0.038 cm^3   Wound Healing % -- 25   Margins Well-defined edges Well-defined edges   Non-staged Wound Description Full thickness Full thickness   Soni-wound Assessment Dry;Hemosiderin staining Dry;Hemosiderin staining   Wound Granulation Tissue Pale Grey;Pink;Firm Firm;Pink;Red   Wound Bed Granulation (%) 100 % 25 %   Wound Bed Slough (%) -- 75 %   Wound Odor None None   Tunneling? No No   Undermining? No No   Sinus Tracts? No No       No associated orders.       Compression Wrap 04/10/25 Ankle Anterior;Left (Active)   Placement Date/Time: 04/10/25 0846   Location: Ankle  Wound Location Orientation: Anterior;Left      Assessments 4/10/2025  7:57 AM 4/17/2025 10:04 AM   Response to Treatment Well tolerated Well tolerated   Compression Layers Multilayer Multilayer   Compression Product Type Unna Boot Unna Boot   Dressing Applied Yes Yes   Compression Wrap Location Toes to Knee Toes to Knee   Compression Wrap Status Dry;Intact;Clean Dry;Clean       No associated orders.            ASSESSMENT AND PLAN:    1. Non-pressure chronic ulcer of left ankle with fat layer exposed (HCC)  - Vascular Surgery - In Network  -  VENOUS INSUFFICIENCY (REFLUX) BILAT LOWER EXT (CPT=93970); Future    2. Lupus vasculitis  (HCC)  - Vascular Surgery - In Network    3. Mixed connective tissue disease (HCC)  - US VENOUS INSUFFICIENCY (REFLUX) BILAT LOWER EXT (CPT=93970); Future    4. Venous insufficiency of both lower extremities  - Vascular Surgery - In Network  -  VENOUS INSUFFICIENCY (REFLUX) BILAT LOWER EXT (CPT=93970); Future          Risks, benefits, and alternatives of current treatment plan discussed in detail.  Questions and concerns addressed. Red flags to RTC or ED reviewed.  Patient (or parent) agrees to plan.      NOTE TO PATIENT: The 21st Century Cures Act makes clinical notes like these available to patients in the interest of transparency. Clinical notes are medical documents used by physicians and care providers to communicate with each other. These documents include medical language and terminology, abbreviations, and treatment information that may sound technical and at times possibly unfamiliar. In addition, at times, the verbiage may appear blunt or direct. These documents are one tool providers use to communicate relevant information and clinical opinions of the care providers in a way that allows common understanding of the clinical context.     I [id number] with the patient. This time included:    preparing to see the patient (eg, review notes and recent diagnostics),  seeing the patient, obtaining and/or reviewing separately obtained history, performing a medically appropriate examination and/or evaluation, counseling and educating the patient, documenting in the record,communication with rheum and vascular  DISCHARGE INSTRUCTIONS     Patient Instructions   Please return: 1 week      Things to do:  Imaging & Consults:  Schedule with Dr. Santamaria [phone]  VASCULAR SURGERY - INTERNAL  US VENOUS INSUFFICIENCY (REFLUX) BILAT LOWER EXT (CPT=93970)        Patient discharge and wound care instructions  Raymond Olivo  5/1/2025          You may shower and cleanse area with mild soap and water, rinse wound  with saline or wound cleanser, dab dry with gauze and apply your dressings as directed.     Changing your dressing:         twice daily       Wash your hands with soap and water.  Ensure that the old dressing is removed completely. Place it in a plastic bag and throw it in the trash.  Cleanse the wound with hypochlorous wound cleanser (ie. Anasept, vashe, pure and clean).  It's ok to “scrub” your wound with the gauze, small amount of bleeding with cleansing is normal and ok.  Apply the following dressings:  gentamicin drops>petroleum gauze>border gauze>spandagrip    Managing your edema:  Avoid prolonged standing in one place. It is better to have your calf muscles moving to pump fluid out of the legs      Elevate leg(s) above the level of the heart when sitting or as much as possible.  Take you diuretics as directed by your physician. Do not skip doses or change doses unless instructed to do so by your physician.  Decrease salt intake, follow recommended 2 grams daily.      Nutrition and blood sugar control:  Focus on the following:  Protein: Meats, beans, eggs, milk and yogurt particularly Greek yogurt), tofu, soy nuts, soy protein products (Follow the protein handout in your welcome folder)  Vitamin C: Citrus fruits and juices, strawberries, tomatoes, tomato juice, peppers, baked potatoes, spinach, broccoli, cauliflower, [place] sprouts, cabbage  Vitamin A: Dark green, leafy vegetables, orange or yellow vegetables, cantaloupe, fortified dairy products, liver, fortified cereals  Zinc: Fortified cereals, red meats, seafood  Consider supplementing with Justin by [employer]. It can be purchased on amazon, [employer] website, or local pharmacy may be able to order it for you.  (These are essential branch chain amino acids that help with tissue building and wound healing).   When your blood sugar is consistently elevated greater than 180 your body can't heal or fight infection.     Concerns:  Signs of infection may include  the following:  Increase in redness  Red \"streaks\" from wound  Increase in swelling  Fever  Unusual odor  Change in the amount of wound drainage     Should you experience any significant changes in your wound(s) or have any questions regarding your home care instructions please contact the [facility] @ [phone] If after regular business hours, please call your family doctor or local emergency room. The treatment plan has been discussed at length between you and your provider. Follow all instructions carefully, it is very important. If you do not follow all instructions you are at risk of your wound not healing, infection, possible loss of limb and even loss of life.    Martina Mckeon FNP-C, CWCN-AP, CFCN, CSWS, WCC, DWC  5/1/2025

## 2025-05-01 ENCOUNTER — OFFICE VISIT (OUTPATIENT)
Dept: WOUND CARE | Facility: HOSPITAL | Age: 50
End: 2025-05-01
Attending: NURSE PRACTITIONER
Payer: COMMERCIAL

## 2025-05-01 VITALS
RESPIRATION RATE: 14 BRPM | SYSTOLIC BLOOD PRESSURE: 101 MMHG | HEART RATE: 85 BPM | TEMPERATURE: 98 F | DIASTOLIC BLOOD PRESSURE: 67 MMHG

## 2025-05-01 DIAGNOSIS — L97.322 NON-PRESSURE CHRONIC ULCER OF LEFT ANKLE WITH FAT LAYER EXPOSED (HCC): Primary | ICD-10-CM

## 2025-05-01 DIAGNOSIS — M35.1 MIXED CONNECTIVE TISSUE DISEASE (HCC): ICD-10-CM

## 2025-05-01 DIAGNOSIS — M32.19 LUPUS VASCULITIS (HCC): ICD-10-CM

## 2025-05-01 DIAGNOSIS — I77.89 LUPUS VASCULITIS (HCC): ICD-10-CM

## 2025-05-01 DIAGNOSIS — I87.2 VENOUS INSUFFICIENCY OF BOTH LOWER EXTREMITIES: ICD-10-CM

## 2025-05-01 PROCEDURE — 99214 OFFICE O/P EST MOD 30 MIN: CPT | Performed by: NURSE PRACTITIONER

## 2025-05-01 NOTE — PATIENT INSTRUCTIONS
Please return: 1 week      Things to do:  Imaging & Consults:  Schedule with Dr. Santamaria [phone]  VASCULAR SURGERY - INTERNAL  US VENOUS INSUFFICIENCY (REFLUX) BILAT LOWER EXT (CPT=93970)        Patient discharge and wound care instructions  Raymondnathan Hayward Pallavi  5/1/2025          You may shower and cleanse area with mild soap and water, rinse wound with saline or wound cleanser, dab dry with gauze and apply your dressings as directed.     Changing your dressing:         twice daily       Wash your hands with soap and water.  Ensure that the old dressing is removed completely. Place it in a plastic bag and throw it in the trash.  Cleanse the wound with hypochlorous wound cleanser (ie. Anasept, vashe, pure and clean).  It's ok to “scrub” your wound with the gauze, small amount of bleeding with cleansing is normal and ok.  Apply the following dressings:  gentamicin drops>petroleum gauze>border gauze>spandagrip    Managing your edema:  Avoid prolonged standing in one place. It is better to have your calf muscles moving to pump fluid out of the legs      Elevate leg(s) above the level of the heart when sitting or as much as possible.  Take you diuretics as directed by your physician. Do not skip doses or change doses unless instructed to do so by your physician.  Decrease salt intake, follow recommended 2 grams daily.      Nutrition and blood sugar control:  Focus on the following:  Protein: Meats, beans, eggs, milk and yogurt particularly Greek yogurt), tofu, soy nuts, soy protein products (Follow the protein handout in your welcome folder)  Vitamin C: Citrus fruits and juices, strawberries, tomatoes, tomato juice, peppers, baked potatoes, spinach, broccoli, cauliflower, [place] sprouts, cabbage  Vitamin A: Dark green, leafy vegetables, orange or yellow vegetables, cantaloupe, fortified dairy products, liver, fortified cereals  Zinc: Fortified cereals, red meats, seafood  Consider supplementing with Justin by [employer]  labs. It can be purchased on amazon, [employer] website, or local pharmacy may be able to order it for you.  (These are essential branch chain amino acids that help with tissue building and wound healing).   When your blood sugar is consistently elevated greater than 180 your body can't heal or fight infection.     Concerns:  Signs of infection may include the following:  Increase in redness  Red \"streaks\" from wound  Increase in swelling  Fever  Unusual odor  Change in the amount of wound drainage     Should you experience any significant changes in your wound(s) or have any questions regarding your home care instructions please contact the [facility] @ [phone] If after regular business hours, please call your family doctor or local emergency room. The treatment plan has been discussed at length between you and your provider. Follow all instructions carefully, it is very important. If you do not follow all instructions you are at risk of your wound not healing, infection, possible loss of limb and even loss of life.

## 2025-05-01 NOTE — PROGRESS NOTES
.Weekly Wound Education Note    Teaching Provided To: Patient, Family  Training Topics: Dressing, Edema control, Cleasing and general instructions, Compression, Discharge instructions  Training Method: Explain/Verbal, Written  Training Response: Patient responds and understands        Notes: Wound stable. Continue gentamicin drops,petroleum gauze, and bordered gauze with reji ZHENG Pt to make appointment with vascular.

## 2025-05-02 DIAGNOSIS — M32.19 LUPUS VASCULITIS (HCC): ICD-10-CM

## 2025-05-02 DIAGNOSIS — I77.89 LUPUS VASCULITIS (HCC): ICD-10-CM

## 2025-05-02 DIAGNOSIS — M35.1 MIXED CONNECTIVE TISSUE DISEASE (HCC): ICD-10-CM

## 2025-05-02 RX ORDER — MYCOPHENOLATE MOFETIL 500 MG/1
750 TABLET ORAL DAILY
Qty: 135 TABLET | Refills: 0 | Status: SHIPPED | OUTPATIENT
Start: 2025-05-02

## 2025-05-02 NOTE — TELEPHONE ENCOUNTER
Mycophenolate 500 mg      Last office visit: 2/26/2025    Next Rheum Apt:5/23/2025 Alisha Farmer DO    Last fill: 2/26/2025 90 tab, 0 refills    Per last office note, pt to take 750 mg daily. Will pend as such    Labs:   Lab Results   Component Value Date    CREATSERUM 0.63 02/25/2025    ALKPHO 85 02/25/2025    AST 24 02/25/2025    ALT 13 02/25/2025    BILT 0.5 02/25/2025    TP 8.2 02/25/2025    ALB 4.8 02/25/2025       Lab Results   Component Value Date    WBC 4.5 02/25/2025    HGB 12.7 02/25/2025    .0 02/25/2025    NEPRELIM 3.12 02/25/2025    NEPERCENT 69.4 02/25/2025    LYPERCENT 17.3 02/25/2025    NE 3.12 02/25/2025    LYMABS 0.78 (L) 02/25/2025

## 2025-05-07 ENCOUNTER — TELEPHONE (OUTPATIENT)
Facility: CLINIC | Age: 50
End: 2025-05-07

## 2025-05-07 NOTE — PROGRESS NOTES
CHIEF COMPLAINT:     Chief Complaint   Patient presents with    Wound Care     Follow up for left leg wound. No complaints at this time. Pt arrives with dressing and spandagrip in place. Pt has also been using special lotion.      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 Patient saw dr. Santamaria (vascular) in December 2023 and he documented \"I explained to the patient and her  that I do not believe the source of her pain to be vascular in origin.  She has an easily palpable dorsalis pedis pulse and the location of the pain is more suggestive of a neuropathic origin.  I suspect perhaps that given the tightness of her feet from her mixed connective tissue disorder the compression stockings is causing somewhat compression of the nerves in that area and I have encouraged her to substitute those stockings to those that involve the feet up to the upper calf.\" Patient last saw dr. Farmer in February 2025 and overall doing well. She was to continue on mmf 750mg daily, prednisone 2.5 mg daily, plaquenil 200mg daily and amlodipine 2.5 mg tiwce daily along with vitamine prscription and d3.  Spouse contacted dr farmer late last week regarding a small wound on her left leg, so patient was fit into the schedule.  They have not been dressing the wound with anything, she did have spandagrip on.  There was surrounding dry drainage. Patient states she noted something last Friday and then by Monday this week she noted it was a definite ulceration.  The drainage is clear and increases when patient plantar/dorsiflexes.  Most likely a lymph vessel leaking. We discussed utilizing doxy-they will bring to next appointment. Her skin remains very dry/flaky she states she has been utilizing resta daily.   We discussed that she may need something with ammonium lactate or urea in it.  No s/s of infection. The area is not as painful as it has been previously.     4-17-25 patient returns. Last week there was lymphorrhea with dorsi/plantar flexion, patient brought the doxycycline to clinic today. Today wound is about the same size, but is more fibrinous. There is not any lymphorrhea noted even with dorsi/plantar flexion. The wound is not enlarging. Pain is manageable except with touch/cleansing. No s/s of infection. Patient able to ambulate without difficulty.     4-24-25 patient returns.  Last week the wound was less granular/more fibrinous we utilized honey and thick foam. today wound is much , more granular, but also larger with a small area of concern distal to the wound that appears as though it may open up.  There is not any lymphorrhea noted. her last venous reflux study was in 2020, this may be something we need to revisit should the wound continue to not progress as expected.  Today will have family apply gentamicin gtts and cover with petroleum gauze twice daily and compress with spandagrip. Rx sent to pharmacy. Patient is not doing justin, daughter/patient encouraged to start that again.    4-30-25 patient returns with spouse.  She has restarted the Justin.  Has been dressing with gent, the pain is about the same, wound is bout the same.  We discussed vascular follow-up for venous reflux study.  Will continue with gent for the next week.  I will reach out to vascular and update dr. Farmer.    5-8-25 patient returns with daughter.  She has been dressing with gent for the last 2 weeks.  I have been in communication with vascular us to get patient scheduled for venous reflux study and consult with dr. Najjar.  Patient reminded to please call to schedule. Today patient c/o burning to the right ankle and there are new scabs/eschar noted on the medial and lateral right ankle. The left medial ankle wound is not  improved.  No acute s/s of infection.  I d/w patient/family that I will reach out to dr davis regarding the non-progression and the new open areas on the right. Will utilize honey hydrocolloid and return patient to compression wraps  MEDICATIONS:     Current Outpatient Medications:     Mycophenolate Mofetil 500 MG Oral Tab, Take 1.5 tablets (750 mg total) by mouth daily., Disp: 135 tablet, Rfl: 0    gentamicin 0.3 % Ophthalmic Solution, Apply 3 drops to wound twice daily, cover with petroleum gauze and cover dressing, Disp: 15 mL, Rfl: 0    doxycycline 100 MG Oral Cap, Bring capsules to wound clinic to use as directed, Disp: 5 capsule, Rfl: 0    ergocalciferol 1.25 MG (17997 UT) Oral Cap, Take 1 capsule (50,000 Units total) by mouth once a week., Disp: 12 capsule, Rfl: 0    hydroxychloroquine 200 MG Oral Tab, Take 1 tablet (200 mg total) by mouth daily., Disp: 90 tablet, Rfl: 0    predniSONE 2.5 MG Oral Tab, Take 1 tablet (2.5 mg total) by mouth daily., Disp: 90 tablet, Rfl: 0    amLODIPine 2.5 MG Oral Tab, Take 1 tablet (2.5 mg total) by mouth 2 (two) times daily., Disp: 180 tablet, Rfl: 0    FERROUS SULFATE 325 (65 Fe) MG Oral Tab, TAKE 1 TABLET BY MOUTH 2 TIMES DAILY WITH MEALS., Disp: 180 tablet, Rfl: 0    clobetasol 0.05 % External Solution, Apply topically to lower legs after shower 3 x weekly, Disp: 50 mL, Rfl: 0  ALLERGIES:   No Known Allergies   REVIEW OF SYSTEMS:   This information was obtained from the patient/family and chart.    See HPI for pertinent positives, otherwise 10 pt ROS negative.    HISTORY:   Past medical, surgical, family and social history updated where appropriate.    PHYSICAL EXAM:     Vitals:    05/08/25 0700   BP: 105/70   Pulse: 83   Resp: 14   Temp: 97.8 °F (36.6 °C)           Estimated body mass index is 21.95 kg/m² as calculated from the following:    Height as of 4/10/25: 62\".    Weight as of 4/10/25: 120 lb (54.4 kg).   No results found for: \"PGLU\"    Vital signs  reviewed.Appears stated age, well groomed.    Constitutional:  Bp wnl for patient. Pulse Regular and wnl for patient. Respirations easy and unlabored. Temperature wnl. Weight normal for height. Appearance neat and clean. Appears in no acute distress. Well nourished and well developed.    Lower extremity:  dp/pt palpable bilalterally.   lower extremities are free of varicosities and no edema, they are scarred, scattered changes in pigmentation (hypo/hyper) with atrophie jay. Capillary refill < 3 seconds. Digits are warm. toenails are wnl for color, thickness and hygeine. Skin is dry-but improving. + hairgrowth on legs.    Musculoskeletal:  Gait and station stable   Integumentary:  refer to wound characteristics and images   Psychiatric:  Judgment and insight intact. Alert and oriented times 3. No evidence of depression, anxiety, or agitation. Calm, cooperative, and communicative, but very quiet and reserved.  EDEMA:   Calf  Point of Measurement - Left Calf: 31  Point of Measurement - Right Calf: 31  Left Calf from:: Heel  Calf Left cm:: 29  Right Calf from:: Heel  Right Calf cm:: 30.5  Ankle  Point of Measurement - Left Ankle: 10  Point of Measurement - Right Ankle: 10  Left Ankle from:: Heel  Left Ankle cm:: 17.2     Right Ankle from:: Heel  Right Ankle cm:: 17.5     DIAGNOSTICS:     Lab Results   Component Value Date    BUN 6 (L) 02/25/2025    CREATSERUM 0.63 02/25/2025    GFRCKDEPI 121.37 12/17/2020    ALB 4.8 02/25/2025    TP 8.2 02/25/2025    A1C 5.1 12/17/2020     Lab Results   Component Value Date    ESRML 54 (H) 02/25/2025    ESRML 47 (H) 09/03/2024    ESRML 39 (H) 07/23/2024      Lab Results   Component Value Date    CRP <0.40 02/25/2025    CRP 0.50 09/03/2024    CRP <0.40 07/23/2024 11-28-23 arterial duplex-dr hilliard  FINDINGS:    LEFT EXTREMITY:  Triphasic waveforms in the left common femoral, profunda, superficial femoral arteries.  Biphasic waveforms below the left knee.   OTHER:  None.     PEAK  VELOCITIES (CM/S):   LEFT COMMON FEMORAL:      165    LEFT PROFUNDA FEMORAL:      78      LEFT SUPERFICIAL FEMORAL PROX:    113   LEFT SUPERFICIAL FEMORAL MID:      94   LEFT SUPERFICIAL FEMORAL DISTAL:    92   LEFT POPLITEAL PROX:      110   LEFT POPLITEAL DISTAL:      121   TIBIOPER TRUNK:      66   LEFT PTA PROX:      60   LEFT PTA MID:      62   LEFT PTA DISTAL:      73   LEFT BETH PROX:      82   LEFT BETH MID:      72    LEFT DORSALIS PEDIS::      30   LEFT GREAT TOE PRESSURE:      25 mmHg   CONCLUSION:    1. No high-grade stenosis is identified.  There is suggestion of diffuse mild stenosis throughout most of the left lower extremity.    2. Overall diminished left toe pressure is noted.  Possibility of decreased perfusion in the distal left foot is of consideration.     7-14-20 venous reflux study-dr santamaria  Per Dr. Santamaria review \"no intervention needed\"  WOUND ASSESSMENT:     Wound 04/10/25 #1 Ankle Left;Medial (Active)   Date First Assessed/Time First Assessed: 04/10/25 0757    Wound Number (Wound Clinic Only): #1  Primary Wound Type: Auto-immune  Location: Ankle  Wound Location Orientation: Left;Medial      Assessments 4/10/2025  7:57 AM 5/8/2025 10:47 AM   Wound Image        Drainage Amount Unable to assess Small   Drainage Description -- Serous;Yellow   Treatments Compression --   Wound Length (cm) 0.7 cm 1 cm   Wound Width (cm) 0.7 cm 0.5 cm   Wound Surface Area (cm^2) 0.38 cm^2 0.39 cm^2   Wound Depth (cm) 0.2 cm 0.2 cm   Wound Volume (cm^3) 0.051 cm^3 0.052 cm^3   Wound Healing % -- -2   Margins Well-defined edges Well-defined edges   Non-staged Wound Description Full thickness Full thickness   Soni-wound Assessment Dry;Hemosiderin staining Hemosiderin staining;Moist;Maceration   Wound Granulation Tissue Pale Grey;Pink;Firm Pink;Firm   Wound Bed Granulation (%) 100 % 20 %   Wound Bed Slough (%) -- 80 %   Wound Odor None None   Tunneling? No --   Undermining? No --   Sinus Tracts? No --       No associated  orders.       Compression Wrap 04/10/25 Ankle Anterior;Left (Active)   Placement Date/Time: 04/10/25 0846   Location: Ankle  Wound Location Orientation: Anterior;Left      Assessments 4/10/2025  7:57 AM 4/17/2025 10:04 AM   Response to Treatment Well tolerated Well tolerated   Compression Layers Multilayer Multilayer   Compression Product Type Unna Boot Unna Boot   Dressing Applied Yes Yes   Compression Wrap Location Toes to Knee Toes to Knee   Compression Wrap Status Dry;Intact;Clean Dry;Clean       No associated orders.       Wound 05/08/25 #2 Right Medial Ankle Ankle Right;Medial (Active)   Date First Assessed/Time First Assessed: 05/08/25 1056    Wound Number (Wound Clinic Only): #2 Right Medial Ankle  Primary Wound Type: Auto-immune  Location: Ankle  Wound Location Orientation: Right;Medial      Assessments 5/8/2025 10:58 AM   Wound Image     Drainage Amount None   Wound Length (cm) 0.5 cm   Wound Width (cm) 0.5 cm   Wound Surface Area (cm^2) 0.2 cm^2   Wound Depth (cm) 0 cm   Wound Volume (cm^3) 0 cm^3   Margins Undefined edges   Non-staged Wound Description Full thickness   Soni-wound Assessment Dry   Wound Odor None   Shape 100% scabbed       No associated orders.       Wound 05/08/25 #3 Right Lateral Ankle Ankle Right;Lateral (Active)   Date First Assessed/Time First Assessed: 05/08/25 1056    Wound Number (Wound Clinic Only): #3 Right Lateral Ankle  Primary Wound Type: Auto-immune  Location: Ankle  Wound Location Orientation: Right;Lateral      Assessments 5/8/2025 10:59 AM   Wound Image     Drainage Amount None   Wound Length (cm) 0.5 cm   Wound Width (cm) 0.8 cm   Wound Surface Area (cm^2) 0.31 cm^2   Wound Depth (cm) 0 cm   Wound Volume (cm^3) 0 cm^3   Margins Undefined edges   Non-staged Wound Description Full thickness   Soni-wound Assessment Dry   Wound Odor None   Shape 100% scabbed       No associated orders.            ASSESSMENT AND PLAN:    1. Non-pressure chronic ulcer of left ankle with fat  layer exposed (HCC)    2. 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 Lupus vasculitis (HCC)    4. Mixed connective tissue disease (HCC)    5. Venous insufficiency of both lower extremities        Risks, benefits, and alternatives of current treatment plan discussed in detail.  Questions and concerns addressed. Red flags to RTC or ED reviewed.  Patient (or parent) agrees to plan.      NOTE TO PATIENT: The 21st Century Cures Act makes clinical notes like these available to patients in the interest of transparency. Clinical notes are medical documents used by physicians and care providers to communicate with each other. These documents include medical language and terminology, abbreviations, and treatment information that may sound technical and at times possibly unfamiliar. In addition, at times, the verbiage may appear blunt or direct. These documents are one tool providers use to communicate relevant information and clinical opinions of the care providers in a way that allows common understanding of the clinical context.     I spent39 minutes with the patient. This time included:    preparing to see the patient (eg, review notes and recent diagnostics),  seeing the patient, obtaining and/or reviewing separately obtained history, performing a medically appropriate examination and/or evaluation, counseling and educating the patient, documenting in the record, communication with rheum  DISCHARGE INSTRUCTIONS     Patient Instructions   Please return: 1 week       Things to do:  Imaging & Consults:  Schedule with Dr. Santamaria and a venous insurfficiency ultrasound [phone]      Patient discharge and wound care instructions  Raymond Olivo  5/8/2025             You may shower with protection of the wound (ie a cast cover or similar).     Cleansing/dressing:       In clinic, with each dressing change:    please cleanse the limb, foot, and between toes with soap,  water and washcloth.   Dry thoroughly.    Cleanse/soak the wound with VASHE (or other hypochlorous wound cleanser), dab dry.    Apply the following dressings:      honey hydrocolloid>20-30mmhg calamine compression (wrap light with padding)    Managing your edema:  Avoid prolonged standing in one place. It is better to have your calf muscles moving to pump fluid out of the legs      Elevate leg(s) above the level of the heart when sitting or as much as possible.  Take you diuretics as directed by your physician. Do not skip doses or change doses unless instructed to do so by your physician.  Decrease salt intake, follow recommended 2 grams daily.  Do not get leg(s) with compression wrap wet. If wraps are too tight as indicated by pain, numbness/tingling or discoloration of toes remove wrap completely and call the wound center @ [phone].  Refer to the \"Multi-layer compression bandage application patient information sheet\" given to you in clinic.     Nutrition and blood sugar control:  Focus on the following:  Protein: Meats, beans, eggs, milk and yogurt particularly Greek yogurt), tofu, soy nuts, soy protein products (Follow the protein handout in your welcome folder)  Vitamin C: Citrus fruits and juices, strawberries, tomatoes, tomato juice, peppers, baked potatoes, spinach, broccoli, cauliflower, [place] sprouts, cabbage  Vitamin A: Dark green, leafy vegetables, orange or yellow vegetables, cantaloupe, fortified dairy products, liver, fortified cereals  Zinc: Fortified cereals, red meats, seafood  Consider supplementing with Justin by [employer]. It can be purchased on amazon, [employer] website, or local pharmacy may be able to order it for you.  (These are essential branch chain amino acids that help with tissue building and wound healing).   When your blood sugar is consistently elevated greater than 180 your body can't heal or fight infection.     Concerns:  Signs of infection may include the following:   Increase in redness  Red \"streaks\" from wound  Increase in swelling  Fever  Unusual odor  Change in the amount of wound drainage     Should you experience any significant changes in your wound(s) or have any questions regarding your home care instructions please contact the [facility] @ [phone] If after regular business hours, please call your family doctor or local emergency room. The treatment plan has been discussed at length between you and your provider. Follow all instructions carefully, it is very important. If you do not follow all instructions you are at risk of your wound not healing, infection, possible loss of limb and even loss of life.    Martina Mckeon FNP-C, CWCN-AP, CFCN, CSWS, WCC, DWC  5/8/2025

## 2025-05-07 NOTE — TELEPHONE ENCOUNTER
ROX LMOVM FOR PT TO SCHEDULE CONSULT WITH DR. NAJJAR. Not urgent, please schedule patient when they call the main line. Dx: wounds and venous insufficiency Thanks!

## 2025-05-08 ENCOUNTER — APPOINTMENT (OUTPATIENT)
Dept: WOUND CARE | Facility: HOSPITAL | Age: 50
End: 2025-05-08
Attending: NURSE PRACTITIONER
Payer: COMMERCIAL

## 2025-05-08 VITALS
DIASTOLIC BLOOD PRESSURE: 70 MMHG | RESPIRATION RATE: 14 BRPM | HEART RATE: 83 BPM | TEMPERATURE: 98 F | SYSTOLIC BLOOD PRESSURE: 105 MMHG

## 2025-05-08 DIAGNOSIS — I87.2 VENOUS INSUFFICIENCY OF BOTH LOWER EXTREMITIES: ICD-10-CM

## 2025-05-08 DIAGNOSIS — I77.89 LUPUS VASCULITIS (HCC): ICD-10-CM

## 2025-05-08 DIAGNOSIS — L97.812 NON-PRESSURE CHRONIC ULCER OF OTHER PART OF RIGHT LOWER LEG WITH FAT LAYER EXPOSED (HCC): ICD-10-CM

## 2025-05-08 DIAGNOSIS — M35.1 MIXED CONNECTIVE TISSUE DISEASE (HCC): ICD-10-CM

## 2025-05-08 DIAGNOSIS — M32.19 LUPUS VASCULITIS (HCC): ICD-10-CM

## 2025-05-08 DIAGNOSIS — L97.322 NON-PRESSURE CHRONIC ULCER OF LEFT ANKLE WITH FAT LAYER EXPOSED (HCC): Primary | ICD-10-CM

## 2025-05-08 PROCEDURE — 99214 OFFICE O/P EST MOD 30 MIN: CPT | Performed by: NURSE PRACTITIONER

## 2025-05-08 NOTE — PROGRESS NOTES
.Weekly Wound Education Note    Teaching Provided To: Patient, Family  Training Topics: Dressing, Edema control, Cleasing and general instructions, Compression, Discharge instructions  Training Method: Explain/Verbal, Written  Training Response: Patient responds and understands        Notes: Wound stable. New wounds to right ankle. Dressing changed to sample honey hydrocolloid to all wounds. Both legs wrapped in calamine unna boot 20-30mmHg (lightly wrapped) with ABD pad to anterior ankle for padding.

## 2025-05-08 NOTE — PATIENT INSTRUCTIONS
Please return: 1 week       Things to do:  Imaging & Consults:  Schedule with Dr. Santamaria and a venous insurfficiency ultrasound [phone]      Patient discharge and wound care instructions  Raymond Olivo  5/8/2025             You may shower with protection of the wound (ie a cast cover or similar).     Cleansing/dressing:       In clinic, with each dressing change:    please cleanse the limb, foot, and between toes with soap, water and washcloth.   Dry thoroughly.    Cleanse/soak the wound with VASHE (or other hypochlorous wound cleanser), dab dry.    Apply the following dressings:      honey hydrocolloid>20-30mmhg calamine compression (wrap light with padding)    Managing your edema:  Avoid prolonged standing in one place. It is better to have your calf muscles moving to pump fluid out of the legs      Elevate leg(s) above the level of the heart when sitting or as much as possible.  Take you diuretics as directed by your physician. Do not skip doses or change doses unless instructed to do so by your physician.  Decrease salt intake, follow recommended 2 grams daily.  Do not get leg(s) with compression wrap wet. If wraps are too tight as indicated by pain, numbness/tingling or discoloration of toes remove wrap completely and call the wound center @ [phone].  Refer to the \"Multi-layer compression bandage application patient information sheet\" given to you in clinic.     Nutrition and blood sugar control:  Focus on the following:  Protein: Meats, beans, eggs, milk and yogurt particularly Greek yogurt), tofu, soy nuts, soy protein products (Follow the protein handout in your welcome folder)  Vitamin C: Citrus fruits and juices, strawberries, tomatoes, tomato juice, peppers, baked potatoes, spinach, broccoli, cauliflower, [place] sprouts, cabbage  Vitamin A: Dark green, leafy vegetables, orange or yellow vegetables, cantaloupe, fortified dairy products, liver, fortified cereals  Zinc: Fortified cereals,  red meats, seafood  Consider supplementing with Justin by [employer]. It can be purchased on amazon, [employer] website, or local pharmacy may be able to order it for you.  (These are essential branch chain amino acids that help with tissue building and wound healing).   When your blood sugar is consistently elevated greater than 180 your body can't heal or fight infection.     Concerns:  Signs of infection may include the following:  Increase in redness  Red \"streaks\" from wound  Increase in swelling  Fever  Unusual odor  Change in the amount of wound drainage     Should you experience any significant changes in your wound(s) or have any questions regarding your home care instructions please contact the wound center [facility] @ [phone] If after regular business hours, please call your family doctor or local emergency room. The treatment plan has been discussed at length between you and your provider. Follow all instructions carefully, it is very important. If you do not follow all instructions you are at risk of your wound not healing, infection, possible loss of limb and even loss of life.

## 2025-05-09 DIAGNOSIS — Z11.1 SCREENING FOR TUBERCULOSIS: Primary | ICD-10-CM

## 2025-05-09 DIAGNOSIS — D84.821 IMMUNOCOMPROMISED STATE DUE TO DRUG THERAPY (HCC): ICD-10-CM

## 2025-05-09 DIAGNOSIS — Z11.59 NEED FOR HEPATITIS B SCREENING TEST: ICD-10-CM

## 2025-05-09 DIAGNOSIS — I77.89 LUPUS VASCULITIS (HCC): ICD-10-CM

## 2025-05-09 DIAGNOSIS — Z79.899 HIGH RISK MEDICATION USE: ICD-10-CM

## 2025-05-09 DIAGNOSIS — M32.19 LUPUS VASCULITIS (HCC): ICD-10-CM

## 2025-05-09 DIAGNOSIS — I73.01 RAYNAUD'S DISEASE WITH GANGRENE (HCC): ICD-10-CM

## 2025-05-09 DIAGNOSIS — Z79.899 IMMUNOCOMPROMISED STATE DUE TO DRUG THERAPY (HCC): ICD-10-CM

## 2025-05-09 DIAGNOSIS — Z11.59 NEED FOR HEPATITIS C SCREENING TEST: ICD-10-CM

## 2025-05-13 ENCOUNTER — LAB ENCOUNTER (OUTPATIENT)
Dept: LAB | Facility: HOSPITAL | Age: 50
End: 2025-05-13
Attending: INTERNAL MEDICINE
Payer: COMMERCIAL

## 2025-05-13 DIAGNOSIS — Z79.899 HIGH RISK MEDICATION USE: ICD-10-CM

## 2025-05-13 DIAGNOSIS — D84.821 IMMUNOCOMPROMISED STATE DUE TO DRUG THERAPY (HCC): ICD-10-CM

## 2025-05-13 DIAGNOSIS — Z11.59 NEED FOR HEPATITIS C SCREENING TEST: ICD-10-CM

## 2025-05-13 DIAGNOSIS — Z11.59 NEED FOR HEPATITIS B SCREENING TEST: ICD-10-CM

## 2025-05-13 DIAGNOSIS — Z79.899 IMMUNOCOMPROMISED STATE DUE TO DRUG THERAPY (HCC): ICD-10-CM

## 2025-05-13 DIAGNOSIS — I77.89 LUPUS VASCULITIS (HCC): ICD-10-CM

## 2025-05-13 DIAGNOSIS — M32.19 LUPUS VASCULITIS (HCC): ICD-10-CM

## 2025-05-13 DIAGNOSIS — I73.01 RAYNAUD'S DISEASE WITH GANGRENE (HCC): ICD-10-CM

## 2025-05-13 DIAGNOSIS — Z11.1 SCREENING FOR TUBERCULOSIS: ICD-10-CM

## 2025-05-13 LAB
HBV CORE AB SERPL QL IA: NONREACTIVE
HBV SURFACE AB SER QL: NONREACTIVE
HBV SURFACE AB SERPL IA-ACNC: <3.1 MIU/ML
HBV SURFACE AG SER-ACNC: <0.1 [IU]/L
HBV SURFACE AG SERPL QL IA: NONREACTIVE
HCV AB SERPL QL IA: NONREACTIVE

## 2025-05-13 PROCEDURE — 86803 HEPATITIS C AB TEST: CPT

## 2025-05-13 PROCEDURE — 86706 HEP B SURFACE ANTIBODY: CPT

## 2025-05-13 PROCEDURE — 86480 TB TEST CELL IMMUN MEASURE: CPT

## 2025-05-13 PROCEDURE — 36415 COLL VENOUS BLD VENIPUNCTURE: CPT

## 2025-05-13 PROCEDURE — 86704 HEP B CORE ANTIBODY TOTAL: CPT

## 2025-05-13 PROCEDURE — 87340 HEPATITIS B SURFACE AG IA: CPT

## 2025-05-14 ENCOUNTER — TELEPHONE (OUTPATIENT)
Dept: WOUND CARE | Facility: HOSPITAL | Age: 50
End: 2025-05-14

## 2025-05-14 NOTE — PROGRESS NOTES
CHIEF COMPLAINT:     No chief complaint on file.    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 Patient saw dr. Santamaria (vascular) in December 2023 and he documented \"I explained to the patient and her  that I do not believe the source of her pain to be vascular in origin.  She has an easily palpable dorsalis pedis pulse and the location of the pain is more suggestive of a neuropathic origin.  I suspect perhaps that given the tightness of her feet from her mixed connective tissue disorder the compression stockings is causing somewhat compression of the nerves in that area and I have encouraged her to substitute those stockings to those that involve the feet up to the upper calf.\" Patient last saw dr. Farmer in February 2025 and overall doing well. She was to continue on mmf 750mg daily, prednisone 2.5 mg daily, plaquenil 200mg daily and amlodipine 2.5 mg tiwce daily along with vitamine prscription and d3.  Spouse contacted dr farmer late last week regarding a small wound on her left leg, so patient was fit into the schedule.  They have not been dressing the wound with anything, she did have spandagrip on.  There was surrounding dry drainage. Patient states she noted something last Friday and then by Monday this week she noted it was a definite ulceration.  The drainage is clear and increases when patient plantar/dorsiflexes.  Most likely a lymph vessel leaking. We discussed utilizing doxy-they will bring to next appointment. Her skin remains very dry/flaky she states she has been utilizing resta daily.  We discussed that she may need something with ammonium lactate or urea in it.  No s/s of infection. The area is not as painful as it has been previously.     4-17-25 patient  returns. Last week there was lymphorrhea with dorsi/plantar flexion, patient brought the doxycycline to clinic today. Today wound is about the same size, but is more fibrinous. There is not any lymphorrhea noted even with dorsi/plantar flexion. The wound is not enlarging. Pain is manageable except with touch/cleansing. No s/s of infection. Patient able to ambulate without difficulty.     4-24-25 patient returns.  Last week the wound was less granular/more fibrinous we utilized honey and thick foam. today wound is much , more granular, but also larger with a small area of concern distal to the wound that appears as though it may open up.  There is not any lymphorrhea noted. her last venous reflux study was in 2020, this may be something we need to revisit should the wound continue to not progress as expected.  Today will have family apply gentamicin gtts and cover with petroleum gauze twice daily and compress with spandagrip. Rx sent to pharmacy. Patient is not doing justin, daughter/patient encouraged to start that again.    4-30-25 patient returns with spouse.  She has restarted the Justin.  Has been dressing with gent, the pain is about the same, wound is bout the same.  We discussed vascular follow-up for venous reflux study.  Will continue with gent for the next week.  I will reach out to vascular and update dr. Farmer.    5-8-25 patient returns with daughter.  She has been dressing with gent for the last 2 weeks.  I have been in communication with vascular us to get patient scheduled for venous reflux study and consult with dr. Najjar.  Patient reminded to please call to schedule. Today patient c/o burning to the right ankle and there are new scabs/eschar noted on the medial and lateral right ankle. The left medial ankle wound is not improved.  No acute s/s of infection.  I d/w patient/family that I will reach out to dr farmer regarding the non-progression and the new open areas on the right. Will utilize  honey hydrocolloid and return patient to compression wraps.    5-15-25 patient returns.  I communicated with rheum last week and they would like another biopsy, they have ordered labs  MEDICATIONS:     Current Outpatient Medications:     Mycophenolate Mofetil 500 MG Oral Tab, Take 1.5 tablets (750 mg total) by mouth daily., Disp: 135 tablet, Rfl: 0    gentamicin 0.3 % Ophthalmic Solution, Apply 3 drops to wound twice daily, cover with petroleum gauze and cover dressing, Disp: 15 mL, Rfl: 0    doxycycline 100 MG Oral Cap, Bring capsules to wound clinic to use as directed, Disp: 5 capsule, Rfl: 0    ergocalciferol 1.25 MG (87303 UT) Oral Cap, Take 1 capsule (50,000 Units total) by mouth once a week., Disp: 12 capsule, Rfl: 0    hydroxychloroquine 200 MG Oral Tab, Take 1 tablet (200 mg total) by mouth daily., Disp: 90 tablet, Rfl: 0    predniSONE 2.5 MG Oral Tab, Take 1 tablet (2.5 mg total) by mouth daily., Disp: 90 tablet, Rfl: 0    amLODIPine 2.5 MG Oral Tab, Take 1 tablet (2.5 mg total) by mouth 2 (two) times daily., Disp: 180 tablet, Rfl: 0    FERROUS SULFATE 325 (65 Fe) MG Oral Tab, TAKE 1 TABLET BY MOUTH 2 TIMES DAILY WITH MEALS., Disp: 180 tablet, Rfl: 0    clobetasol 0.05 % External Solution, Apply topically to lower legs after shower 3 x weekly, Disp: 50 mL, Rfl: 0  ALLERGIES:   No Known Allergies   REVIEW OF SYSTEMS:   This information was obtained from the patient/family and chart.    See HPI for pertinent positives, otherwise 10 pt ROS negative.    HISTORY:   Past medical, surgical, family and social history updated where appropriate.    PHYSICAL EXAM:     There were no vitals filed for this visit.          Estimated body mass index is 21.95 kg/m² as calculated from the following:    Height as of 4/10/25: 62\".    Weight as of 4/10/25: 120 lb (54.4 kg).   No results found for: \"PGLU\"    Vital signs reviewed.Appears stated age, well groomed.    Constitutional:  Bp ***wnl for patient. Pulse Regular and wnl  for patient. Respirations easy and unlabored. Temperature wnl. Weight normal for height. Appearance neat and clean. Appears in no acute distress. Well nourished and well developed.    Lower extremity:  dp/pt palpable ***bilalterally.   lower extremities are*** free of varicosities and no edema, they are scarred, scattered changes in pigmentation (hypo/hyper) with atrophie jay. Capillary refill < 3 seconds. Digits are warm. toenails are wnl for color, thickness and hygeine. Skin is dry-but improving. + hairgrowth on legs.    Musculoskeletal:  Gait and station stable   Integumentary:  refer to wound characteristics and images   Psychiatric:  Judgment and insight intact. Alert and oriented times 3. No evidence of depression, anxiety, or agitation. Calm, cooperative, and communicative, but very quiet and reserved.  EDEMA:   Calf                    Ankle                          DIAGNOSTICS:     Lab Results   Component Value Date    BUN 6 (L) 02/25/2025    CREATSERUM 0.63 02/25/2025    GFRCKDEPI 121.37 12/17/2020    ALB 4.8 02/25/2025    TP 8.2 02/25/2025    A1C 5.1 12/17/2020     Lab Results   Component Value Date    ESRML 54 (H) 02/25/2025    ESRML 47 (H) 09/03/2024    ESRML 39 (H) 07/23/2024      Lab Results   Component Value Date    CRP <0.40 02/25/2025    CRP 0.50 09/03/2024    CRP <0.40 07/23/2024 11-28-23 arterial duplex-dr hilliard  FINDINGS:    LEFT EXTREMITY:  Triphasic waveforms in the left common femoral, profunda, superficial femoral arteries.  Biphasic waveforms below the left knee.   OTHER:  None.     PEAK VELOCITIES (CM/S):   LEFT COMMON FEMORAL:      165    LEFT PROFUNDA FEMORAL:      78      LEFT SUPERFICIAL FEMORAL PROX:    113   LEFT SUPERFICIAL FEMORAL MID:      94   LEFT SUPERFICIAL FEMORAL DISTAL:    92   LEFT POPLITEAL PROX:      110   LEFT POPLITEAL DISTAL:      121   TIBIOPER TRUNK:      66   LEFT PTA PROX:      60   LEFT PTA MID:      62   LEFT PTA DISTAL:      73   LEFT BETH PROX:       82   LEFT BETH MID:      72    LEFT DORSALIS PEDIS::      30   LEFT GREAT TOE PRESSURE:      25 mmHg   CONCLUSION:    1. No high-grade stenosis is identified.  There is suggestion of diffuse mild stenosis throughout most of the left lower extremity.    2. Overall diminished left toe pressure is noted.  Possibility of decreased perfusion in the distal left foot is of consideration.     7-14-20 venous reflux study-dr santamaria  Per Dr. Santamaria review \"no intervention needed\"  WOUND ASSESSMENT:     Wound 04/10/25 #1 Ankle Left;Medial (Active)   Date First Assessed/Time First Assessed: 04/10/25 0757    Wound Number (Wound Clinic Only): #1  Primary Wound Type: Auto-immune  Location: Ankle  Wound Location Orientation: Left;Medial      Assessments 4/10/2025  7:57 AM 5/8/2025 10:47 AM   Wound Image        Drainage Amount Unable to assess Small   Drainage Description -- Serous;Yellow   Treatments Compression Compression   Wound Length (cm) 0.7 cm 1 cm   Wound Width (cm) 0.7 cm 0.5 cm   Wound Surface Area (cm^2) 0.38 cm^2 0.39 cm^2   Wound Depth (cm) 0.2 cm 0.2 cm   Wound Volume (cm^3) 0.051 cm^3 0.052 cm^3   Wound Healing % -- -2   Margins Well-defined edges Well-defined edges   Non-staged Wound Description Full thickness Full thickness   Soni-wound Assessment Dry;Hemosiderin staining Hemosiderin staining;Moist;Maceration   Wound Granulation Tissue Pale Grey;Pink;Firm Pink;Firm   Wound Bed Granulation (%) 100 % 20 %   Wound Bed Slough (%) -- 80 %   Wound Odor None None   Tunneling? No --   Undermining? No --   Sinus Tracts? No --       No associated orders.       Compression Wrap 04/10/25 Ankle Anterior;Left (Active)   Placement Date/Time: 04/10/25 0846   Location: Ankle  Wound Location Orientation: Anterior;Left      Assessments 4/10/2025  7:57 AM 5/8/2025 10:57 AM   Response to Treatment Well tolerated Well tolerated   Compression Layers Multilayer Multilayer   Compression Product Type Unna Boot Unna Boot   Dressing Applied Yes  Yes   Compression Wrap Location Toes to Knee Toes to Knee   Compression Wrap Status Dry;Intact;Clean Dry;Clean       No associated orders.       Wound 05/08/25 #2 Right Medial Ankle Ankle Right;Medial (Active)   Date First Assessed/Time First Assessed: 05/08/25 1056    Wound Number (Wound Clinic Only): #2 Right Medial Ankle  Primary Wound Type: Auto-immune  Location: Ankle  Wound Location Orientation: Right;Medial      Assessments 5/8/2025 10:58 AM   Wound Image     Drainage Amount None   Treatments Compression   Wound Length (cm) 0.5 cm   Wound Width (cm) 0.5 cm   Wound Surface Area (cm^2) 0.2 cm^2   Wound Depth (cm) 0 cm   Wound Volume (cm^3) 0 cm^3   Margins Undefined edges   Non-staged Wound Description Full thickness   Soni-wound Assessment Dry   Wound Odor None   Shape 100% scabbed       No associated orders.       Wound 05/08/25 #3 Right Lateral Ankle Ankle Right;Lateral (Active)   Date First Assessed/Time First Assessed: 05/08/25 1056    Wound Number (Wound Clinic Only): #3 Right Lateral Ankle  Primary Wound Type: Auto-immune  Location: Ankle  Wound Location Orientation: Right;Lateral      Assessments 5/8/2025 10:59 AM   Wound Image     Drainage Amount None   Treatments Compression   Wound Length (cm) 0.5 cm   Wound Width (cm) 0.8 cm   Wound Surface Area (cm^2) 0.31 cm^2   Wound Depth (cm) 0 cm   Wound Volume (cm^3) 0 cm^3   Margins Undefined edges   Non-staged Wound Description Full thickness   Soni-wound Assessment Dry   Wound Odor None   Shape 100% scabbed       No associated orders.       Compression Wrap 05/08/25 Ankle Anterior;Right (Active)   Placement Date/Time: 05/08/25 1646   Location: Ankle  Wound Location Orientation: Anterior;Right      Assessments 5/8/2025 10:57 AM   Response to Treatment Well tolerated   Compression Layers Multilayer   Compression Product Type Unna Boot   Dressing Applied Yes   Compression Wrap Location Toes to Knee   Compression Wrap Status Clean;Dry;Intact       No  associated orders.            ASSESSMENT AND PLAN:    There are no diagnoses linked to this encounter.        Risks, benefits, and alternatives of current treatment plan discussed in detail.  Questions and concerns addressed. Red flags to RTC or ED reviewed.  Patient (or parent) agrees to plan.      NOTE TO PATIENT: The 21st Century Cures Act makes clinical notes like these available to patients in the interest of transparency. Clinical notes are medical documents used by physicians and care providers to communicate with each other. These documents include medical language and terminology, abbreviations, and treatment information that may sound technical and at times possibly unfamiliar. In addition, at times, the verbiage may appear blunt or direct. These documents are one tool providers use to communicate relevant information and clinical opinions of the care providers in a way that allows common understanding of the clinical context.     I spent***minutes with the patient. This time included:    preparing to see the patient (eg, review notes and recent diagnostics),  seeing the patient, obtaining and/or reviewing separately obtained history, performing a medically appropriate examination and/or evaluation, counseling and educating the patient, documenting in the record,   DISCHARGE INSTRUCTIONS     There are no Patient Instructions on file for this visit.   Martina Mckeon FNP-C, CWCN-AP, CFCN, CSWS, WCC, DWC  5/15/2025

## 2025-05-15 ENCOUNTER — APPOINTMENT (OUTPATIENT)
Dept: WOUND CARE | Facility: HOSPITAL | Age: 50
End: 2025-05-15
Attending: NURSE PRACTITIONER
Payer: COMMERCIAL

## 2025-05-15 ENCOUNTER — OFFICE VISIT (OUTPATIENT)
Facility: CLINIC | Age: 50
End: 2025-05-15

## 2025-05-15 VITALS
WEIGHT: 125 LBS | DIASTOLIC BLOOD PRESSURE: 72 MMHG | HEIGHT: 62 IN | BODY MASS INDEX: 23 KG/M2 | SYSTOLIC BLOOD PRESSURE: 110 MMHG

## 2025-05-15 DIAGNOSIS — I87.319 CHRONIC VENOUS HYPERTENSION W ULCERATION (HCC): Primary | ICD-10-CM

## 2025-05-15 DIAGNOSIS — L97.909 CHRONIC VENOUS HYPERTENSION W ULCERATION (HCC): Primary | ICD-10-CM

## 2025-05-15 LAB
M TB IFN-G CD4+ T-CELLS BLD-ACNC: 0.05 IU/ML
M TB TUBERC IFN-G BLD QL: NEGATIVE
M TB TUBERC IGNF/MITOGEN IGNF CONTROL: 0.93 IU/ML
QFT TB1 AG MINUS NIL: 0.02 IU/ML
QFT TB2 AG MINUS NIL: 0 IU/ML

## 2025-05-15 NOTE — PROGRESS NOTES
CHIEF COMPLAINT:     Chief Complaint   Patient presents with    Wound Care     Follow up for bilateral ankle wounds. Pt saw Vascular yesterday, wraps removed. Ultra sound scheduled for possibly Monday. Pt arrives with xeroform, gauze, bordered foam and spandagrips.      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 Patient saw dr. Santamaria (vascular) in December 2023 and he documented \"I explained to the patient and her  that I do not believe the source of her pain to be vascular in origin.  She has an easily palpable dorsalis pedis pulse and the location of the pain is more suggestive of a neuropathic origin.  I suspect perhaps that given the tightness of her feet from her mixed connective tissue disorder the compression stockings is causing somewhat compression of the nerves in that area and I have encouraged her to substitute those stockings to those that involve the feet up to the upper calf.\" Patient last saw dr. Farmer in February 2025 and overall doing well. She was to continue on mmf 750mg daily, prednisone 2.5 mg daily, plaquenil 200mg daily and amlodipine 2.5 mg tiwce daily along with vitamine prscription and d3.  Spouse contacted dr farmer late last week regarding a small wound on her left leg, so patient was fit into the schedule.  They have not been dressing the wound with anything, she did have spandagrip on.  There was surrounding dry drainage. Patient states she noted something last Friday and then by Monday this week she noted it was a definite ulceration.  The drainage is clear and increases when patient plantar/dorsiflexes.  Most likely a lymph vessel leaking. We discussed utilizing doxy-they will bring to next appointment. Her skin remains very dry/flaky she  states she has been utilizing resta daily.  We discussed that she may need something with ammonium lactate or urea in it.  No s/s of infection. The area is not as painful as it has been previously.     4-17-25 patient returns. Last week there was lymphorrhea with dorsi/plantar flexion, patient brought the doxycycline to clinic today. Today wound is about the same size, but is more fibrinous. There is not any lymphorrhea noted even with dorsi/plantar flexion. The wound is not enlarging. Pain is manageable except with touch/cleansing. No s/s of infection. Patient able to ambulate without difficulty.     4-24-25 patient returns.  Last week the wound was less granular/more fibrinous we utilized honey and thick foam. today wound is much , more granular, but also larger with a small area of concern distal to the wound that appears as though it may open up.  There is not any lymphorrhea noted. her last venous reflux study was in 2020, this may be something we need to revisit should the wound continue to not progress as expected.  Today will have family apply gentamicin gtts and cover with petroleum gauze twice daily and compress with spandagrip. Rx sent to pharmacy. Patient is not doing justin, daughter/patient encouraged to start that again.    4-30-25 patient returns with spouse.  She has restarted the Justin.  Has been dressing with gent, the pain is about the same, wound is bout the same.  We discussed vascular follow-up for venous reflux study.  Will continue with gent for the next week.  I will reach out to vascular and update dr. Farmer.    5-8-25 patient returns with daughter.  She has been dressing with gent for the last 2 weeks.  I have been in communication with vascular us to get patient scheduled for venous reflux study and consult with dr. Najjar.  Patient reminded to please call to schedule. Today patient c/o burning to the right ankle and there are new scabs/eschar noted on the medial and lateral right  ankle. The left medial ankle wound is not improved.  No acute s/s of infection.  I d/w patient/family that I will reach out to dr farmer regarding the non-progression and the new open areas on the right. Will utilize honey hydrocolloid and return patient to compression wraps.    5-16-25 patient returns.  I communicated with rheum last week and they would like another biopsy, they have ordered labs.  Patient saw dr. 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  Patient did get labs drawn but the lab only josee the tp and hep b labs, patient encouraged to get the remaining labs ordered in February to be drawn today before leaving hospital, her next appointment with dr. Farmer is next Friday. Wounds are not improving, biopsies taken from left and right medial ankle wounds, sent in [facility] for dif  MEDICATIONS:     Current Outpatient Medications:     Mycophenolate Mofetil 500 MG Oral Tab, Take 1.5 tablets (750 mg total) by mouth daily., Disp: 135 tablet, Rfl: 0    gentamicin 0.3 % Ophthalmic Solution, Apply 3 drops to wound twice daily, cover with petroleum gauze and cover dressing, Disp: 15 mL, Rfl: 0    doxycycline 100 MG Oral Cap, Bring capsules to wound clinic to use as directed, Disp: 5 capsule, Rfl: 0    ergocalciferol 1.25 MG (17867 UT) Oral Cap, Take 1 capsule (50,000 Units total) by mouth once a week., Disp: 12 capsule, Rfl: 0    hydroxychloroquine 200 MG Oral Tab, Take 1 tablet (200 mg total) by mouth daily., Disp: 90 tablet, Rfl: 0    predniSONE 2.5 MG Oral Tab, Take 1 tablet (2.5 mg total) by mouth daily., Disp: 90 tablet, Rfl: 0    amLODIPine 2.5 MG Oral Tab, Take 1 tablet (2.5 mg total) by mouth 2 (two) times daily., Disp: 180 tablet, Rfl: 0    FERROUS SULFATE 325 (65  Fe) MG Oral Tab, TAKE 1 TABLET BY MOUTH 2 TIMES DAILY WITH MEALS., Disp: 180 tablet, Rfl: 0    clobetasol 0.05 % External Solution, Apply topically to lower legs after shower 3 x weekly, Disp: 50 mL, Rfl: 0  ALLERGIES:   No Known Allergies   REVIEW OF SYSTEMS:   This information was obtained from the patient/family and chart.    See HPI for pertinent positives, otherwise 10 pt ROS negative.    HISTORY:   Past medical, surgical, family and social history updated where appropriate.    PHYSICAL EXAM:     Vitals:    05/16/25 0700   BP: 109/67   Pulse: 86   Resp: 14   Temp: 98 °F (36.7 °C)     Estimated body mass index is 22.86 kg/m² as calculated from the following:    Height as of 5/15/25: 62\".    Weight as of 5/15/25: 125 lb (56.7 kg).   No results found for: \"PGLU\"    Vital signs reviewed.Appears stated age, well groomed.    Constitutional:  Bp wnl for patient. Pulse Regular and wnl for patient. Respirations easy and unlabored. Temperature wnl. Weight normal for height. Appearance neat and clean. Appears in no acute distress. Well nourished and well developed.    Lower extremity:  dp/pt palpable bilalterally.   lower extremities are free of varicosities and no edema, they are scarred, scattered changes in pigmentation (hypo/hyper) with atrophie jay. Capillary refill < 3 seconds. Digits are warm. toenails are wnl for color, thickness and hygeine. Skin is dry-but improving. + hairgrowth on legs.    Musculoskeletal:  Gait and station stable   Integumentary:  refer to wound characteristics and images   Psychiatric:  Judgment and insight intact. Alert and oriented times 3. No evidence of depression, anxiety, or agitation. Calm, cooperative, and communicative, but very quiet and reserved.  EDEMA:   Calf  Point of Measurement - Left Calf: 31  Point of Measurement - Right Calf: 31  Left Calf from:: Heel  Calf Left cm:: 27.4  Right Calf from:: Heel  Right Calf cm:: 28  Ankle  Point of Measurement - Left Ankle: 10  Point  of Measurement - Right Ankle: 10  Left Ankle from:: Heel  Left Ankle cm:: 17.1     Right Ankle from:: Heel  Right Ankle cm:: 17.5     DIAGNOSTICS:     Lab Results   Component Value Date    BUN 9 05/16/2025    CREATSERUM 0.59 05/16/2025    GFRCKDEPI 121.37 12/17/2020    ALB 4.8 05/16/2025    TP 8.2 05/16/2025    A1C 5.1 12/17/2020     Lab Results   Component Value Date    ESRML 40 (H) 05/16/2025    ESRML 54 (H) 02/25/2025    ESRML 47 (H) 09/03/2024      Lab Results   Component Value Date    CRP <0.40 05/16/2025    CRP <0.40 02/25/2025    CRP 0.50 09/03/2024 11-28-23 arterial duplex-dr santamaria  FINDINGS:    LEFT EXTREMITY:  Triphasic waveforms in the left common femoral, profunda, superficial femoral arteries.  Biphasic waveforms below the left knee.   OTHER:  None.     PEAK VELOCITIES (CM/S):   LEFT COMMON FEMORAL:      165    LEFT PROFUNDA FEMORAL:      78      LEFT SUPERFICIAL FEMORAL PROX:    113   LEFT SUPERFICIAL FEMORAL MID:      94   LEFT SUPERFICIAL FEMORAL DISTAL:    92   LEFT POPLITEAL PROX:      110   LEFT POPLITEAL DISTAL:      121   TIBIOPER TRUNK:      66   LEFT PTA PROX:      60   LEFT PTA MID:      62   LEFT PTA DISTAL:      73   LEFT BETH PROX:      82   LEFT BETH MID:      72    LEFT DORSALIS PEDIS::      30   LEFT GREAT TOE PRESSURE:      25 mmHg   CONCLUSION:    1. No high-grade stenosis is identified.  There is suggestion of diffuse mild stenosis throughout most of the left lower extremity.    2. Overall diminished left toe pressure is noted.  Possibility of decreased perfusion in the distal left foot is of consideration.     7-14-20 venous reflux study-dr santamaria  Per Dr. Santamaria review \"no intervention needed\"  WOUND ASSESSMENT:     Wound 04/10/25 #1 Ankle Left;Medial (Active)   Date First Assessed/Time First Assessed: 04/10/25 0757    Wound Number (Wound Clinic Only): #1  Primary Wound Type: Auto-immune  Location: Ankle  Wound Location Orientation: Left;Medial      Assessments 4/10/2025  7:57 AM  5/16/2025  9:49 AM   Wound Image        Drainage Amount Unable to assess Small   Drainage Description -- Serosanguineous   Treatments Compression --   Wound Length (cm) 0.7 cm 1.6 cm   Wound Width (cm) 0.7 cm 0.9 cm   Wound Surface Area (cm^2) 0.38 cm^2 1.13 cm^2   Wound Depth (cm) 0.2 cm 0.1 cm   Wound Volume (cm^3) 0.051 cm^3 0.075 cm^3   Wound Healing % -- -47   Margins Well-defined edges Well-defined edges   Non-staged Wound Description Full thickness Full thickness   Soni-wound Assessment Dry;Hemosiderin staining Hemosiderin staining;Edema   Wound Granulation Tissue Pale Grey;Pink;Firm Pink;Red;Firm   Wound Bed Granulation (%) 100 % 80 %   Wound Bed Slough (%) -- 20 %   Wound Odor None None   Tunneling? No --   Undermining? No --   Sinus Tracts? No --       Active Orders   Date Order Priority Status Authorizing Provider   05/16/25 1137 Lesion biopsy Routine Active Martina Mckeon APRN       Compression Wrap 04/10/25 Ankle Anterior;Left (Active)   Placement Date/Time: 04/10/25 0846   Location: Ankle  Wound Location Orientation: Anterior;Left      Assessments 4/10/2025  7:57 AM 5/8/2025 10:57 AM   Response to Treatment Well tolerated Well tolerated   Compression Layers Multilayer Multilayer   Compression Product Type Unna Boot Unna Boot   Dressing Applied Yes Yes   Compression Wrap Location Toes to Knee Toes to Knee   Compression Wrap Status Dry;Intact;Clean Dry;Clean       No associated orders.       Wound 05/08/25 #2 Right Medial Ankle Ankle Right;Medial (Active)   Date First Assessed/Time First Assessed: 05/08/25 1056    Wound Number (Wound Clinic Only): #2 Right Medial Ankle  Primary Wound Type: Auto-immune  Location: Ankle  Wound Location Orientation: Right;Medial      Assessments 5/8/2025 10:58 AM 5/16/2025  9:48 AM   Wound Image       Drainage Amount None Small   Drainage Description -- Serosanguineous   Treatments Compression --   Wound Length (cm) 0.5 cm 1.9 cm   Wound Width (cm) 0.5 cm 1.4 cm   Wound  Surface Area (cm^2) 0.2 cm^2 2.09 cm^2   Wound Depth (cm) 0 cm 0.2 cm   Wound Volume (cm^3) 0 cm^3 0.279 cm^3   Margins Undefined edges Well-defined edges   Non-staged Wound Description Full thickness Full thickness   Soni-wound Assessment Dry Moist;Maceration   Wound Granulation Tissue -- Pink;Spongy   Wound Bed Granulation (%) -- 70 %   Wound Bed Slough (%) -- 30 %   Wound Odor None None   Shape 100% scabbed --       Active Orders   Date Order Priority Status Authorizing Provider   05/16/25 1139 Lesion biopsy Routine Active Martina Mckeon APRN       Wound 05/08/25 #3 Right Lateral Ankle Ankle Right;Lateral (Active)   Date First Assessed/Time First Assessed: 05/08/25 1056    Wound Number (Wound Clinic Only): #3 Right Lateral Ankle  Primary Wound Type: Auto-immune  Location: Ankle  Wound Location Orientation: Right;Lateral      Assessments 5/8/2025 10:59 AM 5/16/2025  9:47 AM   Wound Image       Drainage Amount None Scant   Drainage Description -- Serosanguineous   Treatments Compression --   Wound Length (cm) 0.5 cm 0.7 cm   Wound Width (cm) 0.8 cm 0.9 cm   Wound Surface Area (cm^2) 0.31 cm^2 0.49 cm^2   Wound Depth (cm) 0 cm 0.1 cm   Wound Volume (cm^3) 0 cm^3 0.033 cm^3   Margins Undefined edges Well-defined edges   Non-staged Wound Description Full thickness Full thickness   Soni-wound Assessment Dry Edema;Moist   Wound Granulation Tissue -- Pink;Firm   Wound Bed Granulation (%) -- 60 %   Wound Bed Slough (%) -- 40 %   Wound Odor None None   Shape 100% scabbed --       No associated orders.       Compression Wrap 05/08/25 Ankle Anterior;Right (Active)   Placement Date/Time: 05/08/25 1646   Location: Ankle  Wound Location Orientation: Anterior;Right      Assessments 5/8/2025 10:57 AM   Response to Treatment Well tolerated   Compression Layers Multilayer   Compression Product Type Unna Boot   Dressing Applied Yes   Compression Wrap Location Toes to Knee   Compression Wrap Status Clean;Dry;Intact       No  associated orders.        PROCEDURE:      This procedure was medically necessary to determine etiology of wound and next treatment steps  2 mm punch biopsy taken from right and left medial ankle ulcerations. Sent in [facility] for DIF.    ASSESSMENT AND PLAN:    1. Non-pressure chronic ulcer of left ankle with fat layer exposed (HCC)  - Specimen to Pathology, Tissue; Future    2. Non-pressure chronic ulcer of other part of right lower leg with fat layer exposed (HCC)  - Specimen to Pathology, Tissue; Future    3. Lupus vasculitis (HCC)  - Specimen to Pathology, Tissue; Future  - Specimen to Pathology, Tissue; Future    4. Mixed connective tissue disease (HCC)  - Specimen to Pathology, Tissue; Future  - Specimen to Pathology, Tissue; Future    5. Venous insufficiency of both lower extremities    6. Current chronic use of systemic steroids          Risks, benefits, and alternatives of current treatment plan discussed in detail.  Questions and concerns addressed. Red flags to RTC or ED reviewed.  Patient (or parent) agrees to plan.      NOTE TO PATIENT: The 21st Century Cures Act makes clinical notes like these available to patients in the interest of transparency. Clinical notes are medical documents used by physicians and care providers to communicate with each other. These documents include medical language and terminology, abbreviations, and treatment information that may sound technical and at times possibly unfamiliar. In addition, at times, the verbiage may appear blunt or direct. These documents are one tool providers use to communicate relevant information and clinical opinions of the care providers in a way that allows common understanding of the clinical context.     I [id number] with the patient. This time included:    preparing to see the patient (eg, review notes and recent diagnostics),  seeing the patient, obtaining and/or reviewing separately obtained history, performing a medically appropriate  examination and/or evaluation, counseling and educating the patient, documenting in the record, bill biopsy x 2  DISCHARGE INSTRUCTIONS     Patient Instructions   Please return: 1 week         Things to do:  Laboratory (blood draw) orders:  Orders Placed This Encounter   Procedures    Specimen to Pathology, Tissue    Specimen to Pathology, Tissue     Patient discharge and wound care instructions  Raymond Olivo  5/16/2025      You may shower with protection of the wound (ie a cast cover or similar).     Cleansing/dressing:       In clinic, with each dressing change:    please cleanse the limb, foot, and between toes with soap, water and washcloth.   Dry thoroughly.    Cleanse/soak the wound with VASHE (or other hypochlorous wound cleanser), dab dry.    Apply the following dressings:      silver alginate>20-30mmhg calamine compression (wrap light with padding)    Managing your edema:  Avoid prolonged standing in one place. It is better to have your calf muscles moving to pump fluid out of the legs      Elevate leg(s) above the level of the heart when sitting or as much as possible.  Take you diuretics as directed by your physician. Do not skip doses or change doses unless instructed to do so by your physician.  Decrease salt intake, follow recommended 2 grams daily.  Do not get leg(s) with compression wrap wet. If wraps are too tight as indicated by pain, numbness/tingling or discoloration of toes remove wrap completely and call the wound center @ [phone].  Refer to the \"Multi-layer compression bandage application patient information sheet\" given to you in clinic.     Nutrition and blood sugar control:  Focus on the following:  Protein: Meats, beans, eggs, milk and yogurt particularly Greek yogurt), tofu, soy nuts, soy protein products (Follow the protein handout in your welcome folder)  Vitamin C: Citrus fruits and juices, strawberries, tomatoes, tomato juice, peppers, baked potatoes, spinach, broccoli,  cauliflower, [place] sprouts, cabbage  Vitamin A: Dark green, leafy vegetables, orange or yellow vegetables, cantaloupe, fortified dairy products, liver, fortified cereals  Zinc: Fortified cereals, red meats, seafood  Consider supplementing with Justin by [employer]. It can be purchased on amazon, [employer] website, or local pharmacy may be able to order it for you.  (These are essential branch chain amino acids that help with tissue building and wound healing).   When your blood sugar is consistently elevated greater than 180 your body can't heal or fight infection.     Concerns:  Signs of infection may include the following:  Increase in redness  Red \"streaks\" from wound  Increase in swelling  Fever  Unusual odor  Change in the amount of wound drainage     Should you experience any significant changes in your wound(s) or have any questions regarding your home care instructions please contact the [facility] @ [phone] If after regular business hours, please call your family doctor or local emergency room. The treatment plan has been discussed at length between you and your provider. Follow all instructions carefully, it is very important. If you do not follow all instructions you are at risk of your wound not healing, infection, possible loss of limb and even loss of life.    Martina Mckeon FNP-C, CWCN-AP, CFCN, CSWS, WCC, DWC  5/16/2025

## 2025-05-15 NOTE — PROGRESS NOTES
Samer F. Najjar, MD  Vascular Surgery  [facility]       VASCULAR SURGERY OFFICE NOTE        Name: Raymond Olivo   : 1975  [id number]     REASON FOR VISIT:   Patient is a 49 year old female is here for follow-up regarding bilateral venous ulcers.  I have seen the patient before initially for her left lower extremity ankle area pain.  The patient has a history of mixed connective tissue disease and was later thought to have lupus vasculitis.  She is on MMF  750mg daily, prednisone 2.5 mg daily, plaquenil 200mg daily and amlodipine 2.5 mg.  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  She follows with the wound care center after she developed bilateral lower extremity ulcerations.      PAST MEDICAL HISTORY:   Past Medical History[1]    PAST SURGICAL HISTORY:   Past Surgical History[2]     MEDICATIONS:   Medications - Current[3]    LABSS:     Lab Results   Component Value Date     2020    A1C 5.1 2020      Lab Results   Component Value Date    GLU 82 2025    BUN 9 2025    CREATSERUM 0.59 2025    BUNCREA 16.7 2021    ANIONGAP 6 2025    GFRAA 126 2022    GFRNAA 109 2022    CA 9.7 2025     2025    K 3.8 2025     2025    CO2 29.0 2025    OSMOCALC 284 2025      Lab Results   Component Value Date    WBC 4.6 2025    RBC 4.61 2025    HGB 13.3 2025    HCT 41.0 2025    MCV 88.9 2025    MCH 28.9 2025    MCHC 32.4 2025    RDW 12.7 2025    .0 2025        Lab Results   Component Value Date    HGB 13.3 2025    HGB 12.7 2025    HGB 13.1 2024    HGB 12.8 2024    HGB 12.8 2024    HGB 12.4 10/19/2023    CREATSERUM 0.59 2025    CREATSERUM 0.63 2025    CREATSERUM  0.58 09/03/2024    CREATSERUM 0.59 07/23/2024    CREATSERUM 0.50 (L) 03/18/2024    CREATSERUM 0.56 10/19/2023       EXAM:   /72   Ht 5' 2\" (1.575 m)   Wt 125 lb (56.7 kg)   BMI 22.86 kg/m²   Bilateral superficial medial malleolar area ulcerations with surrounding venous stasis changes       ASSESSMENT    Diagnoses and all orders for this visit:    Chronic venous hypertension w ulceration (HCC)  -     US VENOUS LE REFLUX BILAT (CPT=93970); Future    I have recommended repeating her venous reflux ultrasound so that we can better understand her current anatomy and perhaps plan for a potential treatment.  Her situation is not a routine venous ulcer but this is complicated by vasculitis which can make her treatment plan significantly prolonged I will coordinate my efforts with the wound care team at [place].      PLAN:             Samer F. Najjar MD  Division of Vascular Surgery         [1]   Past Medical History:   Mixed connective tissue disease (HCC)    Raynaud's disease   [2] No past surgical history on file.  [3]   Current Outpatient Medications:     Mycophenolate Mofetil 500 MG Oral Tab, Take 1.5 tablets (750 mg total) by mouth daily., Disp: 135 tablet, Rfl: 0    gentamicin 0.3 % Ophthalmic Solution, Apply 3 drops to wound twice daily, cover with petroleum gauze and cover dressing, Disp: 15 mL, Rfl: 0    doxycycline 100 MG Oral Cap, Bring capsules to wound clinic to use as directed, Disp: 5 capsule, Rfl: 0    ergocalciferol 1.25 MG (85439 UT) Oral Cap, Take 1 capsule (50,000 Units total) by mouth once a week., Disp: 12 capsule, Rfl: 0    hydroxychloroquine 200 MG Oral Tab, Take 1 tablet (200 mg total) by mouth daily., Disp: 90 tablet, Rfl: 0    predniSONE 2.5 MG Oral Tab, Take 1 tablet (2.5 mg total) by mouth daily., Disp: 90 tablet, Rfl: 0    amLODIPine 2.5 MG Oral Tab, Take 1 tablet (2.5 mg total) by mouth 2 (two) times daily., Disp: 180 tablet, Rfl: 0    FERROUS SULFATE 325 (65 Fe) MG Oral Tab, TAKE 1  TABLET BY MOUTH 2 TIMES DAILY WITH MEALS., Disp: 180 tablet, Rfl: 0    clobetasol 0.05 % External Solution, Apply topically to lower legs after shower 3 x weekly, Disp: 50 mL, Rfl: 0

## 2025-05-16 ENCOUNTER — HOSPITAL ENCOUNTER (OUTPATIENT)
Dept: LAB | Facility: HOSPITAL | Age: 50
Discharge: HOME | End: 2025-05-16
Attending: INTERNAL MEDICINE
Payer: COMMERCIAL

## 2025-05-16 ENCOUNTER — OFFICE VISIT (OUTPATIENT)
Dept: WOUND CARE | Facility: HOSPITAL | Age: 50
End: 2025-05-16
Attending: NURSE PRACTITIONER
Payer: COMMERCIAL

## 2025-05-16 VITALS
RESPIRATION RATE: 14 BRPM | TEMPERATURE: 98 F | HEART RATE: 86 BPM | DIASTOLIC BLOOD PRESSURE: 67 MMHG | SYSTOLIC BLOOD PRESSURE: 109 MMHG

## 2025-05-16 DIAGNOSIS — M35.1 MIXED CONNECTIVE TISSUE DISEASE (HCC): ICD-10-CM

## 2025-05-16 DIAGNOSIS — I87.2 VENOUS INSUFFICIENCY OF BOTH LOWER EXTREMITIES: ICD-10-CM

## 2025-05-16 DIAGNOSIS — Z79.52 CURRENT CHRONIC USE OF SYSTEMIC STEROIDS: ICD-10-CM

## 2025-05-16 DIAGNOSIS — M32.19 LUPUS VASCULITIS (HCC): ICD-10-CM

## 2025-05-16 DIAGNOSIS — L97.812 NON-PRESSURE CHRONIC ULCER OF OTHER PART OF RIGHT LOWER LEG WITH FAT LAYER EXPOSED (HCC): ICD-10-CM

## 2025-05-16 DIAGNOSIS — I77.89 LUPUS VASCULITIS (HCC): ICD-10-CM

## 2025-05-16 DIAGNOSIS — I73.01 RAYNAUD'S DISEASE WITH GANGRENE (HCC): ICD-10-CM

## 2025-05-16 DIAGNOSIS — E55.9 VITAMIN D DEFICIENCY: ICD-10-CM

## 2025-05-16 DIAGNOSIS — L97.322 NON-PRESSURE CHRONIC ULCER OF LEFT ANKLE WITH FAT LAYER EXPOSED (HCC): Primary | ICD-10-CM

## 2025-05-16 LAB
ALBUMIN SERPL-MCNC: 4.8 G/DL (ref 3.2–4.8)
ALBUMIN/GLOB SERPL: 1.4 {RATIO} (ref 1–2)
ALP LIVER SERPL-CCNC: 89 U/L (ref 39–100)
ALT SERPL-CCNC: 11 U/L (ref 10–49)
ANION GAP SERPL CALC-SCNC: 6 MMOL/L (ref 0–18)
AST SERPL-CCNC: 20 U/L (ref ?–34)
BASOPHILS # BLD AUTO: 0.01 X10(3) UL (ref 0–0.2)
BASOPHILS NFR BLD AUTO: 0.2 %
BILIRUB SERPL-MCNC: 0.4 MG/DL (ref 0.3–1.2)
BUN BLD-MCNC: 9 MG/DL (ref 9–23)
C3 SERPL-MCNC: 119.3 MG/DL (ref 90–170)
C4 SERPL-MCNC: 33.4 MG/DL (ref 12–36)
CALCIUM BLD-MCNC: 9.7 MG/DL (ref 8.7–10.6)
CHLORIDE SERPL-SCNC: 103 MMOL/L (ref 98–112)
CO2 SERPL-SCNC: 29 MMOL/L (ref 21–32)
CREAT BLD-MCNC: 0.59 MG/DL (ref 0.55–1.02)
CRP SERPL-MCNC: <0.4 MG/DL (ref ?–0.5)
EGFRCR SERPLBLD CKD-EPI 2021: 111 ML/MIN/1.73M2 (ref 60–?)
EOSINOPHIL # BLD AUTO: 0.02 X10(3) UL (ref 0–0.7)
EOSINOPHIL NFR BLD AUTO: 0.4 %
ERYTHROCYTE [DISTWIDTH] IN BLOOD BY AUTOMATED COUNT: 12.7 %
ERYTHROCYTE [SEDIMENTATION RATE] IN BLOOD: 40 MM/HR (ref 0–20)
FASTING STATUS PATIENT QL REPORTED: NO
GLOBULIN PLAS-MCNC: 3.4 G/DL (ref 2–3.5)
GLUCOSE BLD-MCNC: 82 MG/DL (ref 70–99)
HCT VFR BLD AUTO: 41 % (ref 35–48)
HGB BLD-MCNC: 13.3 G/DL (ref 12–16)
IMM GRANULOCYTES # BLD AUTO: 0.01 X10(3) UL (ref 0–1)
IMM GRANULOCYTES NFR BLD: 0.2 %
LYMPHOCYTES # BLD AUTO: 0.55 X10(3) UL (ref 1–4)
LYMPHOCYTES NFR BLD AUTO: 12 %
MCH RBC QN AUTO: 28.9 PG (ref 26–34)
MCHC RBC AUTO-ENTMCNC: 32.4 G/DL (ref 31–37)
MCV RBC AUTO: 88.9 FL (ref 80–100)
MONOCYTES # BLD AUTO: 0.53 X10(3) UL (ref 0.1–1)
MONOCYTES NFR BLD AUTO: 11.5 %
NEUTROPHILS # BLD AUTO: 3.47 X10 (3) UL (ref 1.5–7.7)
NEUTROPHILS # BLD AUTO: 3.47 X10(3) UL (ref 1.5–7.7)
NEUTROPHILS NFR BLD AUTO: 75.7 %
OSMOLALITY SERPL CALC.SUM OF ELEC: 284 MOSM/KG (ref 275–295)
PLATELET # BLD AUTO: 200 10(3)UL (ref 150–450)
POTASSIUM SERPL-SCNC: 3.8 MMOL/L (ref 3.5–5.1)
PROT SERPL-MCNC: 8.2 G/DL (ref 5.7–8.2)
RBC # BLD AUTO: 4.61 X10(6)UL (ref 3.8–5.3)
SODIUM SERPL-SCNC: 138 MMOL/L (ref 136–145)
VIT D+METAB SERPL-MCNC: 76.3 NG/ML (ref 30–100)
WBC # BLD AUTO: 4.6 X10(3) UL (ref 4–11)

## 2025-05-16 PROCEDURE — 86160 COMPLEMENT ANTIGEN: CPT

## 2025-05-16 PROCEDURE — 86140 C-REACTIVE PROTEIN: CPT

## 2025-05-16 PROCEDURE — 11105 PUNCH BX SKIN EA SEP/ADDL: CPT | Performed by: NURSE PRACTITIONER

## 2025-05-16 PROCEDURE — 82306 VITAMIN D 25 HYDROXY: CPT

## 2025-05-16 PROCEDURE — 36415 COLL VENOUS BLD VENIPUNCTURE: CPT

## 2025-05-16 PROCEDURE — 11104 PUNCH BX SKIN SINGLE LESION: CPT | Performed by: NURSE PRACTITIONER

## 2025-05-16 PROCEDURE — 88346 IMFLUOR 1ST 1ANTB STAIN PX: CPT | Performed by: NURSE PRACTITIONER

## 2025-05-16 PROCEDURE — 80053 COMPREHEN METABOLIC PANEL: CPT

## 2025-05-16 PROCEDURE — 85025 COMPLETE CBC W/AUTO DIFF WBC: CPT

## 2025-05-16 PROCEDURE — 88350 IMFLUOR EA ADDL 1ANTB STN PX: CPT | Performed by: NURSE PRACTITIONER

## 2025-05-16 PROCEDURE — 85652 RBC SED RATE AUTOMATED: CPT

## 2025-05-16 NOTE — PATIENT INSTRUCTIONS
Please return: 1 week         Things to do:  Laboratory (blood draw) orders:  Orders Placed This Encounter   Procedures    Specimen to Pathology, Tissue    Specimen to Pathology, Tissue     Patient discharge and wound care instructions  Raymond Hayward Pallavi  5/16/2025      You may shower with protection of the wound (ie a cast cover or similar).     Cleansing/dressing:       In clinic, with each dressing change:    please cleanse the limb, foot, and between toes with soap, water and washcloth.   Dry thoroughly.    Cleanse/soak the wound with VASHE (or other hypochlorous wound cleanser), dab dry.    Apply the following dressings:      silver alginate>20-30mmhg calamine compression (wrap light with padding)    Managing your edema:  Avoid prolonged standing in one place. It is better to have your calf muscles moving to pump fluid out of the legs      Elevate leg(s) above the level of the heart when sitting or as much as possible.  Take you diuretics as directed by your physician. Do not skip doses or change doses unless instructed to do so by your physician.  Decrease salt intake, follow recommended 2 grams daily.  Do not get leg(s) with compression wrap wet. If wraps are too tight as indicated by pain, numbness/tingling or discoloration of toes remove wrap completely and call the wound center @ [phone].  Refer to the \"Multi-layer compression bandage application patient information sheet\" given to you in clinic.     Nutrition and blood sugar control:  Focus on the following:  Protein: Meats, beans, eggs, milk and yogurt particularly Greek yogurt), tofu, soy nuts, soy protein products (Follow the protein handout in your welcome folder)  Vitamin C: Citrus fruits and juices, strawberries, tomatoes, tomato juice, peppers, baked potatoes, spinach, broccoli, cauliflower, [place] sprouts, cabbage  Vitamin A: Dark green, leafy vegetables, orange or yellow vegetables, cantaloupe, fortified dairy products, liver, fortified  cereals  Zinc: Fortified cereals, red meats, seafood  Consider supplementing with Justin by [employer]. It can be purchased on amazon, [employer] website, or local pharmacy may be able to order it for you.  (These are essential branch chain amino acids that help with tissue building and wound healing).   When your blood sugar is consistently elevated greater than 180 your body can't heal or fight infection.     Concerns:  Signs of infection may include the following:  Increase in redness  Red \"streaks\" from wound  Increase in swelling  Fever  Unusual odor  Change in the amount of wound drainage     Should you experience any significant changes in your wound(s) or have any questions regarding your home care instructions please contact the wound center [facility] @ [phone] If after regular business hours, please call your family doctor or local emergency room. The treatment plan has been discussed at length between you and your provider. Follow all instructions carefully, it is very important. If you do not follow all instructions you are at risk of your wound not healing, infection, possible loss of limb and even loss of life.

## 2025-05-16 NOTE — PROGRESS NOTES
.Weekly Wound Education Note    Teaching Provided To: Patient, Family  Training Topics: Discharge instructions, Dressing, Edema control, Compression, Test/procedures  Training Method: Explain/Verbal, Written  Training Response: Patient responds and understands, Reinforcement needed            Punch biopsy taken from both ankles.  Silver alginate, abd pads to wounds.  Calamine unna boot 20-30mmHg to BLE.  Patient has an appointment with Dr. Najjar next week, extra spanda  given for patient to use.

## 2025-05-16 NOTE — PROGRESS NOTES
Patient ID: Raymond Olivo is a 49 year old female.    Lesion biopsy    Date/Time: 5/16/2025 11:37 AM    Performed by: Martina Mckeon APRN  Authorized by: Martina Mckeon APRN    Procedure Details - Skin Biopsy:     Body area: lower extremity    Lower extremity location: L ankle    Initial size (mm): 0    Final defect size (mm): 2    Malignancy: malignancy unknown      Destruction method: punch biopsy    Lesion biopsy    Date/Time: 5/16/2025 11:39 AM    Performed by: Martina Mckeon APRN  Authorized by: Martina Mckeon APRN    Procedure Details - Skin Biopsy:     Body area: lower extremity    Lower extremity location: R ankle    Initial size (mm): 0    Final defect size (mm): 2    Malignancy: malignancy unknown      Destruction method: punch biopsy

## 2025-05-19 ENCOUNTER — NURSE ONLY (OUTPATIENT)
Facility: CLINIC | Age: 50
End: 2025-05-19

## 2025-05-19 DIAGNOSIS — L97.909 CHRONIC VENOUS HYPERTENSION W ULCERATION (HCC): ICD-10-CM

## 2025-05-19 DIAGNOSIS — I87.319 CHRONIC VENOUS HYPERTENSION W ULCERATION (HCC): ICD-10-CM

## 2025-05-19 PROCEDURE — 93970 EXTREMITY STUDY: CPT | Performed by: SURGERY

## 2025-05-21 NOTE — PROGRESS NOTES
CHIEF COMPLAINT:     Chief Complaint   Patient presents with    Wound Recheck     Pt here for follow up arrives with ace wraps to bilateral legs. 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 Patient saw dr. Santamaria (vascular) in December 2023 and he documented \"I explained to the patient and her  that I do not believe the source of her pain to be vascular in origin.  She has an easily palpable dorsalis pedis pulse and the location of the pain is more suggestive of a neuropathic origin.  I suspect perhaps that given the tightness of her feet from her mixed connective tissue disorder the compression stockings is causing somewhat compression of the nerves in that area and I have encouraged her to substitute those stockings to those that involve the feet up to the upper calf.\" Patient last saw dr. Farmer in February 2025 and overall doing well. She was to continue on mmf 750mg daily, prednisone 2.5 mg daily, plaquenil 200mg daily and amlodipine 2.5 mg tiwce daily along with vitamine prscription and d3.  Spouse contacted dr farmer late last week regarding a small wound on her left leg, so patient was fit into the schedule.  They have not been dressing the wound with anything, she did have spandagrip on.  There was surrounding dry drainage. Patient states she noted something last Friday and then by Monday this week she noted it was a definite ulceration.  The drainage is clear and increases when patient plantar/dorsiflexes.  Most likely a lymph vessel leaking. We discussed utilizing doxy-they will bring to next appointment. Her skin remains very dry/flaky she states she has been utilizing resta daily.  We discussed that she may  need something with ammonium lactate or urea in it.  No s/s of infection. The area is not as painful as it has been previously.     4-17-25 patient returns. Last week there was lymphorrhea with dorsi/plantar flexion, patient brought the doxycycline to clinic today. Today wound is about the same size, but is more fibrinous. There is not any lymphorrhea noted even with dorsi/plantar flexion. The wound is not enlarging. Pain is manageable except with touch/cleansing. No s/s of infection. Patient able to ambulate without difficulty.     4-24-25 patient returns.  Last week the wound was less granular/more fibrinous we utilized honey and thick foam. today wound is much , more granular, but also larger with a small area of concern distal to the wound that appears as though it may open up.  There is not any lymphorrhea noted. her last venous reflux study was in 2020, this may be something we need to revisit should the wound continue to not progress as expected.  Today will have family apply gentamicin gtts and cover with petroleum gauze twice daily and compress with spandagrip. Rx sent to pharmacy. Patient is not doing justin, daughter/patient encouraged to start that again.    4-30-25 patient returns with spouse.  She has restarted the Justin.  Has been dressing with gent, the pain is about the same, wound is bout the same.  We discussed vascular follow-up for venous reflux study.  Will continue with gent for the next week.  I will reach out to vascular and update dr. Farmer.    5-8-25 patient returns with daughter.  She has been dressing with gent for the last 2 weeks.  I have been in communication with vascular us to get patient scheduled for venous reflux study and consult with dr. Najjar.  Patient reminded to please call to schedule. Today patient c/o burning to the right ankle and there are new scabs/eschar noted on the medial and lateral right ankle. The left medial ankle wound is not improved.  No acute s/s of  infection.  I d/w patient/family that I will reach out to dr farmer regarding the non-progression and the new open areas on the right. Will utilize honey hydrocolloid and return patient to compression wraps.    5-16-25 patient returns.  I communicated with rheum last week and they would like another biopsy, they have ordered labs.  Patient saw dr. 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  Patient did get labs drawn but the lab only josee the tb and hep b labs, patient encouraged to get the remaining labs ordered in February to be drawn today before leaving hospital, her next appointment with dr. Farmer is next Friday. Wounds are not improving, biopsies taken from left and right medial ankle wounds, sent in [facility] for dif    5-22-25 patient returns.  She has an appointment with dr farmer tomorrow. She got her venous us done on Monday, result is still pending.  Her DIF is also still pending.  We discussed placement of epifix, however we do not have the correct size (14mm disc) so will hold on that until next week.  We discussed that if the biopsies or lab work point to autoimmune or vasculitis then that can be treated with meds with dr. Farmer. If the us shows reflux then dr najjar can treat the veins.  Today the wounds are about the same. She does not have any new ones. No s/s of infection. Continues with extreme pain with any touch. Will update dr. Najjar and dr farmer.  MEDICATIONS:     Current Outpatient Medications:     Mycophenolate Mofetil 500 MG Oral Tab, Take 1.5 tablets (750 mg total) by mouth daily., Disp: 135 tablet, Rfl: 0    gentamicin 0.3 % Ophthalmic Solution, Apply 3 drops to wound twice daily, cover with petroleum gauze and cover dressing, Disp: 15 mL, Rfl:  0    doxycycline 100 MG Oral Cap, Bring capsules to wound clinic to use as directed, Disp: 5 capsule, Rfl: 0    hydroxychloroquine 200 MG Oral Tab, Take 1 tablet (200 mg total) by mouth daily., Disp: 90 tablet, Rfl: 0    predniSONE 2.5 MG Oral Tab, Take 1 tablet (2.5 mg total) by mouth daily., Disp: 90 tablet, Rfl: 0    amLODIPine 2.5 MG Oral Tab, Take 1 tablet (2.5 mg total) by mouth 2 (two) times daily., Disp: 180 tablet, Rfl: 0    FERROUS SULFATE 325 (65 Fe) MG Oral Tab, TAKE 1 TABLET BY MOUTH 2 TIMES DAILY WITH MEALS., Disp: 180 tablet, Rfl: 0    clobetasol 0.05 % External Solution, Apply topically to lower legs after shower 3 x weekly, Disp: 50 mL, Rfl: 0  ALLERGIES:   No Known Allergies   REVIEW OF SYSTEMS:   This information was obtained from the patient/family and chart.    See HPI for pertinent positives, otherwise 10 pt ROS negative.    HISTORY:   Past medical, surgical, family and social history updated where appropriate.    PHYSICAL EXAM:     Vitals:    05/22/25 0700   BP: 105/68   Pulse: 82   Resp: 14   Temp: 98 °F (36.7 °C)       Estimated body mass index is 22.86 kg/m² as calculated from the following:    Height as of 5/15/25: 62\".    Weight as of 5/15/25: 125 lb (56.7 kg).   No results found for: \"PGLU\"    Vital signs reviewed.Appears stated age, well groomed.    Constitutional:  Bp wnl for patient. Pulse Regular and wnl for patient. Respirations easy and unlabored. Temperature wnl. Weight normal for height. Appearance neat and clean. Appears in no acute distress. Well nourished and well developed.    Lower extremity:  dp/pt palpable bilalterally.   lower extremities are free of varicosities and no edema, they are scarred, scattered changes in pigmentation (hypo/hyper) with atrophie jay. Capillary refill < 3 seconds. Digits are warm. toenails are wnl for color, thickness and hygeine. Skin is dry-. + hairgrowth on legs.    Musculoskeletal:  Gait and station stable   Integumentary:  refer to wound  characteristics and images   Psychiatric:  Judgment and insight intact. Alert and oriented times 3. No evidence of depression, anxiety, or agitation. Calm, cooperative, and communicative, but very quiet and reserved.  EDEMA:   Calf  Point of Measurement - Left Calf: 31  Point of Measurement - Right Calf: 31  Left Calf from:: Heel  Calf Left cm:: 27.9  Right Calf from:: Heel  Right Calf cm:: 28.2  Ankle  Point of Measurement - Left Ankle: 10  Point of Measurement - Right Ankle: 10  Left Ankle from:: Heel  Left Ankle cm:: 17.8     Right Ankle from:: Heel  Right Ankle cm:: 17.2     DIAGNOSTICS:     Lab Results   Component Value Date    BUN 9 05/16/2025    CREATSERUM 0.59 05/16/2025    GFRCKDEPI 121.37 12/17/2020    ALB 4.8 05/16/2025    TP 8.2 05/16/2025    A1C 5.1 12/17/2020     Lab Results   Component Value Date    ESRML 40 (H) 05/16/2025    ESRML 54 (H) 02/25/2025    ESRML 47 (H) 09/03/2024      Lab Results   Component Value Date    CRP <0.40 05/16/2025    CRP <0.40 02/25/2025    CRP 0.50 09/03/2024 11-28-23 arterial duplex-dr hilliard  FINDINGS:    LEFT EXTREMITY:  Triphasic waveforms in the left common femoral, profunda, superficial femoral arteries.  Biphasic waveforms below the left knee.   OTHER:  None.     PEAK VELOCITIES (CM/S):   LEFT COMMON FEMORAL:      165    LEFT PROFUNDA FEMORAL:      78      LEFT SUPERFICIAL FEMORAL PROX:    113   LEFT SUPERFICIAL FEMORAL MID:      94   LEFT SUPERFICIAL FEMORAL DISTAL:    92   LEFT POPLITEAL PROX:      110   LEFT POPLITEAL DISTAL:      121   TIBIOPER TRUNK:      66   LEFT PTA PROX:      60   LEFT PTA MID:      62   LEFT PTA DISTAL:      73   LEFT BETH PROX:      82   LEFT BETH MID:      72    LEFT DORSALIS PEDIS::      30   LEFT GREAT TOE PRESSURE:      25 mmHg   CONCLUSION:    1. No high-grade stenosis is identified.  There is suggestion of diffuse mild stenosis throughout most of the left lower extremity.    2. Overall diminished left toe pressure is noted.   Possibility of decreased perfusion in the distal left foot is of consideration.     7-14-20 venous reflux study-dr santamaria  Per Dr. Santamaria review \"no intervention needed\"  WOUND ASSESSMENT:     Wound 04/10/25 #1 Ankle Left;Medial (Active)   Date First Assessed/Time First Assessed: 04/10/25 0757    Wound Number (Wound Clinic Only): #1  Primary Wound Type: Auto-immune  Location: Ankle  Wound Location Orientation: Left;Medial      Assessments 4/10/2025  7:57 AM 5/22/2025  9:14 AM   Wound Image        Drainage Amount Unable to assess Moderate   Drainage Description -- Serosanguineous   Treatments Compression --   Wound Length (cm) 0.7 cm 1.9 cm   Wound Width (cm) 0.7 cm 0.9 cm   Wound Surface Area (cm^2) 0.38 cm^2 1.34 cm^2   Wound Depth (cm) 0.2 cm 0.2 cm   Wound Volume (cm^3) 0.051 cm^3 0.179 cm^3   Wound Healing % -- -251   Margins Well-defined edges Attached edges   Non-staged Wound Description Full thickness Full thickness   Soni-wound Assessment Dry;Hemosiderin staining Atrophy jay;Painful   Wound Granulation Tissue Pale Grey;Pink;Firm Red;Firm   Wound Bed Granulation (%) 100 % 85 %   Wound Bed Slough (%) -- 15 %   Wound Odor None None   Tunneling? No No   Undermining? No No   Sinus Tracts? No No       Inactive Orders   Date Order Priority Status Authorizing Provider   05/16/25 1137 Lesion biopsy Routine Completed Martina Mckeon APRN       Compression Wrap 04/10/25 Ankle Anterior;Left (Active)   Placement Date/Time: 04/10/25 0846   Location: Ankle  Wound Location Orientation: Anterior;Left      Assessments 4/10/2025  7:57 AM 5/16/2025  2:04 PM   Response to Treatment Well tolerated Well tolerated   Compression Layers Multilayer Multilayer   Compression Product Type Unna Boot Unna Boot   Dressing Applied Yes Yes   Compression Wrap Location Toes to Knee Toes to Knee   Compression Wrap Status Dry;Intact;Clean Clean;Dry       No associated orders.       Wound 05/08/25 #2 Ankle Right;Medial (Active)   Date First  Assessed/Time First Assessed: 05/08/25 1056    Wound Number (Wound Clinic Only): #2  Primary Wound Type: Auto-immune  Location: Ankle  Wound Location Orientation: Right;Medial      Assessments 5/8/2025 10:58 AM 5/22/2025  9:12 AM   Wound Image       Drainage Amount None Small   Drainage Description -- Serosanguineous   Treatments Compression --   Wound Length (cm) 0.5 cm 1.6 cm   Wound Width (cm) 0.5 cm 1.2 cm   Wound Surface Area (cm^2) 0.2 cm^2 1.51 cm^2   Wound Depth (cm) 0 cm 0.2 cm   Wound Volume (cm^3) 0 cm^3 0.201 cm^3   Margins Undefined edges Well-defined edges   Non-staged Wound Description Full thickness Full thickness   Soni-wound Assessment Dry Painful;Atrophy jay   Wound Granulation Tissue -- Pink;Firm   Wound Bed Granulation (%) -- 15 %   Wound Bed Epithelium (%) -- 10 %   Wound Bed Slough (%) -- 75 %   Wound Odor None None   Shape 100% scabbed bridge   Tunneling? -- No   Undermining? -- No   Sinus Tracts? -- No       Inactive Orders   Date Order Priority Status Authorizing Provider   05/16/25 1139 Lesion biopsy Routine Completed Martina Mckeon APRN       Wound 05/08/25 #3 Ankle Right;Lateral (Active)   Date First Assessed/Time First Assessed: 05/08/25 1056    Wound Number (Wound Clinic Only): #3  Primary Wound Type: Auto-immune  Location: Ankle  Wound Location Orientation: Right;Lateral      Assessments 5/8/2025 10:59 AM 5/22/2025  9:13 AM   Wound Image       Drainage Amount None Scant   Drainage Description -- Serosanguineous   Treatments Compression --   Wound Length (cm) 0.5 cm 0.7 cm   Wound Width (cm) 0.8 cm 0.8 cm   Wound Surface Area (cm^2) 0.31 cm^2 0.44 cm^2   Wound Depth (cm) 0 cm 0.1 cm   Wound Volume (cm^3) 0 cm^3 0.029 cm^3   Margins Undefined edges Well-defined edges   Non-staged Wound Description Full thickness Full thickness   Soni-wound Assessment Dry Atrophy jay;Painful   Wound Granulation Tissue -- Pink;Firm   Wound Bed Granulation (%) -- 10 %   Wound Bed Slough (%) -- 90  %   Wound Odor None None   Shape 100% scabbed --   Tunneling? -- No   Undermining? -- No   Sinus Tracts? -- No       No associated orders.       Compression Wrap 05/08/25 Ankle Anterior;Right (Active)   Placement Date/Time: 05/08/25 1646   Location: Ankle  Wound Location Orientation: Anterior;Right      Assessments 5/8/2025 10:57 AM 5/16/2025  2:51 PM   Response to Treatment Well tolerated Well tolerated   Compression Layers Multilayer Multilayer   Compression Product Type Unna Boot Unna Boot   Dressing Applied Yes Yes   Compression Wrap Location Toes to Knee Toes to Knee   Compression Wrap Status Clean;Dry;Intact Clean;Dry       No associated orders.       ASSESSMENT AND PLAN:    1. 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 Lupus vasculitis (HCC)    4. Mixed connective tissue disease (HCC)    5. Venous insufficiency of both lower extremities    6. Current chronic use of systemic steroids            Risks, benefits, and alternatives of current treatment plan discussed in detail.  Questions and concerns addressed. Red flags to RTC or ED reviewed.  Patient (or parent) agrees to plan.      NOTE TO PATIENT: The 21st Century Cures Act makes clinical notes like these available to patients in the interest of transparency. Clinical notes are medical documents used by physicians and care providers to communicate with each other. These documents include medical language and terminology, abbreviations, and treatment information that may sound technical and at times possibly unfamiliar. In addition, at times, the verbiage may appear blunt or direct. These documents are one tool providers use to communicate relevant information and clinical opinions of the care providers in a way that allows common understanding of the clinical context.     I [id number] with the patient. This time included:     preparing to see the patient (eg, review notes and recent diagnostics),  seeing the patient, obtaining and/or reviewing separately obtained history, performing a medically appropriate examination and/or evaluation, counseling and educating the patient, documenting in the record,communication with lab, vascular, rheum,  DISCHARGE INSTRUCTIONS     Patient Instructions   Please return: 1 week     Plan to place 14mm disc epifix    Patient discharge and wound care instructions  Raymond Martinezu  5/22/2025         You may shower with protection of the wound (ie a cast cover or similar).     Cleansing/dressing:       In clinic, with each dressing change:    please cleanse the limb, foot, and between toes with soap, water and washcloth.   Dry thoroughly.    Cleanse/soak the wound with VASHE (or other hypochlorous wound cleanser), dab dry.    Apply the following dressings:   LEFT:  gina>xeroform>20-30mmhg calamine compression (wrap light with padding)        RIGHT:  small amount honey gel>honey gauze>20-30mmhg calamine compression (wrap light with padding)     Managing your edema:  Avoid prolonged standing in one place. It is better to have your calf muscles moving to pump fluid out of the legs      Elevate leg(s) above the level of the heart when sitting or as much as possible.  Take you diuretics as directed by your physician. Do not skip doses or change doses unless instructed to do so by your physician.  Decrease salt intake, follow recommended 2 grams daily.  Do not get leg(s) with compression wrap wet. If wraps are too tight as indicated by pain, numbness/tingling or discoloration of toes remove wrap completely and call the wound center @ [phone].  Refer to the \"Multi-layer compression bandage application patient information sheet\" given to you in clinic.     Nutrition and blood sugar control:  Focus on the following:  Protein: Meats, beans, eggs, milk and yogurt particularly Greek yogurt), tofu, soy nuts,  soy protein products (Follow the protein handout in your welcome folder)  Vitamin C: Citrus fruits and juices, strawberries, tomatoes, tomato juice, peppers, baked potatoes, spinach, broccoli, cauliflower, [place] sprouts, cabbage  Vitamin A: Dark green, leafy vegetables, orange or yellow vegetables, cantaloupe, fortified dairy products, liver, fortified cereals  Zinc: Fortified cereals, red meats, seafood  Consider supplementing with Justin by [employer]. It can be purchased on amazon, [employer] website, or local pharmacy may be able to order it for you.  (These are essential branch chain amino acids that help with tissue building and wound healing).   When your blood sugar is consistently elevated greater than 180 your body can't heal or fight infection.     Concerns:  Signs of infection may include the following:  Increase in redness  Red \"streaks\" from wound  Increase in swelling  Fever  Unusual odor  Change in the amount of wound drainage     Should you experience any significant changes in your wound(s) or have any questions regarding your home care instructions please contact the [facility] @ [phone] If after regular business hours, please call your family doctor or local emergency room. The treatment plan has been discussed at length between you and your provider. Follow all instructions carefully, it is very important. If you do not follow all instructions you are at risk of your wound not healing, infection, possible loss of limb and even loss of life.    Martina Mckeon FNP-C, CWCN-AP, CFCN, CSWS, WCC, DWC  5/22/2025

## 2025-05-22 ENCOUNTER — OFFICE VISIT (OUTPATIENT)
Dept: WOUND CARE | Facility: HOSPITAL | Age: 50
End: 2025-05-22
Attending: NURSE PRACTITIONER
Payer: COMMERCIAL

## 2025-05-22 ENCOUNTER — TELEPHONE (OUTPATIENT)
Facility: CLINIC | Age: 50
End: 2025-05-22

## 2025-05-22 VITALS
TEMPERATURE: 98 F | DIASTOLIC BLOOD PRESSURE: 68 MMHG | RESPIRATION RATE: 14 BRPM | SYSTOLIC BLOOD PRESSURE: 105 MMHG | HEART RATE: 82 BPM

## 2025-05-22 DIAGNOSIS — L97.812 NON-PRESSURE CHRONIC ULCER OF OTHER PART OF RIGHT LOWER LEG WITH FAT LAYER EXPOSED (HCC): ICD-10-CM

## 2025-05-22 DIAGNOSIS — M35.1 MIXED CONNECTIVE TISSUE DISEASE (HCC): ICD-10-CM

## 2025-05-22 DIAGNOSIS — M32.19 LUPUS VASCULITIS (HCC): ICD-10-CM

## 2025-05-22 DIAGNOSIS — I87.2 VENOUS INSUFFICIENCY OF BOTH LOWER EXTREMITIES: ICD-10-CM

## 2025-05-22 DIAGNOSIS — L97.322 NON-PRESSURE CHRONIC ULCER OF LEFT ANKLE WITH FAT LAYER EXPOSED (HCC): Primary | ICD-10-CM

## 2025-05-22 DIAGNOSIS — I77.89 LUPUS VASCULITIS (HCC): ICD-10-CM

## 2025-05-22 DIAGNOSIS — Z79.52 CURRENT CHRONIC USE OF SYSTEMIC STEROIDS: ICD-10-CM

## 2025-05-22 PROCEDURE — 99215 OFFICE O/P EST HI 40 MIN: CPT | Performed by: NURSE PRACTITIONER

## 2025-05-22 NOTE — PATIENT INSTRUCTIONS
Please return: 1 week     Plan to place 14mm disc epifix    Patient discharge and wound care instructions  Raymond Olivo  5/22/2025         You may shower with protection of the wound (ie a cast cover or similar).     Cleansing/dressing:       In clinic, with each dressing change:    please cleanse the limb, foot, and between toes with soap, water and washcloth.   Dry thoroughly.    Cleanse/soak the wound with VASHE (or other hypochlorous wound cleanser), dab dry.    Apply the following dressings:   LEFT:  gina>xeroform>20-30mmhg calamine compression (wrap light with padding)        RIGHT:  small amount honey gel>honey gauze>20-30mmhg calamine compression (wrap light with padding)     Managing your edema:  Avoid prolonged standing in one place. It is better to have your calf muscles moving to pump fluid out of the legs      Elevate leg(s) above the level of the heart when sitting or as much as possible.  Take you diuretics as directed by your physician. Do not skip doses or change doses unless instructed to do so by your physician.  Decrease salt intake, follow recommended 2 grams daily.  Do not get leg(s) with compression wrap wet. If wraps are too tight as indicated by pain, numbness/tingling or discoloration of toes remove wrap completely and call the wound center @ [phone].  Refer to the \"Multi-layer compression bandage application patient information sheet\" given to you in clinic.     Nutrition and blood sugar control:  Focus on the following:  Protein: Meats, beans, eggs, milk and yogurt particularly Greek yogurt), tofu, soy nuts, soy protein products (Follow the protein handout in your welcome folder)  Vitamin C: Citrus fruits and juices, strawberries, tomatoes, tomato juice, peppers, baked potatoes, spinach, broccoli, cauliflower, [place] sprouts, cabbage  Vitamin A: Dark green, leafy vegetables, orange or yellow vegetables, cantaloupe, fortified dairy products, liver, fortified cereals  Zinc:  Fortified cereals, red meats, seafood  Consider supplementing with Justin [employer]. It can be purchased on amazon, [employer] website, or local pharmacy may be able to order it for you.  (These are essential branch chain amino acids that help with tissue building and wound healing).   When your blood sugar is consistently elevated greater than 180 your body can't heal or fight infection.     Concerns:  Signs of infection may include the following:  Increase in redness  Red \"streaks\" from wound  Increase in swelling  Fever  Unusual odor  Change in the amount of wound drainage     Should you experience any significant changes in your wound(s) or have any questions regarding your home care instructions please contact the [facility] @ [phone] If after regular business hours, please call your family doctor or local emergency room. The treatment plan has been discussed at length between you and your provider. Follow all instructions carefully, it is very important. If you do not follow all instructions you are at risk of your wound not healing, infection, possible loss of limb and even loss of life.

## 2025-05-23 ENCOUNTER — OFFICE VISIT (OUTPATIENT)
Dept: RHEUMATOLOGY | Facility: CLINIC | Age: 50
End: 2025-05-23
Payer: COMMERCIAL

## 2025-05-23 VITALS
DIASTOLIC BLOOD PRESSURE: 60 MMHG | RESPIRATION RATE: 16 BRPM | HEART RATE: 78 BPM | BODY MASS INDEX: 21.71 KG/M2 | HEIGHT: 62 IN | WEIGHT: 118 LBS | TEMPERATURE: 98 F | SYSTOLIC BLOOD PRESSURE: 102 MMHG | OXYGEN SATURATION: 100 %

## 2025-05-23 DIAGNOSIS — I73.01 RAYNAUD'S DISEASE WITH GANGRENE (HCC): ICD-10-CM

## 2025-05-23 DIAGNOSIS — Z79.899 HIGH RISK MEDICATION USE: ICD-10-CM

## 2025-05-23 DIAGNOSIS — Z87.39 HISTORY OF BURNING PAIN IN LEG: ICD-10-CM

## 2025-05-23 DIAGNOSIS — M32.19 LUPUS VASCULITIS (HCC): ICD-10-CM

## 2025-05-23 DIAGNOSIS — I77.89 LUPUS VASCULITIS (HCC): ICD-10-CM

## 2025-05-23 DIAGNOSIS — Z86.2 HISTORY OF ITP: ICD-10-CM

## 2025-05-23 DIAGNOSIS — L97.922 ULCER OF LEFT LOWER EXTREMITY WITH FAT LAYER EXPOSED (HCC): ICD-10-CM

## 2025-05-23 DIAGNOSIS — Z79.899 LONG-TERM USE OF PLAQUENIL: ICD-10-CM

## 2025-05-23 DIAGNOSIS — D84.821 IMMUNOCOMPROMISED STATE DUE TO DRUG THERAPY (HCC): ICD-10-CM

## 2025-05-23 DIAGNOSIS — M35.1 MIXED CONNECTIVE TISSUE DISEASE (HCC): Primary | ICD-10-CM

## 2025-05-23 DIAGNOSIS — L97.912 ULCER OF RIGHT LOWER EXTREMITY WITH FAT LAYER EXPOSED (HCC): ICD-10-CM

## 2025-05-23 DIAGNOSIS — Z79.52 LONG TERM (CURRENT) USE OF SYSTEMIC STEROIDS: ICD-10-CM

## 2025-05-23 DIAGNOSIS — Z79.899 IMMUNOCOMPROMISED STATE DUE TO DRUG THERAPY (HCC): ICD-10-CM

## 2025-05-23 RX ORDER — GABAPENTIN 100 MG/1
100 CAPSULE ORAL NIGHTLY
Qty: 90 CAPSULE | Refills: 0 | Status: SHIPPED | OUTPATIENT
Start: 2025-05-23

## 2025-05-23 RX ORDER — METHYLPREDNISOLONE ACETATE 80 MG/ML
80 INJECTION, SUSPENSION INTRA-ARTICULAR; INTRALESIONAL; INTRAMUSCULAR; SOFT TISSUE ONCE
Status: COMPLETED | OUTPATIENT
Start: 2025-05-23 | End: 2025-05-23

## 2025-05-23 RX ORDER — PREDNISONE 5 MG/1
TABLET ORAL
Qty: 70 TABLET | Refills: 0 | Status: SHIPPED | OUTPATIENT
Start: 2025-05-23

## 2025-05-23 RX ORDER — AMLODIPINE BESYLATE 2.5 MG/1
2.5 TABLET ORAL 2 TIMES DAILY
Qty: 180 TABLET | Refills: 0 | Status: SHIPPED | OUTPATIENT
Start: 2025-05-23

## 2025-05-23 RX ORDER — HYDROXYCHLOROQUINE SULFATE 200 MG/1
200 TABLET, FILM COATED ORAL DAILY
Qty: 90 TABLET | Refills: 0 | Status: SHIPPED | OUTPATIENT
Start: 2025-05-23

## 2025-05-23 RX ORDER — MYCOPHENOLATE MOFETIL 500 MG/1
500 TABLET ORAL 2 TIMES DAILY
Qty: 180 TABLET | Refills: 0 | Status: SHIPPED | OUTPATIENT
Start: 2025-05-23

## 2025-05-23 RX ADMIN — METHYLPREDNISOLONE ACETATE 80 MG: 80 INJECTION, SUSPENSION INTRA-ARTICULAR; INTRALESIONAL; INTRAMUSCULAR; SOFT TISSUE at 09:43:00

## 2025-05-23 NOTE — PATIENT INSTRUCTIONS
-- depomedrol injection in office  -- increase prednisone to 20mg daily x one week and slow taper   -- increase MMF 500mg twice daily   -- continue amlodipine 5mg total daily, will need to consider adding fluoxetine vs sildenafil for the blood flow/raynauds   -- continue hydroxychloroquine 200mg daily  -- remember annual eye exams  -- ECHO and chest xray ordered  -- arterial ultrasound also ordered  -- restart gabapentin 100mg nightly to help with the burning pain  -- continue following with wound care and vascular surgeon  -- keep follow up in 3 months but consider sooner appt if available  -- we will be in touch after biopsy results and consider stopping MMF and switching to rituximab infusion   -- be sure to get age appropriate cancer screening     Dr. Farmer

## 2025-05-23 NOTE — PROGRESS NOTES
RHEUMATOLOGY Follow up   Date of visit: 05/23/2025    Chief Complaint   Patient presents with    Mixed Connective Tissue Disease     2 month f/u. Here to go over next steps in care management       ASSESSMENT, DISCUSSION & PLAN   Assessment:  1. Mixed connective tissue disease (HCC)    2. Lupus vasculitis (HCC)    3. Raynaud's disease with gangrene (HCC)    4. History of ITP    5. High risk medication use    6. Long-term use of Plaquenil    7. Long term (current) use of systemic steroids    8. Immunocompromised state due to drug therapy (HCC)    9. Ulcer of left lower extremity with fat layer exposed (HCC)    10. Ulcer of right lower extremity with fat layer exposed (HCC)    11. History of burning pain in leg        Discussion:  Mrs. Raymond Olivo is a 48 yo woman with previously diagnosed mixed connective tissue disease and recent ulcer/chronic wounds in her bilateral legs. She does seem to have prominent sclerodermatous features with Raynaud's and chronic wounds, skin tightening as well as telangectasias. It seems like she has been on multiple DMARDs in the past including plaquenil, which was stopped due to possible inefficacy and GI upset as well as azathioprine which was discontinued due to cytopenias.     Since establishing care with me, she restarted methotrexate and had significant improvement of skin ulcerations with amlodipine. She did have worsened skin ulcer formation and biopsy performed by wound care confirmed lupus vasculitis. Due to lack of efficacy of methotrexate, decision was made to stop methotrexate, restart plaquenil and mycophenolate.     Patient had to be hospitalized due to ITP.  Unclear etiology but had responded to prolonged steroids.   She decided to  stop both CellCept and prednisone.  She has been off CellCept since hospitalization at the end of November 2022.  And she had been off of her prednisone but had some worsened fatigue and trace ankle swelling bilaterally. She did have some  elevated inflammatory markers as well so prednisone was restarted. She had been doing better overall.     There is a comopnent of medical nonadherence and pt has been told multiple times to get  baseline ECHO and PFTs due to other complications from the MCTD which may be contributing to some of her fatigue and low weight.  She had initially delayed initially due to COVID19 pandemic, however, she has not had any baseline done since disease onset and we need to evaluate for possible ILD vs pulmonary HTN. Reiterated again but pt declines further work up.     More recently, she has been struggling with more frequent leg ulcers/wound. Seems like venous work up negative. Has not had recent arterial testing done.   Will step up immunosuppression and consider rituximab instead of increase MMF  She would like benefit from restarting gabapentin nightly for the burning pain since this previously helped.   Due to persistent Raynaud's and questionable perfusion, we previously tried adding fluoxetine to the amlodipine. She did not like how she felt, so medication was discontinued. May need to reconsider this vs adding sildenafil     -- depomedrol injection in office  -- increase prednisone to 20mg daily x one week and slow taper   -- increase MMF 500mg twice daily   -- continue amlodipine 5mg total daily, will need to consider adding fluoxetine vs sildenafil for the blood flow/raynauds   -- continue hydroxychloroquine 200mg daily  -- remember annual eye exams  -- ECHO and chest xray ordered  -- arterial ultrasound also ordered  -- restart gabapentin 100mg nightly to help with the burning pain  -- continue following with wound care and vascular surgeon  -- keep follow up in 3 months but consider sooner appt if available  -- we will be in touch after biopsy results and consider stopping MMF and switching to rituximab infusion   -- be sure to get age appropriate cancer screening       Patient and pt's  verbalized  understanding of above instructions. No further questions at this time.    Code selection for this visit was based on time. Time spent (40min) on date of service in preparing to see the patient, obtaining and/or reviewing separately obtained history, performing a medically appropriate examination, counseling and educating the patient/family/caregiver, ordering medications or testing, referring and communicating with other healthcare providers, documenting clinical information in the E HR, independently interpreting results and communicating results to the patient/family/caregiver and care coordination with the patient's other providers.    Additional time spent coordinating care with her other providers     Plan:  Diagnoses and all orders for this visit:    Mixed connective tissue disease (HCC)  -     XR CHEST PA + LAT CHEST (CPT=71046); Future  -     CARD ECHO 2D DOPPLER (CPT=93306); Future  -     amLODIPine 2.5 MG Oral Tab; Take 1 tablet (2.5 mg total) by mouth 2 (two) times daily.  -     hydroxychloroquine 200 MG Oral Tab; Take 1 tablet (200 mg total) by mouth daily.  -     Mycophenolate Mofetil 500 MG Oral Tab; Take 1 tablet (500 mg total) by mouth 2 (two) times daily.  -     methylPREDNISolone acetate (DEPO-medrol) 80 MG/ML injection 80 mg  -     US ARTERIAL DUPLEX LOWER EXTREMITY BILATERAL (CPT=93925); Future    Lupus vasculitis (HCC)  -     XR CHEST PA + LAT CHEST (CPT=71046); Future  -     CARD ECHO 2D DOPPLER (CPT=93306); Future  -     amLODIPine 2.5 MG Oral Tab; Take 1 tablet (2.5 mg total) by mouth 2 (two) times daily.  -     hydroxychloroquine 200 MG Oral Tab; Take 1 tablet (200 mg total) by mouth daily.  -     Mycophenolate Mofetil 500 MG Oral Tab; Take 1 tablet (500 mg total) by mouth 2 (two) times daily.  -     methylPREDNISolone acetate (DEPO-medrol) 80 MG/ML injection 80 mg  -     US ARTERIAL DUPLEX LOWER EXTREMITY BILATERAL (CPT=93925); Future    Raynaud's disease with gangrene (HCC)  -     XR  CHEST PA + LAT CHEST (CPT=71046); Future  -     CARD ECHO 2D DOPPLER (CPT=93306); Future  -     amLODIPine 2.5 MG Oral Tab; Take 1 tablet (2.5 mg total) by mouth 2 (two) times daily.  -     hydroxychloroquine 200 MG Oral Tab; Take 1 tablet (200 mg total) by mouth daily.    History of ITP    High risk medication use    Long-term use of Plaquenil    Long term (current) use of systemic steroids    Immunocompromised state due to drug therapy (HCC)    Ulcer of left lower extremity with fat layer exposed (HCC)  -     US ARTERIAL DUPLEX LOWER EXTREMITY BILATERAL (CPT=93925); Future  -     gabapentin 100 MG Oral Cap; Take 1 capsule (100 mg total) by mouth nightly.    Ulcer of right lower extremity with fat layer exposed (HCC)  -     US ARTERIAL DUPLEX LOWER EXTREMITY BILATERAL (CPT=93925); Future  -     gabapentin 100 MG Oral Cap; Take 1 capsule (100 mg total) by mouth nightly.    History of burning pain in leg  -     gabapentin 100 MG Oral Cap; Take 1 capsule (100 mg total) by mouth nightly.    Other orders  -     predniSONE 5 MG Oral Tab; Take 20mg daily x 7 days, then 15mg daily x 7 days, then 10mg daily x 7 days, then 5mg daily x 7 days.                        Return in about 3 months (around 8/23/2025).  ?  JIM   Raymond Olivo is a 49 year old female with the following active problems who is seen for medically necessary follow-up today. She was seen as a new patient several months ago for second opinion regarding underlying connective tissue disease. She had been suffering from multiple leg wounds over the years.  She was diagnosed by another rheumatologist with having mixed connective tissue disorder. She was previously on Plaquenil as well as azathioprine and some dose of prednisone.  Seemed like she was also on methotrexate but unclear why stopped. Since first seeing her, I restarted methotrexate with daily folic acid supplementation as well as added amlodipine. She had some decrease in her WBC count which  has stabilized. Decision was made to stop methotrexate and start mycophenolate due to lupus vasculitis dx on skin biopsy. She was hospitalized due to thrombocytopenia.  Was diagnosed with ITP and given IV methylprednisolone as well as IVIG infusion. Initially as outpatient platelets were only 10 and improved most recently. She presents for follow up today.    Her  is here and serves to translate if pt does not understand what I'm asking/explaining.     Currently taking:  Plaquenil 200mg daily   Amlodipine 2.5mg twice daily  Prednisone 2.5mg daily   MMF 750mg daily   Biotin supplementation  Vit d completed prescription previously. Not taking OTC at this time.   B12 not taking any more    Iron once daily (rx for two)     Since her last visit, she hasn't been feeling great.  Dealing with wound on he leg. Has not been getting improvement.  Only recently got labs done.   Denies any other issues with joint pain.  Has lost some weight- about 8-9 pounds over the past 1.5 years.  Per , pt has not been eating enough.  States does have appetite but just not eating a lot. Can feel nausea vs upset stomach. And concern for developing diabetes due to family history.   Has been drinking protein shakes intermittently     Has continues with MMF to 750mg.  Some raynauds but not severe. Denies finger tip ulcers  Burning sensation returned but now both legs. - only taking tylenol. Not taking gabapentin anymore.   Now has ulcers on both legs. Following with wound care. Applying doxycycline and gentamicin to the wound.     Denies any other joint pain or swelling.   Continues to have hair loss/thinning but feel stable. No alopecia spots.   Denies skin rashes   No recent bruising or bleeding  Feels the tightness of the skin over the fingers and toes are the same but some tightness and numbness over the top of both feet/front of ankle  Has not had obvious bleeding- epistaxis, hemoptysis, hematuria or bloody stools  Denies  shortness of breath or chest pain.   Denies cough.   Denies fevers or chills.     Has not done ECHO or CXR as previously ordered.     HPI from initial consultation  States about 8-10 years ago, she started to have pain in her joints, while living in [place]. There was concern for possible rheumatologic issue at that time. She was given some oral prednisone which were reduced over time. She had a wound on her leg, was told it was a vascular wound. Had a laser vein surgery at that time. No recurrence until one month ago.   About 3 years ago, they moved to [facility]. She was seeing Dr. Keys, diagnosed pt with mixed connective tissue disease. States her symptoms worsened in winter months- joint pain in her hands as well as change in color of her fingertips. About one month ago, she developed a wound over her leg. She followed with Dr. Ferguson, as Dr. Keys left. Once the wound came, he recommended pt be evaluated by wound care. Also recommended tertiary center.  Was previously on plaquenil, however due to the side effects, this was stopped.  Pt was also on azathioprine but had some cytopenias. Also had been on methotrexate but since this wasn't helping, this was discontinued.   Pt has been following with wound care. States with the topicals and wrapping, she has had some improvement of the wound size.   Pt continues to have burning sensation in both legs, described as 5-6/10.   She is currently on prednisone 5mg daily.   She has never gotten a biopsy of the lesion, unless she had one 8 years ago in [place], but  cannot recall results.  She tried gabapentin, only 100mg nightly but stopped because of feeling drowsy.      Pt does have morning stiffness lasting 10-15 minutes.   Significant hair loss  + ulcers over bottom inside lip  Itching over thighs without redness or rash.   Hx of cytopenia while on DMARDs  + ulcers over toes/bottom of feet  + pain awakening the patient from sleep  + looks thinner, however  actual weight is the same.      No history of significant wrist pain or swelling, pain or swelling of the MCPs, pain or swelling of the ankles and bones of the feet, or new skin nodule formation.  The patient denies nasal ulcers, photosensitive rash, elevated or scarring rashes, prior renal or liver disease, or history of seizures.  No history of prior blood clot in the legs or lungs, strokes or ischemic phenomenon.  Denies nonhealing ulcers on the fingertips, trouble swallowing, or severe acid reflux.  The patient denies any history of uveitis, crampy abdominal pain, constipation, diarrhea, or bloody stools.   There are no symptoms of severe dry eyes, dry mouth, or swelling of the cheeks or under the jawbone.   No fevers, chills, lymphadenopathy, night sweats, or easy bruising or bleeding.  Denies chronic sinus infections/disease or epistaxis.  Denies chronic cough or hemoptysis.      Family hx:  Father with possible MCTD  Brothers/sisters healthy       Past Medical History:  Past Medical History:    Mixed connective tissue disease (HCC)    Raynaud's disease     Past Surgical History:  History reviewed. No pertinent surgical history.    Family History:  Family History   Problem Relation Age of Onset    Diabetes Father     Diabetes Mother      Social History:  Social History     Socioeconomic History    Marital status:    Tobacco Use    Smoking status: Never    Smokeless tobacco: Never   Vaping Use    Vaping status: Never Used   Substance and Sexual Activity    Alcohol use: No    Drug use: No    Sexual activity: Yes     Medications:  Outpatient Medications Marked as Taking for the 5/23/25 encounter (Office Visit) with Alisha Farmer DO   Medication Sig Dispense Refill    amLODIPine 2.5 MG Oral Tab Take 1 tablet (2.5 mg total) by mouth 2 (two) times daily. 180 tablet 0    hydroxychloroquine 200 MG Oral Tab Take 1 tablet (200 mg total) by mouth daily. 90 tablet 0    Mycophenolate Mofetil 500 MG Oral Tab Take 1  tablet (500 mg total) by mouth 2 (two) times daily. 180 tablet 0    predniSONE 5 MG Oral Tab Take 20mg daily x 7 days, then 15mg daily x 7 days, then 10mg daily x 7 days, then 5mg daily x 7 days. 70 tablet 0    gabapentin 100 MG Oral Cap Take 1 capsule (100 mg total) by mouth nightly. 90 capsule 0    gentamicin 0.3 % Ophthalmic Solution Apply 3 drops to wound twice daily, cover with petroleum gauze and cover dressing 15 mL 0    doxycycline 100 MG Oral Cap Bring capsules to wound clinic to use as directed 5 capsule 0    predniSONE 2.5 MG Oral Tab Take 1 tablet (2.5 mg total) by mouth daily. 90 tablet 0    FERROUS SULFATE 325 (65 Fe) MG Oral Tab TAKE 1 TABLET BY MOUTH 2 TIMES DAILY WITH MEALS. 180 tablet 0    clobetasol 0.05 % External Solution Apply topically to lower legs after shower 3 x weekly 50 mL 0     Modified Medications    Modified Medication Previous Medication    AMLODIPINE 2.5 MG ORAL TAB amLODIPine 2.5 MG Oral Tab       Take 1 tablet (2.5 mg total) by mouth 2 (two) times daily.    Take 1 tablet (2.5 mg total) by mouth 2 (two) times daily.    HYDROXYCHLOROQUINE 200 MG ORAL TAB hydroxychloroquine 200 MG Oral Tab       Take 1 tablet (200 mg total) by mouth daily.    Take 1 tablet (200 mg total) by mouth daily.    MYCOPHENOLATE MOFETIL 500 MG ORAL TAB Mycophenolate Mofetil 500 MG Oral Tab       Take 1 tablet (500 mg total) by mouth 2 (two) times daily.    Take 1.5 tablets (750 mg total) by mouth daily.     Medications Discontinued During This Encounter   Medication Reason    hydroxychloroquine 200 MG Oral Tab     amLODIPine 2.5 MG Oral Tab     Mycophenolate Mofetil 500 MG Oral Tab                ?  Allergies:  No Known Allergies  ?  REVIEW OF SYSTEMS   ?  Review of Systems   Constitutional:  Positive for weight loss. Negative for chills, fever and malaise/fatigue.   HENT:  Negative for nosebleeds.    Eyes:  Negative for pain and redness.   Respiratory:  Negative for cough, hemoptysis and shortness of breath.     Cardiovascular:  Negative for chest pain, palpitations and leg swelling.   Gastrointestinal:  Positive for nausea. Negative for abdominal pain, blood in stool, constipation, diarrhea, heartburn and vomiting.   Genitourinary:  Negative for dysuria, frequency, hematuria and urgency.   Musculoskeletal:  Negative for back pain, joint pain, myalgias and neck pain.   Skin:  Positive for itching and rash.   Neurological:  Positive for tingling. Negative for dizziness, tremors, weakness and headaches.   Endo/Heme/Allergies:  Does not bruise/bleed easily.     PHYSICAL EXAM   Today's Vitals:  Temperature Blood Pressure Heart Rate Resp Rate SpO2   Temp: 97.8 °F (36.6 °C) BP: 102/60 Pulse: 78 Resp: 16 SpO2: 100 %   ?  Current Weight Height BMI BSA Pain   Wt Readings from Last 1 Encounters:   05/23/25 118 lb (53.5 kg)    Height: 5' 2\" (157.5 cm) Body mass index is 21.58 kg/m². Body surface area is 1.53 meters squared.         Physical Exam  Vitals and nursing note reviewed.   Constitutional:       General: She is not in acute distress.     Appearance: She is well-developed. She is not diaphoretic.      Comments: + thin   HENT:      Head: Normocephalic.   Eyes:      General: No scleral icterus.     Extraocular Movements: Extraocular movements intact.      Conjunctiva/sclera: Conjunctivae normal.   Neck:      Vascular: No JVD.      Trachea: No tracheal deviation.   Cardiovascular:      Rate and Rhythm: Normal rate and regular rhythm.      Heart sounds: Normal heart sounds. No murmur heard.  Pulmonary:      Effort: Pulmonary effort is normal. No respiratory distress.      Breath sounds: Normal breath sounds. No wheezing.   Musculoskeletal:         General: Tenderness and deformity present. No swelling.      Cervical back: Neck supple.      Comments: No evidence of heberden or kecia nodes of any of the fingers, no basilar joint tenderness of the 1st CMC bilaterally.  No swelling, tenderness, redness or restriction of motion of  the DIPs, PIPs, MCPs, wrists, elbows, or joints of the feet.  Bilateral shoulders with full ROM.  Bilateral knees without medial joint line tenderness, no crepitus, no effusion.  + bilateral ankle tenderness over med/lat malleoli and some swelling most notable over lateral malleolus on left - no more swelling but tenderness over top of feet  Very sensitive to touch over left dorsal foot diffusely- improved   Lymphadenopathy:      Cervical: No cervical adenopathy.   Skin:     General: Skin is warm and dry.      Comments: No active bruising noted today.   Salt/pepper appearance of skin stable  Abnormal nailfold capillaroscopy diffuse fingers, stable  No digital pits of fingers  + telangectasias over face stable  Skin tightening of distal fingers improved   No current ulcers   Neurological:      Mental Status: She is alert. She is disoriented.      Cranial Nerves: No cranial nerve deficit.      Gait: Gait normal.   Psychiatric:         Mood and Affect: Mood normal.         Behavior: Behavior normal.       ?  Radiology review:       Narrative   PROCEDURE:  US ARTERIAL DUPLEX LOWER EXTREMITY LEFT (CPT=93926)     COMPARISON:  None.     INDICATIONS:  M79.672 Left foot pain I73.01 Raynaud's disease with gangrene ([facility]) I77.89 Lupus vasculitis ([facility]) M32.19 Lupus vasculitis ([facility]) M35.1 Mixed connective tissue d*     TECHNIQUE:  A comprehensive color duplex Doppler ultrasound examination of the left lower extremity was performed. Color imaging, Doppler wave form analysis, and peak systolic flow measurements of the common femoral, profunda femoral, superficial  femoral, and popliteal arteries were performed.       PATIENT STATED HISTORY: (As transcribed by Technologist)           FINDINGS:    LEFT EXTREMITY:  Triphasic waveforms in the left common femoral, profunda, superficial femoral arteries.  Biphasic waveforms below the left knee.  OTHER:  None.       PEAK VELOCITIES (CM/S):  LEFT COMMON FEMORAL:      165    LEFT PROFUNDA  FEMORAL:      78      LEFT SUPERFICIAL FEMORAL PROX:    113  LEFT SUPERFICIAL FEMORAL MID:      94  LEFT SUPERFICIAL FEMORAL DISTAL:    92  LEFT POPLITEAL PROX:      110  LEFT POPLITEAL DISTAL:      121  TIBIOPER TRUNK:      66  LEFT PTA PROX:      60  LEFT PTA MID:      62  LEFT PTA DISTAL:      73  LEFT BETH PROX:      82  LEFT BETH MID:      72    LEFT DORSALIS PEDIS::      30  LEFT GREAT TOE PRESSURE:      25 mmHg                       Impression   CONCLUSION:       1. No high-grade stenosis is identified.  There is suggestion of diffuse mild stenosis throughout most of the left lower extremity.     2. Overall diminished left toe pressure is noted.  Possibility of decreased perfusion in the distal left foot is of consideration.          LOCATION:  [place] [id number]           Dictated by ([facility]): Anselmo Carroll MD on 11/28/2023 at 9:30 AM      Finalized by ([facility]): Anselmo Carroll MD on 11/28/2023 at 9:35 AM       Narrative   PROCEDURE:  XR FOOT, COMPLETE (MIN 3 VIEWS), LEFT (CPT=73630)     TECHNIQUE:  AP, oblique, and lateral views were obtained.     COMPARISON:  None.     INDICATIONS:  M79.672 Left foot pain I73.01 Raynaud's disease with gangrene ([facility]) I77.89 Lupus vasculitis ([facility]) M32.19 Lupus vasculitis ([facility]) M35.1 Mixed connective tissue d*     PATIENT STATED HISTORY: (As transcribed by Technologist)  Patient states having lateral anterior foot pain that began roughly twenty days ago, with no injury.         FINDINGS:    BONES:  Osseous structures are intact.  Periarticular decreased bone mineralization.  Limited evaluation of the 2nd through 5th distal phalanges is due to constant flexion.  Joint spaces are preserved.  Mild hallux valgus deformity.  No dislocation.    Mild inferior calcaneal enthesopathy.                   Impression   CONCLUSION:    1. Osseous structures are intact.  Please see details as above.        LOCATION:  [place]        Dictated by ([facility]): Madina Colin MD on 10/19/2023 at 12:16 PM       Finalized by ([facility]): Madina Colin MD on 10/19/2023 at 12:18 PM        Narrative   PROCEDURE:  XR FOOT, COMPLETE (MIN 3 VIEWS), LEFT (CPT=73630)     TECHNIQUE:  AP, oblique, and lateral views were obtained.     COMPARISON:  None.     INDICATIONS:  M79.672 Left foot pain I73.01 Raynaud's disease with gangrene (HCC) I77.89 Lupus vasculitis (HCC) M32.19 Lupus vasculitis (HCC) M35.1 Mixed connective tissue d*     PATIENT STATED HISTORY: (As transcribed by Technologist)  Patient states having lateral anterior foot pain that began roughly twenty days ago, with no injury.         FINDINGS:    BONES:  Osseous structures are intact.  Periarticular decreased bone mineralization.  Limited evaluation of the 2nd through 5th distal phalanges is due to constant flexion.  Joint spaces are preserved.  Mild hallux valgus deformity.  No dislocation.    Mild inferior calcaneal enthesopathy.                   Impression   CONCLUSION:    1. Osseous structures are intact.  Please see details as above.        LOCATION:  [place]        Dictated by ([facility]): Madina Colin MD on 10/19/2023 at 12:16 PM      Finalized by ([facility]): Madina Colin MD on 10/19/2023 at 12:18 PM      PROCEDURE:  CT ABDOMEN PELVIS IV CONTRAST, NO ORAL (ER)       COMPARISON:  None.       INDICATIONS:  abd pain, sent by pcp to r/o sbo       TECHNIQUE:  CT scanning was performed from the dome of the diaphragm to the pubic symphysis with non-ionic intravenous contrast material. Post contrast coronal MPR imaging was performed.  Dose reduction techniques were used. Dose information is   transmitted to the ACR (American College of Radiology) NRDR (National Radiology Data Registry) which includes the Dose Index Registry.       FINDINGS:     LUNG BASES:  Dependent atelectasis bilaterally.   LIVER:  Normal in shape and contour.   BILIARY:  Cholelithiasis.  No intrahepatic biliary dilatation.  There is possible small amount of pericholecystic fluid..   SPLEEN:  Normal.  No enlargement or focal  lesion.   PANCREAS:  No tyson-pancreatic inflammatory stranding   ADRENALS:  Normal.  No mass or enlargement.     KIDNEYS:  There is moderate bilateral hydronephrosis.  No obstructing urolithiasis.   BOWEL/MESENTERY:  Bowel is normal in caliber. No evidence of obstruction.  Considerable amount of stool noted within the rectum.  Moderate amount of fecal material noted within the remaining portion of the colon.  The appendix is normal appearance.   PELVIS:  Distended bladder.  No free pelvic fluid.  Calcified phleboliths within the pelvis.     AORTA/VASCULAR:  Atheromatous calcifications of the aorta.  Aorta is normal in caliber.   BONES:  No acute fractures.                        Impression   CONCLUSION:     1. Considerably distended bladder with bilateral hydronephrosis.  No obstructing urolithiasis is noted.   2. Prominent mount of stool noted within the rectum suggestive of fecal impaction.  No evidence of bowel obstruction.     3. Gallstone within the neck of the gallbladder measuring 1.3 x 1.0 cm.  The gallbladder is contracted but there is suggestion of small amount of pericholecystic fluid.  Correlation with clinical symptoms to exclude acute cholecystitis.             Dictated by (CST): Colette Nelson MD on 12/22/2021 at 7:51 PM       Finalized by (CST): Colette Nelson MD on 12/22/2021 at 7:56 PM        Labs:  Lab Results   Component Value Date    WBC 4.6 05/16/2025    RBC 4.61 05/16/2025    HGB 13.3 05/16/2025    HCT 41.0 05/16/2025    .0 05/16/2025    MCV 88.9 05/16/2025    MCH 28.9 05/16/2025    MCHC 32.4 05/16/2025    RDW 12.7 05/16/2025    NEPRELIM 3.47 05/16/2025    NEPERCENT 75.7 05/16/2025    LYPERCENT 12.0 05/16/2025    MOPERCENT 11.5 05/16/2025    EOPERCENT 0.4 05/16/2025    BAPERCENT 0.2 05/16/2025    NE 3.47 05/16/2025    LYMABS 0.55 (L) 05/16/2025    MOABSO 0.53 05/16/2025    EOABSO 0.02 05/16/2025    BAABSO 0.01 05/16/2025     Lab Results   Component Value Date    GLU 82 05/16/2025     BUN 9 05/16/2025    BUNCREA 16.7 07/14/2021    CREATSERUM 0.59 05/16/2025    ANIONGAP 6 05/16/2025    GFRNAA 109 07/28/2022    GFRAA 126 07/28/2022    CA 9.7 05/16/2025    OSMOCALC 284 05/16/2025    ALKPHO 89 05/16/2025    AST 20 05/16/2025    ALT 11 05/16/2025    BILT 0.4 05/16/2025    TP 8.2 05/16/2025    ALB 4.8 05/16/2025    GLOBULIN 3.4 05/16/2025     05/16/2025    K 3.8 05/16/2025     05/16/2025    CO2 29.0 05/16/2025       Additional Labs:  05/2025  Quantiferon TB negative  Heb B surface ab nonreactive  HBSAg screen nonreactive  HCV ab nonreactive  ESR 40 elevated  CRP normal  C3 119.3  C4 33.4   Vit D 76.3 normal     Biopsy through wound care- in process    02/2025  Iron 42 low  ESR 54 elevated  CRP normal  Ferritin 151 normal  Vitamin D19.2 Low    09/2024  CRP normal  ESR 47 elevated  CMP grossly normal  CBC with WBC 4.7; hemoglobin 13.1; platelet 194    07/2024 (resulted after visit)  CBC with WBC 5.5; hemoglobin 12.8; platelet 183  CMP grossly normal except potassium 3.3 low  ESR 39 elevated  CRP normal     03/2024  CBC with WBC 3.2, otherwise normal  CMP grossly normal   Iron 45; percent sat 15 borderline low  Ferritin 114.6  B12 726 normal  ESR 54 elevated  CRP 0.37 borderline elevated  Vitamin D 35  C3 103  C4 33.7     10/2023  CBC grossly normal  CMP grossly normal  Iron 43; percent sat 12 low  Ferritin 94.1 normal  B12 726 normal  ESR 45 elevated  CRP 0.48 borderline elevated  Vitamin D 58.8 Normal    07/2023  B12 246 borderline low   Vitamin D 22.9 low  Ferritin 96.6 normal  Iron low  CBC grossly normal  CMP grossly normal    07/2022  B12 302 normal  Vitamin D 14.4 low  Ferritin normal  Iron normal  CBC grossly normal  CMP with potassium 3.3 otherwise grossly normal  CRP normal  ESR 15 normal  C3 97.8 normal  C4 29.1 normal    01/29/2022  WELL[place] 1: 640 speckled      Remainder of DARRYL panel negative  CBC with WBC 5.9; hemoglobin 12.2; platelet 296  B12 689  ESR 48 elevated  CRP  0.44 borderline elevated  CMP grossly normal  UA grossly normal with exception of trace leuk esterase squamous epithelial and rare bacteria  Ferritin 245.8 elevated  Iron and percent sat low    01/11/2022  CBC grossly normal (WBC 5.6; hemoglobin 13.2; platelet 214)    11/2021   B12 943  WELL[place] positive      Remainder of DARRYL panel negative intrinsic factor blocking antibody negative  MMA normal  ESR 34  dsDNA negative  Total complement normal    11/22/2021  CBC with WBC 3.9; hemoglobin 11.4; platelet 10    7/2021  dsDNA negative  Protein creatinine ratio normal  UA small amount of blood; leuk esterase; bacteria rare; moderate squamous  C4 27.3 normal  C3 112 normal  CRP 0.46 borderline elevated  ESR 40 elevated  CBC with hemoglobin 10.9; WBC 4.0; platelet 213 normal  CMP grossly normal    08/2020  Esr 36  CRP 0.60  C3 and C4 normal     Cutaneous Direct IF, Biopsy See Note    Comment: IMMUNODERMATOLOGY REPORT       Specimen(s):   1. 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, including lupus vasculitis     (See Results and Comments)   ____________________________________________________________     RESULTS   IgG:  Grains within and around basal keratinocytes and         within cell nuclei and grains in focal superficial,         upper, and mid dermal blood vessels         IgG4:  Negative     IgM:  Grains in focal dermal blood vessels     IgA:  Focal grains within basal keratinocytes     C3:   Few grains along basement membrane zone and grains and         clumps within superficial and upper dermal blood         vessels     Fibrinogen:  3+ deposition around superficial, upper, and                mid dermal blood vessels     COMMENTS   By direct immunofluorescence, there is granular  deposition of IgG   within and around basal keratinocytes and within cell nuclei. The   presence of granular IgG within the epidermis raises the   consideration of lupus erythematosus, specifically subacute   cutaneous lupus erythematosus (SCLE). The finding of granular   staining of cell nuclei may be indicative of the presence of   anti-nuclear antibodies, further lending support to the   possibility of lupus erythematosus. 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     Correlation with histopathologic examination of formalin-fixed   tissue is needed to further define this process. Correlation with   clinical findings is also needed, including with serologic testing   for lupus associated antibodies, including antibodies to SSA ([place])   and SSB ([place]), which are often associated with SCLE.        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

## 2025-05-26 NOTE — PROGRESS NOTES
CHIEF COMPLAINT:     Chief Complaint   Patient presents with    Wound Care     Patients is here for a follow up. 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 Patient saw dr. Santamaria (vascular) in December 2023 and he documented \"I explained to the patient and her  that I do not believe the source of her pain to be vascular in origin.  She has an easily palpable dorsalis pedis pulse and the location of the pain is more suggestive of a neuropathic origin.  I suspect perhaps that given the tightness of her feet from her mixed connective tissue disorder the compression stockings is causing somewhat compression of the nerves in that area and I have encouraged her to substitute those stockings to those that involve the feet up to the upper calf.\" Patient/spouse have been in contact with dr. Farmer and in march 2024 reported a dark spot/ulcer on her bilateral lower legs, she was advised to make an appointment with wound care as well as get her blood work done ordered in January 2024 so that further treatment plan could be extablished.  The blood work did show increased inflammation and she was advised to increase her prednisone to 20mg x 5 days (which spouse reports they did do) and take the amlodipine 2.5 mg-which she states she is doing.  They have been leaving the areas open to air and she has not been wearing compression.  She c/o burning sensation in her ankle area.  She was started on gabapentin by dr. Farmer last year, however they stopped it because it was making her too sleepy. We discussed restarting the gabapentin, but only take a noc first.  Both wounds are dried eschar.  Will utilize hydrogel with lidocaine to start debriding off/softening the eschar. I  have also ordered santyl. Patient placed into spandagrips for compression    HPI PRIOR TO OCTOBER 2024 SEE INDIVIDUAL PROGRESS NOTES  9-10-24 patient returns.   She did get her labs drawn last week and she has an appointment with Dr. Farmer on 9-17-24. Her esr is up slightly from July however I suspect this may be related to the decrease in her po steroid.   Last week we added gina and continued with the xeroform.  We also utilize the debrisoft to cleanse the wounds and periwounds.  Today, wounds are improved, but the gina appears to just have stayed in the wound bed and did not integrate, therefore will not utilize gina this week. Her pain remains mostly on the lateral ankle.  We no longer have the accumulating exudate, overall her skin is much improved and more healthy in appearance.  No s/s of infection.    9-17-24 patient returns.  She is coming from an appointment with Dr. Farmer, plan is to keep meds same at this time.  Wounds have been improving (albeit slowly) in size and wound bed characteristics. The medial wounds are fully resolved, bilaterally, she is remaining with lateral ankle wounds bilaterally. She continues with the riley.  No s/s of infection. C/o pain with any touch.    9-24-24 patient returns.  She got her retinal exam done as ordered by dr. Farmer this morning, spouse reports everything is good.   Last week the medial wounds were resolved, however today there is a small open area on the left medial distal leg (almost to the heel).  Patient/spouse report that she has not worn any other shoes. There is some surrounding hyperemia to this wound, but patient does not report any increase in pain to the area as compared to other weeks.  The lateral wounds appear a bit moist.  Will utilize ag alginate (instead of xeroform) will also utilize clobetesol topically.'    10-1-24 patient returns.  Last week we utilize ag alginate as the wounds/periwounds were moist. Today wounds are improved.  She did  bring the [employer] ag.  Patient has a blancheable area along anterior ankle on the left.  Will order spandagrip and silicone tape from [facility].  I will also rx topical steroid. Patient will change dressings 1 x this week. No s/s of infection. She states the right lower extremity pain is improved. She still has burning on the left.     10-8-24 patient returns. Last week we removed the calamine wraps and went over self care, cleansing, dressing, and compression. She was to change the dressings after shower/cleansing once this week. Today wounds are very dessicated with surrounding dried exudate, her right does not have pain, left lateral is still painful.  We used debrimit and forceps to remove the surrounding exudate/scabbing as patient could tolerate. Will utilize small amount of hydrogel to the left. Will dress with xeroform and foam. Continue with spandagrip. Patient to leave all dressings in place for the next week. No s/s of infection    10-15-24 patient returns.  We returned to leaving dressings in place for the full week last week but was to be using spandagrip (not wrap) for compression, however patient returns today without any compression in place. She states that she took it off on the way to clinic. Today wounds are improved, moisture is optimized. The right lateral wound and the left medial wound are primarily epithelial tissue.  Her pain is much improved except for the left lateral wound still is painful to touch.  No s/s of infection. Patient continues to do the riley.    10-22-24 patient returns. Her wounds have continued to improve.  Last week we have dressed with xeroform and spandagrip, patient to leave in place x 1 week.  She states that she only has pain on lateral left. The lateral right is open just a pinpoint. The medial wounds bilaterally are resolved. No s/s of infection continue current poc with clobetesol, xeroform, foam.  Important to keep the medial ankles moisturized.    10-29-24 patient  returns, medial ankle wounds were resolved last week. We continued to treat the lateral with moist wound product (xerforom) and light compression. No changes in medications.  Pain is not present on the right, all wounds are resolved on the right. We discussed moisturization, use of clobetsol sparingly as needed and light compression on the right. Wound remains open on the left lateral but improved. Continue current poc. No s/s of infection. Tenderness to touch over the wound on the left lateral    11-5-24 patient returns. The remaining open wound is the left lateral. She followed up with dr. Farmer and she was interested in stopping or decreasing MMF.  Dr. Farmer strongly recommended against this as she is understandably concerned that patient will just reflare again without any immunosuppression.  Patient has been advised to get echo and PFT's done but she is declining. The remaining left lateral has epithelial bridge. The right inspected and skin hydration looks good, no open areas. No s/s of infection, no c/o pain except with touch to the left lateral.  Will use a larger spandagrip as patient does have a reddened area to anterior ankle area.   MEDICATIONS:     Current Outpatient Medications:     Mycophenolate Mofetil 500 MG Oral Tab, Take 1 tablet (500 mg total) by mouth daily., Disp: 90 tablet, Rfl: 0    Ferrous Sulfate 325 (65 Fe) MG Oral Tab, Take 1 tablet (325 mg total) by mouth 2 (two) times daily with meals., Disp: 180 tablet, Rfl: 0    hydroxychloroquine 200 MG Oral Tab, Take 1 tablet (200 mg total) by mouth daily., Disp: 90 tablet, Rfl: 0    clobetasol 0.05 % External Solution, Apply topically to lower legs after shower 3 x weekly, Disp: 50 mL, Rfl: 0    amLODIPine 2.5 MG Oral Tab, Take 1 tablet (2.5 mg total) by mouth 2 (two) times daily. (Patient taking differently: Take 1 tablet (2.5 mg total) by mouth daily.), Disp: 180 tablet, Rfl: 0    predniSONE 2.5 MG Oral Tab, Take 1 tablet (2.5 mg total) by  mouth daily., Disp: 90 tablet, Rfl: 0  ALLERGIES:   No Known Allergies   REVIEW OF SYSTEMS:   This information was obtained from the patient/family and chart.    See HPI for pertinent positives, otherwise 10 pt ROS negative.    HISTORY:   Past medical, surgical, family and social history updated where appropriate.    PHYSICAL EXAM:     Vitals:    11/05/24 0700   BP: 95/62   Pulse: 79   Resp: 16   Temp: 98 °F (36.7 °C)       Estimated body mass index is 21.15 kg/m² as calculated from the following:    Height as of 11/1/24: 64\".    Weight as of 11/1/24: 123 lb 3.2 oz (55.9 kg).   No results found for: \"PGLU\"    Vital signs reviewed.Appears stated age, well groomed.    Constitutional:  Bp wnl for patient. Pulse Regular and wnl for patient. Respirations easy and unlabored. Temperature wnl. Weight normal for height. Appearance neat and clean. Appears in no acute distress. Well nourished and well developed.    Lower extremity:  dp/pt palpable bilaterally. Extremities are free of varicosities and edema, they are scarred, scattered changes in pigmentation (hypo/hyper) with atrophie jay. Capillary refill < 3 seconds. Digits are warm. toenails are wnl for color, thickness and hygeine. Skin hydration wnl. + hairgrowth on legs.    Musculoskeletal:  Gait and station stable   Integumentary:  refer to wound characteristics and images   Psychiatric:  Judgment and insight intact. Alert and oriented times 3. No evidence of depression, anxiety, or agitation. Calm, cooperative, and communicative, but very quiet and reserved.  EDEMA:   Calf  Point of Measurement - Left Calf: 31  Point of Measurement - Right Calf: 31  Left Calf from:: Heel  Calf Left cm:: 28.7  Right Calf from:: Heel     Ankle  Point of Measurement - Left Ankle: 11  Point of Measurement - Right Ankle: 11  Left Ankle from:: Heel  Left Ankle cm:: 17.2     Right Ankle from:: Heel        DIAGNOSTICS:     Lab Results   Component Value Date    BUN 9 09/03/2024     CREATSERUM 0.58 09/03/2024    GFRCKDEPI 121.37 12/17/2020    ALB 4.9 (H) 09/03/2024    TP 8.0 09/03/2024    A1C 5.1 12/17/2020     Lab Results   Component Value Date    ESRML 47 (H) 09/03/2024    ESRML 39 (H) 07/23/2024    ESRML 54 (H) 03/18/2024      Lab Results   Component Value Date    CRP 0.50 09/03/2024    CRP <0.40 07/23/2024    CRP 0.37 (H) 03/18/2024 11-28-23 arterial duplex-dr santamaria  FINDINGS:    LEFT EXTREMITY:  Triphasic waveforms in the left common femoral, profunda, superficial femoral arteries.  Biphasic waveforms below the left knee.   OTHER:  None.     PEAK VELOCITIES (CM/S):   LEFT COMMON FEMORAL:      165    LEFT PROFUNDA FEMORAL:      78      LEFT SUPERFICIAL FEMORAL PROX:    113   LEFT SUPERFICIAL FEMORAL MID:      94   LEFT SUPERFICIAL FEMORAL DISTAL:    92   LEFT POPLITEAL PROX:      110   LEFT POPLITEAL DISTAL:      121   TIBIOPER TRUNK:      66   LEFT PTA PROX:      60   LEFT PTA MID:      62   LEFT PTA DISTAL:      73   LEFT BETH PROX:      82   LEFT BETH MID:      72    LEFT DORSALIS PEDIS::      30   LEFT GREAT TOE PRESSURE:      25 mmHg   CONCLUSION:    1. No high-grade stenosis is identified.  There is suggestion of diffuse mild stenosis throughout most of the left lower extremity.    2. Overall diminished left toe pressure is noted.  Possibility of decreased perfusion in the distal left foot is of consideration.     7-14-20 venous reflux study-dr santamaria  Per Dr. Santamaria review \"no intervention needed\"  WOUND ASSESSMENT:     Compression Wrap 05/21/24 Ankle Anterior;Left (Active)   Placement Date/Time: 05/21/24 1019   Location: Ankle  Wound Location Orientation: Anterior;Left      Assessments 5/21/2024  9:36 AM 9/24/2024  1:35 PM   Response to Treatment Well tolerated Well tolerated   Compression Layers Multilayer Multilayer   Compression Product Type Unna Boot Unna Boot   Dressing Applied Yes Yes   Compression Wrap Location Toes to Knee Toes to Knee   Compression Wrap Status  Clean;Dry;Intact Clean;Dry       No associated orders.       Wound 07/23/24 #4 Leg Left;Lateral (Active)   Date First Assessed/Time First Assessed: 07/23/24 1137    Wound Number (Wound Clinic Only): #4  Primary Wound Type: (c) Other (comment)  Location: Leg  Wound Location Orientation: Left;Lateral      Assessments 7/23/2024 11:46 AM 11/5/2024 10:12 AM   Wound Image       Drainage Amount Unable to assess Scant   Drainage Description -- Serous;Yellow   Treatments Compression --   Wound Length (cm) 2.3 cm 1.2 cm   Wound Width (cm) 1.3 cm 0.3 cm   Wound Surface Area (cm^2) 2.99 cm^2 0.36 cm^2   Wound Depth (cm) 0.1 cm 0.1 cm   Wound Volume (cm^3) 0.299 cm^3 0.036 cm^3   Wound Healing % -- 88   Margins Well-defined edges Well-defined edges   Non-staged Wound Description Full thickness Full thickness   Soni-wound Assessment Clean;Dry;Other (Comment) Hemosiderin staining   Wound Granulation Tissue Pale Grey;Pink;Firm Firm;Pink   Wound Bed Granulation (%) 45 % 5 %   Wound Bed Epithelium (%) 10 % 75 %   Wound Bed Slough (%) 45 % 20 %   Wound Odor None None   Shape -- Bridged       No associated orders.          ASSESSMENT AND PLAN:    1. 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 Idiopathic chronic venous hypertension of both lower extremities with ulcer and inflammation (HCC)    3. Lupus vasculitis (HCC)    4. Mixed connective tissue disease (HCC)    5. Current chronic use of systemic steroids            Risks, benefits, and alternatives of current treatment plan discussed in detail.  Questions and concerns addressed. Red flags to RTC or ED reviewed.  Patient (or parent) agrees to plan.      NOTE TO PATIENT: The 21st Century Cures Act makes clinical notes like these available to patients in the interest of transparency. Clinical notes are medical documents used by physicians and care providers to communicate with each other. These documents include medical language and terminology,  abbreviations, and treatment information that may sound technical and at times possibly unfamiliar. In addition, at times, the verbiage may appear blunt or direct. These documents are one tool providers use to communicate relevant information and clinical opinions of the care providers in a way that allows common understanding of the clinical context.     I [id number] with the patient. This time included:    preparing to see the patient (eg, review notes and recent diagnostics),  seeing the patient, obtaining and/or reviewing separately obtained history, performing a medically appropriate examination and/or evaluation, counseling and educating the patient, documenting in the record  DISCHARGE INSTRUCTIONS     Patient Instructions   Please return:  1 week      RAYMOND!  Moisturize the right leg twice daily, you can use the clobetesol solution as needed, wear light compression (10mmhg)    Patient discharge and wound care instructions  Raymond Mainor Banu  11/5/2024         You may shower with protection of the wound (ie a cast cover or similar).     Changing your dressing:            leave in place for 1 week    Wash your hands with soap and water.  Ensure that the old dressing is removed completely. Place it in a plastic bag and throw it in the trash.  Cleanse the wound with hypochlorous wound cleanser (ie. Anasept, vashe, pure and clean).  It's ok to “scrub” your wound with the gauze, small amount of bleeding with cleansing is normal and ok.  Apply the following dressings:  clobetesol  and moisturizer  left: folded xerform>border foam      Spandagrip from base of toes to knee    Nutrition and blood sugar control:  Focus on the following:  Protein: Meats, beans, eggs, milk and yogurt particularly Greek yogurt), tofu, soy nuts, soy protein products (Follow the protein handout in your welcome folder)  Vitamin C: Citrus fruits and juices, strawberries, tomatoes, tomato juice, peppers, baked potatoes, spinach,  broccoli, cauliflower, [place] sprouts, cabbage  Vitamin A: Dark green, leafy vegetables, orange or yellow vegetables, cantaloupe, fortified dairy products, liver, fortified cereals  Zinc: Fortified cereals, red meats, seafood  Consider supplementing with Justin by [employer]. It can be purchased on amazon, [employer] website, or local pharmacy may be able to order it for you.  (These are essential branch chain amino acids that help with tissue building and wound healing).     Concerns:  Signs of infection may include the following:  Increase in redness  Red \"streaks\" from wound  Increase in swelling  Fever  Unusual odor  Change in the amount of wound drainage     Should you experience any significant changes in your wound(s) or have any questions regarding your home care instructions please contact the [facility] @ [phone] If after regular business hours, please call your family doctor or local emergency room. The treatment plan has been discussed at length between you and your provider. Follow all instructions carefully, it is very important. If you do not follow all instructions you are at risk of your wound not healing, infection, possible loss of limb and even loss of life.        Martina Mckeon FNP-C, CWCN-AP, CFCN, CSWS, WCC, DWC  11/5/2024        all other ROS negative except as per HPI

## 2025-05-28 NOTE — PROGRESS NOTES
CHIEF COMPLAINT:     Chief Complaint   Patient presents with    Wound Care     Follow-up for wounds to both legs. Pain 4/10. Arrives with wraps to both legs - tolerating well. Increased prednisone to 20mg for 1 week and then taper back done.      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 Patient saw dr. Santamaria (vascular) in December 2023 and he documented \"I explained to the patient and her  that I do not believe the source of her pain to be vascular in origin.  She has an easily palpable dorsalis pedis pulse and the location of the pain is more suggestive of a neuropathic origin.  I suspect perhaps that given the tightness of her feet from her mixed connective tissue disorder the compression stockings is causing somewhat compression of the nerves in that area and I have encouraged her to substitute those stockings to those that involve the feet up to the upper calf.\" Patient last saw dr. Farmer in February 2025 and overall doing well. She was to continue on mmf 750mg daily, prednisone 2.5 mg daily, plaquenil 200mg daily and amlodipine 2.5 mg tiwce daily along with vitamine prscription and d3.  Spouse contacted dr farmer late last week regarding a small wound on her left leg, so patient was fit into the schedule.  They have not been dressing the wound with anything, she did have spandagrip on.  There was surrounding dry drainage. Patient states she noted something last Friday and then by Monday this week she noted it was a definite ulceration.  The drainage is clear and increases when patient plantar/dorsiflexes.  Most likely a lymph vessel leaking. We discussed utilizing doxy-they will bring to next appointment. Her skin remains very dry/flaky she states she has been utilizing  resta daily.  We discussed that she may need something with ammonium lactate or urea in it.  No s/s of infection. The area is not as painful as it has been previously.     HPI PRIOR TO MAY 2025 SEE INDIVIDUAL PROGRESS NOTES  5-8-25 patient returns with daughter.  She has been dressing with gent for the last 2 weeks.  I have been in communication with vascular us to get patient scheduled for venous reflux study and consult with dr. Najjar.  Patient reminded to please call to schedule. Today patient c/o burning to the right ankle and there are new scabs/eschar noted on the medial and lateral right ankle. The left medial ankle wound is not improved.  No acute s/s of infection.  I d/w patient/family that I will reach out to dr farmer regarding the non-progression and the new open areas on the right. Will utilize honey hydrocolloid and return patient to compression wraps.    5-16-25 patient returns.  I communicated with rheum last week and they would like another biopsy, they have ordered labs.  Patient saw dr. 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  Patient did get labs drawn but the lab only josee the tb and hep b labs, patient encouraged to get the remaining labs ordered in February to be drawn today before leaving hospital, her next appointment with dr. Farmer is next Friday. Wounds are not improving, biopsies taken from left and right medial ankle wounds, sent in [facility] for dif    5-22-25 patient returns.  She has an appointment with dr farmer tomorrow. She got her venous us done on Monday, result is still pending.  Her DIF is also still pending.  We discussed placement of epifix, however we do not have the correct size (14mm disc) so will hold on that until next week.  We  discussed that if the biopsies or lab work point to autoimmune or vasculitis then that can be treated with meds with dr. Farmer. If the us shows reflux then dr najjar can treat the veins.  Today the wounds are about the same. She does not have any new ones. No s/s of infection. Continues with extreme pain with any touch. Will update dr. Najjar and dr farmer.    5-29-25 patient returns.  I s/w lab yesterday regarding status of the DIF that was sent out to [facility], it went out on the 19th and it can take up to 14 days, so it is still pending.  per vascular her venous study showed essentially normal valves and therefore the cause of her ulcerations is likely due to autoimmune disease.  patient saw dr. Farmer last week patient is to restart the gabapentin 100mg at noc to help with burnng pain, echo and cxr ordered, her prednisone was increased to 20mg daily x 1 week, increase the mycophenolate to 500mg bid, continue hydroxychlorquine.  The plan is to consider stopping the mycophenolate and switching to rituximab after the results of the biopsy come back.  today pain is improved on the left, burning pain continues on the right.  wounds are stable or improved. Will continue with honey./honey gauze and light compression. No acute s/s of infection.  MEDICATIONS:     Current Outpatient Medications:     amLODIPine 2.5 MG Oral Tab, Take 1 tablet (2.5 mg total) by mouth 2 (two) times daily., Disp: 180 tablet, Rfl: 0    hydroxychloroquine 200 MG Oral Tab, Take 1 tablet (200 mg total) by mouth daily., Disp: 90 tablet, Rfl: 0    Mycophenolate Mofetil 500 MG Oral Tab, Take 1 tablet (500 mg total) by mouth 2 (two) times daily., Disp: 180 tablet, Rfl: 0    predniSONE 5 MG Oral Tab, Take 20mg daily x 7 days, then 15mg daily x 7 days, then 10mg daily x 7 days, then 5mg daily x 7 days., Disp: 70 tablet, Rfl: 0    gabapentin 100 MG Oral Cap, Take 1 capsule (100 mg total) by mouth nightly., Disp: 90 capsule, Rfl: 0    gentamicin 0.3 %  Ophthalmic Solution, Apply 3 drops to wound twice daily, cover with petroleum gauze and cover dressing, Disp: 15 mL, Rfl: 0    doxycycline 100 MG Oral Cap, Bring capsules to wound clinic to use as directed, Disp: 5 capsule, Rfl: 0    predniSONE 2.5 MG Oral Tab, Take 1 tablet (2.5 mg total) by mouth daily., Disp: 90 tablet, Rfl: 0    FERROUS SULFATE 325 (65 Fe) MG Oral Tab, TAKE 1 TABLET BY MOUTH 2 TIMES DAILY WITH MEALS., Disp: 180 tablet, Rfl: 0    clobetasol 0.05 % External Solution, Apply topically to lower legs after shower 3 x weekly, Disp: 50 mL, Rfl: 0  ALLERGIES:   No Known Allergies   REVIEW OF SYSTEMS:   This information was obtained from the patient/family and chart.    See HPI for pertinent positives, otherwise 10 pt ROS negative.    HISTORY:   Past medical, surgical, family and social history updated where appropriate.    PHYSICAL EXAM:     Vitals:    05/29/25 1100   BP: 108/68   Pulse: 80   Resp: 14   Temp: 98.1 °F (36.7 °C)     Estimated body mass index is 21.58 kg/m² as calculated from the following:    Height as of 5/23/25: 62\".    Weight as of 5/23/25: 118 lb (53.5 kg).   No results found for: \"PGLU\"    Vital signs reviewed.Appears stated age, well groomed.    Constitutional:  Bp wnl for patient. Pulse Regular and wnl for patient. Respirations easy and unlabored. Temperature wnl. Weight normal for height. Appearance neat and clean. Appears in no acute distress. Well nourished and well developed.    Lower extremity:  dp/pt palpable bilalterally.   lower extremities are free of varicosities and no edema, they are scarred, scattered changes in pigmentation (hypo/hyper) with atrophie jay. Capillary refill < 3 seconds. Digits are warm. toenails are wnl for color, thickness and hygeine. Skin is dry-. + hairgrowth on legs.    Musculoskeletal:  Gait and station stable   Integumentary:  refer to wound characteristics and images   Psychiatric:  Judgment and insight intact. Alert and oriented times 3. No  evidence of depression, anxiety, or agitation. Calm, cooperative, and communicative, but very quiet and reserved.  EDEMA:   Calf  Point of Measurement - Left Calf: 31  Point of Measurement - Right Calf: 31  Left Calf from:: Heel  Calf Left cm:: 27.7  Right Calf from:: Heel  Right Calf cm:: 27.9  Ankle  Point of Measurement - Left Ankle: 10  Point of Measurement - Right Ankle: 10  Left Ankle from:: Heel  Left Ankle cm:: 16.3     Right Ankle from:: Heel  Right Ankle cm:: 16.4     DIAGNOSTICS:     Lab Results   Component Value Date    BUN 9 05/16/2025    CREATSERUM 0.59 05/16/2025    GFRCKDEPI 121.37 12/17/2020    ALB 4.8 05/16/2025    TP 8.2 05/16/2025    A1C 5.1 12/17/2020     Lab Results   Component Value Date    ESRML 40 (H) 05/16/2025    ESRML 54 (H) 02/25/2025    ESRML 47 (H) 09/03/2024      Lab Results   Component Value Date    CRP <0.40 05/16/2025    CRP <0.40 02/25/2025    CRP 0.50 09/03/2024 11-28-23 arterial duplex-dr hilliard  FINDINGS:    LEFT EXTREMITY:  Triphasic waveforms in the left common femoral, profunda, superficial femoral arteries.  Biphasic waveforms below the left knee.   OTHER:  None.     PEAK VELOCITIES (CM/S):   LEFT COMMON FEMORAL:      165    LEFT PROFUNDA FEMORAL:      78      LEFT SUPERFICIAL FEMORAL PROX:    113   LEFT SUPERFICIAL FEMORAL MID:      94   LEFT SUPERFICIAL FEMORAL DISTAL:    92   LEFT POPLITEAL PROX:      110   LEFT POPLITEAL DISTAL:      121   TIBIOPER TRUNK:      66   LEFT PTA PROX:      60   LEFT PTA MID:      62   LEFT PTA DISTAL:      73   LEFT BETH PROX:      82   LEFT BETH MID:      72    LEFT DORSALIS PEDIS::      30   LEFT GREAT TOE PRESSURE:      25 mmHg   CONCLUSION:    1. No high-grade stenosis is identified.  There is suggestion of diffuse mild stenosis throughout most of the left lower extremity.    2. Overall diminished left toe pressure is noted.  Possibility of decreased perfusion in the distal left foot is of consideration.     7-14-20 venous reflux  study-dr santamaria  Per Dr. Santamaria review \"no intervention needed\"  WOUND ASSESSMENT:     Wound 04/10/25 #1 Ankle Left;Medial (Active)   Date First Assessed/Time First Assessed: 04/10/25 0757    Wound Number (Wound Clinic Only): #1  Primary Wound Type: Auto-immune  Location: Ankle  Wound Location Orientation: Left;Medial      Assessments 4/10/2025  7:57 AM 5/29/2025 11:27 AM   Wound Image        Drainage Amount Unable to assess Small   Drainage Description -- Yellow;Serous   Treatments Compression --   Wound Length (cm) 0.7 cm 1.5 cm   Wound Width (cm) 0.7 cm 0.9 cm   Wound Surface Area (cm^2) 0.38 cm^2 1.06 cm^2   Wound Depth (cm) 0.2 cm 0.2 cm   Wound Volume (cm^3) 0.051 cm^3 0.141 cm^3   Wound Healing % -- -176   Margins Well-defined edges Attached edges   Non-staged Wound Description Full thickness Full thickness   Soni-wound Assessment Dry;Hemosiderin staining Atrophy jay;Painful;Moist;Maceration   Wound Granulation Tissue Pale Grey;Pink;Firm Red;Firm;Pink   Wound Bed Granulation (%) 100 % 20 %   Wound Bed Slough (%) -- 80 %   Wound Odor None None   Tunneling? No No   Undermining? No No   Sinus Tracts? No No       Inactive Orders   Date Order Priority Status Authorizing Provider   05/16/25 1137 Lesion biopsy Routine Completed Martina Mckeon APRN       Compression Wrap 04/10/25 Ankle Anterior;Left (Active)   Placement Date/Time: 04/10/25 0846   Location: Ankle  Wound Location Orientation: Anterior;Left      Assessments 4/10/2025  7:57 AM 5/22/2025  9:14 AM   Response to Treatment Well tolerated Well tolerated   Compression Layers Multilayer Multilayer   Compression Product Type Unna Boot Unna Boot   Dressing Applied Yes Yes   Compression Wrap Location Toes to Knee Toes to Knee   Compression Wrap Status Dry;Intact;Clean Clean;Dry       No associated orders.       Wound 05/08/25 #2 Ankle Right;Medial (Active)   Date First Assessed/Time First Assessed: 05/08/25 1056    Wound Number (Wound Clinic Only): #2  Primary  Wound Type: Auto-immune  Location: Ankle  Wound Location Orientation: Right;Medial      Assessments 5/8/2025 10:58 AM 5/29/2025 11:28 AM   Wound Image       Drainage Amount None Small   Drainage Description -- Yellow;Serous   Treatments Compression --   Wound Length (cm) 0.5 cm 1.5 cm   Wound Width (cm) 0.5 cm 0.9 cm   Wound Surface Area (cm^2) 0.2 cm^2 1.06 cm^2   Wound Depth (cm) 0 cm 0.2 cm   Wound Volume (cm^3) 0 cm^3 0.141 cm^3   Margins Undefined edges Well-defined edges   Non-staged Wound Description Full thickness Full thickness   Soni-wound Assessment Dry Painful;Atrophy jay   Wound Granulation Tissue -- Pink;Spongy   Wound Bed Granulation (%) -- 5 %   Wound Bed Epithelium (%) -- 5 %   Wound Bed Slough (%) -- 90 %   Wound Odor None None   Shape 100% scabbed bridge   Tunneling? -- No   Undermining? -- No   Sinus Tracts? -- No       Inactive Orders   Date Order Priority Status Authorizing Provider   05/16/25 1139 Lesion biopsy Routine Completed Martina Mckeon APRN       Wound 05/08/25 #3 Ankle Right;Lateral (Active)   Date First Assessed/Time First Assessed: 05/08/25 1056    Wound Number (Wound Clinic Only): #3  Primary Wound Type: Auto-immune  Location: Ankle  Wound Location Orientation: Right;Lateral      Assessments 5/8/2025 10:59 AM 5/29/2025 11:29 AM   Wound Image       Drainage Amount None None   Treatments Compression --   Wound Length (cm) 0.5 cm 1.1 cm   Wound Width (cm) 0.8 cm 0.8 cm   Wound Surface Area (cm^2) 0.31 cm^2 0.69 cm^2   Wound Depth (cm) 0 cm 0 cm   Wound Volume (cm^3) 0 cm^3 0 cm^3   Margins Undefined edges Undefined edges   Non-staged Wound Description Full thickness Full thickness   Soni-wound Assessment Dry Atrophy jay;Painful   Wound Odor None None   Shape 100% scabbed 100% scabbed   Tunneling? -- No   Undermining? -- No   Sinus Tracts? -- No       No associated orders.       Compression Wrap 05/08/25 Ankle Anterior;Right (Active)   Placement Date/Time: 05/08/25 2378    Location: Ankle  Wound Location Orientation: Anterior;Right      Assessments 5/8/2025 10:57 AM 5/22/2025  9:14 AM   Response to Treatment Well tolerated Well tolerated   Compression Layers Multilayer Multilayer   Compression Product Type Unna Boot Unna Boot   Dressing Applied Yes Yes   Compression Wrap Location Toes to Knee Toes to Knee   Compression Wrap Status Clean;Dry;Intact Clean;Dry       No associated orders.       ASSESSMENT AND PLAN:    1. 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 Lupus vasculitis (HCC)    4. Mixed connective tissue disease (HCC)    5. Venous insufficiency of both lower extremities    6. Current chronic use of systemic steroids      Risks, benefits, and alternatives of current treatment plan discussed in detail.  Questions and concerns addressed. Red flags to RTC or ED reviewed.  Patient (or parent) agrees to plan.      NOTE TO PATIENT: The 21st Century Cures Act makes clinical notes like these available to patients in the interest of transparency. Clinical notes are medical documents used by physicians and care providers to communicate with each other. These documents include medical language and terminology, abbreviations, and treatment information that may sound technical and at times possibly unfamiliar. In addition, at times, the verbiage may appear blunt or direct. These documents are one tool providers use to communicate relevant information and clinical opinions of the care providers in a way that allows common understanding of the clinical context.     I [id number] with the patient. This time included:    preparing to see the patient (eg, review notes and recent diagnostics),  seeing the patient, obtaining and/or reviewing separately obtained history, performing a medically appropriate examination and/or evaluation, counseling and educating the  patient, documenting in the record,  DISCHARGE INSTRUCTIONS     Patient Instructions   Please return:  1 week     Plan to place 14mm disc epifix    Patient discharge and wound care instructions  Raymond Martinezu  5/29/2025            You may shower with protection of the wound (ie a cast cover or similar).     Cleansing/dressing:       In clinic, with each dressing change:    please cleanse the limb, foot, and between toes with soap, water and washcloth.   Dry thoroughly.    Cleanse/soak the wound with VASHE (or other hypochlorous wound cleanser), dab dry.    Apply the following dressings:   LEFT:  small amount honey gel>honey gauze>20-30mmhg calamine compression (wrap light with padding)        RIGHT:   small amount honey gel (to medial)>honey gauze>20-30mmhg calamine compression (wrap light with padding to anterior tibia)    Managing your edema:  Avoid prolonged standing in one place. It is better to have your calf muscles moving to pump fluid out of the legs      Elevate leg(s) above the level of the heart when sitting or as much as possible.  Take you diuretics as directed by your physician. Do not skip doses or change doses unless instructed to do so by your physician.  Decrease salt intake, follow recommended 2 grams daily.  Do not get leg(s) with compression wrap wet. If wraps are too tight as indicated by pain, numbness/tingling or discoloration of toes remove wrap completely and call the wound center @ [phone].  Refer to the \"Multi-layer compression bandage application patient information sheet\" given to you in clinic.     Nutrition and blood sugar control:  Focus on the following:  Protein: Meats, beans, eggs, milk and yogurt particularly Greek yogurt), tofu, soy nuts, soy protein products (Follow the protein handout in your welcome folder)  Vitamin C: Citrus fruits and juices, strawberries, tomatoes, tomato juice, peppers, baked potatoes, spinach, broccoli, cauliflower, [place] sprouts,  cabbage  Vitamin A: Dark green, leafy vegetables, orange or yellow vegetables, cantaloupe, fortified dairy products, liver, fortified cereals  Zinc: Fortified cereals, red meats, seafood  Consider supplementing with Justin by [employer]. It can be purchased on amazon, [employer] website, or local pharmacy may be able to order it for you.  (These are essential branch chain amino acids that help with tissue building and wound healing).   When your blood sugar is consistently elevated greater than 180 your body can't heal or fight infection.     Concerns:  Signs of infection may include the following:  Increase in redness  Red \"streaks\" from wound  Increase in swelling  Fever  Unusual odor  Change in the amount of wound drainage     Should you experience any significant changes in your wound(s) or have any questions regarding your home care instructions please contact the [facility] @ [phone] If after regular business hours, please call your family doctor or local emergency room. The treatment plan has been discussed at length between you and your provider. Follow all instructions carefully, it is very important. If you do not follow all instructions you are at risk of your wound not healing, infection, possible loss of limb and even loss of life.    Martina Mckeon FNP-C, CWCN-AP, CFCN, CSWS, WCC, DWC  5/29/2025

## 2025-05-29 ENCOUNTER — OFFICE VISIT (OUTPATIENT)
Dept: WOUND CARE | Facility: HOSPITAL | Age: 50
End: 2025-05-29
Attending: NURSE PRACTITIONER
Payer: COMMERCIAL

## 2025-05-29 VITALS
DIASTOLIC BLOOD PRESSURE: 68 MMHG | SYSTOLIC BLOOD PRESSURE: 108 MMHG | HEART RATE: 80 BPM | RESPIRATION RATE: 14 BRPM | TEMPERATURE: 98 F

## 2025-05-29 DIAGNOSIS — I77.89 LUPUS VASCULITIS (HCC): ICD-10-CM

## 2025-05-29 DIAGNOSIS — I87.2 VENOUS INSUFFICIENCY OF BOTH LOWER EXTREMITIES: ICD-10-CM

## 2025-05-29 DIAGNOSIS — M35.1 MIXED CONNECTIVE TISSUE DISEASE (HCC): ICD-10-CM

## 2025-05-29 DIAGNOSIS — Z79.52 CURRENT CHRONIC USE OF SYSTEMIC STEROIDS: ICD-10-CM

## 2025-05-29 DIAGNOSIS — M32.19 LUPUS VASCULITIS (HCC): ICD-10-CM

## 2025-05-29 DIAGNOSIS — L97.812 NON-PRESSURE CHRONIC ULCER OF OTHER PART OF RIGHT LOWER LEG WITH FAT LAYER EXPOSED (HCC): ICD-10-CM

## 2025-05-29 DIAGNOSIS — L97.322 NON-PRESSURE CHRONIC ULCER OF LEFT ANKLE WITH FAT LAYER EXPOSED (HCC): Primary | ICD-10-CM

## 2025-05-29 PROCEDURE — 99213 OFFICE O/P EST LOW 20 MIN: CPT | Performed by: NURSE PRACTITIONER

## 2025-05-29 NOTE — PROGRESS NOTES
.Weekly Wound Education Note    Teaching Provided To: Patient, Family  Training Topics: Dressing, Edema control, Cleasing and general instructions, Compression, Discharge instructions  Training Method: Explain/Verbal, Written  Training Response: Patient responds and understands        Notes: Wounds stable. Left medial leg and right medial leg dressed with honey gel, honey gauze, and ABD pad. Right lateral ankle dressed with honey gauze, and ABD pad. Both legs wrapped lightly with unna boot 20-30mmHg with rosidal from toes to knee for padding.

## 2025-05-29 NOTE — PATIENT INSTRUCTIONS
Please return:  1 week     Plan to place 14mm disc epifix    Patient discharge and wound care instructions  Raymond Olivo  5/29/2025            You may shower with protection of the wound (ie a cast cover or similar).     Cleansing/dressing:       In clinic, with each dressing change:    please cleanse the limb, foot, and between toes with soap, water and washcloth.   Dry thoroughly.    Cleanse/soak the wound with VASHE (or other hypochlorous wound cleanser), dab dry.    Apply the following dressings:   LEFT:  small amount honey gel>honey gauze>20-30mmhg calamine compression (wrap light with padding)        RIGHT:   small amount honey gel (to medial)>honey gauze>20-30mmhg calamine compression (wrap light with padding to anterior tibia)    Managing your edema:  Avoid prolonged standing in one place. It is better to have your calf muscles moving to pump fluid out of the legs      Elevate leg(s) above the level of the heart when sitting or as much as possible.  Take you diuretics as directed by your physician. Do not skip doses or change doses unless instructed to do so by your physician.  Decrease salt intake, follow recommended 2 grams daily.  Do not get leg(s) with compression wrap wet. If wraps are too tight as indicated by pain, numbness/tingling or discoloration of toes remove wrap completely and call the wound center @ [phone].  Refer to the \"Multi-layer compression bandage application patient information sheet\" given to you in clinic.     Nutrition and blood sugar control:  Focus on the following:  Protein: Meats, beans, eggs, milk and yogurt particularly Greek yogurt), tofu, soy nuts, soy protein products (Follow the protein handout in your welcome folder)  Vitamin C: Citrus fruits and juices, strawberries, tomatoes, tomato juice, peppers, baked potatoes, spinach, broccoli, cauliflower, [place] sprouts, cabbage  Vitamin A: Dark green, leafy vegetables, orange or yellow vegetables, cantaloupe,  fortified dairy products, liver, fortified cereals  Zinc: Fortified cereals, red meats, seafood  Consider supplementing with Justin by [employer]. It can be purchased on amazon, [employer] website, or local pharmacy may be able to order it for you.  (These are essential branch chain amino acids that help with tissue building and wound healing).   When your blood sugar is consistently elevated greater than 180 your body can't heal or fight infection.     Concerns:  Signs of infection may include the following:  Increase in redness  Red \"streaks\" from wound  Increase in swelling  Fever  Unusual odor  Change in the amount of wound drainage     Should you experience any significant changes in your wound(s) or have any questions regarding your home care instructions please contact the [facility] @ [phone] If after regular business hours, please call your family doctor or local emergency room. The treatment plan has been discussed at length between you and your provider. Follow all instructions carefully, it is very important. If you do not follow all instructions you are at risk of your wound not healing, infection, possible loss of limb and even loss of life.

## 2025-06-05 ENCOUNTER — OFFICE VISIT (OUTPATIENT)
Dept: WOUND CARE | Facility: HOSPITAL | Age: 50
End: 2025-06-05
Attending: NURSE PRACTITIONER
Payer: COMMERCIAL

## 2025-06-05 VITALS
HEART RATE: 90 BPM | SYSTOLIC BLOOD PRESSURE: 104 MMHG | TEMPERATURE: 98 F | DIASTOLIC BLOOD PRESSURE: 71 MMHG | RESPIRATION RATE: 14 BRPM

## 2025-06-05 DIAGNOSIS — M32.19 LUPUS VASCULITIS (HCC): ICD-10-CM

## 2025-06-05 DIAGNOSIS — M35.1 MIXED CONNECTIVE TISSUE DISEASE (HCC): ICD-10-CM

## 2025-06-05 DIAGNOSIS — L97.322 NON-PRESSURE CHRONIC ULCER OF LEFT ANKLE WITH FAT LAYER EXPOSED (HCC): Primary | ICD-10-CM

## 2025-06-05 DIAGNOSIS — I77.89 LUPUS VASCULITIS (HCC): ICD-10-CM

## 2025-06-05 DIAGNOSIS — Z79.52 CURRENT CHRONIC USE OF SYSTEMIC STEROIDS: ICD-10-CM

## 2025-06-05 DIAGNOSIS — L97.812 NON-PRESSURE CHRONIC ULCER OF OTHER PART OF RIGHT LOWER LEG WITH FAT LAYER EXPOSED (HCC): ICD-10-CM

## 2025-06-05 PROCEDURE — 15275 SKIN SUB GRAFT FACE/NK/HF/G: CPT | Performed by: NURSE PRACTITIONER

## 2025-06-05 NOTE — PATIENT INSTRUCTIONS
Please return:  1 week         Patient discharge and wound care instructions  Raymond Olivo  6/5/2025               You may shower with protection of the wound (ie a cast cover or similar).     Cleansing/dressing:       In clinic, with each dressing change:    please cleanse the limb, foot, and between toes with soap, water and washcloth.   Dry thoroughly.    Cleanse/soak the wound with VASHE (or other hypochlorous wound cleanser), dab dry.    Apply the following dressings:   LEFT: epifix>honey gauze>20-30mmhg calamine compression (wrap light with padding)        RIGHT: epifix>honey gauze>20-30mmhg calamine compression (wrap light with padding to anterior tibia)    Managing your edema:  Avoid prolonged standing in one place. It is better to have your calf muscles moving to pump fluid out of the legs      Elevate leg(s) above the level of the heart when sitting or as much as possible.  Take you diuretics as directed by your physician. Do not skip doses or change doses unless instructed to do so by your physician.  Decrease salt intake, follow recommended 2 grams daily.  Do not get leg(s) with compression wrap wet. If wraps are too tight as indicated by pain, numbness/tingling or discoloration of toes remove wrap completely and call the wound center @ [phone].  Refer to the \"Multi-layer compression bandage application patient information sheet\" given to you in clinic.     Nutrition and blood sugar control:  Focus on the following:  Protein: Meats, beans, eggs, milk and yogurt particularly Greek yogurt), tofu, soy nuts, soy protein products (Follow the protein handout in your welcome folder)  Vitamin C: Citrus fruits and juices, strawberries, tomatoes, tomato juice, peppers, baked potatoes, spinach, broccoli, cauliflower, [place] sprouts, cabbage  Vitamin A: Dark green, leafy vegetables, orange or yellow vegetables, cantaloupe, fortified dairy products, liver, fortified cereals  Zinc: Fortified cereals,  red meats, seafood  Consider supplementing with Justin by [employer]. It can be purchased on amazon, [employer] website, or local pharmacy may be able to order it for you.  (These are essential branch chain amino acids that help with tissue building and wound healing).   When your blood sugar is consistently elevated greater than 180 your body can't heal or fight infection.     Concerns:  Signs of infection may include the following:  Increase in redness  Red \"streaks\" from wound  Increase in swelling  Fever  Unusual odor  Change in the amount of wound drainage     Should you experience any significant changes in your wound(s) or have any questions regarding your home care instructions please contact the wound center [facility] @ [phone] If after regular business hours, please call your family doctor or local emergency room. The treatment plan has been discussed at length between you and your provider. Follow all instructions carefully, it is very important. If you do not follow all instructions you are at risk of your wound not healing, infection, possible loss of limb and even loss of life.

## 2025-06-05 NOTE — PROGRESS NOTES
.Weekly Wound Education Note    Teaching Provided To: Patient, Family  Training Topics: Dressing, Edema control, Cleasing and general instructions, Compression, Discharge instructions  Training Method: Explain/Verbal, Written  Training Response: Patient responds and understands        Notes: Wounds stable. Continue honey gauze and ABD pad to right lateral ankle. Epifix #1 applied to right medial and left medial ankle wounds, fixated with honey gauze, dressed with ABD pad and calamine unna boot 20-30mmHg with rosidal to anterior leg for padding. One graft was split between both wounds, all of product used.

## 2025-06-05 NOTE — PROGRESS NOTES
Patient ID: Raymond Olivo is a 50 year old female.    Cellular tissue product application Auto-immune Left;Medial Ankle    Date/Time: 6/5/2025 10:34 AM    Performed by: Martina Mckeon APRN  Authorized by: Mratina Mckeon APRN  Associated wounds:   Wound 04/10/25 #1 Ankle Left;Medial    Consent:     Consent obtained:  Verbal    Consent given by:  Patient    Alternatives discussed:  Alternative treatment  Procedure details:     Location:  head/hands/feet/digits/genitalia    Product applied:  Epifix    Product lot #:  [id number]    Product expiration:  12/1/2029    Amount used (sq cm):  2    Amount wasted (sq cm):  1    Reason for waste:  Wound size    Secured/Fixated: Yes      Secured/Fixated with:  Honey gauze    Total wound surface area (sq cm):  1    Type of wound:  other wound type    Treatment number:  1  Post-procedure details:     Patient tolerance of procedure:  Tolerated well, no immediate complications  Comments:      Epifix #1 applied, fixated with honey gauze, dressed with ABD pad and calamine unna boot 20-30mmHg with rosidal for padding. One product split between 2 wounds.

## 2025-06-05 NOTE — PROGRESS NOTES
CHIEF COMPLAINT:     Chief Complaint   Patient presents with    Wound Care     Patient is here for a wound care follow up. She presents with both wraps in place. Her pain is currently 4/10.      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 Patient saw dr. Santamaria (vascular) in December 2023 and he documented \"I explained to the patient and her  that I do not believe the source of her pain to be vascular in origin.  She has an easily palpable dorsalis pedis pulse and the location of the pain is more suggestive of a neuropathic origin.  I suspect perhaps that given the tightness of her feet from her mixed connective tissue disorder the compression stockings is causing somewhat compression of the nerves in that area and I have encouraged her to substitute those stockings to those that involve the feet up to the upper calf.\" Patient last saw dr. Farmer in February 2025 and overall doing well. She was to continue on mmf 750mg daily, prednisone 2.5 mg daily, plaquenil 200mg daily and amlodipine 2.5 mg tiwce daily along with vitamine prscription and d3.  Spouse contacted dr farmer late last week regarding a small wound on her left leg, so patient was fit into the schedule.  They have not been dressing the wound with anything, she did have spandagrip on.  There was surrounding dry drainage. Patient states she noted something last Friday and then by Monday this week she noted it was a definite ulceration.  The drainage is clear and increases when patient plantar/dorsiflexes.  Most likely a lymph vessel leaking. We discussed utilizing doxy-they will bring to next appointment. Her skin remains very dry/flaky she states she has been utilizing resta daily.  We discussed that she may need  something with ammonium lactate or urea in it.  No s/s of infection. The area is not as painful as it has been previously.     HPI PRIOR TO MAY 2025 SEE INDIVIDUAL PROGRESS NOTES  5-8-25 patient returns with daughter.  She has been dressing with gent for the last 2 weeks.  I have been in communication with vascular us to get patient scheduled for venous reflux study and consult with dr. Najjar.  Patient reminded to please call to schedule. Today patient c/o burning to the right ankle and there are new scabs/eschar noted on the medial and lateral right ankle. The left medial ankle wound is not improved.  No acute s/s of infection.  I d/w patient/family that I will reach out to dr farmer regarding the non-progression and the new open areas on the right. Will utilize honey hydrocolloid and return patient to compression wraps.    5-16-25 patient returns.  I communicated with rheum last week and they would like another biopsy, they have ordered labs.  Patient saw dr. 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  Patient did get labs drawn but the lab only josee the tb and hep b labs, patient encouraged to get the remaining labs ordered in February to be drawn today before leaving hospital, her next appointment with dr. Farmer is next Friday. Wounds are not improving, biopsies taken from left and right medial ankle wounds, sent in [facility] for dif    5-22-25 patient returns.  She has an appointment with dr farmer tomorrow. She got her venous us done on Monday, result is still pending.  Her DIF is also still pending.  We discussed placement of epifix, however we do not have the correct size (14mm disc) so will hold on that until next week.  We discussed that if the biopsies or lab work  point to autoimmune or vasculitis then that can be treated with meds with dr. Farmer. If the us shows reflux then dr najjar can treat the veins.  Today the wounds are about the same. She does not have any new ones. No s/s of infection. Continues with extreme pain with any touch. Will update dr. Najjar and dr farmer.    5-29-25 patient returns.  I s/w lab yesterday regarding status of the DIF that was sent out to [facility], it went out on the 19th and it can take up to 14 days, so it is still pending.  per vascular her venous study showed essentially normal valves and therefore the cause of her ulcerations is likely due to autoimmune disease.  patient saw dr. Farmer last week patient is to restart the gabapentin 100mg at noc to help with burnng pain, echo and cxr ordered, her prednisone was increased to 20mg daily x 1 week, increase the mycophenolate to 500mg bid, continue hydroxychlorquine.  The plan is to consider stopping the mycophenolate and switching to rituximab after the results of the biopsy come back.  today pain is improved on the left, burning pain continues on the right.  wounds are stable or improved. Will continue with honey./honey gauze and light compression. No acute s/s of infection.    6-5-25 patient returns.  We received the results of the biopsies from [facility] yesterday.  For an unknown reason the 2 mm punchs were reduced to 1mm and a full diagnostic eval was not able to be performed however eval did show immunofluorescence observed in non-specific pattern with the following antibodies: IGM, C3, and fibrinogen on LEFT.  The RIGHT just showed immunoflourescence observed in fibrinogen antibody. I reached out to dr farmer regarding need for further biopsy. I discussed the biopsy results (and lack of) with the patient and spouse.  Today, patient's pain is improved, the left ulceration is improved in size and characteristic, the right is stable.  Will place epifix today. No s/s of infection.    MEDICATIONS:     Current Outpatient Medications:     amLODIPine 2.5 MG Oral Tab, Take 1 tablet (2.5 mg total) by mouth 2 (two) times daily., Disp: 180 tablet, Rfl: 0    hydroxychloroquine 200 MG Oral Tab, Take 1 tablet (200 mg total) by mouth daily., Disp: 90 tablet, Rfl: 0    Mycophenolate Mofetil 500 MG Oral Tab, Take 1 tablet (500 mg total) by mouth 2 (two) times daily., Disp: 180 tablet, Rfl: 0    predniSONE 5 MG Oral Tab, Take 20mg daily x 7 days, then 15mg daily x 7 days, then 10mg daily x 7 days, then 5mg daily x 7 days., Disp: 70 tablet, Rfl: 0    gabapentin 100 MG Oral Cap, Take 1 capsule (100 mg total) by mouth nightly., Disp: 90 capsule, Rfl: 0    gentamicin 0.3 % Ophthalmic Solution, Apply 3 drops to wound twice daily, cover with petroleum gauze and cover dressing, Disp: 15 mL, Rfl: 0    doxycycline 100 MG Oral Cap, Bring capsules to wound clinic to use as directed, Disp: 5 capsule, Rfl: 0    predniSONE 2.5 MG Oral Tab, Take 1 tablet (2.5 mg total) by mouth daily., Disp: 90 tablet, Rfl: 0    FERROUS SULFATE 325 (65 Fe) MG Oral Tab, TAKE 1 TABLET BY MOUTH 2 TIMES DAILY WITH MEALS., Disp: 180 tablet, Rfl: 0    clobetasol 0.05 % External Solution, Apply topically to lower legs after shower 3 x weekly, Disp: 50 mL, Rfl: 0  ALLERGIES:   No Known Allergies   REVIEW OF SYSTEMS:   This information was obtained from the patient/family and chart.    See HPI for pertinent positives, otherwise 10 pt ROS negative.    HISTORY:   Past medical, surgical, family and social history updated where appropriate.    PHYSICAL EXAM:     Vitals:    06/05/25 0955   BP: 104/71   Pulse: 90   Resp: 14   Temp: 98.2 °F (36.8 °C)       Estimated body mass index is 21.58 kg/m² as calculated from the following:    Height as of 5/23/25: 62\".    Weight as of 5/23/25: 118 lb (53.5 kg).   No results found for: \"PGLU\"    Vital signs reviewed.Appears stated age, well groomed.    Constitutional:  Bp wnl for patient. Pulse Regular and wnl for  patient. Respirations easy and unlabored. Temperature wnl. Weight normal for height. Appearance neat and clean. Appears in no acute distress. Well nourished and well developed.    Lower extremity:  dp/pt palpable bilalterally.   lower extremities are free of varicosities and no edema, they are scarred, scattered changes in pigmentation (hypo/hyper) with atrophie jay. Capillary refill < 3 seconds. Digits are warm. toenails are wnl for color, thickness and hygeine. Skin is dry-. + hairgrowth on legs.    Musculoskeletal:  Gait and station stable   Integumentary:  refer to wound characteristics and images   Psychiatric:  Judgment and insight intact. Alert and oriented times 3. No evidence of depression, anxiety, or agitation. Calm, cooperative, and communicative, but very quiet and reserved.  EDEMA:   Calf  Point of Measurement - Left Calf: 31  Point of Measurement - Right Calf: 31  Left Calf from:: Heel  Calf Left cm:: 27  Right Calf from:: Heel  Right Calf cm:: 27  Ankle  Point of Measurement - Left Ankle: 10  Point of Measurement - Right Ankle: 10  Left Ankle from:: Heel  Left Ankle cm:: 16     Right Ankle from:: Heel  Right Ankle cm:: 16.5     DIAGNOSTICS:     Lab Results   Component Value Date    BUN 9 05/16/2025    CREATSERUM 0.59 05/16/2025    GFRCKDEPI 121.37 12/17/2020    ALB 4.8 05/16/2025    TP 8.2 05/16/2025    A1C 5.1 12/17/2020     Lab Results   Component Value Date    ESRML 40 (H) 05/16/2025    ESRML 54 (H) 02/25/2025    ESRML 47 (H) 09/03/2024      Lab Results   Component Value Date    CRP <0.40 05/16/2025    CRP <0.40 02/25/2025    CRP 0.50 09/03/2024 6-5-25 Right and left ankle biopsy      May 2025 venous study-dr hilliard  \"The results showed essentially normal valves in the veins of both of your legs. This means that the cause of ulcerations is the autoimmune disease and not due to your veins\"     11-28-23 arterial duplex-dr hilliard  FINDINGS:    LEFT EXTREMITY:  Triphasic waveforms in the left  common femoral, profunda, superficial femoral arteries.  Biphasic waveforms below the left knee.   OTHER:  None.     PEAK VELOCITIES (CM/S):   LEFT COMMON FEMORAL:      165    LEFT PROFUNDA FEMORAL:      78      LEFT SUPERFICIAL FEMORAL PROX:    113   LEFT SUPERFICIAL FEMORAL MID:      94   LEFT SUPERFICIAL FEMORAL DISTAL:    92   LEFT POPLITEAL PROX:      110   LEFT POPLITEAL DISTAL:      121   TIBIOPER TRUNK:      66   LEFT PTA PROX:      60   LEFT PTA MID:      62   LEFT PTA DISTAL:      73   LEFT BETH PROX:      82   LEFT BETH MID:      72    LEFT DORSALIS PEDIS::      30   LEFT GREAT TOE PRESSURE:      25 mmHg   CONCLUSION:    1. No high-grade stenosis is identified.  There is suggestion of diffuse mild stenosis throughout most of the left lower extremity.    2. Overall diminished left toe pressure is noted.  Possibility of decreased perfusion in the distal left foot is of consideration.     7-14-20 venous reflux study-dr santamaria  Per Dr. Santamaria review \"no intervention needed\"    July 2020 DIF-pathology punch biopsy  RESULTS   IgG: Grains within and around basal keratinocytes and         within cell nuclei and grains in focal superficial,         upper, and mid dermal blood vessels   IgG4:  Negative   IgM:  Grains in focal dermal blood vessels   IgA:  Focal grains within basal keratinocytes   C3:   Few grains along basement membrane zone and grains and         clumps within superficial and upper dermal blood         vessels   Fibrinogen:  3+ deposition around superficial, upper, and                mid dermal blood vessels   COMMENTS   By direct immunofluorescence, there is granular deposition of IgG   within and around basal keratinocytes and within cell nuclei. The   presence of granular IgG within the epidermis raises the   consideration of lupus erythematosus, specifically subacute   cutaneous lupus erythematosus (SCLE). The finding of granular   staining of cell nuclei may be indicative of the presence of    anti-nuclear antibodies, further lending support to the   possibility of lupus erythematosus. 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     WOUND ASSESSMENT:     Wound 04/10/25 #1 Ankle Left;Medial (Active)   Date First Assessed/Time First Assessed: 04/10/25 0757    Wound Number (Wound Clinic Only): #1  Primary Wound Type: Auto-immune  Location: Ankle  Wound Location Orientation: Left;Medial      Assessments 4/10/2025  7:57 AM 6/5/2025 10:02 AM   Wound Image        Drainage Amount Unable to assess Small   Drainage Description -- Yellow;Serous   Treatments Compression --   Wound Length (cm) 0.7 cm 1 cm   Wound Width (cm) 0.7 cm 0.7 cm   Wound Surface Area (cm^2) 0.38 cm^2 0.55 cm^2   Wound Depth (cm) 0.2 cm 0.2 cm   Wound Volume (cm^3) 0.051 cm^3 0.073 cm^3   Wound Healing % -- -43   Margins Well-defined edges Well-defined edges   Non-staged Wound Description Full thickness Full thickness   Soni-wound Assessment Dry;Hemosiderin staining Atrophy jay;Painful;Dry   Wound Granulation Tissue Pale Grey;Pink;Firm Spongy;Red   Wound Bed Granulation (%) 100 % 95 %   Wound Bed Slough (%) -- 5 %   Wound Odor None None   Tunneling? No No   Undermining? No No   Sinus Tracts? No No       Active Orders   Date Order Priority Status Authorizing Provider   06/05/25 1034 Cellular tissue product application Auto-immune Left;Medial Ankle Routine Active Martina Mckeon APRN       Inactive Orders   Date Order Priority Status Authorizing Provider   05/16/25 1137 Lesion biopsy Routine Completed Martina Mckeon APRN       Compression Wrap 04/10/25 Ankle Anterior;Left (Active)   Placement Date/Time: 04/10/25 0846   Location: Ankle  Wound Location Orientation: Anterior;Left      Assessments 4/10/2025  7:57 AM  5/29/2025 11:28 AM   Response to Treatment Well tolerated Well tolerated   Compression Layers Multilayer Multilayer   Compression Product Type Unna Boot Unna Boot   Dressing Applied Yes Yes   Compression Wrap Location Toes to Knee Toes to Knee   Compression Wrap Status Dry;Intact;Clean Clean;Dry       No associated orders.       Wound 05/08/25 #2 Ankle Right;Medial (Active)   Date First Assessed/Time First Assessed: 05/08/25 1056    Wound Number (Wound Clinic Only): #2  Primary Wound Type: Auto-immune  Location: Ankle  Wound Location Orientation: Right;Medial      Assessments 5/8/2025 10:58 AM 6/5/2025 10:02 AM   Wound Image       Drainage Amount None Small   Drainage Description -- Yellow;Serous   Treatments Compression --   Wound Length (cm) 0.5 cm 1.6 cm   Wound Width (cm) 0.5 cm 1 cm   Wound Surface Area (cm^2) 0.2 cm^2 1.26 cm^2   Wound Depth (cm) 0 cm 0.2 cm   Wound Volume (cm^3) 0 cm^3 0.168 cm^3   Margins Undefined edges Well-defined edges   Non-staged Wound Description Full thickness Full thickness   Soni-wound Assessment Dry Dry;Painful;Atrophy jay   Wound Granulation Tissue -- Firm;Pink   Wound Bed Granulation (%) -- 10 %   Wound Bed Epithelium (%) -- 5 %   Wound Bed Slough (%) -- 85 %   Wound Odor None None   Shape 100% scabbed bridged   Tunneling? -- No   Undermining? -- No   Sinus Tracts? -- No       Active Orders   Date Order Priority Status Authorizing Provider   06/05/25 1054 Cellular tissue product application Auto-immune Right;Medial Ankle Routine Active Martina Mckeon APRN       Inactive Orders   Date Order Priority Status Authorizing Provider   05/16/25 1139 Lesion biopsy Routine Completed Martina Mckeon APRN       Wound 05/08/25 #3 Ankle Right;Lateral (Active)   Date First Assessed/Time First Assessed: 05/08/25 1056    Wound Number (Wound Clinic Only): #3  Primary Wound Type: Auto-immune  Location: Ankle  Wound Location Orientation: Right;Lateral      Assessments 5/8/2025 10:59 AM  6/5/2025 10:04 AM   Wound Image       Drainage Amount None None   Treatments Compression --   Wound Length (cm) 0.5 cm 0.5 cm   Wound Width (cm) 0.8 cm 0.7 cm   Wound Surface Area (cm^2) 0.31 cm^2 0.27 cm^2   Wound Depth (cm) 0 cm 0 cm   Wound Volume (cm^3) 0 cm^3 0 cm^3   Margins Undefined edges Well-defined edges   Non-staged Wound Description Full thickness Full thickness   Soni-wound Assessment Dry Dry;Atrophy jay   Wound Odor None None   Shape 100% scabbed --   Tunneling? -- No   Undermining? -- No   Sinus Tracts? -- No       No associated orders.       Compression Wrap 05/08/25 Ankle Anterior;Right (Active)   Placement Date/Time: 05/08/25 1646   Location: Ankle  Wound Location Orientation: Anterior;Right      Assessments 5/8/2025 10:57 AM 5/29/2025 11:28 AM   Response to Treatment Well tolerated Well tolerated   Compression Layers Multilayer Multilayer   Compression Product Type Unna Boot Unna Boot   Dressing Applied Yes Yes   Compression Wrap Location Toes to Knee Toes to Knee   Compression Wrap Status Clean;Dry;Intact Clean;Dry       No associated orders.     PROCEDURE:      This procedure is medical necessary to nourish the wound bed with extracellular matrix proteins, growth factors, cytokines and other specialty proteins to assist in re-epitheliazation and more rapid healing, decreasing the risk of infection.  See rn px note    ASSESSMENT AND PLAN:    1. 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 Lupus vasculitis (HCC)    4. Mixed connective tissue disease (HCC)    5. Current chronic use of systemic steroids        Risks, benefits, and alternatives of current treatment plan discussed in detail.  Questions and concerns addressed. Red flags to RTC or ED reviewed.  Patient (or parent) agrees to plan.      NOTE TO PATIENT: The 21st Century Cures Act makes clinical notes like  these available to patients in the interest of transparency. Clinical notes are medical documents used by physicians and care providers to communicate with each other. These documents include medical language and terminology, abbreviations, and treatment information that may sound technical and at times possibly unfamiliar. In addition, at times, the verbiage may appear blunt or direct. These documents are one tool providers use to communicate relevant information and clinical opinions of the care providers in a way that allows common understanding of the clinical context.     I spent40 minutes with the patient. This time included:    preparing to see the patient (eg, review notes and recent diagnostics),  seeing the patient, obtaining and/or reviewing separately obtained history, performing a medically appropriate examination and/or evaluation, counseling and educating the patient, documenting in the record, bill epifix placement only  DISCHARGE INSTRUCTIONS     Patient Instructions   Please return:  1 week         Patient discharge and wound care instructions  Raymond Olivo  6/5/2025               You may shower with protection of the wound (ie a cast cover or similar).     Cleansing/dressing:       In clinic, with each dressing change:    please cleanse the limb, foot, and between toes with soap, water and washcloth.   Dry thoroughly.    Cleanse/soak the wound with VASHE (or other hypochlorous wound cleanser), dab dry.    Apply the following dressings:   LEFT: epifix>honey gauze>20-30mmhg calamine compression (wrap light with padding)        RIGHT: epifix>honey gauze>20-30mmhg calamine compression (wrap light with padding to anterior tibia)    Managing your edema:  Avoid prolonged standing in one place. It is better to have your calf muscles moving to pump fluid out of the legs      Elevate leg(s) above the level of the heart when sitting or as much as possible.  Take you diuretics as directed by your  physician. Do not skip doses or change doses unless instructed to do so by your physician.  Decrease salt intake, follow recommended 2 grams daily.  Do not get leg(s) with compression wrap wet. If wraps are too tight as indicated by pain, numbness/tingling or discoloration of toes remove wrap completely and call the wound center @ [phone].  Refer to the \"Multi-layer compression bandage application patient information sheet\" given to you in clinic.     Nutrition and blood sugar control:  Focus on the following:  Protein: Meats, beans, eggs, milk and yogurt particularly Greek yogurt), tofu, soy nuts, soy protein products (Follow the protein handout in your welcome folder)  Vitamin C: Citrus fruits and juices, strawberries, tomatoes, tomato juice, peppers, baked potatoes, spinach, broccoli, cauliflower, Byron sprouts, cabbage  Vitamin A: Dark green, leafy vegetables, orange or yellow vegetables, cantaloupe, fortified dairy products, liver, fortified cereals  Zinc: Fortified cereals, red meats, seafood  Consider supplementing with Justin by [employer]. It can be purchased on amazon, [employer] website, or local pharmacy may be able to order it for you.  (These are essential branch chain amino acids that help with tissue building and wound healing).   When your blood sugar is consistently elevated greater than 180 your body can't heal or fight infection.     Concerns:  Signs of infection may include the following:  Increase in redness  Red \"streaks\" from wound  Increase in swelling  Fever  Unusual odor  Change in the amount of wound drainage     Should you experience any significant changes in your wound(s) or have any questions regarding your home care instructions please contact the wound center [facility] @ [phone] If after regular business hours, please call your family doctor or local emergency room. The treatment plan has been discussed at length between you and your provider. Follow all instructions  carefully, it is very important. If you do not follow all instructions you are at risk of your wound not healing, infection, possible loss of limb and even loss of life.    Martina Mckeon FNP-C, CWCN-AP, CFCN, CSWS, WCC, DWC  6/5/2025

## 2025-06-05 NOTE — PROGRESS NOTES
Patient ID: Raymond Olivo is a 50 year old female.    Cellular tissue product application Auto-immune Right;Medial Ankle    Date/Time: 6/5/2025 10:54 AM    Performed by: Martina Mckeon APRN  Authorized by: Martina Mckeon APRN  Associated wounds:   Wound 05/08/25 #2 Ankle Right;Medial    Consent:     Consent obtained:  Verbal    Consent given by:  Patient    Alternatives discussed:  Alternative treatment  Procedure details:     Location:  head/hands/feet/digits/genitalia    Product applied:  Epifix    Product lot #:  [id number]    Product expiration:  12/1/2029    Amount used (sq cm):  2    Amount wasted (sq cm):  1    Reason for waste:  Wound size    Secured/Fixated: Yes      Secured/Fixated with:  Honey gauze    Total wound surface area (sq cm):  1    Type of wound:  other wound type    Treatment number:  1  Post-procedure details:     Patient tolerance of procedure:  Tolerated well, no immediate complications  Comments:      Epifix #1 applied, fixated with honey gauze, dressed with ABD pad and calamine unna boot 20-30mmHg with rosidal for padding. One product split between 2 wounds.

## 2025-06-07 ENCOUNTER — PATIENT MESSAGE (OUTPATIENT)
Dept: RHEUMATOLOGY | Facility: CLINIC | Age: 50
End: 2025-06-07

## 2025-06-07 DIAGNOSIS — M35.1 MIXED CONNECTIVE TISSUE DISEASE (HCC): Primary | ICD-10-CM

## 2025-06-07 DIAGNOSIS — Z79.899 IMMUNOCOMPROMISED STATE DUE TO DRUG THERAPY (HCC): ICD-10-CM

## 2025-06-07 DIAGNOSIS — D84.821 IMMUNOCOMPROMISED STATE DUE TO DRUG THERAPY (HCC): ICD-10-CM

## 2025-06-07 DIAGNOSIS — Z79.899 HIGH RISK MEDICATION USE: ICD-10-CM

## 2025-06-07 DIAGNOSIS — E61.1 IRON DEFICIENCY: ICD-10-CM

## 2025-06-07 DIAGNOSIS — I77.89 LUPUS VASCULITIS (HCC): ICD-10-CM

## 2025-06-07 DIAGNOSIS — M32.19 LUPUS VASCULITIS (HCC): ICD-10-CM

## 2025-06-09 RX ORDER — FERROUS SULFATE 325(65) MG
1 TABLET ORAL 2 TIMES DAILY WITH MEALS
Qty: 180 TABLET | Refills: 0 | Status: SHIPPED | OUTPATIENT
Start: 2025-06-09

## 2025-06-09 NOTE — TELEPHONE ENCOUNTER
LOV: 5/23/2025    RTC: 3 months   Future Appointments   Date Time Provider Department Center   6/12/2025 10:30 AM Martina Mckeon APRN  [facility]   6/19/2025 10:00 AM Martina Mckeon APRN  [facility]   6/26/2025  9:00 AM Martina Mckeon APRTYREE  [facility]   7/3/2025 10:30 AM Martina Mckeon APRN  [facility]   8/22/2025  8:30 AM Alisha Farmer DO [facility] [place]   11/21/2025  9:45 AM Alisha Farmer DO [facility] [place]   LABS:   Component      Latest Ref Rng 5/13/2025   WBC      4.0 - 11.0 x10(3) uL    RBC      3.80 - 5.30 x10(6)uL    Hemoglobin      12.0 - 16.0 g/dL    Hematocrit      35.0 - 48.0 %    Platelet Count      150.0 - 450.0 10(3)uL    MCV      80.0 - 100.0 fL    MCH      26.0 - 34.0 pg    MCHC      31.0 - 37.0 g/dL    RDW      %    Prelim Neutrophil Abs      1.50 - 7.70 x10 (3) uL    Neutrophils Absolute      1.50 - 7.70 x10(3) uL    Lymphocytes Absolute      1.00 - 4.00 x10(3) uL    Monocytes Absolute      0.10 - 1.00 x10(3) uL    Eosinophils Absolute      0.00 - 0.70 x10(3) uL    Basophils Absolute      0.00 - 0.20 x10(3) uL    Immature Granulocyte Absolute      0.00 - 1.00 x10(3) uL    Neutrophils %      %    Lymphocytes %      %    Monocytes %      %    Eosinophils %      %    Basophils %      %    Immature Granulocyte %      %    Glucose      70 - 99 mg/dL    Sodium      136 - 145 mmol/L    Potassium      3.5 - 5.1 mmol/L    Chloride      98 - 112 mmol/L    Carbon Dioxide, Total      21.0 - 32.0 mmol/L    ANION GAP      0 - 18 mmol/L    BUN      9 - 23 mg/dL    CREATININE      0.55 - 1.02 mg/dL    CALCIUM      8.7 - 10.6 mg/dL    CALCULATED OSMOLALITY      275 - 295 mOsm/kg    EGFR      >=60 mL/min/1.73m2    AST (SGOT)      <34 U/L    ALT (SGPT)      10 - 49 U/L    ALKALINE PHOSPHATASE      39 - 100 U/L    Total Bilirubin      0.3 - 1.2 mg/dL    PROTEIN, TOTAL      5.7 - 8.2 g/dL    Albumin      3.2 - 4.8 g/dL    Globulin      2.0 - 3.5 g/dL    A/G  Ratio      1.0 - 2.0     Patient Fasting for CMP?    Quantiferon TB NIL      IU/mL 0.05    Quantiferon-TB1 Minus NIL      IU/mL 0.02    Quantiferon-TB2 Minus NIL      IU/mL 0.00    Quantiferon TB Mitogen minus NIL      IU/mL 0.93    Quantiferon TB Result      Negative  Negative    HEPATITIS B SURFACE AB QUAL      Reactive   Nonreactive !    HEPATITIS B SURFACE AB QUANT      mIU/mL <3.10    HBSAg Screen      Nonreactive   Nonreactive    Hbsag Screen Index <0.10    HEPATITIS B CORE AB, TOTAL      Nonreactive   Nonreactive    HCV AB      Nonreactive   Nonreactive    C-REACTIVE PROTEIN      <=0.50 mg/dL    SED RATE      0 - 20 mm/Hr    COMPLEMENT C3      90.0 - 170.0 mg/dL    COMPLEMENT C4      12.0 - 36.0 mg/dL    VITAMIN D, 25-OH, TOTAL      30.0 - 100.0 ng/mL       Legend:  ! Abnormal  Component      Latest Ref [facility] 5/16/2025   WBC      4.0 - 11.0 x10(3) uL 4.6    RBC      3.80 - 5.30 x10(6)uL 4.61    Hemoglobin      12.0 - 16.0 g/dL 13.3    Hematocrit      35.0 - 48.0 % 41.0    Platelet Count      150.0 - 450.0 10(3)uL 200.0    MCV      80.0 - 100.0 fL 88.9    MCH      26.0 - 34.0 pg 28.9    MCHC      31.0 - 37.0 g/dL 32.4    RDW      % 12.7    Prelim Neutrophil Abs      1.50 - 7.70 x10 (3) uL 3.47    Neutrophils Absolute      1.50 - 7.70 x10(3) uL 3.47    Lymphocytes Absolute      1.00 - 4.00 x10(3) uL 0.55 (L)    Monocytes Absolute      0.10 - 1.00 x10(3) uL 0.53    Eosinophils Absolute      0.00 - 0.70 x10(3) uL 0.02    Basophils Absolute      0.00 - 0.20 x10(3) uL 0.01    Immature Granulocyte Absolute      0.00 - 1.00 x10(3) uL 0.01    Neutrophils %      % 75.7    Lymphocytes %      % 12.0    Monocytes %      % 11.5    Eosinophils %      % 0.4    Basophils %      % 0.2    Immature Granulocyte %      % 0.2    Glucose      70 - 99 mg/dL 82    Sodium      136 - 145 mmol/L 138    Potassium      3.5 - 5.1 mmol/L 3.8    Chloride      98 - 112 mmol/L 103    Carbon Dioxide, Total      21.0 - 32.0 mmol/L 29.0    ANION  GAP      0 - 18 mmol/L 6    BUN      9 - 23 mg/dL 9    CREATININE      0.55 - 1.02 mg/dL 0.59    CALCIUM      8.7 - 10.6 mg/dL 9.7    CALCULATED OSMOLALITY      275 - 295 mOsm/kg 284    EGFR      >=60 mL/min/1.73m2 111    AST (SGOT)      <34 U/L 20    ALT (SGPT)      10 - 49 U/L 11    ALKALINE PHOSPHATASE      39 - 100 U/L 89    Total Bilirubin      0.3 - 1.2 mg/dL 0.4    PROTEIN, TOTAL      5.7 - 8.2 g/dL 8.2    Albumin      3.2 - 4.8 g/dL 4.8    Globulin      2.0 - 3.5 g/dL 3.4    A/G Ratio      1.0 - 2.0  1.4    Patient Fasting for CMP? No    Quantiferon TB NIL      IU/mL    Quantiferon-TB1 Minus NIL      IU/mL    Quantiferon-TB2 Minus NIL      IU/mL    Quantiferon TB Mitogen minus NIL      IU/mL    Quantiferon TB Result      Negative     HEPATITIS B SURFACE AB QUAL      Reactive      HEPATITIS B SURFACE AB QUANT      mIU/mL    HBSAg Screen      Nonreactive      Hbsag Screen Index    HEPATITIS B CORE AB, TOTAL      Nonreactive      HCV AB      Nonreactive      C-REACTIVE PROTEIN      <=0.50 mg/dL <0.40    SED RATE      0 - 20 mm/Hr 40 (H)    COMPLEMENT C3      90.0 - 170.0 mg/dL 119.3    COMPLEMENT C4      12.0 - 36.0 mg/dL 33.4    VITAMIN D, 25-OH, TOTAL      30.0 - 100.0 ng/mL 76.3       Legend:  (L) Low  (H) High    LAST REFILL: 1/31/2025, Quantity 180 tablets, Refills 0    Dr Farmer-- orders pending, approve if agreeable.

## 2025-06-11 NOTE — PROGRESS NOTES
CHIEF COMPLAINT:     Chief Complaint   Patient presents with    Wound Recheck     Patient arrives to wound care follow-up with BLE in calamine unna compression. She rates her pain 3-4/10. No new questions or concerns this visit.     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 Patient saw dr. Santamaria (vascular) in December 2023 and he documented \"I explained to the patient and her  that I do not believe the source of her pain to be vascular in origin.  She has an easily palpable dorsalis pedis pulse and the location of the pain is more suggestive of a neuropathic origin.  I suspect perhaps that given the tightness of her feet from her mixed connective tissue disorder the compression stockings is causing somewhat compression of the nerves in that area and I have encouraged her to substitute those stockings to those that involve the feet up to the upper calf.\" Patient last saw dr. Farmer in February 2025 and overall doing well. She was to continue on mmf 750mg daily, prednisone 2.5 mg daily, plaquenil 200mg daily and amlodipine 2.5 mg tiwce daily along with vitamine prscription and d3.  Spouse contacted dr farmer late last week regarding a small wound on her left leg, so patient was fit into the schedule.  They have not been dressing the wound with anything, she did have spandagrip on.  There was surrounding dry drainage. Patient states she noted something last Friday and then by Monday this week she noted it was a definite ulceration.  The drainage is clear and increases when patient plantar/dorsiflexes.  Most likely a lymph vessel leaking. We discussed utilizing doxy-they will bring to next appointment. Her skin remains very dry/flaky she states she has been utilizing resta daily.   We discussed that she may need something with ammonium lactate or urea in it.  No s/s of infection. The area is not as painful as it has been previously.     HPI PRIOR TO MAY 2025 SEE INDIVIDUAL PROGRESS NOTES  5-8-25 patient returns with daughter.  She has been dressing with gent for the last 2 weeks.  I have been in communication with vascular us to get patient scheduled for venous reflux study and consult with dr. Najjar.  Patient reminded to please call to schedule. Today patient c/o burning to the right ankle and there are new scabs/eschar noted on the medial and lateral right ankle. The left medial ankle wound is not improved.  No acute s/s of infection.  I d/w patient/family that I will reach out to dr farmer regarding the non-progression and the new open areas on the right. Will utilize honey hydrocolloid and return patient to compression wraps.    5-16-25 patient returns.  I communicated with rheum last week and they would like another biopsy, they have ordered labs.  Patient saw dr. 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  Patient did get labs drawn but the lab only josee the tb and hep b labs, patient encouraged to get the remaining labs ordered in February to be drawn today before leaving hospital, her next appointment with dr. Farmer is next Friday. Wounds are not improving, biopsies taken from left and right medial ankle wounds, sent in [facility] for dif    5-22-25 patient returns.  She has an appointment with dr farmer tomorrow. She got her venous us done on Monday, result is still pending.  Her DIF is also still pending.  We discussed placement of epifix, however we do not have the correct size (14mm disc) so will hold on that until next week.  We discussed that  if the biopsies or lab work point to autoimmune or vasculitis then that can be treated with meds with dr. Farmer. If the us shows reflux then dr najjar can treat the veins.  Today the wounds are about the same. She does not have any new ones. No s/s of infection. Continues with extreme pain with any touch. Will update dr. Najjar and dr farmer.    5-29-25 patient returns.  I s/w lab yesterday regarding status of the DIF that was sent out to [facility], it went out on the 19th and it can take up to 14 days, so it is still pending.  per vascular her venous study showed essentially normal valves and therefore the cause of her ulcerations is likely due to autoimmune disease.  patient saw dr. Farmer last week patient is to restart the gabapentin 100mg at noc to help with burnng pain, echo and cxr ordered, her prednisone was increased to 20mg daily x 1 week, increase the mycophenolate to 500mg bid, continue hydroxychlorquine.  The plan is to consider stopping the mycophenolate and switching to rituximab after the results of the biopsy come back.  today pain is improved on the left, burning pain continues on the right.  wounds are stable or improved. Will continue with honey./honey gauze and light compression. No acute s/s of infection.    6-5-25 patient returns.  We received the results of the biopsies from [facility] yesterday.  For an unknown reason the 2 mm punchs were reduced to 1mm and a full diagnostic eval was not able to be performed however eval did show immunofluorescence observed in non-specific pattern with the following antibodies: IGM, C3, and fibrinogen on LEFT.  The RIGHT just showed immunoflourescence observed in fibrinogen antibody. I reached out to dr farmer regarding need for further biopsy. I discussed the biopsy results (and lack of) with the patient and spouse.  Today, patient's pain is improved, the left ulceration is improved in size and characteristic, the right is stable.  Will place epifix today. No  s/s of infection.     6-12-25 patient returns.  I touched base with dr. Farmer and since the wounds were improving last week we do not need to try and get another biopsy, if they would start to deteriorate she will begin the process of getting insurance approval for rituximab.  The plan with the medications is to keep with the higher dose of the mycophenolate and continue with the steroids and the slow taper.  Last week we placed epifix to the wounds.  Today her wounds are stable with slight improvements in wound bed characteristics, she reports her pain is also slightly better.  Will allow epifix to continue to integrate over the next week and plan to place another graft next week. Continue with honey gel and gauze. No s/s of infection.  MEDICATIONS:     Current Outpatient Medications:     Ferrous Sulfate 325 (65 Fe) MG Oral Tab, Take 1 tablet (325 mg total) by mouth 2 (two) times daily with meals., Disp: 180 tablet, Rfl: 0    amLODIPine 2.5 MG Oral Tab, Take 1 tablet (2.5 mg total) by mouth 2 (two) times daily., Disp: 180 tablet, Rfl: 0    hydroxychloroquine 200 MG Oral Tab, Take 1 tablet (200 mg total) by mouth daily., Disp: 90 tablet, Rfl: 0    Mycophenolate Mofetil 500 MG Oral Tab, Take 1 tablet (500 mg total) by mouth 2 (two) times daily., Disp: 180 tablet, Rfl: 0    predniSONE 5 MG Oral Tab, Take 20mg daily x 7 days, then 15mg daily x 7 days, then 10mg daily x 7 days, then 5mg daily x 7 days., Disp: 70 tablet, Rfl: 0    gabapentin 100 MG Oral Cap, Take 1 capsule (100 mg total) by mouth nightly., Disp: 90 capsule, Rfl: 0    gentamicin 0.3 % Ophthalmic Solution, Apply 3 drops to wound twice daily, cover with petroleum gauze and cover dressing, Disp: 15 mL, Rfl: 0    doxycycline 100 MG Oral Cap, Bring capsules to wound clinic to use as directed, Disp: 5 capsule, Rfl: 0    predniSONE 2.5 MG Oral Tab, Take 1 tablet (2.5 mg total) by mouth daily., Disp: 90 tablet, Rfl: 0    clobetasol 0.05 % External Solution,  Apply topically to lower legs after shower 3 x weekly, Disp: 50 mL, Rfl: 0  ALLERGIES:   No Known Allergies   REVIEW OF SYSTEMS:   This information was obtained from the patient/family and chart.    See HPI for pertinent positives, otherwise 10 pt ROS negative.    HISTORY:   Past medical, surgical, family and social history updated where appropriate.    PHYSICAL EXAM:     Vitals:    06/12/25 1025   BP: 108/66   Pulse: 84   Resp: 15   Temp: 98.3 °F (36.8 °C)     Estimated body mass index is 21.58 kg/m² as calculated from the following:    Height as of 5/23/25: 62\".    Weight as of 5/23/25: 118 lb (53.5 kg).   No results found for: \"PGLU\"    Vital signs reviewed.Appears stated age, well groomed.    Constitutional:  Bp wnl for patient. Pulse Regular and wnl for patient. Respirations easy and unlabored. Temperature wnl. Weight normal for height. Appearance neat and clean. Appears in no acute distress. Well nourished and well developed.    Lower extremity:  dp/pt palpable bilalterally.   lower extremities are free of varicosities and no edema, they are scarred, scattered changes in pigmentation (hypo/hyper) with atrophie jay. Capillary refill < 3 seconds. Digits are warm. toenails are wnl for color, thickness and hygeine. Skin is dry-. + hairgrowth on legs.    Musculoskeletal:  Gait and station stable   Integumentary:  refer to wound characteristics and images   Psychiatric:  Judgment and insight intact. Alert and oriented times 3. No evidence of depression, anxiety, or agitation. Calm, cooperative, and communicative, but very quiet and reserved.  EDEMA:   Calf  Point of Measurement - Left Calf: 31  Point of Measurement - Right Calf: 31  Left Calf from:: Heel  Calf Left cm:: 28  Right Calf from:: Heel  Right Calf cm:: 29  Ankle  Point of Measurement - Left Ankle: 10  Point of Measurement - Right Ankle: 10  Left Ankle from:: Heel  Left Ankle cm:: 16     Right Ankle from:: Heel  Right Ankle cm:: 16     DIAGNOSTICS:      Lab Results   Component Value Date    BUN 9 05/16/2025    CREATSERUM 0.59 05/16/2025    GFRCKDEPI 121.37 12/17/2020    ALB 4.8 05/16/2025    TP 8.2 05/16/2025    A1C 5.1 12/17/2020     Lab Results   Component Value Date    ESRML 40 (H) 05/16/2025    ESRML 54 (H) 02/25/2025    ESRML 47 (H) 09/03/2024      Lab Results   Component Value Date    CRP <0.40 05/16/2025    CRP <0.40 02/25/2025    CRP 0.50 09/03/2024 6-5-25 Right and left ankle biopsy      May 2025 venous study-dr santamaria  \"The results showed essentially normal valves in the veins of both of your legs. This means that the cause of ulcerations is the autoimmune disease and not due to your veins\"     11-28-23 arterial duplex-dr santamaria  FINDINGS:    LEFT EXTREMITY:  Triphasic waveforms in the left common femoral, profunda, superficial femoral arteries.  Biphasic waveforms below the left knee.   OTHER:  None.     PEAK VELOCITIES (CM/S):   LEFT COMMON FEMORAL:      165    LEFT PROFUNDA FEMORAL:      78      LEFT SUPERFICIAL FEMORAL PROX:    113   LEFT SUPERFICIAL FEMORAL MID:      94   LEFT SUPERFICIAL FEMORAL DISTAL:    92   LEFT POPLITEAL PROX:      110   LEFT POPLITEAL DISTAL:      121   TIBIOPER TRUNK:      66   LEFT PTA PROX:      60   LEFT PTA MID:      62   LEFT PTA DISTAL:      73   LEFT BETH PROX:      82   LEFT BETH MID:      72    LEFT DORSALIS PEDIS::      30   LEFT GREAT TOE PRESSURE:      25 mmHg   CONCLUSION:    1. No high-grade stenosis is identified.  There is suggestion of diffuse mild stenosis throughout most of the left lower extremity.    2. Overall diminished left toe pressure is noted.  Possibility of decreased perfusion in the distal left foot is of consideration.     7-14-20 venous reflux study-dr santamaria  Per Dr. Santamaria review \"no intervention needed\"    July 2020 DIF-pathology punch biopsy  RESULTS   IgG: Grains within and around basal keratinocytes and         within cell nuclei and grains in focal superficial,         upper, and mid  dermal blood vessels   IgG4:  Negative   IgM:  Grains in focal dermal blood vessels   IgA:  Focal grains within basal keratinocytes   C3:   Few grains along basement membrane zone and grains and         clumps within superficial and upper dermal blood         vessels   Fibrinogen:  3+ deposition around superficial, upper, and                mid dermal blood vessels   COMMENTS   By direct immunofluorescence, there is granular deposition of IgG   within and around basal keratinocytes and within cell nuclei. The   presence of granular IgG within the epidermis raises the   consideration of lupus erythematosus, specifically subacute   cutaneous lupus erythematosus (SCLE). The finding of granular   staining of cell nuclei may be indicative of the presence of   anti-nuclear antibodies, further lending support to the   possibility of lupus erythematosus. 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     WOUND ASSESSMENT:     Wound 04/10/25 #1 Ankle Left;Medial (Active)   Date First Assessed/Time First Assessed: 04/10/25 0757    Wound Number (Wound Clinic Only): #1  Primary Wound Type: Auto-immune  Location: Ankle  Wound Location Orientation: Left;Medial      Assessments 4/10/2025  7:57 AM 6/12/2025 10:31 AM   Wound Image        Drainage Amount Unable to assess Small   Drainage Description -- Yellow;Serous   Treatments Compression --   Wound Length (cm) 0.7 cm 1.1 cm   Wound Width (cm) 0.7 cm 0.9 cm   Wound Surface Area (cm^2) 0.38 cm^2 0.78 cm^2   Wound Depth (cm) 0.2 cm 0.2 cm   Wound Volume (cm^3) 0.051 cm^3 0.104 cm^3   Wound Healing % -- -104   Margins Well-defined edges Well-defined edges   Non-staged Wound Description Full thickness Full thickness   Soni-wound Assessment Dry;Hemosiderin staining  Atrophy jay;Painful;Dry   Wound Granulation Tissue Pale Grey;Pink;Firm Red;Firm   Wound Bed Granulation (%) 100 % 100 %   Wound Odor None None   Tunneling? No No   Undermining? No No   Sinus Tracts? No No       Inactive Orders   Date Order Priority Status Authorizing Provider   06/05/25 1034 Cellular tissue product application Auto-immune Left;Medial Ankle Routine Completed Martina Mckeon APRN   05/16/25 1137 Lesion biopsy Routine Completed Martina Mckeon APRN       Compression Wrap 04/10/25 Ankle Anterior;Left (Active)   Placement Date/Time: 04/10/25 0846   Location: Ankle  Wound Location Orientation: Anterior;Left      Assessments 4/10/2025  7:57 AM 6/5/2025 10:02 AM   Response to Treatment Well tolerated Well tolerated   Compression Layers Multilayer Multilayer   Compression Product Type Unna Boot Unna Boot   Dressing Applied Yes Yes   Compression Wrap Location Toes to Knee Toes to Knee   Compression Wrap Status Dry;Intact;Clean Clean;Dry       No associated orders.       Wound 05/08/25 #2 Ankle Right;Medial (Active)   Date First Assessed/Time First Assessed: 05/08/25 1056    Wound Number (Wound Clinic Only): #2  Primary Wound Type: Auto-immune  Location: Ankle  Wound Location Orientation: Right;Medial      Assessments 5/8/2025 10:58 AM 6/12/2025 10:32 AM   Wound Image       Drainage Amount None Small   Drainage Description -- Yellow;Serous   Treatments Compression --   Wound Length (cm) 0.5 cm 0.7 cm   Wound Width (cm) 0.5 cm 1.1 cm   Wound Surface Area (cm^2) 0.2 cm^2 0.6 cm^2   Wound Depth (cm) 0 cm 0.2 cm   Wound Volume (cm^3) 0 cm^3 0.081 cm^3   Margins Undefined edges Well-defined edges   Non-staged Wound Description Full thickness Full thickness   Soni-wound Assessment Dry Dry;Painful;Atrophy jay   Wound Granulation Tissue -- Firm;Pink   Wound Bed Granulation (%) -- 5 %   Wound Bed Epithelium (%) -- 10 %   Wound Bed Slough (%) -- 85 %   Wound Odor None None   Shape 100% scabbed bridged    Tunneling? -- No   Undermining? -- No   Sinus Tracts? -- No       Inactive Orders   Date Order Priority Status Authorizing Provider   06/05/25 1054 Cellular tissue product application Auto-immune Right;Medial Ankle Routine Completed Martina Mckeon APRN   05/16/25 1139 Lesion biopsy Routine Completed Martina Mckeon APRN       Wound 05/08/25 #3 Ankle Right;Lateral (Active)   Date First Assessed/Time First Assessed: 05/08/25 1056    Wound Number (Wound Clinic Only): #3  Primary Wound Type: Auto-immune  Location: Ankle  Wound Location Orientation: Right;Lateral      Assessments 5/8/2025 10:59 AM 6/12/2025 10:32 AM   Wound Image        Drainage Amount None None   Treatments Compression --   Wound Length (cm) 0.5 cm 0.9 cm   Wound Width (cm) 0.8 cm 0.6 cm   Wound Surface Area (cm^2) 0.31 cm^2 0.42 cm^2   Wound Depth (cm) 0 cm 0 cm   Wound Volume (cm^3) 0 cm^3 0 cm^3   Margins Undefined edges Well-defined edges   Non-staged Wound Description Full thickness Full thickness   Soni-wound Assessment Dry Dry;Atrophy jay   Wound Odor None None   Shape 100% scabbed 100% scab   Tunneling? -- No   Undermining? -- No   Sinus Tracts? -- No       No associated orders.       Compression Wrap 05/08/25 Ankle Anterior;Right (Active)   Placement Date/Time: 05/08/25 1646   Location: Ankle  Wound Location Orientation: Anterior;Right      Assessments 5/8/2025 10:57 AM 6/5/2025 10:02 AM   Response to Treatment Well tolerated Well tolerated   Compression Layers Multilayer Multilayer   Compression Product Type Unna Boot Unna Boot   Dressing Applied Yes Yes   Compression Wrap Location Toes to Knee Toes to Knee   Compression Wrap Status Clean;Dry;Intact Clean;Dry       No associated orders.         ASSESSMENT AND PLAN:    1. 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 Lupus vasculitis (HCC)    4. Mixed connective  tissue disease (HCC)    5. Current chronic use of systemic steroids          Risks, benefits, and alternatives of current treatment plan discussed in detail.  Questions and concerns addressed. Red flags to RTC or ED reviewed.  Patient (or parent) agrees to plan.      NOTE TO PATIENT: The 21st Century Cures Act makes clinical notes like these available to patients in the interest of transparency. Clinical notes are medical documents used by physicians and care providers to communicate with each other. These documents include medical language and terminology, abbreviations, and treatment information that may sound technical and at times possibly unfamiliar. In addition, at times, the verbiage may appear blunt or direct. These documents are one tool providers use to communicate relevant information and clinical opinions of the care providers in a way that allows common understanding of the clinical context.     I spent29 minutes with the patient. This time included:    preparing to see the patient (eg, review notes and recent diagnostics),  seeing the patient, obtaining and/or reviewing separately obtained history, performing a medically appropriate examination and/or evaluation, counseling and educating the patient, documenting in the record  DISCHARGE INSTRUCTIONS     Patient Instructions   Please return:  1 week  - plan for another epifix placement        Patient discharge and wound care instructions  Raymond Olivo  6/12/2025       You may shower with protection of the wound (ie a cast cover or similar).     Cleansing/dressing:       In clinic, with each dressing change:    please cleanse the limb, foot, and between toes with soap, water and washcloth.   Dry thoroughly.    Cleanse/soak the wound with VASHE (or other hypochlorous wound cleanser), dab dry.    Apply the following dressings:   LEFT: honey gel>honey gauze>20-30mmhg calamine compression (wrap light with padding)        RIGHT:honey gel>honey  gauze>20-30mmhg calamine compression (wrap light with padding to anterior tibia)    Managing your edema:  Avoid prolonged standing in one place. It is better to have your calf muscles moving to pump fluid out of the legs      Elevate leg(s) above the level of the heart when sitting or as much as possible.  Take you diuretics as directed by your physician. Do not skip doses or change doses unless instructed to do so by your physician.  Decrease salt intake, follow recommended 2 grams daily.  Do not get leg(s) with compression wrap wet. If wraps are too tight as indicated by pain, numbness/tingling or discoloration of toes remove wrap completely and call the wound center @ [phone].  Refer to the \"Multi-layer compression bandage application patient information sheet\" given to you in clinic.     Nutrition and blood sugar control:  Focus on the following:  Protein: Meats, beans, eggs, milk and yogurt particularly Greek yogurt), tofu, soy nuts, soy protein products (Follow the protein handout in your welcome folder)  Vitamin C: Citrus fruits and juices, strawberries, tomatoes, tomato juice, peppers, baked potatoes, spinach, broccoli, cauliflower, [place] sprouts, cabbage  Vitamin A: Dark green, leafy vegetables, orange or yellow vegetables, cantaloupe, fortified dairy products, liver, fortified cereals  Zinc: Fortified cereals, red meats, seafood  Consider supplementing with Justin by [employer]. It can be purchased on amazon, [employer] website, or local pharmacy may be able to order it for you.  (These are essential branch chain amino acids that help with tissue building and wound healing).   When your blood sugar is consistently elevated greater than 180 your body can't heal or fight infection.     Concerns:  Signs of infection may include the following:  Increase in redness  Red \"streaks\" from wound  Increase in swelling  Fever  Unusual odor  Change in the amount of wound drainage     Should you experience any  significant changes in your wound(s) or have any questions regarding your home care instructions please contact the wound center [facility] @ [phone] If after regular business hours, please call your family doctor or local emergency room. The treatment plan has been discussed at length between you and your provider. Follow all instructions carefully, it is very important. If you do not follow all instructions you are at risk of your wound not healing, infection, possible loss of limb and even loss of life.    Martina Mckeon FNP-C, CWCN-AP, CFCN, CSWS, WCC, DWC  6/12/2025

## 2025-06-12 ENCOUNTER — OFFICE VISIT (OUTPATIENT)
Dept: WOUND CARE | Facility: HOSPITAL | Age: 50
End: 2025-06-12
Attending: NURSE PRACTITIONER
Payer: COMMERCIAL

## 2025-06-12 VITALS
HEART RATE: 84 BPM | RESPIRATION RATE: 15 BRPM | TEMPERATURE: 98 F | SYSTOLIC BLOOD PRESSURE: 108 MMHG | DIASTOLIC BLOOD PRESSURE: 66 MMHG

## 2025-06-12 DIAGNOSIS — I77.89 LUPUS VASCULITIS (HCC): ICD-10-CM

## 2025-06-12 DIAGNOSIS — L97.322 NON-PRESSURE CHRONIC ULCER OF LEFT ANKLE WITH FAT LAYER EXPOSED (HCC): Primary | ICD-10-CM

## 2025-06-12 DIAGNOSIS — M32.19 LUPUS VASCULITIS (HCC): ICD-10-CM

## 2025-06-12 DIAGNOSIS — Z79.52 CURRENT CHRONIC USE OF SYSTEMIC STEROIDS: ICD-10-CM

## 2025-06-12 DIAGNOSIS — L97.812 NON-PRESSURE CHRONIC ULCER OF OTHER PART OF RIGHT LOWER LEG WITH FAT LAYER EXPOSED (HCC): ICD-10-CM

## 2025-06-12 DIAGNOSIS — M35.1 MIXED CONNECTIVE TISSUE DISEASE (HCC): ICD-10-CM

## 2025-06-12 PROCEDURE — 99213 OFFICE O/P EST LOW 20 MIN: CPT | Performed by: NURSE PRACTITIONER

## 2025-06-12 NOTE — PATIENT INSTRUCTIONS
Please return:  1 week  - plan for another epifix placement        Patient discharge and wound care instructions  Raymond Olivo  6/12/2025       You may shower with protection of the wound (ie a cast cover or similar).     Cleansing/dressing:       In clinic, with each dressing change:    please cleanse the limb, foot, and between toes with soap, water and washcloth.   Dry thoroughly.    Cleanse/soak the wound with VASHE (or other hypochlorous wound cleanser), dab dry.    Apply the following dressings:   LEFT: honey gel>honey gauze>20-30mmhg calamine compression (wrap light with padding)        RIGHT:honey gel>honey gauze>20-30mmhg calamine compression (wrap light with padding to anterior tibia)    Managing your edema:  Avoid prolonged standing in one place. It is better to have your calf muscles moving to pump fluid out of the legs      Elevate leg(s) above the level of the heart when sitting or as much as possible.  Take you diuretics as directed by your physician. Do not skip doses or change doses unless instructed to do so by your physician.  Decrease salt intake, follow recommended 2 grams daily.  Do not get leg(s) with compression wrap wet. If wraps are too tight as indicated by pain, numbness/tingling or discoloration of toes remove wrap completely and call the wound center @ [phone].  Refer to the \"Multi-layer compression bandage application patient information sheet\" given to you in clinic.     Nutrition and blood sugar control:  Focus on the following:  Protein: Meats, beans, eggs, milk and yogurt particularly Greek yogurt), tofu, soy nuts, soy protein products (Follow the protein handout in your welcome folder)  Vitamin C: Citrus fruits and juices, strawberries, tomatoes, tomato juice, peppers, baked potatoes, spinach, broccoli, cauliflower, [place] sprouts, cabbage  Vitamin A: Dark green, leafy vegetables, orange or yellow vegetables, cantaloupe, fortified dairy products, liver, fortified  cereals  Zinc: Fortified cereals, red meats, seafood  Consider supplementing with Justin by [employer]. It can be purchased on amazon, [employer] website, or local pharmacy may be able to order it for you.  (These are essential branch chain amino acids that help with tissue building and wound healing).   When your blood sugar is consistently elevated greater than 180 your body can't heal or fight infection.     Concerns:  Signs of infection may include the following:  Increase in redness  Red \"streaks\" from wound  Increase in swelling  Fever  Unusual odor  Change in the amount of wound drainage     Should you experience any significant changes in your wound(s) or have any questions regarding your home care instructions please contact the wound center [facility] @ [phone] If after regular business hours, please call your family doctor or local emergency room. The treatment plan has been discussed at length between you and your provider. Follow all instructions carefully, it is very important. If you do not follow all instructions you are at risk of your wound not healing, infection, possible loss of limb and even loss of life.

## 2025-06-12 NOTE — PROGRESS NOTES
.Weekly Wound Education Note    Teaching Provided To: Patient, Family  Training Topics: Dressing, Edema control, Cleasing and general instructions, Compression, Discharge instructions  Training Method: Explain/Verbal, Written  Training Response: Patient responds and understands        Notes: Wounds stable. All wounds dressed with honey gel, honey gauze, ABD pads and calamine unna boot 20-30mmHg with strip of rosidal to anterior leg/ foot for padding.

## 2025-06-19 ENCOUNTER — OFFICE VISIT (OUTPATIENT)
Dept: WOUND CARE | Facility: HOSPITAL | Age: 50
End: 2025-06-19
Attending: NURSE PRACTITIONER
Payer: COMMERCIAL

## 2025-06-19 VITALS
DIASTOLIC BLOOD PRESSURE: 68 MMHG | SYSTOLIC BLOOD PRESSURE: 108 MMHG | TEMPERATURE: 98 F | HEART RATE: 85 BPM | RESPIRATION RATE: 14 BRPM

## 2025-06-19 DIAGNOSIS — I77.89 LUPUS VASCULITIS (HCC): ICD-10-CM

## 2025-06-19 DIAGNOSIS — M35.1 MIXED CONNECTIVE TISSUE DISEASE (HCC): ICD-10-CM

## 2025-06-19 DIAGNOSIS — L97.812 NON-PRESSURE CHRONIC ULCER OF OTHER PART OF RIGHT LOWER LEG WITH FAT LAYER EXPOSED (HCC): ICD-10-CM

## 2025-06-19 DIAGNOSIS — L97.322 NON-PRESSURE CHRONIC ULCER OF LEFT ANKLE WITH FAT LAYER EXPOSED (HCC): Primary | ICD-10-CM

## 2025-06-19 DIAGNOSIS — M32.19 LUPUS VASCULITIS (HCC): ICD-10-CM

## 2025-06-19 DIAGNOSIS — Z79.52 CURRENT CHRONIC USE OF SYSTEMIC STEROIDS: ICD-10-CM

## 2025-06-19 PROCEDURE — 15275 SKIN SUB GRAFT FACE/NK/HF/G: CPT | Performed by: NURSE PRACTITIONER

## 2025-06-19 NOTE — PROGRESS NOTES
Patient ID: Raymond Olivo is a 50 year old female.    Cellular tissue product application Auto-immune Left;Medial Ankle    Date/Time: 6/19/2025 10:30 AM    Performed by: Martina Mckeon APRN  Authorized by: Martina Mckeon APRN  Associated wounds:   Wound 04/10/25 #1 Ankle Left;Medial    Consent:     Consent obtained:  Verbal    Consent given by:  Patient    Alternatives discussed:  Alternative treatment  Procedure details:     Location:  head/hands/feet/digits/genitalia    Product applied:  Epifix    Product lot #:  [id number]    Product expiration:  12/1/2029    Amount used (sq cm):  4    Amount wasted (sq cm):  0    Secured/Fixated: Yes      Secured/Fixated with:  Xeroform    Total wound surface area (sq cm):  1    Type of wound:  other wound type    Treatment number:  2  Post-procedure details:     Patient tolerance of procedure:  Tolerated well, no immediate complications  Comments:      Epifix #2 applied, fixated with open xeroform, dressed with thick foam and calamine unna boot 20-30mmHg. One epifix graft split between left and right medial ankle wounds.   Cellular tissue product application Auto-immune Right;Medial Ankle    Date/Time: 6/19/2025 10:32 AM    Performed by: Martina Mckeon APRN  Authorized by: Martina Mckeon APRN  Associated wounds:   Wound 05/08/25 #2 Ankle Right;Medial    Consent:     Consent obtained:  Verbal    Consent given by:  Patient    Alternatives discussed:  Alternative treatment  Procedure details:     Location:  head/hands/feet/digits/genitalia    Product applied:  Epifix    Product lot #:  [id number]    Product expiration:  12/1/2029    Amount used (sq cm):  4    Amount wasted (sq cm):  0    Secured/Fixated: Yes      Secured/Fixated with:  Xeroform    Total wound surface area (sq cm):  1    Type of wound:  other wound type    Treatment number:  2  Post-procedure details:     Patient tolerance of procedure:  Tolerated well, no immediate complications  Comments:       Epifix #2 applied, fixated with open xeroform, dressed with thick foam and calamine unna boot 20-30mmHg. One epifix graft split between left and right medial ankle wounds.

## 2025-06-19 NOTE — PATIENT INSTRUCTIONS
Please return:  1 week        Patient discharge and wound care instructions  Raymond Olivo  6/19/2025          You may shower with protection of the wound (ie a cast cover or similar).     Cleansing/dressing:       In clinic, with each dressing change:    please cleanse the limb, foot, and between toes with soap, water and washcloth.   Dry thoroughly.    Cleanse/soak the wound with VASHE (or other hypochlorous wound cleanser), dab dry.    Apply the following dressings:   LEFT:  epifix >xeroform>thick foam (cut to rest posterior to the medial malleolous)>20-30mmhg calamine compression (wrap light with padding)        RIGHT:  epifix >xeroform>thick foam (cut to rest posterior to the medial malleolous)>20-30mmhg calamine compression (wrap light with padding to anterior tibia)         Right lateral:  xeroform     Managing your edema:  Avoid prolonged standing in one place. It is better to have your calf muscles moving to pump fluid out of the legs      Elevate leg(s) above the level of the heart when sitting or as much as possible.  Take you diuretics as directed by your physician. Do not skip doses or change doses unless instructed to do so by your physician.  Decrease salt intake, follow recommended 2 grams daily.  Do not get leg(s) with compression wrap wet. If wraps are too tight as indicated by pain, numbness/tingling or discoloration of toes remove wrap completely and call the wound center @ [phone].  Refer to the \"Multi-layer compression bandage application patient information sheet\" given to you in clinic.     Nutrition and blood sugar control:  Focus on the following:  Protein: Meats, beans, eggs, milk and yogurt particularly Greek yogurt), tofu, soy nuts, soy protein products (Follow the protein handout in your welcome folder)  Vitamin C: Citrus fruits and juices, strawberries, tomatoes, tomato juice, peppers, baked potatoes, spinach, broccoli, cauliflower, [place] sprouts, cabbage  Vitamin A:  Dark green, leafy vegetables, orange or yellow vegetables, cantaloupe, fortified dairy products, liver, fortified cereals  Zinc: Fortified cereals, red meats, seafood  Consider supplementing with Justin by [employer]. It can be purchased on amazon, [employer] website, or local pharmacy may be able to order it for you.  (These are essential branch chain amino acids that help with tissue building and wound healing).   When your blood sugar is consistently elevated greater than 180 your body can't heal or fight infection.     Concerns:  Signs of infection may include the following:  Increase in redness  Red \"streaks\" from wound  Increase in swelling  Fever  Unusual odor  Change in the amount of wound drainage     Should you experience any significant changes in your wound(s) or have any questions regarding your home care instructions please contact the wound center [facility] @ [phone] If after regular business hours, please call your family doctor or local emergency room. The treatment plan has been discussed at length between you and your provider. Follow all instructions carefully, it is very important. If you do not follow all instructions you are at risk of your wound not healing, infection, possible loss of limb and even loss of life.

## 2025-06-19 NOTE — PROGRESS NOTES
CHIEF COMPLAINT:     Chief Complaint   Patient presents with    Wound Care     Patient is here for a wound care follow up. Her pain is currently 5/10. She denies any new wound concerns.     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 Patient saw dr. Santamaria (vascular) in December 2023 and he documented \"I explained to the patient and her  that I do not believe the source of her pain to be vascular in origin.  She has an easily palpable dorsalis pedis pulse and the location of the pain is more suggestive of a neuropathic origin.  I suspect perhaps that given the tightness of her feet from her mixed connective tissue disorder the compression stockings is causing somewhat compression of the nerves in that area and I have encouraged her to substitute those stockings to those that involve the feet up to the upper calf.\" Patient last saw dr. Farmer in February 2025 and overall doing well. She was to continue on mmf 750mg daily, prednisone 2.5 mg daily, plaquenil 200mg daily and amlodipine 2.5 mg tiwce daily along with vitamine prscription and d3.  Spouse contacted dr farmer late last week regarding a small wound on her left leg, so patient was fit into the schedule.  They have not been dressing the wound with anything, she did have spandagrip on.  There was surrounding dry drainage. Patient states she noted something last Friday and then by Monday this week she noted it was a definite ulceration.  The drainage is clear and increases when patient plantar/dorsiflexes.  Most likely a lymph vessel leaking. We discussed utilizing doxy-they will bring to next appointment. Her skin remains very dry/flaky she states she has been utilizing resta daily.  We discussed that she may need something  with ammonium lactate or urea in it.  No s/s of infection. The area is not as painful as it has been previously.     HPI PRIOR TO JUNE 2025 SEE INDIVIDUAL PROGRESS NOTES  5-29-25 patient returns.  I s/w lab yesterday regarding status of the DIF that was sent out to [facility], it went out on the 19th and it can take up to 14 days, so it is still pending.  per vascular her venous study showed essentially normal valves and therefore the cause of her ulcerations is likely due to autoimmune disease.  patient saw dr. Farmer last week patient is to restart the gabapentin 100mg at noc to help with burnng pain, echo and cxr ordered, her prednisone was increased to 20mg daily x 1 week, increase the mycophenolate to 500mg bid, continue hydroxychlorquine.  The plan is to consider stopping the mycophenolate and switching to rituximab after the results of the biopsy come back.  today pain is improved on the left, burning pain continues on the right.  wounds are stable or improved. Will continue with honey./honey gauze and light compression. No acute s/s of infection.    6-5-25 patient returns.  We received the results of the biopsies from [facility] yesterday.  For an unknown reason the 2 mm punchs were reduced to 1mm and a full diagnostic eval was not able to be performed however eval did show immunofluorescence observed in non-specific pattern with the following antibodies: IGM, C3, and fibrinogen on LEFT.  The RIGHT just showed immunoflourescence observed in fibrinogen antibody. I reached out to dr farmer regarding need for further biopsy. I discussed the biopsy results (and lack of) with the patient and spouse.  Today, patient's pain is improved, the left ulceration is improved in size and characteristic, the right is stable.  Will place epifix today. No s/s of infection.     6-12-25 patient returns.  I touched base with dr. Farmer and since the wounds were improving last week we do not need to try and get another biopsy, if they  would start to deteriorate she will begin the process of getting insurance approval for rituximab.  The plan with the medications is to keep with the higher dose of the mycophenolate and continue with the steroids and the slow taper.  Last week we placed epifix to the wounds.  Today her wounds are stable with slight improvements in wound bed characteristics, she reports her pain is also slightly better.  Will allow epifix to continue to integrate over the next week and plan to place another graft next week. Continue with honey gel and gauze. No s/s of infection.    6-19-25 patient returns.  We placed epifix 2 weeks ago and allowed it to continue to integrate last week.  Today, wounds are about the same, patient c/o continued burning.  I utilized a debrimit today to attempt to cleanse the wounds and periwound prior to epifix application, patient had difficulty but tolerated this.  Epifix #2 placed to both medial wounds.  Will utilize xeroform and thick foam for more moisture retention as the wounds and periwound are dry. No acute s/s of infection.  MEDICATIONS:     Current Outpatient Medications:     Ferrous Sulfate 325 (65 Fe) MG Oral Tab, Take 1 tablet (325 mg total) by mouth 2 (two) times daily with meals., Disp: 180 tablet, Rfl: 0    amLODIPine 2.5 MG Oral Tab, Take 1 tablet (2.5 mg total) by mouth 2 (two) times daily., Disp: 180 tablet, Rfl: 0    hydroxychloroquine 200 MG Oral Tab, Take 1 tablet (200 mg total) by mouth daily., Disp: 90 tablet, Rfl: 0    Mycophenolate Mofetil 500 MG Oral Tab, Take 1 tablet (500 mg total) by mouth 2 (two) times daily., Disp: 180 tablet, Rfl: 0    predniSONE 5 MG Oral Tab, Take 20mg daily x 7 days, then 15mg daily x 7 days, then 10mg daily x 7 days, then 5mg daily x 7 days., Disp: 70 tablet, Rfl: 0    gabapentin 100 MG Oral Cap, Take 1 capsule (100 mg total) by mouth nightly., Disp: 90 capsule, Rfl: 0    gentamicin 0.3 % Ophthalmic Solution, Apply 3 drops to wound twice daily,  cover with petroleum gauze and cover dressing, Disp: 15 mL, Rfl: 0    doxycycline 100 MG Oral Cap, Bring capsules to wound clinic to use as directed, Disp: 5 capsule, Rfl: 0    predniSONE 2.5 MG Oral Tab, Take 1 tablet (2.5 mg total) by mouth daily., Disp: 90 tablet, Rfl: 0    clobetasol 0.05 % External Solution, Apply topically to lower legs after shower 3 x weekly, Disp: 50 mL, Rfl: 0  ALLERGIES:   No Known Allergies   REVIEW OF SYSTEMS:   This information was obtained from the patient/family and chart.    See HPI for pertinent positives, otherwise 10 pt ROS negative.    HISTORY:   Past medical, surgical, family and social history updated where appropriate.    PHYSICAL EXAM:     Vitals:    06/19/25 0959   BP: 108/68   Pulse: 85   Resp: 14   Temp: 98 °F (36.7 °C)       Estimated body mass index is 21.58 kg/m² as calculated from the following:    Height as of 5/23/25: 62\".    Weight as of 5/23/25: 118 lb (53.5 kg).   No results found for: \"PGLU\"    Vital signs reviewed.Appears stated age, well groomed.    Constitutional:  Bp wnl for patient. Pulse Regular and wnl for patient. Respirations easy and unlabored. Temperature wnl. Weight normal for height. Appearance neat and clean. Appears in no acute distress. Well nourished and well developed.    Lower extremity:  dp/pt palpable bilalterally.   lower extremities are free of varicosities and no edema, they are scarred, scattered changes in pigmentation (hypo/hyper) with atrophie jay. Capillary refill < 3 seconds. Digits are warm. toenails are wnl for color, thickness and hygeine. Skin is dry-. + hairgrowth on legs.    Musculoskeletal:  Gait and station stable   Integumentary:  refer to wound characteristics and images   Psychiatric:  Judgment and insight intact. Alert and oriented times 3. No evidence of depression, anxiety, or agitation. Calm, cooperative, and communicative, but very quiet and reserved.  EDEMA:   Calf  Point of Measurement - Left Calf: 31  Point of  Measurement - Right Calf: 31  Left Calf from:: Heel  Calf Left cm:: 26.2  Right Calf from:: Heel  Right Calf cm:: 28.2  Ankle  Point of Measurement - Left Ankle: 10  Point of Measurement - Right Ankle: 10  Left Ankle from:: Heel  Left Ankle cm:: 16.1     Right Ankle from:: Heel  Right Ankle cm:: 17.1     DIAGNOSTICS:     Lab Results   Component Value Date    BUN 9 05/16/2025    CREATSERUM 0.59 05/16/2025    GFRCKDEPI 121.37 12/17/2020    ALB 4.8 05/16/2025    TP 8.2 05/16/2025    A1C 5.1 12/17/2020     Lab Results   Component Value Date    ESRML 40 (H) 05/16/2025    ESRML 54 (H) 02/25/2025    ESRML 47 (H) 09/03/2024      Lab Results   Component Value Date    CRP <0.40 05/16/2025    CRP <0.40 02/25/2025    CRP 0.50 09/03/2024 6-5-25 Right and left ankle biopsy      May 2025 venous study-dr hilliard  \"The results showed essentially normal valves in the veins of both of your legs. This means that the cause of ulcerations is the autoimmune disease and not due to your veins\"     11-28-23 arterial duplex-dr hilliard  FINDINGS:    LEFT EXTREMITY:  Triphasic waveforms in the left common femoral, profunda, superficial femoral arteries.  Biphasic waveforms below the left knee.   OTHER:  None.     PEAK VELOCITIES (CM/S):   LEFT COMMON FEMORAL:      165    LEFT PROFUNDA FEMORAL:      78      LEFT SUPERFICIAL FEMORAL PROX:    113   LEFT SUPERFICIAL FEMORAL MID:      94   LEFT SUPERFICIAL FEMORAL DISTAL:    92   LEFT POPLITEAL PROX:      110   LEFT POPLITEAL DISTAL:      121   TIBIOPER TRUNK:      66   LEFT PTA PROX:      60   LEFT PTA MID:      62   LEFT PTA DISTAL:      73   LEFT BETH PROX:      82   LEFT BETH MID:      72    LEFT DORSALIS PEDIS::      30   LEFT GREAT TOE PRESSURE:      25 mmHg   CONCLUSION:    1. No high-grade stenosis is identified.  There is suggestion of diffuse mild stenosis throughout most of the left lower extremity.    2. Overall diminished left toe pressure is noted.  Possibility of decreased perfusion in  the distal left foot is of consideration.     7-14-20 venous reflux study-dr santamaria  Per Dr. Santamaria review \"no intervention needed\"    July 2020 DIF-pathology punch biopsy  RESULTS   IgG: Grains within and around basal keratinocytes and         within cell nuclei and grains in focal superficial,         upper, and mid dermal blood vessels   IgG4:  Negative   IgM:  Grains in focal dermal blood vessels   IgA:  Focal grains within basal keratinocytes   C3:   Few grains along basement membrane zone and grains and         clumps within superficial and upper dermal blood         vessels   Fibrinogen:  3+ deposition around superficial, upper, and                mid dermal blood vessels   COMMENTS   By direct immunofluorescence, there is granular deposition of IgG   within and around basal keratinocytes and within cell nuclei. The   presence of granular IgG within the epidermis raises the   consideration of lupus erythematosus, specifically subacute   cutaneous lupus erythematosus (SCLE). The finding of granular   staining of cell nuclei may be indicative of the presence of   anti-nuclear antibodies, further lending support to the   possibility of lupus erythematosus. 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     WOUND ASSESSMENT:     Wound 04/10/25 #1 Ankle Left;Medial (Active)   Date First Assessed/Time First Assessed: 04/10/25 0757    Wound Number (Wound Clinic Only): #1  Primary Wound Type: Auto-immune  Location: Ankle  Wound Location Orientation: Left;Medial      Assessments 4/10/2025  7:57 AM 6/19/2025 10:08 AM   Wound Image        Drainage Amount Unable to assess Small   Drainage Description -- Serosanguineous   Treatments Compression Compression   Wound Length (cm) 0.7 cm 1.1 cm   Wound Width  (cm) 0.7 cm 0.7 cm   Wound Surface Area (cm^2) 0.38 cm^2 0.6 cm^2   Wound Depth (cm) 0.2 cm 0.2 cm   Wound Volume (cm^3) 0.051 cm^3 0.081 cm^3   Wound Healing % -- -59   Margins Well-defined edges Well-defined edges   Non-staged Wound Description Full thickness Full thickness   Soni-wound Assessment Dry;Hemosiderin staining Atrophy jay;Painful;Dry;Hemosiderin staining   Wound Granulation Tissue Pale Grey;Pink;Firm Firm;Pink;Red   Wound Bed Granulation (%) 100 % 100 %   Wound Odor None None   Tunneling? No No   Undermining? No No   Sinus Tracts? No No       Active Orders   Date Order Priority Status Authorizing Provider   06/19/25 1030 Cellular tissue product application Auto-immune Left;Medial Ankle Routine Active Martina Mckeon APRN       Inactive Orders   Date Order Priority Status Authorizing Provider   06/05/25 1034 Cellular tissue product application Auto-immune Left;Medial Ankle Routine Completed Martina Mckeon APRN   05/16/25 1137 Lesion biopsy Routine Completed Martina Mckeon APRN       Compression Wrap 04/10/25 Ankle Anterior;Left (Active)   Placement Date/Time: 04/10/25 0846   Location: Ankle  Wound Location Orientation: Anterior;Left      Assessments 4/10/2025  7:57 AM 6/19/2025 10:08 AM   Response to Treatment Well tolerated Well tolerated   Compression Layers Multilayer Multilayer   Compression Product Type Unna Boot Unna Boot   Dressing Applied Yes Yes   Compression Wrap Location Toes to Knee Toes to Knee   Compression Wrap Status Dry;Intact;Clean Clean;Dry       No associated orders.       Wound 05/08/25 #2 Ankle Right;Medial (Active)   Date First Assessed/Time First Assessed: 05/08/25 1056    Wound Number (Wound Clinic Only): #2  Primary Wound Type: Auto-immune  Location: Ankle  Wound Location Orientation: Right;Medial      Assessments 5/8/2025 10:58 AM 6/19/2025 10:06 AM   Wound Image       Drainage Amount None Small   Drainage Description -- Serosanguineous   Treatments Compression  Compression   Wound Length (cm) 0.5 cm 1.4 cm   Wound Width (cm) 0.5 cm 1.2 cm   Wound Surface Area (cm^2) 0.2 cm^2 1.32 cm^2   Wound Depth (cm) 0 cm 0.2 cm   Wound Volume (cm^3) 0 cm^3 0.176 cm^3   Margins Undefined edges Well-defined edges   Non-staged Wound Description Full thickness Full thickness   Soni-wound Assessment Dry Dry;Painful;Atrophy jay   Wound Granulation Tissue -- Firm;Pink;Pale Grey   Wound Bed Granulation (%) -- 20 %   Wound Bed Slough (%) -- 70 %   Wound Odor None None   Shape 100% scabbed --   Tunneling? -- No   Undermining? -- No   Sinus Tracts? -- No       Active Orders   Date Order Priority Status Authorizing Provider   06/19/25 1032 Cellular tissue product application Auto-immune Right;Medial Ankle Routine Active Martina Mckeon APRN       Inactive Orders   Date Order Priority Status Authorizing Provider   06/05/25 1054 Cellular tissue product application Auto-immune Right;Medial Ankle Routine Completed Martina Mckeon APRN   05/16/25 1139 Lesion biopsy Routine Completed Martina Mckeon APRN       Wound 05/08/25 #3 Ankle Right;Lateral (Active)   Date First Assessed/Time First Assessed: 05/08/25 1056    Wound Number (Wound Clinic Only): #3  Primary Wound Type: Auto-immune  Location: Ankle  Wound Location Orientation: Right;Lateral      Assessments 5/8/2025 10:59 AM 6/19/2025 10:07 AM   Wound Image       Drainage Amount None Scant   Drainage Description -- Serous;Yellow   Treatments Compression Compression   Wound Length (cm) 0.5 cm 0.1 cm   Wound Width (cm) 0.8 cm 0.1 cm   Wound Surface Area (cm^2) 0.31 cm^2 0.01 cm^2   Wound Depth (cm) 0 cm 0 cm   Wound Volume (cm^3) 0 cm^3 0 cm^3   Margins Undefined edges Well-defined edges   Non-staged Wound Description Full thickness Full thickness   Soni-wound Assessment Dry Dry;Atrophy jay   Wound Granulation Tissue -- Firm;Pink   Wound Bed Granulation (%) -- 100 %   Wound Odor None None   Shape 100% scabbed --   Tunneling? -- No   Undermining?  -- No   Sinus Tracts? -- No       No associated orders.       Compression Wrap 05/08/25 Ankle Anterior;Right (Active)   Placement Date/Time: 05/08/25 1646   Location: Ankle  Wound Location Orientation: Anterior;Right      Assessments 5/8/2025 10:57 AM 6/19/2025 10:08 AM   Response to Treatment Well tolerated Well tolerated   Compression Layers Multilayer Multilayer   Compression Product Type Unna Boot Unna Boot   Dressing Applied Yes Yes   Compression Wrap Location Toes to Knee Toes to Knee   Compression Wrap Status Clean;Dry;Intact Clean;Dry       No associated orders.     PROCEDURE:      This procedure is medical necessary to nourish the wound bed with extracellular matrix proteins, growth factors, cytokines and other specialty proteins to assist in re-epitheliazation and more rapid healing, decreasing the risk of infection.  See rn px note    ASSESSMENT AND PLAN:    1. 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 Lupus vasculitis (HCC)    4. Mixed connective tissue disease (HCC)    5. Current chronic use of systemic steroids        Risks, benefits, and alternatives of current treatment plan discussed in detail.  Questions and concerns addressed. Red flags to RTC or ED reviewed.  Patient (or parent) agrees to plan.      NOTE TO PATIENT: The 21st Century Cures Act makes clinical notes like these available to patients in the interest of transparency. Clinical notes are medical documents used by physicians and care providers to communicate with each other. These documents include medical language and terminology, abbreviations, and treatment information that may sound technical and at times possibly unfamiliar. In addition, at times, the verbiage may appear blunt or direct. These documents are one tool providers use to communicate relevant information and clinical opinions of the care  providers in a way that allows common understanding of the clinical context.     I spent 40 minutes with the patient. This time included:    preparing to see the patient (eg, review notes and recent diagnostics),  seeing the patient, obtaining and/or reviewing separately obtained history, performing a medically appropriate examination and/or evaluation, counseling and educating the patient, documenting in the record. Bill epifix placement only  DISCHARGE INSTRUCTIONS     Patient Instructions   Please return:  1 week        Patient discharge and wound care instructions  Raymondnathan Hayward Pallavi  6/19/2025          You may shower with protection of the wound (ie a cast cover or similar).     Cleansing/dressing:       In clinic, with each dressing change:    please cleanse the limb, foot, and between toes with soap, water and washcloth.   Dry thoroughly.    Cleanse/soak the wound with VASHE (or other hypochlorous wound cleanser), dab dry.    Apply the following dressings:   LEFT:  epifix >xeroform>thick foam (cut to rest posterior to the medial malleolous)>20-30mmhg calamine compression (wrap light with padding)        RIGHT:  epifix >xeroform>thick foam (cut to rest posterior to the medial malleolous)>20-30mmhg calamine compression (wrap light with padding to anterior tibia)         Right lateral:  xeroform     Managing your edema:  Avoid prolonged standing in one place. It is better to have your calf muscles moving to pump fluid out of the legs      Elevate leg(s) above the level of the heart when sitting or as much as possible.  Take you diuretics as directed by your physician. Do not skip doses or change doses unless instructed to do so by your physician.  Decrease salt intake, follow recommended 2 grams daily.  Do not get leg(s) with compression wrap wet. If wraps are too tight as indicated by pain, numbness/tingling or discoloration of toes remove wrap completely and call the wound center @ [phone].  Refer to  the \"Multi-layer compression bandage application patient information sheet\" given to you in clinic.     Nutrition and blood sugar control:  Focus on the following:  Protein: Meats, beans, eggs, milk and yogurt particularly Greek yogurt), tofu, soy nuts, soy protein products (Follow the protein handout in your welcome folder)  Vitamin C: Citrus fruits and juices, strawberries, tomatoes, tomato juice, peppers, baked potatoes, spinach, broccoli, cauliflower, [place] sprouts, cabbage  Vitamin A: Dark green, leafy vegetables, orange or yellow vegetables, cantaloupe, fortified dairy products, liver, fortified cereals  Zinc: Fortified cereals, red meats, seafood  Consider supplementing with Ujstin by [employer]. It can be purchased on amazon, [employer] website, or local pharmacy may be able to order it for you.  (These are essential branch chain amino acids that help with tissue building and wound healing).   When your blood sugar is consistently elevated greater than 180 your body can't heal or fight infection.     Concerns:  Signs of infection may include the following:  Increase in redness  Red \"streaks\" from wound  Increase in swelling  Fever  Unusual odor  Change in the amount of wound drainage     Should you experience any significant changes in your wound(s) or have any questions regarding your home care instructions please contact the [facility] @ [phone] If after regular business hours, please call your family doctor or local emergency room. The treatment plan has been discussed at length between you and your provider. Follow all instructions carefully, it is very important. If you do not follow all instructions you are at risk of your wound not healing, infection, possible loss of limb and even loss of life.    Martina Mckeon FNP-C, CWCN-AP, CFCN, CSWS, WCC, DWC  6/19/2025

## 2025-06-19 NOTE — PROGRESS NOTES
.Weekly Wound Education Note    Teaching Provided To: Patient, Family  Training Topics: Dressing, Edema control, Cleasing and general instructions, Compression, Discharge instructions  Training Method: Explain/Verbal, Written  Training Response: Patient responds and understands        Notes: Wound stable. Epifix #2 applied to both medial ankle wounds, fixated with open xeroform, dressed with thick foam and calamine unna boot 20-30mmHg. One epifix graft split between left and right medial ankle wounds. Folded xeroform applied to lateral right ankle. Both legs wrapped in calamine unna boot 20-30mmHg with rosidal to anterior ankle/leg for padding. Pt provided rosidal.

## 2025-06-25 NOTE — PROGRESS NOTES
CHIEF COMPLAINT:     Chief Complaint   Patient presents with    Wound Recheck     Arrives on foot for wound care appointment. Rates pain 4/10. BLE in unna compression. Her wounds are dressed under xeroform and foam.     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 Patient saw dr. Santamaria (vascular) in December 2023 and he documented \"I explained to the patient and her  that I do not believe the source of her pain to be vascular in origin.  She has an easily palpable dorsalis pedis pulse and the location of the pain is more suggestive of a neuropathic origin.  I suspect perhaps that given the tightness of her feet from her mixed connective tissue disorder the compression stockings is causing somewhat compression of the nerves in that area and I have encouraged her to substitute those stockings to those that involve the feet up to the upper calf.\" Patient last saw dr. Farmer in February 2025 and overall doing well. She was to continue on mmf 750mg daily, prednisone 2.5 mg daily, plaquenil 200mg daily and amlodipine 2.5 mg tiwce daily along with vitamine prscription and d3.  Spouse contacted dr farmer late last week regarding a small wound on her left leg, so patient was fit into the schedule.  They have not been dressing the wound with anything, she did have spandagrip on.  There was surrounding dry drainage. Patient states she noted something last Friday and then by Monday this week she noted it was a definite ulceration.  The drainage is clear and increases when patient plantar/dorsiflexes.  Most likely a lymph vessel leaking. We discussed utilizing doxy-they will bring to next appointment. Her skin remains very dry/flaky she states she has been utilizing resta daily.  We discussed  that she may need something with ammonium lactate or urea in it.  No s/s of infection. The area is not as painful as it has been previously.     HPI PRIOR TO JUNE 2025 SEE INDIVIDUAL PROGRESS NOTES  6-5-25 patient returns.  We received the results of the biopsies from [facility] yesterday.  For an unknown reason the 2 mm punchs were reduced to 1mm and a full diagnostic eval was not able to be performed however eval did show immunofluorescence observed in non-specific pattern with the following antibodies: IGM, C3, and fibrinogen on LEFT.  The RIGHT just showed immunoflourescence observed in fibrinogen antibody. I reached out to dr farmer regarding need for further biopsy. I discussed the biopsy results (and lack of) with the patient and spouse.  Today, patient's pain is improved, the left ulceration is improved in size and characteristic, the right is stable.  Will place epifix today. No s/s of infection.     6-12-25 patient returns.  I touched base with dr. Farmer and since the wounds were improving last week we do not need to try and get another biopsy, if they would start to deteriorate she will begin the process of getting insurance approval for rituximab.  The plan with the medications is to keep with the higher dose of the mycophenolate and continue with the steroids and the slow taper.  Last week we placed epifix to the wounds.  Today her wounds are stable with slight improvements in wound bed characteristics, she reports her pain is also slightly better.  Will allow epifix to continue to integrate over the next week and plan to place another graft next week. Continue with honey gel and gauze. No s/s of infection.    6-19-25 patient returns.  We placed epifix 2 weeks ago and allowed it to continue to integrate last week.  Today, wounds are about the same, patient c/o continued burning.  I utilized a debrimit today to attempt to cleanse the wounds and periwound prior to epifix application, patient had difficulty  but tolerated this.  Epifix #2 placed to both medial wounds.  Will utilize xeroform and thick foam for more moisture retention as the wounds and periwound are dry. No acute s/s of infection.    6-26-25 patient returns.  Last week we placed epifix #2 to both medial wounds after cleansing with debrimit.  She states her pain today is \"ok\".  Currently on [id number] for another week and then will reduce again.  The wounds are about the same size but tissue is improved, moisture level improved. No s/s of infection. Since patient is approved only for 4 epifix, will skip a week before applying another. Will update rheum next week regarding progress.  MEDICATIONS:     Current Outpatient Medications:     Ferrous Sulfate 325 (65 Fe) MG Oral Tab, Take 1 tablet (325 mg total) by mouth 2 (two) times daily with meals., Disp: 180 tablet, Rfl: 0    amLODIPine 2.5 MG Oral Tab, Take 1 tablet (2.5 mg total) by mouth 2 (two) times daily., Disp: 180 tablet, Rfl: 0    hydroxychloroquine 200 MG Oral Tab, Take 1 tablet (200 mg total) by mouth daily., Disp: 90 tablet, Rfl: 0    Mycophenolate Mofetil 500 MG Oral Tab, Take 1 tablet (500 mg total) by mouth 2 (two) times daily., Disp: 180 tablet, Rfl: 0    predniSONE 5 MG Oral Tab, Take 20mg daily x 7 days, then 15mg daily x 7 days, then 10mg daily x 7 days, then 5mg daily x 7 days., Disp: 70 tablet, Rfl: 0    gabapentin 100 MG Oral Cap, Take 1 capsule (100 mg total) by mouth nightly., Disp: 90 capsule, Rfl: 0    gentamicin 0.3 % Ophthalmic Solution, Apply 3 drops to wound twice daily, cover with petroleum gauze and cover dressing, Disp: 15 mL, Rfl: 0    doxycycline 100 MG Oral Cap, Bring capsules to wound clinic to use as directed, Disp: 5 capsule, Rfl: 0    predniSONE 2.5 MG Oral Tab, Take 1 tablet (2.5 mg total) by mouth daily., Disp: 90 tablet, Rfl: 0    clobetasol 0.05 % External Solution, Apply topically to lower legs after shower 3 x weekly, Disp: 50 mL, Rfl: 0  ALLERGIES:   No Known  Allergies   REVIEW OF SYSTEMS:   This information was obtained from the patient/family and chart.    See HPI for pertinent positives, otherwise 10 pt ROS negative.    HISTORY:   Past medical, surgical, family and social history updated where appropriate.    PHYSICAL EXAM:     Vitals:    06/26/25 0908   BP: 111/75   Pulse: 95   Resp: 12   Temp: 98.1 °F (36.7 °C)         Estimated body mass index is 21.58 kg/m² as calculated from the following:    Height as of 5/23/25: 62\".    Weight as of 5/23/25: 118 lb (53.5 kg).   No results found for: \"PGLU\"    Vital signs reviewed.Appears stated age, well groomed.    Constitutional:  Bp wnl for patient. Pulse Regular and wnl for patient. Respirations easy and unlabored. Temperature wnl. Weight normal for height. Appearance neat and clean. Appears in no acute distress. Well nourished and well developed.    Lower extremity:  dp/pt palpable bilalterally.   lower extremities are free of varicosities and no edema, they are scarred, scattered changes in pigmentation (hypo/hyper) with atrophie jay. Capillary refill < 3 seconds. Digits are warm. toenails are wnl for color, thickness and hygeine. Skin is dry-. + hairgrowth on legs.    Musculoskeletal:  Gait and station stable   Integumentary:  refer to wound characteristics and images   Psychiatric:  Judgment and insight intact. Alert and oriented times 3. No evidence of depression, anxiety, or agitation. Calm, cooperative, and communicative, but very quiet and reserved.  EDEMA:   Calf  Point of Measurement - Left Calf: 31  Point of Measurement - Right Calf: 31  Left Calf from:: Heel  Calf Left cm:: 27  Right Calf from:: Heel  Right Calf cm:: 27.2  Ankle  Point of Measurement - Left Ankle: 10  Point of Measurement - Right Ankle: 10  Left Ankle from:: Heel  Left Ankle cm:: 17.6     Right Ankle from:: Heel  Right Ankle cm:: 18     DIAGNOSTICS:     Lab Results   Component Value Date    BUN 9 05/16/2025    CREATSERUM 0.59 05/16/2025     GFRCKDEPI 121.37 12/17/2020    ALB 4.8 05/16/2025    TP 8.2 05/16/2025    A1C 5.1 12/17/2020     Lab Results   Component Value Date    ESRML 40 (H) 05/16/2025    ESRML 54 (H) 02/25/2025    ESRML 47 (H) 09/03/2024      Lab Results   Component Value Date    CRP <0.40 05/16/2025    CRP <0.40 02/25/2025    CRP 0.50 09/03/2024 6-5-25 Right and left ankle biopsy      May 2025 venous study-dr santamaria  \"The results showed essentially normal valves in the veins of both of your legs. This means that the cause of ulcerations is the autoimmune disease and not due to your veins\"     11-28-23 arterial duplex-dr santamaria  FINDINGS:    LEFT EXTREMITY:  Triphasic waveforms in the left common femoral, profunda, superficial femoral arteries.  Biphasic waveforms below the left knee.   OTHER:  None.     PEAK VELOCITIES (CM/S):   LEFT COMMON FEMORAL:      165    LEFT PROFUNDA FEMORAL:      78      LEFT SUPERFICIAL FEMORAL PROX:    113   LEFT SUPERFICIAL FEMORAL MID:      94   LEFT SUPERFICIAL FEMORAL DISTAL:    92   LEFT POPLITEAL PROX:      110   LEFT POPLITEAL DISTAL:      121   TIBIOPER TRUNK:      66   LEFT PTA PROX:      60   LEFT PTA MID:      62   LEFT PTA DISTAL:      73   LEFT BETH PROX:      82   LEFT BETH MID:      72    LEFT DORSALIS PEDIS::      30   LEFT GREAT TOE PRESSURE:      25 mmHg   CONCLUSION:    1. No high-grade stenosis is identified.  There is suggestion of diffuse mild stenosis throughout most of the left lower extremity.    2. Overall diminished left toe pressure is noted.  Possibility of decreased perfusion in the distal left foot is of consideration.     7-14-20 venous reflux study-dr santamaria  Per Dr. Santamaria review \"no intervention needed\"    July 2020 DIF-pathology punch biopsy  RESULTS   IgG: Grains within and around basal keratinocytes and         within cell nuclei and grains in focal superficial,         upper, and mid dermal blood vessels   IgG4:  Negative   IgM:  Grains in focal dermal blood vessels   IgA:   Focal grains within basal keratinocytes   C3:   Few grains along basement membrane zone and grains and         clumps within superficial and upper dermal blood         vessels   Fibrinogen:  3+ deposition around superficial, upper, and                mid dermal blood vessels   COMMENTS   By direct immunofluorescence, there is granular deposition of IgG   within and around basal keratinocytes and within cell nuclei. The   presence of granular IgG within the epidermis raises the   consideration of lupus erythematosus, specifically subacute   cutaneous lupus erythematosus (SCLE). The finding of granular   staining of cell nuclei may be indicative of the presence of   anti-nuclear antibodies, further lending support to the   possibility of lupus erythematosus. 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     WOUND ASSESSMENT:     Wound 04/10/25 #1 Ankle Left;Medial (Active)   Date First Assessed/Time First Assessed: 04/10/25 0757    Wound Number (Wound Clinic Only): #1  Primary Wound Type: Auto-immune  Location: Ankle  Wound Location Orientation: Left;Medial      Assessments 4/10/2025  7:57 AM 6/26/2025  9:14 AM   Wound Image        Drainage Amount Unable to assess Small   Drainage Description -- Serosanguineous   Treatments Compression --   Wound Length (cm) 0.7 cm 1 cm   Wound Width (cm) 0.7 cm 0.9 cm   Wound Surface Area (cm^2) 0.38 cm^2 0.71 cm^2   Wound Depth (cm) 0.2 cm 0.1 cm   Wound Volume (cm^3) 0.051 cm^3 0.047 cm^3   Wound Healing % -- 8   Margins Well-defined edges Well-defined edges   Non-staged Wound Description Full thickness Full thickness   Soni-wound Assessment Dry;Hemosiderin staining Atrophy jay;Painful;Dry;Hemosiderin staining   Wound Granulation Tissue Pale Grey;Pink;Firm  Red;Firm   Wound Bed Granulation (%) 100 % 90 %   Wound Bed Slough (%) -- 10 %   Wound Odor None None   Tunneling? No No   Undermining? No No   Sinus Tracts? No No       Inactive Orders   Date Order Priority Status Authorizing Provider   06/19/25 1030 Cellular tissue product application Auto-immune Left;Medial Ankle Routine Completed Martina Mckeon APRN   06/05/25 1034 Cellular tissue product application Auto-immune Left;Medial Ankle Routine Completed Martina Mckeon APRN   05/16/25 1137 Lesion biopsy Routine Completed Martina Mckeon, CALVIN       Compression Wrap 04/10/25 Ankle Anterior;Left (Active)   Placement Date/Time: 04/10/25 0846   Location: Ankle  Wound Location Orientation: Anterior;Left      Assessments 4/10/2025  7:57 AM 6/19/2025 10:08 AM   Response to Treatment Well tolerated Well tolerated   Compression Layers Multilayer Multilayer   Compression Product Type Unna Boot Unna Boot   Dressing Applied Yes Yes   Compression Wrap Location Toes to Knee Toes to Knee   Compression Wrap Status Dry;Intact;Clean Clean;Dry       No associated orders.       Wound 05/08/25 #2 Ankle Right;Medial (Active)   Date First Assessed/Time First Assessed: 05/08/25 1056    Wound Number (Wound Clinic Only): #2  Primary Wound Type: Auto-immune  Location: Ankle  Wound Location Orientation: Right;Medial      Assessments 5/8/2025 10:58 AM 6/26/2025  9:14 AM   Wound Image       Drainage Amount None Small   Drainage Description -- Serosanguineous   Treatments Compression --   Wound Length (cm) 0.5 cm 1.4 cm   Wound Width (cm) 0.5 cm 1.2 cm   Wound Surface Area (cm^2) 0.2 cm^2 1.32 cm^2   Wound Depth (cm) 0 cm 0.2 cm   Wound Volume (cm^3) 0 cm^3 0.176 cm^3   Margins Undefined edges Well-defined edges   Non-staged Wound Description Full thickness Full thickness   Soni-wound Assessment Dry Maceration;Moist;Hemosiderin staining;Edema   Wound Granulation Tissue -- Red;Pink;Firm   Wound Bed Granulation (%) -- 30 %   Wound Bed Epithelium (%)  -- 10 %   Wound Bed Slough (%) -- 60 %   Wound Odor None None   Shape 100% scabbed bridged   Tunneling? -- No   Undermining? -- No   Sinus Tracts? -- No       Inactive Orders   Date Order Priority Status Authorizing Provider   06/19/25 1032 Cellular tissue product application Auto-immune Right;Medial Ankle Routine Completed Martina Mckeon, APRTYREE   06/05/25 1054 Cellular tissue product application Auto-immune Right;Medial Ankle Routine Completed Martina Mckeon, APRN   05/16/25 1139 Lesion biopsy Routine Completed Martina Mckeon, CALVIN       Wound 05/08/25 #3 Ankle Right;Lateral (Active)   Date First Assessed/Time First Assessed: 05/08/25 1056    Wound Number (Wound Clinic Only): #3  Primary Wound Type: Auto-immune  Location: Ankle  Wound Location Orientation: Right;Lateral      Assessments 5/8/2025 10:59 AM 6/26/2025  9:13 AM   Wound Image       Drainage Amount None Small   Drainage Description -- Serosanguineous   Treatments Compression --   Wound Length (cm) 0.5 cm 0.1 cm   Wound Width (cm) 0.8 cm 0.1 cm   Wound Surface Area (cm^2) 0.31 cm^2 0.01 cm^2   Wound Depth (cm) 0 cm 0.1 cm   Wound Volume (cm^3) 0 cm^3 0.001 cm^3   Margins Undefined edges Well-defined edges   Non-staged Wound Description Full thickness Full thickness   Soni-wound Assessment Dry Atrophy jay;Hemosiderin staining   Wound Granulation Tissue -- Firm;Pink   Wound Bed Granulation (%) -- 100 %   Wound Odor None None   Shape 100% scabbed --   Tunneling? -- No   Undermining? -- No   Sinus Tracts? -- No       No associated orders.       Compression Wrap 05/08/25 Ankle Anterior;Right (Active)   Placement Date/Time: 05/08/25 1646   Location: Ankle  Wound Location Orientation: Anterior;Right      Assessments 5/8/2025 10:57 AM 6/19/2025 10:08 AM   Response to Treatment Well tolerated Well tolerated   Compression Layers Multilayer Multilayer   Compression Product Type Unna Boot Unna Boot   Dressing Applied Yes Yes   Compression Wrap Location Toes to Knee  Toes to Knee   Compression Wrap Status Clean;Dry;Intact Clean;Dry       No associated orders.         ASSESSMENT AND PLAN:    1. 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 Lupus vasculitis (HCC)    4. Mixed connective tissue disease (HCC)    5. Current chronic use of systemic steroids          Risks, benefits, and alternatives of current treatment plan discussed in detail.  Questions and concerns addressed. Red flags to RTC or ED reviewed.  Patient (or parent) agrees to plan.      NOTE TO PATIENT: The 21st Century Cures Act makes clinical notes like these available to patients in the interest of transparency. Clinical notes are medical documents used by physicians and care providers to communicate with each other. These documents include medical language and terminology, abbreviations, and treatment information that may sound technical and at times possibly unfamiliar. In addition, at times, the verbiage may appear blunt or direct. These documents are one tool providers use to communicate relevant information and clinical opinions of the care providers in a way that allows common understanding of the clinical context.     I spent 29minutes with the patient. This time included:    preparing to see the patient (eg, review notes and recent diagnostics),  seeing the patient, obtaining and/or reviewing separately obtained history, performing a medically appropriate examination and/or evaluation, counseling and educating the patient, documenting in the record.  DISCHARGE INSTRUCTIONS     Patient Instructions   Please return:  1 week         Patient discharge and wound care instructions  Raymond Olivo  6/26/2025             You may shower with protection of the wound (ie a cast cover or similar).     Cleansing/dressing:       In clinic, with each dressing change:    please cleanse the limb,  foot, and between toes with soap, water and washcloth.   Dry thoroughly.    Cleanse/soak the wound with VASHE (or other hypochlorous wound cleanser), dab dry.    Apply the following dressings:   LEFT:  gina>xeroform>thick foam (cut to rest posterior to the medial malleolous)>20-30mmhg calamine compression (wrap light with padding)        RIGHT: gina>xeroform>thick foam (cut to rest posterior to the medial malleolous)>20-30mmhg calamine compression (wrap light with padding to anterior tibia)         Right lateral:   xeroform>thin foam    Managing your edema:  Avoid prolonged standing in one place. It is better to have your calf muscles moving to pump fluid out of the legs      Elevate leg(s) above the level of the heart when sitting or as much as possible.  Take you diuretics as directed by your physician. Do not skip doses or change doses unless instructed to do so by your physician.  Decrease salt intake, follow recommended 2 grams daily.  Do not get leg(s) with compression wrap wet. If wraps are too tight as indicated by pain, numbness/tingling or discoloration of toes remove wrap completely and call the wound center @ [phone].  Refer to the \"Multi-layer compression bandage application patient information sheet\" given to you in clinic.     Nutrition and blood sugar control:  Focus on the following:  Protein: Meats, beans, eggs, milk and yogurt particularly Greek yogurt), tofu, soy nuts, soy protein products (Follow the protein handout in your welcome folder)  Vitamin C: Citrus fruits and juices, strawberries, tomatoes, tomato juice, peppers, baked potatoes, spinach, broccoli, cauliflower, [place] sprouts, cabbage  Vitamin A: Dark green, leafy vegetables, orange or yellow vegetables, cantaloupe, fortified dairy products, liver, fortified cereals  Zinc: Fortified cereals, red meats, seafood  Consider supplementing with Justin by [email or web address]. It can be purchased on amazon, [place], or local pharmacy  may be able to order it for you.  (These are essential branch chain amino acids that help with tissue building and wound healing).   When your blood sugar is consistently elevated greater than 180 your body can't heal or fight infection.     Concerns:  Signs of infection may include the following:  Increase in redness  Red \"streaks\" from wound  Increase in swelling  Fever  Unusual odor  Change in the amount of wound drainage     Should you experience any significant changes in your wound(s) or have any questions regarding your home care instructions please contact the [facility] @ [phone] If after regular business hours, please call your family doctor or local emergency room. The treatment plan has been discussed at length between you and your provider. Follow all instructions carefully, it is very important. If you do not follow all instructions you are at risk of your wound not healing, infection, possible loss of limb and even loss of life.    Martina Mckeon FNP-C, CWCN-AP, CFCN, CSWS, WCC, DWC  6/26/2025

## 2025-06-26 ENCOUNTER — OFFICE VISIT (OUTPATIENT)
Dept: WOUND CARE | Facility: HOSPITAL | Age: 50
End: 2025-06-26
Attending: NURSE PRACTITIONER
Payer: COMMERCIAL

## 2025-06-26 VITALS
TEMPERATURE: 98 F | RESPIRATION RATE: 12 BRPM | DIASTOLIC BLOOD PRESSURE: 75 MMHG | SYSTOLIC BLOOD PRESSURE: 111 MMHG | HEART RATE: 95 BPM

## 2025-06-26 DIAGNOSIS — Z79.52 CURRENT CHRONIC USE OF SYSTEMIC STEROIDS: ICD-10-CM

## 2025-06-26 DIAGNOSIS — I77.89 LUPUS VASCULITIS (HCC): ICD-10-CM

## 2025-06-26 DIAGNOSIS — M32.19 LUPUS VASCULITIS (HCC): ICD-10-CM

## 2025-06-26 DIAGNOSIS — M35.1 MIXED CONNECTIVE TISSUE DISEASE (HCC): ICD-10-CM

## 2025-06-26 DIAGNOSIS — L97.322 NON-PRESSURE CHRONIC ULCER OF LEFT ANKLE WITH FAT LAYER EXPOSED (HCC): Primary | ICD-10-CM

## 2025-06-26 DIAGNOSIS — L97.812 NON-PRESSURE CHRONIC ULCER OF OTHER PART OF RIGHT LOWER LEG WITH FAT LAYER EXPOSED (HCC): ICD-10-CM

## 2025-06-26 PROCEDURE — 99213 OFFICE O/P EST LOW 20 MIN: CPT | Performed by: NURSE PRACTITIONER

## 2025-06-26 NOTE — PATIENT INSTRUCTIONS
Please return:  1 week         Patient discharge and wound care instructions  Raymond Olivo  6/26/2025             You may shower with protection of the wound (ie a cast cover or similar).     Cleansing/dressing:       In clinic, with each dressing change:    please cleanse the limb, foot, and between toes with soap, water and washcloth.   Dry thoroughly.    Cleanse/soak the wound with VASHE (or other hypochlorous wound cleanser), dab dry.    Apply the following dressings:   LEFT:  gina>xeroform>thick foam (cut to rest posterior to the medial malleolous)>20-30mmhg calamine compression (wrap light with padding)        RIGHT: gina>xeroform>thick foam (cut to rest posterior to the medial malleolous)>20-30mmhg calamine compression (wrap light with padding to anterior tibia)         Right lateral:   xeroform>thin foam    Managing your edema:  Avoid prolonged standing in one place. It is better to have your calf muscles moving to pump fluid out of the legs      Elevate leg(s) above the level of the heart when sitting or as much as possible.  Take you diuretics as directed by your physician. Do not skip doses or change doses unless instructed to do so by your physician.  Decrease salt intake, follow recommended 2 grams daily.  Do not get leg(s) with compression wrap wet. If wraps are too tight as indicated by pain, numbness/tingling or discoloration of toes remove wrap completely and call the wound center @ [phone].  Refer to the \"Multi-layer compression bandage application patient information sheet\" given to you in clinic.     Nutrition and blood sugar control:  Focus on the following:  Protein: Meats, beans, eggs, milk and yogurt particularly Greek yogurt), tofu, soy nuts, soy protein products (Follow the protein handout in your welcome folder)  Vitamin C: Citrus fruits and juices, strawberries, tomatoes, tomato juice, peppers, baked potatoes, spinach, broccoli, cauliflower, [place] sprouts,  cabbage  Vitamin A: Dark green, leafy vegetables, orange or yellow vegetables, cantaloupe, fortified dairy products, liver, fortified cereals  Zinc: Fortified cereals, red meats, seafood  Consider supplementing with Justin by [employer]. It can be purchased on amazon, [employer], or local pharmacy may be able to order it for you.  (These are essential branch chain amino acids that help with tissue building and wound healing).   When your blood sugar is consistently elevated greater than 180 your body can't heal or fight infection.     Concerns:  Signs of infection may include the following:  Increase in redness  Red \"streaks\" from wound  Increase in swelling  Fever  Unusual odor  Change in the amount of wound drainage     Should you experience any significant changes in your wound(s) or have any questions regarding your home care instructions please contact the [facility] @ [phone] If after regular business hours, please call your family doctor or local emergency room. The treatment plan has been discussed at length between you and your provider. Follow all instructions carefully, it is very important. If you do not follow all instructions you are at risk of your wound not healing, infection, possible loss of limb and even loss of life.

## 2025-06-26 NOTE — PROGRESS NOTES
.Weekly Wound Education Note    Teaching Provided To: Patient, Family  Training Topics: Dressing, Edema control, Cleasing and general instructions, Compression, Discharge instructions  Training Method: Explain/Verbal, Written  Training Response: Patient responds and understands        Notes: Wounds improving. Epifix integrating. Wounds dressed with gina, open xeroform and foam dressing (thick to both medial wounds, thin to lateral. Both legs wrapped with calamine unna boot 20-30mmHg with rosidal to anterior ankle and leg for padding. Pt provided rosidal.

## 2025-07-02 ENCOUNTER — APPOINTMENT (OUTPATIENT)
Dept: WOUND CARE | Facility: HOSPITAL | Age: 50
End: 2025-07-02
Attending: NURSE PRACTITIONER
Payer: COMMERCIAL

## 2025-07-02 VITALS
DIASTOLIC BLOOD PRESSURE: 63 MMHG | RESPIRATION RATE: 14 BRPM | SYSTOLIC BLOOD PRESSURE: 96 MMHG | HEART RATE: 85 BPM | TEMPERATURE: 98 F

## 2025-07-02 DIAGNOSIS — M32.19 LUPUS VASCULITIS (HCC): ICD-10-CM

## 2025-07-02 DIAGNOSIS — Z79.52 CURRENT CHRONIC USE OF SYSTEMIC STEROIDS: ICD-10-CM

## 2025-07-02 DIAGNOSIS — L97.322 NON-PRESSURE CHRONIC ULCER OF LEFT ANKLE WITH FAT LAYER EXPOSED (HCC): Primary | ICD-10-CM

## 2025-07-02 DIAGNOSIS — I77.89 LUPUS VASCULITIS (HCC): ICD-10-CM

## 2025-07-02 DIAGNOSIS — M35.1 MIXED CONNECTIVE TISSUE DISEASE (HCC): ICD-10-CM

## 2025-07-02 DIAGNOSIS — L97.812 NON-PRESSURE CHRONIC ULCER OF OTHER PART OF RIGHT LOWER LEG WITH FAT LAYER EXPOSED (HCC): ICD-10-CM

## 2025-07-02 PROCEDURE — 99214 OFFICE O/P EST MOD 30 MIN: CPT | Performed by: NURSE PRACTITIONER

## 2025-07-02 NOTE — PATIENT INSTRUCTIONS
Please return:  1 week         Patient discharge and wound care instructions  Raymond Olivo  7/2/2025               You may shower with protection of the wound (ie a cast cover or similar).     Cleansing/dressing:       In clinic, with each dressing change:    please cleanse the limb, foot, and between toes with soap, water and washcloth.   Dry thoroughly.    Cleanse/soak the wound with VASHE (or other hypochlorous wound cleanser), dab dry.    Apply the following dressings:   LEFT:  enluxtra (back on)(cut to rest posterior to the medial malleolous)>20-30mmhg calamine compression (wrap light with padding)        RIGHT: enluxtra (back on) (cut to rest posterior to the medial malleolous)>20-30mmhg calamine compression (wrap light with padding to anterior tibia)         Right lateral:   xeroform>thin foam    Managing your edema:  Avoid prolonged standing in one place. It is better to have your calf muscles moving to pump fluid out of the legs      Elevate leg(s) above the level of the heart when sitting or as much as possible.  Take you diuretics as directed by your physician. Do not skip doses or change doses unless instructed to do so by your physician.  Decrease salt intake, follow recommended 2 grams daily.  Do not get leg(s) with compression wrap wet. If wraps are too tight as indicated by pain, numbness/tingling or discoloration of toes remove wrap completely and call the wound center @ [phone].  Refer to the \"Multi-layer compression bandage application patient information sheet\" given to you in clinic.     Nutrition and blood sugar control:  Focus on the following:  Protein: Meats, beans, eggs, milk and yogurt particularly Greek yogurt), tofu, soy nuts, soy protein products (Follow the protein handout in your welcome folder)  Vitamin C: Citrus fruits and juices, strawberries, tomatoes, tomato juice, peppers, baked potatoes, spinach, broccoli, cauliflower, [place] sprouts, cabbage  Vitamin A: Dark  green, leafy vegetables, orange or yellow vegetables, cantaloupe, fortified dairy products, liver, fortified cereals  Zinc: Fortified cereals, red meats, seafood  Consider supplementing with Justin by [employer]. It can be purchased on amazon, [employer] website, or local pharmacy may be able to order it for you.  (These are essential branch chain amino acids that help with tissue building and wound healing).   When your blood sugar is consistently elevated greater than 180 your body can't heal or fight infection.     Concerns:  Signs of infection may include the following:  Increase in redness  Red \"streaks\" from wound  Increase in swelling  Fever  Unusual odor  Change in the amount of wound drainage     Should you experience any significant changes in your wound(s) or have any questions regarding your home care instructions please contact the wound center [facility] @ [phone] If after regular business hours, please call your family doctor or local emergency room. The treatment plan has been discussed at length between you and your provider. Follow all instructions carefully, it is very important. If you do not follow all instructions you are at risk of your wound not healing, infection, possible loss of limb and even loss of life.

## 2025-07-02 NOTE — PROGRESS NOTES
.Weekly Wound Education Note    Teaching Provided To: Patient, Family  Training Topics: Discharge instructions, Dressing, Edema control, Compression  Training Method: Explain/Verbal, Written  Training Response: Patient responds and understands            Patient to continue taking Prednisone, do not decrease dosage any further.  Enluxtra (back on) to all wounds.  Calamine unna boot 20-30mmHg to BLE.

## 2025-07-02 NOTE — PROGRESS NOTES
CHIEF COMPLAINT:     Chief Complaint   Patient presents with    Wound Care     Patient arrives for a wound care follow up appointment. Patient arrives with bilateral unna wraps. Both wraps stayed up well. Patient states she has mild pain.      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 Patient saw dr. Santamaria (vascular) in December 2023 and he documented \"I explained to the patient and her  that I do not believe the source of her pain to be vascular in origin.  She has an easily palpable dorsalis pedis pulse and the location of the pain is more suggestive of a neuropathic origin.  I suspect perhaps that given the tightness of her feet from her mixed connective tissue disorder the compression stockings is causing somewhat compression of the nerves in that area and I have encouraged her to substitute those stockings to those that involve the feet up to the upper calf.\" Patient last saw dr. Farmer in February 2025 and overall doing well. She was to continue on mmf 750mg daily, prednisone 2.5 mg daily, plaquenil 200mg daily and amlodipine 2.5 mg tiwce daily along with vitamine prscription and d3.  Spouse contacted dr farmer late last week regarding a small wound on her left leg, so patient was fit into the schedule.  They have not been dressing the wound with anything, she did have spandagrip on.  There was surrounding dry drainage. Patient states she noted something last Friday and then by Monday this week she noted it was a definite ulceration.  The drainage is clear and increases when patient plantar/dorsiflexes.  Most likely a lymph vessel leaking. We discussed utilizing doxy-they will bring to next appointment. Her skin remains very dry/flaky she states she has been utilizing  resta daily.  We discussed that she may need something with ammonium lactate or urea in it.  No s/s of infection. The area is not as painful as it has been previously.     HPI PRIOR TO JULY 2025 SEE INDIVIDUAL PROGRESS NOTES  6-5-25 patient returns.  We received the results of the biopsies from [facility] yesterday.  For an unknown reason the 2 mm punchs were reduced to 1mm and a full diagnostic eval was not able to be performed however eval did show immunofluorescence observed in non-specific pattern with the following antibodies: IGM, C3, and fibrinogen on LEFT.  The RIGHT just showed immunoflourescence observed in fibrinogen antibody. I reached out to dr farmer regarding need for further biopsy. I discussed the biopsy results (and lack of) with the patient and spouse.  Today, patient's pain is improved, the left ulceration is improved in size and characteristic, the right is stable.  Will place epifix today. No s/s of infection.     6-12-25 patient returns.  I touched base with dr. Farmer and since the wounds were improving last week we do not need to try and get another biopsy, if they would start to deteriorate she will begin the process of getting insurance approval for rituximab.  The plan with the medications is to keep with the higher dose of the mycophenolate and continue with the steroids and the slow taper.  Last week we placed epifix to the wounds.  Today her wounds are stable with slight improvements in wound bed characteristics, she reports her pain is also slightly better.  Will allow epifix to continue to integrate over the next week and plan to place another graft next week. Continue with honey gel and gauze. No s/s of infection.    6-19-25 patient returns.  We placed epifix 2 weeks ago and allowed it to continue to integrate last week.  Today, wounds are about the same, patient c/o continued burning.  I utilized a debrimit today to attempt to cleanse the wounds and periwound prior to epifix  application, patient had difficulty but tolerated this.  Epifix #2 placed to both medial wounds.  Will utilize xeroform and thick foam for more moisture retention as the wounds and periwound are dry. No acute s/s of infection.    6-26-25 patient returns.  Last week we placed epifix #2 to both medial wounds after cleansing with debrimit.  She states her pain today is \"ok\".  Currently on [id number] for another week and then will reduce again.  The wounds are about the same size but tissue is improved, moisture level improved. No s/s of infection. Since patient is approved only for 4 epifix, will skip a week before applying another. Will update rheum next week regarding progress.    7-2-25 Patient returns.  We have placed 2 epifix grafts so far with a week between each placement.  She is eligable for 4 grafts.  Today wounds are the same, she states she has experienced more burning pain this week.  Patient has a new small opening on the left medial ankle.  She remains on 10mg prednisone.  At this time, will hold further CTP placements.  I will reach out to dr. Farmer regarding the stall and the increased pain. No s/s of infection. Patient and family are traveling to the [place] tomorrow.  MEDICATIONS:     Current Outpatient Medications:     Ferrous Sulfate 325 (65 Fe) MG Oral Tab, Take 1 tablet (325 mg total) by mouth 2 (two) times daily with meals., Disp: 180 tablet, Rfl: 0    amLODIPine 2.5 MG Oral Tab, Take 1 tablet (2.5 mg total) by mouth 2 (two) times daily., Disp: 180 tablet, Rfl: 0    hydroxychloroquine 200 MG Oral Tab, Take 1 tablet (200 mg total) by mouth daily., Disp: 90 tablet, Rfl: 0    Mycophenolate Mofetil 500 MG Oral Tab, Take 1 tablet (500 mg total) by mouth 2 (two) times daily., Disp: 180 tablet, Rfl: 0    predniSONE 5 MG Oral Tab, Take 20mg daily x 7 days, then 15mg daily x 7 days, then 10mg daily x 7 days, then 5mg daily x 7 days., Disp: 70 tablet, Rfl: 0    gabapentin 100 MG Oral Cap,  Take 1 capsule (100 mg total) by mouth nightly., Disp: 90 capsule, Rfl: 0    gentamicin 0.3 % Ophthalmic Solution, Apply 3 drops to wound twice daily, cover with petroleum gauze and cover dressing, Disp: 15 mL, Rfl: 0    doxycycline 100 MG Oral Cap, Bring capsules to wound clinic to use as directed, Disp: 5 capsule, Rfl: 0    predniSONE 2.5 MG Oral Tab, Take 1 tablet (2.5 mg total) by mouth daily., Disp: 90 tablet, Rfl: 0    clobetasol 0.05 % External Solution, Apply topically to lower legs after shower 3 x weekly, Disp: 50 mL, Rfl: 0  ALLERGIES:   No Known Allergies   REVIEW OF SYSTEMS:   This information was obtained from the patient/family and chart.    See HPI for pertinent positives, otherwise 10 pt ROS negative.    HISTORY:   Past medical, surgical, family and social history updated where appropriate.    PHYSICAL EXAM:     Vitals:    07/02/25 1100   BP: 96/63   Pulse: 85   Resp: 14   Temp: 98 °F (36.7 °C)     Estimated body mass index is 21.58 kg/m² as calculated from the following:    Height as of 5/23/25: 62\".    Weight as of 5/23/25: 118 lb (53.5 kg).   No results found for: \"PGLU\"    Vital signs reviewed.Appears stated age, well groomed.    Constitutional:  Bp low, denies symptoms. Pulse Regular and wnl for patient. Respirations easy and unlabored. Temperature wnl. Weight normal for height. Appearance neat and clean. Appears in no acute distress. Well nourished and well developed.    Lower extremity:  dp/pt palpable bilaterally.   lower extremities are free of varicosities and no edema, they are scarred, scattered changes in pigmentation (hypo/hyper) with atrophie jay. Capillary refill < 3 seconds. Digits are warm. toenails are wnl for color, thickness and hygeine. Skin is dry-. + hairgrowth on legs.    Musculoskeletal:  Gait and station stable   Integumentary:  refer to wound characteristics and images   Psychiatric:  Judgment and insight intact. Alert and oriented times 3. No evidence of depression,  anxiety, or agitation. Calm, cooperative, and communicative, but very quiet and reserved.  EDEMA:   Calf  Point of Measurement - Left Calf: 31  Point of Measurement - Right Calf: 31  Left Calf from:: Heel  Calf Left cm:: 27.7  Right Calf from:: Heel  Right Calf cm:: 29  Ankle  Point of Measurement - Left Ankle: 10  Point of Measurement - Right Ankle: 10  Left Ankle from:: Heel  Left Ankle cm:: 16.2     Right Ankle from:: Heel  Right Ankle cm:: 16.5     DIAGNOSTICS:     Lab Results   Component Value Date    BUN 9 05/16/2025    CREATSERUM 0.59 05/16/2025    GFRCKDEPI 121.37 12/17/2020    ALB 4.8 05/16/2025    TP 8.2 05/16/2025    A1C 5.1 12/17/2020     Lab Results   Component Value Date    ESRML 40 (H) 05/16/2025    ESRML 54 (H) 02/25/2025    ESRML 47 (H) 09/03/2024      Lab Results   Component Value Date    CRP <0.40 05/16/2025    CRP <0.40 02/25/2025    CRP 0.50 09/03/2024 6-5-25 Right and left ankle biopsy      May 2025 venous study-dr hilliard  \"The results showed essentially normal valves in the veins of both of your legs. This means that the cause of ulcerations is the autoimmune disease and not due to your veins\"     11-28-23 arterial duplex-dr hilliard  FINDINGS:    LEFT EXTREMITY:  Triphasic waveforms in the left common femoral, profunda, superficial femoral arteries.  Biphasic waveforms below the left knee.   OTHER:  None.     PEAK VELOCITIES (CM/S):   LEFT COMMON FEMORAL:      165    LEFT PROFUNDA FEMORAL:      78      LEFT SUPERFICIAL FEMORAL PROX:    113   LEFT SUPERFICIAL FEMORAL MID:      94   LEFT SUPERFICIAL FEMORAL DISTAL:    92   LEFT POPLITEAL PROX:      110   LEFT POPLITEAL DISTAL:      121   TIBIOPER TRUNK:      66   LEFT PTA PROX:      60   LEFT PTA MID:      62   LEFT PTA DISTAL:      73   LEFT BETH PROX:      82   LEFT BETH MID:      72    LEFT DORSALIS PEDIS::      30   LEFT GREAT TOE PRESSURE:      25 mmHg   CONCLUSION:    1. No high-grade stenosis is identified.  There is suggestion of diffuse  mild stenosis throughout most of the left lower extremity.    2. Overall diminished left toe pressure is noted.  Possibility of decreased perfusion in the distal left foot is of consideration.     7-14-20 venous reflux study-dr santamaria  Per Dr. Santamaria review \"no intervention needed\"    July 2020 DIF-pathology punch biopsy  RESULTS   IgG: Grains within and around basal keratinocytes and         within cell nuclei and grains in focal superficial,         upper, and mid dermal blood vessels   IgG4:  Negative   IgM:  Grains in focal dermal blood vessels   IgA:  Focal grains within basal keratinocytes   C3:   Few grains along basement membrane zone and grains and         clumps within superficial and upper dermal blood         vessels   Fibrinogen:  3+ deposition around superficial, upper, and                mid dermal blood vessels   COMMENTS   By direct immunofluorescence, there is granular deposition of IgG   within and around basal keratinocytes and within cell nuclei. The   presence of granular IgG within the epidermis raises the   consideration of lupus erythematosus, specifically subacute   cutaneous lupus erythematosus (SCLE). The finding of granular   staining of cell nuclei may be indicative of the presence of   anti-nuclear antibodies, further lending support to the   possibility of lupus erythematosus. 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     WOUND ASSESSMENT:     Wound 04/10/25 #1 Ankle Left;Medial (Active)   Date First Assessed/Time First Assessed: 04/10/25 0757    Wound Number (Wound Clinic Only): #1  Primary Wound Type: Auto-immune  Location: Ankle  Wound Location Orientation: Left;Medial      Assessments 4/10/2025  7:57 AM 7/2/2025  2:10 PM   Wound Image        Drainage Amount  Unable to assess Moderate   Drainage Description -- Serosanguineous   Treatments Compression --   Wound Length (cm) 0.7 cm 1 cm   Wound Width (cm) 0.7 cm 0.9 cm   Wound Surface Area (cm^2) 0.38 cm^2 0.71 cm^2   Wound Depth (cm) 0.2 cm 0.1 cm   Wound Volume (cm^3) 0.051 cm^3 0.047 cm^3   Wound Healing % -- 8   Margins Well-defined edges Well-defined edges   Non-staged Wound Description Full thickness Full thickness   Soni-wound Assessment Dry;Hemosiderin staining Painful;Hemosiderin staining;Moist;Maceration   Wound Granulation Tissue Pale Grey;Pink;Firm Pink;Spongy   Wound Bed Granulation (%) 100 % 50 %   Wound Bed Epithelium (%) -- 40 %   Wound Bed Slough (%) -- 10 %   Wound Odor None None   Tunneling? No No   Undermining? No No   Sinus Tracts? No No       Inactive Orders   Date Order Priority Status Authorizing Provider   06/19/25 1030 Cellular tissue product application Auto-immune Left;Medial Ankle Routine Completed Martina Mckeon APRN   06/05/25 1034 Cellular tissue product application Auto-immune Left;Medial Ankle Routine Completed Martina Mckeon APRN   05/16/25 1137 Lesion biopsy Routine Completed Martina Mckeon APRN       Compression Wrap 04/10/25 Ankle Anterior;Left (Active)   Placement Date/Time: 04/10/25 0846   Location: Ankle  Wound Location Orientation: Anterior;Left      Assessments 4/10/2025  7:57 AM 6/26/2025  9:14 AM   Response to Treatment Well tolerated Well tolerated   Compression Layers Multilayer Multilayer   Compression Product Type Unna Boot Unna Boot   Dressing Applied Yes Yes   Compression Wrap Location Toes to Knee Toes to Knee   Compression Wrap Status Dry;Intact;Clean Clean;Dry       No associated orders.       Wound 05/08/25 #2 Ankle Right;Medial (Active)   Date First Assessed/Time First Assessed: 05/08/25 1056    Wound Number (Wound Clinic Only): #2  Primary Wound Type: Auto-immune  Location: Ankle  Wound Location Orientation: Right;Medial      Assessments 5/8/2025 10:58 AM 7/2/2025   2:09 PM   Wound Image       Drainage Amount None Moderate   Drainage Description -- Serosanguineous   Treatments Compression --   Wound Length (cm) 0.5 cm 1.3 cm   Wound Width (cm) 0.5 cm 1.2 cm   Wound Surface Area (cm^2) 0.2 cm^2 1.23 cm^2   Wound Depth (cm) 0 cm 0.1 cm   Wound Volume (cm^3) 0 cm^3 0.082 cm^3   Margins Undefined edges Well-defined edges   Non-staged Wound Description Full thickness Full thickness   Soni-wound Assessment Dry Maceration;Moist;Hemosiderin staining;Edema   Wound Granulation Tissue -- Pink;Spongy   Wound Bed Granulation (%) -- 50 %   Wound Bed Slough (%) -- 50 %   Wound Odor None None   Shape 100% scabbed --   Tunneling? -- No   Undermining? -- No   Sinus Tracts? -- No       Inactive Orders   Date Order Priority Status Authorizing Provider   06/19/25 1032 Cellular tissue product application Auto-immune Right;Medial Ankle Routine Completed Martina Mckeon APRN   06/05/25 1054 Cellular tissue product application Auto-immune Right;Medial Ankle Routine Completed Martina Mckeon APRN   05/16/25 1139 Lesion biopsy Routine Completed Martina Mckeon APRN       Wound 05/08/25 #3 Ankle Right;Lateral (Active)   Date First Assessed/Time First Assessed: 05/08/25 1056    Wound Number (Wound Clinic Only): #3  Primary Wound Type: Auto-immune  Location: Ankle  Wound Location Orientation: Right;Lateral      Assessments 5/8/2025 10:59 AM 7/2/2025  2:12 PM   Wound Image       Drainage Amount None Scant   Drainage Description -- Serous;Yellow   Treatments Compression --   Wound Length (cm) 0.5 cm 0.1 cm   Wound Width (cm) 0.8 cm 0.1 cm   Wound Surface Area (cm^2) 0.31 cm^2 0.01 cm^2   Wound Depth (cm) 0 cm 0.1 cm   Wound Volume (cm^3) 0 cm^3 0.001 cm^3   Margins Undefined edges Well-defined edges   Non-staged Wound Description Full thickness Full thickness   Soni-wound Assessment Dry Dry   Wound Granulation Tissue -- Pink;Firm   Wound Bed Granulation (%) -- 100 %   Wound Odor None None   Shape 100%  scabbed --   Tunneling? -- No   Undermining? -- No   Sinus Tracts? -- No       No associated orders.       Compression Wrap 05/08/25 Ankle Anterior;Right (Active)   Placement Date/Time: 05/08/25 1646   Location: Ankle  Wound Location Orientation: Anterior;Right      Assessments 5/8/2025 10:57 AM 6/26/2025  9:14 AM   Response to Treatment Well tolerated Well tolerated   Compression Layers Multilayer Multilayer   Compression Product Type Unna Boot Unna Boot   Dressing Applied Yes Yes   Compression Wrap Location Toes to Knee Toes to Knee   Compression Wrap Status Clean;Dry;Intact Clean;Dry       No associated orders.       Wound 07/02/25 #4 Ankle Distal;Left (Active)   Date First Assessed/Time First Assessed: 07/02/25 1431    Wound Number (Wound Clinic Only): #4  Primary Wound Type: Other (comment)  Location: Ankle  Wound Location Orientation: Distal;Left      Assessments 7/2/2025  2:32 PM   Wound Image     Drainage Amount Scant   Drainage Description Serosanguineous   Wound Length (cm) 0.2 cm   Wound Width (cm) 0.5 cm   Wound Surface Area (cm^2) 0.08 cm^2   Wound Depth (cm) 0.1 cm   Wound Volume (cm^3) 0.005 cm^3   Margins Well-defined edges   Non-staged Wound Description Full thickness   Soni-wound Assessment Dry;Edema   Wound Granulation Tissue Pink;Firm   Wound Bed Granulation (%) 100 %   Wound Odor None   Tunneling? No   Undermining? No   Sinus Tracts? No       No associated orders.         ASSESSMENT AND PLAN:    1. 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 Lupus vasculitis (HCC)    4. Mixed connective tissue disease (HCC)    5. Current chronic use of systemic steroids      Risks, benefits, and alternatives of current treatment plan discussed in detail.  Questions and concerns addressed. Red flags to RTC or ED reviewed.  Patient (or parent) agrees to plan.      NOTE TO PATIENT: The 21st  Century Cures Act makes clinical notes like these available to patients in the interest of transparency. Clinical notes are medical documents used by physicians and care providers to communicate with each other. These documents include medical language and terminology, abbreviations, and treatment information that may sound technical and at times possibly unfamiliar. In addition, at times, the verbiage may appear blunt or direct. These documents are one tool providers use to communicate relevant information and clinical opinions of the care providers in a way that allows common understanding of the clinical context.     I spent 35minutes with the patient. This time included:    preparing to see the patient (eg, review notes and recent diagnostics),  seeing the patient, obtaining and/or reviewing separately obtained history, performing a medically appropriate examination and/or evaluation, counseling and educating the patient, documenting in the record. Communication with rheum  DISCHARGE INSTRUCTIONS     Patient Instructions   Please return:  1 week         Patient discharge and wound care instructions  Raymondnathan Allenrocco Olivo  7/2/2025               You may shower with protection of the wound (ie a cast cover or similar).     Cleansing/dressing:       In clinic, with each dressing change:    please cleanse the limb, foot, and between toes with soap, water and washcloth.   Dry thoroughly.    Cleanse/soak the wound with VASHE (or other hypochlorous wound cleanser), dab dry.    Apply the following dressings:   LEFT:  enluxtra (back on)(cut to rest posterior to the medial malleolous)>20-30mmhg calamine compression (wrap light with padding)        RIGHT: enluxtra (back on) (cut to rest posterior to the medial malleolous)>20-30mmhg calamine compression (wrap light with padding to anterior tibia)         Right lateral:   xeroform>thin foam    Managing your edema:  Avoid prolonged standing in one place. It is better to have  your calf muscles moving to pump fluid out of the legs      Elevate leg(s) above the level of the heart when sitting or as much as possible.  Take you diuretics as directed by your physician. Do not skip doses or change doses unless instructed to do so by your physician.  Decrease salt intake, follow recommended 2 grams daily.  Do not get leg(s) with compression wrap wet. If wraps are too tight as indicated by pain, numbness/tingling or discoloration of toes remove wrap completely and call the wound center @ [phone].  Refer to the \"Multi-layer compression bandage application patient information sheet\" given to you in clinic.     Nutrition and blood sugar control:  Focus on the following:  Protein: Meats, beans, eggs, milk and yogurt particularly Greek yogurt), tofu, soy nuts, soy protein products (Follow the protein handout in your welcome folder)  Vitamin C: Citrus fruits and juices, strawberries, tomatoes, tomato juice, peppers, baked potatoes, spinach, broccoli, cauliflower, Petal sprouts, cabbage  Vitamin A: Dark green, leafy vegetables, orange or yellow vegetables, cantaloupe, fortified dairy products, liver, fortified cereals  Zinc: Fortified cereals, red meats, seafood  Consider supplementing with Justin by [employer]. It can be purchased on amazon, [employer] website, or local pharmacy may be able to order it for you.  (These are essential branch chain amino acids that help with tissue building and wound healing).   When your blood sugar is consistently elevated greater than 180 your body can't heal or fight infection.     Concerns:  Signs of infection may include the following:  Increase in redness  Red \"streaks\" from wound  Increase in swelling  Fever  Unusual odor  Change in the amount of wound drainage     Should you experience any significant changes in your wound(s) or have any questions regarding your home care instructions please contact the wound center [facility] @ [phone] If after  regular business hours, please call your family doctor or local emergency room. The treatment plan has been discussed at length between you and your provider. Follow all instructions carefully, it is very important. If you do not follow all instructions you are at risk of your wound not healing, infection, possible loss of limb and even loss of life.    Martina Mckeon FNP-C, CWCN-AP, CFCN, CSWS, WCC, DWC  7/2/2025

## 2025-07-03 ENCOUNTER — APPOINTMENT (OUTPATIENT)
Dept: WOUND CARE | Facility: HOSPITAL | Age: 50
End: 2025-07-03
Attending: NURSE PRACTITIONER
Payer: COMMERCIAL

## 2025-07-07 ENCOUNTER — TELEPHONE (OUTPATIENT)
Facility: CLINIC | Age: 50
End: 2025-07-07

## 2025-07-07 DIAGNOSIS — D84.821 IMMUNOCOMPROMISED STATE DUE TO DRUG THERAPY (HCC): ICD-10-CM

## 2025-07-07 DIAGNOSIS — M35.1 MIXED CONNECTIVE TISSUE DISEASE (HCC): Primary | ICD-10-CM

## 2025-07-07 DIAGNOSIS — Z79.899 HIGH RISK MEDICATION USE: ICD-10-CM

## 2025-07-07 DIAGNOSIS — Z23 NEED FOR HEPATITIS B VACCINATION: ICD-10-CM

## 2025-07-07 DIAGNOSIS — Z79.899 IMMUNOCOMPROMISED STATE DUE TO DRUG THERAPY (HCC): ICD-10-CM

## 2025-07-07 DIAGNOSIS — M32.19 LUPUS VASCULITIS (HCC): ICD-10-CM

## 2025-07-07 DIAGNOSIS — I77.89 LUPUS VASCULITIS (HCC): ICD-10-CM

## 2025-07-07 RX ORDER — PREDNISONE 10 MG/1
40 TABLET ORAL DAILY
Qty: 28 TABLET | Refills: 0 | Status: SHIPPED | OUTPATIENT
Start: 2025-07-07 | End: 2025-07-14

## 2025-07-07 NOTE — TELEPHONE ENCOUNTER
Spoke to patient spouse. Explained Dr. Farmer message      Per communication from wound care, pt still has significant wounds that are not healing. We will need to start process for starting rituximab. Recommend she get hep b vaccination at least first step before starting rituximab. Once starting, will stop the MMF. In the meantime, offer to increase pred to 40mg daily for a week to see if this helps.   (Rituximab was discussed in detail at her last visit)    Voiced understanding, requesting refill of prednisone with this call and also prescription for Hep B vaccine

## 2025-07-09 NOTE — PROGRESS NOTES
CHIEF COMPLAINT:     Chief Complaint   Patient presents with    Wound Recheck     Arrives with BLE in calamine unna compression. Notes increased pain in wound 5/10, daughter in room mentions that they had a more active weekend. No new questions or concerns this visit.     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 Patient saw dr. Santamaria (vascular) in December 2023 and he documented \"I explained to the patient and her  that I do not believe the source of her pain to be vascular in origin.  She has an easily palpable dorsalis pedis pulse and the location of the pain is more suggestive of a neuropathic origin.  I suspect perhaps that given the tightness of her feet from her mixed connective tissue disorder the compression stockings is causing somewhat compression of the nerves in that area and I have encouraged her to substitute those stockings to those that involve the feet up to the upper calf.\" Patient last saw dr. Farmer in February 2025 and overall doing well. She was to continue on mmf 750mg daily, prednisone 2.5 mg daily, plaquenil 200mg daily and amlodipine 2.5 mg tiwce daily along with vitamine prscription and d3.  Spouse contacted dr farmer late last week regarding a small wound on her left leg, so patient was fit into the schedule.  They have not been dressing the wound with anything, she did have spandagrip on.  There was surrounding dry drainage. Patient states she noted something last Friday and then by Monday this week she noted it was a definite ulceration.  The drainage is clear and increases when patient plantar/dorsiflexes.  Most likely a lymph vessel leaking. We discussed utilizing doxy-they will bring to next appointment. Her skin remains very dry/flaky she  states she has been utilizing resta daily.  We discussed that she may need something with ammonium lactate or urea in it.  No s/s of infection. The area is not as painful as it has been previously.     HPI PRIOR TO JULY 2025 SEE INDIVIDUAL PROGRESS NOTES  6-5-25 patient returns.  We received the results of the biopsies from [facility] yesterday.  For an unknown reason the 2 mm punchs were reduced to 1mm and a full diagnostic eval was not able to be performed however eval did show immunofluorescence observed in non-specific pattern with the following antibodies: IGM, C3, and fibrinogen on LEFT.  The RIGHT just showed immunoflourescence observed in fibrinogen antibody. I reached out to dr farmer regarding need for further biopsy. I discussed the biopsy results (and lack of) with the patient and spouse.  Today, patient's pain is improved, the left ulceration is improved in size and characteristic, the right is stable.  Will place epifix today. No s/s of infection.     6-12-25 patient returns.  I touched base with dr. Farmer and since the wounds were improving last week we do not need to try and get another biopsy, if they would start to deteriorate she will begin the process of getting insurance approval for rituximab.  The plan with the medications is to keep with the higher dose of the mycophenolate and continue with the steroids and the slow taper.  Last week we placed epifix to the wounds.  Today her wounds are stable with slight improvements in wound bed characteristics, she reports her pain is also slightly better.  Will allow epifix to continue to integrate over the next week and plan to place another graft next week. Continue with honey gel and gauze. No s/s of infection.    6-19-25 patient returns.  We placed epifix 2 weeks ago and allowed it to continue to integrate last week.  Today, wounds are about the same, patient c/o continued burning.  I utilized a debrimit today to attempt to cleanse the wounds and  periwound prior to epifix application, patient had difficulty but tolerated this.  Epifix #2 placed to both medial wounds.  Will utilize xeroform and thick foam for more moisture retention as the wounds and periwound are dry. No acute s/s of infection.    6-26-25 patient returns.  Last week we placed epifix #2 to both medial wounds after cleansing with debrimit.  She states her pain today is \"ok\".  Currently on [id number] for another week and then will reduce again.  The wounds are about the same size but tissue is improved, moisture level improved. No s/s of infection. Since patient is approved only for 4 epifix, will skip a week before applying another. Will update rheum next week regarding progress.    7-2-25 Patient returns.  We have placed 2 epifix grafts so far with a week between each placement.  She is eligable for 4 grafts.  Today wounds are the same, she states she has experienced more burning pain this week.  Patient has a new small opening on the left medial ankle.  She remains on 10mg prednisone.  At this time, will hold further CTP placements.  I will reach out to dr. Farmer regarding the stall and the increased pain. No s/s of infection. Patient and family are traveling to the [facility] tomorrow.    7-10-25 patient returns. I touched base with dr. Farmer and she said that we can increase her prednisone to 40mg daily for the next week and they have started the process to get rituximab approved. Patient is to get hep b vaccine. Patient has been on the increased dose of prednisone x 2 days, no change in the burning pain she experiences.  Her wounds are all stable or improved. Will continue with the enluxtra.  We also discussed the hep b vaccine and getting that process started as it is a series. No s/s of infection.  MEDICATIONS:     Current Outpatient Medications:     predniSONE 10 MG Oral Tab, Take 4 tablets (40 mg total) by mouth daily for 7 days., Disp: 28 tablet, Rfl: 0    Ferrous Sulfate  325 (65 Fe) MG Oral Tab, Take 1 tablet (325 mg total) by mouth 2 (two) times daily with meals., Disp: 180 tablet, Rfl: 0    amLODIPine 2.5 MG Oral Tab, Take 1 tablet (2.5 mg total) by mouth 2 (two) times daily., Disp: 180 tablet, Rfl: 0    hydroxychloroquine 200 MG Oral Tab, Take 1 tablet (200 mg total) by mouth daily., Disp: 90 tablet, Rfl: 0    Mycophenolate Mofetil 500 MG Oral Tab, Take 1 tablet (500 mg total) by mouth 2 (two) times daily., Disp: 180 tablet, Rfl: 0    predniSONE 5 MG Oral Tab, Take 20mg daily x 7 days, then 15mg daily x 7 days, then 10mg daily x 7 days, then 5mg daily x 7 days., Disp: 70 tablet, Rfl: 0    gabapentin 100 MG Oral Cap, Take 1 capsule (100 mg total) by mouth nightly., Disp: 90 capsule, Rfl: 0    gentamicin 0.3 % Ophthalmic Solution, Apply 3 drops to wound twice daily, cover with petroleum gauze and cover dressing, Disp: 15 mL, Rfl: 0    doxycycline 100 MG Oral Cap, Bring capsules to wound clinic to use as directed, Disp: 5 capsule, Rfl: 0    predniSONE 2.5 MG Oral Tab, Take 1 tablet (2.5 mg total) by mouth daily., Disp: 90 tablet, Rfl: 0    clobetasol 0.05 % External Solution, Apply topically to lower legs after shower 3 x weekly, Disp: 50 mL, Rfl: 0  ALLERGIES:   No Known Allergies   REVIEW OF SYSTEMS:   This information was obtained from the patient/family and chart.    See HPI for pertinent positives, otherwise 10 pt ROS negative.    HISTORY:   Past medical, surgical, family and social history updated where appropriate.    PHYSICAL EXAM:     Vitals:    07/10/25 1004   BP: 112/71   Pulse: 92   Resp: 14   Temp: 98.2 °F (36.8 °C)       Estimated body mass index is 21.58 kg/m² as calculated from the following:    Height as of 5/23/25: 62\".    Weight as of 5/23/25: 118 lb (53.5 kg).   No results found for: \"PGLU\"    Vital signs reviewed.Appears stated age, well groomed.    Constitutional:  Bp wnl. Pulse Regular and wnl for patient. Respirations easy and unlabored. Temperature wnl.  Weight normal for height. Appearance neat and clean. Appears in no acute distress. Well nourished and well developed.    Lower extremity:  dp/pt palpable bilaterally.   lower extremities are free of varicosities and no edema, they are scarred, scattered changes in pigmentation (hypo/hyper) with atrophie jay. Capillary refill < 3 seconds. Digits are warm. toenails are wnl for color, thickness and hygeine. Skin is dry-. + hairgrowth on legs.    Musculoskeletal:  Gait and station stable   Integumentary:  refer to wound characteristics and images   Psychiatric:  Judgment and insight intact. Alert and oriented times 3. No evidence of depression, anxiety, or agitation. Calm, cooperative, and communicative, but very quiet and reserved.  EDEMA:   Calf  Point of Measurement - Left Calf: 31  Point of Measurement - Right Calf: 31  Left Calf from:: Heel  Calf Left cm:: 27  Right Calf from:: Heel  Right Calf cm:: 28.1  Ankle  Point of Measurement - Left Ankle: 10  Point of Measurement - Right Ankle: 10  Left Ankle from:: Heel  Left Ankle cm:: 17.1     Right Ankle from:: Heel  Right Ankle cm:: 15.8     DIAGNOSTICS:     Lab Results   Component Value Date    BUN 9 05/16/2025    CREATSERUM 0.59 05/16/2025    GFRCKDEPI 121.37 12/17/2020    ALB 4.8 05/16/2025    TP 8.2 05/16/2025    A1C 5.1 12/17/2020     Lab Results   Component Value Date    ESRML 40 (H) 05/16/2025    ESRML 54 (H) 02/25/2025    ESRML 47 (H) 09/03/2024      Lab Results   Component Value Date    CRP <0.40 05/16/2025    CRP <0.40 02/25/2025    CRP 0.50 09/03/2024 6-5-25 Right and left ankle biopsy      May 2025 venous study-dr hilliard  \"The results showed essentially normal valves in the veins of both of your legs. This means that the cause of ulcerations is the autoimmune disease and not due to your veins\"     11-28-23 arterial duplex-dr hilliard  FINDINGS:    LEFT EXTREMITY:  Triphasic waveforms in the left common femoral, profunda, superficial femoral arteries.   Biphasic waveforms below the left knee.   OTHER:  None.     PEAK VELOCITIES (CM/S):   LEFT COMMON FEMORAL:      165    LEFT PROFUNDA FEMORAL:      78      LEFT SUPERFICIAL FEMORAL PROX:    113   LEFT SUPERFICIAL FEMORAL MID:      94   LEFT SUPERFICIAL FEMORAL DISTAL:    92   LEFT POPLITEAL PROX:      110   LEFT POPLITEAL DISTAL:      121   TIBIOPER TRUNK:      66   LEFT PTA PROX:      60   LEFT PTA MID:      62   LEFT PTA DISTAL:      73   LEFT BETH PROX:      82   LEFT BETH MID:      72    LEFT DORSALIS PEDIS::      30   LEFT GREAT TOE PRESSURE:      25 mmHg   CONCLUSION:    1. No high-grade stenosis is identified.  There is suggestion of diffuse mild stenosis throughout most of the left lower extremity.    2. Overall diminished left toe pressure is noted.  Possibility of decreased perfusion in the distal left foot is of consideration.     7-14-20 venous reflux study-dr santamaria  Per Dr. Santamaria review \"no intervention needed\"    July 2020 DIF-pathology punch biopsy  RESULTS   IgG: Grains within and around basal keratinocytes and         within cell nuclei and grains in focal superficial,         upper, and mid dermal blood vessels   IgG4:  Negative   IgM:  Grains in focal dermal blood vessels   IgA:  Focal grains within basal keratinocytes   C3:   Few grains along basement membrane zone and grains and         clumps within superficial and upper dermal blood         vessels   Fibrinogen:  3+ deposition around superficial, upper, and                mid dermal blood vessels   COMMENTS   By direct immunofluorescence, there is granular deposition of IgG   within and around basal keratinocytes and within cell nuclei. The   presence of granular IgG within the epidermis raises the   consideration of lupus erythematosus, specifically subacute   cutaneous lupus erythematosus (SCLE). The finding of granular   staining of cell nuclei may be indicative of the presence of   anti-nuclear antibodies, further lending support to the    possibility of lupus erythematosus. 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     WOUND ASSESSMENT:     Wound 04/10/25 #1 Ankle Left;Medial (Active)   Date First Assessed/Time First Assessed: 04/10/25 0757    Wound Number (Wound Clinic Only): #1  Primary Wound Type: Auto-immune  Location: Ankle  Wound Location Orientation: Left;Medial      Assessments 4/10/2025  7:57 AM 7/10/2025 10:14 AM   Wound Image        Drainage Amount Unable to assess Scant   Drainage Description -- Serous;Yellow   Treatments Compression --   Wound Length (cm) 0.7 cm 1 cm   Wound Width (cm) 0.7 cm 0.6 cm   Wound Surface Area (cm^2) 0.38 cm^2 0.47 cm^2   Wound Depth (cm) 0.2 cm 0.1 cm   Wound Volume (cm^3) 0.051 cm^3 0.031 cm^3   Wound Healing % -- 39   Margins Well-defined edges Well-defined edges   Non-staged Wound Description Full thickness Full thickness   Soni-wound Assessment Dry;Hemosiderin staining Painful;Hemosiderin staining;Dry;Hyperpigmented   Wound Granulation Tissue Pale Grey;Pink;Firm Red;Pink;Firm   Wound Bed Granulation (%) 100 % 90 %   Wound Bed Slough (%) -- 10 %   Wound Odor None None   Tunneling? No No   Undermining? No No   Sinus Tracts? No No       Inactive Orders   Date Order Priority Status Authorizing Provider   06/19/25 1030 Cellular tissue product application Auto-immune Left;Medial Ankle Routine Completed Martina Mckeon APRN   06/05/25 1034 Cellular tissue product application Auto-immune Left;Medial Ankle Routine Completed Martina Mckeon APRN   05/16/25 1137 Lesion biopsy Routine Completed Martina Mckeon APRN       Compression Wrap 04/10/25 Ankle Anterior;Left (Active)   Placement Date/Time: 04/10/25 0846   Location: Ankle  Wound Location Orientation: Anterior;Left      Assessments  4/10/2025  7:57 AM 7/2/2025  4:34 PM   Response to Treatment Well tolerated Well tolerated   Compression Layers Multilayer Multilayer   Compression Product Type Unna Boot Unna Boot   Dressing Applied Yes Yes   Compression Wrap Location Toes to Knee Toes to Knee   Compression Wrap Status Dry;Intact;Clean Dry;Clean       No associated orders.       Wound 05/08/25 #2 Ankle Right;Medial (Active)   Date First Assessed/Time First Assessed: 05/08/25 1056    Wound Number (Wound Clinic Only): #2  Primary Wound Type: Auto-immune  Location: Ankle  Wound Location Orientation: Right;Medial      Assessments 5/8/2025 10:58 AM 7/10/2025 10:14 AM   Wound Image       Drainage Amount None Scant   Drainage Description -- Serous;Yellow   Treatments Compression --   Wound Length (cm) 0.5 cm 1 cm   Wound Width (cm) 0.5 cm 1 cm   Wound Surface Area (cm^2) 0.2 cm^2 0.79 cm^2   Wound Depth (cm) 0 cm 0.1 cm   Wound Volume (cm^3) 0 cm^3 0.052 cm^3   Margins Undefined edges Well-defined edges   Non-staged Wound Description Full thickness Full thickness   Soni-wound Assessment Dry Hyperpigmented;Hemosiderin staining;Painful   Wound Granulation Tissue -- Pink;Firm   Wound Bed Granulation (%) -- 20 %   Wound Bed Slough (%) -- 80 %   Wound Odor None None   Shape 100% scabbed --   Tunneling? -- No   Undermining? -- No   Sinus Tracts? -- No       Inactive Orders   Date Order Priority Status Authorizing Provider   06/19/25 1032 Cellular tissue product application Auto-immune Right;Medial Ankle Routine Completed Martina Mckeon APRN   06/05/25 1054 Cellular tissue product application Auto-immune Right;Medial Ankle Routine Completed Martina Mckeon APRN   05/16/25 1139 Lesion biopsy Routine Completed Martina Mckeon APRN       Wound 05/08/25 #3 Ankle Right;Lateral (Active)   Date First Assessed/Time First Assessed: 05/08/25 1056    Wound Number (Wound Clinic Only): #3  Primary Wound Type: Auto-immune  Location: Ankle  Wound Location Orientation:  Right;Lateral      Assessments 5/8/2025 10:59 AM 7/10/2025 10:13 AM   Wound Image       Drainage Amount None Scant   Drainage Description -- Serous;Yellow   Treatments Compression --   Wound Length (cm) 0.5 cm 0.1 cm   Wound Width (cm) 0.8 cm 0.2 cm   Wound Surface Area (cm^2) 0.31 cm^2 0.02 cm^2   Wound Depth (cm) 0 cm 0.1 cm   Wound Volume (cm^3) 0 cm^3 0.001 cm^3   Margins Undefined edges Well-defined edges   Non-staged Wound Description Full thickness Full thickness   Soni-wound Assessment Dry Blanchable erythema;Hemosiderin staining;Hyperpigmented;Painful;Dry   Wound Granulation Tissue -- Pink;Firm   Wound Bed Granulation (%) -- 100 %   Wound Odor None None   Shape 100% scabbed --   Tunneling? -- No   Undermining? -- No   Sinus Tracts? -- No       No associated orders.       Compression Wrap 05/08/25 Ankle Anterior;Right (Active)   Placement Date/Time: 05/08/25 1646   Location: Ankle  Wound Location Orientation: Anterior;Right      Assessments 5/8/2025 10:57 AM 7/2/2025  4:35 PM   Response to Treatment Well tolerated Well tolerated   Compression Layers Multilayer Multilayer   Compression Product Type Unna Boot Unna Boot   Dressing Applied Yes Yes   Compression Wrap Location Toes to Knee Toes to Knee   Compression Wrap Status Clean;Dry;Intact Clean;Dry       No associated orders.       Wound 07/02/25 #4 Ankle Distal;Left (Active)   Date First Assessed/Time First Assessed: 07/02/25 1431    Wound Number (Wound Clinic Only): #4  Primary Wound Type: Other (comment)  Location: Ankle  Wound Location Orientation: Distal;Left      Assessments 7/2/2025  2:32 PM 7/10/2025 10:15 AM   Wound Image       Drainage Amount Scant Scant   Drainage Description Serosanguineous Serosanguineous   Treatments Compression --   Wound Length (cm) 0.2 cm 0.2 cm   Wound Width (cm) 0.5 cm 0.5 cm   Wound Surface Area (cm^2) 0.08 cm^2 0.08 cm^2   Wound Depth (cm) 0.1 cm 0.1 cm   Wound Volume (cm^3) 0.005 cm^3 0.005 cm^3   Wound Healing % -- 0    Margins Well-defined edges Well-defined edges   Non-staged Wound Description Full thickness Full thickness   Soni-wound Assessment Dry;Edema Dry;Edema   Wound Granulation Tissue Pink;Firm Pink;Firm   Wound Bed Granulation (%) 100 % 100 %   Wound Odor None None   Tunneling? No --   Undermining? No --   Sinus Tracts? No --       No associated orders.         ASSESSMENT AND PLAN:    1. 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 Lupus vasculitis (HCC)    4. Mixed connective tissue disease (HCC)    5. Current chronic use of systemic steroids        Risks, benefits, and alternatives of current treatment plan discussed in detail.  Questions and concerns addressed. Red flags to RTC or ED reviewed.  Patient (or parent) agrees to plan.      NOTE TO PATIENT: The 21st Century Cures Act makes clinical notes like these available to patients in the interest of transparency. Clinical notes are medical documents used by physicians and care providers to communicate with each other. These documents include medical language and terminology, abbreviations, and treatment information that may sound technical and at times possibly unfamiliar. In addition, at times, the verbiage may appear blunt or direct. These documents are one tool providers use to communicate relevant information and clinical opinions of the care providers in a way that allows common understanding of the clinical context.     I spent 30minutes with the patient. This time included:    preparing to see the patient (eg, review notes and recent diagnostics),  seeing the patient, obtaining and/or reviewing separately obtained history, performing a medically appropriate examination and/or evaluation, counseling and educating the patient, documenting in the record.   DISCHARGE INSTRUCTIONS     Patient Instructions   Please return:  1 week        Check in with dr davis regarding the process for the rituximab and the hepatitis b vaccine    Patient discharge and wound care instructions  Raymond Hayward Pallavi  7/10/2025         You may shower with protection of the wound (ie a cast cover or similar).     Cleansing/dressing:       In clinic, with each dressing change:    please cleanse the limb, foot, and between toes with soap, water and washcloth.   Dry thoroughly.    Cleanse/soak the wound with VASHE (or other hypochlorous wound cleanser), dab dry.    Apply the following dressings:   LEFT:  enluxtra (back on)(cut to rest posterior to the medial malleolous)>20-30mmhg calamine compression (wrap light with padding)        RIGHT: enluxtra (back on) (cut to rest posterior to the medial malleolous)>20-30mmhg calamine compression (wrap light with padding to anterior tibia)         Right lateral:   xeroform>thin foam    Managing your edema:  Avoid prolonged standing in one place. It is better to have your calf muscles moving to pump fluid out of the legs      Elevate leg(s) above the level of the heart when sitting or as much as possible.  Take you diuretics as directed by your physician. Do not skip doses or change doses unless instructed to do so by your physician.  Decrease salt intake, follow recommended 2 grams daily.  Do not get leg(s) with compression wrap wet. If wraps are too tight as indicated by pain, numbness/tingling or discoloration of toes remove wrap completely and call the wound center @ [phone].  Refer to the \"Multi-layer compression bandage application patient information sheet\" given to you in clinic.     Nutrition and blood sugar control:  Focus on the following:  Protein: Meats, beans, eggs, milk and yogurt particularly Greek yogurt), tofu, soy nuts, soy protein products (Follow the protein handout in your welcome folder)  Vitamin C: Citrus fruits and juices, strawberries, tomatoes, tomato juice, peppers, baked potatoes, spinach,  broccoli, cauliflower, Rosebud sprouts, cabbage  Vitamin A: Dark green, leafy vegetables, orange or yellow vegetables, cantaloupe, fortified dairy products, liver, fortified cereals  Zinc: Fortified cereals, red meats, seafood  Consider supplementing with Justin by [employer] Browser. It can be purchased on amazon, [employer] website, or local pharmacy may be able to order it for you.  (These are essential branch chain amino acids that help with tissue building and wound healing).   When your blood sugar is consistently elevated greater than 180 your body can't heal or fight infection.     Concerns:  Signs of infection may include the following:  Increase in redness  Red \"streaks\" from wound  Increase in swelling  Fever  Unusual odor  Change in the amount of wound drainage     Should you experience any significant changes in your wound(s) or have any questions regarding your home care instructions please contact the wound center [facility] @ [phone] If after regular business hours, please call your family doctor or local emergency room. The treatment plan has been discussed at length between you and your provider. Follow all instructions carefully, it is very important. If you do not follow all instructions you are at risk of your wound not healing, infection, possible loss of limb and even loss of life.    Martina Mckeon FNP-C, CWCN-AP, CFCN, CSWS, WCC, DWC  7/10/2025

## 2025-07-10 ENCOUNTER — OFFICE VISIT (OUTPATIENT)
Dept: WOUND CARE | Facility: HOSPITAL | Age: 50
End: 2025-07-10
Attending: NURSE PRACTITIONER
Payer: COMMERCIAL

## 2025-07-10 VITALS
DIASTOLIC BLOOD PRESSURE: 71 MMHG | HEART RATE: 92 BPM | TEMPERATURE: 98 F | RESPIRATION RATE: 14 BRPM | SYSTOLIC BLOOD PRESSURE: 112 MMHG

## 2025-07-10 DIAGNOSIS — M32.19 LUPUS VASCULITIS (HCC): ICD-10-CM

## 2025-07-10 DIAGNOSIS — L97.812 NON-PRESSURE CHRONIC ULCER OF OTHER PART OF RIGHT LOWER LEG WITH FAT LAYER EXPOSED (HCC): ICD-10-CM

## 2025-07-10 DIAGNOSIS — I77.89 LUPUS VASCULITIS (HCC): ICD-10-CM

## 2025-07-10 DIAGNOSIS — L97.322 NON-PRESSURE CHRONIC ULCER OF LEFT ANKLE WITH FAT LAYER EXPOSED (HCC): Primary | ICD-10-CM

## 2025-07-10 DIAGNOSIS — Z79.52 CURRENT CHRONIC USE OF SYSTEMIC STEROIDS: ICD-10-CM

## 2025-07-10 DIAGNOSIS — M35.1 MIXED CONNECTIVE TISSUE DISEASE (HCC): ICD-10-CM

## 2025-07-10 PROCEDURE — 29581 APPL MULTLAYER CMPRN SYS LEG: CPT

## 2025-07-10 NOTE — PATIENT INSTRUCTIONS
Please return:  1 week       Check in with dr davis regarding the process for the rituximab and the hepatitis b vaccine    Patient discharge and wound care instructions  Raymond Hayward Pallavi  7/10/2025         You may shower with protection of the wound (ie a cast cover or similar).     Cleansing/dressing:       In clinic, with each dressing change:    please cleanse the limb, foot, and between toes with soap, water and washcloth.   Dry thoroughly.    Cleanse/soak the wound with VASHE (or other hypochlorous wound cleanser), dab dry.    Apply the following dressings:   LEFT:  enluxtra (back on)(cut to rest posterior to the medial malleolous)>20-30mmhg calamine compression (wrap light with padding)        RIGHT: enluxtra (back on) (cut to rest posterior to the medial malleolous)>20-30mmhg calamine compression (wrap light with padding to anterior tibia)         Right lateral:   xeroform>thin foam    Managing your edema:  Avoid prolonged standing in one place. It is better to have your calf muscles moving to pump fluid out of the legs      Elevate leg(s) above the level of the heart when sitting or as much as possible.  Take you diuretics as directed by your physician. Do not skip doses or change doses unless instructed to do so by your physician.  Decrease salt intake, follow recommended 2 grams daily.  Do not get leg(s) with compression wrap wet. If wraps are too tight as indicated by pain, numbness/tingling or discoloration of toes remove wrap completely and call the wound center @ [phone].  Refer to the \"Multi-layer compression bandage application patient information sheet\" given to you in clinic.     Nutrition and blood sugar control:  Focus on the following:  Protein: Meats, beans, eggs, milk and yogurt particularly Greek yogurt), tofu, soy nuts, soy protein products (Follow the protein handout in your welcome folder)  Vitamin C: Citrus fruits and juices, strawberries, tomatoes, tomato juice, peppers, baked  potatoes, spinach, broccoli, cauliflower, [place] sprouts, cabbage  Vitamin A: Dark green, leafy vegetables, orange or yellow vegetables, cantaloupe, fortified dairy products, liver, fortified cereals  Zinc: Fortified cereals, red meats, seafood  Consider supplementing with Justin by [employer]. It can be purchased on amazon, [employer] website, or local pharmacy may be able to order it for you.  (These are essential branch chain amino acids that help with tissue building and wound healing).   When your blood sugar is consistently elevated greater than 180 your body can't heal or fight infection.     Concerns:  Signs of infection may include the following:  Increase in redness  Red \"streaks\" from wound  Increase in swelling  Fever  Unusual odor  Change in the amount of wound drainage     Should you experience any significant changes in your wound(s) or have any questions regarding your home care instructions please contact the wound center [facility] @ [phone] If after regular business hours, please call your family doctor or local emergency room. The treatment plan has been discussed at length between you and your provider. Follow all instructions carefully, it is very important. If you do not follow all instructions you are at risk of your wound not healing, infection, possible loss of limb and even loss of life.

## 2025-07-10 NOTE — PROGRESS NOTES
.Weekly Wound Education Note    Teaching Provided To: Patient, Family  Training Topics: Dressing, Edema control, Cleasing and general instructions, Compression, Discharge instructions  Training Method: Explain/Verbal, Written  Training Response: Patient responds and understands        Notes: Wounds stable. Continue Enluxtra with back on, and calamine unna boot 20-30mmHg with stacked rosidal between pink and brown layers from toes to knee for padding.

## 2025-07-15 RX ORDER — PREDNISONE 5 MG/1
TABLET ORAL
Qty: 70 TABLET | Refills: 0 | OUTPATIENT
Start: 2025-07-15

## 2025-07-16 NOTE — PROGRESS NOTES
CHIEF COMPLAINT:     Chief Complaint   Patient presents with    Wound Care     Arrives for follow-up. Bilateral unna-boots intact. Denies new concerns for provider.     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 Patient saw dr. Santamaria (vascular) in December 2023 and he documented \"I explained to the patient and her  that I do not believe the source of her pain to be vascular in origin.  She has an easily palpable dorsalis pedis pulse and the location of the pain is more suggestive of a neuropathic origin.  I suspect perhaps that given the tightness of her feet from her mixed connective tissue disorder the compression stockings is causing somewhat compression of the nerves in that area and I have encouraged her to substitute those stockings to those that involve the feet up to the upper calf.\" Patient last saw dr. Farmer in February 2025 and overall doing well. She was to continue on mmf 750mg daily, prednisone 2.5 mg daily, plaquenil 200mg daily and amlodipine 2.5 mg tiwce daily along with vitamine prscription and d3.  Spouse contacted dr farmer late last week regarding a small wound on her left leg, so patient was fit into the schedule.  They have not been dressing the wound with anything, she did have spandagrip on.  There was surrounding dry drainage. Patient states she noted something last Friday and then by Monday this week she noted it was a definite ulceration.  The drainage is clear and increases when patient plantar/dorsiflexes.  Most likely a lymph vessel leaking. We discussed utilizing doxy-they will bring to next appointment. Her skin remains very dry/flaky she states she has been utilizing resta daily.  We discussed that she may need something with ammonium lactate  or urea in it.  No s/s of infection. The area is not as painful as it has been previously.     HPI PRIOR TO JULY 2025 SEE INDIVIDUAL PROGRESS NOTES  6-26-25 patient returns.  Last week we placed epifix #2 to both medial wounds after cleansing with debrimit.  She states her pain today is \"ok\".  Currently on [id number] for another week and then will reduce again.  The wounds are about the same size but tissue is improved, moisture level improved. No s/s of infection. Since patient is approved only for 4 epifix, will skip a week before applying another. Will update rheum next week regarding progress.    7-2-25 Patient returns.  We have placed 2 epifix grafts so far with a week between each placement.  She is eligable for 4 grafts.  Today wounds are the same, she states she has experienced more burning pain this week.  Patient has a new small opening on the left medial ankle.  She remains on 10mg prednisone.  At this time, will hold further CTP placements.  I will reach out to dr. Farmer regarding the stall and the increased pain. No s/s of infection. Patient and family are traveling to the [place] tomorrow.    7-10-25 patient returns. I touched base with dr. Farmer and she said that we can increase her prednisone to 40mg daily for the next week and they have started the process to get rituximab approved. Patient is to get hep b vaccine. Patient has been on the increased dose of prednisone x 2 days, no change in the burning pain she experiences.  Her wounds are all stable or improved. Will continue with the enluxtra.  We also discussed the hep b vaccine and getting that process started as it is a series. No s/s of infection.    7-17-25 patient returns with spouse. She finished the 40mg of prednisone yesterday and is now down to 15.  She states her pain is much better, still has pain in lateral right.  Her wounds are significantly improved.  The right medial is measuring slightly larger, but the bridge of  epithelial is wider and is now fully dividing the wound into 2 parts.  The left medial is smaller. I will reach out to dr davis regarding patient's improvement and next steps with the prednisone.  I again reminded patient and spouse regarding Hep b vaccine series. No s/s of infection.  MEDICATIONS:     Current Outpatient Medications:     Ferrous Sulfate 325 (65 Fe) MG Oral Tab, Take 1 tablet (325 mg total) by mouth 2 (two) times daily with meals., Disp: 180 tablet, Rfl: 0    amLODIPine 2.5 MG Oral Tab, Take 1 tablet (2.5 mg total) by mouth 2 (two) times daily., Disp: 180 tablet, Rfl: 0    hydroxychloroquine 200 MG Oral Tab, Take 1 tablet (200 mg total) by mouth daily., Disp: 90 tablet, Rfl: 0    Mycophenolate Mofetil 500 MG Oral Tab, Take 1 tablet (500 mg total) by mouth 2 (two) times daily., Disp: 180 tablet, Rfl: 0    predniSONE 5 MG Oral Tab, Take 20mg daily x 7 days, then 15mg daily x 7 days, then 10mg daily x 7 days, then 5mg daily x 7 days., Disp: 70 tablet, Rfl: 0    gabapentin 100 MG Oral Cap, Take 1 capsule (100 mg total) by mouth nightly., Disp: 90 capsule, Rfl: 0    gentamicin 0.3 % Ophthalmic Solution, Apply 3 drops to wound twice daily, cover with petroleum gauze and cover dressing, Disp: 15 mL, Rfl: 0    doxycycline 100 MG Oral Cap, Bring capsules to wound clinic to use as directed, Disp: 5 capsule, Rfl: 0    predniSONE 2.5 MG Oral Tab, Take 1 tablet (2.5 mg total) by mouth daily., Disp: 90 tablet, Rfl: 0    clobetasol 0.05 % External Solution, Apply topically to lower legs after shower 3 x weekly, Disp: 50 mL, Rfl: 0  ALLERGIES:   No Known Allergies   REVIEW OF SYSTEMS:   This information was obtained from the patient/family and chart.    See HPI for pertinent positives, otherwise 10 pt ROS negative.    HISTORY:   Past medical, surgical, family and social history updated where appropriate.    PHYSICAL EXAM:     Vitals:    07/17/25 1004   BP: 102/65   Pulse: 83   Resp: 16   Temp: 98 °F (36.7 °C)          Estimated body mass index is 21.58 kg/m² as calculated from the following:    Height as of 5/23/25: 62\".    Weight as of 5/23/25: 118 lb (53.5 kg).   No results found for: \"PGLU\"    Vital signs reviewed.Appears stated age, well groomed.    Constitutional:  Bp wnl. Pulse Regular and wnl for patient. Respirations easy and unlabored. Temperature wnl. Weight normal for height. Appearance neat and clean. Appears in no acute distress. Well nourished and well developed.    Lower extremity:  dp/pt palpable bilaterally.   lower extremities are free of varicosities and no edema, they are scarred, scattered changes in pigmentation (hypo/hyper) with atrophie jay. Capillary refill < 3 seconds. Digits are warm. toenails are wnl for color, thickness and hygeine. Skin is dry-. + hairgrowth on legs.    Musculoskeletal:  Gait and station stable   Integumentary:  refer to wound characteristics and images   Psychiatric:  Judgment and insight intact. Alert and oriented times 3. No evidence of depression, anxiety, or agitation. Calm, cooperative, and communicative, but very quiet and reserved.  EDEMA:   Calf  Point of Measurement - Left Calf: 31  Point of Measurement - Right Calf: 31  Left Calf from:: Heel  Calf Left cm:: 27.6  Right Calf from:: Heel  Right Calf cm:: 27.1  Ankle  Point of Measurement - Left Ankle: 10  Point of Measurement - Right Ankle: 10  Left Ankle from:: Heel  Left Ankle cm:: 16.6     Right Ankle from:: Heel  Right Ankle cm:: 17.3     DIAGNOSTICS:     Lab Results   Component Value Date    BUN 9 05/16/2025    CREATSERUM 0.59 05/16/2025    GFRCKDEPI 121.37 12/17/2020    ALB 4.8 05/16/2025    TP 8.2 05/16/2025    A1C 5.1 12/17/2020     Lab Results   Component Value Date    ESRML 40 (H) 05/16/2025    ESRML 54 (H) 02/25/2025    ESRML 47 (H) 09/03/2024      Lab Results   Component Value Date    CRP <0.40 05/16/2025    CRP <0.40 02/25/2025    CRP 0.50 09/03/2024 6-5-25 Right and left ankle biopsy      May  2025 venous study-dr santamaria  \"The results showed essentially normal valves in the veins of both of your legs. This means that the cause of ulcerations is the autoimmune disease and not due to your veins\"     11-28-23 arterial duplex-dr santamaria  FINDINGS:    LEFT EXTREMITY:  Triphasic waveforms in the left common femoral, profunda, superficial femoral arteries.  Biphasic waveforms below the left knee.   OTHER:  None.     PEAK VELOCITIES (CM/S):   LEFT COMMON FEMORAL:      165    LEFT PROFUNDA FEMORAL:      78      LEFT SUPERFICIAL FEMORAL PROX:    113   LEFT SUPERFICIAL FEMORAL MID:      94   LEFT SUPERFICIAL FEMORAL DISTAL:    92   LEFT POPLITEAL PROX:      110   LEFT POPLITEAL DISTAL:      121   TIBIOPER TRUNK:      66   LEFT PTA PROX:      60   LEFT PTA MID:      62   LEFT PTA DISTAL:      73   LEFT BETH PROX:      82   LEFT BETH MID:      72    LEFT DORSALIS PEDIS::      30   LEFT GREAT TOE PRESSURE:      25 mmHg   CONCLUSION:    1. No high-grade stenosis is identified.  There is suggestion of diffuse mild stenosis throughout most of the left lower extremity.    2. Overall diminished left toe pressure is noted.  Possibility of decreased perfusion in the distal left foot is of consideration.     7-14-20 venous reflux study-dr santamaria  Per Dr. Santamaria review \"no intervention needed\"    July 2020 DIF-pathology punch biopsy  RESULTS   IgG: Grains within and around basal keratinocytes and         within cell nuclei and grains in focal superficial,         upper, and mid dermal blood vessels   IgG4:  Negative   IgM:  Grains in focal dermal blood vessels   IgA:  Focal grains within basal keratinocytes   C3:   Few grains along basement membrane zone and grains and         clumps within superficial and upper dermal blood         vessels   Fibrinogen:  3+ deposition around superficial, upper, and                mid dermal blood vessels   COMMENTS   By direct immunofluorescence, there is granular deposition of IgG   within and  around basal keratinocytes and within cell nuclei. The   presence of granular IgG within the epidermis raises the   consideration of lupus erythematosus, specifically subacute   cutaneous lupus erythematosus (SCLE). The finding of granular   staining of cell nuclei may be indicative of the presence of   anti-nuclear antibodies, further lending support to the   possibility of lupus erythematosus. 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     WOUND ASSESSMENT:     Wound 04/10/25 #1 Ankle Left;Medial (Active)   Date First Assessed/Time First Assessed: 04/10/25 0757    Wound Number (Wound Clinic Only): #1  Primary Wound Type: Auto-immune  Location: Ankle  Wound Location Orientation: Left;Medial      Assessments 4/10/2025  7:57 AM 7/17/2025 10:16 AM   Wound Image        Drainage Amount Unable to assess Scant   Drainage Description -- Serous;Yellow   Treatments Compression --   Wound Length (cm) 0.7 cm 0.4 cm   Wound Width (cm) 0.7 cm 0.5 cm   Wound Surface Area (cm^2) 0.38 cm^2 0.16 cm^2   Wound Depth (cm) 0.2 cm 0.1 cm   Wound Volume (cm^3) 0.051 cm^3 0.01 cm^3   Wound Healing % -- 80   Margins Well-defined edges --   Non-staged Wound Description Full thickness --   Soni-wound Assessment Dry;Hemosiderin staining Painful;Hemosiderin staining;Dry;Dry exudate   Wound Granulation Tissue Pale Grey;Pink;Firm Spongy;Red   Wound Bed Granulation (%) 100 % 50 %   Wound Bed Slough (%) -- 50 %   Wound Odor None None   Tunneling? No No   Undermining? No No   Sinus Tracts? No No       Inactive Orders   Date Order Priority Status Authorizing Provider   06/19/25 1030 Cellular tissue product application Auto-immune Left;Medial Ankle Routine Completed Martina Mckeon APRN   06/05/25 1034 Cellular tissue product  application Auto-immune Left;Medial Ankle Routine Completed Martina Mckeon APRN   05/16/25 1137 Lesion biopsy Routine Completed Martina Mckeon, CALVIN       Compression Wrap 04/10/25 Ankle Anterior;Left (Active)   Placement Date/Time: 04/10/25 0846   Location: Ankle  Wound Location Orientation: Anterior;Left      Assessments 4/10/2025  7:57 AM 7/10/2025 10:15 AM   Response to Treatment Well tolerated Well tolerated   Compression Layers Multilayer Multilayer   Compression Product Type Unna Boot Unna Boot   Dressing Applied Yes Yes   Compression Wrap Location Toes to Knee Toes to Knee   Compression Wrap Status Dry;Intact;Clean Dry;Clean       No associated orders.       Wound 05/08/25 #2 Ankle Right;Medial (Active)   Date First Assessed/Time First Assessed: 05/08/25 1056    Wound Number (Wound Clinic Only): #2  Primary Wound Type: Auto-immune  Location: Ankle  Wound Location Orientation: Right;Medial      Assessments 5/8/2025 10:58 AM 7/17/2025 10:19 AM   Wound Image       Drainage Amount None Scant   Drainage Description -- Serous;Yellow   Treatments Compression --   Wound Length (cm) 0.5 cm 1.3 cm   Wound Width (cm) 0.5 cm 1.7 cm   Wound Surface Area (cm^2) 0.2 cm^2 1.74 cm^2   Wound Depth (cm) 0 cm 0.1 cm   Wound Volume (cm^3) 0 cm^3 0.116 cm^3   Margins Undefined edges Well-defined edges   Non-staged Wound Description Full thickness Full thickness   Soni-wound Assessment Dry Hemosiderin staining;Painful;Dry exudate   Wound Granulation Tissue -- Red;Spongy   Wound Bed Granulation (%) -- 5 %   Wound Bed Epithelium (%) -- 30 %   Wound Bed Slough (%) -- 65 %   Wound Odor None None   Shape 100% scabbed bridged   Tunneling? -- No   Undermining? -- No   Sinus Tracts? -- No       Inactive Orders   Date Order Priority Status Authorizing Provider   06/19/25 1032 Cellular tissue product application Auto-immune Right;Medial Ankle Routine Completed Martina Mckeon, CALVIN   06/05/25 1054 Cellular tissue product application  Auto-immune Right;Medial Ankle Routine Completed Martina Mckeon APRN   05/16/25 1139 Lesion biopsy Routine Completed Martina Mckeon APRN       Wound 05/08/25 #3 Ankle Right;Lateral (Active)   Date First Assessed/Time First Assessed: 05/08/25 1056    Wound Number (Wound Clinic Only): #3  Primary Wound Type: Auto-immune  Location: Ankle  Wound Location Orientation: Right;Lateral      Assessments 5/8/2025 10:59 AM 7/17/2025 10:17 AM   Wound Image       Drainage Amount None Scant   Drainage Description -- Serous;Yellow   Treatments Compression --   Wound Length (cm) 0.5 cm 0.3 cm   Wound Width (cm) 0.8 cm 0.4 cm   Wound Surface Area (cm^2) 0.31 cm^2 0.09 cm^2   Wound Depth (cm) 0 cm 0.1 cm   Wound Volume (cm^3) 0 cm^3 0.006 cm^3   Margins Undefined edges Well-defined edges   Non-staged Wound Description Full thickness Full thickness   Soni-wound Assessment Dry Blanchable erythema;Hemosiderin staining;Painful   Wound Granulation Tissue -- Pink;Spongy   Wound Bed Granulation (%) -- 25 %   Wound Bed Slough (%) -- 75 %   Wound Odor None None   Shape 100% scabbed --   Tunneling? -- No   Undermining? -- No   Sinus Tracts? -- No       No associated orders.       Compression Wrap 05/08/25 Ankle Anterior;Right (Active)   Placement Date/Time: 05/08/25 1646   Location: Ankle  Wound Location Orientation: Anterior;Right      Assessments 5/8/2025 10:57 AM 7/10/2025 10:15 AM   Response to Treatment Well tolerated Well tolerated   Compression Layers Multilayer Multilayer   Compression Product Type Unna Boot Unna Boot   Dressing Applied Yes Yes   Compression Wrap Location Toes to Knee Toes to Knee   Compression Wrap Status Clean;Dry;Intact Clean;Dry       No associated orders.       Wound 07/02/25 #4 Ankle Distal;Left (Active)   Date First Assessed/Time First Assessed: 07/02/25 1431    Wound Number (Wound Clinic Only): #4  Primary Wound Type: Other (comment)  Location: Ankle  Wound Location Orientation: Distal;Left      Assessments  7/2/2025  2:32 PM 7/17/2025 10:14 AM   Wound Image       Drainage Amount Scant None   Drainage Description Serosanguineous --   Treatments Compression --   Wound Length (cm) 0.2 cm 0.1 cm   Wound Width (cm) 0.5 cm 0.1 cm   Wound Surface Area (cm^2) 0.08 cm^2 0.01 cm^2   Wound Depth (cm) 0.1 cm 0.1 cm   Wound Volume (cm^3) 0.005 cm^3 0.001 cm^3   Wound Healing % -- 80   Margins Well-defined edges Well-defined edges   Non-staged Wound Description Full thickness Full thickness   Soni-wound Assessment Dry;Edema Dry;Edema   Wound Granulation Tissue Pink;Firm --   Wound Bed Granulation (%) 100 % --   Wound Bed Epithelium (%) -- 100 %   Wound Odor None None   Tunneling? No No   Undermining? No No   Sinus Tracts? No No       No associated orders.         ASSESSMENT AND PLAN:    1. 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 Lupus vasculitis (HCC)    4. Mixed connective tissue disease (HCC)    5. Current chronic use of systemic steroids          Risks, benefits, and alternatives of current treatment plan discussed in detail.  Questions and concerns addressed. Red flags to RTC or ED reviewed.  Patient (or parent) agrees to plan.      NOTE TO PATIENT: The 21st Century Cures Act makes clinical notes like these available to patients in the interest of transparency. Clinical notes are medical documents used by physicians and care providers to communicate with each other. These documents include medical language and terminology, abbreviations, and treatment information that may sound technical and at times possibly unfamiliar. In addition, at times, the verbiage may appear blunt or direct. These documents are one tool providers use to communicate relevant information and clinical opinions of the care providers in a way that allows common understanding of the clinical context.     I spent 30minutes with the  patient. This time included:    preparing to see the patient (eg, review notes and recent diagnostics),  seeing the patient, obtaining and/or reviewing separately obtained history, performing a medically appropriate examination and/or evaluation, counseling and educating the patient, documenting in the record. Communication with rheum  DISCHARGE INSTRUCTIONS     Patient Instructions   Please return:  1 week          Patient discharge and wound care instructions  Raymond Olivo  7/17/2025            You may shower with protection of the wound (ie a cast cover or similar).     Cleansing/dressing:       In clinic, with each dressing change:    please cleanse the limb, foot, and between toes with soap, water and washcloth.   Dry thoroughly.    Cleanse/soak the wound with VASHE (or other hypochlorous wound cleanser), dab dry.    Apply the following dressings:   LEFT:   enluxtra (back on)(cut to rest posterior to the medial malleolous)>20-30mmhg calamine compression (wrap light with padding)        RIGHT:  enluxtra (back on) (cut to rest posterior to the medial malleolous)>20-30mmhg calamine compression (wrap light with padding to anterior tibia)         Right lateral:  enluxtra (back on)    Managing your edema:  Avoid prolonged standing in one place. It is better to have your calf muscles moving to pump fluid out of the legs      Elevate leg(s) above the level of the heart when sitting or as much as possible.  Take you diuretics as directed by your physician. Do not skip doses or change doses unless instructed to do so by your physician.  Decrease salt intake, follow recommended 2 grams daily.  Do not get leg(s) with compression wrap wet. If wraps are too tight as indicated by pain, numbness/tingling or discoloration of toes remove wrap completely and call the wound center @ [phone].  Refer to the \"Multi-layer compression bandage application patient information sheet\" given to you in clinic.     Nutrition and  blood sugar control:  Focus on the following:  Protein: Meats, beans, eggs, milk and yogurt particularly Greek yogurt), tofu, soy nuts, soy protein products (Follow the protein handout in your welcome folder)  Vitamin C: Citrus fruits and juices, strawberries, tomatoes, tomato juice, peppers, baked potatoes, spinach, broccoli, cauliflower, [place] sprouts, cabbage  Vitamin A: Dark green, leafy vegetables, orange or yellow vegetables, cantaloupe, fortified dairy products, liver, fortified cereals  Zinc: Fortified cereals, red meats, seafood  Consider supplementing with Justin by [employer]. It can be purchased on amazon, [employer] website, or local pharmacy may be able to order it for you.  (These are essential branch chain amino acids that help with tissue building and wound healing).   When your blood sugar is consistently elevated greater than 180 your body can't heal or fight infection.     Concerns:  Signs of infection may include the following:  Increase in redness  Red \"streaks\" from wound  Increase in swelling  Fever  Unusual odor  Change in the amount of wound drainage     Should you experience any significant changes in your wound(s) or have any questions regarding your home care instructions please contact the [facility] @ [phone] If after regular business hours, please call your family doctor or local emergency room. The treatment plan has been discussed at length between you and your provider. Follow all instructions carefully, it is very important. If you do not follow all instructions you are at risk of your wound not healing, infection, possible loss of limb and even loss of life.    Martina Mckeon FNP-C, CWCN-AP, CFCN, CSWS, WCC, DWC  7/17/2025

## 2025-07-17 ENCOUNTER — OFFICE VISIT (OUTPATIENT)
Dept: WOUND CARE | Facility: HOSPITAL | Age: 50
End: 2025-07-17
Attending: NURSE PRACTITIONER
Payer: COMMERCIAL

## 2025-07-17 VITALS
SYSTOLIC BLOOD PRESSURE: 102 MMHG | RESPIRATION RATE: 16 BRPM | HEART RATE: 83 BPM | TEMPERATURE: 98 F | DIASTOLIC BLOOD PRESSURE: 65 MMHG

## 2025-07-17 DIAGNOSIS — L97.322 NON-PRESSURE CHRONIC ULCER OF LEFT ANKLE WITH FAT LAYER EXPOSED (HCC): Primary | ICD-10-CM

## 2025-07-17 DIAGNOSIS — Z79.52 CURRENT CHRONIC USE OF SYSTEMIC STEROIDS: ICD-10-CM

## 2025-07-17 DIAGNOSIS — M35.1 MIXED CONNECTIVE TISSUE DISEASE (HCC): ICD-10-CM

## 2025-07-17 DIAGNOSIS — I77.89 LUPUS VASCULITIS (HCC): ICD-10-CM

## 2025-07-17 DIAGNOSIS — L97.812 NON-PRESSURE CHRONIC ULCER OF OTHER PART OF RIGHT LOWER LEG WITH FAT LAYER EXPOSED (HCC): ICD-10-CM

## 2025-07-17 DIAGNOSIS — M32.19 LUPUS VASCULITIS (HCC): ICD-10-CM

## 2025-07-17 PROCEDURE — 99214 OFFICE O/P EST MOD 30 MIN: CPT | Performed by: NURSE PRACTITIONER

## 2025-07-17 NOTE — PROGRESS NOTES
.Weekly Wound Education Note    Teaching Provided To: Patient  Training Topics: Dressing, Edema control, Cleasing and general instructions, Compression, Discharge instructions  Training Method: Explain/Verbal, Written  Training Response: Patient responds and understands        Notes: Wounds improving. Continue enluxtra with back on, and calamine unna boot 20-30mmHg with stacked rosidal bewteen layers over anterior leg and foot for padding.

## 2025-07-17 NOTE — PATIENT INSTRUCTIONS
Please return:  1 week          Patient discharge and wound care instructions  Raymond Olivo  7/17/2025            You may shower with protection of the wound (ie a cast cover or similar).     Cleansing/dressing:       In clinic, with each dressing change:    please cleanse the limb, foot, and between toes with soap, water and washcloth.   Dry thoroughly.    Cleanse/soak the wound with VASHE (or other hypochlorous wound cleanser), dab dry.    Apply the following dressings:   LEFT:   enluxtra (back on)(cut to rest posterior to the medial malleolous)>20-30mmhg calamine compression (wrap light with padding)        RIGHT:  enluxtra (back on) (cut to rest posterior to the medial malleolous)>20-30mmhg calamine compression (wrap light with padding to anterior tibia)         Right lateral:  enluxtra (back on)    Managing your edema:  Avoid prolonged standing in one place. It is better to have your calf muscles moving to pump fluid out of the legs      Elevate leg(s) above the level of the heart when sitting or as much as possible.  Take you diuretics as directed by your physician. Do not skip doses or change doses unless instructed to do so by your physician.  Decrease salt intake, follow recommended 2 grams daily.  Do not get leg(s) with compression wrap wet. If wraps are too tight as indicated by pain, numbness/tingling or discoloration of toes remove wrap completely and call the wound center @ [phone].  Refer to the \"Multi-layer compression bandage application patient information sheet\" given to you in clinic.     Nutrition and blood sugar control:  Focus on the following:  Protein: Meats, beans, eggs, milk and yogurt particularly Greek yogurt), tofu, soy nuts, soy protein products (Follow the protein handout in your welcome folder)  Vitamin C: Citrus fruits and juices, strawberries, tomatoes, tomato juice, peppers, baked potatoes, spinach, broccoli, cauliflower, [place] sprouts, cabbage  Vitamin A: Dark  green, leafy vegetables, orange or yellow vegetables, cantaloupe, fortified dairy products, liver, fortified cereals  Zinc: Fortified cereals, red meats, seafood  Consider supplementing with Justin by [employer]. It can be purchased on amazon, [employer] website, or local pharmacy may be able to order it for you.  (These are essential branch chain amino acids that help with tissue building and wound healing).   When your blood sugar is consistently elevated greater than 180 your body can't heal or fight infection.     Concerns:  Signs of infection may include the following:  Increase in redness  Red \"streaks\" from wound  Increase in swelling  Fever  Unusual odor  Change in the amount of wound drainage     Should you experience any significant changes in your wound(s) or have any questions regarding your home care instructions please contact the wound center [facility] @ [phone] If after regular business hours, please call your family doctor or local emergency room. The treatment plan has been discussed at length between you and your provider. Follow all instructions carefully, it is very important. If you do not follow all instructions you are at risk of your wound not healing, infection, possible loss of limb and even loss of life.

## 2025-07-24 ENCOUNTER — OFFICE VISIT (OUTPATIENT)
Dept: WOUND CARE | Facility: HOSPITAL | Age: 50
End: 2025-07-24
Attending: NURSE PRACTITIONER
Payer: COMMERCIAL

## 2025-07-24 VITALS
RESPIRATION RATE: 14 BRPM | HEART RATE: 84 BPM | DIASTOLIC BLOOD PRESSURE: 65 MMHG | TEMPERATURE: 98 F | SYSTOLIC BLOOD PRESSURE: 106 MMHG

## 2025-07-24 DIAGNOSIS — L97.322 NON-PRESSURE CHRONIC ULCER OF LEFT ANKLE WITH FAT LAYER EXPOSED (HCC): Primary | ICD-10-CM

## 2025-07-24 DIAGNOSIS — Z79.52 CURRENT CHRONIC USE OF SYSTEMIC STEROIDS: ICD-10-CM

## 2025-07-24 DIAGNOSIS — M35.1 MIXED CONNECTIVE TISSUE DISEASE (HCC): ICD-10-CM

## 2025-07-24 DIAGNOSIS — I77.89 LUPUS VASCULITIS (HCC): ICD-10-CM

## 2025-07-24 DIAGNOSIS — M32.19 LUPUS VASCULITIS (HCC): ICD-10-CM

## 2025-07-24 DIAGNOSIS — L97.812 NON-PRESSURE CHRONIC ULCER OF OTHER PART OF RIGHT LOWER LEG WITH FAT LAYER EXPOSED (HCC): ICD-10-CM

## 2025-07-24 PROCEDURE — 99214 OFFICE O/P EST MOD 30 MIN: CPT | Performed by: NURSE PRACTITIONER

## 2025-07-24 NOTE — PROGRESS NOTES
.Weekly Wound Education Note    Teaching Provided To: Patient  Training Topics: Dressing, Edema control, Cleasing and general instructions, Compression, Discharge instructions  Training Method: Explain/Verbal, Written  Training Response: Patient responds and understands        Notes: Wounds improving. Continue enluxtra (back on and cut in kidney bean shape to avoid bony areas) and calamine unna boot 20-30mmHg with stacked rosidal between layers for padding. Clobetasol to legs for rash.

## 2025-07-24 NOTE — PATIENT INSTRUCTIONS
Please return:  1 week          Patient discharge and wound care instructions  Raymond Olivo  7/24/2025              You may shower with protection of the wound (ie a cast cover or similar).     Cleansing/dressing:       In clinic, with each dressing change:    please cleanse the limb, foot, and between toes with soap, water and washcloth.   Dry thoroughly.    Cleanse/soak the wound with VASHE (or other hypochlorous wound cleanser), dab dry.    Apply the following dressings:   LEFT:   enluxtra (back on)(cut to rest posterior to the medial malleolous)>20-30mmhg calamine compression (wrap light with padding)        RIGHT:  enluxtra (back on) (cut to rest posterior to the medial malleolous)>20-30mmhg calamine compression (wrap light with padding to anterior tibia)         Right lateral:  enluxtra (back on)    Managing your edema:  Avoid prolonged standing in one place. It is better to have your calf muscles moving to pump fluid out of the legs      Elevate leg(s) above the level of the heart when sitting or as much as possible.  Take you diuretics as directed by your physician. Do not skip doses or change doses unless instructed to do so by your physician.  Decrease salt intake, follow recommended 2 grams daily.  Do not get leg(s) with compression wrap wet. If wraps are too tight as indicated by pain, numbness/tingling or discoloration of toes remove wrap completely and call the wound center @ [phone].  Refer to the \"Multi-layer compression bandage application patient information sheet\" given to you in clinic.     Nutrition and blood sugar control:  Focus on the following:  Protein: Meats, beans, eggs, milk and yogurt particularly Greek yogurt), tofu, soy nuts, soy protein products (Follow the protein handout in your welcome folder)  Vitamin C: Citrus fruits and juices, strawberries, tomatoes, tomato juice, peppers, baked potatoes, spinach, broccoli, cauliflower, [place] sprouts, cabbage  Vitamin A: Dark  green, leafy vegetables, orange or yellow vegetables, cantaloupe, fortified dairy products, liver, fortified cereals  Zinc: Fortified cereals, red meats, seafood  Consider supplementing with Justin by [employer] It can be purchased on amazon, [employer] website, or local pharmacy may be able to order it for you.  (These are essential branch chain amino acids that help with tissue building and wound healing).   When your blood sugar is consistently elevated greater than 180 your body can't heal or fight infection.     Concerns:  Signs of infection may include the following:  Increase in redness  Red \"streaks\" from wound  Increase in swelling  Fever  Unusual odor  Change in the amount of wound drainage     Should you experience any significant changes in your wound(s) or have any questions regarding your home care instructions please contact the wound center [facility] @ [phone] If after regular business hours, please call your family doctor or local emergency room. The treatment plan has been discussed at length between you and your provider. Follow all instructions carefully, it is very important. If you do not follow all instructions you are at risk of your wound not healing, infection, possible loss of limb and even loss of life.

## 2025-07-24 NOTE — PROGRESS NOTES
CHIEF COMPLAINT:     Chief Complaint   Patient presents with    Wound Recheck     Arrives with BLE in calamine unna compression. Rates pain 3/10 today. No new issues or concerns.       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 Patient saw dr. Santamaria (vascular) in December 2023 and he documented \"I explained to the patient and her  that I do not believe the source of her pain to be vascular in origin.  She has an easily palpable dorsalis pedis pulse and the location of the pain is more suggestive of a neuropathic origin.  I suspect perhaps that given the tightness of her feet from her mixed connective tissue disorder the compression stockings is causing somewhat compression of the nerves in that area and I have encouraged her to substitute those stockings to those that involve the feet up to the upper calf.\" Patient last saw dr. Farmer in February 2025 and overall doing well. She was to continue on mmf 750mg daily, prednisone 2.5 mg daily, plaquenil 200mg daily and amlodipine 2.5 mg tiwce daily along with vitamine prscription and d3.  Spouse contacted dr farmer late last week regarding a small wound on her left leg, so patient was fit into the schedule.  They have not been dressing the wound with anything, she did have spandagrip on.  There was surrounding dry drainage. Patient states she noted something last Friday and then by Monday this week she noted it was a definite ulceration.  The drainage is clear and increases when patient plantar/dorsiflexes.  Most likely a lymph vessel leaking. We discussed utilizing doxy-they will bring to next appointment. Her skin remains very dry/flaky she states she has been utilizing resta daily.  We discussed that she may need something with  ammonium lactate or urea in it.  No s/s of infection. The area is not as painful as it has been previously.     HPI PRIOR TO JULY 2025 SEE INDIVIDUAL PROGRESS NOTES  6-26-25 patient returns.  Last week we placed epifix #2 to both medial wounds after cleansing with debrimit.  She states her pain today is \"ok\".  Currently on [id number] for another week and then will reduce again.  The wounds are about the same size but tissue is improved, moisture level improved. No s/s of infection. Since patient is approved only for 4 epifix, will skip a week before applying another. Will update rheum next week regarding progress.    7-2-25 Patient returns.  We have placed 2 epifix grafts so far with a week between each placement.  She is eligable for 4 grafts.  Today wounds are the same, she states she has experienced more burning pain this week.  Patient has a new small opening on the left medial ankle.  She remains on 10mg prednisone.  At this time, will hold further CTP placements.  I will reach out to dr. Farmer regarding the stall and the increased pain. No s/s of infection. Patient and family are traveling to [place] tomorrow.    7-10-25 patient returns. I touched base with dr. Farmer and she said that we can increase her prednisone to 40mg daily for the next week and they have started the process to get rituximab approved. Patient is to get hep b vaccine. Patient has been on the increased dose of prednisone x 2 days, no change in the burning pain she experiences.  Her wounds are all stable or improved. Will continue with the enluxtra.  We also discussed the hep b vaccine and getting that process started as it is a series. No s/s of infection.    7-17-25 patient returns with spouse. She finished the 40mg of prednisone yesterday and is now down to 15.  She states her pain is much better, still has pain in lateral right.  Her wounds are significantly improved.  The right medial is measuring slightly larger, but  the bridge of epithelial is wider and is now fully dividing the wound into 2 parts.  The left medial is smaller. I will reach out to dr farmer regarding patient's improvement and next steps with the prednisone.  I again reminded patient and spouse regarding Hep b vaccine series. No s/s of infection.    7-24-25 patient returns with daughter.  Patient is needing dental extraction so is on propylactic abx.  She is also taking 30mg prednisone.  Her wounds are improved, her pain is also improved, able to tolerate cleansing today.  They have not started the hep b series yet. Will continue with enluxtra and compression wrap. Update sent to dr. Farmer. No s/s of infection  MEDICATIONS:     Current Outpatient Medications:     predniSONE 20 MG Oral Tab, Take 30mg daily x 7 days, then 20mg daily x 7 days, then 15mg daily x 7 days then update my office., Disp: 46 tablet, Rfl: 0    Ferrous Sulfate 325 (65 Fe) MG Oral Tab, Take 1 tablet (325 mg total) by mouth 2 (two) times daily with meals., Disp: 180 tablet, Rfl: 0    amLODIPine 2.5 MG Oral Tab, Take 1 tablet (2.5 mg total) by mouth 2 (two) times daily., Disp: 180 tablet, Rfl: 0    hydroxychloroquine 200 MG Oral Tab, Take 1 tablet (200 mg total) by mouth daily., Disp: 90 tablet, Rfl: 0    Mycophenolate Mofetil 500 MG Oral Tab, Take 1 tablet (500 mg total) by mouth 2 (two) times daily., Disp: 180 tablet, Rfl: 0    predniSONE 5 MG Oral Tab, Take 20mg daily x 7 days, then 15mg daily x 7 days, then 10mg daily x 7 days, then 5mg daily x 7 days., Disp: 70 tablet, Rfl: 0    gabapentin 100 MG Oral Cap, Take 1 capsule (100 mg total) by mouth nightly., Disp: 90 capsule, Rfl: 0    gentamicin 0.3 % Ophthalmic Solution, Apply 3 drops to wound twice daily, cover with petroleum gauze and cover dressing, Disp: 15 mL, Rfl: 0    doxycycline 100 MG Oral Cap, Bring capsules to wound clinic to use as directed, Disp: 5 capsule, Rfl: 0    predniSONE 2.5 MG Oral Tab, Take 1 tablet (2.5 mg total) by  mouth daily., Disp: 90 tablet, Rfl: 0    clobetasol 0.05 % External Solution, Apply topically to lower legs after shower 3 x weekly, Disp: 50 mL, Rfl: 0  ALLERGIES:   No Known Allergies   REVIEW OF SYSTEMS:   This information was obtained from the patient/family and chart.    See HPI for pertinent positives, otherwise 10 pt ROS negative.    HISTORY:   Past medical, surgical, family and social history updated where appropriate.    PHYSICAL EXAM:     Vitals:    07/24/25 0955   BP: 106/65   Pulse: 84   Resp: 14   Temp: 98.2 °F (36.8 °C)     Estimated body mass index is 21.58 kg/m² as calculated from the following:    Height as of 5/23/25: 62\".    Weight as of 5/23/25: 118 lb (53.5 kg).   No results found for: \"PGLU\"    Vital signs reviewed.Appears stated age, well groomed.    Constitutional:  Bp wnl. Pulse Regular and wnl for patient. Respirations easy and unlabored. Temperature wnl. Weight normal for height. Appearance neat and clean. Appears in no acute distress. Well nourished and well developed.    Lower extremity:  dp/pt palpable bilaterally.   lower extremities are free of varicosities and no edema, they are scarred, scattered changes in pigmentation (hypo/hyper) with atrophie jay. Capillary refill < 3 seconds. Digits are warm. toenails are wnl for color, thickness and hygeine. Skin is dry-. + hairgrowth on legs.    Musculoskeletal:  Gait and station stable   Integumentary:  refer to wound characteristics and images   Psychiatric:  Judgment and insight intact. Alert and oriented times 3. No evidence of depression, anxiety, or agitation. Calm, cooperative, and communicative, but very quiet and reserved.  EDEMA:   Calf  Point of Measurement - Left Calf: 31  Point of Measurement - Right Calf: 31  Left Calf from:: Heel  Calf Left cm:: 28  Right Calf from:: Heel  Right Calf cm:: 28.2  Ankle  Point of Measurement - Left Ankle: 10  Point of Measurement - Right Ankle: 10  Left Ankle from:: Heel  Left Ankle cm::  16.2     Right Ankle from:: Heel  Right Ankle cm:: 16.2     DIAGNOSTICS:     Lab Results   Component Value Date    BUN 9 05/16/2025    CREATSERUM 0.59 05/16/2025    GFRCKDEPI 121.37 12/17/2020    ALB 4.8 05/16/2025    TP 8.2 05/16/2025    A1C 5.1 12/17/2020     Lab Results   Component Value Date    ESRML 40 (H) 05/16/2025    ESRML 54 (H) 02/25/2025    ESRML 47 (H) 09/03/2024      Lab Results   Component Value Date    CRP <0.40 05/16/2025    CRP <0.40 02/25/2025    CRP 0.50 09/03/2024 6-5-25 Right and left ankle biopsy      May 2025 venous study-dr santamaria  \"The results showed essentially normal valves in the veins of both of your legs. This means that the cause of ulcerations is the autoimmune disease and not due to your veins\"     11-28-23 arterial duplex-dr santamaria  FINDINGS:    LEFT EXTREMITY:  Triphasic waveforms in the left common femoral, profunda, superficial femoral arteries.  Biphasic waveforms below the left knee.   OTHER:  None.     PEAK VELOCITIES (CM/S):   LEFT COMMON FEMORAL:      165    LEFT PROFUNDA FEMORAL:      78      LEFT SUPERFICIAL FEMORAL PROX:    113   LEFT SUPERFICIAL FEMORAL MID:      94   LEFT SUPERFICIAL FEMORAL DISTAL:    92   LEFT POPLITEAL PROX:      110   LEFT POPLITEAL DISTAL:      121   TIBIOPER TRUNK:      66   LEFT PTA PROX:      60   LEFT PTA MID:      62   LEFT PTA DISTAL:      73   LEFT BETH PROX:      82   LEFT BETH MID:      72    LEFT DORSALIS PEDIS::      30   LEFT GREAT TOE PRESSURE:      25 mmHg   CONCLUSION:    1. No high-grade stenosis is identified.  There is suggestion of diffuse mild stenosis throughout most of the left lower extremity.    2. Overall diminished left toe pressure is noted.  Possibility of decreased perfusion in the distal left foot is of consideration.     7-14-20 venous reflux study-dr santamaria  Per Dr. Santamaria review \"no intervention needed\"    July 2020 DIF-pathology punch biopsy  RESULTS   IgG: Grains within and around basal keratinocytes and          within cell nuclei and grains in focal superficial,         upper, and mid dermal blood vessels   IgG4:  Negative   IgM:  Grains in focal dermal blood vessels   IgA:  Focal grains within basal keratinocytes   C3:   Few grains along basement membrane zone and grains and         clumps within superficial and upper dermal blood         vessels   Fibrinogen:  3+ deposition around superficial, upper, and                mid dermal blood vessels   COMMENTS   By direct immunofluorescence, there is granular deposition of IgG   within and around basal keratinocytes and within cell nuclei. The   presence of granular IgG within the epidermis raises the   consideration of lupus erythematosus, specifically subacute   cutaneous lupus erythematosus (SCLE). The finding of granular   staining of cell nuclei may be indicative of the presence of   anti-nuclear antibodies, further lending support to the   possibility of lupus erythematosus. 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     WOUND ASSESSMENT:     Wound 04/10/25 #1 Ankle Left;Medial (Active)   Date First Assessed/Time First Assessed: 04/10/25 0757    Wound Number (Wound Clinic Only): #1  Primary Wound Type: Auto-immune  Location: Ankle  Wound Location Orientation: Left;Medial      Assessments 4/10/2025  7:57 AM 7/24/2025 10:02 AM   Wound Image         Drainage Amount Unable to assess None   Treatments Compression --   Wound Length (cm) 0.7 cm 0.5 cm   Wound Width (cm) 0.7 cm 0.5 cm   Wound Surface Area (cm^2) 0.38 cm^2 0.2 cm^2   Wound Depth (cm) 0.2 cm 0 cm   Wound Volume (cm^3) 0.051 cm^3 0 cm^3   Wound Healing % -- 100   Margins Well-defined edges Well-defined edges   Non-staged Wound Description Full thickness Full thickness   Soni-wound  Assessment Dry;Hemosiderin staining Painful;Hemosiderin staining;Dry;Dry exudate   Wound Granulation Tissue Pale Grey;Pink;Firm --   Wound Bed Granulation (%) 100 % --   Wound Bed Epithelium (%) -- 100 %   Wound Odor None None   Shape -- 100% dark spot/dry scab   Tunneling? No No   Undermining? No No   Sinus Tracts? No No       Inactive Orders   Date Order Priority Status Authorizing Provider   06/19/25 1030 Cellular tissue product application Auto-immune Left;Medial Ankle Routine Completed Martina Mckeon APRN   06/05/25 1034 Cellular tissue product application Auto-immune Left;Medial Ankle Routine Completed Martina Mckeon APRN   05/16/25 1137 Lesion biopsy Routine Completed Martian Mckeon APRN       Compression Wrap 04/10/25 Ankle Anterior;Left (Active)   Placement Date/Time: 04/10/25 0846   Location: Ankle  Wound Location Orientation: Anterior;Left      Assessments 4/10/2025  7:57 AM 7/17/2025 10:17 AM   Response to Treatment Well tolerated Well tolerated   Compression Layers Multilayer Multilayer   Compression Product Type Unna Boot Unna Boot   Dressing Applied Yes Yes   Compression Wrap Location Toes to Knee Toes to Knee   Compression Wrap Status Dry;Intact;Clean Dry;Clean       No associated orders.       Wound 05/08/25 #2 Ankle Right;Medial (Active)   Date First Assessed/Time First Assessed: 05/08/25 1056    Wound Number (Wound Clinic Only): #2  Primary Wound Type: Auto-immune  Location: Ankle  Wound Location Orientation: Right;Medial      Assessments 5/8/2025 10:58 AM 7/24/2025  9:59 AM   Wound Image       Drainage Amount None Scant   Drainage Description -- Serous;Yellow   Treatments Compression --   Wound Length (cm) 0.5 cm 1 cm   Wound Width (cm) 0.5 cm 1 cm   Wound Surface Area (cm^2) 0.2 cm^2 0.79 cm^2   Wound Depth (cm) 0 cm 0.1 cm   Wound Volume (cm^3) 0 cm^3 0.052 cm^3   Margins Undefined edges Well-defined edges   Non-staged Wound Description Full thickness Full thickness   Soni-wound Assessment  Dry Hemosiderin staining;Painful;Dry exudate   Wound Granulation Tissue -- Red;Spongy   Wound Bed Granulation (%) -- 10 %   Wound Bed Epithelium (%) -- 30 %   Wound Bed Slough (%) -- 60 %   Wound Odor None None   Shape 100% scabbed bridged   Tunneling? -- No   Undermining? -- No   Sinus Tracts? -- No       Inactive Orders   Date Order Priority Status Authorizing Provider   06/19/25 1032 Cellular tissue product application Auto-immune Right;Medial Ankle Routine Completed Martina Mckeon, [place]   06/05/25 1054 Cellular tissue product application Auto-immune Right;Medial Ankle Routine Completed Martina Mckeon, CALVIN   05/16/25 1139 Lesion biopsy Routine Completed Martina Mckeon, CALVIN       Wound 05/08/25 #3 Ankle Right;Lateral (Active)   Date First Assessed/Time First Assessed: 05/08/25 1056    Wound Number (Wound Clinic Only): #3  Primary Wound Type: Auto-immune  Location: Ankle  Wound Location Orientation: Right;Lateral      Assessments 5/8/2025 10:59 AM 7/24/2025  9:57 AM   Wound Image       Drainage Amount None Scant   Drainage Description -- Serous;Yellow   Treatments Compression --   Wound Length (cm) 0.5 cm 0.2 cm   Wound Width (cm) 0.8 cm 0.2 cm   Wound Surface Area (cm^2) 0.31 cm^2 0.03 cm^2   Wound Depth (cm) 0 cm 0.1 cm   Wound Volume (cm^3) 0 cm^3 0.002 cm^3   Margins Undefined edges Well-defined edges   Non-staged Wound Description Full thickness Full thickness   Soni-wound Assessment Dry --   Wound Odor None --   Shape 100% scabbed --       No associated orders.       Compression Wrap 05/08/25 Ankle Anterior;Right (Active)   Placement Date/Time: 05/08/25 1646   Location: Ankle  Wound Location Orientation: Anterior;Right      Assessments 5/8/2025 10:57 AM 7/17/2025 10:17 AM   Response to Treatment Well tolerated Well tolerated   Compression Layers Multilayer Multilayer   Compression Product Type Unna Boot Unna Boot   Dressing Applied Yes Yes   Compression Wrap Location Toes to Knee Toes to Knee   Compression  Wrap Status Clean;Dry;Intact Clean;Dry       No associated orders.       Wound 07/02/25 #4 Ankle Distal;Left (Active)   Date First Assessed/Time First Assessed: 07/02/25 1431    Wound Number (Wound Clinic Only): #4  Primary Wound Type: Other (comment)  Location: Ankle  Wound Location Orientation: Distal;Left      Assessments 7/2/2025  2:32 PM 7/24/2025 10:00 AM   Wound Image       Drainage Amount Scant None   Drainage Description Serosanguineous --   Treatments Compression --   Wound Length (cm) 0.2 cm 0.1 cm   Wound Width (cm) 0.5 cm 0.1 cm   Wound Surface Area (cm^2) 0.08 cm^2 0.01 cm^2   Wound Depth (cm) 0.1 cm 0 cm   Wound Volume (cm^3) 0.005 cm^3 0 cm^3   Wound Healing % -- 100   Margins Well-defined edges Well-defined edges   Non-staged Wound Description Full thickness Full thickness   Soni-wound Assessment Dry;Edema Dry;Hemosiderin staining   Wound Granulation Tissue Pink;Firm --   Wound Bed Granulation (%) 100 % --   Wound Bed Epithelium (%) -- 100 %   Wound Odor None None   Tunneling? No No   Undermining? No No   Sinus Tracts? No No       No associated orders.         ASSESSMENT AND PLAN:    1. 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 Lupus vasculitis (HCC)    4. Mixed connective tissue disease (HCC)    5. Current chronic use of systemic steroids            Risks, benefits, and alternatives of current treatment plan discussed in detail.  Questions and concerns addressed. Red flags to RTC or ED reviewed.  Patient (or parent) agrees to plan.      NOTE TO PATIENT: The 21st Century Cures Act makes clinical notes like these available to patients in the interest of transparency. Clinical notes are medical documents used by physicians and care providers to communicate with each other. These documents include medical language and terminology, abbreviations, and treatment information that  may sound technical and at times possibly unfamiliar. In addition, at times, the verbiage may appear blunt or direct. These documents are one tool providers use to communicate relevant information and clinical opinions of the care providers in a way that allows common understanding of the clinical context.     I spent 30minutes with the patient. This time included:    preparing to see the patient (eg, review notes and recent diagnostics),  seeing the patient, obtaining and/or reviewing separately obtained history, performing a medically appropriate examination and/or evaluation, counseling and educating the patient, documenting in the record. Update to rheum  DISCHARGE INSTRUCTIONS     Patient Instructions   Please return:  1 week          Patient discharge and wound care instructions  Raymond Hayward Pallavi  7/24/2025              You may shower with protection of the wound (ie a cast cover or similar).     Cleansing/dressing:       In clinic, with each dressing change:    please cleanse the limb, foot, and between toes with soap, water and washcloth.   Dry thoroughly.    Cleanse/soak the wound with VASHE (or other hypochlorous wound cleanser), dab dry.    Apply the following dressings:   LEFT:   enluxtra (back on)(cut to rest posterior to the medial malleolous)>20-30mmhg calamine compression (wrap light with padding)        RIGHT:  enluxtra (back on) (cut to rest posterior to the medial malleolous)>20-30mmhg calamine compression (wrap light with padding to anterior tibia)         Right lateral:  enluxtra (back on)    Managing your edema:  Avoid prolonged standing in one place. It is better to have your calf muscles moving to pump fluid out of the legs      Elevate leg(s) above the level of the heart when sitting or as much as possible.  Take you diuretics as directed by your physician. Do not skip doses or change doses unless instructed to do so by your physician.  Decrease salt intake, follow recommended 2 grams  daily.  Do not get leg(s) with compression wrap wet. If wraps are too tight as indicated by pain, numbness/tingling or discoloration of toes remove wrap completely and call the wound center @ [phone].  Refer to the \"Multi-layer compression bandage application patient information sheet\" given to you in clinic.     Nutrition and blood sugar control:  Focus on the following:  Protein: Meats, beans, eggs, milk and yogurt particularly Greek yogurt), tofu, soy nuts, soy protein products (Follow the protein handout in your welcome folder)  Vitamin C: Citrus fruits and juices, strawberries, tomatoes, tomato juice, peppers, baked potatoes, spinach, broccoli, cauliflower, [place] sprouts, cabbage  Vitamin A: Dark green, leafy vegetables, orange or yellow vegetables, cantaloupe, fortified dairy products, liver, fortified cereals  Zinc: Fortified cereals, red meats, seafood  Consider supplementing with Justin [employer]. It can be purchased on amazon, [employer] website, or local pharmacy may be able to order it for you.  (These are essential branch chain amino acids that help with tissue building and wound healing).   When your blood sugar is consistently elevated greater than 180 your body can't heal or fight infection.     Concerns:  Signs of infection may include the following:  Increase in redness  Red \"streaks\" from wound  Increase in swelling  Fever  Unusual odor  Change in the amount of wound drainage     Should you experience any significant changes in your wound(s) or have any questions regarding your home care instructions please contact the wound center [facility] @ [phone] If after regular business hours, please call your family doctor or local emergency room. The treatment plan has been discussed at length between you and your provider. Follow all instructions carefully, it is very important. If you do not follow all instructions you are at risk of your wound not healing, infection, possible loss of  limb and even loss of life.    Martina Mckeon FNP-C, CWCN-AP, CFCN, CSWS, WCC, DWC  7/24/2025

## 2025-07-31 ENCOUNTER — OFFICE VISIT (OUTPATIENT)
Dept: WOUND CARE | Facility: HOSPITAL | Age: 50
End: 2025-07-31
Attending: NURSE PRACTITIONER
Payer: COMMERCIAL

## 2025-07-31 VITALS
DIASTOLIC BLOOD PRESSURE: 70 MMHG | TEMPERATURE: 98 F | RESPIRATION RATE: 14 BRPM | SYSTOLIC BLOOD PRESSURE: 107 MMHG | HEART RATE: 83 BPM

## 2025-07-31 DIAGNOSIS — M35.1 MIXED CONNECTIVE TISSUE DISEASE (HCC): ICD-10-CM

## 2025-07-31 DIAGNOSIS — L97.322 NON-PRESSURE CHRONIC ULCER OF LEFT ANKLE WITH FAT LAYER EXPOSED (HCC): Primary | ICD-10-CM

## 2025-07-31 DIAGNOSIS — M32.19 LUPUS VASCULITIS (HCC): ICD-10-CM

## 2025-07-31 DIAGNOSIS — Z79.52 CURRENT CHRONIC USE OF SYSTEMIC STEROIDS: ICD-10-CM

## 2025-07-31 DIAGNOSIS — I77.89 LUPUS VASCULITIS (HCC): ICD-10-CM

## 2025-07-31 DIAGNOSIS — L97.812 NON-PRESSURE CHRONIC ULCER OF OTHER PART OF RIGHT LOWER LEG WITH FAT LAYER EXPOSED (HCC): ICD-10-CM

## 2025-07-31 PROCEDURE — 99213 OFFICE O/P EST LOW 20 MIN: CPT | Performed by: NURSE PRACTITIONER

## 2025-08-03 DIAGNOSIS — M35.1 MIXED CONNECTIVE TISSUE DISEASE (HCC): ICD-10-CM

## 2025-08-03 DIAGNOSIS — M32.19 LUPUS VASCULITIS (HCC): ICD-10-CM

## 2025-08-03 DIAGNOSIS — I77.89 LUPUS VASCULITIS (HCC): ICD-10-CM

## 2025-08-04 RX ORDER — MYCOPHENOLATE MOFETIL 500 MG/1
750 TABLET ORAL DAILY
Qty: 135 TABLET | Refills: 0 | Status: SHIPPED | OUTPATIENT
Start: 2025-08-04

## 2025-08-07 ENCOUNTER — OFFICE VISIT (OUTPATIENT)
Dept: WOUND CARE | Facility: HOSPITAL | Age: 50
End: 2025-08-07
Attending: NURSE PRACTITIONER

## 2025-08-07 VITALS
RESPIRATION RATE: 15 BRPM | SYSTOLIC BLOOD PRESSURE: 100 MMHG | TEMPERATURE: 98 F | DIASTOLIC BLOOD PRESSURE: 64 MMHG | HEART RATE: 94 BPM

## 2025-08-07 DIAGNOSIS — M32.19 LUPUS VASCULITIS (HCC): ICD-10-CM

## 2025-08-07 DIAGNOSIS — L97.322 NON-PRESSURE CHRONIC ULCER OF LEFT ANKLE WITH FAT LAYER EXPOSED (HCC): Primary | ICD-10-CM

## 2025-08-07 DIAGNOSIS — L97.812 NON-PRESSURE CHRONIC ULCER OF OTHER PART OF RIGHT LOWER LEG WITH FAT LAYER EXPOSED (HCC): ICD-10-CM

## 2025-08-07 DIAGNOSIS — M35.1 MIXED CONNECTIVE TISSUE DISEASE (HCC): ICD-10-CM

## 2025-08-07 DIAGNOSIS — Z79.52 CURRENT CHRONIC USE OF SYSTEMIC STEROIDS: ICD-10-CM

## 2025-08-07 DIAGNOSIS — I87.2 VENOUS INSUFFICIENCY OF BOTH LOWER EXTREMITIES: ICD-10-CM

## 2025-08-07 DIAGNOSIS — I77.89 LUPUS VASCULITIS (HCC): ICD-10-CM

## 2025-08-07 PROCEDURE — 99213 OFFICE O/P EST LOW 20 MIN: CPT | Performed by: NURSE PRACTITIONER

## 2025-08-14 ENCOUNTER — OFFICE VISIT (OUTPATIENT)
Dept: WOUND CARE | Facility: HOSPITAL | Age: 50
End: 2025-08-14
Attending: NURSE PRACTITIONER

## 2025-08-14 VITALS
HEART RATE: 90 BPM | RESPIRATION RATE: 15 BRPM | TEMPERATURE: 98 F | DIASTOLIC BLOOD PRESSURE: 71 MMHG | SYSTOLIC BLOOD PRESSURE: 108 MMHG

## 2025-08-14 DIAGNOSIS — I87.2 VENOUS INSUFFICIENCY OF BOTH LOWER EXTREMITIES: ICD-10-CM

## 2025-08-14 DIAGNOSIS — I77.89 LUPUS VASCULITIS (HCC): ICD-10-CM

## 2025-08-14 DIAGNOSIS — M35.1 MIXED CONNECTIVE TISSUE DISEASE (HCC): ICD-10-CM

## 2025-08-14 DIAGNOSIS — Z79.52 CURRENT CHRONIC USE OF SYSTEMIC STEROIDS: ICD-10-CM

## 2025-08-14 DIAGNOSIS — M32.19 LUPUS VASCULITIS (HCC): ICD-10-CM

## 2025-08-14 DIAGNOSIS — L97.812 NON-PRESSURE CHRONIC ULCER OF OTHER PART OF RIGHT LOWER LEG WITH FAT LAYER EXPOSED (HCC): ICD-10-CM

## 2025-08-14 DIAGNOSIS — L97.322 NON-PRESSURE CHRONIC ULCER OF LEFT ANKLE WITH FAT LAYER EXPOSED (HCC): Primary | ICD-10-CM

## 2025-08-14 PROCEDURE — 99215 OFFICE O/P EST HI 40 MIN: CPT | Performed by: NURSE PRACTITIONER

## 2025-08-21 ENCOUNTER — LAB ENCOUNTER (OUTPATIENT)
Dept: LAB | Facility: HOSPITAL | Age: 50
End: 2025-08-21
Attending: INTERNAL MEDICINE

## 2025-08-21 ENCOUNTER — APPOINTMENT (OUTPATIENT)
Dept: WOUND CARE | Facility: HOSPITAL | Age: 50
End: 2025-08-21
Attending: NURSE PRACTITIONER

## 2025-08-21 DIAGNOSIS — Z79.899 IMMUNOCOMPROMISED STATE DUE TO DRUG THERAPY (HCC): ICD-10-CM

## 2025-08-21 DIAGNOSIS — M35.1 MIXED CONNECTIVE TISSUE DISEASE (HCC): ICD-10-CM

## 2025-08-21 DIAGNOSIS — I77.89 LUPUS VASCULITIS (HCC): ICD-10-CM

## 2025-08-21 DIAGNOSIS — M32.19 LUPUS VASCULITIS (HCC): ICD-10-CM

## 2025-08-21 DIAGNOSIS — D84.821 IMMUNOCOMPROMISED STATE DUE TO DRUG THERAPY (HCC): ICD-10-CM

## 2025-08-21 DIAGNOSIS — Z79.899 HIGH RISK MEDICATION USE: ICD-10-CM

## 2025-08-21 LAB
ALBUMIN SERPL-MCNC: 4.6 G/DL (ref 3.2–4.8)
ALBUMIN/GLOB SERPL: 1.6 (ref 1–2)
ALP LIVER SERPL-CCNC: 68 U/L (ref 39–100)
ALT SERPL-CCNC: 12 U/L (ref 10–49)
ANION GAP SERPL CALC-SCNC: 9 MMOL/L (ref 0–18)
AST SERPL-CCNC: 22 U/L (ref ?–34)
BASOPHILS # BLD AUTO: 0.01 X10(3) UL (ref 0–0.2)
BASOPHILS NFR BLD AUTO: 0.2 %
BILIRUB SERPL-MCNC: 0.6 MG/DL (ref 0.3–1.2)
BUN BLD-MCNC: 7 MG/DL (ref 9–23)
C3 SERPL-MCNC: 129.8 MG/DL (ref 90–170)
C4 SERPL-MCNC: 26.8 MG/DL (ref 12–36)
CALCIUM BLD-MCNC: 10 MG/DL (ref 8.7–10.6)
CHLORIDE SERPL-SCNC: 103 MMOL/L (ref 98–112)
CO2 SERPL-SCNC: 29 MMOL/L (ref 21–32)
CREAT BLD-MCNC: 0.7 MG/DL (ref 0.55–1.02)
CRP SERPL-MCNC: <0.5 MG/DL (ref ?–0.5)
EGFRCR SERPLBLD CKD-EPI 2021: 106 ML/MIN/1.73M2 (ref 60–?)
EOSINOPHIL # BLD AUTO: 0 X10(3) UL (ref 0–0.7)
EOSINOPHIL NFR BLD AUTO: 0 %
ERYTHROCYTE [DISTWIDTH] IN BLOOD BY AUTOMATED COUNT: 13.5 %
ERYTHROCYTE [SEDIMENTATION RATE] IN BLOOD: 27 MM/HR (ref 0–20)
FASTING STATUS PATIENT QL REPORTED: NO
GLOBULIN PLAS-MCNC: 2.8 G/DL (ref 2–3.5)
GLUCOSE BLD-MCNC: 126 MG/DL (ref 70–99)
HCT VFR BLD AUTO: 41.8 % (ref 35–48)
HGB BLD-MCNC: 13.8 G/DL (ref 12–16)
IMM GRANULOCYTES # BLD AUTO: 0.02 X10(3) UL (ref 0–1)
IMM GRANULOCYTES NFR BLD: 0.4 %
LYMPHOCYTES # BLD AUTO: 0.32 X10(3) UL (ref 1–4)
LYMPHOCYTES NFR BLD AUTO: 5.7 %
MCH RBC QN AUTO: 29.6 PG (ref 26–34)
MCHC RBC AUTO-ENTMCNC: 33 G/DL (ref 31–37)
MCV RBC AUTO: 89.7 FL (ref 80–100)
MONOCYTES # BLD AUTO: 0.13 X10(3) UL (ref 0.1–1)
MONOCYTES NFR BLD AUTO: 2.3 %
NEUTROPHILS # BLD AUTO: 5.13 X10 (3) UL (ref 1.5–7.7)
NEUTROPHILS # BLD AUTO: 5.13 X10(3) UL (ref 1.5–7.7)
NEUTROPHILS NFR BLD AUTO: 91.4 %
OSMOLALITY SERPL CALC.SUM OF ELEC: 292 MOSM/KG (ref 275–295)
PLATELET # BLD AUTO: 202 10(3)UL (ref 150–450)
POTASSIUM SERPL-SCNC: 3.7 MMOL/L (ref 3.5–5.1)
PROT SERPL-MCNC: 7.4 G/DL (ref 5.7–8.2)
RBC # BLD AUTO: 4.66 X10(6)UL (ref 3.8–5.3)
SODIUM SERPL-SCNC: 141 MMOL/L (ref 136–145)
WBC # BLD AUTO: 5.6 X10(3) UL (ref 4–11)

## 2025-08-21 PROCEDURE — 86140 C-REACTIVE PROTEIN: CPT

## 2025-08-21 PROCEDURE — 80053 COMPREHEN METABOLIC PANEL: CPT

## 2025-08-21 PROCEDURE — 36415 COLL VENOUS BLD VENIPUNCTURE: CPT

## 2025-08-21 PROCEDURE — 86160 COMPLEMENT ANTIGEN: CPT

## 2025-08-21 PROCEDURE — 85652 RBC SED RATE AUTOMATED: CPT

## 2025-08-21 PROCEDURE — 85025 COMPLETE CBC W/AUTO DIFF WBC: CPT

## 2025-08-22 ENCOUNTER — OFFICE VISIT (OUTPATIENT)
Dept: RHEUMATOLOGY | Facility: CLINIC | Age: 50
End: 2025-08-22

## 2025-08-22 VITALS
RESPIRATION RATE: 16 BRPM | BODY MASS INDEX: 21.71 KG/M2 | SYSTOLIC BLOOD PRESSURE: 92 MMHG | HEART RATE: 67 BPM | OXYGEN SATURATION: 99 % | DIASTOLIC BLOOD PRESSURE: 58 MMHG | TEMPERATURE: 97 F | WEIGHT: 118 LBS | HEIGHT: 62 IN

## 2025-08-22 DIAGNOSIS — L97.912 ULCER OF RIGHT LOWER EXTREMITY WITH FAT LAYER EXPOSED (HCC): Primary | ICD-10-CM

## 2025-08-22 DIAGNOSIS — D84.821 IMMUNOCOMPROMISED STATE DUE TO DRUG THERAPY (HCC): ICD-10-CM

## 2025-08-22 DIAGNOSIS — Z79.899 LONG-TERM USE OF PLAQUENIL: ICD-10-CM

## 2025-08-22 DIAGNOSIS — M35.1 MIXED CONNECTIVE TISSUE DISEASE (HCC): ICD-10-CM

## 2025-08-22 DIAGNOSIS — Z86.2 HISTORY OF ITP: ICD-10-CM

## 2025-08-22 DIAGNOSIS — L97.922 ULCER OF LEFT LOWER EXTREMITY WITH FAT LAYER EXPOSED (HCC): ICD-10-CM

## 2025-08-22 DIAGNOSIS — Z79.899 IMMUNOCOMPROMISED STATE DUE TO DRUG THERAPY (HCC): ICD-10-CM

## 2025-08-22 DIAGNOSIS — Z79.52 LONG TERM (CURRENT) USE OF SYSTEMIC STEROIDS: ICD-10-CM

## 2025-08-22 DIAGNOSIS — I77.89 LUPUS VASCULITIS (HCC): ICD-10-CM

## 2025-08-22 DIAGNOSIS — Z87.39 HISTORY OF BURNING PAIN IN LEG: ICD-10-CM

## 2025-08-22 DIAGNOSIS — Z13.820 SCREENING FOR OSTEOPOROSIS: ICD-10-CM

## 2025-08-22 DIAGNOSIS — I73.01 RAYNAUD'S DISEASE WITH GANGRENE (HCC): ICD-10-CM

## 2025-08-22 DIAGNOSIS — M32.19 LUPUS VASCULITIS (HCC): ICD-10-CM

## 2025-08-22 PROCEDURE — 99215 OFFICE O/P EST HI 40 MIN: CPT | Performed by: INTERNAL MEDICINE

## 2025-08-22 RX ORDER — MYCOPHENOLATE MOFETIL 500 MG/1
500 TABLET ORAL 3 TIMES DAILY
Qty: 270 TABLET | Refills: 0 | Status: SHIPPED | OUTPATIENT
Start: 2025-08-22

## 2025-08-22 RX ORDER — AMLODIPINE BESYLATE 2.5 MG/1
2.5 TABLET ORAL 2 TIMES DAILY
Qty: 180 TABLET | Refills: 0 | Status: SHIPPED | OUTPATIENT
Start: 2025-08-22

## 2025-08-22 RX ORDER — PREDNISONE 5 MG/1
15 TABLET ORAL DAILY
Qty: 270 TABLET | Refills: 0 | Status: SHIPPED | OUTPATIENT
Start: 2025-08-22 | End: 2025-11-20

## 2025-08-27 ENCOUNTER — TELEPHONE (OUTPATIENT)
Facility: CLINIC | Age: 50
End: 2025-08-27

## 2025-08-27 DIAGNOSIS — M32.19 LUPUS VASCULITIS (HCC): Primary | ICD-10-CM

## 2025-08-27 DIAGNOSIS — I77.89 LUPUS VASCULITIS (HCC): Primary | ICD-10-CM

## 2025-08-27 DIAGNOSIS — D69.3 IDIOPATHIC THROMBOCYTOPENIC PURPURA (ITP) (HCC): ICD-10-CM

## 2025-08-28 ENCOUNTER — OFFICE VISIT (OUTPATIENT)
Dept: WOUND CARE | Facility: HOSPITAL | Age: 50
End: 2025-08-28
Attending: NURSE PRACTITIONER

## 2025-08-28 ENCOUNTER — APPOINTMENT (OUTPATIENT)
Dept: WOUND CARE | Facility: HOSPITAL | Age: 50
End: 2025-08-28
Attending: NURSE PRACTITIONER

## 2025-08-28 VITALS
TEMPERATURE: 98 F | RESPIRATION RATE: 14 BRPM | DIASTOLIC BLOOD PRESSURE: 62 MMHG | HEART RATE: 87 BPM | SYSTOLIC BLOOD PRESSURE: 109 MMHG

## 2025-08-28 DIAGNOSIS — L97.812 NON-PRESSURE CHRONIC ULCER OF OTHER PART OF RIGHT LOWER LEG WITH FAT LAYER EXPOSED (HCC): ICD-10-CM

## 2025-08-28 DIAGNOSIS — Z79.52 CURRENT CHRONIC USE OF SYSTEMIC STEROIDS: ICD-10-CM

## 2025-08-28 DIAGNOSIS — L97.322 NON-PRESSURE CHRONIC ULCER OF LEFT ANKLE WITH FAT LAYER EXPOSED (HCC): Primary | ICD-10-CM

## 2025-08-28 DIAGNOSIS — M32.19 LUPUS VASCULITIS (HCC): ICD-10-CM

## 2025-08-28 DIAGNOSIS — M35.1 MIXED CONNECTIVE TISSUE DISEASE (HCC): ICD-10-CM

## 2025-08-28 DIAGNOSIS — I77.89 LUPUS VASCULITIS (HCC): ICD-10-CM

## 2025-08-28 PROCEDURE — 99214 OFFICE O/P EST MOD 30 MIN: CPT | Performed by: NURSE PRACTITIONER

## 2025-08-29 RX ORDER — RITUXIMAB-PVVR 100 MG/10ML
375 INJECTION, SOLUTION INTRAVENOUS AS DIRECTED
Qty: 6 EACH | Refills: 0 | Status: SHIPPED | OUTPATIENT
Start: 2025-08-29

## (undated) DIAGNOSIS — M35.1 MIXED CONNECTIVE TISSUE DISEASE (HCC): Primary | ICD-10-CM

## (undated) NOTE — Clinical Note
Please call and provide info for Dr. Dominique Marcos dermatology.      Odilon Pink DO  [facility]  2/16/2021

## (undated) NOTE — Clinical Note
Martina Coffey! Thank you for referring Ms. Raymond Olivo for rheumatologic evaluation. Please see the discussion portion of my note and let me know if you have any questions.  JOSE ALBERTO Rold[facility]2/4/2019

## (undated) NOTE — Clinical Note
Please check if PA required for ECHO/PFTs and let pt/pt's  know when able to schedule.    Luke Pineda, DO [facility] 10/26/2022

## (undated) NOTE — MR AVS SNAPSHOT
After Visit Summary   7/8/2020    Ilan Wheatley    MRN: [id number]           Visit Information     Date & Time  7/8/2020  4:00 PM Provider  [facility]  Phone  [phone]      Your Vitals Were     LMP If you receive a survey from [facility], please take a few minutes to complete it and provide feedback. We strive to deliver the best patient experience and are looking for ways to make improvements. Your feedback will help us do so.  For more information EMERGENCY ROOM Life-threatening emergencies needing immediate intervention at a hospital emergency room.  Average cost  $2,300*   *Cost varies based on your insurance coverage  For more information about hours, locations or appointment options available at

## (undated) NOTE — Clinical Note
Alexx Haq! Please see update on Raymond.  She's doing well from a rheum standpoint at this time    Luke Pineda, DO  [facility]  4/28/2022

## (undated) NOTE — LETTER
Date: 6/12/2024  Patient name: Raymond Olivo  YOB: 1975  Medical Record Number: [id number]  Primary Coverage: Payor: [facility] [place] PPO / Plan: BCBS PPO / Product Type: PPO /   Secondary Coverage:   Insurance ID: [id number]  Patient Address: [address] [place] [address]   [place] [address]  Telephone Information:   Home Phone [phone]   Mobile [phone]         Encounter Date: 6/12/2024  Provider: Shabnam Adrian MD  Diagnosis:     ICD-10-CM   1. 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 Idiopathic chronic venous hypertension of both lower extremities with ulcer and inflammation (HCC)  I87.333   3. Non-pressure chronic ulcer of left ankle with fat layer exposed (HCC)  L97.322   4. Lupus vasculitis (HCC)  M32.19    I77.89   5. Mixed connective tissue disease (HCC)  M35.1   6. Current chronic use of systemic steroids  Z79.52       Progress Note:  [facility] PROGRESS NOTE  SHABNAM ADRIAN MD  6/12/2024    Chief Complaint:   Chief Complaint   Patient presents with    Wound Care     Patient is here for a wound care follow up. She complains of pain that is 5/10.        HPI:   Subjective  Raymond Olivo is a 49 year old female coming in for a follow-up visit.    HPI    COVERING NIRANJAN NP  CHART REVIEWED IN DETAIL.     Wounds stable - slightly improved.   Still  with large amount of nonviable tissue / slough wound base.   Pain better but still has burning all the time - more at night  Takes gabapentin 200 gm at bedtime through rheum - dr. Stewart    No s/o active infection now.     Does not tolerate compression wraps well due to pain - discussed possibility of increasing gabapentin dose so that wraps maybe better tolerated.     Will run insurance PA for skin subs.     Review of Systems  Negative except HPI   Denies chest pain / SOB / palpitations  Denies fever.     Allergies  No Known Allergies    Current Meds:  Current Outpatient Medications    Medication Sig Dispense Refill    predniSONE 5 MG Oral Tab Take 20mg daily for 5 days then update office. 180 tablet 0    gabapentin 100 MG Oral Cap Take 1 capsule (100 mg total) by mouth nightly.      amLODIPine 2.5 MG Oral Tab Take 1 tablet (2.5 mg total) by mouth 2 (two) times daily. 180 tablet 0    hydroxychloroquine 200 MG Oral Tab Take 1 tablet (200 mg total) by mouth daily. 90 tablet 0    Ferrous Sulfate 325 (65 Fe) MG Oral Tab Take 1 tablet (325 mg total) by mouth 2 (two) times daily with meals. 180 tablet 0    Mycophenolate Mofetil 500 MG Oral Tab Take 1 tablet (500 mg total) by mouth daily. 90 tablet 0         EXAM:   Objective  Objective    Physical Exam    Vital Signs  Vitals:    06/12/24 1008   BP: 105/69   Pulse: 86   Resp: 14   Temp: 98.2 °F (36.8 °C)       Wound Assessment  Wound 04/26/24 #1 Left Medial Ankle Ankle Left;Medial (Active)   Wound Image    06/12/24 1006   Drainage Amount Moderate 06/12/24 1006   Drainage Description Serous;Yellow 06/12/24 1006   Treatments Compression 06/04/24 0947   Wound Length (cm) 7.1 cm 06/12/24 1006   Wound Width (cm) 11.2 cm 06/12/24 1006   Wound Surface Area (cm^2) 79.52 cm^2 06/12/24 1006   Wound Depth (cm) 0.1 cm 06/12/24 1006   Wound Volume (cm^3) 7.952 cm^3 06/12/24 1006   Wound Healing % -394 06/12/24 1006   Margins Well-defined edges 06/12/24 1006   Non-staged Wound Description Full thickness 06/12/24 1006   Soni-wound Assessment Dry;Edema;Non-blanchable erythema 06/12/24 1006   Wound Granulation Tissue Pink;Firm 06/12/24 1006   Wound Bed Granulation (%) 20 % 06/12/24 1006   Wound Bed Epithelium (%) 30 % 06/12/24 1006   Wound Bed Slough (%) 50 % 06/12/24 1006   Wound Odor None 06/12/24 1006   Shape 100% scabbed 04/26/24 1034   Tunneling? No 05/28/24 0947   Undermining? No 05/28/24 0947   Sinus Tracts? No 05/28/24 0947       Wound 04/26/24 #2 Right Lateral Ankle Ankle Right;Lateral (Active)   Wound Image   06/12/24 1004   Drainage Amount Scant 06/12/24  1004   Drainage Description Serosanguineous 06/12/24 1004   Treatments Compression 06/04/24 0944   Wound Length (cm) 2 cm 06/12/24 1004   Wound Width (cm) 1.8 cm 06/12/24 1004   Wound Surface Area (cm^2) 3.6 cm^2 06/12/24 1004   Wound Depth (cm) 0.1 cm 06/12/24 1004   Wound Volume (cm^3) 0.36 cm^3 06/12/24 1004   Wound Healing % -929 06/12/24 1004   Margins Well-defined edges 06/12/24 1004   Non-staged Wound Description Full thickness 06/12/24 1004   Soni-wound Assessment Edema;Atrophy jay;Hemosiderin staining;Dry 06/12/24 1004   Wound Granulation Tissue Firm;Pink 06/12/24 1004   Wound Bed Granulation (%) 30 % 06/12/24 1004   Wound Bed Epithelium (%) 2 % 06/04/24 0944   Wound Bed Slough (%) 60 % 06/12/24 1004   Wound Odor None 06/12/24 1004   Shape 10% scabbed 06/12/24 1004   Tunneling? No 05/28/24 0945   Undermining? No 05/28/24 0945   Sinus Tracts? No 05/28/24 0945       Compression Wrap 05/21/24 Ankle Anterior;Left (Active)   Response to Treatment Well tolerated 06/04/24 0944   Compression Layers Multilayer 06/04/24 0944   Compression Product Type Unna Boot 06/04/24 0944   Dressing Applied Yes 06/04/24 0944   Compression Wrap Location Toes to Knee 06/04/24 0944   Compression Wrap Status Clean;Dry;Intact 06/04/24 0944       Compression Wrap 05/28/24 Ankle Anterior;Right (Active)   Response to Treatment Well tolerated 06/04/24 0944   Compression Layers Multilayer 06/04/24 0944   Compression Product Type Unna Boot 06/04/24 0944   Dressing Applied Yes 06/04/24 0944   Compression Wrap Location Toes to Knee 06/04/24 0944   Compression Wrap Status Dry;Intact;Clean 06/04/24 0944           ASSESSMENT AND PLAN:     Assessment  Assessment    Encounter Diagnosis  1. 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 Idiopathic chronic venous hypertension of both lower extremities with ulcer and inflammation (HCC)    3. Non-pressure chronic ulcer of left ankle with fat layer exposed (HCC)    4.  Lupus vasculitis (HCC)    5. Mixed connective tissue disease (HCC)    6. Current chronic use of systemic steroids        Problem List  Patient Active Problem List   Diagnosis    Mixed connective tissue disease (HCC)    Raynaud's disease without gangrene    High risk medication use    Raynaud's disease    Current chronic use of systemic steroids    Leukopenia    Ribonucleoprotein antibody positive    Positive WELLINGTON (antinuclear antibody)    Idiopathic thrombocytopenic purpura (ITP) (HCC)    Anemia    B12 deficiency    Long-term use of Plaquenil    Long term (current) use of systemic steroids    Iron deficiency    Lupus vasculitis (HCC)         MANAGEMENT      Start collagen right ankle wound.   Increase dose of gabapentin  Increase compression as tolerated  Leg elevation.   Low salt diet.   PA for skin subs.   D/w  and daughter in detail.   Return one week    PROCEDURES:    Compression wrap application.     Patient Instructions     Consider increase gabapentin to TID PRN due to persistent burning pain - through Dr. Stewart    RETURN:  June 19th with Dr. Aguilar  June 25th with Martina    Patient discharge and wound care instructions  Raymond Olivo      You may shower with protection of the wound (ie a cast cover or similar).     Changing your dressing:           Wash your hands with soap and water.  Ensure that the old dressing is removed completely. Place it in a plastic bag and throw it in the trash.  Cleanse the wound with hypochlorous wound cleanser (ie. Anasept, vashe, pure and clean) .  It's ok to “scrub” your wound with the gauze, small amount of bleeding with cleansing is normal and ok.  Apply the following dressings:   Right:   JAVON / enluxtra with back off>kerramax>20-30mm hg wrap  Left:   enluxtra with back off>kerramax >20-30mmhg VERY light wrap    Nutrition and blood sugar control:  Focus on the following:  Protein: Meats, beans, eggs, milk and yogurt particularly Greek yogurt), tofu, soy  nuts, soy protein products (Follow the protein handout in your welcome folder)  Vitamin C: Citrus fruits and juices, strawberries, tomatoes, tomato juice, peppers, baked potatoes, spinach, broccoli, cauliflower, [place] sprouts, cabbage  Vitamin A: Dark green, leafy vegetables, orange or yellow vegetables, cantaloupe, fortified dairy products, liver, fortified cereals  Zinc: Fortified cereals, red meats, seafood  Consider supplementing with Justin by [employer]. It can be purchased on amazon, [employer] website, or local pharmacy may be able to order it for you.  (These are essential branch chain amino acids that help with tissue building and wound healing).     Concerns:  Signs of infection may include the following:  Increase in redness  Red \"streaks\" from wound  Increase in swelling  Fever  Unusual odor  Change in the amount of wound drainage     Should you experience any significant changes in your wound(s) or have any questions regarding your home care instructions please contact the [facility] @ [phone] If after regular business hours, please call your family doctor or local emergency room. The treatment plan has been discussed at length between you and your provider. Follow all instructions carefully, it is very important. If you do not follow all instructions you are at risk of your wound not healing, infection, possible loss of limb and even loss of life.           Patient/Caregiver Education: There are no barriers to learning. Medical education for above diagnosis given.   Answered all questions.    Outcome: Patient verbalizes understanding. Patient is notified to call with any questions, complications, allergies, or worsening or changing symptoms.  Patient is to call with any side effects or complications as a result of the treatments today.      DOCUMENTATION OF TIME SPENT: Code selection for this visit was based on time spent : 40 min on date of service in preparing to see the  patient, obtaining and/or reviewing separately obtained history, performing a medically appropriate examination, counseling and educating the patient/family/caregiver, ordering medications or testing, referring and communicating with other healthcare providers, documenting clinical information in the E HR, independently interpreting results and communicating results to the patient/family/caregiver and care coordination with the patient's other providers.    Followup: Return in about 1 week (around 6/19/2024) for Wound followup.      Note to Patient:  The 21st Century Cures Act makes medical notes like these available to patients in the interest of transparency. However, be advised this is a medical document and is intended as [id number] communication; it is written in medical language and may appear blunt, direct, or contain abbreviations or verbiage that are unfamiliar. Medical documents are intended to carry relevant information, facts as evident, and the clinical opinion of the practitioner.    Also, please note that this report has been produced using speech recognition software and may contain errors related to that system including, but not limited to, errors in grammar, punctuation, and spelling, as well as words and phrases that possibly may have been recognized inappropriately.  If there are any questions or concerns, contact the dictating provider for clarification.      Kaz Aguilar MD  6/12/2024  10:20 AM                   Weekly Wound Education Note    Teaching Provided To: Patient;Family  Training Topics: Cleasing and general instructions;Compression;Discharge instructions;Dressing;Edema control  Training Method: Explain/Verbal  Training Response: Patient responds and understands;Reinforcement needed        Notes: Stable. Vashe soak prior to dressing application. Right ankle: gina and enluxtra. Left ankle: enluxtra (backing removed) and kerramax. Unna boot 20-30mmhg BLE. Running insurance  for skin subs. Ordered polymem silver MAX from [facility] - pt/family advised to bring to next visit.        Wound Information/Order:  Wound Number: All wounds  Product: non-bordered Polymem silver MAX  Dispense: 30 days  Dressing Frequency:Change dressing 2x per week    Was a Debridement performed: Yes, Debridement type: mechanical    Compression Stockings ordered: No    Notes: non-bordered Polymem silver MAX - dispense as written. For both wounds.

## (undated) NOTE — Clinical Note
IRAM Henson! Hope you are doing well! Sorry, my note's not done yet for Raymond. But I was wondering if you think she would benefit from an iron infusion. Her iron levels are still low but her ferritin is normal. She can only tolerate taking one ferrous sulfate daily. If you think she would benefit from an infusion, I can send her your way.  Thanks in advance! - Alisha

## (undated) NOTE — LETTER
04/27/20        Raymond Olivo  [address] [place] [id number]      Dear Dominique Glez records indicate that you have outstanding lab work and or testing that was ordered for you and has not yet been completed:  No orders of the defined types w

## (undated) NOTE — LETTER
Date: 10/1/2024  Patient name: Raymond Olivo  YOB: 1975  Medical Record Number: [id number]  Primary Coverage: Payor: [facility] [place] PPO / Plan: BCBS PPO / Product Type: PPO /   Secondary Coverage:   Insurance ID: [id number]  Patient Address: [address] [place] [address]   [place] [address]  Telephone Information:   Home Phone [phone]   Mobile [phone]         Encounter Date: 10/1/2024  Provider: CALVIN Ibrahim  Diagnosis:     ICD-10-CM   1. 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 Non-pressure chronic ulcer of left ankle with fat layer exposed (HCC)  L97.322   3. Idiopathic chronic venous hypertension of both lower extremities with ulcer and inflammation (HCC)  I87.333   4. Lupus vasculitis (HCC)  M32.19    I77.89   5. Mixed connective tissue disease (HCC)  M35.1   6. Current chronic use of systemic steroids  Z79.52       Progress Note:  CHIEF COMPLAINT:     Chief Complaint   Patient presents with    Wound Care     Patient is here for a follow up visit for wounds to both legs.     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 Patient saw dr. Santamaria (vascular) in December 2023 and he documented \"I explained to the patient and her  that I do not believe the source of her pain to be vascular in origin.  She has an easily palpable dorsalis pedis pulse and the location of the pain is more suggestive of a neuropathic origin.  I suspect perhaps that given the tightness of her feet from her mixed connective tissue disorder the compression stockings is causing somewhat compression of the nerves in that area and I have encouraged her to substitute those stockings to those that involve the feet up to the upper calf.\" Patient/spouse have been in  contact with dr. Farmer and in march 2024 reported a dark spot/ulcer on her bilateral lower legs, she was advised to make an appointment with wound care as well as get her blood work done ordered in January 2024 so that further treatment plan could be extablished.  The blood work did show increased inflammation and she was advised to increase her prednisone to 20mg x 5 days (which spouse reports they did do) and take the amlodipine 2.5 mg-which she states she is doing.  They have been leaving the areas open to air and she has not been wearing compression.  She c/o burning sensation in her ankle area.  She was started on gabapentin by dr. Farmer last year, however they stopped it because it was making her too sleepy. We discussed restarting the gabapentin, but only take a noc first.  Both wounds are dried eschar.  Will utilize hydrogel with lidocaine to start debriding off/softening the eschar. I have also ordered santyl. Patient placed into spandagrips for compression    HPI PRIOR TO OCTOBER 2024 SEE INDIVIDUAL PROGRESS NOTES  9-10-24 patient returns.   She did get her labs drawn last week and she has an appointment with Dr. Farmer on 9-17-24. Her esr is up slightly from July however I suspect this may be related to the decrease in her po steroid.   Last week we added gina and continued with the xeroform.  We also utilize the debrisoft to cleanse the wounds and periwounds.  Today, wounds are improved, but the gina appears to just have stayed in the wound bed and did not integrate, therefore will not utilize gina this week. Her pain remains mostly on the lateral ankle.  We no longer have the accumulating exudate, overall her skin is much improved and more healthy in appearance.  No s/s of infection.    9-17-24 patient returns.  She is coming from an appointment with Dr. Farmer, plan is to keep meds same at this time.  Wounds have been improving (albeit slowly) in size and wound bed characteristics. The medial  wounds are fully resolved, bilaterally, she is remaining with lateral ankle wounds bilaterally. She continues with the riley.  No s/s of infection. C/o pain with any touch.    9-24-24 patient returns.  She got her retinal exam done as ordered by dr. Farmer this morning, spouse reports everything is good.   Last week the medial wounds were resolved, however today there is a small open area on the left medial distal leg (almost to the heel).  Patient/spouse report that she has not worn any other shoes. There is some surrounding hyperemia to this wound, but patient does not report any increase in pain to the area as compared to other weeks.  The lateral wounds appear a bit moist.  Will utilize ag alginate (instead of xeroform) will also utilize clobetesol topically.'    10-1-24 patient returns.  Last week we utilize ag alginate as the wounds/periwounds were moist. Today wounds are improved.  She did bring the polymem ag.  Patient has a blancheable area along anterior ankle on the left.  Will order spandagrip and silicone tape from [facility].  I will also rx topical steroid. Patient will change dressings 1 x this week. No s/s of infection. She states the right lower extremity pain is improved. She still has burning on the left.   MEDICATIONS:     Current Outpatient Medications:     clobetasol 0.05 % External Solution, Apply topically to lower legs after shower 3 x weekly, Disp: 50 mL, Rfl: 0    amLODIPine 2.5 MG Oral Tab, Take 1 tablet (2.5 mg total) by mouth 2 (two) times daily., Disp: 180 tablet, Rfl: 0    hydroxychloroquine 200 MG Oral Tab, Take 1 tablet (200 mg total) by mouth daily., Disp: 90 tablet, Rfl: 0    predniSONE 2.5 MG Oral Tab, Take 1 tablet (2.5 mg total) by mouth daily., Disp: 90 tablet, Rfl: 0    gabapentin 100 MG Oral Cap, Take 100mg in am and 200mg at bedtime., Disp: , Rfl:     mycophenolate mofetil 250 MG Oral Cap, Take 1 capsule (250 mg total) by mouth at bedtime., Disp: 90 capsule, Rfl: 0    Ferrous  Sulfate 325 (65 Fe) MG Oral Tab, Take 1 tablet (325 mg total) by mouth 2 (two) times daily with meals., Disp: 180 tablet, Rfl: 0    Mycophenolate Mofetil 500 MG Oral Tab, Take 1 tablet (500 mg total) by mouth daily., Disp: 90 tablet, Rfl: 0  ALLERGIES:   No Known Allergies   REVIEW OF SYSTEMS:   This information was obtained from the patient/family and chart.    See HPI for pertinent positives, otherwise 10 pt ROS negative.    HISTORY:   Past medical, surgical, family and social history updated where appropriate.    PHYSICAL EXAM:     Vitals:    10/01/24 1008   BP: 104/70   Pulse: 82   Resp: 14   Temp: 98 °F (36.7 °C)         Estimated body mass index is 22.5 kg/m² as calculated from the following:    Height as of 9/17/24: 62\".    Weight as of 9/17/24: 123 lb (55.8 kg).   No results found for: \"PGLU\"    Vital signs reviewed.Appears stated age, well groomed.    Constitutional:  Bp wnl for patient. Pulse Regular and wnl for patient. Respirations easy and unlabored. Temperature wnl. Weight normal for height. Appearance neat and clean. Appears in no acute distress. Well nourished and well developed.    Lower extremity:  dp/pt palpable bilaterally. Extremities are free of varicosities and edema, they are scarred, scattered changes in pigmentation (hypo/hyper) with atrophie jay. Capillary refill < 3 seconds. Digits are warm. toenails are wnl for color, thickness and hygeine. Skin hydration wnl. + hairgrowth on legs.    Musculoskeletal:  Gait and station stable   Integumentary:  refer to wound characteristics and images   Psychiatric:  Judgment and insight intact. Alert and oriented times 3. No evidence of depression, anxiety, or agitation. Calm, cooperative, and communicative, but very quiet and reserved.  EDEMA:   Calf  Point of Measurement - Left Calf: 31  Point of Measurement - Right Calf: 31     Calf Left cm:: 27.8     Right Calf cm:: 29.8  Ankle  Point of Measurement - Left Ankle: 11  Point of Measurement - Right  Ankle: 11     Left Ankle cm:: 17        Right Ankle cm:: 16.8     DIAGNOSTICS:     Lab Results   Component Value Date    BUN 9 09/03/2024    CREATSERUM 0.58 09/03/2024    GFRCKDEPI 121.37 12/17/2020    ALB 4.9 (H) 09/03/2024    TP 8.0 09/03/2024    A1C 5.1 12/17/2020     Lab Results   Component Value Date    ESRML 47 (H) 09/03/2024    ESRML 39 (H) 07/23/2024    ESRML 54 (H) 03/18/2024      Lab Results   Component Value Date    CRP 0.50 09/03/2024    CRP <0.40 07/23/2024    CRP 0.37 (H) 03/18/2024 11-28-23 arterial duplex-dr santamaria  FINDINGS:    LEFT EXTREMITY:  Triphasic waveforms in the left common femoral, profunda, superficial femoral arteries.  Biphasic waveforms below the left knee.   OTHER:  None.     PEAK VELOCITIES (CM/S):   LEFT COMMON FEMORAL:      165    LEFT PROFUNDA FEMORAL:      78      LEFT SUPERFICIAL FEMORAL PROX:    113   LEFT SUPERFICIAL FEMORAL MID:      94   LEFT SUPERFICIAL FEMORAL DISTAL:    92   LEFT POPLITEAL PROX:      110   LEFT POPLITEAL DISTAL:      121   TIBIOPER TRUNK:      66   LEFT PTA PROX:      60   LEFT PTA MID:      62   LEFT PTA DISTAL:      73   LEFT BETH PROX:      82   LEFT BETH MID:      72    LEFT DORSALIS PEDIS::      30   LEFT GREAT TOE PRESSURE:      25 mmHg   CONCLUSION:    1. No high-grade stenosis is identified.  There is suggestion of diffuse mild stenosis throughout most of the left lower extremity.    2. Overall diminished left toe pressure is noted.  Possibility of decreased perfusion in the distal left foot is of consideration.     7-14-20 venous reflux study-dr santamaria  Per Dr. Santamaria review \"no intervention needed\"  WOUND ASSESSMENT:     Wound 04/26/24 #2 Ankle Right;Lateral (Active)   Date First Assessed/Time First Assessed: 04/26/24 1031    Wound Number (Wound Clinic Only): #2  Primary Wound Type: (c) Auto-immune  Location: Ankle  Wound Location Orientation: Right;Lateral      Assessments 4/26/2024 10:34 AM 10/1/2024 10:03 AM   Wound Image       Drainage  Amount Unable to assess Scant   Drainage Description -- Serosanguineous   Treatments Compression --   Wound Length (cm) 0.5 cm 1 cm   Wound Width (cm) 0.7 cm 0.7 cm   Wound Surface Area (cm^2) 0.35 cm^2 0.7 cm^2   Wound Depth (cm) 0.1 cm 0.1 cm   Wound Volume (cm^3) 0.035 cm^3 0.07 cm^3   Wound Healing % -- -100   Margins Well-defined edges Well-defined edges   Non-staged Wound Description Full thickness Full thickness   Soni-wound Assessment Dry;Edema;Atrophy jay Hemosiderin staining   Wound Bed Epithelium (%) -- 5 %   Wound Odor None None   Shape 100% scabbed 95% scabbed       Inactive Orders   Date Order Priority Status Authorizing Provider   06/19/24 1145 Debridement Auto-immune Right;Lateral Ankle Routine Completed Kaz Tapia MD       Compression Wrap 05/21/24 Ankle Anterior;Left (Active)   Placement Date/Time: 05/21/24 1019   Location: Ankle  Wound Location Orientation: Anterior;Left      Assessments 5/21/2024  9:36 AM 9/24/2024  1:35 PM   Response to Treatment Well tolerated Well tolerated   Compression Layers Multilayer Multilayer   Compression Product Type Unna Boot Unna Boot   Dressing Applied Yes Yes   Compression Wrap Location Toes to Knee Toes to Knee   Compression Wrap Status Clean;Dry;Intact Clean;Dry       No associated orders.       Compression Wrap 05/28/24 Ankle Anterior;Right (Active)   Placement Date/Time: 05/28/24 1012   Location: Ankle  Wound Location Orientation: Anterior;Right      Assessments 5/28/2024  9:47 AM 9/24/2024  1:35 PM   Response to Treatment Well tolerated Well tolerated   Compression Layers Multilayer Multilayer   Compression Product Type Unna Boot Unna Boot   Dressing Applied Yes Yes   Compression Wrap Location Toes to Knee Toes to Knee   Compression Wrap Status Dry;Intact;Clean Clean;Dry       No associated orders.       Wound 07/23/24 #4 Leg Left;Lateral (Active)   Date First Assessed/Time First Assessed: 07/23/24 1137    Wound Number (Wound Clinic Only): #4   Primary Wound Type: (c) Other (comment)  Location: Leg  Wound Location Orientation: Left;Lateral      Assessments 7/23/2024 11:46 AM 10/1/2024 10:05 AM   Wound Image       Drainage Amount Unable to assess Scant   Drainage Description -- Serosanguineous   Treatments Compression --   Wound Length (cm) 2.3 cm 1.8 cm   Wound Width (cm) 1.3 cm 0.5 cm   Wound Surface Area (cm^2) 2.99 cm^2 0.9 cm^2   Wound Depth (cm) 0.1 cm 0.1 cm   Wound Volume (cm^3) 0.299 cm^3 0.09 cm^3   Wound Healing % -- 70   Margins Well-defined edges Well-defined edges   Non-staged Wound Description Full thickness Full thickness   Soni-wound Assessment Clean;Dry;Other (Comment) Hemosiderin staining   Wound Granulation Tissue Pale Grey;Pink;Firm Pink;Firm   Wound Bed Granulation (%) 45 % 20 %   Wound Bed Epithelium (%) 10 % 10 %   Wound Bed Slough (%) 45 % --   Wound Odor None None   Shape -- 70 scabbed, bridged       No associated orders.       Wound 09/24/24 #5 Left Medial Ankle Ankle Anterior;Left (Active)   Date First Assessed/Time First Assessed: 09/24/24 1349    Wound Number (Wound Clinic Only): #5 Left Medial Ankle  Primary Wound Type: (c) Other (comment)  Location: Ankle  Wound Location Orientation: Anterior;Left      Assessments 9/24/2024  1:50 PM 10/1/2024 10:07 AM   Wound Image       Drainage Amount Unable to assess None   Treatments Compression --   Wound Length (cm) 0.3 cm 0.3 cm   Wound Width (cm) 0.4 cm 0.3 cm   Wound Surface Area (cm^2) 0.12 cm^2 0.09 cm^2   Wound Depth (cm) 0.1 cm 0 cm   Wound Volume (cm^3) 0.012 cm^3 0 cm^3   Wound Healing % -- 100   Margins Well-defined edges Well-defined edges   Non-staged Wound Description Full thickness Full thickness   Soni-wound Assessment Dry Dry;Hemosiderin staining   Wound Bed Slough (%) 100 % --   Wound Odor None None   Shape -- 100% scabbed       No associated orders.          ASSESSMENT AND PLAN:    1. 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  Non-pressure chronic ulcer of left ankle with fat layer exposed (HCC)    3. Idiopathic chronic venous hypertension of both lower extremities with ulcer and inflammation (HCC)    4. Lupus vasculitis (HCC)    5. Mixed connective tissue disease (HCC)    6. Current chronic use of systemic steroids        Risks, benefits, and alternatives of current treatment plan discussed in detail.  Questions and concerns addressed. Red flags to RTC or ED reviewed.  Patient (or parent) agrees to plan.      NOTE TO PATIENT: The 21st Century Cures Act makes clinical notes like these available to patients in the interest of transparency. Clinical notes are medical documents used by physicians and care providers to communicate with each other. These documents include medical language and terminology, abbreviations, and treatment information that may sound technical and at times possibly unfamiliar. In addition, at times, the verbiage may appear blunt or direct. These documents are one tool providers use to communicate relevant information and clinical opinions of the care providers in a way that allows common understanding of the clinical context.   I [id number] with the patient. This time included:    preparing to see the patient (eg, review notes and recent diagnostics),  seeing the patient, obtaining and/or reviewing separately obtained history, performing a medically appropriate examination and/or evaluation, counseling and educating the patient, documenting in the record, rx  DISCHARGE INSTRUCTIONS     Patient Instructions   Please return:  1 week - bring the grey polymem foam with you    Patient discharge and wound care instructions  Raymond Olivo  10/1/2024           You may shower and cleanse area with mild soap and water, rinse wound with saline or wound cleanser, dab dry with gauze apply moisturizer and topical clobetesol, then your polymem ag dressing/secure with silicone tape and spandagrip     DO NOT LEAVE WOUNDS  OPEN TO AIR, COVER IMMEDIATELY AFTER SHOWER        Nutrition and blood sugar control:  Focus on the following:  Protein: Meats, beans, eggs, milk and yogurt particularly Greek yogurt), tofu, soy nuts, soy protein products (Follow the protein handout in your welcome folder)  Vitamin C: Citrus fruits and juices, strawberries, tomatoes, tomato juice, peppers, baked potatoes, spinach, broccoli, cauliflower, Shattuck sprouts, cabbage  Vitamin A: Dark green, leafy vegetables, orange or yellow vegetables, cantaloupe, fortified dairy products, liver, fortified cereals  Zinc: Fortified cereals, red meats, seafood  Consider supplementing with Justin by [employer]. It can be purchased on amazon, [employer] website, or local pharmacy may be able to order it for you.  (These are essential branch chain amino acids that help with tissue building and wound healing).     Concerns:  Signs of infection may include the following:  Increase in redness  Red \"streaks\" from wound  Increase in swelling  Fever  Unusual odor  Change in the amount of wound drainage     Should you experience any significant changes in your wound(s) or have any questions regarding your home care instructions please contact the wound center [facility] @ [phone] If after regular business hours, please call your family doctor or local emergency room. The treatment plan has been discussed at length between you and your provider. Follow all instructions carefully, it is very important. If you do not follow all instructions you are at risk of your wound not healing, infection, possible loss of limb and even loss of life.        Martina Mckeon FNP-C, CWCN-AP, CFCN, CSWS, WCC, DWC  10/1/2024         .Weekly Wound Education Note    Teaching Provided To: Patient;Family  Training Topics: Dressing;Edema control;Compression;Cleasing and general instructions;Discharge instructions  Training Method: Explain/Verbal;Written  Training Response: Patient responds and  understands        Notes: Wounds improving. Dressing changed to polymem silver max, and silicone tape. Compression decreased to spandagrip D. Will order spandagrip and silicone tape this visit.        Wound Treatment Orders:  No orders of the defined types were placed in this encounter.        Wound Information/Order:  Wound Number: All wounds  Product:Other Spandagrip size D roll, Safe N Simple silicone tape  Dispense: 30 days  Dressing Frequency:Change dressing 2x per week    Was a Debridement performed: Yes, Debridement type: mechanical    Compression Stockings ordered: No    Notes: Dispense as written    Additional wound: No

## (undated) NOTE — Clinical Note
IRAM Mack, my note isn't completed yet but I just saw her and she feels well overall. Will keep things status quo for now- lower dose of prednisone and higher dose of MMF at 750mg. I was considering increasing to 1000mg but they would like to continue current regimen. Thanks again! - Alisha